# Patient Record
Sex: MALE | Race: WHITE | NOT HISPANIC OR LATINO | Employment: OTHER | URBAN - METROPOLITAN AREA
[De-identification: names, ages, dates, MRNs, and addresses within clinical notes are randomized per-mention and may not be internally consistent; named-entity substitution may affect disease eponyms.]

---

## 2017-01-10 ENCOUNTER — ANTI-COAG VISIT (OUTPATIENT)
Dept: CARDIOLOGY | Facility: CLINIC | Age: 77
End: 2017-01-10
Payer: MEDICARE

## 2017-01-10 DIAGNOSIS — Z79.01 LONG TERM (CURRENT) USE OF ANTICOAGULANTS: Primary | ICD-10-CM

## 2017-01-10 LAB
CTP QC/QA: ABNORMAL
INR PPP: 3.4 (ref 2–3)

## 2017-01-10 PROCEDURE — 85610 PROTHROMBIN TIME: CPT | Mod: QW,S$GLB,,

## 2017-01-10 PROCEDURE — 99211 OFF/OP EST MAY X REQ PHY/QHP: CPT | Mod: 25,S$GLB,,

## 2017-01-10 NOTE — PROGRESS NOTES
INR remains supra-therapeutic. No abnormal bleeding issues.  Will lower total weekly dose. Begin 2.5mg Tuesdays and Thursdays and 5mg all other days. Patient reports no bleeding or bruising, no new medications.  I reminded the patient to call with any problems, changes or questions before the next visit.

## 2017-01-10 NOTE — MR AVS SNAPSHOT
[facility]  [address]  [place] [address]  Phone: [phone]  Fax: [phone]                  [place] DEJA Biggs Jr.   1/10/2017 7:30 AM   Anti-coag visit    Description:  Male : 1940   Provider:  Ly Izaguirre PharmD   Department:  [facility]           Diagnoses this Visit        Comments    Long term (current) use of anticoagulants    -  Primary            To Do List           Future Appointments        Provider Department Dept Phone    1/10/2017 7:30 AM Ly Izaguirre PharmD [facility] [phone]    2017 7:30 AM Ly Izaguirre PharmD [facility] [phone]    2017 9:30 AM LAURA Carmona [facility] [phone]    3/16/2017 7:20 AM LABORATORY, [facility] [phone]    3/23/2017 8:20 AM Jeronimo Barbour NP [facility] [phone]      Goals (5 Years of Data)     None      [facility]     [facility] -  Assistance  Registered nurses in the [facility] provide clinical advisement, health education, appointment booking, and other advisory services.  Call for this free service at [phone].             Medications           Message regarding Medications     Verify the changes and/or additions to your medication regime listed below are the same as discussed with your clinician today.  If any of these changes or additions are incorrect, please notify your healthcare provider.             Verify that the below list of medications is an accurate representation of the medications you are currently taking.  If none reported, the list may be blank. If incorrect, please contact your healthcare provider. Carry this list with you in case of emergency.           Current Medications     amlodipine (NORVASC) 5 MG tablet Take 1 tablet (5 mg total) by mouth once daily.    atorvastatin (LIPITOR) 40 MG tablet TAKE ONE TABLET BY MOUTH DAILY     metoprolol succinate (TOPROL-XL) 25 MG 24 hr tablet TAKE 1 TABLET BY MOUTH TWICE DAILY    tadalafil (CIALIS) 20 MG Tab Take 1 tablet (20 mg total) by mouth daily as needed. 1 Tablet Oral Every other day    trazodone (DESYREL) 50 MG tablet     valsartan (DIOVAN) 160 MG tablet Take 1 tablet (160 mg total) by mouth once daily.    warfarin (COUMADIN) 5 MG tablet Take 1 tablet by mouth as directed by the Coumadin Clinic.    zolpidem (AMBIEN) 5 MG Tab TAKE 1 TABLET (5 MG TOTAL) BY MOUTH NIGHTLY AS NEEDED.           Clinical Reference Information           Allergies as of 1/10/2017     No Known Allergies      Immunizations Administered on Date of Encounter - 1/10/2017     None      Orders Placed During Today's Visit      Normal Orders This Visit    POCT PT/INR          1/10/2017  7:25 AM - Ly Izagiurre, PharmD      Component Results     Component Value Flag Ref Range Units Status    INR 3.4 (A) 2.0 - 3.0  Final     Acceptable           December 2016 Details    Sun Mon Tue Wed Thu Fri Sat         1               2               3                 4               5               6   3.1   2.5 mg   See details      7      5 mg         8      5 mg         9      5 mg         10      5 mg           11      5 mg         12      5 mg         13      5 mg         14      5 mg         15      5 mg         16      5 mg         17      5 mg           18      5 mg         19      5 mg         20      5 mg         21      5 mg         22      5 mg         23      5 mg         24      5 mg           25      5 mg         26      5 mg         27      5 mg         28      5 mg         29      5 mg         30      5 mg         31      5 mg          Date Details   12/06 Last INR check   INR: 3.1                 January 2017 Details    Sun Mon Tue Wed Thu Fri Sat     1      5 mg         2      5 mg         3      5 mg         4      5 mg         5      5 mg         6      5 mg         7      5 mg           8      5 mg          9      5 mg         10   3.4   2.5 mg   See details      11      5 mg         12      2.5 mg         13      5 mg         14      5 mg           15      5 mg         16      5 mg         17      2.5 mg         18      5 mg         19      2.5 mg         20      5 mg         21      5 mg           22      5 mg         23      5 mg         24      2.5 mg         25      5 mg         26      2.5 mg         27      5 mg         28      5 mg           29      5 mg         30      5 mg         31                 Date Details   01/10 This INR check   INR: 3.4       Date of next INR:  1/31/2017               How to take your warfarin dose     To take:  2.5 mg Take 0.5 of a 5 mg tablet.    To take:  5 mg Take 1 of the 5 mg tablets.           Anticoagulation Summary as of 1/10/2017     Maintenance plan 2.5 mg (5 mg x 0.5) on Tue, Thu; 5 mg (5 mg x 1) all other days    Full instructions 2.5 mg on Tue, Thu; 5 mg all other days    Next INR check 1/31/2017      Anticoagulation Episode Summary     Comments       Patient Findings     Negatives Signs/symptoms of thrombosis, Signs/symptoms of bleeding, Laboratory test error suspected, Change in health, Change in alcohol use, Change in activity, Upcoming invasive procedure, Emergency department visit, Upcoming dental procedure, Missed doses, Extra doses, Change in medications, Change in diet/appetite, Hospital admission, Bruising, Other complaints      [facility] Sign-Up     Activating your [facility] account is as easy as 1-2-3!     1) Visit [email or web address], select Sign Up Now, enter this activation code and your date of birth, then select Next.  [email or web address]  Expires: 2/24/2017  7:26 AM      2) Create a username and password to use when you visit [facility] in the future and select a security question in case you lose your password and select Next.    3) Enter your e-mail address and click Sign Up!    Additional Information  If you have questions, please e-mail  [email or web address] or call [phone] to talk to our [facility] staff. Remember, [facility] is NOT to be used for urgent needs. For medical emergencies, dial 911.

## 2017-01-31 ENCOUNTER — ANTI-COAG VISIT (OUTPATIENT)
Dept: CARDIOLOGY | Facility: CLINIC | Age: 77
End: 2017-01-31
Payer: MEDICARE

## 2017-01-31 DIAGNOSIS — Z79.01 LONG TERM (CURRENT) USE OF ANTICOAGULANTS: Primary | ICD-10-CM

## 2017-01-31 LAB — INR PPP: 2 (ref 2–3)

## 2017-01-31 PROCEDURE — 85610 PROTHROMBIN TIME: CPT | Mod: QW,S$GLB,,

## 2017-01-31 PROCEDURE — 99211 OFF/OP EST MAY X REQ PHY/QHP: CPT | Mod: 25,S$GLB,,

## 2017-01-31 RX ORDER — WARFARIN SODIUM 5 MG/1
TABLET ORAL
Qty: 30 TABLET | Refills: 3 | Status: SHIPPED | OUTPATIENT
Start: 2017-01-31 | End: 2017-06-20 | Stop reason: SDUPTHER

## 2017-01-31 NOTE — PROGRESS NOTES
INR today is 2.0. Will gently increase total weekly dose until follow-up. Patient reports no bleeding or bruising, no new medications and no diet changes.  I reminded the patient to call with any problems, changes or questions before the next visit.

## 2017-01-31 NOTE — MR AVS SNAPSHOT
[facility]  [address]  [place] [address]  Phone: [phone]  Fax: [phone]                  [place] DEJA Biggs Jr.   2017 7:30 AM   Anti-coag visit    Description:  Male : 1940   Provider:  Ly Izaguirre PharmD   Department:  [facility]           Diagnoses this Visit        Comments    Long term (current) use of anticoagulants    -  Primary            To Do List           Future Appointments        Provider Department Dept Phone    2017 7:30 AM Ly Izaguirre PharmD [facility] [phone]    2017 9:30 AM LAURA Carmona [facility] [phone]    3/7/2017 7:30 AM Ly Izaguirre PharmD [facility] [phone]    3/16/2017 7:20 AM LABORATORY, [facility] [phone]    3/23/2017 8:20 AM Jeronimo Barbour NP [facility] [phone]      Goals (5 Years of Data)     None      [facility]     [facility] -  Assistance  Registered nurses in the [facility] provide clinical advisement, health education, appointment booking, and other advisory services.  Call for this free service at [phone].             Medications           Message regarding Medications     Verify the changes and/or additions to your medication regime listed below are the same as discussed with your clinician today.  If any of these changes or additions are incorrect, please notify your healthcare provider.             Verify that the below list of medications is an accurate representation of the medications you are currently taking.  If none reported, the list may be blank. If incorrect, please contact your healthcare provider. Carry this list with you in case of emergency.           Current Medications     amlodipine (NORVASC) 5 MG tablet Take 1 tablet (5 mg total) by mouth once daily.    atorvastatin (LIPITOR) 40 MG tablet TAKE ONE TABLET BY MOUTH DAILY     metoprolol succinate (TOPROL-XL) 25 MG 24 hr tablet TAKE 1 TABLET BY MOUTH TWICE DAILY    tadalafil (CIALIS) 20 MG Tab Take 1 tablet (20 mg total) by mouth daily as needed. 1 Tablet Oral Every other day    trazodone (DESYREL) 50 MG tablet     valsartan (DIOVAN) 160 MG tablet Take 1 tablet (160 mg total) by mouth once daily.    warfarin (COUMADIN) 5 MG tablet Take 1 tablet by mouth as directed by the Coumadin Clinic.    zolpidem (AMBIEN) 5 MG Tab TAKE 1 TABLET (5 MG TOTAL) BY MOUTH NIGHTLY AS NEEDED.           Clinical Reference Information           Allergies as of 1/31/2017     No Known Allergies      Immunizations Administered on Date of Encounter - 1/31/2017     None      Orders Placed During Today's Visit      Normal Orders This Visit    POCT INR          1/31/2017  7:28 AM - Amy AlvaradoD      Component Results     Component Value Flag Ref Range Units Status    INR 2.0  2.0 - 3.0  Final      January 2017 Details    Sun Mon Tue Wed Thu Fri Sat     1      5 mg         2      5 mg         3      5 mg         4      5 mg         5      5 mg         6      5 mg         7      5 mg           8      5 mg         9      5 mg         10   3.4   2.5 mg   See details      11      5 mg         12      2.5 mg         13      5 mg         14      5 mg           15      5 mg         16      5 mg         17      2.5 mg         18      5 mg         19      2.5 mg         20      5 mg         21      5 mg           22      5 mg         23      5 mg         24      2.5 mg         25      5 mg         26      2.5 mg         27      5 mg         28      5 mg           29      5 mg         30      5 mg         31   2.0   5 mg   See details           Date Details   01/10 Last INR check   INR: 3.4      01/31 This INR check   INR: 2.0                     How to take your warfarin dose     To take:  5 mg Take 1 of the 5 mg tablets.           February 2017 Details    Sun Mon Tue Wed Thu Fri Sat        1      5 mg         2       2.5 mg         3      5 mg         4      5 mg           5      5 mg         6      5 mg         7      5 mg         8      5 mg         9      2.5 mg         10      5 mg         11      5 mg           12      5 mg         13      5 mg         14      5 mg         15      5 mg         16      2.5 mg         17      5 mg         18      5 mg           19      5 mg         20      5 mg         21      5 mg         22      5 mg         23      2.5 mg         24      5 mg         25      5 mg           26      5 mg         27      5 mg         28      5 mg              Date Details   No additional details            How to take your warfarin dose     To take:  2.5 mg Take 0.5 of a 5 mg tablet.    To take:  5 mg Take 1 of the 5 mg tablets.           March 2017 Details    Sun Mon Tue Wed Thu Fri Sat        1      5 mg         2      2.5 mg         3      5 mg         4      5 mg           5      5 mg         6      5 mg         7            8               9               10               11                 12               13               14               15               16               17               18                 19               20               21               22               23               24               25                 26               27               28               29               30               31                 Date Details   No additional details    Date of next INR:  3/7/2017         How to take your warfarin dose     To take:  2.5 mg Take 0.5 of a 5 mg tablet.    To take:  5 mg Take 1 of the 5 mg tablets.           Anticoagulation Summary as of 1/31/2017     Maintenance plan 2.5 mg (5 mg x 0.5) on Thu; 5 mg (5 mg x 1) all other days    Full instructions 2.5 mg on Thu; 5 mg all other days    Next INR check 3/7/2017      Anticoagulation Episode Summary     Comments       [facility] Sign-Up     Activating your [facility] account is as easy as 1-2-3!     1) Visit [email or web address], select  Sign Up Now, enter this activation code and your date of birth, then select Next.  [email or web address]  Expires: 2/24/2017  7:26 AM      2) Create a username and password to use when you visit [facility] in the future and select a security question in case you lose your password and select Next.    3) Enter your e-mail address and click Sign Up!    Additional Information  If you have questions, please e-mail [email or web address] or call [phone] to talk to our [facility] staff. Remember, [facility] is NOT to be used for urgent needs. For medical emergencies, dial 911.

## 2017-02-07 RX ORDER — AMLODIPINE BESYLATE 5 MG/1
5 TABLET ORAL DAILY
Qty: 30 TABLET | Refills: 6 | Status: SHIPPED | OUTPATIENT
Start: 2017-02-07 | End: 2017-09-14 | Stop reason: SDUPTHER

## 2017-02-14 RX ORDER — VALSARTAN 160 MG/1
160 TABLET ORAL DAILY
Qty: 30 TABLET | Refills: 11 | Status: SHIPPED | OUTPATIENT
Start: 2017-02-14 | End: 2018-02-12 | Stop reason: SDUPTHER

## 2017-02-27 ENCOUNTER — OFFICE VISIT (OUTPATIENT)
Dept: CARDIOLOGY | Facility: CLINIC | Age: 77
End: 2017-02-27
Payer: MEDICARE

## 2017-02-27 VITALS
DIASTOLIC BLOOD PRESSURE: 66 MMHG | HEIGHT: 67 IN | HEART RATE: 72 BPM | SYSTOLIC BLOOD PRESSURE: 120 MMHG | WEIGHT: 193.44 LBS | BODY MASS INDEX: 30.36 KG/M2

## 2017-02-27 DIAGNOSIS — E78.00 PURE HYPERCHOLESTEROLEMIA: ICD-10-CM

## 2017-02-27 DIAGNOSIS — I25.10 ATHEROSCLEROSIS OF NATIVE CORONARY ARTERY WITHOUT ANGINA PECTORIS, UNSPECIFIED WHETHER NATIVE OR TRANSPLANTED HEART: ICD-10-CM

## 2017-02-27 DIAGNOSIS — I10 ESSENTIAL HYPERTENSION: Primary | ICD-10-CM

## 2017-02-27 DIAGNOSIS — I48.91 ATRIAL FIBRILLATION, UNSPECIFIED TYPE: ICD-10-CM

## 2017-02-27 PROCEDURE — 3074F SYST BP LT 130 MM HG: CPT | Mod: S$GLB,,, | Performed by: NURSE PRACTITIONER

## 2017-02-27 PROCEDURE — 1160F RVW MEDS BY RX/DR IN RCRD: CPT | Mod: S$GLB,,, | Performed by: NURSE PRACTITIONER

## 2017-02-27 PROCEDURE — 1159F MED LIST DOCD IN RCRD: CPT | Mod: S$GLB,,, | Performed by: NURSE PRACTITIONER

## 2017-02-27 PROCEDURE — 3078F DIAST BP <80 MM HG: CPT | Mod: S$GLB,,, | Performed by: NURSE PRACTITIONER

## 2017-02-27 PROCEDURE — 1157F ADVNC CARE PLAN IN RCRD: CPT | Mod: S$GLB,,, | Performed by: NURSE PRACTITIONER

## 2017-02-27 PROCEDURE — 99499 UNLISTED E&M SERVICE: CPT | Mod: S$GLB,,, | Performed by: NURSE PRACTITIONER

## 2017-02-27 PROCEDURE — 99999 PR PBB SHADOW E&M-EST. PATIENT-LVL III: CPT | Mod: PBBFAC,,, | Performed by: NURSE PRACTITIONER

## 2017-02-27 PROCEDURE — 93000 ELECTROCARDIOGRAM COMPLETE: CPT | Mod: S$GLB,,, | Performed by: INTERNAL MEDICINE

## 2017-02-27 PROCEDURE — 99214 OFFICE O/P EST MOD 30 MIN: CPT | Mod: S$GLB,,, | Performed by: NURSE PRACTITIONER

## 2017-02-27 PROCEDURE — 1126F AMNT PAIN NOTED NONE PRSNT: CPT | Mod: S$GLB,,, | Performed by: NURSE PRACTITIONER

## 2017-02-27 RX ORDER — ASPIRIN 81 MG/1
81 TABLET ORAL DAILY
Status: ON HOLD | COMMUNITY
End: 2023-03-03 | Stop reason: HOSPADM
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
## 2017-02-27 NOTE — MR AVS SNAPSHOT
[facility]  [address] [facility] [address]  Phone: [phone]  Fax: [phone]                  [place] DEJA Biggs Jr.   2017 9:30 AM   Office Visit    Description:  Male : 1940   Provider:  LAURA Carmona   Department:  [facility]           Reason for Visit     Atrial Fibrillation     Hypertension           Diagnoses this Visit        Comments    Essential hypertension    -  Primary     Atherosclerosis of native coronary artery without angina pectoris, unspecified whether native or transplanted heart         Atrial fibrillation, unspecified type         Pure hypercholesterolemia                To Do List           Future Appointments        Provider Department Dept Phone    3/7/2017 7:30 AM Ly Izaguirre, PharmD '[facility] Coumadin [phone]    3/16/2017 7:20 AM LABORATORY, [facility] [phone]    3/23/2017 8:20 AM Jeronimo Barbour [facility] Internal Medicine [phone]    10/24/2017 8:30 AM Ryan Bermeo MD [facility] - Ophthalmology [phone]      Goals (5 Years of Data)     None      Follow-Up and Disposition     Return in about 6 months (around 2017) for HTN and A-fib .      [facility] On Call     [facility] On Call Nurse Care Line -  Assistance  Registered nurses in the [facility] On Call Center provide clinical advisement, health education, appointment booking, and other advisory services.  Call for this free service at [phone].             Medications           Message regarding Medications     Verify the changes and/or additions to your medication regime listed below are the same as discussed with your clinician today.  If any of these changes or additions are incorrect, please notify your healthcare provider.        STOP taking these medications     trazodone (DESYREL) 50 MG tablet     zolpidem (AMBIEN) 5 MG Tab TAKE 1 TABLET (5 MG TOTAL) BY MOUTH NIGHTLY AS NEEDED.           Verify that the  below list of medications is an accurate representation of the medications you are currently taking.  If none reported, the list may be blank. If incorrect, please contact your healthcare provider. Carry this list with you in case of emergency.           Current Medications     amlodipine (NORVASC) 5 MG tablet Take 1 tablet (5 mg total) by mouth once daily.    aspirin (ECOTRIN) 81 MG EC tablet Take 81 mg by mouth once daily.    atorvastatin (LIPITOR) 40 MG tablet TAKE ONE TABLET BY MOUTH DAILY    metoprolol succinate (TOPROL-XL) 25 MG 24 hr tablet TAKE 1 TABLET BY MOUTH TWICE DAILY    tadalafil (CIALIS) 20 MG Tab Take 1 tablet (20 mg total) by mouth daily as needed. 1 Tablet Oral Every other day    valsartan (DIOVAN) 160 MG tablet Take 1 tablet (160 mg total) by mouth once daily.    warfarin (COUMADIN) 5 MG tablet Take 1/2 tablet on Thursdays and 1 tablet all other days as directed by the Coumadin Clinic.           Clinical Reference Information           Your Vitals Were     BP                   120/66 (BP Location: Right arm, Patient Position: Sitting, BP Method: Manual)           Blood Pressure          Most Recent Value    BP  120/66      Allergies as of 2/27/2017     No Known Allergies      Immunizations Administered on Date of Encounter - 2/27/2017     None      Orders Placed During Today's Visit     Future Labs/Procedures Expected by Expires    EKG 12-lead  As directed 2/27/2018      [facility] Sign-Up     Activating your [facility] account is as easy as 1-2-3!     1) Visit [email or web address], select Sign Up Now, enter this activation code and your date of birth, then select Next.  [email or web address]  Expires: 4/13/2017  9:35 AM      2) Create a username and password to use when you visit [facility] in the future and select a security question in case you lose your password and select Next.    3) Enter your e-mail address and click Sign Up!    Additional Information  If you have questions, please e-mail  [email or web address] or call [phone] to talk to our [facility] staff. Remember, [facility] is NOT to be used for urgent needs. For medical emergencies, dial 911.         Language Assistance Services     ATTENTION: Language assistance services are available, free of charge. Please call [phone].      ATENCIÓN: Si habla español, tiene a schmidt disposición servicios gratuitos de asistencia lingüística. Llame al [phone].     CHÚ Ý: N?u b?n nói Ti?ng Vi?t, có các d?ch v? h? tr? ngôn ng? mi?n phí dành cho b?n. G?i s? [phone].         [facility] complies with applicable Federal civil rights laws and does not discriminate on the basis of race, color, national origin, age, disability, or sex.

## 2017-03-07 ENCOUNTER — ANTI-COAG VISIT (OUTPATIENT)
Dept: CARDIOLOGY | Facility: CLINIC | Age: 77
End: 2017-03-07
Payer: MEDICARE

## 2017-03-07 DIAGNOSIS — Z79.01 LONG TERM (CURRENT) USE OF ANTICOAGULANTS: Primary | ICD-10-CM

## 2017-03-07 LAB — INR PPP: 2.5 (ref 2–3)

## 2017-03-07 PROCEDURE — 85610 PROTHROMBIN TIME: CPT | Mod: QW,S$GLB,,

## 2017-03-07 PROCEDURE — 99211 OFF/OP EST MAY X REQ PHY/QHP: CPT | Mod: 25,S$GLB,,

## 2017-03-07 NOTE — PROGRESS NOTES
INR is now therapeutic. C/o some pains from repairing his home. No other compliants and no changes in medications. Continue dose and diet until follow-up.

## 2017-03-07 NOTE — MR AVS SNAPSHOT
[facility]  [address]  [place] [address]  Phone: [phone]  Fax: [phone]                  [place] DEJA Biggs Jr.   3/7/2017 7:30 AM   Anti-coag visit    Description:  Male : 1940   Provider:  Ly Izaguirre PharmD   Department:  [facility]           Diagnoses this Visit        Comments    Long term (current) use of anticoagulants    -  Primary            To Do List           Future Appointments        Provider Department Dept Phone    3/7/2017 7:30 AM Ly Izaguirre PharmD [facility] [phone]    3/16/2017 7:20 AM LABORATORY, [facility] [phone]    3/23/2017 8:20 AM Jeronimo Barbour NP [place] - Internal Medicine [phone]    2017 7:30 AM Ly Izaguirre PharmD [facility] [phone]    10/24/2017 8:30 AM Ryan Bermeo MD [facility] [phone]      Goals (5 Years of Data)     None      [facility] On [facility]     [facility] On Call Nurse [facility] -  Assistance  Registered nurses in the [facility] Center provide clinical advisement, health education, appointment booking, and other advisory services.  Call for this free service at [phone].             Medications           Message regarding Medications     Verify the changes and/or additions to your medication regime listed below are the same as discussed with your clinician today.  If any of these changes or additions are incorrect, please notify your healthcare provider.             Verify that the below list of medications is an accurate representation of the medications you are currently taking.  If none reported, the list may be blank. If incorrect, please contact your healthcare provider. Carry this list with you in case of emergency.           Current Medications     amlodipine (NORVASC) 5 MG tablet Take 1 tablet (5 mg total) by mouth once daily.    aspirin (ECOTRIN) 81 MG EC tablet Take 81 mg by mouth once daily.     atorvastatin (LIPITOR) 40 MG tablet TAKE ONE TABLET BY MOUTH DAILY    metoprolol succinate (TOPROL-XL) 25 MG 24 hr tablet TAKE 1 TABLET BY MOUTH TWICE DAILY    tadalafil (CIALIS) 20 MG Tab Take 1 tablet (20 mg total) by mouth daily as needed. 1 Tablet Oral Every other day    valsartan (DIOVAN) 160 MG tablet Take 1 tablet (160 mg total) by mouth once daily.    warfarin (COUMADIN) 5 MG tablet Take 1/2 tablet on Thursdays and 1 tablet all other days as directed by the Coumadin Clinic.           Clinical Reference Information           Allergies as of 3/7/2017     No Known Allergies      Immunizations Administered on Date of Encounter - 3/7/2017     None      Orders Placed During Today's Visit      Normal Orders This Visit    POCT INR          3/7/2017  7:21 AM - Ly Izaguirre, PharmD      Component Results     Component Value Flag Ref Range Units Status    INR 2.5  2.0 - 3.0  Final      January 2017 Details    Sun Mon Tue Wed Thu Fri Sat     1               2               3               4               5               6               7                 8               9               10               11               12               13               14                 15               16               17               18               19               20               21                 22               23               24               25               26               27               28                 29               30               31   2.0   5 mg   See details           Date Details   01/31 Last INR check   INR: 2.0                 February 2017 Details    Sun Mon Tue Wed Thu Fri Sat        1      5 mg         2      2.5 mg         3      5 mg         4      5 mg           5      5 mg         6      5 mg         7      5 mg         8      5 mg         9      2.5 mg         10      5 mg         11      5 mg           12      5 mg         13      5 mg         14      5 mg         15      5 mg         16       2.5 mg         17      5 mg         18      5 mg           19      5 mg         20      5 mg         21      5 mg         22      5 mg         23      2.5 mg         24      5 mg         25      5 mg           26      5 mg         27      5 mg         28      5 mg              Date Details   No additional details              March 2017 Details    Sun Mon Tue Wed Thu Fri Sat        1      5 mg         2      2.5 mg         3      5 mg         4      5 mg           5      5 mg         6      5 mg         7   2.5   5 mg   See details      8      5 mg         9      2.5 mg         10      5 mg         11      5 mg           12      5 mg         13      5 mg         14      5 mg         15      5 mg         16      2.5 mg         17      5 mg         18      5 mg           19      5 mg         20      5 mg         21      5 mg         22      5 mg         23      2.5 mg         24      5 mg         25      5 mg           26      5 mg         27      5 mg         28      5 mg         29      5 mg         30      2.5 mg         31      5 mg           Date Details   03/07 This INR check   INR: 2.5                     How to take your warfarin dose     To take:  2.5 mg Take 0.5 of a 5 mg tablet.    To take:  5 mg Take 1 of the 5 mg tablets.           April 2017 Details    Sun Mon Tue Wed Thu Fri Sat           1      5 mg           2      5 mg         3      5 mg         4            5               6               7               8                 9               10               11               12               13               14               15                 16               17               18               19               20               21               22                 23               24               25               26               27               28               29                 30                      Date Details   No additional details    Date of next INR:  4/4/2017         How to take your  warfarin dose     To take:  5 mg Take 1 of the 5 mg tablets.           Anticoagulation Summary as of 3/7/2017     Maintenance plan 2.5 mg (5 mg x 0.5) on Thu; 5 mg (5 mg x 1) all other days    Full instructions 2.5 mg on Thu; 5 mg all other days    Next INR check 4/4/2017      Anticoagulation Episode Summary     Comments       Patient Findings     Negatives Signs/symptoms of thrombosis, Signs/symptoms of bleeding, Laboratory test error suspected, Change in health, Change in alcohol use, Change in activity, Upcoming invasive procedure, Emergency department visit, Upcoming dental procedure, Missed doses, Extra doses, Change in medications, Change in diet/appetite, Hospital admission, Bruising, Other complaints      [facility] Sign-Up     Activating your [facility] account is as easy as 1-2-3!     1) Visit [email or web address], select Sign Up Now, enter this activation code and your date of birth, then select Next.  [email or web address]  Expires: 4/13/2017  9:35 AM      2) Create a username and password to use when you visit [facility] in the future and select a security question in case you lose your password and select Next.    3) Enter your e-mail address and click Sign Up!    Additional Information  If you have questions, please e-mail [email or web address] or call [phone] to talk to our [facility] staff. Remember, [facility] is NOT to be used for urgent needs. For medical emergencies, dial 911.         Language Assistance Services     ATTENTION: Language assistance services are available, free of charge. Please call [phone].      ATENCIÓN: Si habla español, tiene a schmidt disposición servicios gratuitos de asistencia lingüística. Llame al [phone].     CHÚ Ý: N?u b?n nói Ti?ng Vi?t, có các d?ch v? h? tr? ngôn ng? mi?n phí dành cho b?n. G?i s? [phone].         [facility] complies with applicable Federal civil rights laws and does not discriminate on the basis of race, color, national origin, age,  disability, or sex.

## 2017-03-15 DIAGNOSIS — E78.00 PURE HYPERCHOLESTEROLEMIA: Primary | ICD-10-CM

## 2017-03-15 RX ORDER — ATORVASTATIN CALCIUM 40 MG/1
TABLET, FILM COATED ORAL
Qty: 30 TABLET | Refills: 1 | Status: ON HOLD | OUTPATIENT
Start: 2017-03-15 | End: 2017-05-16 | Stop reason: HOSPADM

## 2017-03-15 RX ORDER — ATORVASTATIN CALCIUM 40 MG/1
40 TABLET, FILM COATED ORAL DAILY
Qty: 30 TABLET | Refills: 2 | Status: SHIPPED | OUTPATIENT
Start: 2017-03-15 | End: 2017-06-11 | Stop reason: SDUPTHER

## 2017-03-17 ENCOUNTER — LAB VISIT (OUTPATIENT)
Dept: LAB | Facility: HOSPITAL | Age: 77
End: 2017-03-17
Attending: INTERNAL MEDICINE
Payer: MEDICARE

## 2017-03-17 DIAGNOSIS — I10 ESSENTIAL HYPERTENSION: ICD-10-CM

## 2017-03-17 DIAGNOSIS — Z85.46 HISTORY OF PROSTATE CANCER: ICD-10-CM

## 2017-03-17 LAB
ALBUMIN SERPL BCP-MCNC: 4 G/DL
ALP SERPL-CCNC: 61 U/L
ALT SERPL W/O P-5'-P-CCNC: 36 U/L
ANION GAP SERPL CALC-SCNC: 7 MMOL/L
AST SERPL-CCNC: 30 U/L
BASOPHILS # BLD AUTO: 0.01 K/UL
BASOPHILS NFR BLD: 0.1 %
BILIRUB SERPL-MCNC: 0.9 MG/DL
BUN SERPL-MCNC: 17 MG/DL
CALCIUM SERPL-MCNC: 9.6 MG/DL
CHLORIDE SERPL-SCNC: 103 MMOL/L
CHOLEST/HDLC SERPL: 2.8 {RATIO}
CO2 SERPL-SCNC: 27 MMOL/L
COMPLEXED PSA SERPL-MCNC: <0.01 NG/ML
CREAT SERPL-MCNC: 0.9 MG/DL
DIFFERENTIAL METHOD: NORMAL
EOSINOPHIL # BLD AUTO: 0.2 K/UL
EOSINOPHIL NFR BLD: 3.2 %
ERYTHROCYTE [DISTWIDTH] IN BLOOD BY AUTOMATED COUNT: 12.9 %
EST. GFR  (AFRICAN AMERICAN): >60 ML/MIN/1.73 M^2
EST. GFR  (NON AFRICAN AMERICAN): >60 ML/MIN/1.73 M^2
GLUCOSE SERPL-MCNC: 94 MG/DL
HCT VFR BLD AUTO: 44.1 %
HDL/CHOLESTEROL RATIO: 35.4 %
HDLC SERPL-MCNC: 161 MG/DL
HDLC SERPL-MCNC: 57 MG/DL
HGB BLD-MCNC: 14.8 G/DL
LDLC SERPL CALC-MCNC: 91.4 MG/DL
LYMPHOCYTES # BLD AUTO: 2.9 K/UL
LYMPHOCYTES NFR BLD: 40 %
MCH RBC QN AUTO: 30.7 PG
MCHC RBC AUTO-ENTMCNC: 33.6 %
MCV RBC AUTO: 92 FL
MONOCYTES # BLD AUTO: 0.7 K/UL
MONOCYTES NFR BLD: 10.2 %
NEUTROPHILS # BLD AUTO: 3.4 K/UL
NEUTROPHILS NFR BLD: 46.2 %
NONHDLC SERPL-MCNC: 104 MG/DL
PLATELET # BLD AUTO: 243 K/UL
PMV BLD AUTO: 10.9 FL
POTASSIUM SERPL-SCNC: 4.8 MMOL/L
PROT SERPL-MCNC: 7.1 G/DL
RBC # BLD AUTO: 4.82 M/UL
SODIUM SERPL-SCNC: 137 MMOL/L
TRIGL SERPL-MCNC: 63 MG/DL
TSH SERPL DL<=0.005 MIU/L-ACNC: 1.06 UIU/ML
WBC # BLD AUTO: 7.25 K/UL

## 2017-03-17 PROCEDURE — 80053 COMPREHEN METABOLIC PANEL: CPT

## 2017-03-17 PROCEDURE — 36415 COLL VENOUS BLD VENIPUNCTURE: CPT | Mod: PO

## 2017-03-17 PROCEDURE — 80061 LIPID PANEL: CPT

## 2017-03-17 PROCEDURE — 84153 ASSAY OF PSA TOTAL: CPT

## 2017-03-17 PROCEDURE — 85025 COMPLETE CBC W/AUTO DIFF WBC: CPT

## 2017-03-17 PROCEDURE — 84443 ASSAY THYROID STIM HORMONE: CPT

## 2017-03-23 ENCOUNTER — OFFICE VISIT (OUTPATIENT)
Dept: INTERNAL MEDICINE | Facility: CLINIC | Age: 77
End: 2017-03-23
Payer: MEDICARE

## 2017-03-23 VITALS
WEIGHT: 196.88 LBS | HEART RATE: 72 BPM | DIASTOLIC BLOOD PRESSURE: 84 MMHG | BODY MASS INDEX: 30.9 KG/M2 | HEIGHT: 67 IN | SYSTOLIC BLOOD PRESSURE: 110 MMHG

## 2017-03-23 DIAGNOSIS — I10 ESSENTIAL HYPERTENSION: Primary | ICD-10-CM

## 2017-03-23 DIAGNOSIS — E78.00 PURE HYPERCHOLESTEROLEMIA: ICD-10-CM

## 2017-03-23 PROCEDURE — 1157F ADVNC CARE PLAN IN RCRD: CPT | Mod: S$GLB,,, | Performed by: NURSE PRACTITIONER

## 2017-03-23 PROCEDURE — 3079F DIAST BP 80-89 MM HG: CPT | Mod: S$GLB,,, | Performed by: NURSE PRACTITIONER

## 2017-03-23 PROCEDURE — 99214 OFFICE O/P EST MOD 30 MIN: CPT | Mod: S$GLB,,, | Performed by: NURSE PRACTITIONER

## 2017-03-23 PROCEDURE — 1126F AMNT PAIN NOTED NONE PRSNT: CPT | Mod: S$GLB,,, | Performed by: NURSE PRACTITIONER

## 2017-03-23 PROCEDURE — 1159F MED LIST DOCD IN RCRD: CPT | Mod: S$GLB,,, | Performed by: NURSE PRACTITIONER

## 2017-03-23 PROCEDURE — 1160F RVW MEDS BY RX/DR IN RCRD: CPT | Mod: S$GLB,,, | Performed by: NURSE PRACTITIONER

## 2017-03-23 PROCEDURE — 3074F SYST BP LT 130 MM HG: CPT | Mod: S$GLB,,, | Performed by: NURSE PRACTITIONER

## 2017-03-23 PROCEDURE — 99999 PR PBB SHADOW E&M-EST. PATIENT-LVL III: CPT | Mod: PBBFAC,,, | Performed by: NURSE PRACTITIONER

## 2017-03-23 NOTE — MR AVS SNAPSHOT
[facility]  [address] [place] [address]  Phone: [phone]                  [place] DEJA Biggs Jr.   3/23/2017 8:20 AM   Office Visit    Description:  Male : 1940   Provider:  Jeronimo Barbour NP   Department:  [facility]           Diagnoses this Visit        Comments    Essential hypertension    -  Primary     Pure hypercholesterolemia                To Do List           Future Appointments        Provider Department Dept Phone    2017 7:30 AM Ly Izaguirre, PharmD [facility] [phone]    2017 9:00 AM LABORATORY, [facility] [phone]    2017 8:20 AM Giuliano Moran MD [facility] Internal Medicine [phone]    10/24/2017 8:30 AM Ryan Bermeo MD [facility] [phone]      Goals (5 Years of Data)     None      Follow-Up and Disposition     Return in about 6 months (around 2017).      [facility] On Call     [facility] On Call Nurse [facility] -  Assistance  Registered nurses in the [facility] On Call Center provide clinical advisement, health education, appointment booking, and other advisory services.  Call for this free service at [phone].             Medications           Message regarding Medications     Verify the changes and/or additions to your medication regime listed below are the same as discussed with your clinician today.  If any of these changes or additions are incorrect, please notify your healthcare provider.             Verify that the below list of medications is an accurate representation of the medications you are currently taking.  If none reported, the list may be blank. If incorrect, please contact your healthcare provider. Carry this list with you in case of emergency.           Current Medications     amlodipine (NORVASC) 5 MG tablet Take 1 tablet (5 mg total) by mouth once daily.    aspirin (ECOTRIN) 81 MG EC tablet Take 81 mg by mouth once daily.     "atorvastatin (LIPITOR) 40 MG tablet TAKE ONE TABLET BY MOUTH DAILY    atorvastatin (LIPITOR) 40 MG tablet Take 1 tablet (40 mg total) by mouth once daily.    metoprolol succinate (TOPROL-XL) 25 MG 24 hr tablet TAKE 1 TABLET BY MOUTH TWICE DAILY    tadalafil (CIALIS) 20 MG Tab Take 1 tablet (20 mg total) by mouth daily as needed. 1 Tablet Oral Every other day    valsartan (DIOVAN) 160 MG tablet Take 1 tablet (160 mg total) by mouth once daily.    warfarin (COUMADIN) 5 MG tablet Take 1/2 tablet on Thursdays and 1 tablet all other days as directed by the Coumadin Clinic.           Clinical Reference Information           Your Vitals Were     BP Pulse Height Weight BMI    110/84 72 5' 7" (1.702 m) 89.3 kg (196 lb 13.9 oz) 30.83 kg/m2      Blood Pressure          Most Recent Value    BP  110/84      Allergies as of 3/23/2017     No Known Allergies      Immunizations Administered on Date of Encounter - 3/23/2017     None      Orders Placed During Today's Visit     Future Labs/Procedures Expected by Expires    Comprehensive metabolic panel  9/19/2017 (Approximate) 5/22/2018    Lipid panel  9/19/2017 (Approximate) 5/22/2018      [facility] Sign-Up     Activating your [facility] account is as easy as 1-2-3!     1) Visit [email or web address], select Sign Up Now, enter this activation code and your date of birth, then select Next.  [email or web address]  Expires: 4/13/2017 10:35 AM      2) Create a username and password to use when you visit [facility] in the future and select a security question in case you lose your password and select Next.    3) Enter your e-mail address and click Sign Up!    Additional Information  If you have questions, please e-mail [email or web address] or call [phone] to talk to our [facility] staff. Remember, [facility] is NOT to be used for urgent needs. For medical emergencies, dial 911.         Language Assistance Services     ATTENTION: Language assistance services are available, free of charge. Please " call [phone].      ATENCIÓN: Si habla español, tiene a schmidt disposición servicios gratuitos de asistencia lingüística. Llame al [phone].     CHÚ Ý: N?u b?n nói Ti?ng Vi?t, có các d?ch v? h? tr? ngôn ng? mi?n phí dành cho b?n. G?i s? [phone].         [facility] complies with applicable Federal civil rights laws and does not discriminate on the basis of race, color, national origin, age, disability, or sex.

## 2017-03-23 NOTE — PROGRESS NOTES
"Subjective:      Patient ID: Anthony Biggs Jr. is a 76 y.o. male.    Chief Complaint: No chief complaint on file.    HPI:  Patient is here for a follow up of his labs.  He is taking his meds as ordered, his BP is well-controlled.  Labs are acceptable.      Past Medical History:   Diagnosis Date    *Atrial fibrillation     Abnormal CXR     Atrial fibrillation     Coronary atherosclerosis of unspecified type of vessel, native or graft     History of prostate cancer 2007    prostatectomy    Pure hypercholesterolemia     Unspecified essential hypertension        Past Surgical History:   Procedure Laterality Date    ABDOMINAL HERNIA REPAIR      APPENDECTOMY      bladder sx      CARDIAC CATHETERIZATION      CATARACT EXTRACTION W/  INTRAOCULAR LENS IMPLANT  Restor OU    inguinal hernia      lung sx      PROSTATE SURGERY         Lab Results   Component Value Date    WBC 7.25 03/17/2017    HGB 14.8 03/17/2017    HCT 44.1 03/17/2017     03/17/2017    CHOL 161 03/17/2017    TRIG 63 03/17/2017    HDL 57 03/17/2017    ALT 36 03/17/2017    AST 30 03/17/2017     03/17/2017    K 4.8 03/17/2017     03/17/2017    CREATININE 0.9 03/17/2017    BUN 17 03/17/2017    CO2 27 03/17/2017    TSH 1.064 03/17/2017    PSA <0.010 08/20/2013    INR 2.5 03/07/2017       /84  Pulse 72  Ht 5' 7" (1.702 m)  Wt 89.3 kg (196 lb 13.9 oz)  BMI 30.83 kg/m2      Review of Systems   Constitutional: Negative for appetite change, chills, diaphoresis and fever.   HENT: Negative for congestion, ear pain, postnasal drip, rhinorrhea, sneezing, sore throat and trouble swallowing.    Eyes: Negative for photophobia, pain and visual disturbance.   Respiratory: Negative for apnea, cough, choking, chest tightness, shortness of breath and wheezing.    Cardiovascular: Negative for chest pain, palpitations and leg swelling.   Gastrointestinal: Negative for abdominal pain, constipation, diarrhea, nausea and vomiting. "   Genitourinary: Negative for decreased urine volume, difficulty urinating, dysuria, hematuria and urgency.   Musculoskeletal: Negative for arthralgias, gait problem, joint swelling and myalgias.   Skin: Negative for rash.   Neurological: Negative for dizziness, tremors, seizures, syncope, weakness, light-headedness, numbness and headaches.   Psychiatric/Behavioral: Negative for agitation, confusion, decreased concentration, hallucinations and sleep disturbance. The patient is not nervous/anxious.       Objective:     Physical Exam   Constitutional: He is oriented to person, place, and time. He appears well-developed and well-nourished. No distress.   Musculoskeletal:   Normal gait   Neurological: He is alert and oriented to person, place, and time.   Skin: Skin is warm and dry.   Psychiatric: He has a normal mood and affect. His behavior is normal.     Assessment:      1. Essential hypertension    2. Pure hypercholesterolemia      Plan:   Essential hypertension  -     Comprehensive metabolic panel; Future; Expected date: 9/19/17    Pure hypercholesterolemia  -     Lipid panel; Future; Expected date: 9/19/17      Meds remain the same.  Will see dr guidry in 6 months for labs and a physical    Current Outpatient Prescriptions:     amlodipine (NORVASC) 5 MG tablet, Take 1 tablet (5 mg total) by mouth once daily., Disp: 30 tablet, Rfl: 6    aspirin (ECOTRIN) 81 MG EC tablet, Take 81 mg by mouth once daily., Disp: , Rfl:     atorvastatin (LIPITOR) 40 MG tablet, TAKE ONE TABLET BY MOUTH DAILY, Disp: 30 tablet, Rfl: 1    atorvastatin (LIPITOR) 40 MG tablet, Take 1 tablet (40 mg total) by mouth once daily., Disp: 30 tablet, Rfl: 2    metoprolol succinate (TOPROL-XL) 25 MG 24 hr tablet, TAKE 1 TABLET BY MOUTH TWICE DAILY, Disp: 60 tablet, Rfl: 9    tadalafil (CIALIS) 20 MG Tab, Take 1 tablet (20 mg total) by mouth daily as needed. 1 Tablet Oral Every other day, Disp: 90 tablet, Rfl: 1    valsartan (DIOVAN) 160 MG  tablet, Take 1 tablet (160 mg total) by mouth once daily., Disp: 30 tablet, Rfl: 11    warfarin (COUMADIN) 5 MG tablet, Take 1/2 tablet on Thursdays and 1 tablet all other days as directed by the Coumadin Clinic., Disp: 30 tablet, Rfl: 3

## 2017-04-04 ENCOUNTER — ANTI-COAG VISIT (OUTPATIENT)
Dept: CARDIOLOGY | Facility: CLINIC | Age: 77
End: 2017-04-04
Payer: MEDICARE

## 2017-04-04 DIAGNOSIS — Z79.01 LONG TERM (CURRENT) USE OF ANTICOAGULANTS: Primary | ICD-10-CM

## 2017-04-04 LAB — INR PPP: 1.3 (ref 2–3)

## 2017-04-04 PROCEDURE — 99211 OFF/OP EST MAY X REQ PHY/QHP: CPT | Mod: 25,S$GLB,,

## 2017-04-04 PROCEDURE — 85610 PROTHROMBIN TIME: CPT | Mod: QW,S$GLB,,

## 2017-04-04 NOTE — PROGRESS NOTES
INR today is 1.3. Patient denies missed doses. He reports high vitamin k over the weekend. Will increase this week's dose and repeat INR in 1 week.

## 2017-04-04 NOTE — MR AVS SNAPSHOT
[facility]  [address] [facility]  [place] [address]  Phone: [phone]  Fax: [phone]                  [place] DEJA Biggs Jr.   2017 7:30 AM   Anti-coag visit    Description:  Male : 1940   Provider:  Ly Izaguirre PharmD   Department:  [facility]           Diagnoses this Visit        Comments    Long term (current) use of anticoagulants    -  Primary            To Do List           Future Appointments        Provider Department Dept Phone    2017 7:30 AM Amy AlvaradoD [facility] [phone]    2017 9:00 AM LABORATORY, [facility] [phone]    2017 8:20 AM Giuliano Moran MD [facility] Internal Medicine [phone]    10/24/2017 8:30 AM Ryan Bermeo MD [facility] [phone]      Goals (5 Years of Data)     None      [facility]     [facility] Nurse Care [facility] -  Assistance  Unless otherwise directed by your provider, please contact [facility], our nurse care line that is available for  assistance.     Registered nurses in the [facility] provide: appointment scheduling, clinical advisement, health education, and other advisory services.  Call: [phone] (toll free)               Medications           Message regarding Medications     Verify the changes and/or additions to your medication regime listed below are the same as discussed with your clinician today.  If any of these changes or additions are incorrect, please notify your healthcare provider.             Verify that the below list of medications is an accurate representation of the medications you are currently taking.  If none reported, the list may be blank. If incorrect, please contact your healthcare provider. Carry this list with you in case of emergency.           Current Medications     amlodipine (NORVASC) 5 MG tablet Take 1 tablet (5 mg total) by mouth once daily.    aspirin (ECOTRIN)  81 MG EC tablet Take 81 mg by mouth once daily.    atorvastatin (LIPITOR) 40 MG tablet TAKE ONE TABLET BY MOUTH DAILY    atorvastatin (LIPITOR) 40 MG tablet Take 1 tablet (40 mg total) by mouth once daily.    metoprolol succinate (TOPROL-XL) 25 MG 24 hr tablet TAKE 1 TABLET BY MOUTH TWICE DAILY    tadalafil (CIALIS) 20 MG Tab Take 1 tablet (20 mg total) by mouth daily as needed. 1 Tablet Oral Every other day    valsartan (DIOVAN) 160 MG tablet Take 1 tablet (160 mg total) by mouth once daily.    warfarin (COUMADIN) 5 MG tablet Take 1/2 tablet on Thursdays and 1 tablet all other days as directed by the [facility].           Clinical Reference Information           Allergies as of 4/4/2017     No Known Allergies      Immunizations Administered on Date of Encounter - 4/4/2017     None      Orders Placed During Today's Visit      Normal Orders This Visit    POCT INR          4/4/2017  7:30 AM - Amy AlvaradoD      Component Results     Component Value Flag Ref Range Units Status    INR 1.3 (A) 2.0 - 3.0  Final      March 2017 Details    Sun Mon Tue Wed Thu Fri Sat        1               2               3               4                 5      5 mg         6      5 mg         7   2.5   5 mg   See details      8      5 mg         9      2.5 mg         10      5 mg         11      5 mg           12      5 mg         13      5 mg         14      5 mg         15      5 mg         16      2.5 mg         17      5 mg         18      5 mg           19      5 mg         20      5 mg         21      5 mg         22      5 mg         23      2.5 mg         24      5 mg         25      5 mg           26      5 mg         27      5 mg         28      5 mg         29      5 mg         30      2.5 mg         31      5 mg           Date Details   03/07 Last INR check   INR: 2.5                 April 2017 Details    Sun Mon Tue Wed Thu Fri Sat           1      5 mg           2      5 mg         3      5 mg         4    1.3   10 mg   See details      5      5 mg         6      5 mg         7      5 mg         8      5 mg           9      5 mg         10      5 mg         11            12               13               14               15                 16               17               18               19               20               21               22                 23               24               25               26               27               28               29                 30                      Date Details   04/04 This INR check   INR: 1.3       Date of next INR:  4/11/2017               How to take your warfarin dose     To take:  5 mg Take 1 of the 5 mg tablets.    To take:  10 mg Take 2 of the 5 mg tablets.           Anticoagulation Summary as of 4/4/2017     Maintenance plan 2.5 mg (5 mg x 0.5) on Thu; 5 mg (5 mg x 1) all other days    Full instructions 4/4: 10 mg; 4/6: 5 mg; Otherwise 2.5 mg on Thu; 5 mg all other days    Next INR check 4/11/2017      Anticoagulation Episode Summary     Comments       Patient Findings     Positives Change in diet/appetite    Negatives Signs/symptoms of thrombosis, Signs/symptoms of bleeding, Laboratory test error suspected, Change in health, Change in alcohol use, Change in activity, Upcoming invasive procedure, Emergency department visit, Upcoming dental procedure, Missed doses, Extra doses, Change in medications, Hospital admission, Bruising, Other complaints      [facility] Sign-Up     Activating your [facility] account is as easy as 1-2-3!     1) Visit [email or web address], select Sign Up Now, enter this activation code and your date of birth, then select Next.  [email or web address]  Expires: 4/13/2017 10:35 AM      2) Create a username and password to use when you visit [facility] in the future and select a security question in case you lose your password and select Next.    3) Enter your e-mail address and click Sign Up!    Additional Information  If you have questions, please  e-mail [email or web address] or call [phone] to talk to our [facility] staff. Remember, [facility] is NOT to be used for urgent needs. For medical emergencies, dial 911.         Language Assistance Services     ATTENTION: Language assistance services are available, free of charge. Please call [phone].      ATENCIÓN: Si habla español, tiene a schmidt disposición servicios gratuitos de asistencia lingüística. Llame al [phone].     CHÚ Ý: N?u b?n nói Ti?ng Vi?t, có các d?ch v? h? tr? ngôn ng? mi?n phí dành cho b?n. G?i s? [phone].         [facility] complies with applicable Federal civil rights laws and does not discriminate on the basis of race, color, national origin, age, disability, or sex.

## 2017-04-11 ENCOUNTER — ANTI-COAG VISIT (OUTPATIENT)
Dept: CARDIOLOGY | Facility: CLINIC | Age: 77
End: 2017-04-11
Payer: MEDICARE

## 2017-04-11 DIAGNOSIS — Z79.01 LONG TERM (CURRENT) USE OF ANTICOAGULANTS: Primary | ICD-10-CM

## 2017-04-11 LAB — INR PPP: 3.3 (ref 2–3)

## 2017-04-11 PROCEDURE — 99211 OFF/OP EST MAY X REQ PHY/QHP: CPT | Mod: 25,S$GLB,,

## 2017-04-11 PROCEDURE — 85610 PROTHROMBIN TIME: CPT | Mod: QW,S$GLB,,

## 2017-04-11 NOTE — PROGRESS NOTES
INR is supra-therapeutic today. No bleeding issues. This is a quick follow-up after a sub-therapeutic INR on 4/4. Will resume scheduled dose until follow-up.

## 2017-04-11 NOTE — MR AVS SNAPSHOT
[facility]  [address] [facility] [address]  Phone: [phone]  Fax: [phone]                  [place] DEJA Biggs Jr.   2017 7:30 AM   Anti-coag visit    Description:  Male : 1940   Provider:  Ly Izaguirre PharmD   Department:  [facility]           Diagnoses this Visit        Comments    Long term (current) use of anticoagulants    -  Primary            To Do List           Future Appointments        Provider Department Dept Phone    2017 7:45 AM Amy AlvaradoD [facility] [phone]    2017 9:00 AM LABORATORY, [facility] Laboratory [phone]    2017 8:20 AM Giuliano Moran MD [facility] Internal Medicine [phone]    10/24/2017 8:30 AM Ryan Bermeo MD [facility] [phone]      Goals (5 Years of Data)     None      [facility]     [facility] Care [facility] -  Assistance  Unless otherwise directed by your provider, please contact [facility], our nurse care line that is available for  assistance.     Registered nurses in the [facility] provide: appointment scheduling, clinical advisement, health education, and other advisory services.  Call: [phone] (toll free)               Medications           Message regarding Medications     Verify the changes and/or additions to your medication regime listed below are the same as discussed with your clinician today.  If any of these changes or additions are incorrect, please notify your healthcare provider.             Verify that the below list of medications is an accurate representation of the medications you are currently taking.  If none reported, the list may be blank. If incorrect, please contact your healthcare provider. Carry this list with you in case of emergency.           Current Medications     amlodipine (NORVASC) 5 MG tablet Take 1 tablet (5 mg total) by mouth once daily.    aspirin (ECOTRIN)  81 MG EC tablet Take 81 mg by mouth once daily.    atorvastatin (LIPITOR) 40 MG tablet TAKE ONE TABLET BY MOUTH DAILY    atorvastatin (LIPITOR) 40 MG tablet Take 1 tablet (40 mg total) by mouth once daily.    metoprolol succinate (TOPROL-XL) 25 MG 24 hr tablet TAKE 1 TABLET BY MOUTH TWICE DAILY    tadalafil (CIALIS) 20 MG Tab Take 1 tablet (20 mg total) by mouth daily as needed. 1 Tablet Oral Every other day    valsartan (DIOVAN) 160 MG tablet Take 1 tablet (160 mg total) by mouth once daily.    warfarin (COUMADIN) 5 MG tablet Take 1/2 tablet on Thursdays and 1 tablet all other days as directed by the [facility]madin Clinic.           Clinical Reference Information           Allergies as of 4/11/2017     No Known Allergies      Immunizations Administered on Date of Encounter - 4/11/2017     None      Orders Placed During Today's Visit      Normal Orders This Visit    POCT INR          4/11/2017  7:30 AM - Amy AlvaradoD      Component Results     Component Value Flag Ref Range Units Status    INR 3.3 (A) 2.0 - 3.0  Final      March 2017 Details    Sun Mon Tue Wed Thu Fri Sat        1               2               3               4                 5               6               7               8               9               10               11                 12      5 mg         13      5 mg         14      5 mg         15      5 mg         16      2.5 mg         17      5 mg         18      5 mg           19      5 mg         20      5 mg         21      5 mg         22      5 mg         23      2.5 mg         24      5 mg         25      5 mg           26      5 mg         27      5 mg         28      5 mg         29      5 mg         30      2.5 mg         31      5 mg           Date Details   No additional details              April 2017 Details    Sun Mon Tue Wed Thu Fri Sat           1      5 mg           2      5 mg         3      5 mg         4   1.3   10 mg   See details      5      5 mg         6       5 mg         7      5 mg         8      5 mg           9      5 mg         10      5 mg         11   3.3   5 mg   See details      12      5 mg         13      2.5 mg         14      5 mg         15      5 mg           16      5 mg         17      5 mg         18      5 mg         19      5 mg         20      2.5 mg         21      5 mg         22      5 mg           23      5 mg         24      5 mg         25      5 mg         26      5 mg         27      2.5 mg         28      5 mg         29      5 mg           30      5 mg                Date Details   04/04 Last INR check   INR: 1.3      04/11 This INR check   INR: 3.3                     How to take your warfarin dose     To take:  2.5 mg Take 0.5 of a 5 mg tablet.    To take:  5 mg Take 1 of the 5 mg tablets.           May 2017 Details    Sun Mon Tue Wed Thu Fri Sat      1      5 mg         2      5 mg         3      5 mg         4      2.5 mg         5      5 mg         6      5 mg           7      5 mg         8      5 mg         9            10               11               12               13                 14               15               16               17               18               19               20                 21               22               23               24               25               26               27                 28               29               30               31                   Date Details   No additional details    Date of next INR:  5/9/2017         How to take your warfarin dose     To take:  2.5 mg Take 0.5 of a 5 mg tablet.    To take:  5 mg Take 1 of the 5 mg tablets.           Anticoagulation Summary as of 4/11/2017     Maintenance plan 2.5 mg (5 mg x 0.5) on Thu; 5 mg (5 mg x 1) all other days    Full instructions 2.5 mg on Thu; 5 mg all other days    Next INR check 5/9/2017      Anticoagulation Episode Summary     Comments       Patient Findings     Negatives Signs/symptoms of thrombosis, Signs/symptoms  of bleeding, Laboratory test error suspected, Change in health, Change in alcohol use, Change in activity, Upcoming invasive procedure, Emergency department visit, Upcoming dental procedure, Missed doses, Extra doses, Change in medications, Change in diet/appetite, Hospital admission, Bruising, Other complaints      [facility] Sign-Up     Activating your [facility] account is as easy as 1-2-3!     1) Visit [email or web address], select Sign Up Now, enter this activation code and your date of birth, then select Next.  [email or web address]  Expires: 4/13/2017 10:35 AM      2) Create a username and password to use when you visit [facility] in the future and select a security question in case you lose your password and select Next.    3) Enter your e-mail address and click Sign Up!    Additional Information  If you have questions, please e-mail [email or web address] or call [phone] to talk to our [facility] staff. Remember, [facility] is NOT to be used for urgent needs. For medical emergencies, dial 911.         Language Assistance Services     ATTENTION: Language assistance services are available, free of charge. Please call [phone].      ATENCIÓN: Si habla tara, tiene a schmidt disposición servicios gratuitos de asistencia lingüística. Llame al [phone].     CHÚ Ý: N?u b?n nói Ti?ng Vi?t, có các d?ch v? h? tr? ngôn ng? mi?n phí dành cho b?n. G?i s? [phone].         [facility] complies with applicable Federal civil rights laws and does not discriminate on the basis of race, color, national origin, age, disability, or sex.

## 2017-04-19 ENCOUNTER — TELEPHONE (OUTPATIENT)
Dept: GASTROENTEROLOGY | Facility: CLINIC | Age: 77
End: 2017-04-19

## 2017-04-19 ENCOUNTER — OFFICE VISIT (OUTPATIENT)
Dept: GASTROENTEROLOGY | Facility: CLINIC | Age: 77
End: 2017-04-19
Payer: MEDICARE

## 2017-04-19 VITALS
DIASTOLIC BLOOD PRESSURE: 80 MMHG | BODY MASS INDEX: 31.11 KG/M2 | SYSTOLIC BLOOD PRESSURE: 122 MMHG | HEART RATE: 72 BPM | WEIGHT: 198.19 LBS | HEIGHT: 67 IN

## 2017-04-19 DIAGNOSIS — Z86.010 HISTORY OF COLON POLYPS: ICD-10-CM

## 2017-04-19 DIAGNOSIS — Z12.11 COLON CANCER SCREENING: Primary | ICD-10-CM

## 2017-04-19 DIAGNOSIS — Z79.01 LONG TERM CURRENT USE OF ANTICOAGULANT: ICD-10-CM

## 2017-04-19 DIAGNOSIS — I48.20 CHRONIC ATRIAL FIBRILLATION: ICD-10-CM

## 2017-04-19 PROCEDURE — 99213 OFFICE O/P EST LOW 20 MIN: CPT | Mod: S$GLB,,, | Performed by: NURSE PRACTITIONER

## 2017-04-19 PROCEDURE — 99999 PR PBB SHADOW E&M-EST. PATIENT-LVL III: CPT | Mod: PBBFAC,,, | Performed by: NURSE PRACTITIONER

## 2017-04-19 PROCEDURE — 99499 UNLISTED E&M SERVICE: CPT | Mod: S$GLB,,, | Performed by: NURSE PRACTITIONER

## 2017-04-19 PROCEDURE — 1160F RVW MEDS BY RX/DR IN RCRD: CPT | Mod: S$GLB,,, | Performed by: NURSE PRACTITIONER

## 2017-04-19 PROCEDURE — 1159F MED LIST DOCD IN RCRD: CPT | Mod: S$GLB,,, | Performed by: NURSE PRACTITIONER

## 2017-04-19 PROCEDURE — 1157F ADVNC CARE PLAN IN RCRD: CPT | Mod: 8P,S$GLB,, | Performed by: NURSE PRACTITIONER

## 2017-04-19 PROCEDURE — 3074F SYST BP LT 130 MM HG: CPT | Mod: S$GLB,,, | Performed by: NURSE PRACTITIONER

## 2017-04-19 PROCEDURE — 3079F DIAST BP 80-89 MM HG: CPT | Mod: S$GLB,,, | Performed by: NURSE PRACTITIONER

## 2017-04-19 PROCEDURE — 1126F AMNT PAIN NOTED NONE PRSNT: CPT | Mod: S$GLB,,, | Performed by: NURSE PRACTITIONER

## 2017-04-19 RX ORDER — SODIUM, POTASSIUM,MAG SULFATES 17.5-3.13G
SOLUTION, RECONSTITUTED, ORAL ORAL
Qty: 354 ML | Refills: 0 | Status: ON HOLD | OUTPATIENT
Start: 2017-04-19 | End: 2017-05-16 | Stop reason: HOSPADM

## 2017-04-19 NOTE — TELEPHONE ENCOUNTER
Patient scheduled for colonoscopy on 5/16/2017, currently taking Coumadin 5 mg and will need to stop prior the procedure. Please advise.

## 2017-04-19 NOTE — PROGRESS NOTES
Clinic Consult:  [facility]ation [facility]    Reason for Consult:  The primary encounter diagnosis was Colon cancer screening. Diagnoses of History of colon polyps, Chronic atrial fibrillation, and Long term current use of anticoagulant were also pertinent to this visit.    PCP: Giuliano Moran       HPI:  This is a 76 y.o. male here for evaluation of the above. He was referred to me by Dr. Moran. He is here for a repeat colonoscopy for colon cancer screening. His last colonoscopy was in 2012 and revealed 2 tubular adenomatous polyps and 3 non-bleeding angiectasias. Repeat recommended in 5 years. He reports he just had a wellness check up and everything looked good. He denies any GI complaints at this time. He denies any abdominal pain, hematochezia, melena, nausea or vomiting, or weight loss.     Review of Systems   Constitutional: Negative for fever, malaise/fatigue and weight loss.   HENT: Negative for sore throat.    Respiratory: Negative for cough and wheezing.    Cardiovascular: Negative for chest pain and palpitations.   Genitourinary: Negative for dysuria and frequency.   Musculoskeletal: Negative for back pain, joint pain, myalgias and neck pain.   Skin: Negative for itching and rash.   Neurological: Negative for dizziness, speech change, seizures, loss of consciousness and headaches.   Psychiatric/Behavioral: Negative for depression and substance abuse. The patient is not nervous/anxious.        Medical History:  has a past medical history of *Atrial fibrillation; Abnormal CXR; Atrial fibrillation; Coronary atherosclerosis of unspecified type of vessel, native or graft; History of prostate cancer (2007); Pure hypercholesterolemia; and Unspecified essential hypertension.    Surgical History:  has a past surgical history that includes ostate surgery; Abdominal hernia repair; inguinal hernia; lung sx; Appendectomy; bladder sx; Cataract extraction w/  intraocular lens implant (Restor OU); and  "Cardiac catheterization.    Family History: family history includes Cancer in his father; Diabetes in his mother; Macular degeneration in his sister. There is no history of Strabismus, Retinal detachment, Glaucoma, Blindness, or Amblyopia..     Social History:  reports that he has quit smoking. He has never used smokeless tobacco. He reports that he drinks alcohol.    Allergies: Reviewed    Home Medications:   Current Outpatient Prescriptions on File Prior to Visit   Medication Sig Dispense Refill    amlodipine (NORVASC) 5 MG tablet Take 1 tablet (5 mg total) by mouth once daily. 30 tablet 6    aspirin (ECOTRIN) 81 MG EC tablet Take 81 mg by mouth once daily.      atorvastatin (LIPITOR) 40 MG tablet TAKE ONE TABLET BY MOUTH DAILY 30 tablet 1    atorvastatin (LIPITOR) 40 MG tablet Take 1 tablet (40 mg total) by mouth once daily. 30 tablet 2    metoprolol succinate (TOPROL-XL) 25 MG 24 hr tablet TAKE 1 TABLET BY MOUTH TWICE DAILY 60 tablet 9    tadalafil (CIALIS) 20 MG Tab Take 1 tablet (20 mg total) by mouth daily as needed. 1 Tablet Oral Every other day 90 tablet 1    valsartan (DIOVAN) 160 MG tablet Take 1 tablet (160 mg total) by mouth once daily. 30 tablet 11    warfarin (COUMADIN) 5 MG tablet Take 1/2 tablet on Thursdays and 1 tablet all other days as directed by the [facility]madin Clinic. 30 tablet 3     No current facility-administered medications on file prior to visit.        Physical Exam:  Vital Signs:  /80  Pulse 72  Ht 5' 7" (1.702 m)  Wt 89.9 kg (198 lb 3.1 oz)  BMI 31.04 kg/m2  Body mass index is 31.04 kg/(m^2).  Physical Exam   Constitutional: He is oriented to person, place, and time and well-developed, well-nourished, and in no distress. No distress.   HENT:   Head: Normocephalic.   Eyes: Conjunctivae are normal. Pupils are equal, round, and reactive to light.   Neck: Neck supple.   Cardiovascular: Normal rate and normal heart sounds.  An irregularly irregular rhythm present. "   Pulmonary/Chest: Effort normal and breath sounds normal. No respiratory distress.   Abdominal: Soft. Bowel sounds are normal. He exhibits no distension. There is no tenderness.   Neurological: He is alert and oriented to person, place, and time. No cranial nerve deficit.   Skin: Skin is warm and dry. No rash noted.   Psychiatric: Mood and affect normal.   Vitals reviewed.      Labs: Pertinent labs reviewed.    CRC Screening: See HPI    Assessment:  1. Colon cancer screening    2. History of colon polyps    3. Chronic atrial fibrillation    4. Long term current use of anticoagulant           Recommendations:  Colon cancer screening  History of colon polyps  - Due for screening colonoscopy  -     Case request GI: COLONOSCOPY  -     SUPREP BOWEL PREP KIT 17.5-3.13-1.6 gram SolR; Use as directed  Dispense: 354 mL; Refill: 0    Chronic atrial fibrillation  Long term current use of anticoagulant  - Currently on coumadin for A. Fib.  - Will send approval to Dr. Griffith to stop Coumadin prior to procedure.    Follow up to be determined after procedure.    Thank you so much for allowing me to participate in the care of DESEAN Felton Jr.

## 2017-04-19 NOTE — MR AVS SNAPSHOT
[facility]  [address] [facility] [address]  Phone: [phone]  Fax: [phone]                  [place] DEJA Biggs Jr.   2017 8:00 AM   Office Visit    Description:  Male : 1940   Provider:  Renee Fam NP   Department:  [facility]           Reason for Visit     Colonoscopy           Diagnoses this Visit        Comments    Colon cancer screening    -  Primary     History of colon polyps         Chronic atrial fibrillation         Long term current use of anticoagulant                To Do List           Future Appointments        Provider Department Dept Phone    2017 7:45 AM Amy AlvaradoD [facility] Coumadin [phone]    2017 9:00 AM LABORATORY, [facility] Laboratory [phone]    2017 8:20 AM Giuliano Moran MD [facility] Internal Medicine [phone]    10/24/2017 8:30 AM Ryan Bermeo MD [facility] Ophthalmology [phone]      Goals (5 Years of Data)     None       These Medications        Disp Refills Start End    SUPREP BOWEL PREP KIT 17.5-3.13-1.6 gram SolR 354 mL 0 2017     Use as directed    Pharmacy: [id number] [address] #: [phone]         [facility]     [facility] Nurse Care Line -  Assistance  Unless otherwise directed by your provider, please contact [facility], our nurse care line that is available for  assistance.     Registered nurses in the [facility] provide: appointment scheduling, clinical advisement, health education, and other advisory services.  Call: [phone] (toll free)               Medications           Message regarding Medications     Verify the changes and/or additions to your medication regime listed below are the same as discussed with your clinician today.  If any of these changes or additions are incorrect, please notify your healthcare provider.        START taking  "these NEW medications        Refills    SUPREP BOWEL PREP KIT 17.5-3.13-1.6 gram SolR 0    Sig: Use as directed    Class: Normal           Verify that the below list of medications is an accurate representation of the medications you are currently taking.  If none reported, the list may be blank. If incorrect, please contact your healthcare provider. Carry this list with you in case of emergency.           Current Medications     amlodipine (NORVASC) 5 MG tablet Take 1 tablet (5 mg total) by mouth once daily.    aspirin (ECOTRIN) 81 MG EC tablet Take 81 mg by mouth once daily.    atorvastatin (LIPITOR) 40 MG tablet TAKE ONE TABLET BY MOUTH DAILY    atorvastatin (LIPITOR) 40 MG tablet Take 1 tablet (40 mg total) by mouth once daily.    metoprolol succinate (TOPROL-XL) 25 MG 24 hr tablet TAKE 1 TABLET BY MOUTH TWICE DAILY    tadalafil (CIALIS) 20 MG Tab Take 1 tablet (20 mg total) by mouth daily as needed. 1 Tablet Oral Every other day    valsartan (DIOVAN) 160 MG tablet Take 1 tablet (160 mg total) by mouth once daily.    warfarin (COUMADIN) 5 MG tablet Take 1/2 tablet on Thursdays and 1 tablet all other days as directed by the [facility] Clinic.    SUPREP BOWEL PREP KIT 17.5-3.13-1.6 gram SolR Use as directed           Clinical Reference Information           Your Vitals Were     BP Pulse Height Weight BMI    130/80 72 5' 7" (1.702 m) 89.9 kg (198 lb 3.1 oz) 31.04 kg/m2      Blood Pressure          Most Recent Value    BP  130/80      Allergies as of 4/19/2017     No Known Allergies      Immunizations Administered on Date of Encounter - 4/19/2017     None      Orders Placed During Today's Visit      Normal Orders This Visit    Case request GI: COLONOSCOPY       [facility] Sign-Up     Activating your [facility] account is as easy as 1-2-3!     1) Visit [email or web address], select Sign Up Now, enter this activation code and your date of birth, then select Next.  [email or web address]  Expires: 6/3/2017  8:08 AM      2) Create a " username and password to use when you visit [facility] in the future and select a security question in case you lose your password and select Next.    3) Enter your e-mail address and click Sign Up!    Additional Information  If you have questions, please e-mail [email or web address] or call [phone] to talk to our [facility] staff. Remember, [facility] is NOT to be used for urgent needs. For medical emergencies, dial 911.         Language Assistance Services     ATTENTION: Language assistance services are available, free of charge. Please call [phone].      ATENCIÓN: Si habla español, tiene a schmidt disposición servicios gratuitos de asistencia lingüística. Llame al [phone].     CHÚ Ý: N?u b?n nói Ti?ng Vi?t, có các d?ch v? h? tr? ngôn ng? mi?n phí dành cho b?n. G?i s? [phone].         [facility] complies with applicable Federal civil rights laws and does not discriminate on the basis of race, color, national origin, age, disability, or sex.

## 2017-04-19 NOTE — TELEPHONE ENCOUNTER
This patient needs to be off of coumadin for 5 days for an endoscopy procedure.  Will forward to Dr Griffith for approval.

## 2017-05-09 ENCOUNTER — ANTI-COAG VISIT (OUTPATIENT)
Dept: CARDIOLOGY | Facility: CLINIC | Age: 77
End: 2017-05-09
Payer: MEDICARE

## 2017-05-09 DIAGNOSIS — Z79.01 LONG TERM (CURRENT) USE OF ANTICOAGULANTS: Primary | ICD-10-CM

## 2017-05-09 LAB — INR PPP: 2.5 (ref 2–3)

## 2017-05-09 PROCEDURE — 99211 OFF/OP EST MAY X REQ PHY/QHP: CPT | Mod: 25,S$GLB,,

## 2017-05-09 PROCEDURE — 85610 PROTHROMBIN TIME: CPT | Mod: QW,S$GLB,,

## 2017-05-09 NOTE — PROGRESS NOTES
INR is therapeutic. Patient reports no bleeding or bruising, no new medications and no diet changes.    Patient will be holding warfarin in preparation for colonoscopy. Last dose of warfarin 5/10 then restart the evening of 5/16 at 5mg daily. Repeat INR 1 week post-procedure.

## 2017-05-09 NOTE — MR AVS SNAPSHOT
[facility]  [address] [facility] [address]  Phone: [phone]  Fax: [phone]                  Marissa DEJA Biggs Jr.   2017 7:45 AM   Anti-coag visit    Description:  Male : 1940   Provider:  Ly Izaguirre PharmD   Department:  [facility]           Diagnoses this Visit        Comments    Long term (current) use of anticoagulants    -  Primary            To Do List           Future Appointments        Provider Department Dept Phone    2017 7:45 AM Ly Izaguirre PharmD [facility] [phone]    2017 8:15 AM Ly Izaguirre PharmD [facility] [phone]    2017 9:00 AM LABORATORY, [facility] Laboratory [phone]    2017 8:20 AM Giuliano Moran MD [facility] Internal Medicine [phone]    10/24/2017 8:30 AM Ryan Bermeo MD [facility] [phone]      Your Future Surgeries/Procedures     May 16, 2017   Surgery with Alfredo Naidu MD   [facility])    [address] [facility] [address] [phone]              Goals (5 Years of Data)     None      [facility]     [facility] Care [facility] -  Assistance  Unless otherwise directed by your provider, please contact [facility], our nurse care line that is available for  assistance.     Registered nurses in the [facility] provide: appointment scheduling, clinical advisement, health education, and other advisory services.  Call: [phone] (toll free)               Medications           Message regarding Medications     Verify the changes and/or additions to your medication regime listed below are the same as discussed with your clinician today.  If any of these changes or additions are incorrect, please notify your healthcare provider.             Verify that the below list of medications is an accurate representation of the medications  you are currently taking.  If none reported, the list may be blank. If incorrect, please contact your healthcare provider. Carry this list with you in case of emergency.           Current Medications     amlodipine (NORVASC) 5 MG tablet Take 1 tablet (5 mg total) by mouth once daily.    aspirin (ECOTRIN) 81 MG EC tablet Take 81 mg by mouth once daily.    atorvastatin (LIPITOR) 40 MG tablet TAKE ONE TABLET BY MOUTH DAILY    atorvastatin (LIPITOR) 40 MG tablet Take 1 tablet (40 mg total) by mouth once daily.    metoprolol succinate (TOPROL-XL) 25 MG 24 hr tablet TAKE 1 TABLET BY MOUTH TWICE DAILY    SUPREP BOWEL PREP KIT 17.5-3.13-1.6 gram SolR Use as directed    tadalafil (CIALIS) 20 MG Tab Take 1 tablet (20 mg total) by mouth daily as needed. 1 Tablet Oral Every other day    valsartan (DIOVAN) 160 MG tablet Take 1 tablet (160 mg total) by mouth once daily.    warfarin (COUMADIN) 5 MG tablet Take 1/2 tablet on Thursdays and 1 tablet all other days as directed by the Coumadin Clinic.           Clinical Reference Information           Allergies as of 5/9/2017     No Known Allergies      Immunizations Administered on Date of Encounter - 5/9/2017     None      Orders Placed During Today's Visit      Normal Orders This Visit    POCT INR          5/9/2017  7:34 AM - Ly Izaguirre, PharmD      Component Results     Component Value Flag Ref Range Units Status    INR 2.5  2.0 - 3.0  Final      April 2017 Details    Sun Mon Tue Wed Thu Fri Sat           1                 2               3               4               5               6               7               8                 9      5 mg         10      5 mg         11   3.3   5 mg   See details      12      5 mg         13      2.5 mg         14      5 mg         15      5 mg           16      5 mg         17      5 mg         18      5 mg         19      5 mg         20      2.5 mg         21      5 mg         22      5 mg           23      5 mg         24      5  mg         25      5 mg         26      5 mg         27      2.5 mg         28      5 mg         29      5 mg           30      5 mg                Date Details   04/11 Last INR check   INR: 3.3                 May 2017 Details    Sun Mon Tue Wed Thu Fri Sat      1      5 mg         2      5 mg         3      5 mg         4      2.5 mg         5      5 mg         6      5 mg           7      5 mg         8      5 mg         9   2.5   5 mg   See details      10      5 mg         11      Hold         12      Hold         13      Hold           14      Hold         15      Hold         16      5 mg         17      5 mg         18      5 mg         19      5 mg         20      5 mg           21      5 mg         22      5 mg         23            24               25               26               27                 28               29               30               31                   Date Details   05/09 This INR check   INR: 2.5       Date of next INR:  5/23/2017               How to take your warfarin dose     To take:  5 mg Take 1 of the 5 mg tablets.    Hold Do not take your warfarin dose. See the Details table to the right for additional instructions.                Anticoagulation Summary as of 5/9/2017     Maintenance plan 2.5 mg (5 mg x 0.5) on Thu; 5 mg (5 mg x 1) all other days    Full instructions 5/11: Hold; 5/12: Hold; 5/13: Hold; 5/14: Hold; 5/15: Hold; 5/18: 5 mg; Otherwise 2.5 mg on Thu; 5 mg all other days    Next INR check 5/23/2017      Anticoagulation Episode Summary     Comments c- scope 5/16 holding x5 days prior      [facility] Sign-Up     Activating your [facility] account is as easy as 1-2-3!     1) Visit [email or web address], select Sign Up Now, enter this activation code and your date of birth, then select Next.  [email or web address]  Expires: 6/3/2017  8:08 AM      2) Create a username and password to use when you visit [facility] in the future and select a security question in case you lose your  password and select Next.    3) Enter your e-mail address and click Sign Up!    Additional Information  If you have questions, please e-mail [email or web address] or call [phone] to talk to our [facility] staff. Remember, [facility] is NOT to be used for urgent needs. For medical emergencies, dial 911.         Language Assistance Services     ATTENTION: Language assistance services are available, free of charge. Please call [phone].      ATENCIÓN: Si habla español, tiene a schmidt disposición servicios gratuitos de asistencia lingüística. Llame al [phone].     CHÚ Ý: N?u b?n nói Ti?ng Vi?t, có các d?ch v? h? tr? ngôn ng? mi?n phí dành cho b?n. G?i s? [phone].         [facility] complies with applicable Federal civil rights laws and does not discriminate on the basis of race, color, national origin, age, disability, or sex.

## 2017-05-15 PROBLEM — Z86.010 HISTORY OF COLON POLYPS: Status: ACTIVE | Noted: 2017-05-15

## 2017-05-15 PROBLEM — Z86.0100 HISTORY OF COLON POLYPS: Status: ACTIVE | Noted: 2017-05-15

## 2017-05-16 ENCOUNTER — HOSPITAL ENCOUNTER (OUTPATIENT)
Facility: HOSPITAL | Age: 77
Discharge: HOME | End: 2017-05-16
Attending: INTERNAL MEDICINE | Admitting: INTERNAL MEDICINE
Payer: MEDICARE

## 2017-05-16 ENCOUNTER — ANESTHESIA (OUTPATIENT)
Dept: ENDOSCOPY | Facility: HOSPITAL | Age: 77
End: 2017-05-16
Payer: MEDICARE

## 2017-05-16 ENCOUNTER — SURGERY (OUTPATIENT)
Age: 77
End: 2017-05-16

## 2017-05-16 ENCOUNTER — ANESTHESIA EVENT (OUTPATIENT)
Dept: ENDOSCOPY | Facility: HOSPITAL | Age: 77
End: 2017-05-16
Payer: MEDICARE

## 2017-05-16 VITALS — RESPIRATION RATE: 24 BRPM

## 2017-05-16 DIAGNOSIS — D12.4 BENIGN NEOPLASM OF DESCENDING COLON: ICD-10-CM

## 2017-05-16 DIAGNOSIS — Z12.11 SPECIAL SCREENING FOR MALIGNANT NEOPLASMS, COLON: ICD-10-CM

## 2017-05-16 DIAGNOSIS — Z86.010 HISTORY OF COLON POLYPS: Primary | ICD-10-CM

## 2017-05-16 DIAGNOSIS — D12.0 BENIGN NEOPLASM OF CECUM: ICD-10-CM

## 2017-05-16 DIAGNOSIS — K57.30 DIVERTICULOSIS OF LARGE INTESTINE WITHOUT HEMORRHAGE: Chronic | ICD-10-CM

## 2017-05-16 LAB
INR PPP: 1.1
PROTHROMBIN TIME: 11.3 SEC

## 2017-05-16 PROCEDURE — 27201012 HC FORCEPS, HOT/COLD, DISP: Performed by: INTERNAL MEDICINE

## 2017-05-16 PROCEDURE — 88305 TISSUE EXAM BY PATHOLOGIST: CPT | Performed by: PATHOLOGY

## 2017-05-16 PROCEDURE — 37000009 HC ANESTHESIA EA ADD 15 MINS: Performed by: INTERNAL MEDICINE

## 2017-05-16 PROCEDURE — 88305 TISSUE EXAM BY PATHOLOGIST: CPT | Mod: 26,,, | Performed by: PATHOLOGY

## 2017-05-16 PROCEDURE — 63600175 PHARM REV CODE 636 W HCPCS: Performed by: NURSE ANESTHETIST, CERTIFIED REGISTERED

## 2017-05-16 PROCEDURE — 45380 COLONOSCOPY AND BIOPSY: CPT | Mod: PT,,, | Performed by: INTERNAL MEDICINE

## 2017-05-16 PROCEDURE — 37000008 HC ANESTHESIA 1ST 15 MINUTES: Performed by: INTERNAL MEDICINE

## 2017-05-16 PROCEDURE — 25000003 PHARM REV CODE 250: Performed by: INTERNAL MEDICINE

## 2017-05-16 PROCEDURE — 25000003 PHARM REV CODE 250: Performed by: NURSE ANESTHETIST, CERTIFIED REGISTERED

## 2017-05-16 PROCEDURE — 45380 COLONOSCOPY AND BIOPSY: CPT | Performed by: INTERNAL MEDICINE

## 2017-05-16 PROCEDURE — 85610 PROTHROMBIN TIME: CPT

## 2017-05-16 RX ORDER — SODIUM CHLORIDE, SODIUM LACTATE, POTASSIUM CHLORIDE, CALCIUM CHLORIDE 600; 310; 30; 20 MG/100ML; MG/100ML; MG/100ML; MG/100ML
INJECTION, SOLUTION INTRAVENOUS CONTINUOUS
Status: DISCONTINUED | OUTPATIENT
Start: 2017-05-16 | End: 2017-05-16 | Stop reason: HOSPADM

## 2017-05-16 RX ORDER — PROPOFOL 10 MG/ML
VIAL (ML) INTRAVENOUS
Status: DISCONTINUED | OUTPATIENT
Start: 2017-05-16 | End: 2017-05-16

## 2017-05-16 RX ORDER — LIDOCAINE HYDROCHLORIDE 20 MG/ML
INJECTION, SOLUTION EPIDURAL; INFILTRATION; INTRACAUDAL; PERINEURAL
Status: DISCONTINUED | OUTPATIENT
Start: 2017-05-16 | End: 2017-05-16

## 2017-05-16 RX ADMIN — PROPOFOL 50 MG: 10 INJECTION, EMULSION INTRAVENOUS at 09:05

## 2017-05-16 RX ADMIN — SODIUM CHLORIDE, SODIUM LACTATE, POTASSIUM CHLORIDE, AND CALCIUM CHLORIDE: 600; 310; 30; 20 INJECTION, SOLUTION INTRAVENOUS at 09:05

## 2017-05-16 RX ADMIN — PROPOFOL 150 MG: 10 INJECTION, EMULSION INTRAVENOUS at 09:05

## 2017-05-16 RX ADMIN — PROPOFOL 60 MG: 10 INJECTION, EMULSION INTRAVENOUS at 09:05

## 2017-05-16 RX ADMIN — LIDOCAINE HYDROCHLORIDE 40 MG: 20 INJECTION, SOLUTION EPIDURAL; INFILTRATION; INTRACAUDAL; PERINEURAL at 09:05

## 2017-05-16 NOTE — ANESTHESIA POSTPROCEDURE EVALUATION
"Anesthesia Post Evaluation    Patient: Simonton DEJA Biggs JrLester    Procedure(s) Performed: Procedure(s) (LRB):  COLONOSCOPY (N/A)    Final Anesthesia Type: MAC  Patient location during evaluation: PACU  Patient participation: Yes- Able to Participate  Level of consciousness: awake and alert and oriented  Post-procedure vital signs: reviewed and stable  Airway patency: patent  PONV status at discharge: No PONV  Anesthetic complications: no      Cardiovascular status: blood pressure returned to baseline, hemodynamically stable and stable  Respiratory status: unassisted, room air and spontaneous ventilation  Hydration status: euvolemic  Follow-up not needed.        Visit Vitals    /78 (BP Location: Left arm, Patient Position: Lying, BP Method: Automatic)    Pulse 84    Temp 36 °C (96.8 °F)    Resp 14    Ht 5' 7" (1.702 m)    Wt 87.5 kg (193 lb)    SpO2 (!) 92%    BMI 30.23 kg/m2       Pain/Sophy Score: Sophy Score: 8 (5/16/2017  9:45 AM)      "

## 2017-05-16 NOTE — INTERVAL H&P NOTE
The patient has been examined and the H&P has been reviewed:    I concur with the findings and no changes have occurred since H&P was written.    Anesthesia/Surgery risks, benefits and alternative options discussed and understood by patient/family.          Active Hospital Problems    Diagnosis  POA    *History of colon polyps [Z86.010]  Not Applicable    Special screening for malignant neoplasms, colon [Z12.11]  Not Applicable      Resolved Hospital Problems    Diagnosis Date Resolved POA   No resolved problems to display.

## 2017-05-16 NOTE — DISCHARGE SUMMARY
Endoscopy Discharge Summary      Admit Date: 5/16/2017    Discharge Date and Time:  5/16/2017 9:46 AM    Attending Physician: Alfredo Naidu MD     Discharge Physician: Alfredo Naidu MD     Principal Admitting Diagnoses: History of colon polyps         Discharge Diagnosis: The primary encounter diagnosis was History of colon polyps. Diagnoses of Special screening for malignant neoplasms, colon, Benign neoplasm of cecum, Benign neoplasm of descending colon, and Diverticulosis of large intestine without hemorrhage were also pertinent to this visit.     Discharged Condition: Good    Indication for Admission: History of colon polyps     Hospital Course: Patient was admitted for an inpatient procedure and tolerated the procedure well with no complications.    Significant Diagnostic Studies: COLONOSCOPY    Pathology (if any):  Specimen (12h ago through future)    Start     Ordered    05/16/17 0934  Specimen to Pathology - Surgery  Once     Comments:  1.  Colon Polyps x 4    05/16/17 0941          Disposition: Home.    Bleeding: None    No Implants    Follow Up/Patient Instructions:   Current Discharge Medication List      CONTINUE these medications which have NOT CHANGED    Details   amlodipine (NORVASC) 5 MG tablet Take 1 tablet (5 mg total) by mouth once daily.  Qty: 30 tablet, Refills: 6      aspirin (ECOTRIN) 81 MG EC tablet Take 81 mg by mouth once daily.      atorvastatin (LIPITOR) 40 MG tablet Take 1 tablet (40 mg total) by mouth once daily.  Qty: 30 tablet, Refills: 2    Associated Diagnoses: Pure hypercholesterolemia      metoprolol succinate (TOPROL-XL) 25 MG 24 hr tablet TAKE 1 TABLET BY MOUTH TWICE DAILY  Qty: 60 tablet, Refills: 9      valsartan (DIOVAN) 160 MG tablet Take 1 tablet (160 mg total) by mouth once daily.  Qty: 30 tablet, Refills: 11      warfarin (COUMADIN) 5 MG tablet Take 1/2 tablet on Thursdays and 1 tablet all other days as directed by the Coumadin Clinic.  Qty: 30 tablet, Refills: 3     Associated Diagnoses: Long term (current) use of anticoagulants         STOP taking these medications       SUPREP BOWEL PREP KIT 17.5-3.13-1.6 gram SolR Comments:   Reason for Stopping:         tadalafil (CIALIS) 20 MG Tab Comments:   Reason for Stopping:                 Discharge Procedure Orders  Diet general     Activity as tolerated     Call MD for:  temperature >100.4     Call MD for:  persistent nausea and vomiting     Call MD for:  severe uncontrolled pain     Call MD for:  difficulty breathing, headache or visual disturbances     Call MD for:  redness, tenderness, or signs of infection (pain, swelling, redness, odor or green/yellow discharge around incision site)     Call MD for:  hives     Call MD for:  persistent dizziness or light-headedness     No dressing needed         Follow-up Information     Follow up with Giuliano Moran MD.    Specialty:  Internal Medicine    Why:  As needed    Contact information:    [address]  [address]  [facility] [address]  [phone]

## 2017-05-16 NOTE — DISCHARGE INSTRUCTIONS
If you develop fevers, severe abdominal pain, significant bleeding, nausea or vomiting, please contact us or come to our Emergency Department at [facility].  Our  contact number is [phone] available for emergencies or any question that you may have.      You may return to normal activities tomorrow.  Written discharge instructions were provided to you today and have them handy since you may not remember our conversation today.       I also recommend that you follow a high fiber diet and take calcium supplements daily as well as to continue your present medications.      If you take Aspirin, please resume taking it at your prior dose today. If we obtained biopsies or removed polyps during your procedure, we will contact you within 5 to 6 days with the results.      Thanks for trusting us with your healthcare needs.      Sincerely,     Alfredo Naidu M.D.        To rate your experience with Dr. Naidu, please click on the link below     [email or web address]

## 2017-05-16 NOTE — ANESTHESIA PREPROCEDURE EVALUATION
05/16/2017  Dell DEJA Biggs Jr. is a 76 y.o., male.    Anesthesia Evaluation    I have reviewed the Patient Summary Reports.    I have reviewed the Nursing Notes.   I have reviewed the Medications.     Review of Systems  Anesthesia Hx:  No problems with previous Anesthesia  History of prior surgery of interest to airway management or planning: Previous anesthesia: General, MAC Denies Family Hx of Anesthesia complications.   Denies Personal Hx of Anesthesia complications.   Social:  Non-Smoker, No Alcohol Use    Cardiovascular:   Exercise tolerance: good Hypertension, well controlled CAD  Dysrhythmias atrial fibrillation hyperlipidemia    Pulmonary:  Pulmonary Normal 1962- collapsed lung on right side.. Surgery and no further issues   Hepatic/GI:   Bowel Prep. Last bowel prep  H/o prostate CA   Neurological:  Neurology Normal    Endocrine:  Endocrine Normal    Psych:  Psychiatric Normal           Physical Exam  General:  Well nourished    Airway/Jaw/Neck:  Airway Findings: Mouth Opening: Normal General Airway Assessment: Adult  Mallampati: I  Improves to I with phonation.  TM Distance: Normal, at least 6 cm      Dental:  Dental Findings: In tact   Chest/Lungs:  Chest/Lungs Findings: Normal Respiratory Rate     Heart/Vascular:  Heart Findings: Rate: Normal             Anesthesia Plan  Type of Anesthesia, risks & benefits discussed:  Anesthesia Type:  MAC  Patient's Preference:   Intra-op Monitoring Plan:   Intra-op Monitoring Plan Comments:   Post Op Pain Control Plan:   Post Op Pain Control Plan Comments:   Induction:    Beta Blocker:  Patient is on a Beta-Blocker and has received one dose within the past 24 hours (No further documentation required).       Informed Consent: Patient understands risks and agrees with Anesthesia plan.  Questions answered. Anesthesia consent signed with patient.  ASA Score: 3      Day of Surgery Review of History & Physical: I have interviewed and examined the patient. I have reviewed the patient's H&P dated:  There are no significant changes.          Ready For Surgery From Anesthesia Perspective.

## 2017-05-16 NOTE — IP AVS SNAPSHOT
[address] [place] [address]           Patient Discharge Instructions   Our goal is to set you up for success. This packet includes information on your condition, medications, and your home care.  It will help you care for yourself to prevent having to return to the hospital.     Please ask your nurse if you have any questions.      There are many details to remember when preparing to leave the hospital. Here is what you will need to do:    1. Take your medicine. If you are prescribed medications, review your Medication List on the following pages. You may have new medications to  at the pharmacy and others that you'll need to stop taking. Review the instructions for how and when to take your medications. Talk with your doctor or nurses if you are unsure of what to do.     2. Go to your follow-up appointments. Specific follow-up information is listed in the following pages. Your may be contacted by a nurse or clinical provider about future appointments. Be sure we have all of the phone numbers to reach you. Please contact your provider's office if you are unable to make an appointment.     3. Watch for warning signs. Your doctor or nurse will give you detailed warning signs to watch for and when to call for assistance. These instructions may also include educational information about your condition. If you experience any of warning signs to your health, call your doctor.               ** Verify the list of medication(s) below is accurate and up to date. Carry this with you in case of emergency. If your medications have changed, please notify your healthcare provider.             Medication List      CHANGE how you take these medications        Additional Info                      atorvastatin 40 MG tablet   Commonly known as:  LIPITOR   Quantity:  30 tablet   Refills:  2   Dose:  40 mg   What changed:  Another medication with the same name was removed.  Continue taking this medication, and follow the directions you see here.    Instructions:  Take 1 tablet (40 mg total) by mouth once daily.     Begin Date    AM    Noon    PM    Bedtime         CONTINUE taking these medications        Additional Info                      amlodipine 5 MG tablet   Commonly known as:  NORVASC   Quantity:  30 tablet   Refills:  6   Dose:  5 mg    Instructions:  Take 1 tablet (5 mg total) by mouth once daily.     Begin Date    AM    Noon    PM    Bedtime       aspirin 81 MG EC tablet   Commonly known as:  ECOTRIN   Refills:  0   Dose:  81 mg    Instructions:  Take 81 mg by mouth once daily.     Begin Date    AM    Noon    PM    Bedtime       metoprolol succinate 25 MG 24 hr tablet   Commonly known as:  TOPROL-XL   Quantity:  60 tablet   Refills:  9    Instructions:  TAKE 1 TABLET BY MOUTH TWICE DAILY     Begin Date    AM    Noon    PM    Bedtime       valsartan 160 MG tablet   Commonly known as:  DIOVAN   Quantity:  30 tablet   Refills:  11   Dose:  160 mg    Instructions:  Take 1 tablet (160 mg total) by mouth once daily.     Begin Date    AM    Noon    PM    Bedtime       warfarin 5 MG tablet   Commonly known as:  COUMADIN   Quantity:  30 tablet   Refills:  3    Instructions:  Take 1/2 tablet on Thursdays and 1 tablet all other days as directed by the [facility].     Begin Date    AM    Noon    PM    Bedtime         STOP taking these medications     SUPREP BOWEL PREP KIT 17.5-3.13-1.6 gram Solr   Generic drug:  sodium,potassium,mag sulfates       tadalafil 20 MG Tab   Commonly known as:  CIALIS                  Please bring to all follow up appointments:    1. A copy of your discharge instructions.  2. All medicines you are currently taking in their original bottles.  3. Identification and insurance card.    Please arrive 15 minutes ahead of scheduled appointment time.    Please call 24 hours in advance if you must reschedule your appointment and/or time.        Your Scheduled  Appointments     May 23, 2017  8:15 AM CDT   Coumadin with Ly Izaguirre PharmD   [facility] ([facility])    [address] [place] [id number] [phone]            Sep 13, 2017  9:00 AM CDT   Fasting Lab with LABORATORY, [facility] ([facility])    [id number] [facility] [id number] [phone]            Sep 25, 2017  8:20 AM CDT   Established Patient Visit with Giuliano Moran MD   [facility] ([facility])    [address] [place] [id number] [phone]            Oct 24, 2017  8:30 AM CDT   Established Patient Visit with Ryan Bermeo MD   [facility] Ophthalmology ([facility])    [address] [place] [id number] [phone]              Follow-up Information     Follow up with Giuliano Moran MD.    Specialty:  Internal Medicine    Why:  As needed    Contact information:    [address] [place] [address]  [address]  [place] [address]  [phone]          Discharge Instructions     Future Orders    Activity as tolerated     Call MD for:  difficulty breathing, headache or visual disturbances     Call MD for:  hives     Call MD for:  persistent dizziness or light-headedness     Call MD for:  persistent nausea and vomiting     Call MD for:  redness, tenderness, or signs of infection (pain, swelling, redness, odor or green/yellow discharge around incision site)     Call MD for:  severe uncontrolled pain     Call MD for:  temperature >100.4     Diet general     Questions:    Total calories:      Fat restriction, if any:      Protein restriction, if any:      Na restriction, if any:      Fluid restriction:      Additional restrictions:      No dressing needed         Discharge Instructions       If you develop fevers, severe abdominal pain, significant bleeding, nausea or vomiting, please contact us or come to our Emergency Department at [facility].  Our   "contact number is [phone] available for emergencies or any question that you may have.      You may return to normal activities tomorrow.  Written discharge instructions were provided to you today and have them handy since you may not remember our conversation today.       I also recommend that you follow a high fiber diet and take calcium supplements daily as well as to continue your present medications.      If you take Aspirin, please resume taking it at your prior dose today. If we obtained biopsies or removed polyps during your procedure, we will contact you within 5 to 6 days with the results.      Thanks for trusting us with your healthcare needs.      Sincerely,     Alfredo Naidu M.D.        To rate your experience with Dr. Naidu, please click on the link below     [email or web address]      Discharge References/Attachments     COLON AND RECTAL POLYPS, UNDERSTANDING (ENGLISH)    DIVERTICULOSIS (ENGLISH)        Primary Diagnosis     Your primary diagnosis was:  History Of Colonic Polyps      Admission Information     Date & Time Provider Department [facility]    5/16/2017  8:21 AM Alfredo Naidu MD [facility] -  [id number]      Care Providers     Provider Role Specialty Primary office phone    Alfredo Naidu MD Attending Provider Gastroenterology [phone]    Alfredo Naidu MD Surgeon  Gastroenterology [phone]      Your Vitals Were     BP Pulse Temp Resp Height Weight    125/78 (BP Location: Left arm, Patient Position: Lying, BP Method: Automatic) 87 97.9 °F (36.6 °C) (Oral) 18 5' 7" (1.702 m) 87.5 kg (193 lb)    SpO2 BMI             96% 30.23 kg/m2         Recent Lab Values     No lab values to display.      Pending Labs     Order Current Status    Specimen to Pathology - Surgery Collected (05/16/17 0941)      Allergies as of 5/16/2017     No Known Allergies      [facility] On Call     [facility] On Call Nurse Care Line - 24/7 Assistance  Unless otherwise " directed by your provider, please contact [facility], our nurse care line that is available for 24/7 assistance.     Registered nurses in the [facility] provide clinical advisement, health education, appointment booking, and other advisory services.  Call for this free service at [phone].        Advance Directives     An advance directive is a document which, in the event you are no longer able to make decisions for yourself, tells your healthcare team what kind of treatment you do or do not want to receive, or who you would like to make those decisions for you.  If you do not currently have an advance directive, [facility] encourages you to create one.  For more information call:  [phone] ([phone]), [phone] ([phone]),  or log on to [email or web address].        Smoking Cessation     If you would like to quit smoking:   You may be eligible for free services if you are a [place] resident and started smoking cigarettes before September 1, 1988.  Call the Smoking Cessation Trust (SCT) toll free at (730) 065-9327 or (794) 999-3046.   Call 7-575-QUIT-NOW if you do not meet the above criteria.   Contact us via email: [email or web address]   View our website for more information: [email or web address]        Language Assistance Services     ATTENTION: Language assistance services are available, free of charge. Please call [phone].      ATENCIÓN: Si habla español, tiene a schmidt disposición servicios gratuitos de asistencia lingüística. Llame al [phone].     CHÚ Ý: N?u b?n nói Ti?ng Vi?t, có các d?ch v? h? tr? ngôn ng? mi?n phí dành cho b?n. G?i s? [phone].        Coumadin Discharge Instructions                         [facility] Sign-Up     Activating your [facility] account is as easy as 1-2-3!     1) Visit [email or web address], select Sign Up Now, enter this activation code and your date of birth, then select Next.  [email or web address]  Expires: 6/3/2017   8:08 AM      2) Create a username and password to use when you visit [facility] in the future and select a security question in case you lose your password and select Next.    3) Enter your e-mail address and click Sign Up!    Additional Information  If you have questions, please e-mail [email or web address] or call [phone] to talk to our [facility] staff. Remember, [facility] is NOT to be used for urgent needs. For medical emergencies, dial 911.          [facility] -  complies with applicable Federal civil rights laws and does not discriminate on the basis of race, color, national origin, age, disability, or sex.

## 2017-05-16 NOTE — ANESTHESIA RELEASE NOTE
"Anesthesia Release from PACU Note    Patient: [place] DEJA Biggs JrLester    Procedure(s) Performed: Procedure(s) (LRB):  COLONOSCOPY (N/A)    Anesthesia type: MAC    Post pain: Adequate analgesia    Post assessment: no apparent anesthetic complications, tolerated procedure well and no evidence of recall    Last Vitals:   Visit Vitals    /78 (BP Location: Left arm, Patient Position: Lying, BP Method: Automatic)    Pulse 84    Temp 36 °C (96.8 °F)    Resp 14    Ht 5' 7" (1.702 m)    Wt 87.5 kg (193 lb)    SpO2 (!) 92%    BMI 30.23 kg/m2       Post vital signs: stable    Level of consciousness: awake, alert  and oriented    Nausea/Vomiting: no nausea/no vomiting    Complications: none    Airway Patency: patent    Respiratory: unassisted, spontaneous ventilation, room air    Cardiovascular: stable and blood pressure at baseline    Hydration: euvolemic  "

## 2017-05-18 VITALS
WEIGHT: 193 LBS | TEMPERATURE: 97 F | HEART RATE: 82 BPM | RESPIRATION RATE: 18 BRPM | OXYGEN SATURATION: 97 % | DIASTOLIC BLOOD PRESSURE: 74 MMHG | BODY MASS INDEX: 30.29 KG/M2 | HEIGHT: 67 IN | SYSTOLIC BLOOD PRESSURE: 107 MMHG

## 2017-05-23 ENCOUNTER — ANTI-COAG VISIT (OUTPATIENT)
Dept: CARDIOLOGY | Facility: CLINIC | Age: 77
End: 2017-05-23
Payer: MEDICARE

## 2017-05-23 DIAGNOSIS — Z79.01 LONG TERM (CURRENT) USE OF ANTICOAGULANTS: Primary | ICD-10-CM

## 2017-05-23 LAB — INR PPP: 1.8 (ref 2–3)

## 2017-05-23 PROCEDURE — 99211 OFF/OP EST MAY X REQ PHY/QHP: CPT | Mod: 25,S$GLB,,

## 2017-05-23 PROCEDURE — 85610 PROTHROMBIN TIME: CPT | Mod: QW,S$GLB,,

## 2017-05-23 NOTE — PROGRESS NOTES
INR today is slightly sub-therapeutic. Patient held for colonoscopy and resumed warfairn 5mg daily on 5/16. Will restart scheduled dose until follow-up. Patient reports no bleeding or bruising, no new medications.

## 2017-05-26 RX ORDER — ZOLPIDEM TARTRATE 5 MG/1
TABLET ORAL
Qty: 20 TABLET | Refills: 5 | Status: SHIPPED | OUTPATIENT
Start: 2017-05-26 | End: 2017-05-31

## 2017-05-26 RX ORDER — ZOLPIDEM TARTRATE 5 MG/1
TABLET ORAL
Qty: 20 TABLET | Refills: 4 | Status: SHIPPED | OUTPATIENT
Start: 2017-05-26 | End: 2017-05-26 | Stop reason: SDUPTHER

## 2017-05-31 ENCOUNTER — HOSPITAL ENCOUNTER (OUTPATIENT)
Dept: RADIOLOGY | Facility: HOSPITAL | Age: 77
Discharge: HOME | End: 2017-05-31
Attending: INTERNAL MEDICINE
Payer: MEDICARE

## 2017-05-31 ENCOUNTER — OFFICE VISIT (OUTPATIENT)
Dept: INTERNAL MEDICINE | Facility: CLINIC | Age: 77
End: 2017-05-31
Payer: MEDICARE

## 2017-05-31 ENCOUNTER — TELEPHONE (OUTPATIENT)
Dept: INTERNAL MEDICINE | Facility: CLINIC | Age: 77
End: 2017-05-31

## 2017-05-31 VITALS
HEIGHT: 67 IN | TEMPERATURE: 99 F | OXYGEN SATURATION: 94 % | DIASTOLIC BLOOD PRESSURE: 98 MMHG | HEART RATE: 87 BPM | BODY MASS INDEX: 32.53 KG/M2 | SYSTOLIC BLOOD PRESSURE: 132 MMHG | WEIGHT: 207.25 LBS

## 2017-05-31 DIAGNOSIS — R06.02 SOB (SHORTNESS OF BREATH): Primary | ICD-10-CM

## 2017-05-31 DIAGNOSIS — Z51.81 ENCOUNTER FOR THERAPEUTIC DRUG MONITORING: Primary | ICD-10-CM

## 2017-05-31 DIAGNOSIS — R06.02 SOB (SHORTNESS OF BREATH): ICD-10-CM

## 2017-05-31 PROCEDURE — 1126F AMNT PAIN NOTED NONE PRSNT: CPT | Mod: S$GLB,,, | Performed by: INTERNAL MEDICINE

## 2017-05-31 PROCEDURE — 99499 UNLISTED E&M SERVICE: CPT | Mod: S$GLB,,, | Performed by: INTERNAL MEDICINE

## 2017-05-31 PROCEDURE — 99999 PR PBB SHADOW E&M-EST. PATIENT-LVL IV: CPT | Mod: PBBFAC,,, | Performed by: INTERNAL MEDICINE

## 2017-05-31 PROCEDURE — 71020 XR CHEST PA AND LATERAL: CPT | Mod: TC,PO

## 2017-05-31 PROCEDURE — 93010 ELECTROCARDIOGRAM REPORT: CPT | Mod: S$GLB,,, | Performed by: INTERNAL MEDICINE

## 2017-05-31 PROCEDURE — 99214 OFFICE O/P EST MOD 30 MIN: CPT | Mod: S$GLB,,, | Performed by: INTERNAL MEDICINE

## 2017-05-31 PROCEDURE — 71020 XR CHEST PA AND LATERAL: CPT | Mod: 26,,, | Performed by: RADIOLOGY

## 2017-05-31 PROCEDURE — 1159F MED LIST DOCD IN RCRD: CPT | Mod: S$GLB,,, | Performed by: INTERNAL MEDICINE

## 2017-05-31 PROCEDURE — 93005 ELECTROCARDIOGRAM TRACING: CPT | Mod: S$GLB,,, | Performed by: INTERNAL MEDICINE

## 2017-05-31 NOTE — PROGRESS NOTES
"HPI:  Patient is a 76-year-old man who comes in today with complaints of sudden onset of shortness of breath about 2 weeks ago.  He states he was in normal health and had excellent exercise capacity.  He had a colonoscopy about 3 weeks ago.  He noted about a week thereafter.  He suddenly became very short winded with even just walking to the mailbox.  He denies any chest pain.  There's been no other associated symptoms.  Denies any cough, fever, chills, sweats.    Current meds have been verified and updated per the EMR  Exam:BP (!) 132/98   Pulse 87   Temp 98.6 °F (37 °C) (Tympanic)   Ht 5' 7" (1.702 m)   Wt 94 kg (207 lb 3.7 oz)   SpO2 (!) 94%   BMI 32.46 kg/m²   O2 sat at rest was 96%, after approximately a 2 minute walk.  His O2 sat was down to 82%  Chest is clear  Cardiovascular irregular rate and rhythm without murmur, gallop, rub  Extremities reveal some mild edema in the right lower extremity, but nonetheless    Lab Results   Component Value Date    WBC 7.25 03/17/2017    HGB 14.8 03/17/2017    HCT 44.1 03/17/2017     03/17/2017    CHOL 161 03/17/2017    TRIG 63 03/17/2017    HDL 57 03/17/2017    ALT 36 03/17/2017    AST 30 03/17/2017     03/17/2017    K 4.8 03/17/2017     03/17/2017    CREATININE 0.9 03/17/2017    BUN 17 03/17/2017    CO2 27 03/17/2017    TSH 1.064 03/17/2017    PSA <0.010 08/20/2013    INR 1.8 (A) 05/23/2017    chest x-ray unchanged from prior exam    Impression:  My primary concern is he may have a pulmonary embolus.  Patient at rest is very comfortable, he is hemodynamically stable and his O2 sats are normal.  It is only with exertion that he desaturates.  He is already anticoagulated with Coumadin due to his atrial fibrillation.  Patient Active Problem List   Diagnosis    Pure hypercholesterolemia    Atrial fibrillation    Coronary atherosclerosis    Essential hypertension    History of prostate cancer    Abnormal CXR    Refractive error    Family history " of macular degeneration    Posterior capsular opacification    Pseudophakia    Pseudophakia of both eyes    History of colon polyps    Diverticulosis of large intestine without hemorrhage       Plan:  Orders Placed This Encounter    X-Ray Chest PA And Lateral    CBC auto differential    Comprehensive metabolic panel    EKG 12-lead     .  He will have a CT scan of chest for pulmonary embolus protocol tomorrow morning.  He's been told he has any change in his status tonight, he should go to the emergency room.  He'll also have lab work done today.  We will be in touch with him with results tomorrow

## 2017-06-01 ENCOUNTER — TELEPHONE (OUTPATIENT)
Dept: INTERNAL MEDICINE | Facility: CLINIC | Age: 77
End: 2017-06-01

## 2017-06-01 ENCOUNTER — HOSPITAL ENCOUNTER (OUTPATIENT)
Dept: RADIOLOGY | Facility: HOSPITAL | Age: 77
Discharge: HOME | End: 2017-06-01
Attending: INTERNAL MEDICINE
Payer: MEDICARE

## 2017-06-01 DIAGNOSIS — R06.02 SOB (SHORTNESS OF BREATH): ICD-10-CM

## 2017-06-01 PROCEDURE — 71275 CT ANGIOGRAPHY CHEST: CPT | Mod: 26,,, | Performed by: RADIOLOGY

## 2017-06-01 PROCEDURE — 71275 CT ANGIOGRAPHY CHEST: CPT | Mod: TC,PO

## 2017-06-01 PROCEDURE — 25500020 PHARM REV CODE 255: Mod: PO | Performed by: INTERNAL MEDICINE

## 2017-06-01 RX ADMIN — IOHEXOL 100 ML: 350 INJECTION, SOLUTION INTRAVENOUS at 03:06

## 2017-06-01 NOTE — TELEPHONE ENCOUNTER
3:24 CTA not read.  3:40 CTA not read yet   4:12 Spoke with Dr Moran and let him know that the CTA was negative for PE.     4:24 called and spoke with pt and let him know result and that Dr Moran requested that he be seen sometime next week in the clinic.  He is requesting to be seen as soon as Dr Moran is back in the clinic   He requested a morning appt, I booked him for 06/06 at 7:40 at his request   He knows if he gets in distress to proceed to the ER

## 2017-06-06 ENCOUNTER — OFFICE VISIT (OUTPATIENT)
Dept: INTERNAL MEDICINE | Facility: CLINIC | Age: 77
End: 2017-06-06
Payer: MEDICARE

## 2017-06-06 VITALS
WEIGHT: 207.25 LBS | BODY MASS INDEX: 32.53 KG/M2 | OXYGEN SATURATION: 97 % | HEART RATE: 80 BPM | DIASTOLIC BLOOD PRESSURE: 84 MMHG | SYSTOLIC BLOOD PRESSURE: 124 MMHG | HEIGHT: 67 IN | TEMPERATURE: 99 F

## 2017-06-06 DIAGNOSIS — I10 ESSENTIAL HYPERTENSION: Primary | ICD-10-CM

## 2017-06-06 DIAGNOSIS — R06.09 DOE (DYSPNEA ON EXERTION): ICD-10-CM

## 2017-06-06 DIAGNOSIS — I48.20 CHRONIC ATRIAL FIBRILLATION: ICD-10-CM

## 2017-06-06 PROCEDURE — 99999 PR PBB SHADOW E&M-EST. PATIENT-LVL III: CPT | Mod: PBBFAC,,, | Performed by: INTERNAL MEDICINE

## 2017-06-06 PROCEDURE — 1126F AMNT PAIN NOTED NONE PRSNT: CPT | Mod: S$GLB,,, | Performed by: INTERNAL MEDICINE

## 2017-06-06 PROCEDURE — 1159F MED LIST DOCD IN RCRD: CPT | Mod: S$GLB,,, | Performed by: INTERNAL MEDICINE

## 2017-06-06 PROCEDURE — 99213 OFFICE O/P EST LOW 20 MIN: CPT | Mod: S$GLB,,, | Performed by: INTERNAL MEDICINE

## 2017-06-06 NOTE — PROGRESS NOTES
"HPI:  Patient is a 76-year-old man who comes in today for follow-up of his CT of his chest to rule out pulmonary emboli.  The CT was negative.  Patient continues to have subjective dyspnea on exertion.  He can only walk approximately 100 feet or getting short of breath.  Last visit we did a 50 yard walk here in the building and His O2 sats fell to 83%.      Current meds have been verified and updated per the EMR  Exam:/84   Pulse 80   Temp 99 °F (37.2 °C) (Tympanic)   Ht 5' 7" (1.702 m)   Wt 94 kg (207 lb 3.7 oz)   SpO2 97%   BMI 32.46 kg/m²   Exam deferred    Lab Results   Component Value Date    WBC 8.98 05/31/2017    HGB 14.5 05/31/2017    HCT 42.8 05/31/2017     05/31/2017    CHOL 161 03/17/2017    TRIG 63 03/17/2017    HDL 57 03/17/2017    ALT 50 (H) 05/31/2017    AST 36 05/31/2017     05/31/2017    K 4.6 05/31/2017     05/31/2017    CREATININE 0.9 05/31/2017    CREATININE 0.9 05/31/2017    BUN 20 05/31/2017    CO2 27 05/31/2017    TSH 1.064 03/17/2017    PSA <0.010 08/20/2013    INR 1.8 (A) 05/23/2017       Impression:  Dyspnea on exertion with significant desaturation  Patient Active Problem List   Diagnosis    Pure hypercholesterolemia    Atrial fibrillation    Coronary atherosclerosis    Essential hypertension    History of prostate cancer    Abnormal CXR    Refractive error    Family history of macular degeneration    Posterior capsular opacification    Pseudophakia    Pseudophakia of both eyes    History of colon polyps    Diverticulosis of large intestine without hemorrhage       Plan:  Orders Placed This Encounter    2D Echo w/ Color Flow Doppler    Complete PFT with bronchodilator   .  He will have echo and PFTs done.  He was started on home O2.  We will call him with results of the pulmonary function test and echo.      "

## 2017-06-07 ENCOUNTER — PROCEDURE VISIT (OUTPATIENT)
Dept: PULMONOLOGY | Facility: CLINIC | Age: 77
End: 2017-06-07
Payer: MEDICARE

## 2017-06-07 DIAGNOSIS — R06.09 DOE (DYSPNEA ON EXERTION): ICD-10-CM

## 2017-06-07 PROCEDURE — 94729 DIFFUSING CAPACITY: CPT | Mod: S$GLB,,, | Performed by: INTERNAL MEDICINE

## 2017-06-07 PROCEDURE — 94060 EVALUATION OF WHEEZING: CPT | Mod: S$GLB,,, | Performed by: INTERNAL MEDICINE

## 2017-06-07 PROCEDURE — 94726 PLETHYSMOGRAPHY LUNG VOLUMES: CPT | Mod: 59,S$GLB,, | Performed by: INTERNAL MEDICINE

## 2017-06-08 ENCOUNTER — TELEPHONE (OUTPATIENT)
Dept: INTERNAL MEDICINE | Facility: CLINIC | Age: 77
End: 2017-06-08

## 2017-06-08 NOTE — TELEPHONE ENCOUNTER
----- Message from Eliecer Nuñez sent at 6/8/2017  8:51 AM CDT -----  Contact: Marino Biggs (Spouse)  Marino Biggs (Spouse) is requesting a call from nurse in regards to a test for the pt.            Please call pt back at [phone]

## 2017-06-09 ENCOUNTER — CLINICAL SUPPORT (OUTPATIENT)
Dept: CARDIOLOGY | Facility: CLINIC | Age: 77
End: 2017-06-09
Payer: MEDICARE

## 2017-06-09 ENCOUNTER — TELEPHONE (OUTPATIENT)
Dept: INTERNAL MEDICINE | Facility: CLINIC | Age: 77
End: 2017-06-09

## 2017-06-09 DIAGNOSIS — I48.20 CHRONIC ATRIAL FIBRILLATION: ICD-10-CM

## 2017-06-09 LAB
DIASTOLIC DYSFUNCTION: YES
ESTIMATED PA SYSTOLIC PRESSURE: 26.21
MITRAL VALVE REGURGITATION: ABNORMAL
RETIRED EF AND QEF - SEE NOTES: 55 (ref 55–65)
TRICUSPID VALVE REGURGITATION: ABNORMAL

## 2017-06-09 PROCEDURE — 93306 TTE W/DOPPLER COMPLETE: CPT | Mod: S$GLB,,, | Performed by: INTERNAL MEDICINE

## 2017-06-09 NOTE — TELEPHONE ENCOUNTER
Called pt.  He reports that he has not received home oxygen.  He is checking his O2 saturation and the readings have been normal.  He would like his test results.  Please advise.

## 2017-06-09 NOTE — TELEPHONE ENCOUNTER
----- Message from Giuliano Moran MD sent at 6/9/2017  4:06 PM CDT -----  Call pt and find out if he has received his home oxygen yet

## 2017-06-11 DIAGNOSIS — E78.00 PURE HYPERCHOLESTEROLEMIA: ICD-10-CM

## 2017-06-11 RX ORDER — ATORVASTATIN CALCIUM 40 MG/1
TABLET, FILM COATED ORAL
Qty: 30 TABLET | Refills: 1 | Status: SHIPPED | OUTPATIENT
Start: 2017-06-11 | End: 2017-06-12 | Stop reason: SDUPTHER

## 2017-06-12 ENCOUNTER — TELEPHONE (OUTPATIENT)
Dept: INTERNAL MEDICINE | Facility: CLINIC | Age: 77
End: 2017-06-12

## 2017-06-12 LAB
POST FEF 25 75: 2.3 L/S (ref 1.26–2.72)
POST FET 100: 7.96 S
POST FEV1 FVC: 84 %
POST FEV1: 2.34 L (ref 2.36–3.08)
POST FIF 50: 4.2 L/S
POST FVC: 2.8 L (ref 3.34–4.19)
POST PEF: 5.39 L/S (ref 6.2–8.32)
PRE DLCO: 26.36 ML/MMHG/MIN (ref 16.59–24.88)
PRE ERV: 0.05 L
PRE FEF 25 75: 1.88 L/S (ref 1.26–2.72)
PRE FET 100: 7.1 S
PRE FEV1 FVC: 80 %
PRE FEV1: 2.06 L (ref 2.36–3.08)
PRE FIF 50: 3.71 L/S
PRE FRC PL: 5.53 L (ref 2.44–3.65)
PRE FVC: 2.56 L (ref 3.34–4.19)
PRE KROGHS K: 4.11 1/MIN
PRE PEF: 5.37 L/S (ref 6.2–8.32)
PRE RV: 5.48 L (ref 2.01–2.8)
PRE SVC: 3.12 L
PRE TLC: 8.6 L (ref 5.23–6.23)
PREDICTED DLCO: 20.73 ML/MMHG/MIN (ref 16.59–24.88)
PREDICTED FEV1 FVC: 72.78 % (ref 67.94–77.62)
PREDICTED FEV1: 2.72 L (ref 2.36–3.08)
PREDICTED FRC N2: 3.05 L (ref 2.44–3.65)
PREDICTED FRC PL: 3.05 L (ref 2.44–3.65)
PREDICTED FVC: 3.77 L (ref 3.34–4.19)
PREDICTED RV: 2.4 L (ref 2.01–2.8)
PREDICTED SVC: 3.36 L
PREDICTED TLC: 5.73 L (ref 5.23–6.23)
PROVOCATION PROTOCOL: ABNORMAL

## 2017-06-12 RX ORDER — ATORVASTATIN CALCIUM 40 MG/1
40 TABLET, FILM COATED ORAL DAILY
Qty: 30 TABLET | Refills: 6 | Status: SHIPPED | OUTPATIENT
Start: 2017-06-12 | End: 2018-02-26 | Stop reason: SDUPTHER

## 2017-06-12 NOTE — TELEPHONE ENCOUNTER
----- Message from Marina Stephens sent at 6/12/2017  8:06 AM CDT -----  Contact: Pt  Pt is requesting to have test results from 06/09. Pls call pt back at [phone] or [phone].

## 2017-06-13 ENCOUNTER — TELEPHONE (OUTPATIENT)
Dept: INTERNAL MEDICINE | Facility: CLINIC | Age: 77
End: 2017-06-13

## 2017-06-13 RX ORDER — TIOTROPIUM BROMIDE 18 UG/1
18 CAPSULE ORAL; RESPIRATORY (INHALATION) DAILY
Qty: 30 CAPSULE | Refills: 11 | Status: SHIPPED | OUTPATIENT
Start: 2017-06-13 | End: 2017-06-26

## 2017-06-13 RX ORDER — ALBUTEROL SULFATE 90 UG/1
2 AEROSOL, METERED RESPIRATORY (INHALATION) EVERY 6 HOURS PRN
Qty: 1 INHALER | Refills: 11 | Status: SHIPPED | OUTPATIENT
Start: 2017-06-13 | End: 2017-09-05 | Stop reason: ALTCHOICE

## 2017-06-13 NOTE — TELEPHONE ENCOUNTER
Your echocardiogram shows the typical changes in someone who has had high blood pressure but is overall functioning fairly well. Your PFT's show you have some mild changes of COPD which did improve with the bronchodilator they gave you during the examine. I want you to start on an inhaler that you use every day and a second inhaler that you use only if you feel short winded. Scripts were sent to your pharmacy.

## 2017-06-13 NOTE — TELEPHONE ENCOUNTER
----- Message from Krysta Miller sent at 6/13/2017  1:14 PM CDT -----  Patient would like tests results from last week.   Call him at [phone].                                        potts

## 2017-06-20 ENCOUNTER — ANTI-COAG VISIT (OUTPATIENT)
Dept: CARDIOLOGY | Facility: CLINIC | Age: 77
End: 2017-06-20
Payer: MEDICARE

## 2017-06-20 DIAGNOSIS — Z79.01 LONG TERM (CURRENT) USE OF ANTICOAGULANTS: Primary | ICD-10-CM

## 2017-06-20 LAB — INR PPP: 2.2 (ref 2–3)

## 2017-06-20 PROCEDURE — 85610 PROTHROMBIN TIME: CPT | Mod: QW,S$GLB,,

## 2017-06-20 RX ORDER — WARFARIN SODIUM 5 MG/1
TABLET ORAL
Qty: 30 TABLET | Refills: 3 | Status: SHIPPED | OUTPATIENT
Start: 2017-06-20 | End: 2017-09-05 | Stop reason: SDUPTHER

## 2017-06-26 ENCOUNTER — OFFICE VISIT (OUTPATIENT)
Dept: INTERNAL MEDICINE | Facility: CLINIC | Age: 77
End: 2017-06-26
Payer: MEDICARE

## 2017-06-26 VITALS
TEMPERATURE: 99 F | WEIGHT: 207.88 LBS | SYSTOLIC BLOOD PRESSURE: 122 MMHG | HEIGHT: 67 IN | HEART RATE: 83 BPM | OXYGEN SATURATION: 96 % | DIASTOLIC BLOOD PRESSURE: 88 MMHG | BODY MASS INDEX: 32.63 KG/M2

## 2017-06-26 DIAGNOSIS — F33.1 MODERATE EPISODE OF RECURRENT MAJOR DEPRESSIVE DISORDER: Primary | ICD-10-CM

## 2017-06-26 PROCEDURE — 99499 UNLISTED E&M SERVICE: CPT | Mod: S$GLB,,, | Performed by: INTERNAL MEDICINE

## 2017-06-26 PROCEDURE — 1126F AMNT PAIN NOTED NONE PRSNT: CPT | Mod: S$GLB,,, | Performed by: INTERNAL MEDICINE

## 2017-06-26 PROCEDURE — 1159F MED LIST DOCD IN RCRD: CPT | Mod: S$GLB,,, | Performed by: INTERNAL MEDICINE

## 2017-06-26 PROCEDURE — 99213 OFFICE O/P EST LOW 20 MIN: CPT | Mod: S$GLB,,, | Performed by: INTERNAL MEDICINE

## 2017-06-26 PROCEDURE — 99999 PR PBB SHADOW E&M-EST. PATIENT-LVL III: CPT | Mod: PBBFAC,,, | Performed by: INTERNAL MEDICINE

## 2017-06-26 RX ORDER — ESCITALOPRAM OXALATE 10 MG/1
10 TABLET ORAL DAILY
Qty: 30 TABLET | Refills: 11 | Status: SHIPPED | OUTPATIENT
Start: 2017-06-26 | End: 2017-07-28
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
## 2017-07-11 ENCOUNTER — ANTI-COAG VISIT (OUTPATIENT)
Dept: CARDIOLOGY | Facility: CLINIC | Age: 77
End: 2017-07-11
Payer: MEDICARE

## 2017-07-11 DIAGNOSIS — Z79.01 LONG TERM (CURRENT) USE OF ANTICOAGULANTS: Primary | ICD-10-CM

## 2017-07-11 LAB — INR PPP: 2.3 (ref 2–3)

## 2017-07-11 PROCEDURE — 99211 OFF/OP EST MAY X REQ PHY/QHP: CPT | Mod: 25,S$GLB,,

## 2017-07-11 PROCEDURE — 85610 PROTHROMBIN TIME: CPT | Mod: QW,S$GLB,,

## 2017-07-11 NOTE — PROGRESS NOTES
INR remains therapeutic. Changes in medications related to- lexapro began 6/26. No other changes noted. Continue dose and diet until follow-up.

## 2017-07-12 DIAGNOSIS — I10 ESSENTIAL HYPERTENSION: Primary | ICD-10-CM

## 2017-07-12 DIAGNOSIS — I48.0 PAROXYSMAL ATRIAL FIBRILLATION: ICD-10-CM

## 2017-07-12 RX ORDER — METOPROLOL SUCCINATE 25 MG/1
TABLET, EXTENDED RELEASE ORAL
Qty: 60 TABLET | Refills: 0 | Status: SHIPPED | OUTPATIENT
Start: 2017-07-12 | End: 2017-07-12 | Stop reason: SDUPTHER

## 2017-07-12 RX ORDER — METOPROLOL SUCCINATE 25 MG/1
25 TABLET, EXTENDED RELEASE ORAL 2 TIMES DAILY
Qty: 60 TABLET | Refills: 3 | Status: SHIPPED | OUTPATIENT
Start: 2017-07-12 | End: 2017-11-16

## 2017-07-12 NOTE — TELEPHONE ENCOUNTER
Received Rx refill request from [facility] for Metoprolol.Request sent to Terri Verduzco NP for approval.

## 2017-07-20 ENCOUNTER — TELEPHONE (OUTPATIENT)
Dept: PULMONOLOGY | Facility: CLINIC | Age: 77
End: 2017-07-20

## 2017-07-20 DIAGNOSIS — R06.02 SOB (SHORTNESS OF BREATH): Primary | ICD-10-CM

## 2017-07-28 ENCOUNTER — OFFICE VISIT (OUTPATIENT)
Dept: PULMONOLOGY | Facility: CLINIC | Age: 77
End: 2017-07-28
Payer: MEDICARE

## 2017-07-28 ENCOUNTER — PROCEDURE VISIT (OUTPATIENT)
Dept: PULMONOLOGY | Facility: CLINIC | Age: 77
End: 2017-07-28
Payer: MEDICARE

## 2017-07-28 VITALS — HEIGHT: 67 IN | BODY MASS INDEX: 32.01 KG/M2 | WEIGHT: 203.94 LBS

## 2017-07-28 VITALS
OXYGEN SATURATION: 97 % | HEART RATE: 81 BPM | RESPIRATION RATE: 18 BRPM | WEIGHT: 204 LBS | BODY MASS INDEX: 32.02 KG/M2 | DIASTOLIC BLOOD PRESSURE: 62 MMHG | HEIGHT: 67 IN | SYSTOLIC BLOOD PRESSURE: 120 MMHG

## 2017-07-28 DIAGNOSIS — J45.20 MILD INTERMITTENT ASTHMA WITHOUT COMPLICATION: Primary | ICD-10-CM

## 2017-07-28 DIAGNOSIS — R06.02 SOB (SHORTNESS OF BREATH): ICD-10-CM

## 2017-07-28 PROCEDURE — 99999 PR PBB SHADOW E&M-EST. PATIENT-LVL III: CPT | Mod: PBBFAC,,, | Performed by: INTERNAL MEDICINE

## 2017-07-28 PROCEDURE — 1159F MED LIST DOCD IN RCRD: CPT | Mod: S$GLB,,, | Performed by: INTERNAL MEDICINE

## 2017-07-28 PROCEDURE — 99499 UNLISTED E&M SERVICE: CPT | Mod: S$GLB,,, | Performed by: INTERNAL MEDICINE

## 2017-07-28 PROCEDURE — 94620 PR PULMONARY STRESS TESTING,SIMPLE: CPT | Mod: S$GLB,,, | Performed by: INTERNAL MEDICINE

## 2017-07-28 PROCEDURE — 99205 OFFICE O/P NEW HI 60 MIN: CPT | Mod: S$GLB,,, | Performed by: INTERNAL MEDICINE

## 2017-07-28 RX ORDER — FLUTICASONE FUROATE AND VILANTEROL 200; 25 UG/1; UG/1
1 POWDER RESPIRATORY (INHALATION) DAILY
Qty: 60 EACH | Refills: 11 | Status: SHIPPED | OUTPATIENT
Start: 2017-07-28 | End: 2017-09-05 | Stop reason: ALTCHOICE

## 2017-07-28 RX ORDER — PREDNISONE 20 MG/1
TABLET ORAL
Qty: 20 TABLET | Refills: 1 | Status: SHIPPED | OUTPATIENT
Start: 2017-07-28 | End: 2017-09-05 | Stop reason: ALTCHOICE

## 2017-07-28 NOTE — PROCEDURES
"[facility]  Six Minute Walk     SUMMARY     Ordering Provider: Dr. Beltran   Interpreting Provider: Dr. Beltran  Performing nurse/tech/RT: YOLANDA Fisher RRT  Diagnosis: Shortness of Breath  Height: 5' 7" (170.2 cm)  Weight: 92.5 kg (203 lb 14.8 oz)  BMI (Calculated): 32   Patient Race:             Phase Oxygen Assessment Supplemental O2 Heart   Rate Blood Pressure Grace Dyspnea Scale Rating   Resting 97 % Room Air 76 bpm 126/66 3   Exercise        Minute        1 97 % Room Air 87 bpm     2 96 % Room Air 89 bpm     3 96 % Room Air 90 bpm     4 97 % Room Air 91 bpm     5 98 % Room Air 120 bpm     6  97 % Room Air 115 bpm 138/73 7-8   Recovery        Minute        1 97 % Room Air 81 bpm     2 98 % Room Air 84 bpm     3 98 % Room Air 79 bpm     4 98 % Room Air 80 bpm (!) 146/95 3     Six Minute Walk Summary  6MWT Status: completed without stopping  Patient Reported: Dyspnea, Lightheadedness     Interpretation:  Did the patient stop or pause?: No                        Total Time Walked (Calculated): 360 seconds  Final Partial Lap Distance (feet): 100 feet  Total Distance Meters (Calculated): 274.32 meters  Predicted Distance Meters (Calculated): 445.13 meters  Percentage of Predicted (Calculated): 61.63  Peak VO2 (Calculated): 12.21  Mets: 3.49  Has The Patient Had a Previous Six Minute Walk Test?: No       Previous 6MWT Results  Has The Patient Had a Previous Six Minute Walk Test?: No       Interpretation:  Total distance walked in six minutes is mildly reduced indicating a reduction in overall  functional capacity. There was no exercise induced hypoxemia with significant oxygen desaturation.    Oxygen desaturation did not meet criteria for supplemental oxygen prescription.    Clinical correlation suggested.    Cong Beltran MD    Mild exercise-induced hypoxemia described as an arterial oxygen saturation of 93-95% (or 3-4% less than at rest), moderate exercise-induced hypoxemia as 89-93%, and severe " exercise induced hypoxemia as < 89% O2 saturation.  Medicare Criteria Comments:   When arterial oxygen saturation is at or below 88% during exercise (severe exercise induced hypoxemia) then the patient falls under Medicare Group 1 criteria for supplemental oxygen.  Details about Medicare Group Criteria coverage can be found at [email or web address]/manuals/[email or web address]
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
## 2017-07-28 NOTE — LETTER
July 28, 2017      Giuliano Moran MD  [address]  [address]  [place] [address]           [facility]  [address] [place] [id number]  Phone: [phone]  Fax: [phone]          Patient: Anthony Biggs Jr.   MR Number: [id number]   YOB: 1940   Date of Visit: 7/28/2017       Dear Dr. Giuliano Moran:    Thank you for referring Anthony Biggs to me for evaluation. Attached you will find relevant portions of my assessment and plan of care.    If you have questions, please do not hesitate to call me. I look forward to following Anthony Biggs along with you.    Sincerely,    Cong Beltran MD    Enclosure  CC:  No Recipients    If you would like to receive this communication electronically, please contact [email or web address] or [phone] to request more information on [employer] Link access.    For providers and/or their staff who would like to refer a patient to [facility], please contact us through our one-stop-shop provider referral line, [facility], at [phone].    If you feel you have received this communication in error or would no longer like to receive these types of communications, please e-mail [email or web address]

## 2017-07-28 NOTE — PATIENT INSTRUCTIONS
Fluticasone; Vilanterol inhalation powder  What is this medicine?  FLUTICASONE; VILANTEROL (floo TIK a sone; vye JOSE ter ol) inhalation is a combination of two medicines that decrease inflammation and help to open up the airways of your lungs. It is for chronic obstructive pulmonary disease (COPD), including chronic bronchitis or emphysema. It is also used for asthma in adults to help control symptoms. Do NOT use for an acute asthma attack or COPD attack.  How should I use this medicine?  This medicine is inhaled through the mouth. It is used once per day. Follow the directions on the prescription label. Do not use a spacer device with this inhaler. Take your medicine at regular intervals. Do not take your medicine more often than directed. Do not stop taking except on your doctor's advice. Make sure that you are using your inhaler correctly. Ask you doctor or health care provider if you have any questions.  A special MedGuide will be given to you by the pharmacist with each prescription and refill. Be sure to read this information carefully each time.  Talk to your pediatrician regarding the use of this medicine in children. Special care may be needed. This medicine is not approved for use in children under 18 years of age.  What side effects may I notice from receiving this medicine?  Side effects that you should report to your doctor or health care professional as soon as possible:  · allergic reactions like skin rash or hives, swelling of the face, lips, or tongue  · breathing problems right after inhaling your medicine  · changes in vision  · chest pain  · fast, irregular heartbeat  · feeling faint or lightheaded, falls  · fever or chills  · nausea, vomiting  · tiredness  Side effects that usually do not require medical attention (Report these to your doctor or health care professional if they continue or are bothersome.):  · cough  · headache  · nervousness  · sore throat  · tremor  What may interact with  this medicine?  Do not take this medicine with any of the following medications:  · cisapride  · dofetilide  · dronedarone  · MAOIs like Carbex, Eldepryl, Marplan, Nardil, and Parnate  · pimozide  · thioridazine  · ziprasidone  This medicine may also interact with the following medications:  · antiviral medicines for HIV or AIDS  · beta-blockers like metoprolol and propranolol  · certain medicines for depression, anxiety, or psychotic disturbances  · diuretics  · medicines for colds  · medicines for fungal infections like ketoconazole and itraconazole  · other medicines for breathing problems  · other medicines that prolong the QT interval (cause an abnormal heart rhythm)  What if I miss a dose?  If you miss a dose, use it as soon as you can. If it is almost time for your next dose, use only that dose and continue with your regular schedule. Do not use double or extra doses.  Where should I keep my medicine?  Keep out of the reach of children.  Store at room temperature between 15 and 30 degrees C (59 and 86 degrees F). Store in a dry place away from direct heat or sunlight. Throw away 6 weeks after you remove the inhaler from the foil tray, or after the dose indicator reads 0, whichever comes first. Throw away any unopened packages after the expiration date.  What should I tell my health care provider before I take this medicine?  They need to know if you have any of these conditions:  · bone problems  · immune system problems  · diabetes  · heart disease or irregular heartbeat  · high blood pressure  · infection  · pheochromocytoma  · seizures  · thyroid disease  · an unusual or allergic reaction to fluticasone, vilanterol, milk proteins, corticosteroids, other medicines, foods, dyes, or preservatives  · pregnant or trying to get pregnant  · breast-feeding  What should I watch for while using this medicine?  Visit your doctor or health care professional for regular checkups. Tell your doctor or health care  professional if your symptoms do not get better.  If your symptoms get worse or if you need your short-acting inhalers more often, call your doctor right away. Do not use this medicine more than every 24 hours.  If you are going to have surgery tell your doctor or health care professional that you are using this medicine. Try not to come in contact with people with the chicken pox or measles. If you do, call your doctor.  Date Last Reviewed:   NOTE:This sheet is a summary. It may not cover all possible information. If you have questions about this medicine, talk to your doctor, pharmacist, or health care provider. Copyright© 2016 [employer]        Tiotropium inhalation powder  What is this medicine?  TIOTROPIUM (richelle oh TRO pee um) is a bronchodilator. It helps open up the airways in your lungs to make it easier to breathe. This medicine is used to treat chronic obstructive pulmonary disease (COPD), including emphysema and chronic bronchitis. Do not use this medicine for an acute attack.  How should I use this medicine?  This medicine is used in a special inhaler. Do NOT swallow the capsules. Do NOT use a spacer device. Follow the directions on the prescription label. Take your medicine at regular intervals. Do not take it more often than directed. Do not stop taking except on your doctor's advice. Make sure that you are using your inhaler correctly. Ask your doctor or health care provider if you have any questions. Small pieces of the capsule may get in your mouth or throat when you breathe in your medicine. This is normal and should not hurt you.  Talk to your pediatrician regarding the use of this medicine in children. Special care may be needed.  What side effects may I notice from receiving this medicine?  Side effects that you should report to your doctor or health care professional as soon as possible:  · allergic reactions like skin rash or hives, swelling of the face, lips, or tongue  · breathing  problems  · changes in vision  · chest pain  · fast heartbeat  · infection or flu-like symptoms  · trouble passing urine or change in the amount of urine  Side effects that usually do not require medical attention (report to your doctor or health care professional if they continue or are bothersome):  · constipation  · cough  · dizziness  · dry mouth  · headache  · muscle pain  · sore throat  · stomach upset  What may interact with this medicine?  This medicine may also interact with the following medications:  · atropine  · antihistamines for allergy, cough and cold  · certain medicines for bladder problems like oxybutynin, tolterodine  · certain medicines for stomach problems like dicyclomine, hyoscyamine  · certain medicines for travel sickness like scopolamine  · certain medicines for Parkinson's disease like benztropine, trihexyphenidyl  · ipratropium  What if I miss a dose?  If you miss a dose, use it as soon as you can. If it is almost time for your next dose, use only that dose and continue with your regular schedule. Do not use double or extra doses.  Where should I keep my medicine?  Keep out of the reach of children.  Store at room temperature between 15 and 30 degrees C (59 and 86 degrees F). Protect from humidity. Keep capsules in the foil pack until you are ready to use. Throw away any unused medicine after the expiration date.  What should I tell my health care provider before I take this medicine?  They need to know if you have any of these conditions:  · bladder problems or difficulty passing urine  · glaucoma  · kidney disease  · prostate trouble  · an unusual or allergic reaction to tiotropium, ipratropium, atropine, other medicines, lactose, foods, dyes, or preservatives  · pregnant or trying to get pregnant  · breast-feeding  What should I watch for while using this medicine?  Visit your doctor or health care professional for regular checks on your progress. Tell your doctor if your symptoms do  not improve. Do not use more medicine than directed. If your symptoms get worse while you are using this medicine, call your doctor right away.  Do not get the this medicine in your eyes. It can cause irritation, pain, or blurred vision.  You may get dizzy. Do not drive, use machinery, or do anything that needs mental alertness until you know how this medicine affects you. Do not stand or sit up quickly, especially if you are an older patient. This reduces the risk of dizzy or fainting spells.  Clean the inhaler as directed in the patient information sheet that comes with this medicine.  Date Last Reviewed:   NOTE:This sheet is a summary. It may not cover all possible information. If you have questions about this medicine, talk to your doctor, pharmacist, or health care provider. Copyright© 2016 [employer]

## 2017-07-29 DIAGNOSIS — R06.02 SOB (SHORTNESS OF BREATH): Primary | ICD-10-CM

## 2017-08-03 ENCOUNTER — TELEPHONE (OUTPATIENT)
Dept: CARDIOLOGY | Facility: CLINIC | Age: 77
End: 2017-08-03

## 2017-08-03 NOTE — TELEPHONE ENCOUNTER
I spoke with wife and patient.He has been having sob at rest and increased with exertion and saw his Pulmonologist, Dr Beltran this past week and was recommended he see Dr Griffith.The patient did not want to see mid level which could have seen him tomorrow he insisted on seeing Dr Griffith.I made him an appointment for next week but advised patient not to wait if his sob increased or any signs of his condition worsening to go to the ER or call 911.He verbalized understanding and before I hung up encouraged him to go to ER if needed and he said no he would wait to see Dr Griffith.

## 2017-08-03 NOTE — TELEPHONE ENCOUNTER
----- Message from Kurt Waite sent at 8/3/2017 12:48 PM CDT -----  Contact: Pt/ wife  Pt wife states that pt needs to be seen as soon as possible. Pt is having shortness of breath, pt is very weak and can't walk a very long distance. Pt wife state that pt had an appt with a pulmonary doctor and they advised him to see his heart doctor. Please give wife a call at ..[phone] (home)

## 2017-08-07 ENCOUNTER — OFFICE VISIT (OUTPATIENT)
Dept: INTERNAL MEDICINE | Facility: CLINIC | Age: 77
End: 2017-08-07
Payer: MEDICARE

## 2017-08-07 VITALS
DIASTOLIC BLOOD PRESSURE: 86 MMHG | HEART RATE: 92 BPM | OXYGEN SATURATION: 97 % | BODY MASS INDEX: 33.08 KG/M2 | HEIGHT: 67 IN | SYSTOLIC BLOOD PRESSURE: 132 MMHG | WEIGHT: 210.75 LBS | TEMPERATURE: 99 F

## 2017-08-07 DIAGNOSIS — I25.10 ATHEROSCLEROSIS OF NATIVE CORONARY ARTERY WITHOUT ANGINA PECTORIS, UNSPECIFIED WHETHER NATIVE OR TRANSPLANTED HEART: ICD-10-CM

## 2017-08-07 DIAGNOSIS — Z79.01 CURRENT USE OF LONG TERM ANTICOAGULATION: ICD-10-CM

## 2017-08-07 DIAGNOSIS — H52.7 REFRACTIVE ERROR: ICD-10-CM

## 2017-08-07 DIAGNOSIS — Z83.518 FAMILY HISTORY OF MACULAR DEGENERATION: ICD-10-CM

## 2017-08-07 DIAGNOSIS — E78.00 PURE HYPERCHOLESTEROLEMIA: ICD-10-CM

## 2017-08-07 DIAGNOSIS — Z85.46 HISTORY OF PROSTATE CANCER: ICD-10-CM

## 2017-08-07 DIAGNOSIS — H26.499 POSTERIOR CAPSULAR OPACIFICATION, UNSPECIFIED LATERALITY: ICD-10-CM

## 2017-08-07 DIAGNOSIS — Z00.00 ENCOUNTER FOR PREVENTIVE HEALTH EXAMINATION: Primary | ICD-10-CM

## 2017-08-07 DIAGNOSIS — K57.30 DIVERTICULOSIS OF LARGE INTESTINE WITHOUT HEMORRHAGE: Chronic | ICD-10-CM

## 2017-08-07 DIAGNOSIS — Z96.1 PSEUDOPHAKIA OF BOTH EYES: ICD-10-CM

## 2017-08-07 DIAGNOSIS — I10 ESSENTIAL HYPERTENSION: ICD-10-CM

## 2017-08-07 DIAGNOSIS — R93.89 ABNORMAL CXR: ICD-10-CM

## 2017-08-07 DIAGNOSIS — Z86.010 HISTORY OF COLON POLYPS: ICD-10-CM

## 2017-08-07 DIAGNOSIS — I48.20 CHRONIC ATRIAL FIBRILLATION: ICD-10-CM

## 2017-08-07 PROCEDURE — 99999 PR PBB SHADOW E&M-EST. PATIENT-LVL IV: CPT | Mod: PBBFAC,,, | Performed by: PHYSICIAN ASSISTANT

## 2017-08-07 PROCEDURE — 99499 UNLISTED E&M SERVICE: CPT | Mod: S$GLB,,, | Performed by: PHYSICIAN ASSISTANT

## 2017-08-07 PROCEDURE — G0439 PPPS, SUBSEQ VISIT: HCPCS | Mod: S$GLB,,, | Performed by: PHYSICIAN ASSISTANT

## 2017-08-07 RX ORDER — TIOTROPIUM BROMIDE 18 UG/1
18 CAPSULE ORAL; RESPIRATORY (INHALATION) DAILY
COMMUNITY
End: 2017-09-05 | Stop reason: ALTCHOICE

## 2017-08-07 NOTE — PATIENT INSTRUCTIONS
Counseling and Referral of Other Preventative  (Italic type indicates deductible and co-insurance are waived)    Patient Name: Milo Kalyan  Today's Date: 8/7/2017      SERVICE LIMITATIONS RECOMMENDATION    Vaccines    · Pneumococcal (once after 65)    · Influenza (annually)    · Hepatitis B (if medium/high risk)    · Prevnar 13      Hepatitis B medium/high risk factors:       - End-stage renal disease       - Hemophiliacs who received Factor VII or         IX concentrates       - Clients of institutions for the mentally             retarded       - Persons who live in the same house as          a HepB carrier       - Homosexual men       - Illicit injectable drug abusers     Pneumococcal: Scheduled - see appointments     Influenza: Done, repeat in one year     Hepatitis B: N/A     Prevnar 13: Done, no repeat necessary    Prostate cancer screening (annually to age 75)     Prostate specific antigen (PSA) Shared decision making with Provider. Sometimes a co-pay may be required if the patient decides to have this test. The USPSTF no longer recommends prostate cancer screening routinely in medicine: every 1 year    Colorectal cancer screening (to age 75)    · Fecal occult blood test (annual)  · Flexible sigmoidoscopy (5y)  · Screening colonoscopy (10y)  · Barium enema   Last done 5/16/17, recommend to repeat every 3  years    Diabetes self-management training (no USPSTF recommendations)  Requires referral by treating physician for patient with diabetes or renal disease. 10 hours of initial DSMT sessions of no less than 30 minutes each in a continuous 12-month period. 2 hours of follow-up DSMT in subsequent years.  N/A    Glaucoma screening (no USPSTF recommendation)  Diabetes mellitus, family history   , age 50 or over    American, age 65 or over  Done this year, repeat every year    Medical nutrition therapy for diabetes or renal disease (no recommended schedule)  Requires referral by treating  physician for patient with diabetes or renal disease or kidney transplant within the past 3 years.  Can be provided in same year as diabetes self-management training (DSMT), and CMS recommends medical nutrition therapy take place after DSMT. Up to 3 hours for initial year and 2 hours in subsequent years.  N/A    Cardiovascular screening blood tests (every 5 years)  · Fasting lipid panel  Order as a panel if possible  Done this year, repeat every year    Diabetes screening tests (at least every 3 years, Medicare covers annually or at 6-month intervals for prediabetic patients)  · Fasting blood sugar (FBS) or glucose tolerance test (GTT)  Patient must be diagnosed with one of the following:       - Hypertension       - Dyslipidemia       - Obesity (BMI 30kg/m2)       - Previous elevated impaired FBS or GTT       ... or any two of the following:       - Overweight (BMI 25 but <30)       - Family history of diabetes       - Age 65 or older       - History of gestational diabetes or birth of baby weighing more than 9 pounds  Done this year, repeat every year    Abdominal aortic aneurysm screening (once)  · Sonogram   Limited to patients who meet one of the following criteria:       - Men who are 65-75 years old and have smoked more than 100 cigarette in their lifetime       - Anyone with a family history of abdominal aortic aneurysm       - Anyone recommended for screening by the USPSTF  Recommended to patient, declined    HIV screening (annually for increased risk patients)  · HIV-1 and HIV-2 by EIA, or ORION, rapid antibody test or oral mucosa transudate  Patients must be at increased risk for HIV infection per USPSTF guidelines or pregnant. Tests covered annually for patient at increased risk or as requested by the patient. Pregnant patients may receive up to 3 tests during pregnancy.  Risks discussed, screening is not recommended    Smoking cessation counseling (up to 8 sessions per year)  Patients must be  asymptomatic of tobacco-related conditions to receive as a preventative service.  Non-smoker    Subsequent annual wellness visit  At least 12 months since last AWV  Return in one year     The following information is provided to all patients.  This information is to help you find resources for any of the problems found today that may be affecting your health:                Living healthy guide: www.[facility]      Understanding Diabetes: www.diabetes.org      Eating healthy: www.cdc.gov/healthyweight      CDC home safety checklist: www.cdc.gov/steadi/patient.html      Agency on Aging: www.goea.[place].[facility]      Alcoholics anonymous (AA): www.aa.org      Physical Activity: www.elisa.nih.gov/yt0bpmw      Tobacco use: www.quitwithusla.org

## 2017-08-07 NOTE — PROGRESS NOTES
"Anthony Biggs presented for a  Medicare AWV and comprehensive Health Risk Assessment today. The following components were reviewed and updated:    · Medical history  · Family History  · Social history  · Allergies and Current Medications  · Health Risk Assessment  · Health Maintenance  · Care Team     ** See Completed Assessments for Annual Wellness Visit within the encounter summary.**       The following assessments were completed:  · Living Situation  · CAGE  · Depression Screening  · Timed Get Up and Go  · Whisper Test  · Cognitive Function Screening  · Nutrition Screening  · ADL Screening  · PAQ Screening    Vitals:    08/07/17 1102   BP: 132/86   BP Location: Left arm   Patient Position: Sitting   BP Method: Manual   Pulse: 92   Temp: 98.6 °F (37 °C)   TempSrc: Tympanic   SpO2: 97%   Weight: 95.6 kg (210 lb 12.2 oz)   Height: 5' 7" (1.702 m)     Body mass index is 33.01 kg/m².  Physical Exam   Constitutional: He appears well-developed and well-nourished. No distress.   Cardiovascular: Normal rate.  An irregular rhythm present.   Pulmonary/Chest: Effort normal and breath sounds normal.   Abdominal: Soft. There is no tenderness.   Skin: He is not diaphoretic.   Nursing note and vitals reviewed.        Diagnoses and health risks identified today and associated recommendations/orders:    1. Encounter for preventive health examination  Suggest to keep appointments with PCP. Patient refused AAA screen today. He states he will discuss with his PCP on the next visit.   Prevnar 23 given today    2. Refractive error  Stable and controlled. Continue current treatment plan as previously prescribed with your Opthalmology .       3. Posterior capsular opacification, unspecified laterality  Stable and controlled. Continue current treatment plan as previously prescribed with your PCP.       4. Pure hypercholesterolemia  Stable and controlled. Continue current treatment plan as previously prescribed with your PCP.       5. " Chronic atrial fibrillation  Stable and controlled. Continue current treatment plan as previously prescribed with your PCP and cardiology.       6. Atherosclerosis of native coronary artery without angina pectoris, unspecified whether native or transplanted heart  Stable and controlled. Continue current treatment plan as previously prescribed with your PCP and cardiology.         7. Essential hypertension  Stable and controlled. Continue current treatment plan as previously prescribed with your PCP.       8. History of prostate cancer  Stable and controlled. Continue current treatment plan as previously prescribed with your urologist.       9. Diverticulosis of large intestine without hemorrhage  Stable and controlled. Continue current treatment plan as previously prescribed with your gastroenterologist.       10. History of colon polyps  Stable and controlled. Continue current treatment plan as previously prescribed with your gastroenterologist.       11. Abnormal CXR  Stable and controlled. Continue current treatment plan as previously prescribed with your pulmonologist.   Suggest a PFT to confirm COPD    12. Family history of macular degeneration  Stable and controlled. Continue current treatment plan as previously prescribed with your Opthalmology .     13. Pseudophakia of both eyes  Stable and controlled. Continue current treatment plan as previously prescribed with your Opthalmology .     14. Current use of long term anticoagulation  Stable and controlled. Continue current treatment plan as previously prescribed with your PCP.         Provided [facility] with a 5-10 year written screening schedule and personal prevention plan. Recommendations were developed using the USPSTF age appropriate recommendations. Education, counseling, and referrals were provided as needed. After Visit Summary printed and given to patient which includes a list of additional screenings\tests needed.    No Follow-up on file.    Dank RUEDA  Elham Hanna, SHARAD

## 2017-08-08 ENCOUNTER — ANTI-COAG VISIT (OUTPATIENT)
Dept: CARDIOLOGY | Facility: CLINIC | Age: 77
End: 2017-08-08
Payer: MEDICARE

## 2017-08-08 DIAGNOSIS — Z79.01 LONG TERM (CURRENT) USE OF ANTICOAGULANTS: Primary | ICD-10-CM

## 2017-08-08 LAB — INR PPP: 2.7 (ref 2–3)

## 2017-08-08 PROCEDURE — 99211 OFF/OP EST MAY X REQ PHY/QHP: CPT | Mod: 25,S$GLB,,

## 2017-08-08 PROCEDURE — 85610 PROTHROMBIN TIME: CPT | Mod: QW,S$GLB,,

## 2017-08-08 NOTE — PROGRESS NOTES
INR remains therapeutic. Recently c/o SOB, completed a steroid taper, and now taking Breo Elipta. No other changes noted. Continue dose and diet until follow-up. Patient reports no bleeding or bruising,  and no diet changes.  I reminded the patient to call with any problems, changes or questions before the next visit.

## 2017-08-09 ENCOUNTER — OFFICE VISIT (OUTPATIENT)
Dept: CARDIOLOGY | Facility: CLINIC | Age: 77
End: 2017-08-09
Payer: MEDICARE

## 2017-08-09 VITALS
SYSTOLIC BLOOD PRESSURE: 136 MMHG | WEIGHT: 210.13 LBS | DIASTOLIC BLOOD PRESSURE: 84 MMHG | HEART RATE: 72 BPM | HEIGHT: 67 IN | BODY MASS INDEX: 32.98 KG/M2

## 2017-08-09 DIAGNOSIS — E78.00 PURE HYPERCHOLESTEROLEMIA: ICD-10-CM

## 2017-08-09 DIAGNOSIS — Z79.01 CURRENT USE OF LONG TERM ANTICOAGULATION: ICD-10-CM

## 2017-08-09 DIAGNOSIS — I25.10 ATHEROSCLEROSIS OF NATIVE CORONARY ARTERY WITHOUT ANGINA PECTORIS, UNSPECIFIED WHETHER NATIVE OR TRANSPLANTED HEART: ICD-10-CM

## 2017-08-09 DIAGNOSIS — I48.20 CHRONIC ATRIAL FIBRILLATION: Primary | ICD-10-CM

## 2017-08-09 DIAGNOSIS — I10 ESSENTIAL HYPERTENSION: ICD-10-CM

## 2017-08-09 PROCEDURE — 3079F DIAST BP 80-89 MM HG: CPT | Mod: S$GLB,,, | Performed by: INTERNAL MEDICINE

## 2017-08-09 PROCEDURE — 1126F AMNT PAIN NOTED NONE PRSNT: CPT | Mod: S$GLB,,, | Performed by: INTERNAL MEDICINE

## 2017-08-09 PROCEDURE — 1159F MED LIST DOCD IN RCRD: CPT | Mod: S$GLB,,, | Performed by: INTERNAL MEDICINE

## 2017-08-09 PROCEDURE — 3008F BODY MASS INDEX DOCD: CPT | Mod: S$GLB,,, | Performed by: INTERNAL MEDICINE

## 2017-08-09 PROCEDURE — 99215 OFFICE O/P EST HI 40 MIN: CPT | Mod: S$GLB,,, | Performed by: INTERNAL MEDICINE

## 2017-08-09 PROCEDURE — 3075F SYST BP GE 130 - 139MM HG: CPT | Mod: S$GLB,,, | Performed by: INTERNAL MEDICINE

## 2017-08-09 PROCEDURE — 99499 UNLISTED E&M SERVICE: CPT | Mod: S$GLB,,, | Performed by: INTERNAL MEDICINE

## 2017-08-09 PROCEDURE — 99999 PR PBB SHADOW E&M-EST. PATIENT-LVL III: CPT | Mod: PBBFAC,,, | Performed by: INTERNAL MEDICINE

## 2017-08-09 NOTE — PROGRESS NOTES
Subjective:   Patient ID:  Anthony Biggs Jr. is a 76 y.o. male who presents for follow-up of Shortness of Breath; Fatigue; and Atrial Fibrillation   Pt fatigue and SOB - new.Echo-unchanged. Pt denies CP.Pt has gained 10 lbs    Shortness of Breath   This is a new problem. The current episode started 1 to 4 weeks ago. The problem occurs intermittently. The problem has been waxing and waning. Pertinent negatives include no chest pain or leg swelling. Nothing aggravates the symptoms. He has tried rest for the symptoms. The treatment provided mild relief.   Fatigue   This is a new problem. The current episode started 1 to 4 weeks ago. The problem occurs intermittently. The problem has been waxing and waning. Associated symptoms include fatigue. Pertinent negatives include no chest pain. Nothing aggravates the symptoms. He has tried rest for the symptoms. The treatment provided mild relief.   Atrial Fibrillation   Presents for follow-up visit. Symptoms include shortness of breath. Symptoms are negative for chest pain, dizziness and palpitations. The symptoms have been stable. Past medical history includes atrial fibrillation and hyperlipidemia. There are no medication compliance problems.   Hypertension   This is a chronic problem. The current episode started more than 1 year ago. The problem has been gradually improving since onset. Associated symptoms include shortness of breath. Pertinent negatives include no chest pain or palpitations. There are no associated agents to hypertension. Past treatments include beta blockers, angiotensin blockers and calcium channel blockers. The current treatment provides moderate improvement. There are no compliance problems.    Hyperlipidemia   This is a chronic problem. The current episode started more than 1 year ago. The problem is controlled. Associated symptoms include shortness of breath. Pertinent negatives include no chest pain. Current antihyperlipidemic treatment includes  statins. The current treatment provides moderate improvement of lipids. Compliance problems include adherence to exercise.        Review of Systems   Constitution: Positive for fatigue. Negative for weight gain.   HENT: Negative.    Eyes: Negative.    Cardiovascular: Negative.  Negative for chest pain, leg swelling and palpitations.   Respiratory: Positive for shortness of breath.    Endocrine: Negative.    Hematologic/Lymphatic: Negative.    Skin: Negative.    Musculoskeletal: Negative for muscle weakness.   Gastrointestinal: Negative.    Genitourinary: Negative.    Neurological: Negative.  Negative for dizziness.   Psychiatric/Behavioral: Negative.    Allergic/Immunologic: Negative.      Family History   Problem Relation Age of Onset    Heart disease Mother     Cancer Father      lung    Macular degeneration Sister     Diabetes Sister     Heart disease Sister     Strabismus Neg Hx     Retinal detachment Neg Hx     Glaucoma Neg Hx     Blindness Neg Hx     Amblyopia Neg Hx      Past Medical History:   Diagnosis Date    *Atrial fibrillation     Abnormal CXR     Atrial fibrillation     Coronary atherosclerosis of unspecified type of vessel, native or graft     Emphysema of lung     History of prostate cancer 2007    prostatectomy    Prostate cancer     Pure hypercholesterolemia     Trouble in sleeping     Unspecified essential hypertension     Urinary incontinence      Current Outpatient Prescriptions on File Prior to Visit   Medication Sig Dispense Refill    albuterol 90 mcg/actuation inhaler Inhale 2 puffs into the lungs every 6 (six) hours as needed for Wheezing. Rescue 1 Inhaler 11    amlodipine (NORVASC) 5 MG tablet Take 1 tablet (5 mg total) by mouth once daily. 30 tablet 6    aspirin (ECOTRIN) 81 MG EC tablet Take 81 mg by mouth once daily.      atorvastatin (LIPITOR) 40 MG tablet Take 1 tablet (40 mg total) by mouth once daily. 30 tablet 6    fluticasone-vilanterol (BREO ELLIPTA)  200-25 mcg/dose DsDv diskus inhaler Inhale 1 puff into the lungs once daily. Controller 60 each 11    metoprolol succinate (TOPROL-XL) 25 MG 24 hr tablet Take 1 tablet (25 mg total) by mouth 2 (two) times daily. 60 tablet 3    predniSONE (DELTASONE) 20 MG tablet Prednisone 60 mg/ day for 3 days, 40 mg/day for 3 days,20 mg/ day for 3 days, (1/2 tablet )10 mg a day for 3 days. 20 tablet 1    tiotropium (SPIRIVA) 18 mcg inhalation capsule Inhale 18 mcg into the lungs once daily. Controller      valsartan (DIOVAN) 160 MG tablet Take 1 tablet (160 mg total) by mouth once daily. 30 tablet 11    warfarin (COUMADIN) 5 MG tablet Take 1/2 tablet on Thursdays and 1 tablet all other days as directed by the [facility]. 30 tablet 3     No current facility-administered medications on file prior to visit.      Review of patient's allergies indicates:  No Known Allergies    Objective:     Physical Exam   Constitutional: He is oriented to person, place, and time. He appears well-developed and well-nourished.   HENT:   Head: Normocephalic and atraumatic.   Eyes: Conjunctivae are normal. Pupils are equal, round, and reactive to light.   Neck: Normal range of motion. Neck supple.   Cardiovascular: Normal rate, normal heart sounds and intact distal pulses.  An irregularly irregular rhythm present.   Pulmonary/Chest: Effort normal and breath sounds normal.   Abdominal: Soft. Bowel sounds are normal.   Neurological: He is alert and oriented to person, place, and time.   Skin: Skin is warm and dry.   Psychiatric: He has a normal mood and affect.   Nursing note and vitals reviewed.      Assessment:     1. Chronic atrial fibrillation    2. Pure hypercholesterolemia    3. Atherosclerosis of native coronary artery without angina pectoris, unspecified whether native or transplanted heart    4. Essential hypertension    5. Current use of long term anticoagulation        Plan:     Chronic atrial fibrillation    Pure  hypercholesterolemia    Atherosclerosis of native coronary artery without angina pectoris, unspecified whether native or transplanted heart    Essential hypertension    Current use of long term anticoagulation    BP diary  Stress test and echo  Continue coumadin, metoprolol- afib  Continue norvasc-htn  Continue statin-hlp

## 2017-08-16 ENCOUNTER — TELEPHONE (OUTPATIENT)
Dept: CARDIOLOGY | Facility: CLINIC | Age: 77
End: 2017-08-16

## 2017-08-16 NOTE — TELEPHONE ENCOUNTER
----- Message from Henna Sutherland sent at 8/16/2017  2:54 PM CDT -----  Contact: Mar[place] wife  Calling concerning patient having any prepping/restrictions before appointment tomorrow. Please call patient ASAP today @ [phone]. Thanks, casa

## 2017-08-16 NOTE — TELEPHONE ENCOUNTER
I called the patient to answer his questions.  He needed full instructions and to know when and where to be for his test. He stated that no one called him to let him know he was scheduled and also no one had given him any instructions about the testing.  I reassured patient and gave him full instructions according to testing guidelines.  I apologized to him for no one giving him the needed information earlier.  Patient verbalized understanding of all test instructions and is now happy with his treatment.

## 2017-08-17 ENCOUNTER — HOSPITAL ENCOUNTER (OUTPATIENT)
Dept: RADIOLOGY | Facility: HOSPITAL | Age: 77
Discharge: HOME | End: 2017-08-17
Attending: INTERNAL MEDICINE
Payer: MEDICARE

## 2017-08-17 ENCOUNTER — HOSPITAL ENCOUNTER (OUTPATIENT)
Dept: PULMONOLOGY | Facility: HOSPITAL | Age: 77
Discharge: HOME | End: 2017-08-17
Attending: INTERNAL MEDICINE
Payer: MEDICARE

## 2017-08-17 ENCOUNTER — CLINICAL SUPPORT (OUTPATIENT)
Dept: CARDIOLOGY | Facility: CLINIC | Age: 77
End: 2017-08-17
Payer: MEDICARE

## 2017-08-17 DIAGNOSIS — I48.20 CHRONIC ATRIAL FIBRILLATION: ICD-10-CM

## 2017-08-17 DIAGNOSIS — R06.02 SOB (SHORTNESS OF BREATH): ICD-10-CM

## 2017-08-17 LAB — DIASTOLIC DYSFUNCTION: NO

## 2017-08-17 PROCEDURE — 93224 XTRNL ECG REC UP TO 48 HRS: CPT | Mod: S$GLB,,, | Performed by: INTERNAL MEDICINE

## 2017-08-17 PROCEDURE — 93018 CV STRESS TEST I&R ONLY: CPT | Mod: ,,, | Performed by: INTERNAL MEDICINE

## 2017-08-17 PROCEDURE — 78452 HT MUSCLE IMAGE SPECT MULT: CPT | Mod: 26,,, | Performed by: INTERNAL MEDICINE

## 2017-08-17 PROCEDURE — 93016 CV STRESS TEST SUPVJ ONLY: CPT | Mod: ,,, | Performed by: INTERNAL MEDICINE

## 2017-08-17 RX ORDER — REGADENOSON 0.08 MG/ML
0.4 INJECTION, SOLUTION INTRAVENOUS ONCE
Status: DISCONTINUED | OUTPATIENT
Start: 2017-08-17 | End: 2017-08-18 | Stop reason: HOSPADM

## 2017-08-18 ENCOUNTER — TELEPHONE (OUTPATIENT)
Dept: CARDIOLOGY | Facility: CLINIC | Age: 77
End: 2017-08-18

## 2017-08-18 NOTE — TELEPHONE ENCOUNTER
----- Message from Anuradha Haji sent at 8/18/2017  9:34 AM CDT -----  Contact: Mrs. Biggs/wife  Mrs. Biggs returned call. She can be contacted at [phone].    Thanks,  Anuradha

## 2017-08-18 NOTE — TELEPHONE ENCOUNTER
Returned call and spoke with Mr Biggs.  Explained that his stress test was normal.verbalized understanding

## 2017-08-23 ENCOUNTER — OFFICE VISIT (OUTPATIENT)
Dept: CARDIOLOGY | Facility: CLINIC | Age: 77
End: 2017-08-23
Payer: MEDICARE

## 2017-08-23 VITALS
BODY MASS INDEX: 29.29 KG/M2 | SYSTOLIC BLOOD PRESSURE: 136 MMHG | HEART RATE: 72 BPM | HEIGHT: 67 IN | WEIGHT: 186.63 LBS | DIASTOLIC BLOOD PRESSURE: 72 MMHG

## 2017-08-23 DIAGNOSIS — I48.20 CHRONIC ATRIAL FIBRILLATION: ICD-10-CM

## 2017-08-23 DIAGNOSIS — I25.10 ATHEROSCLEROSIS OF NATIVE CORONARY ARTERY WITHOUT ANGINA PECTORIS, UNSPECIFIED WHETHER NATIVE OR TRANSPLANTED HEART: ICD-10-CM

## 2017-08-23 DIAGNOSIS — E78.00 PURE HYPERCHOLESTEROLEMIA: Primary | ICD-10-CM

## 2017-08-23 DIAGNOSIS — I10 ESSENTIAL HYPERTENSION: ICD-10-CM

## 2017-08-23 DIAGNOSIS — Z79.01 CURRENT USE OF LONG TERM ANTICOAGULATION: ICD-10-CM

## 2017-08-23 PROCEDURE — 3078F DIAST BP <80 MM HG: CPT | Mod: S$GLB,,, | Performed by: INTERNAL MEDICINE

## 2017-08-23 PROCEDURE — 3075F SYST BP GE 130 - 139MM HG: CPT | Mod: S$GLB,,, | Performed by: INTERNAL MEDICINE

## 2017-08-23 PROCEDURE — 99999 PR PBB SHADOW E&M-EST. PATIENT-LVL III: CPT | Mod: PBBFAC,,, | Performed by: INTERNAL MEDICINE

## 2017-08-23 PROCEDURE — 1126F AMNT PAIN NOTED NONE PRSNT: CPT | Mod: S$GLB,,, | Performed by: INTERNAL MEDICINE

## 2017-08-23 PROCEDURE — 99215 OFFICE O/P EST HI 40 MIN: CPT | Mod: S$GLB,,, | Performed by: INTERNAL MEDICINE

## 2017-08-23 PROCEDURE — 1159F MED LIST DOCD IN RCRD: CPT | Mod: S$GLB,,, | Performed by: INTERNAL MEDICINE

## 2017-08-23 PROCEDURE — 3008F BODY MASS INDEX DOCD: CPT | Mod: S$GLB,,, | Performed by: INTERNAL MEDICINE

## 2017-08-23 PROCEDURE — 99499 UNLISTED E&M SERVICE: CPT | Mod: S$GLB,,, | Performed by: INTERNAL MEDICINE

## 2017-08-23 NOTE — PROGRESS NOTES
Subjective:   Patient ID:  Anthony Biggs Jr. is a 77 y.o. male who presents for follow-up of Fatigue and Shortness of Breath (does not necessarily correspond to exertion)  Pts stress test and echo unremarkable. Pt still with SOB despite inhalers.C 2008, LAD 40%    Hypertension   This is a chronic problem. The current episode started more than 1 year ago. The problem has been gradually improving since onset. The problem is controlled. Associated symptoms include shortness of breath. Pertinent negatives include no chest pain or palpitations. Past treatments include angiotensin blockers and calcium channel blockers. The current treatment provides moderate improvement. There are no compliance problems.    Coronary Artery Disease   Presents for follow-up visit. Symptoms include shortness of breath. Pertinent negatives include no chest pain, chest pressure, chest tightness, dizziness, leg swelling, muscle weakness, palpitations or weight gain. Risk factors include hyperlipidemia. The symptoms have been stable. Compliance with diet is variable. Compliance with exercise is variable. Compliance with medications is good.   Hyperlipidemia   This is a chronic problem. The current episode started more than 1 year ago. The problem is controlled. Recent lipid tests were reviewed and are variable. Associated symptoms include shortness of breath. Pertinent negatives include no chest pain. Current antihyperlipidemic treatment includes statins. The current treatment provides moderate improvement of lipids. Compliance problems include medication side effects.        Review of Systems   Constitution: Negative. Negative for weight gain.   HENT: Negative.    Eyes: Negative.    Cardiovascular: Negative.  Negative for chest pain, leg swelling and palpitations.   Respiratory: Positive for shortness of breath. Negative for chest tightness.    Endocrine: Negative.    Hematologic/Lymphatic: Negative.    Skin: Negative.    Musculoskeletal:  Negative for muscle weakness.   Gastrointestinal: Negative.    Genitourinary: Negative.    Neurological: Negative.  Negative for dizziness.   Psychiatric/Behavioral: Negative.    Allergic/Immunologic: Negative.      Family History   Problem Relation Age of Onset    Heart disease Mother     Cancer Father      lung    Macular degeneration Sister     Diabetes Sister     Heart disease Sister     Strabismus Neg Hx     Retinal detachment Neg Hx     Glaucoma Neg Hx     Blindness Neg Hx     Amblyopia Neg Hx      Past Medical History:   Diagnosis Date    *Atrial fibrillation     Abnormal CXR     Atrial fibrillation     Coronary atherosclerosis of unspecified type of vessel, native or graft     Emphysema of lung     History of prostate cancer 2007    prostatectomy    Prostate cancer     Pure hypercholesterolemia     Trouble in sleeping     Unspecified essential hypertension     Urinary incontinence      Current Outpatient Prescriptions on File Prior to Visit   Medication Sig Dispense Refill    albuterol 90 mcg/actuation inhaler Inhale 2 puffs into the lungs every 6 (six) hours as needed for Wheezing. Rescue 1 Inhaler 11    amlodipine (NORVASC) 5 MG tablet Take 1 tablet (5 mg total) by mouth once daily. 30 tablet 6    aspirin (ECOTRIN) 81 MG EC tablet Take 81 mg by mouth once daily.      atorvastatin (LIPITOR) 40 MG tablet Take 1 tablet (40 mg total) by mouth once daily. 30 tablet 6    fluticasone-vilanterol (BREO ELLIPTA) 200-25 mcg/dose DsDv diskus inhaler Inhale 1 puff into the lungs once daily. Controller 60 each 11    metoprolol succinate (TOPROL-XL) 25 MG 24 hr tablet Take 1 tablet (25 mg total) by mouth 2 (two) times daily. 60 tablet 3    predniSONE (DELTASONE) 20 MG tablet Prednisone 60 mg/ day for 3 days, 40 mg/day for 3 days,20 mg/ day for 3 days, (1/2 tablet )10 mg a day for 3 days. 20 tablet 1    tiotropium (SPIRIVA) 18 mcg inhalation capsule Inhale 18 mcg into the lungs once daily.  Controller      valsartan (DIOVAN) 160 MG tablet Take 1 tablet (160 mg total) by mouth once daily. 30 tablet 11    warfarin (COUMADIN) 5 MG tablet Take 1/2 tablet on Thursdays and 1 tablet all other days as directed by the [facility]. 30 tablet 3     No current facility-administered medications on file prior to visit.      Review of patient's allergies indicates:  No Known Allergies    Objective:     Physical Exam   Constitutional: He is oriented to person, place, and time. He appears well-developed and well-nourished.   HENT:   Head: Normocephalic and atraumatic.   Eyes: Conjunctivae are normal. Pupils are equal, round, and reactive to light.   Neck: Normal range of motion. Neck supple.   Cardiovascular: Normal rate, regular rhythm, normal heart sounds and intact distal pulses.    Pulmonary/Chest: Effort normal and breath sounds normal.   Abdominal: Soft. Bowel sounds are normal.   Neurological: He is alert and oriented to person, place, and time.   Skin: Skin is warm and dry.   Psychiatric: He has a normal mood and affect.   Nursing note and vitals reviewed.      Assessment:     1. Pure hypercholesterolemia    2. Chronic atrial fibrillation    3. Atherosclerosis of native coronary artery without angina pectoris, unspecified whether native or transplanted heart    4. Essential hypertension    5. Current use of long term anticoagulation        Plan:     Pure hypercholesterolemia    Chronic atrial fibrillation    Atherosclerosis of native coronary artery without angina pectoris, unspecified whether native or transplanted heart    Essential hypertension    Current use of long term anticoagulation    LHC/RHC  Risks and benefits explained  Hold coumadin

## 2017-08-28 ENCOUNTER — HOSPITAL ENCOUNTER (OUTPATIENT)
Facility: HOSPITAL | Age: 77
Discharge: HOME | End: 2017-08-28
Attending: INTERNAL MEDICINE | Admitting: INTERNAL MEDICINE
Payer: MEDICARE

## 2017-08-28 ENCOUNTER — SURGERY (OUTPATIENT)
Age: 77
End: 2017-08-28

## 2017-08-28 VITALS
DIASTOLIC BLOOD PRESSURE: 75 MMHG | TEMPERATURE: 98 F | SYSTOLIC BLOOD PRESSURE: 110 MMHG | HEART RATE: 78 BPM | WEIGHT: 186 LBS | OXYGEN SATURATION: 95 % | HEIGHT: 67 IN | RESPIRATION RATE: 15 BRPM | BODY MASS INDEX: 29.19 KG/M2

## 2017-08-28 DIAGNOSIS — R53.83 OTHER FATIGUE: ICD-10-CM

## 2017-08-28 DIAGNOSIS — R06.02 SOB (SHORTNESS OF BREATH): ICD-10-CM

## 2017-08-28 DIAGNOSIS — I20.9 ANGINA PECTORIS: Primary | ICD-10-CM

## 2017-08-28 DIAGNOSIS — I48.91 ATRIAL FIBRILLATION, UNSPECIFIED TYPE: ICD-10-CM

## 2017-08-28 DIAGNOSIS — I70.90 ATHEROSCLEROSIS: ICD-10-CM

## 2017-08-28 DIAGNOSIS — I25.10 ATHEROSCLEROSIS OF NATIVE CORONARY ARTERY WITHOUT ANGINA PECTORIS, UNSPECIFIED WHETHER NATIVE OR TRANSPLANTED HEART: ICD-10-CM

## 2017-08-28 PROBLEM — I27.20 PULMONARY HYPERTENSION, MILD: Status: ACTIVE | Noted: 2017-08-28

## 2017-08-28 LAB
ALBUMIN SERPL BCP-MCNC: 3.8 G/DL
ALP SERPL-CCNC: 59 U/L
ALT SERPL W/O P-5'-P-CCNC: 45 U/L
ANION GAP SERPL CALC-SCNC: 10 MMOL/L
AST SERPL-CCNC: 28 U/L
BASOPHILS # BLD AUTO: 0.01 K/UL
BASOPHILS NFR BLD: 0.1 %
BILIRUB SERPL-MCNC: 0.7 MG/DL
BUN SERPL-MCNC: 14 MG/DL
CALCIUM SERPL-MCNC: 9.6 MG/DL
CHLORIDE SERPL-SCNC: 104 MMOL/L
CO2 SERPL-SCNC: 22 MMOL/L
CREAT SERPL-MCNC: 0.9 MG/DL
DIFFERENTIAL METHOD: ABNORMAL
EOSINOPHIL # BLD AUTO: 0.3 K/UL
EOSINOPHIL NFR BLD: 3.6 %
ERYTHROCYTE [DISTWIDTH] IN BLOOD BY AUTOMATED COUNT: 12.8 %
EST. GFR  (AFRICAN AMERICAN): >60 ML/MIN/1.73 M^2
EST. GFR  (NON AFRICAN AMERICAN): >60 ML/MIN/1.73 M^2
GLUCOSE SERPL-MCNC: 113 MG/DL
HCT VFR BLD AUTO: 42.9 %
HGB BLD-MCNC: 14.7 G/DL
INR PPP: 1.1
LYMPHOCYTES # BLD AUTO: 2.2 K/UL
LYMPHOCYTES NFR BLD: 32.1 %
MCH RBC QN AUTO: 31.1 PG
MCHC RBC AUTO-ENTMCNC: 34.3 G/DL
MCV RBC AUTO: 91 FL
MONOCYTES # BLD AUTO: 0.9 K/UL
MONOCYTES NFR BLD: 12.4 %
NEUTROPHILS # BLD AUTO: 3.6 K/UL
NEUTROPHILS NFR BLD: 51.8 %
PLATELET # BLD AUTO: 231 K/UL
PMV BLD AUTO: 9.7 FL
POTASSIUM SERPL-SCNC: 4.7 MMOL/L
PROT SERPL-MCNC: 7 G/DL
PROTHROMBIN TIME: 11 SEC
RBC # BLD AUTO: 4.72 M/UL
SODIUM SERPL-SCNC: 136 MMOL/L
WBC # BLD AUTO: 6.92 K/UL

## 2017-08-28 PROCEDURE — 99152 MOD SED SAME PHYS/QHP 5/>YRS: CPT

## 2017-08-28 PROCEDURE — 93460 R&L HRT ART/VENTRICLE ANGIO: CPT | Mod: 26,,, | Performed by: INTERNAL MEDICINE

## 2017-08-28 PROCEDURE — 85025 COMPLETE CBC W/AUTO DIFF WBC: CPT

## 2017-08-28 PROCEDURE — 63600175 PHARM REV CODE 636 W HCPCS

## 2017-08-28 PROCEDURE — 25000003 PHARM REV CODE 250

## 2017-08-28 PROCEDURE — 80053 COMPREHEN METABOLIC PANEL: CPT

## 2017-08-28 PROCEDURE — 25000003 PHARM REV CODE 250: Performed by: INTERNAL MEDICINE

## 2017-08-28 PROCEDURE — 93460 R&L HRT ART/VENTRICLE ANGIO: CPT

## 2017-08-28 PROCEDURE — 85610 PROTHROMBIN TIME: CPT

## 2017-08-28 PROCEDURE — 99152 MOD SED SAME PHYS/QHP 5/>YRS: CPT | Mod: ,,, | Performed by: INTERNAL MEDICINE

## 2017-08-28 RX ORDER — DIAZEPAM 5 MG/1
5 TABLET ORAL
Status: DISCONTINUED | OUTPATIENT
Start: 2017-08-28 | End: 2017-08-28 | Stop reason: HOSPADM

## 2017-08-28 RX ORDER — FUROSEMIDE 40 MG/1
40 TABLET ORAL 2 TIMES DAILY
Qty: 60 TABLET | Refills: 11 | Status: SHIPPED | OUTPATIENT
Start: 2017-08-28 | End: 2017-09-25 | Stop reason: SDUPTHER

## 2017-08-28 RX ORDER — SODIUM CHLORIDE 9 MG/ML
INJECTION, SOLUTION INTRAVENOUS CONTINUOUS
Status: DISCONTINUED | OUTPATIENT
Start: 2017-08-28 | End: 2017-08-28 | Stop reason: HOSPADM

## 2017-08-28 RX ORDER — HYDROCODONE BITARTRATE AND ACETAMINOPHEN 5; 325 MG/1; MG/1
1 TABLET ORAL EVERY 4 HOURS PRN
Status: DISCONTINUED | OUTPATIENT
Start: 2017-08-28 | End: 2017-08-28 | Stop reason: HOSPADM

## 2017-08-28 RX ORDER — NITROGLYCERIN 0.4 MG/1
0.4 TABLET SUBLINGUAL EVERY 5 MIN PRN
Status: DISCONTINUED | OUTPATIENT
Start: 2017-08-28 | End: 2017-08-28 | Stop reason: HOSPADM

## 2017-08-28 RX ORDER — OXYCODONE HYDROCHLORIDE 5 MG/1
10 TABLET ORAL EVERY 4 HOURS PRN
Status: DISCONTINUED | OUTPATIENT
Start: 2017-08-28 | End: 2017-08-28 | Stop reason: HOSPADM

## 2017-08-28 RX ORDER — POTASSIUM CHLORIDE 20 MEQ/1
20 TABLET, EXTENDED RELEASE ORAL DAILY
Status: DISCONTINUED | OUTPATIENT
Start: 2017-08-28 | End: 2017-08-28

## 2017-08-28 RX ORDER — POTASSIUM CHLORIDE 20 MEQ/1
20 TABLET, EXTENDED RELEASE ORAL 2 TIMES DAILY
Qty: 30 TABLET | Refills: 6 | Status: SHIPPED | OUTPATIENT
Start: 2017-08-28 | End: 2017-11-16

## 2017-08-28 RX ORDER — ATROPINE SULFATE 0.1 MG/ML
0.5 INJECTION INTRAVENOUS
Status: DISCONTINUED | OUTPATIENT
Start: 2017-08-28 | End: 2017-08-28 | Stop reason: HOSPADM

## 2017-08-28 RX ORDER — ACETAMINOPHEN 325 MG/1
650 TABLET ORAL EVERY 4 HOURS PRN
Status: DISCONTINUED | OUTPATIENT
Start: 2017-08-28 | End: 2017-08-28 | Stop reason: HOSPADM

## 2017-08-28 RX ORDER — DIPHENHYDRAMINE HCL 50 MG
50 CAPSULE ORAL ONCE
Status: COMPLETED | OUTPATIENT
Start: 2017-08-28 | End: 2017-08-28

## 2017-08-28 RX ORDER — FUROSEMIDE 40 MG/1
40 TABLET ORAL 2 TIMES DAILY
Status: DISCONTINUED | OUTPATIENT
Start: 2017-08-28 | End: 2017-08-28

## 2017-08-28 RX ORDER — SODIUM CHLORIDE 9 MG/ML
INJECTION, SOLUTION INTRAVENOUS CONTINUOUS
Status: DISCONTINUED | OUTPATIENT
Start: 2017-08-28 | End: 2017-08-28

## 2017-08-28 RX ADMIN — HYDROCODONE BITARTRATE AND ACETAMINOPHEN 1 TABLET: 5; 325 TABLET ORAL at 09:08

## 2017-08-28 RX ADMIN — SODIUM CHLORIDE: 9 INJECTION, SOLUTION INTRAVENOUS at 06:08

## 2017-08-28 RX ADMIN — SODIUM CHLORIDE: 9 INJECTION, SOLUTION INTRAVENOUS at 09:08

## 2017-08-28 RX ADMIN — Medication 50 MG: at 06:08

## 2017-08-28 RX ADMIN — DIAZEPAM 5 MG: 5 TABLET ORAL at 06:08

## 2017-08-28 NOTE — H&P
Subjective:   Patient ID:  Anthony Biggs Jr. is a 77 y.o. male who presents for follow-up of Fatigue and Shortness of Breath (does not necessarily correspond to exertion)  Pts stress test and echo unremarkable. Pt still with SOB despite inhalers.C 2008, LAD 40%     Hypertension   This is a chronic problem. The current episode started more than 1 year ago. The problem has been gradually improving since onset. The problem is controlled. Associated symptoms include shortness of breath. Pertinent negatives include no chest pain or palpitations. Past treatments include angiotensin blockers and calcium channel blockers. The current treatment provides moderate improvement. There are no compliance problems.    Coronary Artery Disease   Presents for follow-up visit. Symptoms include shortness of breath. Pertinent negatives include no chest pain, chest pressure, chest tightness, dizziness, leg swelling, muscle weakness, palpitations or weight gain. Risk factors include hyperlipidemia. The symptoms have been stable. Compliance with diet is variable. Compliance with exercise is variable. Compliance with medications is good.   Hyperlipidemia   This is a chronic problem. The current episode started more than 1 year ago. The problem is controlled. Recent lipid tests were reviewed and are variable. Associated symptoms include shortness of breath. Pertinent negatives include no chest pain. Current antihyperlipidemic treatment includes statins. The current treatment provides moderate improvement of lipids. Compliance problems include medication side effects.          Review of Systems   Constitution: Negative. Negative for weight gain.   HENT: Negative.    Eyes: Negative.    Cardiovascular: Negative.  Negative for chest pain, leg swelling and palpitations.   Respiratory: Positive for shortness of breath. Negative for chest tightness.    Endocrine: Negative.    Hematologic/Lymphatic: Negative.    Skin: Negative.    Musculoskeletal:  Negative for muscle weakness.   Gastrointestinal: Negative.    Genitourinary: Negative.    Neurological: Negative.  Negative for dizziness.   Psychiatric/Behavioral: Negative.    Allergic/Immunologic: Negative.              Family History   Problem Relation Age of Onset    Heart disease Mother      Cancer Father         lung    Macular degeneration Sister      Diabetes Sister      Heart disease Sister      Strabismus Neg Hx      Retinal detachment Neg Hx      Glaucoma Neg Hx      Blindness Neg Hx      Amblyopia Neg Hx             Past Medical History and Surgical History   Diagnosis Date    *Atrial fibrillation      Abnormal CXR      Atrial fibrillation      Coronary atherosclerosis of unspecified type of vessel, native or graft      Emphysema of lung      History of prostate cancer 2007     prostatectomy    Prostate cancer      Pure hypercholesterolemia      Trouble in sleeping      Unspecified essential hypertension      Urinary incontinence               Current Outpatient Prescriptions on File Prior to Visit   Medication Sig Dispense Refill    albuterol 90 mcg/actuation inhaler Inhale 2 puffs into the lungs every 6 (six) hours as needed for Wheezing. Rescue 1 Inhaler 11    amlodipine (NORVASC) 5 MG tablet Take 1 tablet (5 mg total) by mouth once daily. 30 tablet 6    aspirin (ECOTRIN) 81 MG EC tablet Take 81 mg by mouth once daily.        atorvastatin (LIPITOR) 40 MG tablet Take 1 tablet (40 mg total) by mouth once daily. 30 tablet 6    fluticasone-vilanterol (BREO ELLIPTA) 200-25 mcg/dose DsDv diskus inhaler Inhale 1 puff into the lungs once daily. Controller 60 each 11    metoprolol succinate (TOPROL-XL) 25 MG 24 hr tablet Take 1 tablet (25 mg total) by mouth 2 (two) times daily. 60 tablet 3    predniSONE (DELTASONE) 20 MG tablet Prednisone 60 mg/ day for 3 days, 40 mg/day for 3 days,20 mg/ day for 3 days, (1/2 tablet )10 mg a day for 3 days. 20 tablet 1    tiotropium (SPIRIVA) 18  mcg inhalation capsule Inhale 18 mcg into the lungs once daily. Controller        valsartan (DIOVAN) 160 MG tablet Take 1 tablet (160 mg total) by mouth once daily. 30 tablet 11    warfarin (COUMADIN) 5 MG tablet Take 1/2 tablet on Thursdays and 1 tablet all other days as directed by the [facility]. 30 tablet 3      No current facility-administered medications on file prior to visit.       Review of patient's allergies indicates:  No Known Allergies     Objective:      Physical Exam   Constitutional: He is oriented to person, place, and time. He appears well-developed and well-nourished.   HENT:   Head: Normocephalic and atraumatic.   Eyes: Conjunctivae are normal. Pupils are equal, round, and reactive to light.   Neck: Normal range of motion. Neck supple.   Cardiovascular: Normal rate, regular rhythm, normal heart sounds and intact distal pulses.    Pulmonary/Chest: Effort normal and breath sounds normal.   Abdominal: Soft. Bowel sounds are normal.   Neurological: He is alert and oriented to person, place, and time.   Skin: Skin is warm and dry.   Psychiatric: He has a normal mood and affect.   Nursing note and vitals reviewed.        Assessment:      1. Pure hypercholesterolemia    2. Chronic atrial fibrillation    3. Atherosclerosis of native coronary artery without angina pectoris, unspecified whether native or transplanted heart    4. Essential hypertension    5. Current use of long term anticoagulation          Plan:      Pure hypercholesterolemia     Chronic atrial fibrillation     Atherosclerosis of native coronary artery without angina pectoris, unspecified whether native or transplanted heart     Essential hypertension     Current use of long term anticoagulation     LHC/RHC  Risks and benefits explained  Hold coumadin

## 2017-08-28 NOTE — BRIEF OP NOTE
<[facility]  Surgery Department  Operative Note    SUMMARY     Date of Procedure: 8/28/2017     Procedure: Procedure(s) (LRB):  HEART CATH-BILATERAL (Bilateral)     Surgeon(s) and Role:     * Darryl Griffith MD - Primary    Assisting Surgeon: None    Pre-Operative Diagnosis: Fatigue, unspecified type [R53.83]  Shortness of breath [R06.02]  PAF (paroxysmal atrial fibrillation) [I48.0]    Post-Operative Diagnosis: Post-Op Diagnosis Codes:     * Fatigue, unspecified type [R53.83]     * Shortness of breath [R06.02]     * PAF (paroxysmal atrial fibrillation) [I48.0]    Anesthesia: RN IV Sedation    Technical Procedures Used: LHC/RHC    Description of the Findings of the Procedure: nonobstructive CAD, normal lv function, moderate pulmonary HTN, PCWP 19 mmHg    Significant Surgical Tasks Conducted by the Assistant(s), if Applicable: none    Complications: No    Estimated Blood Loss (EBL): < 50 cc           Implants: * No implants in log *    Specimens:   Specimen (12h ago through future)    None                  Condition: Good    Disposition: PACU - hemodynamically stable.    Attestation: I was present and scrubbed for the entire procedure.

## 2017-08-28 NOTE — PLAN OF CARE
Discharge instructions and follow up appointments reviewed with patient and family. All questions answered, concerns addressed. Dressing dry and intact, no drainage, no hematoma. Pt ambulated to bathroom safely, pt fully awake and appropriate for discharge. IV removed. Pt taken to car safely by RN in wheelchair.

## 2017-08-29 LAB — CORONARY STENOSIS: ABNORMAL

## 2017-09-05 ENCOUNTER — ANTI-COAG VISIT (OUTPATIENT)
Dept: CARDIOLOGY | Facility: CLINIC | Age: 77
End: 2017-09-05
Payer: MEDICARE

## 2017-09-05 ENCOUNTER — PROCEDURE VISIT (OUTPATIENT)
Dept: PULMONOLOGY | Facility: CLINIC | Age: 77
End: 2017-09-05
Payer: MEDICARE

## 2017-09-05 ENCOUNTER — OFFICE VISIT (OUTPATIENT)
Dept: PULMONOLOGY | Facility: CLINIC | Age: 77
End: 2017-09-05
Payer: MEDICARE

## 2017-09-05 VITALS
HEIGHT: 70 IN | DIASTOLIC BLOOD PRESSURE: 76 MMHG | BODY MASS INDEX: 30.06 KG/M2 | HEART RATE: 94 BPM | OXYGEN SATURATION: 98 % | SYSTOLIC BLOOD PRESSURE: 138 MMHG | WEIGHT: 210 LBS

## 2017-09-05 DIAGNOSIS — J45.20 MILD INTERMITTENT ASTHMA WITHOUT COMPLICATION: ICD-10-CM

## 2017-09-05 DIAGNOSIS — G47.33 OSA (OBSTRUCTIVE SLEEP APNEA): Primary | ICD-10-CM

## 2017-09-05 DIAGNOSIS — I27.20 PULMONARY HYPERTENSION, MILD: ICD-10-CM

## 2017-09-05 DIAGNOSIS — G47.00 INSOMNIA, UNSPECIFIED TYPE: ICD-10-CM

## 2017-09-05 DIAGNOSIS — Z79.01 LONG TERM (CURRENT) USE OF ANTICOAGULANTS: Primary | ICD-10-CM

## 2017-09-05 LAB
INR PPP: 1.8 (ref 2–3)
PRE FEF 25 75: 1.04 L/S (ref 1.21–2.67)
PRE FET 100: 15.67 S
PRE FEV1 FVC: 64 %
PRE FEV1: 2.39 L (ref 2.32–3.04)
PRE FIF 50: 2.86 L/S
PRE FVC: 3.73 L (ref 3.3–4.15)
PRE PEF: 5.8 L/S (ref 6.09–8.21)
PREDICTED FEV1 FVC: 72.57 % (ref 67.73–77.41)
PREDICTED FEV1: 2.68 L (ref 2.32–3.04)
PREDICTED FVC: 3.73 L (ref 3.3–4.15)
PROVOCATION PROTOCOL: ABNORMAL

## 2017-09-05 PROCEDURE — 3078F DIAST BP <80 MM HG: CPT | Mod: S$GLB,,, | Performed by: INTERNAL MEDICINE

## 2017-09-05 PROCEDURE — 99211 OFF/OP EST MAY X REQ PHY/QHP: CPT | Mod: 25,S$GLB,,

## 2017-09-05 PROCEDURE — 3075F SYST BP GE 130 - 139MM HG: CPT | Mod: S$GLB,,, | Performed by: INTERNAL MEDICINE

## 2017-09-05 PROCEDURE — 99215 OFFICE O/P EST HI 40 MIN: CPT | Mod: 25,S$GLB,, | Performed by: INTERNAL MEDICINE

## 2017-09-05 PROCEDURE — 3008F BODY MASS INDEX DOCD: CPT | Mod: S$GLB,,, | Performed by: INTERNAL MEDICINE

## 2017-09-05 PROCEDURE — 94060 EVALUATION OF WHEEZING: CPT | Mod: S$GLB,,, | Performed by: INTERNAL MEDICINE

## 2017-09-05 PROCEDURE — 85610 PROTHROMBIN TIME: CPT | Mod: QW,S$GLB,,

## 2017-09-05 PROCEDURE — 1126F AMNT PAIN NOTED NONE PRSNT: CPT | Mod: S$GLB,,, | Performed by: INTERNAL MEDICINE

## 2017-09-05 PROCEDURE — 1159F MED LIST DOCD IN RCRD: CPT | Mod: S$GLB,,, | Performed by: INTERNAL MEDICINE

## 2017-09-05 PROCEDURE — 99499 UNLISTED E&M SERVICE: CPT | Mod: S$GLB,,, | Performed by: INTERNAL MEDICINE

## 2017-09-05 PROCEDURE — 99999 PR PBB SHADOW E&M-EST. PATIENT-LVL III: CPT | Mod: PBBFAC,,, | Performed by: INTERNAL MEDICINE

## 2017-09-05 RX ORDER — WARFARIN SODIUM 5 MG/1
TABLET ORAL
Qty: 30 TABLET | Refills: 3 | Status: SHIPPED | OUTPATIENT
Start: 2017-09-05 | End: 2017-12-19 | Stop reason: SDUPTHER

## 2017-09-05 RX ORDER — DOXEPIN HYDROCHLORIDE 10 MG/1
10 CAPSULE ORAL NIGHTLY
Qty: 30 CAPSULE | Refills: 11 | Status: SHIPPED | OUTPATIENT
Start: 2017-09-05 | End: 2018-06-25

## 2017-09-05 NOTE — PATIENT INSTRUCTIONS
Doxepin oral tablets  What is this medicine?  DOXEPIN (DOX e pin) is used to treat insomnia. This medicine helps you sleep through the night.  How should I use this medicine?  Take this medicine by mouth with a glass of water. Follow the directions on the prescription label. Take this medicine on an empty stomach, and not within 3 hours of a meal. This medicine should be taken within 30 minutes of going to sleep and only when you are able to get a full night of sleep before you must be active again. Do not take it more often than directed. Do not stop taking this medicine suddenly except upon the advice of your doctor. Stopping this medicine too quickly may cause serious side effects or your condition may worsen.  A special MedGuide will be given to you by the pharmacist with each prescription and refill. Be sure to read this information carefully each time.  Talk to your pediatrician regarding the use of this medicine in children. Special care may be needed.  What side effects may I notice from receiving this medicine?  Side effects that you should report to your doctor or health care professional as soon as possible:  · allergic reactions like skin rash, itching or hives, swelling of the face, lips, or tongue  · breathing problems  · confusion, hallucinations  · fast, irregular or pounding heartbeat  · fever or infection  · suicidal thoughts or other mood changes  · trouble passing urine or change in the amount of urine  · unusual activities while asleep like driving, eating, making phone calls  Side effects that usually do not require medical attention (Report these to your doctor or health care professional if they continue or are bothersome.):  · constipation  · daytime drowsiness  · dry mouth  · nausea  · weight gain  What may interact with this medicine?  Do not take this medicine with any of the following medications:  · arsenic trioxide  · certain medicines used to regulate abnormal heartbeat or to treat  other heart conditions  · cisapride  · halofantrine  · levomethadyl  · linezolid  · MAOIs like Carbex, Eldepryl, Marplan, Nardil, and Parnate  · methylene blue  · other medicines for mental depression  · phenothiazines like perphenazine, thioridazine and chlorpromazine  · pimozide  · procarbazine  · sparfloxacin  · [place]'s Wort  · ziprasidone  This medicine may also interact with the following medications:  · cimetidine  · tolazamide  What if I miss a dose?  This does not apply. This medicine should only be taken before going to sleep. Do not take double or extra doses.  Where should I keep my medicine?  Keep out of the reach of children.  Store at room temperature between 20 and 25 degrees C (68 and 77 degrees F). Throw away any unused medicine after the expiration date.  What should I tell my health care provider before I take this medicine?  They need to know if you have any of these conditions:  · bipolar disorder  · depression  · difficulty passing urine  · glaucoma  · heart disease  · history of a drug or alcohol abuse problem  · liver disease  · lung or breathing disease, like asthma or sleep apnea  · prostate trouble  · schizophrenia  · suicidal thoughts, plans, or attempt; a previous suicide attempt by you or a family member  · an unusual or allergic reaction to doxepin, other medicines, foods, dyes, or preservatives  · pregnant or trying to get pregnant  · breast-feeding  What should I watch for while using this medicine?  Visit your doctor or health care professional for regular checks on your progress. Keep a regular sleep schedule by going to bed at about the same time each night. Avoid caffeine-containing drinks in the evening hours. Talk to your doctor if you still have trouble sleeping within 7 to 10 days of using this medicine. This may mean there is another cause for your sleep problems.  Do not take this medicine unless you are able to get a full night of sleep before you must be active again.  "After taking this medicine, do not drive, use machinery, or do anything that needs mental alertness. Take this medicine only within 30 minutes of going to bed and then confine your activities to those needed to get ready for sleep. You may still feel drowsy the next day after taking this medicine. If this happens, do not drive, use machinery, or do anything that needs mental alertness until you feel fully awake. Do not stand or sit up quickly, especially if you are an older patient. This reduces the risk of dizzy or fainting spells.  After taking this medicine for sleep, you may get up out of bed while not being fully awake and do an activity that you do not know you are doing. The next morning, you may have no memory of the event. Activities such as driving a car ("sleep-driving"), making and eating food, talking on the phone, sexual activity, and sleep-walking have been reported. Call your doctor right away if you find out you have done any of these activities. Do not take this medicine if you drink alcohol or have taken another medicine for sleep, since your risk of doing these sleep-related activities will be increased.  Patients and their families should watch out for new or worsening thoughts of suicide or depression. Also watch out for sudden changes in feelings such as feeling anxious, agitated, panicky, irritable, hostile, aggressive, impulsive, severely restless, overly excited and hyperactive, or not being able to sleep. If this happens, especially at the beginning of treatment or after a change in dose, call your health care professional.  Your mouth may get dry. Chewing sugarless gum or sucking hard candy, and drinking plenty of water may help. Contact your doctor if the problem does not go away or is severe.  This medicine may cause dry eyes and blurred vision. If you wear contact lenses you may feel some discomfort. Lubricating drops may help. See your eye doctor if the problem does not go away or is " severe.  This medicine can cause constipation. Try to have a bowel movement at least every 2 to 3 days. If you do not have a bowel movement for 3 days, call your doctor or health care professional.  This medicine can make you more sensitive to the sun. Keep out of the sun. If you cannot avoid being in the sun, wear protective clothing and use sunscreen. Do not use sun lamps or tanning beds/booths.  Date Last Reviewed:   NOTE:This sheet is a summary. It may not cover all possible information. If you have questions about this medicine, talk to your doctor, pharmacist, or health care provider. Copyright© 2016 [employer]      Please call the [facility] to schedule sleep study at  [phone] . Usually take 1- 2 days to get insurance company approval.  You will need to schedule at follow up clinic appointment 7 days after the sleep study to review the results.
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
## 2017-09-05 NOTE — PROGRESS NOTES
INR is slightly sub-therapeutic. Recently admitted, LHC/RHC performed. Resumed warfarin at scheduled dose 8/28. No other changes noted. Will gently increase total weekly dose then resume scheduled dose until follow-up.

## 2017-09-11 ENCOUNTER — OFFICE VISIT (OUTPATIENT)
Dept: CARDIOLOGY | Facility: CLINIC | Age: 77
End: 2017-09-11
Payer: MEDICARE

## 2017-09-11 ENCOUNTER — LAB VISIT (OUTPATIENT)
Dept: LAB | Facility: HOSPITAL | Age: 77
End: 2017-09-11
Attending: PHYSICIAN ASSISTANT
Payer: MEDICARE

## 2017-09-11 VITALS
HEIGHT: 70 IN | WEIGHT: 216.69 LBS | BODY MASS INDEX: 31.02 KG/M2 | HEART RATE: 86 BPM | SYSTOLIC BLOOD PRESSURE: 104 MMHG | DIASTOLIC BLOOD PRESSURE: 66 MMHG

## 2017-09-11 DIAGNOSIS — R53.83 FATIGUE, UNSPECIFIED TYPE: Primary | ICD-10-CM

## 2017-09-11 DIAGNOSIS — I10 ESSENTIAL HYPERTENSION: ICD-10-CM

## 2017-09-11 DIAGNOSIS — Z79.01 CURRENT USE OF LONG TERM ANTICOAGULATION: ICD-10-CM

## 2017-09-11 DIAGNOSIS — I48.20 CHRONIC ATRIAL FIBRILLATION: ICD-10-CM

## 2017-09-11 DIAGNOSIS — I27.20 PULMONARY HYPERTENSION, MILD: ICD-10-CM

## 2017-09-11 DIAGNOSIS — R06.02 SOB (SHORTNESS OF BREATH): ICD-10-CM

## 2017-09-11 DIAGNOSIS — E78.00 PURE HYPERCHOLESTEROLEMIA: ICD-10-CM

## 2017-09-11 DIAGNOSIS — R53.83 FATIGUE, UNSPECIFIED TYPE: ICD-10-CM

## 2017-09-11 DIAGNOSIS — I20.9 ANGINA PECTORIS: ICD-10-CM

## 2017-09-11 LAB
ALBUMIN SERPL BCP-MCNC: 3.6 G/DL
ALP SERPL-CCNC: 72 U/L
ALT SERPL W/O P-5'-P-CCNC: 39 U/L
ANION GAP SERPL CALC-SCNC: 11 MMOL/L
AST SERPL-CCNC: 29 U/L
BILIRUB SERPL-MCNC: 0.4 MG/DL
BNP SERPL-MCNC: 90 PG/ML
BUN SERPL-MCNC: 22 MG/DL
CALCIUM SERPL-MCNC: 9.8 MG/DL
CHLORIDE SERPL-SCNC: 105 MMOL/L
CO2 SERPL-SCNC: 24 MMOL/L
CREAT SERPL-MCNC: 1 MG/DL
EST. GFR  (AFRICAN AMERICAN): >60 ML/MIN/1.73 M^2
EST. GFR  (NON AFRICAN AMERICAN): >60 ML/MIN/1.73 M^2
GLUCOSE SERPL-MCNC: 147 MG/DL
POTASSIUM SERPL-SCNC: 3.9 MMOL/L
PROT SERPL-MCNC: 7.1 G/DL
SODIUM SERPL-SCNC: 140 MMOL/L
TSH SERPL DL<=0.005 MIU/L-ACNC: 1.23 UIU/ML

## 2017-09-11 PROCEDURE — 1159F MED LIST DOCD IN RCRD: CPT | Mod: S$GLB,,, | Performed by: PHYSICIAN ASSISTANT

## 2017-09-11 PROCEDURE — 3008F BODY MASS INDEX DOCD: CPT | Mod: S$GLB,,, | Performed by: PHYSICIAN ASSISTANT

## 2017-09-11 PROCEDURE — 84443 ASSAY THYROID STIM HORMONE: CPT

## 2017-09-11 PROCEDURE — 84403 ASSAY OF TOTAL TESTOSTERONE: CPT

## 2017-09-11 PROCEDURE — 99999 PR PBB SHADOW E&M-EST. PATIENT-LVL III: CPT | Mod: PBBFAC,,, | Performed by: PHYSICIAN ASSISTANT

## 2017-09-11 PROCEDURE — 3078F DIAST BP <80 MM HG: CPT | Mod: S$GLB,,, | Performed by: PHYSICIAN ASSISTANT

## 2017-09-11 PROCEDURE — 99214 OFFICE O/P EST MOD 30 MIN: CPT | Mod: S$GLB,,, | Performed by: PHYSICIAN ASSISTANT

## 2017-09-11 PROCEDURE — 83880 ASSAY OF NATRIURETIC PEPTIDE: CPT

## 2017-09-11 PROCEDURE — 80053 COMPREHEN METABOLIC PANEL: CPT

## 2017-09-11 PROCEDURE — 36415 COLL VENOUS BLD VENIPUNCTURE: CPT

## 2017-09-11 PROCEDURE — 99499 UNLISTED E&M SERVICE: CPT | Mod: S$GLB,,, | Performed by: PHYSICIAN ASSISTANT

## 2017-09-11 PROCEDURE — 3074F SYST BP LT 130 MM HG: CPT | Mod: S$GLB,,, | Performed by: PHYSICIAN ASSISTANT

## 2017-09-11 PROCEDURE — 1126F AMNT PAIN NOTED NONE PRSNT: CPT | Mod: S$GLB,,, | Performed by: PHYSICIAN ASSISTANT

## 2017-09-11 NOTE — PROGRESS NOTES
Subjective:    Patient ID:  Anthony Biggs Jr. is a 77 y.o. male who presents for follow-up of hospital f/u      HPI   Mr. Cruz is a 77 year old male patient with a PMHx of pulmonary HTN, atrial fibrillation (on Coumadin), hyperlipidemia and HTN who presents today for hospital follow-up. Patient recently underwent elective R/LHC on 8/28/17 for worsening SOB. R/LHC subsequently showed non-obstructive CAD and moderate pulmonary HTN. Patient returns today and states he is doing ok. He still complains of SOB, especially with exertion. States no real change in status since last visit. Associated symptoms include extreme fatigue, patient states he is not able to do the things he once did. Denies any chest pain or chest tightness. No palpitations, lightheadedness, dizziness, near syncope, or syncope. He reports compliance with his medications, has not noticed any improvement with Lasix. He is currently in process of sleep apnea workup. 2D echo reviewed, from 5/17 showed normal EF, DD.    Review of Systems   Constitution: Positive for malaise/fatigue. Negative for chills, decreased appetite, fever and weakness.   HENT: Negative for congestion, hoarse voice and sore throat.    Eyes: Negative for blurred vision and discharge.   Cardiovascular: Positive for dyspnea on exertion and leg swelling. Negative for chest pain, claudication, cyanosis, irregular heartbeat, near-syncope, orthopnea, palpitations and paroxysmal nocturnal dyspnea.   Respiratory: Positive for shortness of breath. Negative for cough, hemoptysis, snoring, sputum production and wheezing.    Endocrine: Negative for cold intolerance and heat intolerance.   Hematologic/Lymphatic: Negative for bleeding problem. Does not bruise/bleed easily.   Skin: Negative for rash.   Musculoskeletal: Negative for arthritis, back pain, joint pain, joint swelling, muscle cramps, muscle weakness and myalgias.   Gastrointestinal: Negative for abdominal pain, constipation, diarrhea,  "heartburn, melena and nausea.   Genitourinary: Negative for hematuria.   Neurological: Negative for dizziness, focal weakness, headaches, light-headedness, loss of balance, numbness, paresthesias and seizures.   Psychiatric/Behavioral: Negative for memory loss. The patient does not have insomnia.    Allergic/Immunologic: Negative for hives.       /66 (BP Location: Left arm, Patient Position: Sitting, BP Method: Medium (Manual))   Pulse 86   Ht 5' 10" (1.778 m)   Wt 98.3 kg (216 lb 11.4 oz)   BMI 31.09 kg/m²     Objective:    Physical Exam   Constitutional: He is oriented to person, place, and time. He appears well-developed and well-nourished. No distress.   HENT:   Head: Normocephalic and atraumatic.   Eyes: Pupils are equal, round, and reactive to light. Right eye exhibits no discharge. Left eye exhibits no discharge.   Neck: Neck supple. No JVD present. No tracheal deviation present. No thyromegaly present.   Cardiovascular: Normal rate, S1 normal, S2 normal and normal heart sounds.  An irregularly irregular rhythm present. PMI is not displaced.  Exam reveals no gallop, no S3, no S4 and no friction rub.    No murmur heard.  Pulses:       Radial pulses are 2+ on the right side, and 2+ on the left side.   Pulmonary/Chest: Effort normal and breath sounds normal. No respiratory distress. He has no wheezes. He has no rales.   Abdominal: Soft. He exhibits no distension. There is no tenderness. There is no rebound.   Musculoskeletal: He exhibits edema (1+ BLE edema).   Neurological: He is alert and oriented to person, place, and time.   Skin: Skin is warm and dry. He is not diaphoretic. No erythema.   Groin access site C/D/I; no bleeding   Psychiatric: He has a normal mood and affect. His behavior is normal. Thought content normal.   Nursing note and vitals reviewed.      E. Angiographic Results     Diagnostic:          Patient has a right dominant coronary artery.        - Left Main Coronary Artery:           "   The LM is normal. There is MELO 3 flow.     - Left Anterior Descending Artery:             The mid LAD has a 50% stenosis. There is MELO 3 flow. long mid 50% lesion     - Left Circumflex Artery:             The LCX has luminal irregularities. There is MELO 3 flow. left dominant     - Right Coronary Artery:             The RCA is normal. There is MELO 3 flow. small non dominant     - Common Femoral Artery:             The right CFA is normal.  Assessment:       1. Fatigue, unspecified type    2. Pulmonary hypertension, mild    3. Angina pectoris    4. Chronic atrial fibrillation    5. Pure hypercholesterolemia    6. Essential hypertension    7. Current use of long term anticoagulation    8. SOB (shortness of breath)        Patient still complaining of SOB as well as profound fatigue, unclear etiology at this point, may be due to untreated sleep apnea. Sleep study waiting to be approved. CV wise, appears stable. LHC showed non-obstructive CAD. Will check TSH, CMP, BNP, and testosterone today for further evaluation. Will likely d/c Lasix and use on prn basis as patient has not noticed any improvement pending review of labs.   Plan:     -CMP, BNP, TSH, testosterone  -Continue current meds for now  -Will likely d/c Lasix  -Sleep study  -Keep f/u with Dr. Griffith          Chart reviewed. Dr. Griffith agrees with plan as outlined above.

## 2017-09-12 LAB — TESTOST SERPL-MCNC: 506 NG/DL

## 2017-09-13 ENCOUNTER — TELEPHONE (OUTPATIENT)
Dept: CARDIOLOGY | Facility: CLINIC | Age: 77
End: 2017-09-13

## 2017-09-13 ENCOUNTER — LAB VISIT (OUTPATIENT)
Dept: LAB | Facility: HOSPITAL | Age: 77
End: 2017-09-13
Attending: INTERNAL MEDICINE
Payer: MEDICARE

## 2017-09-13 DIAGNOSIS — I10 ESSENTIAL HYPERTENSION: ICD-10-CM

## 2017-09-13 DIAGNOSIS — E78.00 PURE HYPERCHOLESTEROLEMIA: ICD-10-CM

## 2017-09-13 LAB
ALBUMIN SERPL BCP-MCNC: 3.7 G/DL
ALP SERPL-CCNC: 55 U/L
ALT SERPL W/O P-5'-P-CCNC: 39 U/L
ANION GAP SERPL CALC-SCNC: 11 MMOL/L
AST SERPL-CCNC: 32 U/L
BILIRUB SERPL-MCNC: 0.7 MG/DL
BUN SERPL-MCNC: 23 MG/DL
CALCIUM SERPL-MCNC: 9.2 MG/DL
CHLORIDE SERPL-SCNC: 103 MMOL/L
CHOLEST SERPL-MCNC: 178 MG/DL
CHOLEST/HDLC SERPL: 3.8 {RATIO}
CO2 SERPL-SCNC: 27 MMOL/L
CREAT SERPL-MCNC: 0.9 MG/DL
EST. GFR  (AFRICAN AMERICAN): >60 ML/MIN/1.73 M^2
EST. GFR  (NON AFRICAN AMERICAN): >60 ML/MIN/1.73 M^2
GLUCOSE SERPL-MCNC: 121 MG/DL
HDLC SERPL-MCNC: 47 MG/DL
HDLC SERPL: 26.4 %
LDLC SERPL CALC-MCNC: 107.4 MG/DL
NONHDLC SERPL-MCNC: 131 MG/DL
POTASSIUM SERPL-SCNC: 4.4 MMOL/L
PROT SERPL-MCNC: 6.9 G/DL
SODIUM SERPL-SCNC: 141 MMOL/L
TRIGL SERPL-MCNC: 118 MG/DL

## 2017-09-13 PROCEDURE — 36415 COLL VENOUS BLD VENIPUNCTURE: CPT | Mod: PO

## 2017-09-13 PROCEDURE — 80061 LIPID PANEL: CPT

## 2017-09-13 PROCEDURE — 80053 COMPREHEN METABOLIC PANEL: CPT

## 2017-09-13 NOTE — TELEPHONE ENCOUNTER
The patient has been notified of this information and all questions answered.      Pt is already scheduled to see Dr. Griffith 10/25/17 appt letter has been mailed to pt.

## 2017-09-13 NOTE — TELEPHONE ENCOUNTER
----- Message from NIKOS Mcgee sent at 9/13/2017  9:38 AM CDT -----  Please phone patient and notify him of the following:    (1) BNP is WNL, 90    (2) Testosterone, TSH, and CMP are all WNL.    I discussed with Dr. Griffith. Let's decrease Lasix to 40 mg daily and assess response. Please keep scheduled f/u on 10/25/17.    Thanks

## 2017-09-15 ENCOUNTER — TELEPHONE (OUTPATIENT)
Dept: PULMONOLOGY | Facility: CLINIC | Age: 77
End: 2017-09-15

## 2017-09-15 RX ORDER — AMLODIPINE BESYLATE 5 MG/1
TABLET ORAL
Qty: 30 TABLET | Refills: 11 | Status: SHIPPED | OUTPATIENT
Start: 2017-09-15 | End: 2017-09-25

## 2017-09-15 NOTE — TELEPHONE ENCOUNTER
----- Message from Liz Landry sent at 9/15/2017  7:49 AM CDT -----  Contact: be/wife [phone] or [phone]  States that she is calling regarding when pt will have the sleep apnea test. Please call back at [phone] or [phone]//thank you acc

## 2017-09-15 NOTE — TELEPHONE ENCOUNTER
Spoke with pt's wife, explained that it may take up to 2wks for insurance to approve psg but as soon as its authorized someone from the sleep lab will contact her to schedule. She verbalized understanding.

## 2017-09-21 ENCOUNTER — TELEPHONE (OUTPATIENT)
Dept: PULMONOLOGY | Facility: CLINIC | Age: 77
End: 2017-09-21

## 2017-09-21 NOTE — TELEPHONE ENCOUNTER
----- Message from Ashlee Golden sent at 9/21/2017  3:18 PM CDT -----  Contact: wife  Wife returning nurse call, please call [phone].

## 2017-09-21 NOTE — TELEPHONE ENCOUNTER
Per Jeronimo in the sleep lab,  he is not approved yet. we have an issue with these orders and referral not falling in my que.We are using this as an example to show. So he will be pending still.  Called pt to notify no answer. LVM.

## 2017-09-21 NOTE — TELEPHONE ENCOUNTER
----- Message from April Carrillo sent at 9/21/2017  9:47 AM CDT -----  Pt wife(Marino) at [phone]//[facility] is calling regarding pt's sleep apnea test//please call to discuss//thanks//[facility]

## 2017-09-21 NOTE — TELEPHONE ENCOUNTER
Spoke with pt, informed him that psg still pending and as soon as approved he will be contacted to schedule. Pt verbalized understanding.

## 2017-09-21 NOTE — TELEPHONE ENCOUNTER
Spoke with pt's wife, they are still waiting to be scheduled for sleep study. Explained that per chart PSG still pending. Message sent to Jeronimo in sleep lab to have her f/u on authorization.

## 2017-09-25 ENCOUNTER — OFFICE VISIT (OUTPATIENT)
Dept: INTERNAL MEDICINE | Facility: CLINIC | Age: 77
End: 2017-09-25
Payer: MEDICARE

## 2017-09-25 VITALS
SYSTOLIC BLOOD PRESSURE: 102 MMHG | DIASTOLIC BLOOD PRESSURE: 78 MMHG | TEMPERATURE: 100 F | OXYGEN SATURATION: 97 % | WEIGHT: 210.13 LBS | BODY MASS INDEX: 30.08 KG/M2 | HEART RATE: 84 BPM | HEIGHT: 70 IN

## 2017-09-25 DIAGNOSIS — I10 ESSENTIAL HYPERTENSION: ICD-10-CM

## 2017-09-25 DIAGNOSIS — Z00.00 ROUTINE GENERAL MEDICAL EXAMINATION AT A HEALTH CARE FACILITY: Primary | ICD-10-CM

## 2017-09-25 DIAGNOSIS — E78.00 PURE HYPERCHOLESTEROLEMIA: ICD-10-CM

## 2017-09-25 DIAGNOSIS — R73.01 IMPAIRED FASTING GLUCOSE: ICD-10-CM

## 2017-09-25 DIAGNOSIS — I48.20 CHRONIC ATRIAL FIBRILLATION: ICD-10-CM

## 2017-09-25 DIAGNOSIS — I25.10 ATHEROSCLEROSIS OF NATIVE CORONARY ARTERY WITHOUT ANGINA PECTORIS, UNSPECIFIED WHETHER NATIVE OR TRANSPLANTED HEART: ICD-10-CM

## 2017-09-25 DIAGNOSIS — R73.01 IFG (IMPAIRED FASTING GLUCOSE): ICD-10-CM

## 2017-09-25 PROCEDURE — 99499 UNLISTED E&M SERVICE: CPT | Mod: S$GLB,,, | Performed by: INTERNAL MEDICINE

## 2017-09-25 PROCEDURE — 99999 PR PBB SHADOW E&M-EST. PATIENT-LVL IV: CPT | Mod: PBBFAC,,, | Performed by: INTERNAL MEDICINE

## 2017-09-25 PROCEDURE — 99397 PER PM REEVAL EST PAT 65+ YR: CPT | Mod: 25,S$GLB,, | Performed by: INTERNAL MEDICINE

## 2017-09-25 PROCEDURE — 90662 IIV NO PRSV INCREASED AG IM: CPT | Mod: S$GLB,,, | Performed by: INTERNAL MEDICINE

## 2017-09-25 PROCEDURE — G0008 ADMIN INFLUENZA VIRUS VAC: HCPCS | Mod: S$GLB,,, | Performed by: INTERNAL MEDICINE

## 2017-09-25 RX ORDER — FUROSEMIDE 40 MG/1
40 TABLET ORAL DAILY
Qty: 30 TABLET | Refills: 11 | Status: SHIPPED | OUTPATIENT
Start: 2017-09-25 | End: 2017-11-16

## 2017-09-25 RX ORDER — BUPROPION HYDROCHLORIDE 75 MG/1
75 TABLET ORAL 2 TIMES DAILY
Qty: 60 TABLET | Refills: 11 | Status: SHIPPED | OUTPATIENT
Start: 2017-09-25 | End: 2017-10-24

## 2017-09-25 RX ORDER — AMLODIPINE BESYLATE 2.5 MG/1
2.5 TABLET ORAL DAILY
Qty: 90 TABLET | Refills: 3 | Status: SHIPPED | OUTPATIENT
Start: 2017-09-25 | End: 2017-10-25 | Stop reason: ALTCHOICE
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
## 2017-10-03 ENCOUNTER — ANTI-COAG VISIT (OUTPATIENT)
Dept: CARDIOLOGY | Facility: CLINIC | Age: 77
End: 2017-10-03
Payer: MEDICARE

## 2017-10-03 DIAGNOSIS — Z79.01 LONG-TERM (CURRENT) USE OF ANTICOAGULANTS: Primary | ICD-10-CM

## 2017-10-03 LAB — INR PPP: 1.8 (ref 2–3)

## 2017-10-03 PROCEDURE — 99211 OFF/OP EST MAY X REQ PHY/QHP: CPT | Mod: 25,S$GLB,,

## 2017-10-03 PROCEDURE — 85610 PROTHROMBIN TIME: CPT | Mod: QW,S$GLB,,

## 2017-10-03 NOTE — PROGRESS NOTES
INR remains sub-therapeutic. Will increase total weekly dose until follow-up. Patient reports no bleeding or bruising, no new medications and no diet changes.  I reminded the patient to call with any problems, changes or questions before the next visit.

## 2017-10-09 ENCOUNTER — HOSPITAL ENCOUNTER (OUTPATIENT)
Dept: SLEEP MEDICINE | Facility: HOSPITAL | Age: 77
Discharge: HOME | End: 2017-10-09
Attending: INTERNAL MEDICINE
Payer: MEDICARE

## 2017-10-09 DIAGNOSIS — F51.04 PSYCHOPHYSIOLOGICAL INSOMNIA: ICD-10-CM

## 2017-10-09 DIAGNOSIS — G47.33 OSA (OBSTRUCTIVE SLEEP APNEA): Primary | ICD-10-CM

## 2017-10-09 DIAGNOSIS — Z72.821 INADEQUATE SLEEP HYGIENE: ICD-10-CM

## 2017-10-09 PROCEDURE — 95810 POLYSOM 6/> YRS 4/> PARAM: CPT

## 2017-10-09 PROCEDURE — 95810 POLYSOM 6/> YRS 4/> PARAM: CPT | Mod: 26,,, | Performed by: PSYCHOLOGIST

## 2017-10-13 ENCOUNTER — TELEPHONE (OUTPATIENT)
Dept: PULMONOLOGY | Facility: CLINIC | Age: 77
End: 2017-10-13

## 2017-10-13 NOTE — TELEPHONE ENCOUNTER
Spoke with pt, explained that sleep study results are reviewed in person. He has a fu visit on 10/23. Offered pt sooner appt to review results. He declined states he will just wait for appt.

## 2017-10-13 NOTE — TELEPHONE ENCOUNTER
----- Message from Dandre Cardoza sent at 10/13/2017 10:10 AM CDT -----  Contact: [phone]  Pt is calling for test results/ Pt can be reached at [phone] or [phone]

## 2017-10-23 ENCOUNTER — OFFICE VISIT (OUTPATIENT)
Dept: PULMONOLOGY | Facility: CLINIC | Age: 77
End: 2017-10-23
Payer: MEDICARE

## 2017-10-23 ENCOUNTER — TELEPHONE (OUTPATIENT)
Dept: INTERNAL MEDICINE | Facility: CLINIC | Age: 77
End: 2017-10-23

## 2017-10-23 VITALS
OXYGEN SATURATION: 98 % | WEIGHT: 215.81 LBS | DIASTOLIC BLOOD PRESSURE: 82 MMHG | HEART RATE: 83 BPM | RESPIRATION RATE: 18 BRPM | SYSTOLIC BLOOD PRESSURE: 118 MMHG | BODY MASS INDEX: 30.9 KG/M2 | HEIGHT: 70 IN

## 2017-10-23 DIAGNOSIS — G47.33 OSA (OBSTRUCTIVE SLEEP APNEA): Primary | ICD-10-CM

## 2017-10-23 DIAGNOSIS — F51.04 PSYCHOPHYSIOLOGICAL INSOMNIA: ICD-10-CM

## 2017-10-23 DIAGNOSIS — Z72.821 INADEQUATE SLEEP HYGIENE: ICD-10-CM

## 2017-10-23 DIAGNOSIS — I27.20 PULMONARY HYPERTENSION, MILD: ICD-10-CM

## 2017-10-23 DIAGNOSIS — J45.20 MILD INTERMITTENT ASTHMA, UNSPECIFIED WHETHER COMPLICATED: ICD-10-CM

## 2017-10-23 PROCEDURE — 99499 UNLISTED E&M SERVICE: CPT | Mod: S$GLB,,, | Performed by: INTERNAL MEDICINE

## 2017-10-23 PROCEDURE — 99215 OFFICE O/P EST HI 40 MIN: CPT | Mod: S$GLB,,, | Performed by: INTERNAL MEDICINE

## 2017-10-23 PROCEDURE — 99999 PR PBB SHADOW E&M-EST. PATIENT-LVL III: CPT | Mod: PBBFAC,,, | Performed by: INTERNAL MEDICINE

## 2017-10-23 RX ORDER — FLUTICASONE FUROATE AND VILANTEROL 200; 25 UG/1; UG/1
1 POWDER RESPIRATORY (INHALATION) DAILY
Qty: 60 EACH | Refills: 11 | Status: SHIPPED | OUTPATIENT
Start: 2017-10-23 | End: 2017-10-23 | Stop reason: CLARIF
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
## 2017-10-23 NOTE — PATIENT INSTRUCTIONS
Fluticasone; Vilanterol inhalation powder  What is this medicine?  FLUTICASONE; VILANTEROL (floo TIK a sone; vye JOSE ter ol) inhalation is a combination of two medicines that decrease inflammation and help to open up the airways of your lungs. It is for chronic obstructive pulmonary disease (COPD), including chronic bronchitis or emphysema. It is also used for asthma in adults to help control symptoms. Do NOT use for an acute asthma attack or COPD attack.  How should I use this medicine?  This medicine is inhaled through the mouth. It is used once per day. Follow the directions on the prescription label. Do not use a spacer device with this inhaler. Take your medicine at regular intervals. Do not take your medicine more often than directed. Do not stop taking except on your doctor's advice. Make sure that you are using your inhaler correctly. Ask you doctor or health care provider if you have any questions.  A special MedGuide will be given to you by the pharmacist with each prescription and refill. Be sure to read this information carefully each time.  Talk to your pediatrician regarding the use of this medicine in children. Special care may be needed. This medicine is not approved for use in children under 18 years of age.  What side effects may I notice from receiving this medicine?  Side effects that you should report to your doctor or health care professional as soon as possible:  · allergic reactions like skin rash or hives, swelling of the face, lips, or tongue  · breathing problems right after inhaling your medicine  · changes in vision  · chest pain  · fast, irregular heartbeat  · feeling faint or lightheaded, falls  · fever or chills  · nausea, vomiting  · tiredness  Side effects that usually do not require medical attention (Report these to your doctor or health care professional if they continue or are bothersome.):  · cough  · headache  · nervousness  · sore throat  · tremor  What may interact with  this medicine?  Do not take this medicine with any of the following medications:  · cisapride  · dofetilide  · dronedarone  · MAOIs like Carbex, Eldepryl, Marplan, Nardil, and Parnate  · pimozide  · thioridazine  · ziprasidone  This medicine may also interact with the following medications:  · antiviral medicines for HIV or AIDS  · beta-blockers like metoprolol and propranolol  · certain medicines for depression, anxiety, or psychotic disturbances  · diuretics  · medicines for colds  · medicines for fungal infections like ketoconazole and itraconazole  · other medicines for breathing problems  · other medicines that prolong the QT interval (cause an abnormal heart rhythm)  What if I miss a dose?  If you miss a dose, use it as soon as you can. If it is almost time for your next dose, use only that dose and continue with your regular schedule. Do not use double or extra doses.  Where should I keep my medicine?  Keep out of the reach of children.  Store at room temperature between 15 and 30 degrees C (59 and 86 degrees F). Store in a dry place away from direct heat or sunlight. Throw away 6 weeks after you remove the inhaler from the foil tray, or after the dose indicator reads 0, whichever comes first. Throw away any unopened packages after the expiration date.  What should I tell my health care provider before I take this medicine?  They need to know if you have any of these conditions:  · bone problems  · immune system problems  · diabetes  · heart disease or irregular heartbeat  · high blood pressure  · infection  · pheochromocytoma  · seizures  · thyroid disease  · an unusual or allergic reaction to fluticasone, vilanterol, milk proteins, corticosteroids, other medicines, foods, dyes, or preservatives  · pregnant or trying to get pregnant  · breast-feeding  What should I watch for while using this medicine?  Visit your doctor or health care professional for regular checkups. Tell your doctor or health care  professional if your symptoms do not get better.  If your symptoms get worse or if you need your short-acting inhalers more often, call your doctor right away. Do not use this medicine more than every 24 hours.  If you are going to have surgery tell your doctor or health care professional that you are using this medicine. Try not to come in contact with people with the chicken pox or measles. If you do, call your doctor.  NOTE:This sheet is a summary. It may not cover all possible information. If you have questions about this medicine, talk to your doctor, pharmacist, or health care provider. Copyright© 2017 [employer]          CPAP HABITUATION PROCEDURE    Vamsi Rizo, Ph.D., [facility] and Cong Beltran M.D.  [facility], [facility]    Some people have difficulty adjusting to CPAP/BiPAP/AutoCPAP.  This is not unusual or hard to understand: Breathing with CPAP is different from ordinary breathing, and this difference is aversive to some. The problem can be overcome, however, and the benefits CPAP confers are certainly worth the effort.  Below, you will find a simple and gradual way to get used to CPAP before you try to use it all night, every night.  The essence of this procedure is to relax and let breathing with CPAP become a habit.  It may take about 2 weeks, and involves the followin. CPAP while awake and comfortably seated, during the late evening.     2. CPAP in bed while attempting sleep at night.     3. If your discomfort is too great at any time, discontinue and attempt again later the same night, for the same amount of time.   4. You and your physician may alter the times and pressures if necessary.     5. If you find that it is very easy to get used to CPAP, you may start using it every night when you are comfortable enough to do so.  6. IMPORTANT REMINDER: If you have a cold or sinus congestion it is okay to miss a night or two of CPAP. Consider using  antihistamines or decongestants to clear up your sinus congestion prior to sleeping.    DAYS  1-3   Start CPAP while awake and comfortably seated during the late evening, after having prepared for bed.  You may do this while watching television, listening to music or reading. Use for 1 hour, then take off CPAP and go directly to bed to sleep    DAYS  4-6   Start CPAP when you go to bed and use for 1 hour, or until you fall asleep.  If your discomfort is too great at any time, discontinue and attempt again later the same night, for the same designated amount of time (1 hour).     DAYS  7-9   Increase time with CPAP to 2 hours a night.  If your discomfort is too great at any time, discontinue and attempt again later the same night, for the same designated amount of time (2 hours).     DAYS 10-12  Increase time with CPAP to 3 hours a night. If your discomfort is too great at any time, discontinue and attempt again later the same night, for the same designated amount of time (3 hours).     DAYS 13-15   Sleep the entire night with CPAP.     OPTIONAL: You may use Progressive Muscle Relaxation (PMR) to help put you at ease when using CPAP; do PMR twice each day, once in the morning or afternoon, and once in the evening just before using CPAP. You may do PMR prior to any attempt until you are comfortable with CPAP.

## 2017-10-23 NOTE — LETTER
October 23, 2017      Giuliano Moran MD  [address]  [address]  [facility] [address]           [facility]  [address] [place] [id number]  Phone: [phone]  Fax: [phone]          Patient: Anthony Biggs Jr.   MR Number: [id number]   YOB: 1940   Date of Visit: 10/23/2017       Dear No ref. provider found:    Thank you for referring Anthony Biggs to me for evaluation. Attached you will find relevant portions of my assessment and plan of care.    If you have questions, please do not hesitate to call me. I look forward to following Anthony Biggs along with you.    Sincerely,    Cong Beltran MD    Enclosure  CC:  No Recipients    IIf you would like to receive this communication electronically, please contact [email or web address] or [phone] to request Credible Link access.    Credible Link is a tool which provides read-only access to select patient information with whom you have a relationship. Its easy to use and provides real time access to review your patients record including encounter summaries, notes, results, and demographic information.    If you feel you have received this communication in error or would no longer like to receive these types of communications, please e-mail [email or web address]

## 2017-10-23 NOTE — TELEPHONE ENCOUNTER
----- Message from Giuliano Moran MD sent at 10/23/2017  2:34 PM CDT -----   Please call pt and have him come in to see me.   ----- Message -----  From: Cong Beltran MD  Sent: 10/23/2017   2:13 PM  To: Giuliano Moran MD    I need your help concerning this patient you have seen in the past  This patient appears to be significantly impaired with inability to walk a block and developing leg fatigue that causes him to limit his walking.  I reviewed his stress test and his heart rate only went up from 80 to 85  He is on metoprolol. His stress test is normal. His Pulmonary Function Studies demonstrate only mild obstruction    I am having a difficult time determining if his fatigue is worsened by his antidepressant (wellbutrin). I am thinking of giving him a more stimulating antidepressant such as Venlafaxine  ( effexor).  What do you think?

## 2017-10-24 ENCOUNTER — ANTI-COAG VISIT (OUTPATIENT)
Dept: CARDIOLOGY | Facility: CLINIC | Age: 77
End: 2017-10-24
Payer: MEDICARE

## 2017-10-24 ENCOUNTER — OFFICE VISIT (OUTPATIENT)
Dept: OPHTHALMOLOGY | Facility: CLINIC | Age: 77
End: 2017-10-24
Payer: MEDICARE

## 2017-10-24 ENCOUNTER — OFFICE VISIT (OUTPATIENT)
Dept: INTERNAL MEDICINE | Facility: CLINIC | Age: 77
End: 2017-10-24
Payer: MEDICARE

## 2017-10-24 VITALS
OXYGEN SATURATION: 98 % | TEMPERATURE: 99 F | HEIGHT: 70 IN | BODY MASS INDEX: 31.59 KG/M2 | HEART RATE: 84 BPM | SYSTOLIC BLOOD PRESSURE: 122 MMHG | WEIGHT: 220.69 LBS | DIASTOLIC BLOOD PRESSURE: 90 MMHG

## 2017-10-24 DIAGNOSIS — F32.A DEPRESSION, UNSPECIFIED DEPRESSION TYPE: Primary | ICD-10-CM

## 2017-10-24 DIAGNOSIS — Z79.01 LONG-TERM (CURRENT) USE OF ANTICOAGULANTS: Primary | ICD-10-CM

## 2017-10-24 DIAGNOSIS — Z83.518 FAMILY HISTORY OF MACULAR DEGENERATION: ICD-10-CM

## 2017-10-24 DIAGNOSIS — Z96.1 PSEUDOPHAKIA OF BOTH EYES: Primary | ICD-10-CM

## 2017-10-24 DIAGNOSIS — F33.1 MODERATE EPISODE OF RECURRENT MAJOR DEPRESSIVE DISORDER: ICD-10-CM

## 2017-10-24 LAB — INR PPP: 2.7 (ref 2–3)

## 2017-10-24 PROCEDURE — 99211 OFF/OP EST MAY X REQ PHY/QHP: CPT | Mod: 25,S$GLB,,

## 2017-10-24 PROCEDURE — 99999 PR PBB SHADOW E&M-EST. PATIENT-LVL II: CPT | Mod: PBBFAC,,, | Performed by: OPHTHALMOLOGY

## 2017-10-24 PROCEDURE — 99499 UNLISTED E&M SERVICE: CPT | Mod: S$GLB,,, | Performed by: INTERNAL MEDICINE

## 2017-10-24 PROCEDURE — 99213 OFFICE O/P EST LOW 20 MIN: CPT | Mod: S$GLB,,, | Performed by: INTERNAL MEDICINE

## 2017-10-24 PROCEDURE — 99999 PR PBB SHADOW E&M-EST. PATIENT-LVL III: CPT | Mod: PBBFAC,,, | Performed by: INTERNAL MEDICINE

## 2017-10-24 PROCEDURE — 85610 PROTHROMBIN TIME: CPT | Mod: QW,S$GLB,,

## 2017-10-24 PROCEDURE — 92014 COMPRE OPH EXAM EST PT 1/>: CPT | Mod: S$GLB,,, | Performed by: OPHTHALMOLOGY

## 2017-10-24 RX ORDER — BUPROPION HYDROCHLORIDE 150 MG/1
150 TABLET, EXTENDED RELEASE ORAL 2 TIMES DAILY
Qty: 60 TABLET | Refills: 11 | Status: SHIPPED | OUTPATIENT
Start: 2017-10-24 | End: 2018-06-25

## 2017-10-24 NOTE — PROGRESS NOTES
INR is now therapeutic. This is a quick follow-up after a sub-therapeutic INR on 10.3. Continue dose and diet until follow-up. Repeat INR in 4 weeks.

## 2017-10-24 NOTE — PROGRESS NOTES
"HPI:  Patient is a 77-year-old man who comes in today with continued complaints of no energy, fatigue.  He's been seen by multiple specialists in no Etiology found.  4 weeks ago.  He was started on Wellbutrin on a low dose.  He's not seen any benefit.  He has tolerated medication without difficulty.  He has a very depressive affect    Current meds have been verified and updated per the EMR  Exam:BP (!) 122/90 (BP Location: Right arm)   Pulse 84   Temp 98.6 °F (37 °C) (Tympanic)   Ht 5' 10" (1.778 m)   Wt 100.1 kg (220 lb 10.9 oz)   SpO2 98%   BMI 31.66 kg/m²   Exam deferred    Lab Results   Component Value Date    WBC 6.92 08/28/2017    HGB 14.7 08/28/2017    HCT 42.9 08/28/2017     08/28/2017    CHOL 178 09/13/2017    TRIG 118 09/13/2017    HDL 47 09/13/2017    ALT 39 09/13/2017    AST 32 09/13/2017     09/13/2017    K 4.4 09/13/2017     09/13/2017    CREATININE 0.9 09/13/2017    BUN 23 09/13/2017    CO2 27 09/13/2017    TSH 1.226 09/11/2017    PSA <0.010 08/20/2013    INR 2.7 10/24/2017       Impression:  Depression  Patient Active Problem List   Diagnosis    Pure hypercholesterolemia    Atrial fibrillation    Coronary atherosclerosis    Essential hypertension    History of prostate cancer    Abnormal CXR    Refractive error    Family history of macular degeneration    Posterior capsular opacification    Pseudophakia of both eyes    History of colon polyps    Diverticulosis of large intestine without hemorrhage    Current use of long term anticoagulation    Pulmonary hypertension, mild    Impaired fasting glucose    SHORTY (obstructive sleep apnea)    Psychophysiological insomnia    Inadequate sleep hygiene       Plan:  Orders Placed This Encounter    buPROPion (WELLBUTRIN SR) 150 MG TBSR 12 hr tablet     Given that he is only been on the Wellbutrin at low dose for 4 weeks, I would prefer increasing to 150 mg twice a day.  I want to see him back in 4 weeks.  If he is not " any better at that point that I would change him to Effexor.

## 2017-10-24 NOTE — PROGRESS NOTES
SUBJECTIVE:   [place] DEJA Biggs Jr. is a 77 y.o. male   Uncorrected distance visual acuity was 20/40 -2 in the right eye and 20/40 in the left eye.   Chief Complaint   Patient presents with    Annual Exam     here for yearly dfe pt states no complaints does not want any RX va is good        HPI:  HPI     Annual Exam    Additional comments: here for yearly dfe pt states no complaints does not   want any RX va is good           Comments   1. Restor IOL OD 12/07 (no glasses at all)  2. Restor IOL OS 11/07  3. S/p Yag OS 7/08  4. Mild CF OD  5. +FHx AMD       Last edited by Vidhya Calderon MA on 10/24/2017  8:10 AM. (History)        Assessment /Plan :  1. Pseudophakia of both eyes stable   2. Family history of macular degeneration no disease now     RTC in 1 year or prn any changes

## 2017-10-25 ENCOUNTER — OFFICE VISIT (OUTPATIENT)
Dept: CARDIOLOGY | Facility: CLINIC | Age: 77
End: 2017-10-25
Payer: MEDICARE

## 2017-10-25 VITALS
BODY MASS INDEX: 31.28 KG/M2 | SYSTOLIC BLOOD PRESSURE: 112 MMHG | HEIGHT: 70 IN | HEART RATE: 60 BPM | DIASTOLIC BLOOD PRESSURE: 70 MMHG | WEIGHT: 218.5 LBS

## 2017-10-25 DIAGNOSIS — E78.00 PURE HYPERCHOLESTEROLEMIA: Primary | ICD-10-CM

## 2017-10-25 DIAGNOSIS — I27.20 PULMONARY HYPERTENSION, MILD: ICD-10-CM

## 2017-10-25 DIAGNOSIS — I25.10 ATHEROSCLEROSIS OF NATIVE CORONARY ARTERY WITHOUT ANGINA PECTORIS, UNSPECIFIED WHETHER NATIVE OR TRANSPLANTED HEART: ICD-10-CM

## 2017-10-25 DIAGNOSIS — F33.1 MODERATE EPISODE OF RECURRENT MAJOR DEPRESSIVE DISORDER: ICD-10-CM

## 2017-10-25 DIAGNOSIS — I48.0 PAROXYSMAL ATRIAL FIBRILLATION: ICD-10-CM

## 2017-10-25 DIAGNOSIS — I10 ESSENTIAL HYPERTENSION: ICD-10-CM

## 2017-10-25 PROCEDURE — 99999 PR PBB SHADOW E&M-EST. PATIENT-LVL III: CPT | Mod: PBBFAC,,, | Performed by: INTERNAL MEDICINE

## 2017-10-25 PROCEDURE — 99214 OFFICE O/P EST MOD 30 MIN: CPT | Mod: S$GLB,,, | Performed by: INTERNAL MEDICINE

## 2017-10-25 PROCEDURE — 99499 UNLISTED E&M SERVICE: CPT | Mod: S$GLB,,, | Performed by: INTERNAL MEDICINE

## 2017-10-25 RX ORDER — DILTIAZEM HYDROCHLORIDE EXTENDED-RELEASE TABLETS 120 MG/1
120 TABLET, EXTENDED RELEASE ORAL DAILY
Qty: 30 TABLET | Refills: 11 | Status: SHIPPED | OUTPATIENT
Start: 2017-10-25 | End: 2017-11-02 | Stop reason: ALTCHOICE

## 2017-10-25 NOTE — PROGRESS NOTES
Subjective:   Patient ID:  Anthony Biggs Jr. is a 77 y.o. male who presents for follow-up of Atrial Fibrillation; Hypertension; Hyperlipidemia; and Hospital Follow Up (still having symtoms after angiogram)  Pt with fatigue.Patient denies CP, angina or anginal equivalent.Lasix and K not helping sx.     Hypertension   This is a chronic problem. The current episode started more than 1 year ago. The problem has been gradually improving since onset. The problem is controlled. Pertinent negatives include no chest pain, palpitations or shortness of breath. Past treatments include beta blockers, calcium channel blockers, diuretics and angiotensin blockers. The current treatment provides moderate improvement. Compliance problems include medication side effects.    Hyperlipidemia   This is a chronic problem. The current episode started more than 1 year ago. The problem is controlled. Pertinent negatives include no chest pain or shortness of breath. Current antihyperlipidemic treatment includes statins. The current treatment provides moderate improvement of lipids. Compliance problems include medication side effects.    Coronary Artery Disease   Presents for follow-up visit. Pertinent negatives include no chest pain, chest pressure, chest tightness, dizziness, leg swelling, muscle weakness, palpitations, shortness of breath or weight gain. Risk factors include hyperlipidemia. The symptoms have been stable. Compliance with diet is variable. Compliance with exercise is variable. Compliance with medications is good.       Review of Systems   Constitution: Negative. Negative for weight gain.   HENT: Negative.    Eyes: Negative.    Cardiovascular: Negative.  Negative for chest pain, leg swelling and palpitations.   Respiratory: Negative.  Negative for chest tightness and shortness of breath.    Endocrine: Negative.    Hematologic/Lymphatic: Negative.    Skin: Negative.    Musculoskeletal: Negative for muscle weakness.    Gastrointestinal: Negative.    Genitourinary: Negative.    Neurological: Negative.  Negative for dizziness.   Psychiatric/Behavioral: Negative.    Allergic/Immunologic: Negative.      Family History   Problem Relation Age of Onset    Heart disease Mother     Cancer Father      lung    Macular degeneration Sister     Diabetes Sister     Heart disease Sister     Strabismus Neg Hx     Retinal detachment Neg Hx     Glaucoma Neg Hx     Blindness Neg Hx     Amblyopia Neg Hx      Past Medical History:   Diagnosis Date    *Atrial fibrillation     Abnormal CXR     Angina pectoris 8/28/2017    Atrial fibrillation     Coronary atherosclerosis of unspecified type of vessel, native or graft     Depression     Emphysema of lung     History of prostate cancer 2007    prostatectomy    Impaired fasting glucose     SHORTY (obstructive sleep apnea)     Prostate cancer     Pure hypercholesterolemia     Trouble in sleeping     Unspecified essential hypertension     Urinary incontinence      Current Outpatient Prescriptions on File Prior to Visit   Medication Sig Dispense Refill    amlodipine (NORVASC) 2.5 MG tablet Take 1 tablet (2.5 mg total) by mouth once daily. 90 tablet 3    aspirin (ECOTRIN) 81 MG EC tablet Take 81 mg by mouth once daily.      atorvastatin (LIPITOR) 40 MG tablet Take 1 tablet (40 mg total) by mouth once daily. 30 tablet 6    buPROPion (WELLBUTRIN SR) 150 MG TBSR 12 hr tablet Take 1 tablet (150 mg total) by mouth 2 (two) times daily. 60 tablet 11    doxepin (SINEQUAN) 10 MG capsule Take 1 capsule (10 mg total) by mouth every evening. 30 capsule 11    furosemide (LASIX) 40 MG tablet Take 1 tablet (40 mg total) by mouth once daily. 30 tablet 11    metoprolol succinate (TOPROL-XL) 25 MG 24 hr tablet Take 1 tablet (25 mg total) by mouth 2 (two) times daily. 60 tablet 3    potassium chloride SA (K-DUR,KLOR-CON) 20 MEQ tablet Take 1 tablet (20 mEq total) by mouth 2 (two) times daily. 30  tablet 6    valsartan (DIOVAN) 160 MG tablet Take 1 tablet (160 mg total) by mouth once daily. 30 tablet 11    warfarin (COUMADIN) 5 MG tablet Take 1/2 tablet on Thursdays and 1 tablet all other days as directed by the [facility]. 30 tablet 3     No current facility-administered medications on file prior to visit.      Review of patient's allergies indicates:  No Known Allergies    Objective:     Physical Exam   Constitutional: He is oriented to person, place, and time. He appears well-developed and well-nourished.   HENT:   Head: Normocephalic and atraumatic.   Eyes: Conjunctivae are normal. Pupils are equal, round, and reactive to light.   Neck: Normal range of motion. Neck supple.   Cardiovascular: Normal rate, regular rhythm, normal heart sounds and intact distal pulses.    Pulmonary/Chest: Effort normal and breath sounds normal.   Abdominal: Soft. Bowel sounds are normal.   Neurological: He is alert and oriented to person, place, and time.   Skin: Skin is warm and dry.   Psychiatric: He has a normal mood and affect.   Nursing note and vitals reviewed.      Assessment:     1. Pure hypercholesterolemia    2. Paroxysmal atrial fibrillation    3. Atherosclerosis of native coronary artery without angina pectoris, unspecified whether native or transplanted heart    4. Essential hypertension    5. Pulmonary hypertension, mild    6. Moderate episode of recurrent major depressive disorder        Plan:     Pure hypercholesterolemia    Paroxysmal atrial fibrillation    Atherosclerosis of native coronary artery without angina pectoris, unspecified whether native or transplanted heart  -     Cardiology Lab ROBYN Resting, Lower Extremities; Future    Essential hypertension    Pulmonary hypertension, mild    Moderate episode of recurrent major depressive disorder      Stop metoprolol- fatigue  Start Diltiazem , stop norvasc  Vascular studies  Continue coumadin- afib  Continue valsartan, norvasc-htn  Continue statin-hlp

## 2017-11-02 RX ORDER — DILTIAZEM HYDROCHLORIDE 120 MG/1
120 CAPSULE, EXTENDED RELEASE ORAL DAILY
Qty: 90 CAPSULE | Refills: 0 | Status: SHIPPED | OUTPATIENT
Start: 2017-11-02 | End: 2018-01-28 | Stop reason: SDUPTHER

## 2017-11-02 RX ORDER — DILTIAZEM HYDROCHLORIDE 120 MG/1
120 CAPSULE, EXTENDED RELEASE ORAL DAILY
Status: CANCELLED | OUTPATIENT
Start: 2017-11-02

## 2017-11-02 RX ORDER — DILTIAZEM HYDROCHLORIDE 120 MG/1
120 CAPSULE, EXTENDED RELEASE ORAL DAILY
COMMUNITY
End: 2017-11-02 | Stop reason: SDUPTHER

## 2017-11-02 NOTE — TELEPHONE ENCOUNTER
----- Message from Lashon Casiano sent at 11/2/2017  9:38 AM CDT -----  Contact: pt   Pt calling about the medication and the pharmacy sent a request in and it's been a week,, please call pt back [phone]

## 2017-11-07 ENCOUNTER — TELEPHONE (OUTPATIENT)
Dept: CARDIOLOGY | Facility: CLINIC | Age: 77
End: 2017-11-07

## 2017-11-07 NOTE — TELEPHONE ENCOUNTER
----- Message from Rocio Cardoza sent at 11/7/2017 10:43 AM CST -----  Contact: wife  request a call concerning to discus the change in the pt medication, pt can be reached at  [phone]///[id number]

## 2017-11-16 ENCOUNTER — OFFICE VISIT (OUTPATIENT)
Dept: INTERNAL MEDICINE | Facility: CLINIC | Age: 77
End: 2017-11-16
Payer: MEDICARE

## 2017-11-16 VITALS
BODY MASS INDEX: 34.26 KG/M2 | HEART RATE: 84 BPM | DIASTOLIC BLOOD PRESSURE: 86 MMHG | OXYGEN SATURATION: 99 % | HEIGHT: 67 IN | SYSTOLIC BLOOD PRESSURE: 126 MMHG | TEMPERATURE: 99 F | WEIGHT: 218.25 LBS

## 2017-11-16 DIAGNOSIS — I10 ESSENTIAL HYPERTENSION: ICD-10-CM

## 2017-11-16 DIAGNOSIS — F33.1 MODERATE EPISODE OF RECURRENT MAJOR DEPRESSIVE DISORDER: Primary | ICD-10-CM

## 2017-11-16 PROCEDURE — 99213 OFFICE O/P EST LOW 20 MIN: CPT | Mod: S$GLB,,, | Performed by: INTERNAL MEDICINE

## 2017-11-16 PROCEDURE — 99999 PR PBB SHADOW E&M-EST. PATIENT-LVL III: CPT | Mod: PBBFAC,,, | Performed by: INTERNAL MEDICINE

## 2017-11-16 PROCEDURE — 99499 UNLISTED E&M SERVICE: CPT | Mod: S$GLB,,, | Performed by: INTERNAL MEDICINE

## 2017-11-16 RX ORDER — TALC
1 POWDER (GRAM) TOPICAL NIGHTLY
COMMUNITY

## 2017-11-21 ENCOUNTER — CLINICAL SUPPORT (OUTPATIENT)
Dept: CARDIOLOGY | Facility: CLINIC | Age: 77
End: 2017-11-21
Payer: MEDICARE

## 2017-11-21 ENCOUNTER — ANTI-COAG VISIT (OUTPATIENT)
Dept: CARDIOLOGY | Facility: CLINIC | Age: 77
End: 2017-11-21
Payer: MEDICARE

## 2017-11-21 DIAGNOSIS — Z79.01 LONG-TERM (CURRENT) USE OF ANTICOAGULANTS: Primary | ICD-10-CM

## 2017-11-21 DIAGNOSIS — I25.10 ATHEROSCLEROSIS OF NATIVE CORONARY ARTERY WITHOUT ANGINA PECTORIS, UNSPECIFIED WHETHER NATIVE OR TRANSPLANTED HEART: ICD-10-CM

## 2017-11-21 LAB
INR PPP: 2.2 (ref 2–3)
VASCULAR ANKLE BRACHIAL INDEX (ABI) RIGHT: 1.01 (ref 0.9–1.2)

## 2017-11-21 PROCEDURE — 85610 PROTHROMBIN TIME: CPT | Mod: QW,S$GLB,,

## 2017-11-21 PROCEDURE — 93922 UPR/L XTREMITY ART 2 LEVELS: CPT | Mod: S$GLB,,, | Performed by: INTERNAL MEDICINE

## 2017-11-21 PROCEDURE — 99211 OFF/OP EST MAY X REQ PHY/QHP: CPT | Mod: 25,S$GLB,,

## 2017-11-21 NOTE — PROGRESS NOTES
INR remains therapeutic. Changes in medications related to-- metoprolol and norvasc discontinued and now on Diltiazem. Will continue current dose and diet until follow-up. Patient reports no bleeding or bruising.  I reminded the patient to call with any problems, changes or questions before the next visit.

## 2017-12-12 ENCOUNTER — PATIENT OUTREACH (OUTPATIENT)
Dept: ADMINISTRATIVE | Facility: HOSPITAL | Age: 77
End: 2017-12-12

## 2017-12-19 ENCOUNTER — ANTI-COAG VISIT (OUTPATIENT)
Dept: CARDIOLOGY | Facility: CLINIC | Age: 77
End: 2017-12-19
Payer: MEDICARE

## 2017-12-19 ENCOUNTER — LAB VISIT (OUTPATIENT)
Dept: LAB | Facility: HOSPITAL | Age: 77
End: 2017-12-19
Attending: INTERNAL MEDICINE
Payer: MEDICARE

## 2017-12-19 DIAGNOSIS — Z79.01 LONG-TERM (CURRENT) USE OF ANTICOAGULANTS: Primary | ICD-10-CM

## 2017-12-19 DIAGNOSIS — R73.01 IFG (IMPAIRED FASTING GLUCOSE): ICD-10-CM

## 2017-12-19 LAB
ANION GAP SERPL CALC-SCNC: 6 MMOL/L
BUN SERPL-MCNC: 14 MG/DL
CALCIUM SERPL-MCNC: 10 MG/DL
CHLORIDE SERPL-SCNC: 102 MMOL/L
CO2 SERPL-SCNC: 27 MMOL/L
CREAT SERPL-MCNC: 0.9 MG/DL
EST. GFR  (AFRICAN AMERICAN): >60 ML/MIN/1.73 M^2
EST. GFR  (NON AFRICAN AMERICAN): >60 ML/MIN/1.73 M^2
ESTIMATED AVG GLUCOSE: 128 MG/DL
GLUCOSE SERPL-MCNC: 106 MG/DL
HBA1C MFR BLD HPLC: 6.1 %
INR PPP: 1.8 (ref 2–3)
POTASSIUM SERPL-SCNC: 4.5 MMOL/L
SODIUM SERPL-SCNC: 135 MMOL/L

## 2017-12-19 PROCEDURE — 99211 OFF/OP EST MAY X REQ PHY/QHP: CPT | Mod: 25,S$GLB,,

## 2017-12-19 PROCEDURE — 80048 BASIC METABOLIC PNL TOTAL CA: CPT

## 2017-12-19 PROCEDURE — 36415 COLL VENOUS BLD VENIPUNCTURE: CPT | Mod: PO

## 2017-12-19 PROCEDURE — 85610 PROTHROMBIN TIME: CPT | Mod: QW,S$GLB,,

## 2017-12-19 PROCEDURE — 83036 HEMOGLOBIN GLYCOSYLATED A1C: CPT

## 2017-12-19 RX ORDER — WARFARIN SODIUM 5 MG/1
TABLET ORAL
Qty: 30 TABLET | Refills: 3 | Status: SHIPPED | OUTPATIENT
Start: 2017-12-19 | End: 2017-12-26 | Stop reason: SDUPTHER

## 2017-12-19 NOTE — PROGRESS NOTES
INR is slightly sub-therapeutic. Patient confirms compliance. Denies changes. Will gently increase total weekly dose. Repeat INR in 4 weeks. Patient reports no bleeding or bruising, no new medications and no diet changes.  I reminded the patient to call with any problems, changes or questions before the next visit.

## 2017-12-26 ENCOUNTER — OFFICE VISIT (OUTPATIENT)
Dept: INTERNAL MEDICINE | Facility: CLINIC | Age: 77
End: 2017-12-26
Payer: MEDICARE

## 2017-12-26 VITALS
TEMPERATURE: 99 F | HEART RATE: 81 BPM | BODY MASS INDEX: 34.57 KG/M2 | WEIGHT: 220.25 LBS | HEIGHT: 67 IN | SYSTOLIC BLOOD PRESSURE: 132 MMHG | OXYGEN SATURATION: 98 % | DIASTOLIC BLOOD PRESSURE: 84 MMHG

## 2017-12-26 DIAGNOSIS — I27.20 PULMONARY HYPERTENSION, MILD: ICD-10-CM

## 2017-12-26 DIAGNOSIS — R73.01 IMPAIRED FASTING GLUCOSE: ICD-10-CM

## 2017-12-26 DIAGNOSIS — Z79.01 CURRENT USE OF LONG TERM ANTICOAGULATION: ICD-10-CM

## 2017-12-26 DIAGNOSIS — E78.00 PURE HYPERCHOLESTEROLEMIA: ICD-10-CM

## 2017-12-26 DIAGNOSIS — I10 ESSENTIAL HYPERTENSION: Primary | ICD-10-CM

## 2017-12-26 DIAGNOSIS — Z79.01 LONG-TERM (CURRENT) USE OF ANTICOAGULANTS: ICD-10-CM

## 2017-12-26 DIAGNOSIS — F33.1 MODERATE EPISODE OF RECURRENT MAJOR DEPRESSIVE DISORDER: ICD-10-CM

## 2017-12-26 PROCEDURE — 99214 OFFICE O/P EST MOD 30 MIN: CPT | Mod: S$GLB,,, | Performed by: NURSE PRACTITIONER

## 2017-12-26 PROCEDURE — 99999 PR PBB SHADOW E&M-EST. PATIENT-LVL III: CPT | Mod: PBBFAC,,, | Performed by: NURSE PRACTITIONER

## 2017-12-26 PROCEDURE — 99499 UNLISTED E&M SERVICE: CPT | Mod: S$GLB,,, | Performed by: NURSE PRACTITIONER

## 2017-12-26 RX ORDER — WARFARIN SODIUM 5 MG/1
TABLET ORAL
Qty: 30 TABLET | Refills: 3
Start: 2017-12-26 | End: 2018-02-01 | Stop reason: SDUPTHER

## 2017-12-26 NOTE — PROGRESS NOTES
Subjective:      Patient ID: Anthony Biggs Jr. is a 77 y.o. male.    Chief Complaint: Follow-up (3 month)    HPI:  Patient is here for a follow up of his labs.  He is taking his meds as ordered.  No complaints today.  Says he is doing better with his energy level.  Is thinking about starting to exercise at the gym again. Denies any low blood sugars, says readings have been a little elevated with the holidays.    Past Medical History:   Diagnosis Date    *Atrial fibrillation     Abnormal CXR     Angina pectoris 8/28/2017    Atrial fibrillation     Coronary atherosclerosis of unspecified type of vessel, native or graft     Depression     Emphysema of lung     History of prostate cancer 2007    prostatectomy    Impaired fasting glucose     SHORTY (obstructive sleep apnea)     Prostate cancer     Pure hypercholesterolemia     Trouble in sleeping     Unspecified essential hypertension     Urinary incontinence        Past Surgical History:   Procedure Laterality Date    ABDOMINAL HERNIA REPAIR      APPENDECTOMY      bladder sx      CARDIAC CATHETERIZATION      CATARACT EXTRACTION W/  INTRAOCULAR LENS IMPLANT  Restor OU    COLONOSCOPY N/A 5/16/2017    Procedure: COLONOSCOPY;  Surgeon: Alfredo Naidu MD;  Location: [facility];  Service: Endoscopy;  Laterality: N/A;    inguinal hernia      lung sx      PROSTATE SURGERY         Lab Results   Component Value Date    WBC 6.92 08/28/2017    HGB 14.7 08/28/2017    HCT 42.9 08/28/2017     08/28/2017    CHOL 178 09/13/2017    TRIG 118 09/13/2017    HDL 47 09/13/2017    ALT 39 09/13/2017    AST 32 09/13/2017     (L) 12/19/2017    K 4.5 12/19/2017     12/19/2017    CREATININE 0.9 12/19/2017    BUN 14 12/19/2017    CO2 27 12/19/2017    TSH 1.226 09/11/2017    PSA <0.010 08/20/2013    INR 1.8 (A) 12/19/2017    HGBA1C 6.1 (H) 12/19/2017       /84 (BP Location: Left arm, Patient Position: Sitting, BP Method: Medium (Manual))   Pulse 81    "Temp 98.5 °F (36.9 °C) (Tympanic)   Ht 5' 7" (1.702 m)   Wt 99.9 kg (220 lb 3.8 oz)   SpO2 98%   BMI 34.49 kg/m²       Review of Systems   Constitutional: Negative for appetite change, chills, diaphoresis and fever.   HENT: Negative for congestion, ear pain, postnasal drip, rhinorrhea, sneezing, sore throat and trouble swallowing.    Eyes: Negative for photophobia, pain and visual disturbance.   Respiratory: Negative for apnea, cough, choking, chest tightness, shortness of breath and wheezing.    Cardiovascular: Negative for chest pain, palpitations and leg swelling.   Gastrointestinal: Negative for abdominal pain, constipation, diarrhea, nausea and vomiting.   Genitourinary: Negative for decreased urine volume, difficulty urinating, dysuria, hematuria and urgency.   Musculoskeletal: Negative for arthralgias, gait problem, joint swelling and myalgias.   Skin: Negative for rash.   Neurological: Negative for dizziness, tremors, seizures, syncope, weakness, light-headedness, numbness and headaches.   Psychiatric/Behavioral: Negative for agitation, confusion, decreased concentration, hallucinations and sleep disturbance. The patient is not nervous/anxious.       Objective:     Physical Exam   Constitutional: He is oriented to person, place, and time. He appears well-developed and well-nourished. No distress.   Musculoskeletal:   Normal gait   Neurological: He is alert and oriented to person, place, and time.   Skin: Skin is warm and dry.   Psychiatric: He has a normal mood and affect. His behavior is normal.     Assessment:      1. Essential hypertension    2. Pure hypercholesterolemia    3. Pulmonary hypertension, mild    4. Impaired fasting glucose    5. Moderate episode of recurrent major depressive disorder    6. Current use of long term anticoagulation      Plan:   Essential hypertension  -     Comprehensive metabolic panel; Future; Expected date: 06/24/2018    Pure hypercholesterolemia  -     Lipid panel; " Future; Expected date: 06/24/2018    Pulmonary hypertension, mild    Impaired fasting glucose  -     Hemoglobin A1c; Future; Expected date: 06/24/2018    Moderate episode of recurrent major depressive disorder    Current use of long term anticoagulation    his INR is 1.8 from 12/19/17, but says it was changed to 5mg daily on that day per coumadin clinic.  Is not due for a recheck until 1/16/17.   I will contact the coumadin clinic today.   Otherwise will see dr Moran for labs and a physical in 6 months    Current Outpatient Prescriptions:     aspirin (ECOTRIN) 81 MG EC tablet, Take 81 mg by mouth once daily., Disp: , Rfl:     atorvastatin (LIPITOR) 40 MG tablet, Take 1 tablet (40 mg total) by mouth once daily., Disp: 30 tablet, Rfl: 6    buPROPion (WELLBUTRIN SR) 150 MG TBSR 12 hr tablet, Take 1 tablet (150 mg total) by mouth 2 (two) times daily., Disp: 60 tablet, Rfl: 11    diltiaZEM HCl (TIAZAC) 120 mg 24 hr capsule, Take 1 capsule (120 mg total) by mouth once daily., Disp: 90 capsule, Rfl: 0    doxepin (SINEQUAN) 10 MG capsule, Take 1 capsule (10 mg total) by mouth every evening., Disp: 30 capsule, Rfl: 11    melatonin 3 mg Tab, Take 1 tablet by mouth nightly., Disp: , Rfl:     valsartan (DIOVAN) 160 MG tablet, Take 1 tablet (160 mg total) by mouth once daily., Disp: 30 tablet, Rfl: 11    warfarin (COUMADIN) 5 MG tablet, Take 1/2 tablet on Thursdays and 1 tablet all other days as directed by the [facility]madin Clinic., Disp: 30 tablet, Rfl: 3

## 2017-12-28 ENCOUNTER — ANTI-COAG VISIT (OUTPATIENT)
Dept: CARDIOLOGY | Facility: CLINIC | Age: 77
End: 2017-12-28
Payer: MEDICARE

## 2017-12-28 DIAGNOSIS — Z79.01 LONG-TERM (CURRENT) USE OF ANTICOAGULANTS: Primary | ICD-10-CM

## 2017-12-28 LAB — INR PPP: 1.7 (ref 2–3)

## 2017-12-28 PROCEDURE — 85610 PROTHROMBIN TIME: CPT | Mod: QW,S$GLB,,

## 2017-12-28 PROCEDURE — 99211 OFF/OP EST MAY X REQ PHY/QHP: CPT | Mod: 25,S$GLB,,

## 2017-12-28 NOTE — PROGRESS NOTES
INR remains sub-therapeutic. Will boost this week's dose then begin 5mg daily until follow-up. Repeat INR in 2 weeks. Patient denies missed doses but has been taking the medication at different times of the day. Educated on same time every day.

## 2018-01-03 ENCOUNTER — OFFICE VISIT (OUTPATIENT)
Dept: INTERNAL MEDICINE | Facility: CLINIC | Age: 78
End: 2018-01-03
Payer: MEDICARE

## 2018-01-03 VITALS
DIASTOLIC BLOOD PRESSURE: 74 MMHG | BODY MASS INDEX: 34.84 KG/M2 | HEART RATE: 94 BPM | TEMPERATURE: 99 F | SYSTOLIC BLOOD PRESSURE: 122 MMHG | HEIGHT: 67 IN | OXYGEN SATURATION: 97 % | WEIGHT: 222 LBS

## 2018-01-03 DIAGNOSIS — J06.9 UPPER RESPIRATORY TRACT INFECTION, UNSPECIFIED TYPE: Primary | ICD-10-CM

## 2018-01-03 PROCEDURE — 99999 PR PBB SHADOW E&M-EST. PATIENT-LVL IV: CPT | Mod: PBBFAC,,, | Performed by: INTERNAL MEDICINE

## 2018-01-03 PROCEDURE — 99213 OFFICE O/P EST LOW 20 MIN: CPT | Mod: 25,S$GLB,, | Performed by: INTERNAL MEDICINE

## 2018-01-03 PROCEDURE — 96372 THER/PROPH/DIAG INJ SC/IM: CPT | Mod: S$GLB,,, | Performed by: INTERNAL MEDICINE

## 2018-01-03 PROCEDURE — 99499 UNLISTED E&M SERVICE: CPT | Mod: S$GLB,,, | Performed by: INTERNAL MEDICINE

## 2018-01-03 RX ORDER — HYDROCODONE BITARTRATE AND HOMATROPINE METHYLBROMIDE ORAL SOLUTION 5; 1.5 MG/5ML; MG/5ML
5 LIQUID ORAL EVERY 4 HOURS PRN
Qty: 240 ML | Refills: 0 | Status: SHIPPED | OUTPATIENT
Start: 2018-01-03 | End: 2018-01-13

## 2018-01-03 RX ORDER — METHYLPREDNISOLONE ACETATE 80 MG/ML
80 INJECTION, SUSPENSION INTRA-ARTICULAR; INTRALESIONAL; INTRAMUSCULAR; SOFT TISSUE
Status: COMPLETED | OUTPATIENT
Start: 2018-01-03 | End: 2018-01-03

## 2018-01-03 RX ADMIN — METHYLPREDNISOLONE ACETATE 80 MG: 80 INJECTION, SUSPENSION INTRA-ARTICULAR; INTRALESIONAL; INTRAMUSCULAR; SOFT TISSUE at 08:01

## 2018-01-03 NOTE — PROGRESS NOTES
"HPI:  Patient is a 77-year-old man who comes today with complaints of chest congestion for the last 2 days.  He states he's bringing up clear sputum.  He denies any fever, chills, sweats.  He is been taken some old cough syrup.    Current meds have been verified and updated per the EMR  Exam:/74 (BP Location: Right arm, Patient Position: Sitting, BP Method: Medium (Manual))   Pulse 94   Temp 98.8 °F (37.1 °C) (Tympanic)   Ht 5' 7" (1.702 m)   Wt 100.7 kg (222 lb 0.1 oz)   SpO2 97%   BMI 34.77 kg/m²   Throat normal.  Neck supple.  Chest is clear    Lab Results   Component Value Date    WBC 6.92 08/28/2017    HGB 14.7 08/28/2017    HCT 42.9 08/28/2017     08/28/2017    CHOL 178 09/13/2017    TRIG 118 09/13/2017    HDL 47 09/13/2017    ALT 39 09/13/2017    AST 32 09/13/2017     (L) 12/19/2017    K 4.5 12/19/2017     12/19/2017    CREATININE 0.9 12/19/2017    BUN 14 12/19/2017    CO2 27 12/19/2017    TSH 1.226 09/11/2017    PSA <0.010 08/20/2013    INR 1.7 (A) 12/28/2017    HGBA1C 6.1 (H) 12/19/2017       Impression:  Viral upper respiratory illness  Patient Active Problem List   Diagnosis    Pure hypercholesterolemia    Atrial fibrillation    Coronary atherosclerosis    Essential hypertension    History of prostate cancer    Abnormal CXR    Refractive error    Family history of macular degeneration    Posterior capsular opacification    Pseudophakia of both eyes    History of colon polyps    Diverticulosis of large intestine without hemorrhage    Current use of long term anticoagulation    Pulmonary hypertension, mild    Impaired fasting glucose    SHORTY (obstructive sleep apnea)    Depression       Plan:  Orders Placed This Encounter    hydrocodone-homatropine 5-1.5 mg/5 ml (HYCODAN) 5-1.5 mg/5 mL Syrp    methylPREDNISolone acetate injection 80 mg     Patient was given 1 cc Depo-Medrol 80 and Hycodan cough syrup.  He was told take over-the-counter Mucinex DM and to force " fluids

## 2018-01-03 NOTE — PROGRESS NOTES
Depo-Medrol 80 mg/ml IM left ventrogluteal.  Lot# [id number] exp 05/2020 mfg [employer].  Pt advised to wait in clinic 15 minutes to monitor for side effects.  Pt voiced understanding and tolerated injection well.

## 2018-01-09 ENCOUNTER — OFFICE VISIT (OUTPATIENT)
Dept: CARDIOLOGY | Facility: CLINIC | Age: 78
End: 2018-01-09
Payer: MEDICARE

## 2018-01-09 VITALS
HEART RATE: 66 BPM | DIASTOLIC BLOOD PRESSURE: 84 MMHG | HEIGHT: 67 IN | BODY MASS INDEX: 33.3 KG/M2 | SYSTOLIC BLOOD PRESSURE: 132 MMHG | WEIGHT: 212.19 LBS

## 2018-01-09 DIAGNOSIS — I25.10 ATHEROSCLEROSIS OF NATIVE CORONARY ARTERY WITHOUT ANGINA PECTORIS, UNSPECIFIED WHETHER NATIVE OR TRANSPLANTED HEART: ICD-10-CM

## 2018-01-09 DIAGNOSIS — E78.00 PURE HYPERCHOLESTEROLEMIA: Primary | ICD-10-CM

## 2018-01-09 DIAGNOSIS — I48.0 PAROXYSMAL ATRIAL FIBRILLATION: ICD-10-CM

## 2018-01-09 DIAGNOSIS — I10 ESSENTIAL HYPERTENSION: ICD-10-CM

## 2018-01-09 DIAGNOSIS — Z79.01 CURRENT USE OF LONG TERM ANTICOAGULATION: ICD-10-CM

## 2018-01-09 PROCEDURE — 99214 OFFICE O/P EST MOD 30 MIN: CPT | Mod: S$GLB,,, | Performed by: INTERNAL MEDICINE

## 2018-01-09 PROCEDURE — 99999 PR PBB SHADOW E&M-EST. PATIENT-LVL III: CPT | Mod: PBBFAC,,, | Performed by: INTERNAL MEDICINE

## 2018-01-09 PROCEDURE — 99499 UNLISTED E&M SERVICE: CPT | Mod: S$GLB,,, | Performed by: INTERNAL MEDICINE

## 2018-01-09 NOTE — PROGRESS NOTES
Subjective:   Patient ID:  Anthony Biggs Jr. is a 77 y.o. male who presents for follow-up of Results (recent testing); Hyperlipidemia; Hypertension; and Atrial Fibrillation  Fatigue improved off b blockers.Patient denies CP, angina or anginal equivalent.    Hypertension   This is a chronic problem. The current episode started more than 1 year ago. The problem has been gradually improving since onset. The problem is controlled. Pertinent negatives include no chest pain, palpitations or shortness of breath. Past treatments include calcium channel blockers and angiotensin blockers. The current treatment provides moderate improvement. Compliance problems include exercise.    Hyperlipidemia   This is a chronic problem. The current episode started more than 1 year ago. The problem is controlled. Recent lipid tests were reviewed and are variable. Pertinent negatives include no chest pain or shortness of breath. Current antihyperlipidemic treatment includes statins. The current treatment provides moderate improvement of lipids. Compliance problems include adherence to exercise.    Atrial Fibrillation   Presents for follow-up visit. Symptoms are negative for bradycardia, chest pain, dizziness, hypertension, hypotension, palpitations, shortness of breath, syncope, tachycardia and weakness. The symptoms have been stable. Past medical history includes atrial fibrillation and hyperlipidemia. Medication compliance problems include medication side effects.       Review of Systems   Constitution: Negative. Negative for weakness and weight gain.   HENT: Negative.    Eyes: Negative.    Cardiovascular: Negative.  Negative for chest pain, leg swelling, palpitations and syncope.   Respiratory: Negative.  Negative for shortness of breath.    Endocrine: Negative.    Hematologic/Lymphatic: Negative.    Skin: Negative.    Musculoskeletal: Negative for muscle weakness.   Gastrointestinal: Negative.    Genitourinary: Negative.     Neurological: Negative.  Negative for dizziness.   Psychiatric/Behavioral: Negative.    Allergic/Immunologic: Negative.      Family History   Problem Relation Age of Onset    Heart disease Mother     Cancer Father      lung    Macular degeneration Sister     Diabetes Sister     Heart disease Sister     Strabismus Neg Hx     Retinal detachment Neg Hx     Glaucoma Neg Hx     Blindness Neg Hx     Amblyopia Neg Hx      Past Medical History:   Diagnosis Date    *Atrial fibrillation     Abnormal CXR     Angina pectoris 8/28/2017    Atrial fibrillation     Coronary atherosclerosis of unspecified type of vessel, native or graft     Depression     Emphysema of lung     History of prostate cancer 2007    prostatectomy    Impaired fasting glucose     SHORTY (obstructive sleep apnea)     Prostate cancer     Pure hypercholesterolemia     Trouble in sleeping     Unspecified essential hypertension     Urinary incontinence      Current Outpatient Prescriptions on File Prior to Visit   Medication Sig Dispense Refill    aspirin (ECOTRIN) 81 MG EC tablet Take 81 mg by mouth once daily.      atorvastatin (LIPITOR) 40 MG tablet Take 1 tablet (40 mg total) by mouth once daily. 30 tablet 6    buPROPion (WELLBUTRIN SR) 150 MG TBSR 12 hr tablet Take 1 tablet (150 mg total) by mouth 2 (two) times daily. 60 tablet 11    diltiaZEM HCl (TIAZAC) 120 mg 24 hr capsule Take 1 capsule (120 mg total) by mouth once daily. 90 capsule 0    doxepin (SINEQUAN) 10 MG capsule Take 1 capsule (10 mg total) by mouth every evening. 30 capsule 11    hydrocodone-homatropine 5-1.5 mg/5 ml (HYCODAN) 5-1.5 mg/5 mL Syrp Take 5 mLs by mouth every 4 (four) hours as needed. 240 mL 0    melatonin 3 mg Tab Take 1 tablet by mouth nightly.      valsartan (DIOVAN) 160 MG tablet Take 1 tablet (160 mg total) by mouth once daily. 30 tablet 11    warfarin (COUMADIN) 5 MG tablet Take 5 mg daily 30 tablet 3     No current facility-administered  medications on file prior to visit.      Review of patient's allergies indicates:  No Known Allergies    Objective:     Physical Exam   Constitutional: He is oriented to person, place, and time. He appears well-developed and well-nourished.   HENT:   Head: Normocephalic and atraumatic.   Eyes: Conjunctivae are normal. Pupils are equal, round, and reactive to light.   Neck: Normal range of motion. Neck supple.   Cardiovascular: Normal rate, regular rhythm, normal heart sounds and intact distal pulses.    Pulmonary/Chest: Effort normal and breath sounds normal.   Abdominal: Soft. Bowel sounds are normal.   Neurological: He is alert and oriented to person, place, and time.   Skin: Skin is warm and dry.   Psychiatric: He has a normal mood and affect.   Nursing note and vitals reviewed.      Assessment:     1. Pure hypercholesterolemia    2. Paroxysmal atrial fibrillation    3. Atherosclerosis of native coronary artery without angina pectoris, unspecified whether native or transplanted heart    4. Essential hypertension    5. Current use of long term anticoagulation        Plan:     Pure hypercholesterolemia    Paroxysmal atrial fibrillation    Atherosclerosis of native coronary artery without angina pectoris, unspecified whether native or transplanted heart    Essential hypertension    Current use of long term anticoagulation    continue diltiazem, valsartan, -htn  Continue coumadin, diltiazem-PAF  Exercise  Continue statin-hlp

## 2018-01-11 ENCOUNTER — ANTI-COAG VISIT (OUTPATIENT)
Dept: CARDIOLOGY | Facility: CLINIC | Age: 78
End: 2018-01-11
Payer: MEDICARE

## 2018-01-11 DIAGNOSIS — Z79.01 LONG TERM (CURRENT) USE OF ANTICOAGULANTS: Primary | ICD-10-CM

## 2018-01-11 LAB — INR PPP: 1.8 (ref 2–3)

## 2018-01-11 PROCEDURE — 85610 PROTHROMBIN TIME: CPT | Mod: QW,S$GLB,,

## 2018-01-11 PROCEDURE — 99211 OFF/OP EST MAY X REQ PHY/QHP: CPT | Mod: 25,S$GLB,,

## 2018-01-11 NOTE — PROGRESS NOTES
INR remains sub-therapeutic despite dose adjustment. Confirms compliance. Denies changes. Will increase total weekly dose until follow-up.

## 2018-01-29 RX ORDER — DILTIAZEM HYDROCHLORIDE 120 MG/1
120 CAPSULE, EXTENDED RELEASE ORAL DAILY
Qty: 90 CAPSULE | Refills: 0 | Status: SHIPPED | OUTPATIENT
Start: 2018-01-29 | End: 2018-04-26 | Stop reason: SDUPTHER

## 2018-02-01 ENCOUNTER — ANTI-COAG VISIT (OUTPATIENT)
Dept: CARDIOLOGY | Facility: CLINIC | Age: 78
End: 2018-02-01
Payer: MEDICARE

## 2018-02-01 DIAGNOSIS — Z79.01 LONG TERM (CURRENT) USE OF ANTICOAGULANTS: Primary | ICD-10-CM

## 2018-02-01 LAB — INR PPP: 1.9 (ref 2–3)

## 2018-02-01 PROCEDURE — 85610 PROTHROMBIN TIME: CPT | Mod: QW,S$GLB,,

## 2018-02-01 PROCEDURE — 99211 OFF/OP EST MAY X REQ PHY/QHP: CPT | Mod: 25,S$GLB,,

## 2018-02-01 RX ORDER — WARFARIN SODIUM 5 MG/1
TABLET ORAL
Qty: 34 TABLET | Refills: 3 | Status: SHIPPED | OUTPATIENT
Start: 2018-02-01 | End: 2018-06-18 | Stop reason: SDUPTHER

## 2018-02-01 NOTE — PROGRESS NOTES
INR is trending up with dose adjustment but remains slightly sub-therapeutic. Will increase total weekly dose until follow-up. Patient reports no bleeding or bruising, no new medications and no diet changes.  I reminded the patient to call with any problems, changes or questions before the next visit.

## 2018-02-13 RX ORDER — VALSARTAN 160 MG/1
160 TABLET ORAL DAILY
Qty: 30 TABLET | Refills: 10 | Status: SHIPPED | OUTPATIENT
Start: 2018-02-13 | End: 2018-07-26 | Stop reason: RX

## 2018-02-15 ENCOUNTER — PATIENT OUTREACH (OUTPATIENT)
Dept: ADMINISTRATIVE | Facility: HOSPITAL | Age: 78
End: 2018-02-15

## 2018-02-15 ENCOUNTER — OFFICE VISIT (OUTPATIENT)
Dept: INTERNAL MEDICINE | Facility: CLINIC | Age: 78
End: 2018-02-15
Payer: MEDICARE

## 2018-02-15 VITALS
TEMPERATURE: 98 F | SYSTOLIC BLOOD PRESSURE: 124 MMHG | DIASTOLIC BLOOD PRESSURE: 86 MMHG | WEIGHT: 217.81 LBS | HEIGHT: 68 IN | BODY MASS INDEX: 33.01 KG/M2 | HEART RATE: 85 BPM | OXYGEN SATURATION: 98 %

## 2018-02-15 DIAGNOSIS — J06.9 UPPER RESPIRATORY TRACT INFECTION, UNSPECIFIED TYPE: Primary | ICD-10-CM

## 2018-02-15 DIAGNOSIS — R05.9 COUGH: ICD-10-CM

## 2018-02-15 PROCEDURE — 1159F MED LIST DOCD IN RCRD: CPT | Mod: S$GLB,,, | Performed by: FAMILY MEDICINE

## 2018-02-15 PROCEDURE — 1126F AMNT PAIN NOTED NONE PRSNT: CPT | Mod: S$GLB,,, | Performed by: FAMILY MEDICINE

## 2018-02-15 PROCEDURE — 3008F BODY MASS INDEX DOCD: CPT | Mod: S$GLB,,, | Performed by: FAMILY MEDICINE

## 2018-02-15 PROCEDURE — 99213 OFFICE O/P EST LOW 20 MIN: CPT | Mod: S$GLB,,, | Performed by: FAMILY MEDICINE

## 2018-02-15 PROCEDURE — 99999 PR PBB SHADOW E&M-EST. PATIENT-LVL III: CPT | Mod: PBBFAC,,, | Performed by: FAMILY MEDICINE

## 2018-02-15 RX ORDER — PREDNISONE 20 MG/1
40 TABLET ORAL DAILY
Qty: 8 TABLET | Refills: 0 | Status: SHIPPED | OUTPATIENT
Start: 2018-02-15 | End: 2018-02-19

## 2018-02-15 RX ORDER — HYDROCODONE BITARTRATE AND HOMATROPINE METHYLBROMIDE ORAL SOLUTION 5; 1.5 MG/5ML; MG/5ML
5 LIQUID ORAL EVERY 4 HOURS PRN
Qty: 120 ML | Refills: 0 | Status: SHIPPED | OUTPATIENT
Start: 2018-02-15 | End: 2018-07-31

## 2018-02-15 NOTE — PROGRESS NOTES
Subjective:       Patient ID: Anthony Biggs Jr. is a 77 y.o. male.    Chief Complaint: Cough (congestion)      Patient reports he has been congested and coughing for about a week now. Afebrile, no body aches , sore throat.  Had similar symptoms early last month, they had resolved soon after the visit.      Cough   Associated symptoms include postnasal drip and rhinorrhea. Pertinent negatives include no ear pain, fever, myalgias, rash, sore throat, shortness of breath or wheezing.     Review of Systems   Constitutional: Positive for fatigue. Negative for activity change, appetite change and fever.   HENT: Positive for congestion, postnasal drip and rhinorrhea. Negative for ear pain, sinus pressure and sore throat.    Respiratory: Positive for cough. Negative for shortness of breath and wheezing.    Gastrointestinal: Negative for abdominal pain, diarrhea and nausea.   Musculoskeletal: Negative for myalgias.   Skin: Negative for rash.     Past Medical History:   Diagnosis Date    *Atrial fibrillation     Abnormal CXR     Angina pectoris 8/28/2017    Atrial fibrillation     Coronary atherosclerosis of unspecified type of vessel, native or graft     Depression     Emphysema of lung     History of prostate cancer 2007    prostatectomy    Impaired fasting glucose     SHORTY (obstructive sleep apnea)     Prostate cancer     Pure hypercholesterolemia     Trouble in sleeping     Unspecified essential hypertension     Urinary incontinence      Past Surgical History:   Procedure Laterality Date    ABDOMINAL HERNIA REPAIR      APPENDECTOMY      bladder sx      CARDIAC CATHETERIZATION      CATARACT EXTRACTION W/  INTRAOCULAR LENS IMPLANT  Restor OU    COLONOSCOPY N/A 5/16/2017    Procedure: COLONOSCOPY;  Surgeon: Alfredo Naidu MD;  Location: [facility];  Service: Endoscopy;  Laterality: N/A;    inguinal hernia      lung sx      PROSTATE SURGERY       Family History   Problem Relation Age of Onset    Heart  "disease Mother     Cancer Father      lung    Macular degeneration Sister     Diabetes Sister     Heart disease Sister     Strabismus Neg Hx     Retinal detachment Neg Hx     Glaucoma Neg Hx     Blindness Neg Hx     Amblyopia Neg Hx      Social History     Social History    Marital status:      Spouse name: N/A    Number of children: N/A    Years of education: N/A     Occupational History    Not on file.     Social History Main Topics    Smoking status: Former Smoker     Packs/day: 0.50     Years: 40.00     Types: Cigarettes     Quit date: 2002    Smokeless tobacco: Never Used    Alcohol use 0.0 oz/week     5 - 10 Glasses of wine per week    Drug use: No    Sexual activity: No     Other Topics Concern    Not on file     Social History Narrative    No narrative on file     Review of patient's allergies indicates:  No Known Allergies    Objective:       /86 (BP Location: Right arm, Patient Position: Sitting, BP Method: Medium (Manual))   Pulse 85   Temp 98.2 °F (36.8 °C) (Tympanic)   Ht 5' 7.5" (1.715 m)   Wt 98.8 kg (217 lb 13 oz)   SpO2 98%   BMI 33.61 kg/m²   Physical Exam   Constitutional: He is oriented to person, place, and time. He appears well-developed and well-nourished. No distress.   HENT:   Head: Normocephalic.   Right Ear: Hearing, tympanic membrane, external ear and ear canal normal.   Left Ear: Hearing, tympanic membrane, external ear and ear canal normal.   Nose: Mucosal edema present. Right sinus exhibits no maxillary sinus tenderness and no frontal sinus tenderness. Left sinus exhibits no maxillary sinus tenderness and no frontal sinus tenderness.   Mouth/Throat: Uvula is midline and mucous membranes are normal. Posterior oropharyngeal erythema present.   Eyes: Conjunctivae and EOM are normal. Pupils are equal, round, and reactive to light.   Cardiovascular: Normal rate, regular rhythm and normal heart sounds.    Pulmonary/Chest: Effort normal and breath sounds " normal. No respiratory distress.   Abdominal: Soft. Bowel sounds are normal.   Lymphadenopathy:     He has no cervical adenopathy.   Neurological: He is alert and oriented to person, place, and time.   Skin: Skin is warm and dry. He is not diaphoretic.   Psychiatric: He has a normal mood and affect. His behavior is normal.   Nursing note and vitals reviewed.    Assessment:     1. Upper respiratory tract infection, unspecified type    2. Cough      Plan:   Upper respiratory tract infection, unspecified type    Cough    Other orders  -     predniSONE (DELTASONE) 20 MG tablet; Take 2 tablets (40 mg total) by mouth once daily.  Dispense: 8 tablet; Refill: 0  -     hydrocodone-homatropine 5-1.5 mg/5 ml (HYCODAN) 5-1.5 mg/5 mL Syrp; Take 5 mLs by mouth every 4 (four) hours as needed.  Dispense: 120 mL; Refill: 0      Medication List with Changes/Refills   New Medications    HYDROCODONE-HOMATROPINE 5-1.5 MG/5 ML (HYCODAN) 5-1.5 MG/5 ML SYRP    Take 5 mLs by mouth every 4 (four) hours as needed.    PREDNISONE (DELTASONE) 20 MG TABLET    Take 2 tablets (40 mg total) by mouth once daily.   Current Medications    ASPIRIN (ECOTRIN) 81 MG EC TABLET    Take 81 mg by mouth once daily.    ATORVASTATIN (LIPITOR) 40 MG TABLET    Take 1 tablet (40 mg total) by mouth once daily.    BUPROPION (WELLBUTRIN SR) 150 MG TBSR 12 HR TABLET    Take 1 tablet (150 mg total) by mouth 2 (two) times daily.    DILTIAZEM HCL (TIAZAC) 120 MG 24 HR CAPSULE    TAKE 1 CAPSULE (120 MG TOTAL) BY MOUTH ONCE DAILY.    DOXEPIN (SINEQUAN) 10 MG CAPSULE    Take 1 capsule (10 mg total) by mouth every evening.    MELATONIN 3 MG TAB    Take 1 tablet by mouth nightly.    VALSARTAN (DIOVAN) 160 MG TABLET    TAKE 1 TABLET (160 MG TOTAL) BY MOUTH ONCE DAILY.    WARFARIN (COUMADIN) 5 MG TABLET    Take 1.5 tablets Tuesdays, Thursdays, Saturdays and 1 tablet all other days as directed by the coumadin clinic.

## 2018-02-26 DIAGNOSIS — E78.00 PURE HYPERCHOLESTEROLEMIA: ICD-10-CM

## 2018-02-26 RX ORDER — ATORVASTATIN CALCIUM 40 MG/1
TABLET, FILM COATED ORAL
Qty: 30 TABLET | Refills: 5 | Status: SHIPPED | OUTPATIENT
Start: 2018-02-26 | End: 2018-08-25 | Stop reason: SDUPTHER

## 2018-03-01 ENCOUNTER — ANTI-COAG VISIT (OUTPATIENT)
Dept: CARDIOLOGY | Facility: CLINIC | Age: 78
End: 2018-03-01
Payer: MEDICARE

## 2018-03-01 DIAGNOSIS — Z79.01 LONG TERM (CURRENT) USE OF ANTICOAGULANTS: Primary | ICD-10-CM

## 2018-03-01 LAB — INR PPP: 1.6 (ref 2–3)

## 2018-03-01 PROCEDURE — 99211 OFF/OP EST MAY X REQ PHY/QHP: CPT | Mod: 25,S$GLB,,

## 2018-03-01 PROCEDURE — 85610 PROTHROMBIN TIME: CPT | Mod: QW,S$GLB,,

## 2018-03-01 NOTE — PROGRESS NOTES
INR has trended down due to missed dose. Patient takes warfarin in AM therefore will boost tomorrow dose then re-challenge recently adjusted dose until follow-up. Patient reports no bleeding or bruising, no new medications and no diet changes.  I reminded the patient to call with any problems, changes or questions before the next visit.

## 2018-03-15 ENCOUNTER — ANTI-COAG VISIT (OUTPATIENT)
Dept: CARDIOLOGY | Facility: CLINIC | Age: 78
End: 2018-03-15
Payer: MEDICARE

## 2018-03-15 DIAGNOSIS — Z79.01 LONG TERM (CURRENT) USE OF ANTICOAGULANTS: Primary | ICD-10-CM

## 2018-03-15 LAB — INR PPP: 2.5 (ref 2–3)

## 2018-03-15 PROCEDURE — 99211 OFF/OP EST MAY X REQ PHY/QHP: CPT | Mod: 25,S$GLB,,

## 2018-03-15 PROCEDURE — 85610 PROTHROMBIN TIME: CPT | Mod: QW,S$GLB,,

## 2018-03-15 NOTE — PROGRESS NOTES
INR is now therapeutic. This is a quick follow-up after a sub-therapeutic INR on 3/1. Continue dose and diet until follow-up. Repeat INR in 4 weeks.

## 2018-04-09 ENCOUNTER — TELEPHONE (OUTPATIENT)
Dept: CARDIOLOGY | Facility: CLINIC | Age: 78
End: 2018-04-09

## 2018-04-17 ENCOUNTER — ANTI-COAG VISIT (OUTPATIENT)
Dept: CARDIOLOGY | Facility: CLINIC | Age: 78
End: 2018-04-17
Payer: MEDICARE

## 2018-04-17 DIAGNOSIS — Z79.01 LONG TERM (CURRENT) USE OF ANTICOAGULANTS: Primary | ICD-10-CM

## 2018-04-17 LAB — INR PPP: 2.3 (ref 2–3)

## 2018-04-17 PROCEDURE — 85610 PROTHROMBIN TIME: CPT | Mod: QW,S$GLB,, | Performed by: NUCLEAR MEDICINE

## 2018-04-17 NOTE — PROGRESS NOTES
Patient's INR is therapeutic at 2.3.  No changes in dosage.  Recheck in 1 month.  Please call should you have any questions or concerns at [phone] or [phone].

## 2018-04-26 RX ORDER — DILTIAZEM HYDROCHLORIDE 120 MG/1
120 CAPSULE, EXTENDED RELEASE ORAL DAILY
Qty: 90 CAPSULE | Refills: 0 | Status: SHIPPED | OUTPATIENT
Start: 2018-04-26 | End: 2018-08-03 | Stop reason: SDUPTHER

## 2018-05-15 ENCOUNTER — ANTI-COAG VISIT (OUTPATIENT)
Dept: CARDIOLOGY | Facility: CLINIC | Age: 78
End: 2018-05-15
Payer: MEDICARE

## 2018-05-15 DIAGNOSIS — Z79.01 LONG TERM (CURRENT) USE OF ANTICOAGULANTS: Primary | ICD-10-CM

## 2018-05-15 LAB — INR PPP: 2.6 (ref 2–3)

## 2018-05-15 PROCEDURE — 85610 PROTHROMBIN TIME: CPT | Mod: QW,S$GLB,, | Performed by: NUCLEAR MEDICINE

## 2018-05-15 NOTE — PROGRESS NOTES
Patient's INR is therapeutic at 2.6.  No changes in dose.  Recheck in 1 month.  Please call should you have any questions or concerns at [phone] or [phone].

## 2018-06-11 ENCOUNTER — PATIENT OUTREACH (OUTPATIENT)
Dept: ADMINISTRATIVE | Facility: HOSPITAL | Age: 78
End: 2018-06-11

## 2018-06-18 DIAGNOSIS — Z79.01 LONG TERM (CURRENT) USE OF ANTICOAGULANTS: ICD-10-CM

## 2018-06-19 ENCOUNTER — ANTI-COAG VISIT (OUTPATIENT)
Dept: CARDIOLOGY | Facility: CLINIC | Age: 78
End: 2018-06-19
Payer: MEDICARE

## 2018-06-19 ENCOUNTER — LAB VISIT (OUTPATIENT)
Dept: LAB | Facility: HOSPITAL | Age: 78
End: 2018-06-19
Attending: INTERNAL MEDICINE
Payer: MEDICARE

## 2018-06-19 DIAGNOSIS — Z79.01 LONG TERM (CURRENT) USE OF ANTICOAGULANTS: Primary | ICD-10-CM

## 2018-06-19 DIAGNOSIS — I10 ESSENTIAL HYPERTENSION: ICD-10-CM

## 2018-06-19 DIAGNOSIS — E78.00 PURE HYPERCHOLESTEROLEMIA: ICD-10-CM

## 2018-06-19 DIAGNOSIS — R73.01 IMPAIRED FASTING GLUCOSE: ICD-10-CM

## 2018-06-19 LAB
ALBUMIN SERPL BCP-MCNC: 4.1 G/DL
ALP SERPL-CCNC: 68 U/L
ALT SERPL W/O P-5'-P-CCNC: 39 U/L
ANION GAP SERPL CALC-SCNC: 8 MMOL/L
AST SERPL-CCNC: 33 U/L
BILIRUB SERPL-MCNC: 0.8 MG/DL
BUN SERPL-MCNC: 15 MG/DL
CALCIUM SERPL-MCNC: 9.8 MG/DL
CHLORIDE SERPL-SCNC: 104 MMOL/L
CHOLEST SERPL-MCNC: 154 MG/DL
CHOLEST/HDLC SERPL: 3.1 {RATIO}
CO2 SERPL-SCNC: 25 MMOL/L
CREAT SERPL-MCNC: 1 MG/DL
EST. GFR  (AFRICAN AMERICAN): >60 ML/MIN/1.73 M^2
EST. GFR  (NON AFRICAN AMERICAN): >60 ML/MIN/1.73 M^2
ESTIMATED AVG GLUCOSE: 137 MG/DL
GLUCOSE SERPL-MCNC: 118 MG/DL
HBA1C MFR BLD HPLC: 6.4 %
HDLC SERPL-MCNC: 49 MG/DL
HDLC SERPL: 31.8 %
INR PPP: 2.3 (ref 2–3)
LDLC SERPL CALC-MCNC: 88 MG/DL
NONHDLC SERPL-MCNC: 105 MG/DL
POTASSIUM SERPL-SCNC: 4.7 MMOL/L
PROT SERPL-MCNC: 7.2 G/DL
SODIUM SERPL-SCNC: 137 MMOL/L
TRIGL SERPL-MCNC: 85 MG/DL

## 2018-06-19 PROCEDURE — 80061 LIPID PANEL: CPT

## 2018-06-19 PROCEDURE — 83036 HEMOGLOBIN GLYCOSYLATED A1C: CPT

## 2018-06-19 PROCEDURE — 36415 COLL VENOUS BLD VENIPUNCTURE: CPT | Mod: PO

## 2018-06-19 PROCEDURE — 85610 PROTHROMBIN TIME: CPT | Mod: QW,S$GLB,, | Performed by: NUCLEAR MEDICINE

## 2018-06-19 PROCEDURE — 80053 COMPREHEN METABOLIC PANEL: CPT

## 2018-06-19 NOTE — PROGRESS NOTES
Patient's INR is therapeutic at 2.3.  No changes in dose.  Recheck in 1 month.  Please call should you have any questions or concerns at [phone] or [phone].

## 2018-06-20 RX ORDER — WARFARIN SODIUM 5 MG/1
TABLET ORAL
Qty: 34 TABLET | Refills: 2 | Status: SHIPPED | OUTPATIENT
Start: 2018-06-20 | End: 2018-06-25

## 2018-06-25 ENCOUNTER — OFFICE VISIT (OUTPATIENT)
Dept: INTERNAL MEDICINE | Facility: CLINIC | Age: 78
End: 2018-06-25
Payer: MEDICARE

## 2018-06-25 VITALS
BODY MASS INDEX: 34.01 KG/M2 | DIASTOLIC BLOOD PRESSURE: 90 MMHG | WEIGHT: 216.69 LBS | TEMPERATURE: 98 F | RESPIRATION RATE: 16 BRPM | HEIGHT: 67 IN | BODY MASS INDEX: 34.01 KG/M2 | WEIGHT: 216.69 LBS | RESPIRATION RATE: 16 BRPM | SYSTOLIC BLOOD PRESSURE: 130 MMHG | OXYGEN SATURATION: 98 % | DIASTOLIC BLOOD PRESSURE: 90 MMHG | OXYGEN SATURATION: 98 % | TEMPERATURE: 98 F | HEIGHT: 67 IN | SYSTOLIC BLOOD PRESSURE: 130 MMHG | HEART RATE: 81 BPM | HEART RATE: 81 BPM

## 2018-06-25 DIAGNOSIS — E11.59 HYPERTENSION ASSOCIATED WITH DIABETES: ICD-10-CM

## 2018-06-25 DIAGNOSIS — E11.69 HYPERLIPIDEMIA ASSOCIATED WITH TYPE 2 DIABETES MELLITUS: ICD-10-CM

## 2018-06-25 DIAGNOSIS — E11.9 DIABETES MELLITUS WITHOUT COMPLICATION: ICD-10-CM

## 2018-06-25 DIAGNOSIS — Z79.01 CURRENT USE OF LONG TERM ANTICOAGULATION: ICD-10-CM

## 2018-06-25 DIAGNOSIS — I77.1 TORTUOUS AORTA: ICD-10-CM

## 2018-06-25 DIAGNOSIS — E78.5 HYPERLIPIDEMIA ASSOCIATED WITH TYPE 2 DIABETES MELLITUS: ICD-10-CM

## 2018-06-25 DIAGNOSIS — I25.10 ATHEROSCLEROSIS OF NATIVE CORONARY ARTERY WITHOUT ANGINA PECTORIS, UNSPECIFIED WHETHER NATIVE OR TRANSPLANTED HEART: ICD-10-CM

## 2018-06-25 DIAGNOSIS — H26.499 POSTERIOR CAPSULAR OPACIFICATION, UNSPECIFIED LATERALITY: ICD-10-CM

## 2018-06-25 DIAGNOSIS — I15.2 HYPERTENSION ASSOCIATED WITH DIABETES: ICD-10-CM

## 2018-06-25 DIAGNOSIS — G25.2 INTENTION TREMOR: ICD-10-CM

## 2018-06-25 DIAGNOSIS — I27.20 PULMONARY HYPERTENSION, MILD: ICD-10-CM

## 2018-06-25 DIAGNOSIS — Z00.00 ANNUAL PHYSICAL EXAM: ICD-10-CM

## 2018-06-25 DIAGNOSIS — I48.0 PAROXYSMAL ATRIAL FIBRILLATION: ICD-10-CM

## 2018-06-25 DIAGNOSIS — I10 ESSENTIAL HYPERTENSION: ICD-10-CM

## 2018-06-25 DIAGNOSIS — E78.00 PURE HYPERCHOLESTEROLEMIA: Primary | ICD-10-CM

## 2018-06-25 DIAGNOSIS — Z00.00 ENCOUNTER FOR PREVENTIVE HEALTH EXAMINATION: Primary | ICD-10-CM

## 2018-06-25 DIAGNOSIS — Z79.01 LONG TERM (CURRENT) USE OF ANTICOAGULANTS: ICD-10-CM

## 2018-06-25 PROCEDURE — 3044F HG A1C LEVEL LT 7.0%: CPT | Mod: CPTII,S$GLB,, | Performed by: NURSE PRACTITIONER

## 2018-06-25 PROCEDURE — 99499 UNLISTED E&M SERVICE: CPT | Mod: HCNC,S$GLB,, | Performed by: INTERNAL MEDICINE

## 2018-06-25 PROCEDURE — 99999 PR PBB SHADOW E&M-EST. PATIENT-LVL V: CPT | Mod: PBBFAC,,, | Performed by: NURSE PRACTITIONER

## 2018-06-25 PROCEDURE — G0439 PPPS, SUBSEQ VISIT: HCPCS | Mod: S$GLB,,, | Performed by: NURSE PRACTITIONER

## 2018-06-25 PROCEDURE — 99999 PR PBB SHADOW E&M-EST. PATIENT-LVL III: CPT | Mod: PBBFAC,,, | Performed by: INTERNAL MEDICINE

## 2018-06-25 PROCEDURE — 3080F DIAST BP >= 90 MM HG: CPT | Mod: CPTII,S$GLB,, | Performed by: NURSE PRACTITIONER

## 2018-06-25 PROCEDURE — 99499 UNLISTED E&M SERVICE: CPT | Mod: S$GLB,,, | Performed by: NURSE PRACTITIONER

## 2018-06-25 PROCEDURE — 3075F SYST BP GE 130 - 139MM HG: CPT | Mod: CPTII,S$GLB,, | Performed by: NURSE PRACTITIONER

## 2018-06-25 PROCEDURE — 3080F DIAST BP >= 90 MM HG: CPT | Mod: CPTII,S$GLB,, | Performed by: INTERNAL MEDICINE

## 2018-06-25 PROCEDURE — 99214 OFFICE O/P EST MOD 30 MIN: CPT | Mod: S$GLB,,, | Performed by: INTERNAL MEDICINE

## 2018-06-25 PROCEDURE — 3044F HG A1C LEVEL LT 7.0%: CPT | Mod: CPTII,S$GLB,, | Performed by: INTERNAL MEDICINE

## 2018-06-25 PROCEDURE — 3075F SYST BP GE 130 - 139MM HG: CPT | Mod: CPTII,S$GLB,, | Performed by: INTERNAL MEDICINE

## 2018-06-25 RX ORDER — WARFARIN SODIUM 5 MG/1
5 TABLET ORAL DAILY
COMMUNITY
End: 2018-06-25

## 2018-06-25 RX ORDER — PREDNISONE 20 MG/1
20 TABLET ORAL DAILY
COMMUNITY
End: 2018-06-25

## 2018-06-25 RX ORDER — WARFARIN SODIUM 5 MG/1
TABLET ORAL
Qty: 34 TABLET | Refills: 2
Start: 2018-06-25 | End: 2018-09-05 | Stop reason: SDUPTHER

## 2018-06-25 NOTE — PATIENT INSTRUCTIONS
Counseling and Referral of Other Preventative  (Italic type indicates deductible and co-insurance are waived)    Patient Name: Anthony Biggs  Today's Date: 6/25/2018    Health Maintenance       Date Due Completion Date    Influenza Vaccine 08/01/2018 9/25/2017    Lipid Panel 06/19/2019 6/19/2018    High Dose Statin 06/25/2019 6/25/2018    Colonoscopy 05/16/2020 5/16/2017    TETANUS VACCINE 09/16/2026 9/16/2016        Orders Placed This Encounter   Procedures    Ambulatory Referral to Optometry     The following information is provided to all patients.  This information is to help you find resources for any of the problems found today that may be affecting your health:                Living healthy guide: www.[facility].[place].gov      Understanding Diabetes: www.diabetes.org      Eating healthy: www.cdc.gov/healthyweight      CDC home safety checklist: www.cdc.gov/steadi/patient.html      Agency on Aging: www.goea.[place].[facility]      Alcoholics anonymous (AA): www.aa.org      Physical Activity: www.elisa.nih.gov/ts7dary      Tobacco use: www.quitwithusla.org

## 2018-06-25 NOTE — PROGRESS NOTES
"HPI:   patient is a 77-year-old man who comes today for follow-up of his hypertension, atrial fibrillation, coronary disease, and impaired fasting glucose.  He continues to do about the same.  His only new complaint is tremors in his hands left greater than right.  The tremors are much worse when he tends to do something like sign his signature or feed himself.  There is no family history of tremors although his dad did die at age 45.   denies any chest pain shortness of breath.    Current meds have been verified and updated per the EMR  Exam:BP (!) 130/90   Pulse 81   Temp 98.2 °F (36.8 °C)   Resp 16   Ht 5' 7" (1.702 m)   Wt 98.3 kg (216 lb 11.4 oz)   SpO2 98%   BMI 33.94 kg/m²     Chest clear   Cardiovascular irregular rate and rhythm without any significant murmur gallop or rub   neurological he has a fine motor tremor of the left upper extremity that is exacerbated by doing intentional movement.  He has no cogwheeling his gait is unremarkable.  He has no flat faces.    Lab Results   Component Value Date    WBC 6.92 08/28/2017    HGB 14.7 08/28/2017    HCT 42.9 08/28/2017     08/28/2017    CHOL 154 06/19/2018    TRIG 85 06/19/2018    HDL 49 06/19/2018    ALT 39 06/19/2018    AST 33 06/19/2018     06/19/2018    K 4.7 06/19/2018     06/19/2018    CREATININE 1.0 06/19/2018    BUN 15 06/19/2018    CO2 25 06/19/2018    TSH 1.226 09/11/2017    PSA <0.010 08/20/2013    INR 2.3 06/19/2018    HGBA1C 6.4 (H) 06/19/2018       Impression:   impaired fasting glucose has progressed to diabetes   benign intention tremor   multiple other medical problems below, stable  Patient Active Problem List   Diagnosis    Atrial fibrillation    Coronary atherosclerosis    History of prostate cancer    Abnormal CXR    Refractive error    Family history of macular degeneration    Posterior capsular opacification    Pseudophakia of both eyes    History of colon polyps    Diverticulosis of large intestine " without hemorrhage    Current use of long term anticoagulation    Pulmonary hypertension, mild    SHORTY (obstructive sleep apnea)    Depression    Diabetes mellitus without complication    Hyperlipidemia associated with type 2 diabetes mellitus    Hypertension associated with diabetes       Plan:  Orders Placed This Encounter    Hemoglobin A1c    Lipid panel    Basic metabolic panel    CBC auto differential    TSH    Protein / creatinine ratio, urine      encouraged patient to lose some weight.  Current him to avoid the simple carbohydrates.  Patient will see me again in 6 months with above lab work.  We did discuss his tremor.  I would not recommend any medical therapy at this point.

## 2018-06-25 NOTE — PROGRESS NOTES
"Anthony Biggs presented for a  Medicare AWV and comprehensive Health Risk Assessment today. The following components were reviewed and updated:    · Medical history  · Family History  · Social history  · Allergies and Current Medications  · Health Risk Assessment  · Health Maintenance  · Care Team     ** See Completed Assessments for Annual Wellness Visit within the encounter summary.**       The following assessments were completed:  · Living Situation  · CAGE  · Depression Screening  · Timed Get Up and Go  · Whisper Test  · Cognitive Function Screening  · Nutrition Screening  · ADL Screening  · PAQ Screening    Vitals:    06/25/18 0950   BP: (!) 130/90   Pulse: 81   Resp: 16   Temp: 98.2 °F (36.8 °C)   SpO2: 98%   Weight: 98.3 kg (216 lb 11.4 oz)   Height: 5' 7" (1.702 m)     Body mass index is 33.94 kg/m².  Physical Exam   Constitutional: He is oriented to person, place, and time. He appears well-developed and well-nourished. No distress.   HENT:   Head: Normocephalic and atraumatic.   Nose: Nose normal.   Mouth/Throat: Oropharynx is clear and moist. No oropharyngeal exudate.   Eyes: Conjunctivae are normal. Pupils are equal, round, and reactive to light.   Neck: Normal range of motion. Neck supple. No JVD present. No tracheal deviation present. No thyromegaly present.   Cardiovascular: Normal rate, normal heart sounds and intact distal pulses.  Exam reveals no gallop and no friction rub.    No murmur heard.  Regularly irregular   Pulmonary/Chest: Effort normal and breath sounds normal. No respiratory distress. He has no wheezes. He has no rales. He exhibits no tenderness.   Abdominal: Soft. Bowel sounds are normal. He exhibits no distension and no mass. There is no tenderness. There is no rebound and no guarding. No hernia.   Musculoskeletal: Normal range of motion. He exhibits no edema or tenderness.   Lymphadenopathy:     He has no cervical adenopathy.   Neurological: He is alert and oriented to person, place, " and time. No cranial nerve deficit.   Skin: Skin is warm and dry. He is not diaphoretic.   Chronic PVD skin changes on lower legs   Psychiatric: He has a normal mood and affect. His behavior is normal.         Diagnoses and health risks identified today and associated recommendations/orders:    1. Long term (current) use of anticoagulants  Monitored/treated on meds, continue the same tx, sees coumadin clinic routinely, cardiology in July  - warfarin (JANTOVEN) 5 MG tablet; TAKE 1.5 TABLETS TUESDAYS, THURSDAYS, SATURDAYS AND 1 TABLET ALL OTHER DAYS AS DIRECTED BY THE COUMADIN  Dispense: 34 tablet; Refill: 2    2. Annual physical exam  - Ambulatory Referral to Optometry    3. Encounter for preventive health examination  Screenings performed, as noted above.  Personal preventative testing needs reviewed.     4. Hypertension associated with diabetes  Monitored/treated on meds, continue the same tx, stable    5. Hyperlipidemia associated with type 2 diabetes mellitus  Monitored/treated on meds, continue the same tx, stable    6. Paroxysmal atrial fibrillation  Monitored/treated on meds, continue the same tx, stable    7. Pulmonary hypertension, mild  Monitored/treated on meds, continue the same tx, stable    8. Intention tremor  Monitored/treated on meds, continue the same tx, stable, new dx, monitored by pcp    9. Diabetes mellitus without complication  Monitored/treated on meds, continue the same tx, stable    10. Current use of long term anticoagulation  Monitored/treated on meds, continue the same tx, stable    11. Atherosclerosis of native coronary artery without angina pectoris, unspecified whether native or transplanted heart  Monitored/treated on meds, continue the same tx, stable    12. Posterior capsular opacification, unspecified laterality  Monitored/treated on meds, continue the same tx, stable, sees optometry routinely    13. Tortuous Aorta  Monitored/treated on meds, continue the same tx, no chest pain,  sees cardiology in July      Provided Chromo with a 5-10 year written screening schedule and personal prevention plan. Recommendations were developed using the USPSTF age appropriate recommendations. Education, counseling, and referrals were provided as needed. After Visit Summary printed and given to patient which includes a list of additional screenings\tests needed.    No Follow-up on file.    Jeronimo Barbour NP

## 2018-07-11 ENCOUNTER — OFFICE VISIT (OUTPATIENT)
Dept: CARDIOLOGY | Facility: CLINIC | Age: 78
End: 2018-07-11
Payer: MEDICARE

## 2018-07-11 VITALS
HEIGHT: 67 IN | SYSTOLIC BLOOD PRESSURE: 120 MMHG | BODY MASS INDEX: 33.6 KG/M2 | HEART RATE: 80 BPM | WEIGHT: 214.06 LBS | DIASTOLIC BLOOD PRESSURE: 84 MMHG

## 2018-07-11 DIAGNOSIS — I27.20 PULMONARY HYPERTENSION, MILD: ICD-10-CM

## 2018-07-11 DIAGNOSIS — G47.33 OSA (OBSTRUCTIVE SLEEP APNEA): ICD-10-CM

## 2018-07-11 DIAGNOSIS — I77.1 TORTUOUS AORTA: ICD-10-CM

## 2018-07-11 DIAGNOSIS — I15.2 HYPERTENSION ASSOCIATED WITH DIABETES: ICD-10-CM

## 2018-07-11 DIAGNOSIS — E11.9 DIABETES MELLITUS WITHOUT COMPLICATION: ICD-10-CM

## 2018-07-11 DIAGNOSIS — I25.10 ATHEROSCLEROSIS OF NATIVE CORONARY ARTERY OF NATIVE HEART WITHOUT ANGINA PECTORIS: ICD-10-CM

## 2018-07-11 DIAGNOSIS — G25.2 INTENTION TREMOR: ICD-10-CM

## 2018-07-11 DIAGNOSIS — I48.0 PAROXYSMAL ATRIAL FIBRILLATION: Primary | ICD-10-CM

## 2018-07-11 DIAGNOSIS — Z79.01 CURRENT USE OF LONG TERM ANTICOAGULATION: ICD-10-CM

## 2018-07-11 DIAGNOSIS — E11.59 HYPERTENSION ASSOCIATED WITH DIABETES: ICD-10-CM

## 2018-07-11 PROCEDURE — 99999 PR PBB SHADOW E&M-EST. PATIENT-LVL III: CPT | Mod: PBBFAC,,, | Performed by: INTERNAL MEDICINE

## 2018-07-11 PROCEDURE — 3044F HG A1C LEVEL LT 7.0%: CPT | Mod: CPTII,S$GLB,, | Performed by: INTERNAL MEDICINE

## 2018-07-11 PROCEDURE — 99214 OFFICE O/P EST MOD 30 MIN: CPT | Mod: S$GLB,,, | Performed by: INTERNAL MEDICINE

## 2018-07-11 PROCEDURE — 3074F SYST BP LT 130 MM HG: CPT | Mod: CPTII,S$GLB,, | Performed by: INTERNAL MEDICINE

## 2018-07-11 PROCEDURE — 3079F DIAST BP 80-89 MM HG: CPT | Mod: CPTII,S$GLB,, | Performed by: INTERNAL MEDICINE

## 2018-07-11 RX ORDER — FUROSEMIDE 20 MG/1
20 TABLET ORAL DAILY
Qty: 30 TABLET | Refills: 11 | Status: SHIPPED | OUTPATIENT
Start: 2018-07-11 | End: 2018-08-13

## 2018-07-11 NOTE — PROGRESS NOTES
Subjective:   Patient ID:  Anthony Biggs Jr. is a 77 y.o. male who presents for follow-up of Follow-up (6mo) and Shaking (patient first noticed a couple of months ago.)  Patient denies CP, angina or anginal equivalent.Cath last year nonobstructive, mild pulmonary HTN. Inhalers did not help sx.    Hypertension   This is a chronic problem. The current episode started more than 1 year ago. The problem has been gradually improving since onset. The problem is controlled. Pertinent negatives include no chest pain, palpitations or shortness of breath. Past treatments include calcium channel blockers and angiotensin blockers. The current treatment provides moderate improvement. There are no compliance problems.    Hyperlipidemia   This is a chronic problem. The current episode started more than 1 year ago. The problem is controlled. Recent lipid tests were reviewed and are variable. Pertinent negatives include no chest pain or shortness of breath. Current antihyperlipidemic treatment includes statins. The current treatment provides moderate improvement of lipids. Compliance problems include adherence to exercise.    Atrial Fibrillation   Presents for follow-up visit. Symptoms are negative for chest pain, dizziness, hypotension, palpitations, shortness of breath, syncope and tachycardia. Past medical history includes atrial fibrillation and hyperlipidemia. Medication compliance problems include medication side effects.       Review of Systems   Constitution: Negative. Negative for weight gain.   HENT: Negative.    Eyes: Negative.    Cardiovascular: Negative.  Negative for chest pain, leg swelling, palpitations and syncope.   Respiratory: Negative.  Negative for shortness of breath.    Endocrine: Negative.    Hematologic/Lymphatic: Negative.    Skin: Negative.    Musculoskeletal: Negative for muscle weakness.   Gastrointestinal: Negative.    Genitourinary: Negative.    Neurological: Negative.  Negative for dizziness.    Psychiatric/Behavioral: Negative.    Allergic/Immunologic: Negative.      Family History   Problem Relation Age of Onset    Heart disease Mother     Cancer Father         lung    Macular degeneration Sister     Diabetes Sister     Heart disease Sister     Strabismus Neg Hx     Retinal detachment Neg Hx     Glaucoma Neg Hx     Blindness Neg Hx     Amblyopia Neg Hx      Past Medical History:   Diagnosis Date    *Atrial fibrillation     Abnormal CXR     Angina pectoris 8/28/2017    Atrial fibrillation     Coronary atherosclerosis of unspecified type of vessel, native or graft     Depression     Diabetes mellitus without complication     Emphysema of lung     History of prostate cancer 2007    prostatectomy    Hyperlipidemia associated with type 2 diabetes mellitus     Hypertension associated with diabetes     Intention tremor     SHORTY (obstructive sleep apnea)     Prostate cancer     Trouble in sleeping     Urinary incontinence      Current Outpatient Prescriptions on File Prior to Visit   Medication Sig Dispense Refill    aspirin (ECOTRIN) 81 MG EC tablet Take 81 mg by mouth once daily.      atorvastatin (LIPITOR) 40 MG tablet TAKE ONE TABLET BY MOUTH DAILY 30 tablet 5    diltiaZEM HCl (TIAZAC) 120 mg 24 hr capsule TAKE 1 CAPSULE (120 MG TOTAL) BY MOUTH ONCE DAILY. 90 capsule 0    hydrocodone-homatropine 5-1.5 mg/5 ml (HYCODAN) 5-1.5 mg/5 mL Syrp Take 5 mLs by mouth every 4 (four) hours as needed. 120 mL 0    melatonin 3 mg Tab Take 1 tablet by mouth nightly.      valsartan (DIOVAN) 160 MG tablet TAKE 1 TABLET (160 MG TOTAL) BY MOUTH ONCE DAILY. 30 tablet 10    warfarin (JANTOVEN) 5 MG tablet TAKE 1.5 TABLETS TUESDAYS, THURSDAYS, SATURDAYS AND 1 TABLET ALL OTHER DAYS AS DIRECTED BY THE COUMADIN 34 tablet 2     No current facility-administered medications on file prior to visit.      Review of patient's allergies indicates:  No Known Allergies    Objective:     Physical Exam    Constitutional: He is oriented to person, place, and time. He appears well-developed and well-nourished.   HENT:   Head: Normocephalic and atraumatic.   Eyes: Conjunctivae are normal. Pupils are equal, round, and reactive to light.   Neck: Normal range of motion. Neck supple.   Cardiovascular: Normal rate, regular rhythm, normal heart sounds and intact distal pulses.    Pulmonary/Chest: Effort normal and breath sounds normal.   Abdominal: Soft. Bowel sounds are normal.   Neurological: He is alert and oriented to person, place, and time.   Skin: Skin is warm and dry.   Psychiatric: He has a normal mood and affect.   Nursing note and vitals reviewed.      Assessment:     1. Paroxysmal atrial fibrillation    2. Atherosclerosis of native coronary artery of native heart without angina pectoris    3. Current use of long term anticoagulation    4. Pulmonary hypertension, mild    5. Diabetes mellitus without complication    6. SHORTY (obstructive sleep apnea)    7. Hypertension associated with diabetes    8. Intention tremor    9. Tortuous aorta        Plan:     Paroxysmal atrial fibrillation    Atherosclerosis of native coronary artery of native heart without angina pectoris    Current use of long term anticoagulation    Pulmonary hypertension, mild    Diabetes mellitus without complication    SHORTY (obstructive sleep apnea)    Hypertension associated with diabetes    Intention tremor    Tortuous aorta    start low dose lasix  continue diltiazem, valsartan, -htn  Continue coumadin, diltiazem-PAF  Exercise  Continue statin-hlp

## 2018-07-25 ENCOUNTER — TELEPHONE (OUTPATIENT)
Dept: CARDIOLOGY | Facility: CLINIC | Age: 78
End: 2018-07-25

## 2018-07-25 NOTE — TELEPHONE ENCOUNTER
----- Message from Eliecer Nuñez sent at 7/25/2018  2:06 PM CDT -----  Pt is requesting a call from nurse to discuss changes in his medications.      Please call pt back pt [phone]

## 2018-07-25 NOTE — TELEPHONE ENCOUNTER
----- Message from Linda Greenberg sent at 7/25/2018 12:58 PM CDT -----  Contact: self  Patient would like to consult with nurse regarding a change of BP medication. Patient will need medication tonight.pharmacy states that have been trying to contact the office.Please call back at [phone].    Pt uses:  [id number] - [place], [place] - [address] [address]  [address] [facility] [address]  Phone: [phone] Fax: [phone]      Thanks,  Linda Greenberg

## 2018-07-25 NOTE — TELEPHONE ENCOUNTER
I called and pt needs his valsartan 160 mg changed to another medication.  Pharmacy took his bottle with the pills but he has a few left to take till we make the medication change.   Dr Griffith please let me know what med and mg. Thank you

## 2018-07-27 ENCOUNTER — TELEPHONE (OUTPATIENT)
Dept: CARDIOLOGY | Facility: CLINIC | Age: 78
End: 2018-07-27

## 2018-07-27 RX ORDER — LOSARTAN POTASSIUM 100 MG/1
100 TABLET ORAL DAILY
Qty: 90 TABLET | Refills: 3 | Status: SHIPPED | OUTPATIENT
Start: 2018-07-27 | End: 2019-05-16 | Stop reason: SDUPTHER

## 2018-07-27 NOTE — TELEPHONE ENCOUNTER
Notified rx was signed and sent to his pharmacy. Please check later today to see if filled and ready for pickup . lucy

## 2018-07-31 ENCOUNTER — OFFICE VISIT (OUTPATIENT)
Dept: INTERNAL MEDICINE | Facility: CLINIC | Age: 78
End: 2018-07-31
Payer: MEDICARE

## 2018-07-31 ENCOUNTER — ANTI-COAG VISIT (OUTPATIENT)
Dept: CARDIOLOGY | Facility: CLINIC | Age: 78
End: 2018-07-31
Payer: MEDICARE

## 2018-07-31 VITALS
DIASTOLIC BLOOD PRESSURE: 76 MMHG | HEART RATE: 87 BPM | SYSTOLIC BLOOD PRESSURE: 118 MMHG | HEIGHT: 67 IN | TEMPERATURE: 98 F | WEIGHT: 217.13 LBS | OXYGEN SATURATION: 96 % | BODY MASS INDEX: 34.08 KG/M2 | RESPIRATION RATE: 18 BRPM

## 2018-07-31 DIAGNOSIS — Z79.01 LONG TERM (CURRENT) USE OF ANTICOAGULANTS: Primary | ICD-10-CM

## 2018-07-31 DIAGNOSIS — H61.23 BILATERAL IMPACTED CERUMEN: Primary | ICD-10-CM

## 2018-07-31 LAB — INR PPP: 3.2 (ref 2–3)

## 2018-07-31 PROCEDURE — 3074F SYST BP LT 130 MM HG: CPT | Mod: CPTII,S$GLB,, | Performed by: FAMILY MEDICINE

## 2018-07-31 PROCEDURE — 69210 REMOVE IMPACTED EAR WAX UNI: CPT | Mod: S$GLB,,, | Performed by: FAMILY MEDICINE

## 2018-07-31 PROCEDURE — 99213 OFFICE O/P EST LOW 20 MIN: CPT | Mod: 25,S$GLB,, | Performed by: FAMILY MEDICINE

## 2018-07-31 PROCEDURE — 99999 PR PBB SHADOW E&M-EST. PATIENT-LVL III: CPT | Mod: PBBFAC,,, | Performed by: FAMILY MEDICINE

## 2018-07-31 PROCEDURE — 85610 PROTHROMBIN TIME: CPT | Mod: QW,S$GLB,, | Performed by: NUCLEAR MEDICINE

## 2018-07-31 PROCEDURE — 3078F DIAST BP <80 MM HG: CPT | Mod: CPTII,S$GLB,, | Performed by: FAMILY MEDICINE

## 2018-07-31 RX ORDER — NEOMYCIN SULFATE, POLYMYXIN B SULFATE AND HYDROCORTISONE 10; 3.5; 1 MG/ML; MG/ML; [USP'U]/ML
3 SUSPENSION/ DROPS AURICULAR (OTIC) 4 TIMES DAILY
Qty: 10 ML | Refills: 2 | Status: SHIPPED | OUTPATIENT
Start: 2018-07-31 | End: 2018-08-13

## 2018-07-31 NOTE — PROCEDURES
"Ear Cerumen Removal  Date/Time: 7/31/2018 11:19 AM  Performed by: BENITO RODRIGUEZ  Authorized by: EBNITO RODRIGUEZ     Time out: Immediately prior to procedure a "time out" was called to verify the correct patient, procedure, equipment, support staff and site/side marked as required.    Consent Done?:  Yes (Verbal)    Local anesthetic:  None  Location details:  Both ears  Procedure type: curette    Cerumen  Removal Results:  Cerumen completely removed  Patient tolerance:  Patient tolerated the procedure well with no immediate complications     Irrigation also used      "

## 2018-07-31 NOTE — PROGRESS NOTES
Subjective:       Patient ID: Anthony Biggs Jr. is a 77 y.o. male.    Chief Complaint: Ear Fullness      Patient reports sensation of ear fullness, difficulty hearing recently. Has had to have his ears flushed in the past. Says that his wife attempted to clean out his ears last night with hydrogen peroxide but was unsuccessful.      Ear Fullness    Pertinent negatives include no ear discharge or sore throat.     Review of Systems   Constitutional: Negative for fatigue and fever.   HENT: Negative for congestion, ear discharge, ear pain and sore throat.      Past Medical History:   Diagnosis Date    *Atrial fibrillation     Abnormal CXR     Angina pectoris 8/28/2017    Atrial fibrillation     Coronary atherosclerosis of unspecified type of vessel, native or graft     Depression     Diabetes mellitus without complication     Emphysema of lung     History of prostate cancer 2007    prostatectomy    Hyperlipidemia associated with type 2 diabetes mellitus     Hypertension associated with diabetes     Intention tremor     SHORTY (obstructive sleep apnea)     Prostate cancer     Trouble in sleeping     Urinary incontinence      Past Surgical History:   Procedure Laterality Date    ABDOMINAL HERNIA REPAIR      APPENDECTOMY      bladder sx      CARDIAC CATHETERIZATION      CATARACT EXTRACTION W/  INTRAOCULAR LENS IMPLANT  Restor OU    COLONOSCOPY N/A 5/16/2017    Procedure: COLONOSCOPY;  Surgeon: Alfredo Naidu MD;  Location: [facility];  Service: Endoscopy;  Laterality: N/A;    inguinal hernia      lung sx      PROSTATE SURGERY       Family History   Problem Relation Age of Onset    Heart disease Mother     Cancer Father         lung    Macular degeneration Sister     Diabetes Sister     Heart disease Sister     Strabismus Neg Hx     Retinal detachment Neg Hx     Glaucoma Neg Hx     Blindness Neg Hx     Amblyopia Neg Hx      Social History     Social History    Marital status:       "Spouse name: N/A    Number of children: N/A    Years of education: N/A     Occupational History    Not on file.     Social History Main Topics    Smoking status: Former Smoker     Packs/day: 0.50     Years: 40.00     Types: Cigarettes     Quit date: 2002    Smokeless tobacco: Never Used    Alcohol use No    Drug use: No    Sexual activity: No     Other Topics Concern    Not on file     Social History Narrative    No narrative on file     Review of patient's allergies indicates:  No Known Allergies    Objective:       /76   Pulse 87   Temp 98.2 °F (36.8 °C)   Resp 18   Ht 5' 6.93" (1.7 m)   Wt 98.5 kg (217 lb 2.5 oz)   SpO2 96%   BMI 34.08 kg/m²   Physical Exam   Constitutional: He appears well-developed and well-nourished. No distress.   HENT:   Bilateral cerumen impaction  After removed right ear canal with mild abrasion   Skin: He is not diaphoretic.     Assessment:     1. Bilateral impacted cerumen      Plan:   Bilateral impacted cerumen    Other orders  -     neomycin-polymyxin-hydrocortisone (CORTISPORIN) 3.5-10,000-1 mg/mL-unit/mL-% otic suspension; Place 3 drops into both ears 4 (four) times daily.  Dispense: 10 mL; Refill: 2      Medication List with Changes/Refills   New Medications    NEOMYCIN-POLYMYXIN-HYDROCORTISONE (CORTISPORIN) 3.5-10,000-1 MG/ML-UNIT/ML-% OTIC SUSPENSION    Place 3 drops into both ears 4 (four) times daily.   Current Medications    ASPIRIN (ECOTRIN) 81 MG EC TABLET    Take 81 mg by mouth once daily.    ATORVASTATIN (LIPITOR) 40 MG TABLET    TAKE ONE TABLET BY MOUTH DAILY    DILTIAZEM HCL (TIAZAC) 120 MG 24 HR CAPSULE    TAKE 1 CAPSULE (120 MG TOTAL) BY MOUTH ONCE DAILY.    FUROSEMIDE (LASIX) 20 MG TABLET    Take 1 tablet (20 mg total) by mouth once daily.    LOSARTAN (COZAAR) 100 MG TABLET    Take 1 tablet (100 mg total) by mouth once daily.    MELATONIN 3 MG TAB    Take 1 tablet by mouth nightly.    WARFARIN (JANTOVEN) 5 MG TABLET    TAKE 1.5 TABLETS TUESDAYS, " THURSDAYS, SATURDAYS AND 1 TABLET ALL OTHER DAYS AS DIRECTED BY THE COUMADIN   Discontinued Medications    HYDROCODONE-HOMATROPINE 5-1.5 MG/5 ML (HYCODAN) 5-1.5 MG/5 ML SYRP    Take 5 mLs by mouth every 4 (four) hours as needed.

## 2018-07-31 NOTE — PROGRESS NOTES
Patient's INR is slightly elevated at 3.2.  Reports no signs of any abnormal bleeding at present.  Patient takes Warfarin in the morning.  Instructed patient to take a lower dose tomorrow of 2.5 mg (1/2 tablet) - only, then resume on regular scheduled dose.  Recheck in 4 weeks.

## 2018-08-05 RX ORDER — DILTIAZEM HYDROCHLORIDE 120 MG/1
CAPSULE, EXTENDED RELEASE ORAL
Qty: 90 CAPSULE | Refills: 0 | Status: SHIPPED | OUTPATIENT
Start: 2018-08-05 | End: 2018-09-03 | Stop reason: SDUPTHER

## 2018-08-13 ENCOUNTER — HOSPITAL ENCOUNTER (OUTPATIENT)
Dept: RADIOLOGY | Facility: HOSPITAL | Age: 78
Discharge: HOME | End: 2018-08-13
Attending: FAMILY MEDICINE
Payer: MEDICARE

## 2018-08-13 ENCOUNTER — OFFICE VISIT (OUTPATIENT)
Dept: INTERNAL MEDICINE | Facility: CLINIC | Age: 78
End: 2018-08-13
Payer: MEDICARE

## 2018-08-13 VITALS
TEMPERATURE: 99 F | SYSTOLIC BLOOD PRESSURE: 122 MMHG | HEART RATE: 88 BPM | DIASTOLIC BLOOD PRESSURE: 78 MMHG | OXYGEN SATURATION: 96 % | RESPIRATION RATE: 18 BRPM | HEIGHT: 67 IN | BODY MASS INDEX: 34.08 KG/M2

## 2018-08-13 DIAGNOSIS — M79.671 RIGHT FOOT PAIN: Primary | ICD-10-CM

## 2018-08-13 DIAGNOSIS — M79.671 RIGHT FOOT PAIN: ICD-10-CM

## 2018-08-13 PROCEDURE — 73630 X-RAY EXAM OF FOOT: CPT | Mod: TC,PO,RT

## 2018-08-13 PROCEDURE — 99213 OFFICE O/P EST LOW 20 MIN: CPT | Mod: S$GLB,,, | Performed by: FAMILY MEDICINE

## 2018-08-13 PROCEDURE — 3074F SYST BP LT 130 MM HG: CPT | Mod: CPTII,S$GLB,, | Performed by: FAMILY MEDICINE

## 2018-08-13 PROCEDURE — 3078F DIAST BP <80 MM HG: CPT | Mod: CPTII,S$GLB,, | Performed by: FAMILY MEDICINE

## 2018-08-13 PROCEDURE — 73630 X-RAY EXAM OF FOOT: CPT | Mod: 26,RT,, | Performed by: RADIOLOGY

## 2018-08-13 PROCEDURE — 99999 PR PBB SHADOW E&M-EST. PATIENT-LVL IV: CPT | Mod: PBBFAC,,, | Performed by: FAMILY MEDICINE

## 2018-08-13 RX ORDER — MELOXICAM 15 MG/1
15 TABLET ORAL DAILY
Qty: 14 TABLET | Refills: 0 | Status: SHIPPED | OUTPATIENT
Start: 2018-08-13 | End: 2018-10-03

## 2018-08-13 RX ORDER — METHYLPREDNISOLONE 4 MG/1
TABLET ORAL
Qty: 1 PACKAGE | Refills: 0 | Status: SHIPPED | OUTPATIENT
Start: 2018-08-13 | End: 2018-09-03

## 2018-08-13 RX ORDER — HYDROCODONE BITARTRATE AND ACETAMINOPHEN 5; 325 MG/1; MG/1
1 TABLET ORAL EVERY 6 HOURS PRN
Qty: 10 TABLET | Refills: 0 | Status: SHIPPED | OUTPATIENT
Start: 2018-08-13 | End: 2018-10-03

## 2018-08-13 NOTE — PROGRESS NOTES
Subjective:       Patient ID: Anhtony Biggs Jr. is a 77 y.o. male.    Chief Complaint: Foot Pain      Patient reports right foot pain and swelling, pain is constant, he is unable to walk. No known injury or trauma to foot. Began about 2 days ago with pain in lateral right ankle, now more in foot.      Foot Pain   Associated symptoms include arthralgias and joint swelling. Pertinent negatives include no rash.     Review of Systems   Constitutional: Positive for activity change.   Musculoskeletal: Positive for arthralgias, gait problem and joint swelling.   Skin: Negative for color change and rash.     Past Medical History:   Diagnosis Date    *Atrial fibrillation     Abnormal CXR     Angina pectoris 8/28/2017    Atrial fibrillation     Coronary atherosclerosis of unspecified type of vessel, native or graft     Depression     Diabetes mellitus without complication     Emphysema of lung     History of prostate cancer 2007    prostatectomy    Hyperlipidemia associated with type 2 diabetes mellitus     Hypertension associated with diabetes     Intention tremor     SHORTY (obstructive sleep apnea)     Prostate cancer     Trouble in sleeping     Urinary incontinence      Past Surgical History:   Procedure Laterality Date    ABDOMINAL HERNIA REPAIR      APPENDECTOMY      bladder sx      CARDIAC CATHETERIZATION      CATARACT EXTRACTION W/  INTRAOCULAR LENS IMPLANT  Restor OU    inguinal hernia      lung sx      PROSTATE SURGERY       Family History   Problem Relation Age of Onset    Heart disease Mother     Cancer Father         lung    Macular degeneration Sister     Diabetes Sister     Heart disease Sister     Strabismus Neg Hx     Retinal detachment Neg Hx     Glaucoma Neg Hx     Blindness Neg Hx     Amblyopia Neg Hx      Social History     Socioeconomic History    Marital status:      Spouse name: Not on file    Number of children: Not on file    Years of education: Not on file  "   Highest education level: Not on file   Social Needs    Financial resource strain: Not on file    Food insecurity - worry: Not on file    Food insecurity - inability: Not on file    Transportation needs - medical: Not on file    Transportation needs - non-medical: Not on file   Occupational History    Not on file   Tobacco Use    Smoking status: Former Smoker     Packs/day: 0.50     Years: 40.00     Pack years: 20.00     Types: Cigarettes     Last attempt to quit:      Years since quittin.6    Smokeless tobacco: Never Used   Substance and Sexual Activity    Alcohol use: No    Drug use: No    Sexual activity: No   Other Topics Concern    Not on file   Social History Narrative    Not on file     Review of patient's allergies indicates:  No Known Allergies    Objective:       /78   Pulse 88   Temp 98.8 °F (37.1 °C)   Resp 18   Ht 5' 6.93" (1.7 m)   SpO2 96%   BMI 34.08 kg/m²   Physical Exam   Constitutional: He appears well-developed and well-nourished. No distress.   Musculoskeletal: Normal range of motion. He exhibits edema and tenderness (lateral right midfoot). He exhibits no deformity.   Pulse 2+     Skin: Skin is warm. Capillary refill takes less than 2 seconds. He is not diaphoretic. No erythema.     Assessment:     1. Right foot pain      Plan:   Right foot pain  -     X-Ray Foot Complete Right; Future; Expected date: 2018 NAF    Other orders  -     meloxicam (MOBIC) 15 MG tablet; Take 1 tablet (15 mg total) by mouth once daily.  Dispense: 14 tablet; Refill: 0  -     HYDROcodone-acetaminophen (NORCO) 5-325 mg per tablet; Take 1 tablet by mouth every 6 (six) hours as needed for Pain.  Dispense: 10 tablet; Refill: 0  -     methylPREDNISolone (MEDROL DOSEPACK) 4 mg tablet; use as directed  Dispense: 1 Package; Refill: 0      Medication List with Changes/Refills   New Medications    HYDROCODONE-ACETAMINOPHEN (NORCO) 5-325 MG PER TABLET    Take 1 tablet by mouth every 6 " (six) hours as needed for Pain.    MELOXICAM (MOBIC) 15 MG TABLET    Take 1 tablet (15 mg total) by mouth once daily.    METHYLPREDNISOLONE (MEDROL DOSEPACK) 4 MG TABLET    use as directed   Current Medications    ASPIRIN (ECOTRIN) 81 MG EC TABLET    Take 81 mg by mouth once daily.    ATORVASTATIN (LIPITOR) 40 MG TABLET    TAKE ONE TABLET BY MOUTH DAILY    DILTIAZEM HCL (TIAZAC) 120 MG 24 HR CAPSULE    TAKE ONE CAPSULE BY MOUTH DAILY    LOSARTAN (COZAAR) 100 MG TABLET    Take 1 tablet (100 mg total) by mouth once daily.    MELATONIN 3 MG TAB    Take 1 tablet by mouth nightly.    WARFARIN (JANTOVEN) 5 MG TABLET    TAKE 1.5 TABLETS TUESDAYS, THURSDAYS, SATURDAYS AND 1 TABLET ALL OTHER DAYS AS DIRECTED BY THE COUMADIN   Discontinued Medications    FUROSEMIDE (LASIX) 20 MG TABLET    Take 1 tablet (20 mg total) by mouth once daily.    NEOMYCIN-POLYMYXIN-HYDROCORTISONE (CORTISPORIN) 3.5-10,000-1 MG/ML-UNIT/ML-% OTIC SUSPENSION    Place 3 drops into both ears 4 (four) times daily.

## 2018-08-24 DIAGNOSIS — E78.00 PURE HYPERCHOLESTEROLEMIA: ICD-10-CM

## 2018-08-25 RX ORDER — ATORVASTATIN CALCIUM 40 MG/1
TABLET, FILM COATED ORAL
Qty: 30 TABLET | Refills: 4 | Status: SHIPPED | OUTPATIENT
Start: 2018-08-25 | End: 2019-01-17 | Stop reason: SDUPTHER

## 2018-08-28 ENCOUNTER — ANTI-COAG VISIT (OUTPATIENT)
Dept: CARDIOLOGY | Facility: CLINIC | Age: 78
End: 2018-08-28
Payer: MEDICARE

## 2018-08-28 DIAGNOSIS — Z79.01 LONG TERM (CURRENT) USE OF ANTICOAGULANTS: Primary | ICD-10-CM

## 2018-08-28 LAB — INR PPP: 3 (ref 2–3)

## 2018-08-28 PROCEDURE — 99211 OFF/OP EST MAY X REQ PHY/QHP: CPT | Mod: 25,S$GLB,, | Performed by: NUCLEAR MEDICINE

## 2018-08-28 PROCEDURE — 85610 PROTHROMBIN TIME: CPT | Mod: QW,S$GLB,, | Performed by: NUCLEAR MEDICINE

## 2018-08-28 NOTE — PROGRESS NOTES
Patient's INR is therapeutic at 3.0.  Patient takes Warfarin in the a.m..   Will lower dose to 7.5 mg on Tuesday and Thursday; and 5 mg on all other days.  Recheck in 4 weeks.

## 2018-09-03 RX ORDER — DILTIAZEM HYDROCHLORIDE 120 MG/1
CAPSULE, EXTENDED RELEASE ORAL
Qty: 30 CAPSULE | Refills: 0 | Status: SHIPPED | OUTPATIENT
Start: 2018-09-03 | End: 2019-06-26

## 2018-09-05 DIAGNOSIS — Z79.01 LONG TERM (CURRENT) USE OF ANTICOAGULANTS: ICD-10-CM

## 2018-09-05 RX ORDER — WARFARIN SODIUM 5 MG/1
TABLET ORAL
Qty: 34 TABLET | Refills: 1 | Status: SHIPPED | OUTPATIENT
Start: 2018-09-05 | End: 2018-11-06 | Stop reason: SDUPTHER

## 2018-09-25 ENCOUNTER — ANTI-COAG VISIT (OUTPATIENT)
Dept: CARDIOLOGY | Facility: CLINIC | Age: 78
End: 2018-09-25
Payer: MEDICARE

## 2018-09-25 DIAGNOSIS — Z79.01 LONG TERM (CURRENT) USE OF ANTICOAGULANTS: Primary | ICD-10-CM

## 2018-09-25 LAB — INR PPP: 2.1 (ref 2–3)

## 2018-09-25 PROCEDURE — 85610 PROTHROMBIN TIME: CPT | Mod: PBBFAC

## 2018-09-25 NOTE — PROGRESS NOTES
Patient's INR is therapeutic at 2.1.  No changes in dose.  Recheck in 1 month.  Please call should you have any questions or concerns at [phone] or [phone].

## 2018-10-01 RX ORDER — DILTIAZEM HYDROCHLORIDE 120 MG/1
CAPSULE, EXTENDED RELEASE ORAL
Qty: 30 CAPSULE | Refills: 0 | Status: SHIPPED | OUTPATIENT
Start: 2018-10-01 | End: 2018-10-03 | Stop reason: SDUPTHER

## 2018-10-02 DIAGNOSIS — Z76.89 ENCOUNTER TO ESTABLISH CARE: Primary | ICD-10-CM

## 2018-10-03 ENCOUNTER — CLINICAL SUPPORT (OUTPATIENT)
Dept: CARDIOLOGY | Facility: CLINIC | Age: 78
End: 2018-10-03
Payer: MEDICARE

## 2018-10-03 ENCOUNTER — OFFICE VISIT (OUTPATIENT)
Dept: CARDIOLOGY | Facility: CLINIC | Age: 78
End: 2018-10-03
Payer: MEDICARE

## 2018-10-03 VITALS
SYSTOLIC BLOOD PRESSURE: 130 MMHG | WEIGHT: 216.06 LBS | HEIGHT: 66 IN | DIASTOLIC BLOOD PRESSURE: 80 MMHG | HEART RATE: 82 BPM | BODY MASS INDEX: 34.72 KG/M2

## 2018-10-03 DIAGNOSIS — I48.91 ATRIAL FIBRILLATION, UNSPECIFIED TYPE: Primary | ICD-10-CM

## 2018-10-03 DIAGNOSIS — E11.59 HYPERTENSION ASSOCIATED WITH DIABETES: ICD-10-CM

## 2018-10-03 DIAGNOSIS — Z76.89 ENCOUNTER TO ESTABLISH CARE: ICD-10-CM

## 2018-10-03 DIAGNOSIS — I25.10 ATHEROSCLEROSIS OF NATIVE CORONARY ARTERY OF NATIVE HEART WITHOUT ANGINA PECTORIS: ICD-10-CM

## 2018-10-03 DIAGNOSIS — I48.0 PAROXYSMAL ATRIAL FIBRILLATION: Primary | ICD-10-CM

## 2018-10-03 DIAGNOSIS — Z79.01 CURRENT USE OF LONG TERM ANTICOAGULATION: ICD-10-CM

## 2018-10-03 DIAGNOSIS — E11.9 DIABETES MELLITUS WITHOUT COMPLICATION: ICD-10-CM

## 2018-10-03 DIAGNOSIS — G47.33 OSA (OBSTRUCTIVE SLEEP APNEA): ICD-10-CM

## 2018-10-03 DIAGNOSIS — I15.2 HYPERTENSION ASSOCIATED WITH DIABETES: ICD-10-CM

## 2018-10-03 DIAGNOSIS — I27.20 PULMONARY HYPERTENSION, MILD: ICD-10-CM

## 2018-10-03 PROCEDURE — 3079F DIAST BP 80-89 MM HG: CPT | Mod: CPTII,,, | Performed by: INTERNAL MEDICINE

## 2018-10-03 PROCEDURE — 99214 OFFICE O/P EST MOD 30 MIN: CPT | Mod: S$PBB,,, | Performed by: INTERNAL MEDICINE

## 2018-10-03 PROCEDURE — 93005 ELECTROCARDIOGRAM TRACING: CPT | Mod: PBBFAC | Performed by: INTERNAL MEDICINE

## 2018-10-03 PROCEDURE — 1101F PT FALLS ASSESS-DOCD LE1/YR: CPT | Mod: CPTII,,, | Performed by: INTERNAL MEDICINE

## 2018-10-03 PROCEDURE — 3075F SYST BP GE 130 - 139MM HG: CPT | Mod: CPTII,,, | Performed by: INTERNAL MEDICINE

## 2018-10-03 PROCEDURE — 99213 OFFICE O/P EST LOW 20 MIN: CPT | Mod: PBBFAC,25 | Performed by: INTERNAL MEDICINE

## 2018-10-03 PROCEDURE — 93010 ELECTROCARDIOGRAM REPORT: CPT | Mod: S$PBB,,, | Performed by: INTERNAL MEDICINE

## 2018-10-03 PROCEDURE — 99999 PR PBB SHADOW E&M-EST. PATIENT-LVL III: CPT | Mod: PBBFAC,,, | Performed by: INTERNAL MEDICINE

## 2018-10-03 RX ORDER — FUROSEMIDE 40 MG/1
40 TABLET ORAL 2 TIMES DAILY
Qty: 60 TABLET | Refills: 11 | Status: SHIPPED | OUTPATIENT
Start: 2018-10-03 | End: 2018-11-14

## 2018-10-03 NOTE — PROGRESS NOTES
Subjective:   Patient ID:  Anthony Biggs Jr. is a 78 y.o. male who presents for follow-up of 2 mo f/u  Pt still SOB. Low dose  diuretics did not help sx.Pt with LE edema.    Hypertension   This is a chronic problem. The current episode started more than 1 year ago. The problem has been gradually improving since onset. The problem is controlled. Associated symptoms include shortness of breath. Pertinent negatives include no chest pain or palpitations. Past treatments include calcium channel blockers and angiotensin blockers. The current treatment provides moderate improvement. Compliance problems include medication side effects.    Hyperlipidemia   This is a chronic problem. The current episode started more than 1 year ago. The problem is controlled. Recent lipid tests were reviewed and are variable. Associated symptoms include shortness of breath. Pertinent negatives include no chest pain. Current antihyperlipidemic treatment includes statins. The current treatment provides moderate improvement of lipids. Compliance problems include medication side effects.    Atrial Fibrillation   Presents for follow-up visit. Symptoms include shortness of breath. Symptoms are negative for bradycardia, chest pain, dizziness, hypotension, palpitations, syncope, tachycardia and weakness. The symptoms have been stable. Past medical history includes atrial fibrillation and hyperlipidemia. Medication compliance problems include medication side effects.       Review of Systems   Constitution: Negative. Negative for weakness and weight gain.   HENT: Negative.    Eyes: Negative.    Cardiovascular: Negative.  Negative for chest pain, leg swelling, palpitations and syncope.   Respiratory: Positive for shortness of breath.    Endocrine: Negative.    Hematologic/Lymphatic: Negative.    Skin: Negative.    Musculoskeletal: Negative for muscle weakness.   Gastrointestinal: Negative.    Genitourinary: Negative.    Neurological: Negative.  Negative  for dizziness.   Psychiatric/Behavioral: Negative.    Allergic/Immunologic: Negative.      Family History   Problem Relation Age of Onset    Heart disease Mother     Cancer Father         lung    Macular degeneration Sister     Diabetes Sister     Heart disease Sister     Strabismus Neg Hx     Retinal detachment Neg Hx     Glaucoma Neg Hx     Blindness Neg Hx     Amblyopia Neg Hx      Past Medical History:   Diagnosis Date    *Atrial fibrillation     Abnormal CXR     Angina pectoris 8/28/2017    Atrial fibrillation     Coronary atherosclerosis of unspecified type of vessel, native or graft     Depression     Diabetes mellitus without complication     Emphysema of lung     History of prostate cancer 2007    prostatectomy    Hyperlipidemia associated with type 2 diabetes mellitus     Hypertension associated with diabetes     Intention tremor     SHORTY (obstructive sleep apnea)     Prostate cancer     Trouble in sleeping     Urinary incontinence      Current Outpatient Medications on File Prior to Visit   Medication Sig Dispense Refill    aspirin (ECOTRIN) 81 MG EC tablet Take 81 mg by mouth once daily.      atorvastatin (LIPITOR) 40 MG tablet TAKE ONE TABLET BY MOUTH DAILY 30 tablet 4    diltiaZEM HCl (TIAZAC) 120 mg 24 hr capsule TAKE ONE CAPSULE BY MOUTH DAILY 30 capsule 0    losartan (COZAAR) 100 MG tablet Take 1 tablet (100 mg total) by mouth once daily. 90 tablet 3    melatonin 3 mg Tab Take 1 tablet by mouth nightly.      warfarin (JANTOVEN) 5 MG tablet TAKE 1&1/2 TABLET TUESDAYS, THURSDAYS, SATURDAYS AND 1 TABLET ALL OTHER DAYS AS DIRECTED BY COUMADIN DOCTOR. (Patient taking differently: TAKE 1&1/2 TABLET Tuesday and Thursday; AND 1 TABLET ALL OTHER DAYS AS DIRECTED BY COUMADIN DOCTOR.) 34 tablet 1    [DISCONTINUED] diltiaZEM HCl (TIAZAC) 120 mg 24 hr capsule TAKE ONE CAPSULE BY MOUTH DAILY 30 capsule 0    [DISCONTINUED] diltiaZEM HCl (TIAZAC) 120 mg 24 hr capsule TAKE ONE  CAPSULE BY MOUTH DAILY 30 capsule 0    [DISCONTINUED] HYDROcodone-acetaminophen (NORCO) 5-325 mg per tablet Take 1 tablet by mouth every 6 (six) hours as needed for Pain. 10 tablet 0    [DISCONTINUED] meloxicam (MOBIC) 15 MG tablet Take 1 tablet (15 mg total) by mouth once daily. 14 tablet 0     No current facility-administered medications on file prior to visit.      Review of patient's allergies indicates:  No Known Allergies    Objective:     Physical Exam   Constitutional: He is oriented to person, place, and time. He appears well-developed and well-nourished.   HENT:   Head: Normocephalic and atraumatic.   Eyes: Conjunctivae are normal. Pupils are equal, round, and reactive to light.   Neck: Normal range of motion. Neck supple.   Cardiovascular: Normal rate, regular rhythm, normal heart sounds and intact distal pulses.   Pulmonary/Chest: Effort normal and breath sounds normal.   Abdominal: Soft. Bowel sounds are normal.   Neurological: He is alert and oriented to person, place, and time.   Skin: Skin is warm and dry.   Psychiatric: He has a normal mood and affect.   Nursing note and vitals reviewed.      Assessment:     1. Paroxysmal atrial fibrillation    2. Atherosclerosis of native coronary artery of native heart without angina pectoris    3. Current use of long term anticoagulation    4. Pulmonary hypertension, mild    5. SHORTY (obstructive sleep apnea)    6. Diabetes mellitus without complication    7. Hypertension associated with diabetes        Plan:     Paroxysmal atrial fibrillation    Atherosclerosis of native coronary artery of native heart without angina pectoris    Current use of long term anticoagulation    Pulmonary hypertension, mild    SHORTY (obstructive sleep apnea)    Diabetes mellitus without complication    Hypertension associated with diabetes      restart diuretics at high dose  Holter/event monitor  Continue statin-hlp  Continue diltiazem, losartan, -htn  Continue coumadin-afib

## 2018-10-24 ENCOUNTER — CLINICAL SUPPORT (OUTPATIENT)
Dept: CARDIOLOGY | Facility: CLINIC | Age: 78
End: 2018-10-24
Attending: INTERNAL MEDICINE
Payer: MEDICARE

## 2018-10-24 DIAGNOSIS — I48.91 ATRIAL FIBRILLATION, UNSPECIFIED TYPE: ICD-10-CM

## 2018-10-24 PROCEDURE — 93226 XTRNL ECG REC<48 HR SCAN A/R: CPT | Mod: PBBFAC | Performed by: INTERNAL MEDICINE

## 2018-10-24 PROCEDURE — 93227 XTRNL ECG REC<48 HR R&I: CPT | Mod: S$PBB,,, | Performed by: INTERNAL MEDICINE

## 2018-10-27 ENCOUNTER — OFFICE VISIT (OUTPATIENT)
Dept: URGENT CARE | Facility: CLINIC | Age: 78
End: 2018-10-27
Payer: MEDICARE

## 2018-10-27 VITALS
WEIGHT: 213.75 LBS | TEMPERATURE: 99 F | BODY MASS INDEX: 34.35 KG/M2 | HEART RATE: 94 BPM | OXYGEN SATURATION: 97 % | RESPIRATION RATE: 17 BRPM | SYSTOLIC BLOOD PRESSURE: 130 MMHG | DIASTOLIC BLOOD PRESSURE: 88 MMHG | HEIGHT: 66 IN

## 2018-10-27 DIAGNOSIS — M54.50 MIDLINE LOW BACK PAIN WITHOUT SCIATICA, UNSPECIFIED CHRONICITY: Primary | ICD-10-CM

## 2018-10-27 PROCEDURE — 3075F SYST BP GE 130 - 139MM HG: CPT | Mod: CPTII,,, | Performed by: NURSE PRACTITIONER

## 2018-10-27 PROCEDURE — 99214 OFFICE O/P EST MOD 30 MIN: CPT | Mod: S$PBB,,, | Performed by: NURSE PRACTITIONER

## 2018-10-27 PROCEDURE — 3079F DIAST BP 80-89 MM HG: CPT | Mod: CPTII,,, | Performed by: NURSE PRACTITIONER

## 2018-10-27 PROCEDURE — 99215 OFFICE O/P EST HI 40 MIN: CPT | Mod: PBBFAC,PO | Performed by: NURSE PRACTITIONER

## 2018-10-27 PROCEDURE — 99999 PR PBB SHADOW E&M-EST. PATIENT-LVL V: CPT | Mod: PBBFAC,,, | Performed by: NURSE PRACTITIONER

## 2018-10-27 PROCEDURE — 1101F PT FALLS ASSESS-DOCD LE1/YR: CPT | Mod: CPTII,,, | Performed by: NURSE PRACTITIONER

## 2018-10-27 RX ORDER — TIZANIDINE 2 MG/1
2 TABLET ORAL 2 TIMES DAILY PRN
Qty: 20 TABLET | Refills: 0 | Status: SHIPPED | OUTPATIENT
Start: 2018-10-27 | End: 2018-11-06

## 2018-10-27 NOTE — PATIENT INSTRUCTIONS
Self-Care for Low Back Pain    Most people have low back pain now and then. In many cases, it isnt serious and self-care can help. Sometimes low back pain can be a sign of a bigger problem. Call your healthcare provider if your pain returns often or gets worse over time. For the long-term care of your back, get regular exercise, lose any excess weight and learn good posture.  Take a short rest  Lying down during the day may be beneficial for short periods of time if severe pain increases with sitting or standing. Long-term bed rest could be detrimental.  Reduce pain and swelling  Cold reduces swelling. Both cold and heat can reduce pain. Protect your skin by placing a towel between your body and the ice or heat source.  · For the first few days, apply an ice pack for 15 to 20 minutes .  · After the first few days, try heat for 15 minutes at a time to ease pain. Never sleep on a heating pad.  · Over-the-counter medicine can help control pain and swelling. Try aspirin or ibuprofen.  Exercise  Exercise can help your back heal. It also helps your back get stronger and more flexible, preventing any reinjury. Ask your healthcare provider about specific exercises for your back.  Use good posture to avoid reinjury  · When moving, bend at the hips and knees. Dont bend at the waist or twist around.  · When lifting, keep the object close to your body. Dont try to lift more than you can handle.  · When sitting, keep your lower back supported. Use a rolled-up towel as needed.  Seek immediate medical care if:  · Youre unable to stand or walk.  · You have a temperature over 100.4°F (38.0°C)  · You have frequent, painful, or bloody urination.  · You have severe abdominal pain.  · You have a sharp, stabbing pain.  · Your pain is constant.  · You have pain or numbness in your leg.  · You feel pain in a new area of your back.  · You notice that the pain isnt decreasing after more than a week.   Date Last Reviewed: 9/29/2015  ©  2974-2817 [employer]. [address] [place] [address], [place] [address]. All rights reserved. This information is not intended as a substitute for professional medical care. Always follow your healthcare professional's instructions.

## 2018-10-27 NOTE — PROGRESS NOTES
Subjective:       Patient ID: Anthony Biggs Jr. is a 78 y.o. male.    Chief Complaint: Back Pain    Patient presents with lower back pain that started on yesterday.  Reports helping change wife's tire and twisted back when getting up.  Took prescribed pain medication at home with mild relief.  Does not remember name.       Review of Systems   Constitutional: Negative for chills and fatigue.   Respiratory: Negative for cough and shortness of breath.    Musculoskeletal: Positive for arthralgias, back pain, gait problem and myalgias.   Skin: Negative for color change and wound.   Psychiatric/Behavioral: Negative for agitation and confusion.       Objective:      Physical Exam   Constitutional: He is oriented to person, place, and time. Vital signs are normal. He appears well-developed and well-nourished.   HENT:   Head: Normocephalic and atraumatic.   Neck: Normal range of motion.   Cardiovascular: Normal rate.   Pulmonary/Chest: Effort normal.   Musculoskeletal: He exhibits tenderness.        Lumbar back: He exhibits decreased range of motion and tenderness. He exhibits no swelling.   Neurological: He is alert and oriented to person, place, and time.   Skin: Skin is warm.   Psychiatric: He has a normal mood and affect. His behavior is normal.       Assessment:       1. Midline low back pain without sciatica, unspecified chronicity        Plan:         Midline low back pain without sciatica, unspecified chronicity  -     tiZANidine (ZANAFLEX) 2 MG tablet; Take 1 tablet (2 mg total) by mouth 2 (two) times daily as needed (back pain). Causes drowsiness  Dispense: 20 tablet; Refill: 0        Start tizanidine for back pain.  Rest.  Warm compresses.  Follow up if no improvement.

## 2018-10-30 ENCOUNTER — TELEPHONE (OUTPATIENT)
Dept: CARDIOLOGY | Facility: CLINIC | Age: 78
End: 2018-10-30

## 2018-10-31 ENCOUNTER — TELEPHONE (OUTPATIENT)
Dept: CARDIOLOGY | Facility: CLINIC | Age: 78
End: 2018-10-31

## 2018-10-31 NOTE — TELEPHONE ENCOUNTER
----- Message from Salome Green sent at 10/31/2018  9:33 AM CDT -----  Contact: ms lr-wife  monitor results needed..[phone] or [phone]

## 2018-10-31 NOTE — TELEPHONE ENCOUNTER
Called to patient and spoke with patient's wife Mrs. Pichardo--informed will give a call with results when released by Dr. Griffith--a message has been forwarded to Dr. Griffith--Mrs. Pichardo verbalizes understanding

## 2018-11-01 ENCOUNTER — TELEPHONE (OUTPATIENT)
Dept: CARDIOLOGY | Facility: CLINIC | Age: 78
End: 2018-11-01

## 2018-11-01 NOTE — TELEPHONE ENCOUNTER
11/02 Called pt and spoke with wife. She wants to wait till after they see Dr Griffith next week to do any other test. Cm  11/01 Called and notified pt and told him I would check with Dr Griffith to see if he wants any other test done, then call him back. He agreed. Cm  ----- Message from Darryl Griffith MD sent at 10/31/2018  6:43 PM CDT -----  Contact: ms lr-wife  Holter shows chronic afib, avg HR 91, no findings that would cause SOB  ----- Message -----  From: Mirella Ronquillo LPN  Sent: 10/31/2018   3:19 PM  To: Darryl Griffith MD     Please result patient's holter monitor. Thanks.  ----- Message -----  From: Salome Green  Sent: 10/31/2018   9:33 AM  To: Nacho SANTIAGO Staff    monitor results needed..[phone] or [phone]

## 2018-11-06 ENCOUNTER — IMMUNIZATION (OUTPATIENT)
Dept: INTERNAL MEDICINE | Facility: CLINIC | Age: 78
End: 2018-11-06
Payer: MEDICARE

## 2018-11-06 ENCOUNTER — ANTI-COAG VISIT (OUTPATIENT)
Dept: CARDIOLOGY | Facility: CLINIC | Age: 78
End: 2018-11-06
Payer: MEDICARE

## 2018-11-06 DIAGNOSIS — Z79.01 LONG TERM (CURRENT) USE OF ANTICOAGULANTS: ICD-10-CM

## 2018-11-06 DIAGNOSIS — Z79.01 LONG TERM (CURRENT) USE OF ANTICOAGULANTS: Primary | ICD-10-CM

## 2018-11-06 LAB — INR PPP: 2 (ref 2–3)

## 2018-11-06 PROCEDURE — 85610 PROTHROMBIN TIME: CPT | Mod: QW,HCNC,S$GLB, | Performed by: NUCLEAR MEDICINE

## 2018-11-06 PROCEDURE — 90662 IIV NO PRSV INCREASED AG IM: CPT | Mod: HCNC,S$GLB,, | Performed by: INTERNAL MEDICINE

## 2018-11-06 PROCEDURE — G0008 ADMIN INFLUENZA VIRUS VAC: HCPCS | Mod: HCNC,S$GLB,, | Performed by: INTERNAL MEDICINE

## 2018-11-06 RX ORDER — WARFARIN SODIUM 5 MG/1
TABLET ORAL
Qty: 34 TABLET | Refills: 0 | Status: SHIPPED | OUTPATIENT
Start: 2018-11-06 | End: 2018-12-01 | Stop reason: SDUPTHER

## 2018-11-06 NOTE — PROGRESS NOTES
Patient's INR is therapeutic at 2.0.  Instructed to maintain current dose of Warfarin 7.5 mg on Tuesdays and Thursdays; and 5 mg on all other days per dosing calendar given.  Recheck in 1 month.

## 2018-11-13 ENCOUNTER — OFFICE VISIT (OUTPATIENT)
Dept: OPHTHALMOLOGY | Facility: CLINIC | Age: 78
End: 2018-11-13
Payer: MEDICARE

## 2018-11-13 DIAGNOSIS — Z96.1 PSEUDOPHAKIA OF BOTH EYES: Primary | ICD-10-CM

## 2018-11-13 DIAGNOSIS — Z83.518 FAMILY HISTORY OF MACULAR DEGENERATION: ICD-10-CM

## 2018-11-13 PROCEDURE — 99999 PR PBB SHADOW E&M-EST. PATIENT-LVL II: CPT | Mod: PBBFAC,HCNC,, | Performed by: OPHTHALMOLOGY

## 2018-11-13 PROCEDURE — 92014 COMPRE OPH EXAM EST PT 1/>: CPT | Mod: HCNC,S$GLB,, | Performed by: OPHTHALMOLOGY

## 2018-11-13 NOTE — LETTER
November 13, 2018      Jeronimo Barbour, TIFFANIE  [address] [facility] [address] [place] [address] Ophthalmology  [address] [place] [id number]  Phone: [phone]  Fax: [phone]          Patient: Anthony Biggs Jr.   MR Number: [id number]   YOB: 1940   Date of Visit: 11/13/2018       Dear Jeronimo Barbour:    Thank you for referring Anthony Biggs to me for evaluation. Attached you will find relevant portions of my assessment and plan of care.    If you have questions, please do not hesitate to call me. I look forward to following Anthony Biggs along with you.    Sincerely,    Ryan Bermeo MD    Enclosure  CC:  No Recipients    If you would like to receive this communication electronically, please contact [email or web address] or [phone] to request more information on [facility] access.    For providers and/or their staff who would like to refer a patient to [facility], please contact us through our one-stop-shop provider referral line, [facility], at [phone].    If you feel you have received this communication in error or would no longer like to receive these types of communications, please e-mail [email or web address]

## 2018-11-13 NOTE — PROGRESS NOTES
SUBJECTIVE:   [place] DEJA Biggs Jr. is a 78 y.o. male   Uncorrected distance visual acuity was 20/30 in the right eye and 20/30 -2 in the left eye.   Chief Complaint   Patient presents with    Annual Exam     pt states doing well just here for dilation no complaints decline RX         HPI:  HPI     Annual Exam      Additional comments: pt states doing well just here for dilation no   complaints decline RX               Comments     1. Restor IOL OD 12/07 (no glasses at all)  2. Restor IOL OS 11/07  Yag OS 7/08  3. Mild CF OD  4. +FHx AMD          Last edited by Vidhya Calderon MA on 11/13/2018  7:49 AM. (History)        Assessment /Plan :  1. Pseudophakia of both eyes  -- Condition stable, no therapeutic change required. Monitoring routinely.     2. Family history of macular degeneration no disease now     RTC in 1 year or prn any changes

## 2018-11-14 ENCOUNTER — OFFICE VISIT (OUTPATIENT)
Dept: CARDIOLOGY | Facility: CLINIC | Age: 78
End: 2018-11-14
Payer: MEDICARE

## 2018-11-14 VITALS
DIASTOLIC BLOOD PRESSURE: 74 MMHG | BODY MASS INDEX: 34.7 KG/M2 | HEIGHT: 66 IN | WEIGHT: 215.94 LBS | HEART RATE: 84 BPM | SYSTOLIC BLOOD PRESSURE: 128 MMHG

## 2018-11-14 DIAGNOSIS — E11.69 HYPERLIPIDEMIA ASSOCIATED WITH TYPE 2 DIABETES MELLITUS: ICD-10-CM

## 2018-11-14 DIAGNOSIS — E78.5 HYPERLIPIDEMIA ASSOCIATED WITH TYPE 2 DIABETES MELLITUS: ICD-10-CM

## 2018-11-14 DIAGNOSIS — I48.0 PAROXYSMAL ATRIAL FIBRILLATION: ICD-10-CM

## 2018-11-14 DIAGNOSIS — I27.20 PULMONARY HYPERTENSION, MILD: Primary | ICD-10-CM

## 2018-11-14 DIAGNOSIS — G47.33 OSA (OBSTRUCTIVE SLEEP APNEA): ICD-10-CM

## 2018-11-14 DIAGNOSIS — E11.9 DIABETES MELLITUS WITHOUT COMPLICATION: ICD-10-CM

## 2018-11-14 PROCEDURE — 3078F DIAST BP <80 MM HG: CPT | Mod: CPTII,HCNC,S$GLB, | Performed by: INTERNAL MEDICINE

## 2018-11-14 PROCEDURE — 3074F SYST BP LT 130 MM HG: CPT | Mod: CPTII,HCNC,S$GLB, | Performed by: INTERNAL MEDICINE

## 2018-11-14 PROCEDURE — 1101F PT FALLS ASSESS-DOCD LE1/YR: CPT | Mod: CPTII,HCNC,S$GLB, | Performed by: INTERNAL MEDICINE

## 2018-11-14 PROCEDURE — 99999 PR PBB SHADOW E&M-EST. PATIENT-LVL III: CPT | Mod: PBBFAC,HCNC,, | Performed by: INTERNAL MEDICINE

## 2018-11-14 PROCEDURE — 99214 OFFICE O/P EST MOD 30 MIN: CPT | Mod: HCNC,S$GLB,, | Performed by: INTERNAL MEDICINE

## 2018-11-14 RX ORDER — TORSEMIDE 10 MG/1
10 TABLET ORAL DAILY
Qty: 30 TABLET | Refills: 11 | Status: SHIPPED | OUTPATIENT
Start: 2018-11-14 | End: 2019-10-18 | Stop reason: SDUPTHER

## 2018-11-14 NOTE — PROGRESS NOTES
Subjective:   Patient ID:  Anthony Biggs Jr. is a 78 y.o. male who presents for follow-up of med change and did holter  Fatigue improved with more sleep. Pt with mild FLORES.Pt with ? depression    Hypertension   This is a chronic problem. The current episode started more than 1 year ago. The problem has been gradually improving since onset. The problem is controlled. Pertinent negatives include no chest pain, palpitations or shortness of breath. Past treatments include calcium channel blockers and angiotensin blockers. The current treatment provides moderate improvement. There are no compliance problems.    Hyperlipidemia   This is a chronic problem. The current episode started more than 1 year ago. The problem is controlled. Recent lipid tests were reviewed and are variable. Pertinent negatives include no chest pain or shortness of breath. Current antihyperlipidemic treatment includes statins. The current treatment provides moderate improvement of lipids. Compliance problems include medication side effects.    Atrial Fibrillation   Presents for follow-up visit. Symptoms are negative for bradycardia, chest pain, dizziness, palpitations, shortness of breath, syncope, tachycardia and weakness. The symptoms have been stable. Past medical history includes atrial fibrillation and hyperlipidemia. Medication compliance problems include medication side effects.       Review of Systems   Constitution: Negative. Negative for weakness and weight gain.   HENT: Negative.    Eyes: Negative.    Cardiovascular: Negative.  Negative for chest pain, leg swelling, palpitations and syncope.   Respiratory: Negative.  Negative for shortness of breath.    Endocrine: Negative.    Hematologic/Lymphatic: Negative.    Skin: Negative.    Musculoskeletal: Negative for muscle weakness.   Gastrointestinal: Negative.    Genitourinary: Negative.    Neurological: Negative.  Negative for dizziness.   Psychiatric/Behavioral: Negative.     Allergic/Immunologic: Negative.      Family History   Problem Relation Age of Onset    Heart disease Mother     Cancer Father         lung    Macular degeneration Sister     Diabetes Sister     Heart disease Sister     Strabismus Neg Hx     Retinal detachment Neg Hx     Glaucoma Neg Hx     Blindness Neg Hx     Amblyopia Neg Hx      Past Medical History:   Diagnosis Date    *Atrial fibrillation     Abnormal CXR     Angina pectoris 8/28/2017    Atrial fibrillation     Coronary atherosclerosis of unspecified type of vessel, native or graft     Depression     Diabetes mellitus without complication     Emphysema of lung     History of prostate cancer 2007    prostatectomy    Hyperlipidemia associated with type 2 diabetes mellitus     Hypertension associated with diabetes     Intention tremor     SHORTY (obstructive sleep apnea)     Prostate cancer     Trouble in sleeping     Urinary incontinence      Current Outpatient Medications on File Prior to Visit   Medication Sig Dispense Refill    aspirin (ECOTRIN) 81 MG EC tablet Take 81 mg by mouth once daily.      atorvastatin (LIPITOR) 40 MG tablet TAKE ONE TABLET BY MOUTH DAILY 30 tablet 4    diltiaZEM HCl (TIAZAC) 120 mg 24 hr capsule TAKE ONE CAPSULE BY MOUTH DAILY 30 capsule 0    furosemide (LASIX) 40 MG tablet Take 1 tablet (40 mg total) by mouth 2 (two) times daily. 60 tablet 11    losartan (COZAAR) 100 MG tablet Take 1 tablet (100 mg total) by mouth once daily. 90 tablet 3    melatonin 3 mg Tab Take 1 tablet by mouth nightly.      warfarin (JANTOVEN) 5 MG tablet TAKE 1&1/2 TABLET TUESDAYS, THURSDAYS, SATURDAYS AND 1 TABLET ALL OTHER DAYS AS DIRECTED BY COUMADIN DOCTOR. 34 tablet 0     No current facility-administered medications on file prior to visit.      Review of patient's allergies indicates:  No Known Allergies    Objective:     Physical Exam   Constitutional: He is oriented to person, place, and time. He appears well-developed and  well-nourished.   HENT:   Head: Normocephalic and atraumatic.   Eyes: Conjunctivae are normal. Pupils are equal, round, and reactive to light.   Neck: Normal range of motion. Neck supple.   Cardiovascular: Normal rate, normal heart sounds and intact distal pulses. An irregularly irregular rhythm present.   Pulmonary/Chest: Effort normal and breath sounds normal.   Abdominal: Soft. Bowel sounds are normal.   Neurological: He is alert and oriented to person, place, and time.   Skin: Skin is warm and dry.   Psychiatric: He has a normal mood and affect.   Nursing note and vitals reviewed.      Assessment:     1. Pulmonary hypertension, mild    2. Paroxysmal atrial fibrillation    3. Hyperlipidemia associated with type 2 diabetes mellitus    4. Diabetes mellitus without complication    5. SHORTY (obstructive sleep apnea)        Plan:     Pulmonary hypertension, mild    Paroxysmal atrial fibrillation    Hyperlipidemia associated with type 2 diabetes mellitus    Diabetes mellitus without complication    SHORTY (obstructive sleep apnea)      Change lasix to demedex  Continue statin-hlp  Continue diltiazem, losartan, -htn  Continue coumadin-afib

## 2018-11-26 ENCOUNTER — TELEPHONE (OUTPATIENT)
Dept: PAIN MEDICINE | Facility: CLINIC | Age: 78
End: 2018-11-26

## 2018-11-26 NOTE — TELEPHONE ENCOUNTER
----- Message from Kiko Lizarraga sent at 11/26/2018  9:45 AM CST -----  Contact: SPOUSE  Requesting call back regarding getting an appt. Scheduled for pt sooner than next available. Please call back at [phone] or [phone].    Thanks,  Kiko Lizarraga

## 2018-12-01 DIAGNOSIS — Z79.01 LONG TERM (CURRENT) USE OF ANTICOAGULANTS: ICD-10-CM

## 2018-12-03 RX ORDER — WARFARIN SODIUM 5 MG/1
TABLET ORAL
Qty: 34 TABLET | Refills: 0 | Status: SHIPPED | OUTPATIENT
Start: 2018-12-03 | End: 2018-12-28 | Stop reason: SDUPTHER

## 2018-12-04 ENCOUNTER — ANTI-COAG VISIT (OUTPATIENT)
Dept: CARDIOLOGY | Facility: CLINIC | Age: 78
End: 2018-12-04
Payer: MEDICARE

## 2018-12-04 DIAGNOSIS — Z79.01 LONG TERM (CURRENT) USE OF ANTICOAGULANTS: Primary | ICD-10-CM

## 2018-12-04 LAB — INR PPP: 3.3 (ref 2–3)

## 2018-12-04 PROCEDURE — 85610 PROTHROMBIN TIME: CPT | Mod: QW,HCNC,S$GLB, | Performed by: NUCLEAR MEDICINE

## 2018-12-04 PROCEDURE — 99211 OFF/OP EST MAY X REQ PHY/QHP: CPT | Mod: 25,HCNC,S$GLB, | Performed by: NUCLEAR MEDICINE

## 2018-12-04 NOTE — PROGRESS NOTES
Patient's INR is supra-therapeutic at 3.3.  Mr. Biggs states he has been prescribed Norco 10/325mg for pain and is also taking OTC acetaminophen.  Educated patient on potential increase in INR due to high doses of acetaminophen (>2000mg); patient agrees to avoid OTC meds while taking prescribed pain medication. No signs of bleeding noted; advised patient to seek immediate medical attention if he notices any abnormal bleeding.  Instructions given for patient to take coumadin 2.5mg on tomorrow (12/05); then resume current dose of 7.5mg on Tuesdays and Thursdays and 5mg on all other days of the week.  Follow-up in 3 weeks.

## 2018-12-07 ENCOUNTER — HOSPITAL ENCOUNTER (OUTPATIENT)
Dept: RADIOLOGY | Facility: HOSPITAL | Age: 78
Discharge: HOME | End: 2018-12-07
Attending: PAIN MEDICINE
Payer: MEDICARE

## 2018-12-07 DIAGNOSIS — M47.816 ARTHRITIS OF LUMBAR SPINE: ICD-10-CM

## 2018-12-07 PROCEDURE — 72148 MRI LUMBAR SPINE W/O DYE: CPT | Mod: TC,HCNC

## 2018-12-10 ENCOUNTER — TELEPHONE (OUTPATIENT)
Dept: INTERNAL MEDICINE | Facility: CLINIC | Age: 78
End: 2018-12-10

## 2018-12-10 NOTE — TELEPHONE ENCOUNTER
Patients wife came into clinic to discuss patients recent problem with back pain. States patient pulled his back about two weeks ago. He went to see Dr. Wright whom he had seen in previous years. Dr. Pineda ordered an MRI which patient had done last Friday 12/07/18. Wife states that Dr. Wright's office was waiting on the results of MRI but he could not see the patient until Friday 12/14/18 she states that patient is in to much pain to wait until then and that the medication prescribed by Dr. Wright was not helping much only last for about an hour. Patient is unable to move about or get in and out of bed or chair along. I spoke with Dr. Moran he recommended that she speak back with Dr. Wright office in regards to patient being seen sooner and or getting a change in medication. She verbalized understanding she was given a copy of the patients MRI report to take to Dr. Wright.

## 2018-12-12 ENCOUNTER — TELEPHONE (OUTPATIENT)
Dept: CARDIOLOGY | Facility: CLINIC | Age: 78
End: 2018-12-12

## 2018-12-12 NOTE — TELEPHONE ENCOUNTER
----- Message from Kiko Lizarraga sent at 12/12/2018  9:03 AM CST -----  Contact: spouse  Requesting call back regarding questions about pt procedure on 12/13. Please call back at [phone].    Thanks,  Kiko Lizarraga

## 2018-12-12 NOTE — TELEPHONE ENCOUNTER
I spoke to patients spouse.She stated the problem or question has been resolved and she does not need our assistance.

## 2018-12-19 ENCOUNTER — LAB VISIT (OUTPATIENT)
Dept: LAB | Facility: HOSPITAL | Age: 78
End: 2018-12-19
Attending: INTERNAL MEDICINE
Payer: MEDICARE

## 2018-12-19 DIAGNOSIS — E11.9 DIABETES MELLITUS WITHOUT COMPLICATION: ICD-10-CM

## 2018-12-19 LAB
ANION GAP SERPL CALC-SCNC: 8 MMOL/L
BASOPHILS # BLD AUTO: 0.01 K/UL
BASOPHILS NFR BLD: 0.1 %
BUN SERPL-MCNC: 23 MG/DL
CALCIUM SERPL-MCNC: 9.9 MG/DL
CHLORIDE SERPL-SCNC: 99 MMOL/L
CHOLEST SERPL-MCNC: 154 MG/DL
CHOLEST/HDLC SERPL: 2.4 {RATIO}
CO2 SERPL-SCNC: 27 MMOL/L
CREAT SERPL-MCNC: 0.9 MG/DL
DIFFERENTIAL METHOD: ABNORMAL
EOSINOPHIL # BLD AUTO: 0.1 K/UL
EOSINOPHIL NFR BLD: 0.5 %
ERYTHROCYTE [DISTWIDTH] IN BLOOD BY AUTOMATED COUNT: 12.2 %
EST. GFR  (AFRICAN AMERICAN): >60 ML/MIN/1.73 M^2
EST. GFR  (NON AFRICAN AMERICAN): >60 ML/MIN/1.73 M^2
ESTIMATED AVG GLUCOSE: 163 MG/DL
GLUCOSE SERPL-MCNC: 122 MG/DL
HBA1C MFR BLD HPLC: 7.3 %
HCT VFR BLD AUTO: 41.5 %
HDLC SERPL-MCNC: 64 MG/DL
HDLC SERPL: 41.6 %
HGB BLD-MCNC: 13.9 G/DL
IMM GRANULOCYTES # BLD AUTO: 0.07 K/UL
IMM GRANULOCYTES NFR BLD AUTO: 0.5 %
LDLC SERPL CALC-MCNC: 79.2 MG/DL
LYMPHOCYTES # BLD AUTO: 3.2 K/UL
LYMPHOCYTES NFR BLD: 24.1 %
MCH RBC QN AUTO: 29.5 PG
MCHC RBC AUTO-ENTMCNC: 33.5 G/DL
MCV RBC AUTO: 88 FL
MONOCYTES # BLD AUTO: 1.1 K/UL
MONOCYTES NFR BLD: 8 %
NEUTROPHILS # BLD AUTO: 8.9 K/UL
NEUTROPHILS NFR BLD: 66.8 %
NONHDLC SERPL-MCNC: 90 MG/DL
NRBC BLD-RTO: 0 /100 WBC
PLATELET # BLD AUTO: 287 K/UL
PMV BLD AUTO: 10.6 FL
POTASSIUM SERPL-SCNC: 4.6 MMOL/L
RBC # BLD AUTO: 4.71 M/UL
SODIUM SERPL-SCNC: 134 MMOL/L
TRIGL SERPL-MCNC: 54 MG/DL
TSH SERPL DL<=0.005 MIU/L-ACNC: 0.8 UIU/ML
WBC # BLD AUTO: 13.28 K/UL

## 2018-12-19 PROCEDURE — 84443 ASSAY THYROID STIM HORMONE: CPT | Mod: HCNC

## 2018-12-19 PROCEDURE — 83036 HEMOGLOBIN GLYCOSYLATED A1C: CPT | Mod: HCNC

## 2018-12-19 PROCEDURE — 80061 LIPID PANEL: CPT | Mod: HCNC

## 2018-12-19 PROCEDURE — 85025 COMPLETE CBC W/AUTO DIFF WBC: CPT | Mod: HCNC

## 2018-12-19 PROCEDURE — 36415 COLL VENOUS BLD VENIPUNCTURE: CPT | Mod: HCNC,PO

## 2018-12-19 PROCEDURE — 80048 BASIC METABOLIC PNL TOTAL CA: CPT | Mod: HCNC

## 2018-12-26 ENCOUNTER — OFFICE VISIT (OUTPATIENT)
Dept: INTERNAL MEDICINE | Facility: CLINIC | Age: 78
End: 2018-12-26
Payer: MEDICARE

## 2018-12-26 VITALS
OXYGEN SATURATION: 96 % | WEIGHT: 220.44 LBS | DIASTOLIC BLOOD PRESSURE: 82 MMHG | BODY MASS INDEX: 35.43 KG/M2 | HEIGHT: 66 IN | SYSTOLIC BLOOD PRESSURE: 124 MMHG | TEMPERATURE: 99 F | HEART RATE: 92 BPM

## 2018-12-26 DIAGNOSIS — I15.2 HYPERTENSION ASSOCIATED WITH DIABETES: ICD-10-CM

## 2018-12-26 DIAGNOSIS — E11.59 HYPERTENSION ASSOCIATED WITH DIABETES: ICD-10-CM

## 2018-12-26 DIAGNOSIS — Z79.01 CURRENT USE OF LONG TERM ANTICOAGULATION: ICD-10-CM

## 2018-12-26 DIAGNOSIS — I27.20 PULMONARY HYPERTENSION, MILD: ICD-10-CM

## 2018-12-26 DIAGNOSIS — G25.2 INTENTION TREMOR: ICD-10-CM

## 2018-12-26 DIAGNOSIS — R93.89 ABNORMAL CXR: ICD-10-CM

## 2018-12-26 DIAGNOSIS — I48.0 PAROXYSMAL ATRIAL FIBRILLATION: ICD-10-CM

## 2018-12-26 DIAGNOSIS — Z00.00 ROUTINE GENERAL MEDICAL EXAMINATION AT A HEALTH CARE FACILITY: Primary | ICD-10-CM

## 2018-12-26 DIAGNOSIS — E11.9 DIABETES MELLITUS WITHOUT COMPLICATION: ICD-10-CM

## 2018-12-26 DIAGNOSIS — E78.5 HYPERLIPIDEMIA ASSOCIATED WITH TYPE 2 DIABETES MELLITUS: ICD-10-CM

## 2018-12-26 DIAGNOSIS — G47.33 OSA (OBSTRUCTIVE SLEEP APNEA): ICD-10-CM

## 2018-12-26 DIAGNOSIS — Z85.46 HISTORY OF PROSTATE CANCER: ICD-10-CM

## 2018-12-26 DIAGNOSIS — E11.69 HYPERLIPIDEMIA ASSOCIATED WITH TYPE 2 DIABETES MELLITUS: ICD-10-CM

## 2018-12-26 DIAGNOSIS — I25.10 ATHEROSCLEROSIS OF NATIVE CORONARY ARTERY OF NATIVE HEART WITHOUT ANGINA PECTORIS: ICD-10-CM

## 2018-12-26 DIAGNOSIS — K57.30 DIVERTICULOSIS OF LARGE INTESTINE WITHOUT HEMORRHAGE: Chronic | ICD-10-CM

## 2018-12-26 DIAGNOSIS — Z86.010 HISTORY OF COLON POLYPS: ICD-10-CM

## 2018-12-26 DIAGNOSIS — I77.1 TORTUOUS AORTA: ICD-10-CM

## 2018-12-26 PROCEDURE — 3079F DIAST BP 80-89 MM HG: CPT | Mod: CPTII,HCNC,S$GLB, | Performed by: INTERNAL MEDICINE

## 2018-12-26 PROCEDURE — 99999 PR PBB SHADOW E&M-EST. PATIENT-LVL III: CPT | Mod: PBBFAC,HCNC,, | Performed by: INTERNAL MEDICINE

## 2018-12-26 PROCEDURE — 99397 PER PM REEVAL EST PAT 65+ YR: CPT | Mod: HCNC,S$GLB,, | Performed by: INTERNAL MEDICINE

## 2018-12-26 PROCEDURE — 99499 UNLISTED E&M SERVICE: CPT | Mod: HCNC,S$GLB,, | Performed by: INTERNAL MEDICINE

## 2018-12-26 PROCEDURE — 3074F SYST BP LT 130 MM HG: CPT | Mod: CPTII,HCNC,S$GLB, | Performed by: INTERNAL MEDICINE

## 2018-12-26 RX ORDER — HYDROCODONE BITARTRATE AND ACETAMINOPHEN 10; 325 MG/1; MG/1
10-325 TABLET ORAL DAILY PRN
COMMUNITY
Start: 2018-11-28 | End: 2019-06-26

## 2018-12-26 RX ORDER — METFORMIN HYDROCHLORIDE 500 MG/1
500 TABLET ORAL 2 TIMES DAILY WITH MEALS
Qty: 180 TABLET | Refills: 3 | Status: SHIPPED | OUTPATIENT
Start: 2018-12-26 | End: 2019-12-11 | Stop reason: SDUPTHER

## 2018-12-26 NOTE — PROGRESS NOTES
HPI:  Patient is a 78-year-old man who comes in today for follow-up of his diabetes, hypertension, lipids, coronary disease, and for his annual physical exam.  He has been having problems with a herniated disc.  He has had 1 steroid injection already.  Patient is scheduled for repeat injection next month.  He otherwise, has been doing well.  He has no other complaints.  He denies any chest pain or shortness of breath.  He denies any palpitations.      Current MEDS: medcard review, verified and update  Allergies: Per the electronic medical record    Past Medical History:   Diagnosis Date    Abnormal CXR     Angina pectoris 8/28/2017    Atrial fibrillation     Coronary atherosclerosis of unspecified type of vessel, native or graft     Depression     Diabetes mellitus without complication     Emphysema of lung     History of prostate cancer 2007    prostatectomy    Hyperlipidemia associated with type 2 diabetes mellitus     Hypertension associated with diabetes     Intention tremor     SHORTY (obstructive sleep apnea)     Prostate cancer     Trouble in sleeping     Urinary incontinence        Past Surgical History:   Procedure Laterality Date    ABDOMINAL HERNIA REPAIR      APPENDECTOMY      bladder sx      CARDIAC CATHETERIZATION      CATARACT EXTRACTION W/  INTRAOCULAR LENS IMPLANT  Restor OU    COLONOSCOPY N/A 5/16/2017    Performed by Alfredo [facility]idu MD at [facility] ENDO    HEART CATH-BILATERAL Bilateral 8/28/2017    Performed by Darryl Griffith MD at [facility] CATH LAB    inguinal hernia      lung sx      PROSTATE SURGERY         SHx: per the electronic medical record    FHx: recorded in the electronic medical record    ROS:    denies any chest pains or shortness of breath. Denies any nausea, vomiting or diarrhea. Denies any fever, chills or sweats. Denies any change in weight, voice, stool, skin or hair. Denies any dysuria, dyspepsia or dysphagia. Denies any change in vision, hearing or headaches.  "Denies any swollen lymph nodes or loss of memory.    PE:  /82 (BP Location: Right arm, Patient Position: Sitting, BP Method: Medium (Manual))   Pulse 92   Temp 99 °F (37.2 °C) (Tympanic)   Ht 5' 6" (1.676 m)   Wt 100 kg (220 lb 7.4 oz)   SpO2 96%   BMI 35.58 kg/m²   Gen: Well-developed, well-nourished, male, in no acute distress, oriented x3  HEENT: neck is supple, no adenopathy, carotids 2+ equal without bruits, thyroid exam normal size without nodules.  CHEST: clear to auscultation and percussion  CVS: regular rate and rhythm without significant murmur, gallop, or rubs  ABD: soft, benign, no rebound no guarding, no distention.  Bowel sounds are normal.     nontender.  No palpable masses.  No organomegaly and no audible bruits.  RECTAL:  Deferred  EXT: no clubbing, cyanosis, or edema  LYMPH: no cervical, inguinal, or axillary adenopathy  FEET: no loss of sensation.  No ulcers or pressure sores.  Monofilament testing normal  NEURO: gait normal.  Cranial nerves II- XII intact. No nystagmus.  Speech normal.   Gross motor and sensory unremarkable.    Lab Results   Component Value Date    WBC 13.28 (H) 12/19/2018    HGB 13.9 (L) 12/19/2018    HCT 41.5 12/19/2018     12/19/2018    CHOL 154 12/19/2018    TRIG 54 12/19/2018    HDL 64 12/19/2018    ALT 39 06/19/2018    AST 33 06/19/2018     (L) 12/19/2018    K 4.6 12/19/2018    CL 99 12/19/2018    CREATININE 0.9 12/19/2018    BUN 23 12/19/2018    CO2 27 12/19/2018    TSH 0.803 12/19/2018    PSA <0.010 08/20/2013    INR 3.3 (A) 12/04/2018    HGBA1C 7.3 (H) 12/19/2018       Impression:  Lumbar radiculopathy secondary to herniated disc  Diabetes, his hemoglobin A1c has continued to increase  Multiple other medical problems below, stable  Patient Active Problem List   Diagnosis    Atrial fibrillation    Coronary atherosclerosis    History of prostate cancer    Abnormal CXR    Refractive error    Family history of macular degeneration    Posterior " capsular opacification    Pseudophakia of both eyes    History of colon polyps    Diverticulosis of large intestine without hemorrhage    Current use of long term anticoagulation    Pulmonary hypertension, mild    SHORTY (obstructive sleep apnea)    Diabetes mellitus without complication    Hyperlipidemia associated with type 2 diabetes mellitus    Hypertension associated with diabetes    Intention tremor    Tortuous aorta       Plan:   Orders Placed This Encounter    Comprehensive metabolic panel    Lipid panel    Hemoglobin A1c    metFORMIN (GLUCOPHAGE) 500 MG tablet     He was started on metformin 500 mg twice a day.  He will be seen again in 6 months with above lab work.  We discussed doing flexion and extension exercises for his back.  He needs to be walking as much as he can.  He needs to avoid simple carbohydrates in his diet.

## 2018-12-26 NOTE — PROGRESS NOTES
Patient, Anthony Biggs Jr. (MRN #[id number]), presented with a recorded BMI of 35.58 kg/m^2 and a documented comorbidity(s):  - Diabetes Mellitus Type 2  - Obstructive Sleep Apnea  - Hypertension  - Hyperlipidemia  - Atrial Fibrillation  to which the severe obesity is a contributing factor. This is consistent with the definition of severe obesity (BMI 35.0-39.9) with comorbidity (ICD-10 E66.01, Z68.35). The patient's severe obesity was monitored, evaluated, addressed and/or treated. This addendum to the medical record is made on 12/26/2018.

## 2018-12-27 ENCOUNTER — ANTI-COAG VISIT (OUTPATIENT)
Dept: CARDIOLOGY | Facility: CLINIC | Age: 78
End: 2018-12-27
Payer: MEDICARE

## 2018-12-27 DIAGNOSIS — Z79.01 LONG TERM (CURRENT) USE OF ANTICOAGULANTS: Primary | ICD-10-CM

## 2018-12-27 LAB — INR PPP: 3.2 (ref 2–3)

## 2018-12-27 PROCEDURE — 85610 PROTHROMBIN TIME: CPT | Mod: QW,HCNC,S$GLB, | Performed by: INTERNAL MEDICINE

## 2018-12-27 PROCEDURE — 99211 OFF/OP EST MAY X REQ PHY/QHP: CPT | Mod: 25,HCNC,S$GLB, | Performed by: INTERNAL MEDICINE

## 2018-12-27 NOTE — PROGRESS NOTES
Patient's INR is supra-therapeutic at 3.2.  Reports receiving an injection to his back about 2 weeks ago and taking a muscle relaxer as needed.  Educated patient on potential increase in INR due to injection.  No signs of bleeding noted; advised patient to seek immediate medical attention if he notices any abnormal bleeding.  Instructions given for patient to take coumadin 2.5mg on tomorrow (12/28); then resume current dose of 7.5mg on Tuesdays and Thursdays and 5mg on all other days of the week.  Patient voiced understanding.  Follow-up in 2 weeks.

## 2018-12-28 DIAGNOSIS — Z79.01 LONG TERM (CURRENT) USE OF ANTICOAGULANTS: ICD-10-CM

## 2018-12-28 RX ORDER — WARFARIN SODIUM 5 MG/1
TABLET ORAL
Qty: 34 TABLET | Refills: 0 | Status: SHIPPED | OUTPATIENT
Start: 2018-12-28 | End: 2019-01-29 | Stop reason: SDUPTHER

## 2019-01-03 ENCOUNTER — CLINICAL SUPPORT (OUTPATIENT)
Dept: DIABETES | Facility: CLINIC | Age: 79
End: 2019-01-03
Payer: MEDICARE

## 2019-01-03 VITALS — BODY MASS INDEX: 34.51 KG/M2 | HEIGHT: 66 IN | WEIGHT: 214.75 LBS

## 2019-01-03 DIAGNOSIS — E11.9 TYPE 2 DIABETES MELLITUS WITHOUT COMPLICATION, UNSPECIFIED WHETHER LONG TERM INSULIN USE: Primary | ICD-10-CM

## 2019-01-03 PROCEDURE — 99999 PR PBB SHADOW E&M-EST. PATIENT-LVL III: ICD-10-PCS | Mod: PBBFAC,HCNC,, | Performed by: DIETITIAN, REGISTERED

## 2019-01-03 PROCEDURE — G0108 PR DIAB MANAGE TRN  PER INDIV: ICD-10-PCS | Mod: HCNC,S$GLB,, | Performed by: DIETITIAN, REGISTERED

## 2019-01-03 PROCEDURE — G0108 DIAB MANAGE TRN  PER INDIV: HCPCS | Mod: HCNC,S$GLB,, | Performed by: DIETITIAN, REGISTERED

## 2019-01-03 PROCEDURE — 99999 PR PBB SHADOW E&M-EST. PATIENT-LVL III: CPT | Mod: PBBFAC,HCNC,, | Performed by: DIETITIAN, REGISTERED

## 2019-01-03 NOTE — PROGRESS NOTES
"Diabetes Education  Author: Nanci Snow RD  Date: 1/3/2019    Diabetes Care Management Summary  Diabetes Education Record Assessment/Progress: Initial  Current Diabetes Risk Level: Moderate     Referring Provider: Janes Feng  78 y.o. male in clinic today with wife for diabetes education. Patient has not taken diabetes education courses in the past, as recently diagnosed.   Wife requested today's appt to clarify many questions regarding diabetes care. Pt aslo with ht ds and need help putting low fat, low sodium diet together with carb controlled diet.     Weight: 97.4 kg (214 lb 11.7 oz)   Height: 5' 6" (167.6 cm)     Lab Results   Component Value Date    HGBA1C 7.3 (H) 12/19/2018       Diabetes Type  Diabetes Type : Type II    Diabetes History  Diabetes Diagnosis: 0-1 year  Current Treatment: Oral Medication, Diet  Reviewed Problem List with Patient: Yes  DM medications:  Metformin, 500 mg bid     Health Maintenance was reviewed today with patient. Discussed with patient importance of routine eye exams, foot exams/foot care, blood work (i.e.: A1c, microalbumin, and lipid), dental visits, yearly flu vaccine, and pneumonia vaccine as indicated by PCP. Patient verbalized understanding.     Health Maintenance Topics with due status: Not Due       Topic Last Completion Date    TETANUS VACCINE 09/16/2016    Colonoscopy 05/16/2017    Eye Exam 11/13/2018    Lipid Panel 12/19/2018    Hemoglobin A1c 12/19/2018     Health Maintenance Due   Topic Date Due    Foot Exam  08/16/1950     Protective Sensation (w/ 10 gram monofilament):  Right: Intact  Left: Intact    Visual Inspection:  Nails Intact - without Evidence of Foot Deformity- Bilateral and Dry Skin -  Bilateral - flaky skin on bottom of feet; thick nails.     Pedal Pulses:   Right: Present  Left: Present       Nutrition  Meal Planning: 3 meals per day, water  What type of sweetener do you use?: Sweet N Low  What type of beverages do you drink?: water, milk, " other (see comments)(coffee - 2 cups per day with nondairy creamer)  Meal Plan 24 Hour Recall - Breakfast: Crispix cereal, half banana, skim milk  Meal Plan 24 Hour Recall - Lunch: pork roast, cabbage, salad, corn, water  Meal Plan 24 Hour Recall - Dinner: 1/2 hamburger steak (lean meat), cabbage, salad, corn, cookies, water  Meal Plan 24 Hour Recall - Snack: cheese crackers(doesn't snack a lot)    Monitoring   Self Monitoring : gave meter today  Blood Glucose Logs: No  Do you use a personal continuous glucose monitor?: No  In the last month, how often have you had a low blood sugar reaction?: (not yet checking bg)    Exercise   Exercise Type: none(none at this time due to bulging disc in back )    Current Diabetes Treatment   Current Treatment: Oral Medication, Diet    Social History  Primary Support: Self, Spouse  Occupation: retired  Smoking Status: Ex Smoker  Alcohol Use: Daily(rum and Diet Coke)                                Barriers to Change  Barriers to Change: None  Learning Challenges : None, Other(pt and wife concerned that they won't remember everything being taught/discussed)    Readiness to Learn   Readiness to Learn : Eager    Cultural Influences  Cultural Influences: No    Diabetes Education Assessment/Progress  Diabetes Disease Process (diabetes disease process and treatment options): Discussion, Individual Session, Demonstrates Understanding/Competency(verbalizes/demonstrates)  Nutrition (Incorporating nutritional management into one's lifestyle): Discussion, Instructed, Individual Session, Demonstrates Understanding/Competency (verbalizes/demonstrates), Written Materials Provided  Physical Activity (incorporating physical activity into one's lifestyle): Discussion, Individual Session, Demonstrates Understanding/Competency (verbalizes/demonstrates)  Medications (states correct name, dose, onset, peak, duration, side effects & timing of meds): Discussion, Individual Session, Demonstrates  Understanding/Competency(verbalizes/demonstrates)  Monitoring (monitoring blood glucose/other parameters & using results): Discussion, Instructed, Individual Session, Demonstrates Understanding/Competency (verbalizes/demonstrates), Written Materials Provided  Acute Complications (preventing, detecting, and treating acute complications): Discussion, Instructed, Individual Session, Demonstrates Understanding/Competency (verbalizes/demonstrates), Written Materials Provided  Chronic Complications (preventing, detecting, and treating chronic complications): Discussion, Individual Session, Demonstrates Understanding/Competency (verbalizes/demonstrates)  Clinical (diabetes, other pertinent medical history, and relevant comorbidities reviewed during visit): Discussion, Individual Session  Cognitive (knowledge of self-management skills, functional health literacy): Discussion, Individual Session  Psychosocial (emotional response to diabetes): Discussion, Individual Session  Diabetes Distress and Support Systems: Not Covered/Deferred(newly diagnosed and couldn't answer questions; deferred to next appt)  Behavioral (readiness for change, lifestyle practices, self-care behaviors): Discussion, Individual Session    Goals  Patient has selected/evaluated goals during today's session: Yes, selected  Healthy Eating: Set(Eat whole grain bread, and brown rice; Decrease portions of starch, fruit and milk; Add protein such as eggs/Egg Beaters to breakfast with small cup of cereal)  Start Date: 01/03/19  Target Date: 04/03/19  Physical Activity: Set(start walking or riding bike when back pain subsides)  Start Date: 01/03/19  Target Date: 04/03/19  Monitoring: Set(check bg daily - alternating fasting and pp)  Start Date: 01/03/19  Target Date: 04/03/19         Diabetes Care Plan/Intervention  Education Plan/Intervention: Individual Follow-Up DSMT   F/u in 6 wksl   Gave meter and demonstrated use.     Diabetes Meal Plan  Calories:  1800  Carbohydrate Per Meal: 30-45g  Carbohydrate Per Snack : 15-20g    Today's Self-Management Care Plan was developed with the patient's input and is based on barriers identified during today's assessment.    The long and short-term goals in the care plan were written with the patient/caregiver's input. The patient has agreed to work toward these goals to improve his overall diabetes control.      The patient received a copy of today's self-management plan and verbalized understanding of the care plan, goals, and all of today's instructions.      The patient was encouraged to communicate with his physician and care team regarding his condition(s) and treatment.  I provided the patient with my contact information today and encouraged him to contact me via phone or patient portal as needed.     Education Units of Time   Time Spent: 60 min

## 2019-01-03 NOTE — PATIENT INSTRUCTIONS
Eat whole grain bread, and brown rice.     Decrease portions of starch, fruit and milk    Add protein such as eggs/Egg Beaters to breakfast with small cup of cereal

## 2019-01-03 NOTE — LETTER
January 3, 2019        Giuliano Moran MD  [address]  [address]  [facility] [address]             [facility]  [address] [place] [address]  Phone: [phone]  Fax: [phone]   Patient: Anthony Biggs Jr.   MR Number: [id number]   YOB: 1940   Date of Visit: 1/3/2019       Dear Dr. Moran:    Thank you for referring Anthony Biggs to me for evaluation. Below are the relevant portions of my assessment and plan of care.       If you have questions, please do not hesitate to call me. I look forward to following Anthony along with you.    Sincerely,      Nanci Snow RD           CC  No Recipients

## 2019-01-07 ENCOUNTER — TELEPHONE (OUTPATIENT)
Dept: INTERNAL MEDICINE | Facility: CLINIC | Age: 79
End: 2019-01-07

## 2019-01-07 NOTE — TELEPHONE ENCOUNTER
----- Message from Alondra Hanson sent at 1/7/2019  8:40 AM CST -----  Contact: Pt wife - be   States that pt is having issue with sleeping and wants to have some sleeping pills called in for him and can be reached at [phone] //[email or web address]     [id number] [place] - [address]  [address] [facility] [address]  Phone: [phone] Fax: [phone]

## 2019-01-10 ENCOUNTER — ANTI-COAG VISIT (OUTPATIENT)
Dept: CARDIOLOGY | Facility: CLINIC | Age: 79
End: 2019-01-10
Payer: MEDICARE

## 2019-01-10 DIAGNOSIS — Z79.01 LONG TERM (CURRENT) USE OF ANTICOAGULANTS: Primary | ICD-10-CM

## 2019-01-10 LAB — INR PPP: 3.6 (ref 2–3)

## 2019-01-10 PROCEDURE — 99211 PR OFFICE/OUTPT VISIT, EST, LEVL I: ICD-10-PCS | Mod: 25,HCNC,S$GLB, | Performed by: INTERNAL MEDICINE

## 2019-01-10 PROCEDURE — 85610 PROTHROMBIN TIME: CPT | Mod: QW,HCNC,S$GLB, | Performed by: INTERNAL MEDICINE

## 2019-01-10 PROCEDURE — 99211 OFF/OP EST MAY X REQ PHY/QHP: CPT | Mod: 25,HCNC,S$GLB, | Performed by: INTERNAL MEDICINE

## 2019-01-10 PROCEDURE — 85610 POCT INR: ICD-10-PCS | Mod: QW,HCNC,S$GLB, | Performed by: INTERNAL MEDICINE

## 2019-01-10 NOTE — PROGRESS NOTES
"Patient's INR is supra therapeutic today at 3.6.  Reports medication changes:  Started Glucophage on 12/26/2018 and c/o of right hip pain - stated, "injection didn't work".  No signs of any abnormal bleeding reported at present.  Patient has taken his Warfarin this morning.  Instructed patient to eat a small portion (1/2 cup) of a dark leafy vegetable today, hold Warfarin dose tomorrow, then start new dose of Warfarin 7.5 mg every Tuesday and 5 mg on all other days per week.  Dosing calendar given.  Recheck in 2 weeks.  Advised to seek medical attention (ER) for any abnormal bleeding, if needed.  Patient voiced understanding.  "

## 2019-01-17 DIAGNOSIS — E78.00 PURE HYPERCHOLESTEROLEMIA: ICD-10-CM

## 2019-01-17 RX ORDER — ATORVASTATIN CALCIUM 40 MG/1
TABLET, FILM COATED ORAL
Qty: 30 TABLET | Refills: 3 | Status: SHIPPED | OUTPATIENT
Start: 2019-01-17 | End: 2019-05-29 | Stop reason: SDUPTHER

## 2019-01-18 ENCOUNTER — OFFICE VISIT (OUTPATIENT)
Dept: INTERNAL MEDICINE | Facility: CLINIC | Age: 79
End: 2019-01-18
Payer: MEDICARE

## 2019-01-18 VITALS
HEART RATE: 90 BPM | SYSTOLIC BLOOD PRESSURE: 126 MMHG | BODY MASS INDEX: 32.94 KG/M2 | DIASTOLIC BLOOD PRESSURE: 84 MMHG | WEIGHT: 209.88 LBS | TEMPERATURE: 98 F | HEIGHT: 67 IN | OXYGEN SATURATION: 97 %

## 2019-01-18 DIAGNOSIS — I77.1 TORTUOUS AORTA: ICD-10-CM

## 2019-01-18 DIAGNOSIS — E11.59 HYPERTENSION ASSOCIATED WITH DIABETES: ICD-10-CM

## 2019-01-18 DIAGNOSIS — I15.2 HYPERTENSION ASSOCIATED WITH DIABETES: ICD-10-CM

## 2019-01-18 DIAGNOSIS — E11.69 HYPERLIPIDEMIA ASSOCIATED WITH TYPE 2 DIABETES MELLITUS: ICD-10-CM

## 2019-01-18 DIAGNOSIS — I25.10 ATHEROSCLEROSIS OF NATIVE CORONARY ARTERY OF NATIVE HEART WITHOUT ANGINA PECTORIS: ICD-10-CM

## 2019-01-18 DIAGNOSIS — E78.5 HYPERLIPIDEMIA ASSOCIATED WITH TYPE 2 DIABETES MELLITUS: ICD-10-CM

## 2019-01-18 DIAGNOSIS — E11.9 DIABETES MELLITUS WITHOUT COMPLICATION: Primary | ICD-10-CM

## 2019-01-18 PROCEDURE — 3079F PR MOST RECENT DIASTOLIC BLOOD PRESSURE 80-89 MM HG: ICD-10-PCS | Mod: CPTII,HCNC,S$GLB, | Performed by: INTERNAL MEDICINE

## 2019-01-18 PROCEDURE — 3074F SYST BP LT 130 MM HG: CPT | Mod: CPTII,HCNC,S$GLB, | Performed by: INTERNAL MEDICINE

## 2019-01-18 PROCEDURE — 99999 PR PBB SHADOW E&M-EST. PATIENT-LVL III: ICD-10-PCS | Mod: PBBFAC,HCNC,, | Performed by: INTERNAL MEDICINE

## 2019-01-18 PROCEDURE — 3079F DIAST BP 80-89 MM HG: CPT | Mod: CPTII,HCNC,S$GLB, | Performed by: INTERNAL MEDICINE

## 2019-01-18 PROCEDURE — 1101F PR PT FALLS ASSESS DOC 0-1 FALLS W/OUT INJ PAST YR: ICD-10-PCS | Mod: CPTII,HCNC,S$GLB, | Performed by: INTERNAL MEDICINE

## 2019-01-18 PROCEDURE — 99213 PR OFFICE/OUTPT VISIT, EST, LEVL III, 20-29 MIN: ICD-10-PCS | Mod: HCNC,S$GLB,, | Performed by: INTERNAL MEDICINE

## 2019-01-18 PROCEDURE — 99999 PR PBB SHADOW E&M-EST. PATIENT-LVL III: CPT | Mod: PBBFAC,HCNC,, | Performed by: INTERNAL MEDICINE

## 2019-01-18 PROCEDURE — 3074F PR MOST RECENT SYSTOLIC BLOOD PRESSURE < 130 MM HG: ICD-10-PCS | Mod: CPTII,HCNC,S$GLB, | Performed by: INTERNAL MEDICINE

## 2019-01-18 PROCEDURE — 1101F PT FALLS ASSESS-DOCD LE1/YR: CPT | Mod: CPTII,HCNC,S$GLB, | Performed by: INTERNAL MEDICINE

## 2019-01-18 PROCEDURE — 99213 OFFICE O/P EST LOW 20 MIN: CPT | Mod: HCNC,S$GLB,, | Performed by: INTERNAL MEDICINE

## 2019-01-18 RX ORDER — ZOLPIDEM TARTRATE 5 MG/1
5 TABLET ORAL NIGHTLY PRN
Qty: 20 TABLET | Refills: 5 | Status: SHIPPED | OUTPATIENT
Start: 2019-01-18 | End: 2019-06-26

## 2019-01-18 NOTE — PROGRESS NOTES
"HPI:  Patient is a 78-year-old man who comes today with complaints of episodes where he just feels extremely cold from different body levels all the way down.  One time it was from the nipples of his chest always to his feet.  This is another time it was from his knees down to his feet.  Another time it was just the ankles.  Each time it has been bilateral and at the same symmetrical level.    Current meds have been verified and updated per the EMR  Exam:/84 (BP Location: Right arm, Patient Position: Sitting, BP Method: Medium (Manual))   Pulse 90   Temp 98.2 °F (36.8 °C) (Tympanic)   Ht 5' 7" (1.702 m)   Wt 95.2 kg (209 lb 14.1 oz)   SpO2 97%   BMI 32.87 kg/m²   Exam deferred    Lab Results   Component Value Date    WBC 13.28 (H) 12/19/2018    HGB 13.9 (L) 12/19/2018    HCT 41.5 12/19/2018     12/19/2018    CHOL 154 12/19/2018    TRIG 54 12/19/2018    HDL 64 12/19/2018    ALT 39 06/19/2018    AST 33 06/19/2018     (L) 12/19/2018    K 4.6 12/19/2018    CL 99 12/19/2018    CREATININE 0.9 12/19/2018    BUN 23 12/19/2018    CO2 27 12/19/2018    TSH 0.803 12/19/2018    PSA <0.010 08/20/2013    INR 3.6 (A) 01/10/2019    HGBA1C 7.3 (H) 12/19/2018       Impression:  Unusual symptoms I just keep the find no way to explain.  Patient was reassured.  I suspect this just anxiety  Patient Active Problem List   Diagnosis    Atrial fibrillation    Coronary atherosclerosis    History of prostate cancer    Abnormal CXR    Refractive error    Family history of macular degeneration    Posterior capsular opacification    Pseudophakia of both eyes    History of colon polyps    Diverticulosis of large intestine without hemorrhage    Current use of long term anticoagulation    Pulmonary hypertension, mild    SHORTY (obstructive sleep apnea)    Diabetes mellitus without complication    Hyperlipidemia associated with type 2 diabetes mellitus    Hypertension associated with diabetes    Intention tremor "    Tortuous aorta       Plan:  Orders Placed This Encounter    zolpidem (AMBIEN) 5 MG Tab     He was given some Ambien force helping to sleep at night.  He will see me at his regular scheduled appointment or otherwise as needed

## 2019-01-25 ENCOUNTER — ANTI-COAG VISIT (OUTPATIENT)
Dept: CARDIOLOGY | Facility: CLINIC | Age: 79
End: 2019-01-25
Payer: MEDICARE

## 2019-01-25 DIAGNOSIS — Z79.01 LONG TERM (CURRENT) USE OF ANTICOAGULANTS: Primary | ICD-10-CM

## 2019-01-25 LAB — INR PPP: 2.4 (ref 2–3)

## 2019-01-25 PROCEDURE — 85610 POCT INR: ICD-10-PCS | Mod: QW,HCNC,S$GLB, | Performed by: INTERNAL MEDICINE

## 2019-01-25 PROCEDURE — 85610 PROTHROMBIN TIME: CPT | Mod: QW,HCNC,S$GLB, | Performed by: INTERNAL MEDICINE

## 2019-01-25 NOTE — PROGRESS NOTES
Patient's INR is therapeutic at 2.4.  Reports no recent changes.  Instructed to maintain current dose of Warfarin 7.5 mg every Tuesday and 5 mg on all other days per dosing calendar given.  Recheck in 1 month.

## 2019-01-29 DIAGNOSIS — Z79.01 LONG TERM (CURRENT) USE OF ANTICOAGULANTS: ICD-10-CM

## 2019-01-29 RX ORDER — WARFARIN SODIUM 5 MG/1
TABLET ORAL
Qty: 34 TABLET | Refills: 0 | Status: SHIPPED | OUTPATIENT
Start: 2019-01-29 | End: 2019-03-05 | Stop reason: SDUPTHER

## 2019-02-14 ENCOUNTER — NUTRITION (OUTPATIENT)
Dept: DIABETES | Facility: CLINIC | Age: 79
End: 2019-02-14
Payer: MEDICARE

## 2019-02-14 VITALS — BODY MASS INDEX: 31.37 KG/M2 | WEIGHT: 199.88 LBS | HEIGHT: 67 IN

## 2019-02-14 DIAGNOSIS — E11.69 TYPE 2 DIABETES MELLITUS WITH OTHER SPECIFIED COMPLICATION, WITHOUT LONG-TERM CURRENT USE OF INSULIN: ICD-10-CM

## 2019-02-14 DIAGNOSIS — E11.9 TYPE 2 DIABETES MELLITUS WITHOUT COMPLICATION, WITHOUT LONG-TERM CURRENT USE OF INSULIN: Primary | ICD-10-CM

## 2019-02-14 PROCEDURE — G0108 DIAB MANAGE TRN  PER INDIV: HCPCS | Mod: HCNC,S$GLB,, | Performed by: DIETITIAN, REGISTERED

## 2019-02-14 PROCEDURE — G0108 PR DIAB MANAGE TRN  PER INDIV: ICD-10-PCS | Mod: HCNC,S$GLB,, | Performed by: DIETITIAN, REGISTERED

## 2019-02-14 PROCEDURE — 99999 PR PBB SHADOW E&M-EST. PATIENT-LVL II: CPT | Mod: PBBFAC,HCNC,, | Performed by: DIETITIAN, REGISTERED

## 2019-02-14 PROCEDURE — 99999 PR PBB SHADOW E&M-EST. PATIENT-LVL II: ICD-10-PCS | Mod: PBBFAC,HCNC,, | Performed by: DIETITIAN, REGISTERED

## 2019-02-14 NOTE — PROGRESS NOTES
"Diabetes Education  Author: Nanci Snow, KHANH  Date: 2/14/2019    Diabetes Care Management Summary  Diabetes Education Record Assessment/Progress: Post Program/Follow-up  Current Diabetes Risk Level: Moderate     Referring Provider: Giuliano Moran MD  78 y.o. male in clinic today for diabetes education f/u.   First appt was requested by wife to clarify many questions regarding diabetes diet and how to put this together with low fat and low sodium for his heart.   Since last visit, pt is eating more fish and chicken, limiting/avoiding sweets, overall eating less. They have changed from white to wheat bread, limited to 2 slices/day. Avoiding rice and potatoes.       Weight: 90.6 kg (199 lb 13.6 oz)   Height: 5' 7" (170.2 cm)     15 lbs lost since last visit.    Lab Results   Component Value Date    HGBA1C 7.3 (H) 12/19/2018       Diabetes Type  Diabetes Type : Type II    Diabetes History  Diabetes Diagnosis: 0-1 year  Current Treatment: Oral Medication, Diet  Reviewed Problem List with Patient: No  DM medications:  Metformin, 500 mg bid     Health Maintenance was reviewed today with patient. Discussed with patient importance of routine eye exams, foot exams/foot care, blood work (i.e.: A1c, microalbumin, and lipid), dental visits, yearly flu vaccine, and pneumonia vaccine as indicated by PCP. Patient verbalized understanding.     Health Maintenance Topics with due status: Not Due       Topic Last Completion Date    TETANUS VACCINE 09/16/2016    Colonoscopy 05/16/2017    Eye Exam 11/13/2018    Lipid Panel 12/19/2018    Hemoglobin A1c 12/19/2018    Foot Exam 01/03/2019     There are no preventive care reminders to display for this patient.    Nutrition  What type of sweetener do you use?: Sweet N Low  What type of beverages do you drink?: water, milk, other (see comments)(coffee - 2 cups per day with nondairy creamer)  Meal Plan 24 Hour Recall - Breakfast: 4a - Cheerios, 1/2 banana, skim milk  Meal Plan 24 Hour " Recall - Lunch: 10a: 2 hot dogs on 2 slices bread, small pc cantaloupe, water  Meal Plan 24 Hour Recall - Dinner: 3:30p - rotisserie chicken, stir lock mixed vegetables, cantaloupe, water  Meal Plan 24 Hour Recall - Snack: mid morning (8a): 1 sl whole wheat bread and 1 egg, 1/2 cup milk // 6p: rum and Diet Coke       Monitoring   Self Monitoring : has not been checking - couldn't use meter  Blood Glucose Logs: No  Do you use a personal continuous glucose monitor?: No       Exercise   Exercise Type: none    Current Diabetes Treatment   Current Treatment: Oral Medication, Diet                                     Barriers to Change  Barriers to Change: None  Learning Challenges : None, Other(pt and wife concerned that they won't remember everything being taught/discussed)              Diabetes Education Assessment/Progress  Diabetes Disease Process (diabetes disease process and treatment options): Discussion, Individual Session, Demonstrates Understanding/Competency(verbalizes/demonstrates)  Nutrition (Incorporating nutritional management into one's lifestyle): Discussion, Instructed, Individual Session, Demonstrates Understanding/Competency (verbalizes/demonstrates), Written Materials Provided  Physical Activity (incorporating physical activity into one's lifestyle): Discussion, Individual Session, Demonstrates Understanding/Competency (verbalizes/demonstrates)  Medications (states correct name, dose, onset, peak, duration, side effects & timing of meds): Discussion, Individual Session, Demonstrates Understanding/Competency(verbalizes/demonstrates)  Monitoring (monitoring blood glucose/other parameters & using results): Discussion, Instructed, Individual Session, Demonstrates Understanding/Competency (verbalizes/demonstrates), Written Materials Provided  Acute Complications (preventing, detecting, and treating acute complications): Discussion, Instructed, Individual Session, Demonstrates Understanding/Competency  (verbalizes/demonstrates), Written Materials Provided  Chronic Complications (preventing, detecting, and treating chronic complications): Discussion, Individual Session, Demonstrates Understanding/Competency (verbalizes/demonstrates)  Clinical (diabetes, other pertinent medical history, and relevant comorbidities reviewed during visit): Discussion, Individual Session  Cognitive (knowledge of self-management skills, functional health literacy): Discussion, Individual Session  Psychosocial (emotional response to diabetes): Discussion, Individual Session  Diabetes Distress and Support Systems: Not Covered/Deferred(newly diagnosed and couldn't answer questions; deferred to next appt)  Behavioral (readiness for change, lifestyle practices, self-care behaviors): Discussion, Individual Session    Goals  Patient has selected/evaluated goals during today's session: Yes, selected  Healthy Eating: (Eat whole grain bread, and brown rice; Decrease portions of starch, fruit and milk; Add protein such as eggs/Egg Beaters to breakfast with small cup of cereal)  Physical Activity: (start walking or riding bike when back pain subsides)  Monitoring: (check bg daily - alternating fasting and pp)         Diabetes Care Plan/Intervention  Education Plan/Intervention: Individual Follow-Up DSMT   Demonstrated use of meter  F/u in 3 months    Diabetes Meal Plan  Calories: 1800  Carbohydrate Per Meal: 30-45g  Carbohydrate Per Snack : 15-20g    Today's Self-Management Care Plan was developed with the patient's input and is based on barriers identified during today's assessment.    The long and short-term goals in the care plan were written with the patient/caregiver's input. The patient has agreed to work toward these goals to improve his overall diabetes control.      The patient received a copy of today's self-management plan and verbalized understanding of the care plan, goals, and all of today's instructions.      The patient was  encouraged to communicate with his physician and care team regarding his condition(s) and treatment.  I provided the patient with my contact information today and encouraged him to contact me via phone or patient portal as needed.     Education Units of Time   Time Spent: 60 min

## 2019-02-18 ENCOUNTER — TELEPHONE (OUTPATIENT)
Dept: CARDIOLOGY | Facility: CLINIC | Age: 79
End: 2019-02-18

## 2019-02-18 NOTE — TELEPHONE ENCOUNTER
Called and left a v/m that Dr Griffith not seeing any patients in clinic the week of 02/25/19. Please call back to reschedule. cm

## 2019-02-21 ENCOUNTER — TELEPHONE (OUTPATIENT)
Dept: CARDIOLOGY | Facility: CLINIC | Age: 79
End: 2019-02-21

## 2019-02-21 NOTE — TELEPHONE ENCOUNTER
02/21 Called and offered him an appt with another provider, since Dr Griffith in hospital rounds, he declined and would rather wait, so appt resheculed. cm

## 2019-02-22 ENCOUNTER — ANTI-COAG VISIT (OUTPATIENT)
Dept: CARDIOLOGY | Facility: CLINIC | Age: 79
End: 2019-02-22
Payer: MEDICARE

## 2019-02-22 DIAGNOSIS — Z79.01 LONG TERM (CURRENT) USE OF ANTICOAGULANTS: Primary | ICD-10-CM

## 2019-02-22 LAB — INR PPP: 2.6 (ref 2–3)

## 2019-02-22 PROCEDURE — 85610 PROTHROMBIN TIME: CPT | Mod: QW,HCNC,S$GLB, | Performed by: INTERNAL MEDICINE

## 2019-02-22 PROCEDURE — 85610 POCT INR: ICD-10-PCS | Mod: QW,HCNC,S$GLB, | Performed by: INTERNAL MEDICINE

## 2019-02-22 NOTE — PROGRESS NOTES
Patient's INR is therapeutic at 2.6.  Reports no recent changes.  Instructed to maintain current dose of Warfarin 7.5 mg every Tuesday and 5 mg on all other days per week.  Dose calendar - given.  Recheck in 1 month.

## 2019-03-05 DIAGNOSIS — Z79.01 LONG TERM (CURRENT) USE OF ANTICOAGULANTS: ICD-10-CM

## 2019-03-06 RX ORDER — WARFARIN SODIUM 5 MG/1
TABLET ORAL
Qty: 34 TABLET | Refills: 0 | Status: SHIPPED | OUTPATIENT
Start: 2019-03-06 | End: 2019-04-04 | Stop reason: SDUPTHER

## 2019-03-18 ENCOUNTER — LAB VISIT (OUTPATIENT)
Dept: LAB | Facility: HOSPITAL | Age: 79
End: 2019-03-18
Attending: INTERNAL MEDICINE
Payer: MEDICARE

## 2019-03-18 ENCOUNTER — OFFICE VISIT (OUTPATIENT)
Dept: CARDIOLOGY | Facility: CLINIC | Age: 79
End: 2019-03-18
Payer: MEDICARE

## 2019-03-18 VITALS
WEIGHT: 195.19 LBS | HEART RATE: 86 BPM | SYSTOLIC BLOOD PRESSURE: 122 MMHG | BODY MASS INDEX: 30.64 KG/M2 | DIASTOLIC BLOOD PRESSURE: 80 MMHG | HEIGHT: 67 IN

## 2019-03-18 DIAGNOSIS — G47.33 OSA (OBSTRUCTIVE SLEEP APNEA): ICD-10-CM

## 2019-03-18 DIAGNOSIS — E78.5 HYPERLIPIDEMIA ASSOCIATED WITH TYPE 2 DIABETES MELLITUS: ICD-10-CM

## 2019-03-18 DIAGNOSIS — I25.10 ATHEROSCLEROSIS OF NATIVE CORONARY ARTERY OF NATIVE HEART WITHOUT ANGINA PECTORIS: ICD-10-CM

## 2019-03-18 DIAGNOSIS — I15.2 HYPERTENSION ASSOCIATED WITH DIABETES: ICD-10-CM

## 2019-03-18 DIAGNOSIS — I25.10 ATHEROSCLEROSIS OF NATIVE CORONARY ARTERY OF NATIVE HEART WITHOUT ANGINA PECTORIS: Primary | ICD-10-CM

## 2019-03-18 DIAGNOSIS — E11.69 HYPERLIPIDEMIA ASSOCIATED WITH TYPE 2 DIABETES MELLITUS: ICD-10-CM

## 2019-03-18 DIAGNOSIS — Z79.01 CURRENT USE OF LONG TERM ANTICOAGULATION: ICD-10-CM

## 2019-03-18 DIAGNOSIS — I77.1 TORTUOUS AORTA: ICD-10-CM

## 2019-03-18 DIAGNOSIS — E11.59 HYPERTENSION ASSOCIATED WITH DIABETES: ICD-10-CM

## 2019-03-18 LAB
ANION GAP SERPL CALC-SCNC: 7 MMOL/L
BUN SERPL-MCNC: 18 MG/DL
CALCIUM SERPL-MCNC: 10 MG/DL
CHLORIDE SERPL-SCNC: 99 MMOL/L
CO2 SERPL-SCNC: 30 MMOL/L
CREAT SERPL-MCNC: 0.9 MG/DL
EST. GFR  (AFRICAN AMERICAN): >60 ML/MIN/1.73 M^2
EST. GFR  (NON AFRICAN AMERICAN): >60 ML/MIN/1.73 M^2
GLUCOSE SERPL-MCNC: 88 MG/DL
POTASSIUM SERPL-SCNC: 4.4 MMOL/L
SODIUM SERPL-SCNC: 136 MMOL/L

## 2019-03-18 PROCEDURE — 1101F PT FALLS ASSESS-DOCD LE1/YR: CPT | Mod: HCNC,CPTII,S$GLB, | Performed by: INTERNAL MEDICINE

## 2019-03-18 PROCEDURE — 1101F PR PT FALLS ASSESS DOC 0-1 FALLS W/OUT INJ PAST YR: ICD-10-PCS | Mod: HCNC,CPTII,S$GLB, | Performed by: INTERNAL MEDICINE

## 2019-03-18 PROCEDURE — 99214 PR OFFICE/OUTPT VISIT, EST, LEVL IV, 30-39 MIN: ICD-10-PCS | Mod: HCNC,S$GLB,, | Performed by: INTERNAL MEDICINE

## 2019-03-18 PROCEDURE — 36415 COLL VENOUS BLD VENIPUNCTURE: CPT | Mod: HCNC

## 2019-03-18 PROCEDURE — 99499 RISK ADDL DX/OHS AUDIT: ICD-10-PCS | Mod: HCNC,S$GLB,, | Performed by: INTERNAL MEDICINE

## 2019-03-18 PROCEDURE — 99214 OFFICE O/P EST MOD 30 MIN: CPT | Mod: HCNC,S$GLB,, | Performed by: INTERNAL MEDICINE

## 2019-03-18 PROCEDURE — 3074F PR MOST RECENT SYSTOLIC BLOOD PRESSURE < 130 MM HG: ICD-10-PCS | Mod: HCNC,CPTII,S$GLB, | Performed by: INTERNAL MEDICINE

## 2019-03-18 PROCEDURE — 3079F PR MOST RECENT DIASTOLIC BLOOD PRESSURE 80-89 MM HG: ICD-10-PCS | Mod: HCNC,CPTII,S$GLB, | Performed by: INTERNAL MEDICINE

## 2019-03-18 PROCEDURE — 3079F DIAST BP 80-89 MM HG: CPT | Mod: HCNC,CPTII,S$GLB, | Performed by: INTERNAL MEDICINE

## 2019-03-18 PROCEDURE — 3074F SYST BP LT 130 MM HG: CPT | Mod: HCNC,CPTII,S$GLB, | Performed by: INTERNAL MEDICINE

## 2019-03-18 PROCEDURE — 99999 PR PBB SHADOW E&M-EST. PATIENT-LVL III: CPT | Mod: PBBFAC,HCNC,, | Performed by: INTERNAL MEDICINE

## 2019-03-18 PROCEDURE — 99499 UNLISTED E&M SERVICE: CPT | Mod: HCNC,S$GLB,, | Performed by: INTERNAL MEDICINE

## 2019-03-18 PROCEDURE — 80048 BASIC METABOLIC PNL TOTAL CA: CPT | Mod: HCNC

## 2019-03-18 PROCEDURE — 99999 PR PBB SHADOW E&M-EST. PATIENT-LVL III: ICD-10-PCS | Mod: PBBFAC,HCNC,, | Performed by: INTERNAL MEDICINE

## 2019-03-18 RX ORDER — FUROSEMIDE 40 MG/1
40 TABLET ORAL 2 TIMES DAILY
COMMUNITY
End: 2019-06-26

## 2019-03-18 NOTE — PROGRESS NOTES
Subjective:   Patient ID:  Anthony Biggs Jr. is a 78 y.o. male who presents for follow-up of Hypertension (c/o weakness, jeanne horses)  Pt states leg cramping, he states after starting metformin. Lasix change to demedex did  help FLORES.    Hypertension   This is a chronic problem. The current episode started more than 1 year ago. The problem has been gradually improving since onset. The problem is controlled. Pertinent negatives include no chest pain, palpitations or shortness of breath. Past treatments include calcium channel blockers and angiotensin blockers. The current treatment provides moderate improvement. There are no compliance problems.    Atrial Fibrillation   Presents for follow-up visit. Symptoms are negative for bradycardia, chest pain, dizziness, hypotension, palpitations, shortness of breath, syncope, tachycardia and weakness. The symptoms have been stable. Past medical history includes atrial fibrillation. Medication compliance problems include medication side effects.       Review of Systems   Constitution: Negative. Negative for weakness and weight gain.   HENT: Negative.    Eyes: Negative.    Cardiovascular: Negative.  Negative for chest pain, leg swelling, palpitations and syncope.   Respiratory: Negative.  Negative for shortness of breath.    Endocrine: Negative.    Hematologic/Lymphatic: Negative.    Skin: Negative.    Musculoskeletal: Negative for muscle weakness.   Gastrointestinal: Negative.    Genitourinary: Negative.    Neurological: Negative.  Negative for dizziness.   Psychiatric/Behavioral: Negative.    Allergic/Immunologic: Negative.      Family History   Problem Relation Age of Onset    Heart disease Mother     Cancer Father         lung    Macular degeneration Sister     Diabetes Sister     Heart disease Sister     Strabismus Neg Hx     Retinal detachment Neg Hx     Glaucoma Neg Hx     Blindness Neg Hx     Amblyopia Neg Hx      Past Medical History:   Diagnosis Date     Abnormal CXR     Angina pectoris 2017    Atrial fibrillation     Coronary atherosclerosis of unspecified type of vessel, native or graft     Depression     Diabetes mellitus without complication     Emphysema of lung     History of prostate cancer 2007    prostatectomy    Hyperlipidemia associated with type 2 diabetes mellitus     Hypertension associated with diabetes     Intention tremor     SHORTY (obstructive sleep apnea)     Prostate cancer     Trouble in sleeping     Urinary incontinence      Social History     Socioeconomic History    Marital status:      Spouse name: Not on file    Number of children: Not on file    Years of education: Not on file    Highest education level: Not on file   Social Needs    Financial resource strain: Not on file    Food insecurity - worry: Not on file    Food insecurity - inability: Not on file    Transportation needs - medical: Not on file    Transportation needs - non-medical: Not on file   Occupational History    Not on file   Tobacco Use    Smoking status: Former Smoker     Packs/day: 0.50     Years: 40.00     Pack years: 20.00     Types: Cigarettes     Last attempt to quit:      Years since quittin.2    Smokeless tobacco: Never Used   Substance and Sexual Activity    Alcohol use: No    Drug use: No    Sexual activity: No   Other Topics Concern    Not on file   Social History Narrative    Not on file     Current Outpatient Medications on File Prior to Visit   Medication Sig Dispense Refill    aspirin (ECOTRIN) 81 MG EC tablet Take 81 mg by mouth once daily.      atorvastatin (LIPITOR) 40 MG tablet TAKE ONE TABLET BY MOUTH DAILY 30 tablet 3    diltiaZEM HCl (TIAZAC) 120 mg 24 hr capsule TAKE ONE CAPSULE BY MOUTH DAILY 30 capsule 0    furosemide (LASIX) 40 MG tablet Take 40 mg by mouth 2 (two) times daily.      losartan (COZAAR) 100 MG tablet Take 1 tablet (100 mg total) by mouth once daily. 90 tablet 3    melatonin 3 mg  Tab Take 1 tablet by mouth nightly.      metFORMIN (GLUCOPHAGE) 500 MG tablet Take 1 tablet (500 mg total) by mouth 2 (two) times daily with meals. 180 tablet 3    torsemide (DEMADEX) 10 MG Tab Take 1 tablet (10 mg total) by mouth once daily. 30 tablet 11    warfarin (JANTOVEN) 5 MG tablet TAKE ONE AND ONE-HALF TABLETS BY MOUTH EVERY TUES, THURS, & SAT. & ONE TABLET ON ALL OTHER DAYS. 34 tablet 0    HYDROcodone-acetaminophen (NORCO)  mg per tablet Take  tablets by mouth daily as needed.      zolpidem (AMBIEN) 5 MG Tab Take 1 tablet (5 mg total) by mouth nightly as needed. 20 tablet 5     No current facility-administered medications on file prior to visit.      Review of patient's allergies indicates:  No Known Allergies    Objective:     Physical Exam   Constitutional: He is oriented to person, place, and time. He appears well-developed and well-nourished.   HENT:   Head: Normocephalic and atraumatic.   Eyes: Conjunctivae are normal. Pupils are equal, round, and reactive to light.   Neck: Normal range of motion. Neck supple.   Cardiovascular: Normal rate, normal heart sounds and intact distal pulses. An irregularly irregular rhythm present.   Pulmonary/Chest: Effort normal and breath sounds normal.   Abdominal: Soft. Bowel sounds are normal.   Neurological: He is alert and oriented to person, place, and time.   Skin: Skin is warm and dry.   Psychiatric: He has a normal mood and affect.   Nursing note and vitals reviewed.      Assessment:     1. Atherosclerosis of native coronary artery of native heart without angina pectoris    2. Hyperlipidemia associated with type 2 diabetes mellitus    3. Hypertension associated with diabetes    4. Tortuous aorta    5. Current use of long term anticoagulation    6. SHORTY (obstructive sleep apnea)        Plan:     Atherosclerosis of native coronary artery of native heart without angina pectoris    Hyperlipidemia associated with type 2 diabetes  mellitus    Hypertension associated with diabetes    Tortuous aorta    Current use of long term anticoagulation    SHORTY (obstructive sleep apnea)      Check K  Continue statin-hlp  Continue diltiazem, losartan,demedex -htn  Continue coumadin-afib

## 2019-03-19 ENCOUNTER — TELEPHONE (OUTPATIENT)
Dept: CARDIOLOGY | Facility: CLINIC | Age: 79
End: 2019-03-19

## 2019-03-19 NOTE — TELEPHONE ENCOUNTER
Attempted to reach patient and no answer.Left message lab results reviewed by Dr Griffith and are wnl.

## 2019-03-19 NOTE — TELEPHONE ENCOUNTER
----- Message from Darryl Griffith MD sent at 3/19/2019  8:52 AM CDT -----  Please tell pt bmp wnl

## 2019-03-22 ENCOUNTER — ANTI-COAG VISIT (OUTPATIENT)
Dept: CARDIOLOGY | Facility: CLINIC | Age: 79
End: 2019-03-22
Payer: MEDICARE

## 2019-03-22 DIAGNOSIS — Z79.01 LONG TERM (CURRENT) USE OF ANTICOAGULANTS: Primary | ICD-10-CM

## 2019-03-22 LAB — INR PPP: 3.2 (ref 2–3)

## 2019-03-22 PROCEDURE — 99211 OFF/OP EST MAY X REQ PHY/QHP: CPT | Mod: 25,HCNC,S$GLB, | Performed by: INTERNAL MEDICINE

## 2019-03-22 PROCEDURE — 85610 POCT INR: ICD-10-PCS | Mod: QW,HCNC,S$GLB, | Performed by: INTERNAL MEDICINE

## 2019-03-22 PROCEDURE — 85610 PROTHROMBIN TIME: CPT | Mod: QW,HCNC,S$GLB, | Performed by: INTERNAL MEDICINE

## 2019-03-22 PROCEDURE — 99211 PR OFFICE/OUTPT VISIT, EST, LEVL I: ICD-10-PCS | Mod: 25,HCNC,S$GLB, | Performed by: INTERNAL MEDICINE

## 2019-03-22 NOTE — PROGRESS NOTES
Patient's INR is slightly elevated from goal at 3.2.  Reports no medication changes.  No signs of any abnormal bleeding at present.  Takes Warfarin in the a.m..  Instructed to take a lower dose of Warfarin 2.5 mg tomorrow - only, then resume on regular scheduled dose of Warfarin 7.5 mg every Tuesday and 5 mg on all other days per week.  Dose calendar - given.  Advised to watch for signs of any abnormal bleeding; and if needed, seek medical attention (ED).  Recheck in 1 month.  Patient voiced understanding.

## 2019-04-01 ENCOUNTER — OFFICE VISIT (OUTPATIENT)
Dept: INTERNAL MEDICINE | Facility: CLINIC | Age: 79
End: 2019-04-01
Payer: MEDICARE

## 2019-04-01 VITALS
HEIGHT: 68 IN | RESPIRATION RATE: 16 BRPM | BODY MASS INDEX: 29.73 KG/M2 | DIASTOLIC BLOOD PRESSURE: 84 MMHG | HEART RATE: 95 BPM | WEIGHT: 196.19 LBS | SYSTOLIC BLOOD PRESSURE: 118 MMHG | OXYGEN SATURATION: 95 % | TEMPERATURE: 98 F

## 2019-04-01 DIAGNOSIS — R05.9 COUGH: ICD-10-CM

## 2019-04-01 DIAGNOSIS — I48.0 PAROXYSMAL ATRIAL FIBRILLATION: ICD-10-CM

## 2019-04-01 DIAGNOSIS — F33.1 MODERATE EPISODE OF RECURRENT MAJOR DEPRESSIVE DISORDER: ICD-10-CM

## 2019-04-01 DIAGNOSIS — E11.8 TYPE 2 DIABETES MELLITUS WITH COMPLICATION, UNSPECIFIED WHETHER LONG TERM INSULIN USE: Primary | ICD-10-CM

## 2019-04-01 DIAGNOSIS — I27.20 PULMONARY HYPERTENSION, MILD: ICD-10-CM

## 2019-04-01 PROBLEM — E66.01 MORBID (SEVERE) OBESITY DUE TO EXCESS CALORIES: Status: ACTIVE | Noted: 2019-04-01

## 2019-04-01 LAB — GLUCOSE SERPL-MCNC: 79 MG/DL (ref 70–110)

## 2019-04-01 PROCEDURE — 3079F PR MOST RECENT DIASTOLIC BLOOD PRESSURE 80-89 MM HG: ICD-10-PCS | Mod: HCNC,CPTII,S$GLB, | Performed by: FAMILY MEDICINE

## 2019-04-01 PROCEDURE — 3074F SYST BP LT 130 MM HG: CPT | Mod: HCNC,CPTII,S$GLB, | Performed by: FAMILY MEDICINE

## 2019-04-01 PROCEDURE — 96372 THER/PROPH/DIAG INJ SC/IM: CPT | Mod: HCNC,S$GLB,, | Performed by: FAMILY MEDICINE

## 2019-04-01 PROCEDURE — 99999 PR PBB SHADOW E&M-EST. PATIENT-LVL V: CPT | Mod: PBBFAC,HCNC,, | Performed by: FAMILY MEDICINE

## 2019-04-01 PROCEDURE — 99999 PR PBB SHADOW E&M-EST. PATIENT-LVL V: ICD-10-PCS | Mod: PBBFAC,HCNC,, | Performed by: FAMILY MEDICINE

## 2019-04-01 PROCEDURE — 99213 OFFICE O/P EST LOW 20 MIN: CPT | Mod: 25,HCNC,S$GLB, | Performed by: FAMILY MEDICINE

## 2019-04-01 PROCEDURE — 1101F PR PT FALLS ASSESS DOC 0-1 FALLS W/OUT INJ PAST YR: ICD-10-PCS | Mod: HCNC,CPTII,S$GLB, | Performed by: FAMILY MEDICINE

## 2019-04-01 PROCEDURE — 82962 POCT GLUCOSE, HAND-HELD DEVICE: ICD-10-PCS | Mod: HCNC,S$GLB,, | Performed by: FAMILY MEDICINE

## 2019-04-01 PROCEDURE — 99499 RISK ADDL DX/OHS AUDIT: ICD-10-PCS | Mod: HCNC,S$GLB,, | Performed by: FAMILY MEDICINE

## 2019-04-01 PROCEDURE — 99213 PR OFFICE/OUTPT VISIT, EST, LEVL III, 20-29 MIN: ICD-10-PCS | Mod: 25,HCNC,S$GLB, | Performed by: FAMILY MEDICINE

## 2019-04-01 PROCEDURE — 3079F DIAST BP 80-89 MM HG: CPT | Mod: HCNC,CPTII,S$GLB, | Performed by: FAMILY MEDICINE

## 2019-04-01 PROCEDURE — 3074F PR MOST RECENT SYSTOLIC BLOOD PRESSURE < 130 MM HG: ICD-10-PCS | Mod: HCNC,CPTII,S$GLB, | Performed by: FAMILY MEDICINE

## 2019-04-01 PROCEDURE — 1101F PT FALLS ASSESS-DOCD LE1/YR: CPT | Mod: HCNC,CPTII,S$GLB, | Performed by: FAMILY MEDICINE

## 2019-04-01 PROCEDURE — 96372 PR INJECTION,THERAP/PROPH/DIAG2ST, IM OR SUBCUT: ICD-10-PCS | Mod: HCNC,S$GLB,, | Performed by: FAMILY MEDICINE

## 2019-04-01 PROCEDURE — 99499 UNLISTED E&M SERVICE: CPT | Mod: HCNC,S$GLB,, | Performed by: FAMILY MEDICINE

## 2019-04-01 PROCEDURE — 82962 GLUCOSE BLOOD TEST: CPT | Mod: HCNC,S$GLB,, | Performed by: FAMILY MEDICINE

## 2019-04-01 RX ORDER — DOXYCYCLINE 100 MG/1
100 CAPSULE ORAL 2 TIMES DAILY
Qty: 20 CAPSULE | Refills: 0 | Status: SHIPPED | OUTPATIENT
Start: 2019-04-01 | End: 2019-06-26

## 2019-04-01 RX ORDER — METHYLPREDNISOLONE ACETATE 80 MG/ML
80 INJECTION, SUSPENSION INTRA-ARTICULAR; INTRALESIONAL; INTRAMUSCULAR; SOFT TISSUE ONCE
Status: COMPLETED | OUTPATIENT
Start: 2019-04-01 | End: 2019-04-01

## 2019-04-01 RX ORDER — CODEINE PHOSPHATE AND GUAIFENESIN 10; 100 MG/5ML; MG/5ML
5 SOLUTION ORAL 3 TIMES DAILY PRN
Qty: 118 ML | Refills: 0 | Status: SHIPPED | OUTPATIENT
Start: 2019-04-01 | End: 2019-04-11

## 2019-04-01 RX ADMIN — METHYLPREDNISOLONE ACETATE 80 MG: 80 INJECTION, SUSPENSION INTRA-ARTICULAR; INTRALESIONAL; INTRAMUSCULAR; SOFT TISSUE at 09:04

## 2019-04-01 NOTE — PROGRESS NOTES
Subjective:      Patient ID: Anthony Biggs Jr. is a 78 y.o. male.    Chief Complaint: Cough      Patient reports sinus congestion, coughing which seems to be worsening for over 2 weeks now. Coughing now productive of greenish sputum. Afebrile.    Review of Systems   Constitutional: Negative for activity change, appetite change, fatigue and fever.   HENT: Positive for congestion, postnasal drip, rhinorrhea, sinus pressure and sore throat. Negative for ear pain and sinus pain.    Respiratory: Positive for cough. Negative for shortness of breath and wheezing.    Gastrointestinal: Negative for abdominal pain, diarrhea and nausea.   Musculoskeletal: Negative for myalgias.   Skin: Negative for rash.   Neurological: Positive for headaches.     Past Medical History:   Diagnosis Date    Abnormal CXR     Angina pectoris 8/28/2017    Atrial fibrillation     Coronary atherosclerosis of unspecified type of vessel, native or graft     Depression     Diabetes mellitus without complication     Emphysema of lung     History of prostate cancer 2007    prostatectomy    Hyperlipidemia associated with type 2 diabetes mellitus     Hypertension associated with diabetes     Intention tremor     Morbid (severe) obesity due to excess calories 4/1/2019    SHORTY (obstructive sleep apnea)     Prostate cancer     Trouble in sleeping     Urinary incontinence      Past Surgical History:   Procedure Laterality Date    ABDOMINAL HERNIA REPAIR      APPENDECTOMY      bladder sx      CARDIAC CATHETERIZATION      CATARACT EXTRACTION W/  INTRAOCULAR LENS IMPLANT  Restor OU    COLONOSCOPY N/A 5/16/2017    Performed by Alfredo Naidu MD at [facility] ENDO    HEART CATH-BILATERAL Bilateral 8/28/2017    Performed by Darryl Griffith MD at [facility] CATH LAB    inguinal hernia      lung sx      PROSTATE SURGERY       Family History   Problem Relation Age of Onset    Heart disease Mother     Cancer Father         lung    Macular degeneration  "Sister     Diabetes Sister     Heart disease Sister     Strabismus Neg Hx     Retinal detachment Neg Hx     Glaucoma Neg Hx     Blindness Neg Hx     Amblyopia Neg Hx      Social History     Socioeconomic History    Marital status:      Spouse name: Not on file    Number of children: Not on file    Years of education: Not on file    Highest education level: Not on file   Occupational History    Not on file   Social Needs    Financial resource strain: Not on file    Food insecurity:     Worry: Not on file     Inability: Not on file    Transportation needs:     Medical: Not on file     Non-medical: Not on file   Tobacco Use    Smoking status: Former Smoker     Packs/day: 0.50     Years: 40.00     Pack years: 20.00     Types: Cigarettes     Last attempt to quit:      Years since quittin.2    Smokeless tobacco: Never Used   Substance and Sexual Activity    Alcohol use: No    Drug use: No    Sexual activity: Never   Lifestyle    Physical activity:     Days per week: Not on file     Minutes per session: Not on file    Stress: Not on file   Relationships    Social connections:     Talks on phone: Not on file     Gets together: Not on file     Attends Gnosticist service: Not on file     Active member of club or organization: Not on file     Attends meetings of clubs or organizations: Not on file     Relationship status: Not on file    Intimate partner violence:     Fear of current or ex partner: Not on file     Emotionally abused: Not on file     Physically abused: Not on file     Forced sexual activity: Not on file   Other Topics Concern    Not on file   Social History Narrative    Not on file     Review of patient's allergies indicates:  No Known Allergies    Objective:       /84   Pulse 95   Temp 98 °F (36.7 °C) (Tympanic)   Resp 16   Ht 5' 7.5" (1.715 m)   Wt 89 kg (196 lb 3.4 oz)   SpO2 95%   BMI 30.28 kg/m²   Physical Exam   Constitutional: He is oriented to person, " place, and time. He appears well-developed and well-nourished. No distress.   HENT:   Head: Normocephalic.   Right Ear: Hearing, tympanic membrane, external ear and ear canal normal.   Left Ear: Hearing, tympanic membrane, external ear and ear canal normal.   Nose: Mucosal edema present. Right sinus exhibits no maxillary sinus tenderness and no frontal sinus tenderness. Left sinus exhibits no maxillary sinus tenderness and no frontal sinus tenderness.   Mouth/Throat: Uvula is midline and mucous membranes are normal. Posterior oropharyngeal erythema present.   Eyes: Pupils are equal, round, and reactive to light. Conjunctivae and EOM are normal.   Cardiovascular: Normal rate, regular rhythm and normal heart sounds.   Pulmonary/Chest: Effort normal and breath sounds normal. No respiratory distress. He has no wheezes. He has no rales.   Abdominal: Soft. Bowel sounds are normal.   Lymphadenopathy:     He has no cervical adenopathy.   Neurological: He is alert and oriented to person, place, and time.   Skin: Skin is warm and dry. He is not diaphoretic.   Psychiatric: He has a normal mood and affect. His behavior is normal.   Nursing note and vitals reviewed.    Assessment:     1. Type 2 diabetes mellitus with complication, unspecified whether long term insulin use    2. Cough    3. Moderate episode of recurrent major depressive disorder    4. Paroxysmal atrial fibrillation    5. Pulmonary hypertension, mild      Plan:   Type 2 diabetes mellitus with complication, unspecified whether long term insulin use  -     POCT Glucose, Hand-Held Device - 67    Cough  -     doxycycline (VIBRAMYCIN) 100 MG Cap; Take 1 capsule (100 mg total) by mouth 2 (two) times daily.  Dispense: 20 capsule; Refill: 0  -     guaifenesin-codeine 100-10 mg/5 ml (TUSSI-ORGANIDIN NR)  mg/5 mL syrup; Take 5 mLs by mouth 3 (three) times daily as needed for Cough.  Dispense: 118 mL; Refill: 0  -     methylPREDNISolone acetate injection 80  mg        Moderate episode of recurrent major depressive disorder  This chronic comorbid condition affected decision making today.  Paroxysmal atrial fibrillation  This chronic comorbid condition affected decision making today.  Pulmonary hypertension, mild  This chronic comorbid condition affected decision making today.        Medication List with Changes/Refills   New Medications    DOXYCYCLINE (VIBRAMYCIN) 100 MG CAP    Take 1 capsule (100 mg total) by mouth 2 (two) times daily.    GUAIFENESIN-CODEINE 100-10 MG/5 ML (TUSSI-ORGANIDIN NR)  MG/5 ML SYRUP    Take 5 mLs by mouth 3 (three) times daily as needed for Cough.   Current Medications    ASPIRIN (ECOTRIN) 81 MG EC TABLET    Take 81 mg by mouth once daily.    ATORVASTATIN (LIPITOR) 40 MG TABLET    TAKE ONE TABLET BY MOUTH DAILY    DILTIAZEM HCL (TIAZAC) 120 MG 24 HR CAPSULE    TAKE ONE CAPSULE BY MOUTH DAILY    FUROSEMIDE (LASIX) 40 MG TABLET    Take 40 mg by mouth 2 (two) times daily.    HYDROCODONE-ACETAMINOPHEN (NORCO)  MG PER TABLET    Take  tablets by mouth daily as needed.    LOSARTAN (COZAAR) 100 MG TABLET    Take 1 tablet (100 mg total) by mouth once daily.    MELATONIN 3 MG TAB    Take 1 tablet by mouth nightly.    METFORMIN (GLUCOPHAGE) 500 MG TABLET    Take 1 tablet (500 mg total) by mouth 2 (two) times daily with meals.    TORSEMIDE (DEMADEX) 10 MG TAB    Take 1 tablet (10 mg total) by mouth once daily.    WARFARIN (JANTOVEN) 5 MG TABLET    TAKE ONE AND ONE-HALF TABLETS BY MOUTH EVERY TUES, THURS, & SAT. & ONE TABLET ON ALL OTHER DAYS.    ZOLPIDEM (AMBIEN) 5 MG TAB    Take 1 tablet (5 mg total) by mouth nightly as needed.

## 2019-04-04 DIAGNOSIS — Z79.01 LONG TERM (CURRENT) USE OF ANTICOAGULANTS: ICD-10-CM

## 2019-04-04 RX ORDER — WARFARIN SODIUM 5 MG/1
TABLET ORAL
Qty: 34 TABLET | Refills: 0 | Status: SHIPPED | OUTPATIENT
Start: 2019-04-04 | End: 2019-05-06 | Stop reason: SDUPTHER

## 2019-04-26 ENCOUNTER — ANTI-COAG VISIT (OUTPATIENT)
Dept: CARDIOLOGY | Facility: CLINIC | Age: 79
End: 2019-04-26
Payer: MEDICARE

## 2019-04-26 DIAGNOSIS — Z79.01 LONG TERM (CURRENT) USE OF ANTICOAGULANTS: Primary | ICD-10-CM

## 2019-04-26 LAB — INR PPP: 2.7 (ref 2–3)

## 2019-04-26 PROCEDURE — 85610 POCT INR: ICD-10-PCS | Mod: QW,HCNC,S$GLB, | Performed by: INTERNAL MEDICINE

## 2019-04-26 PROCEDURE — 93793 PR ANTICOAGULANT MGMT FOR PT TAKING WARFARIN: ICD-10-PCS | Mod: HCNC,S$GLB,,

## 2019-04-26 PROCEDURE — 93793 ANTICOAG MGMT PT WARFARIN: CPT | Mod: HCNC,S$GLB,,

## 2019-04-26 PROCEDURE — 85610 PROTHROMBIN TIME: CPT | Mod: QW,HCNC,S$GLB, | Performed by: INTERNAL MEDICINE

## 2019-04-26 NOTE — PROGRESS NOTES
Patient's INR is therapeutic at 2.7.  Reports no recent changes.  Instructed to maintain current dose of Warfarin 7.5 mg every Tuesday and 5 mg on all other days per week.  Dose calendar - given.  Recheck in 1 month.

## 2019-05-06 DIAGNOSIS — Z79.01 LONG TERM (CURRENT) USE OF ANTICOAGULANTS: ICD-10-CM

## 2019-05-06 RX ORDER — WARFARIN SODIUM 5 MG/1
TABLET ORAL
Qty: 34 TABLET | Refills: 0 | Status: SHIPPED | OUTPATIENT
Start: 2019-05-06 | End: 2019-06-11 | Stop reason: SDUPTHER

## 2019-05-16 RX ORDER — LOSARTAN POTASSIUM 100 MG/1
TABLET ORAL
Qty: 90 TABLET | Refills: 2 | Status: SHIPPED | OUTPATIENT
Start: 2019-05-16 | End: 2019-10-18 | Stop reason: RX

## 2019-05-24 ENCOUNTER — ANTI-COAG VISIT (OUTPATIENT)
Dept: CARDIOLOGY | Facility: CLINIC | Age: 79
End: 2019-05-24
Payer: MEDICARE

## 2019-05-24 DIAGNOSIS — I48.0 PAROXYSMAL ATRIAL FIBRILLATION: ICD-10-CM

## 2019-05-24 DIAGNOSIS — Z79.01 LONG TERM (CURRENT) USE OF ANTICOAGULANTS: Primary | ICD-10-CM

## 2019-05-24 LAB — INR PPP: 2.5 (ref 2–3)

## 2019-05-24 PROCEDURE — 93793 PR ANTICOAGULANT MGMT FOR PT TAKING WARFARIN: ICD-10-PCS | Mod: HCNC,S$GLB,,

## 2019-05-24 PROCEDURE — 85610 PROTHROMBIN TIME: CPT | Mod: QW,HCNC,S$GLB, | Performed by: INTERNAL MEDICINE

## 2019-05-24 PROCEDURE — 93793 ANTICOAG MGMT PT WARFARIN: CPT | Mod: HCNC,S$GLB,,

## 2019-05-24 PROCEDURE — 85610 POCT INR: ICD-10-PCS | Mod: QW,HCNC,S$GLB, | Performed by: INTERNAL MEDICINE

## 2019-05-24 NOTE — PROGRESS NOTES
Patient's INR is therapeutic at 2.5.  Reports no recent changes.  Instructed to maintain current dose of Warfarin 7.5 mg every Tuesday and 5 mg on all other days per week.  Dose calendar - given.  Recheck in 1 month.

## 2019-05-29 DIAGNOSIS — E78.00 PURE HYPERCHOLESTEROLEMIA: ICD-10-CM

## 2019-05-29 RX ORDER — ATORVASTATIN CALCIUM 40 MG/1
40 TABLET, FILM COATED ORAL DAILY
Qty: 30 TABLET | Refills: 3 | Status: SHIPPED | OUTPATIENT
Start: 2019-05-29 | End: 2019-09-17 | Stop reason: SDUPTHER

## 2019-06-11 DIAGNOSIS — Z79.01 LONG TERM (CURRENT) USE OF ANTICOAGULANTS: ICD-10-CM

## 2019-06-12 RX ORDER — WARFARIN SODIUM 5 MG/1
TABLET ORAL
Qty: 34 TABLET | Refills: 0 | Status: SHIPPED | OUTPATIENT
Start: 2019-06-12 | End: 2019-07-11 | Stop reason: SDUPTHER

## 2019-06-21 ENCOUNTER — ANTI-COAG VISIT (OUTPATIENT)
Dept: CARDIOLOGY | Facility: CLINIC | Age: 79
End: 2019-06-21
Payer: MEDICARE

## 2019-06-21 DIAGNOSIS — I48.0 PAROXYSMAL ATRIAL FIBRILLATION: ICD-10-CM

## 2019-06-21 DIAGNOSIS — Z79.01 LONG TERM (CURRENT) USE OF ANTICOAGULANTS: Primary | ICD-10-CM

## 2019-06-21 LAB — INR PPP: 3.7 (ref 2–3)

## 2019-06-21 PROCEDURE — 85610 PROTHROMBIN TIME: CPT | Mod: QW,HCNC,S$GLB, | Performed by: INTERNAL MEDICINE

## 2019-06-21 PROCEDURE — 93793 ANTICOAG MGMT PT WARFARIN: CPT | Mod: HCNC,S$GLB,,

## 2019-06-21 PROCEDURE — 85610 POCT INR: ICD-10-PCS | Mod: QW,HCNC,S$GLB, | Performed by: INTERNAL MEDICINE

## 2019-06-21 PROCEDURE — 93793 PR ANTICOAGULANT MGMT FOR PT TAKING WARFARIN: ICD-10-PCS | Mod: HCNC,S$GLB,,

## 2019-06-21 NOTE — PROGRESS NOTES
Patient's INR is supra therapeutic at 3.7.  Reports no medications/diet changes.  No abnormal bleeding reported.  Patient has taken his Warfarin this morning.  Instructed patient to eat a small portion of a dark leafy vegetable today, hold Warfarin dose tomorrow (6/22), than on Sunday, 6/23/2019 resume on regular schedule dose of 7.5 mg every Tuesday and 5 mg on all other days per week - will rechallenge dose.  Will recheck INR in 2 weeks.  Dose calendar - given.  Advised patient to seek medical attention (ED) for any signs of abnormal bleeding, if needed.  Patient repeated back instructions and voiced understanding.

## 2019-06-26 ENCOUNTER — OFFICE VISIT (OUTPATIENT)
Dept: INTERNAL MEDICINE | Facility: CLINIC | Age: 79
End: 2019-06-26
Payer: MEDICARE

## 2019-06-26 ENCOUNTER — TELEPHONE (OUTPATIENT)
Dept: INTERNAL MEDICINE | Facility: CLINIC | Age: 79
End: 2019-06-26

## 2019-06-26 VITALS
HEIGHT: 68 IN | WEIGHT: 195.56 LBS | RESPIRATION RATE: 16 BRPM | TEMPERATURE: 99 F | OXYGEN SATURATION: 97 % | DIASTOLIC BLOOD PRESSURE: 70 MMHG | DIASTOLIC BLOOD PRESSURE: 70 MMHG | OXYGEN SATURATION: 97 % | HEIGHT: 62 IN | SYSTOLIC BLOOD PRESSURE: 112 MMHG | BODY MASS INDEX: 29.64 KG/M2 | TEMPERATURE: 99 F | SYSTOLIC BLOOD PRESSURE: 112 MMHG | BODY MASS INDEX: 35.99 KG/M2 | HEART RATE: 89 BPM | HEART RATE: 87 BPM | WEIGHT: 195.56 LBS | RESPIRATION RATE: 16 BRPM

## 2019-06-26 DIAGNOSIS — E11.9 DIABETES MELLITUS WITHOUT COMPLICATION: Primary | ICD-10-CM

## 2019-06-26 DIAGNOSIS — I77.1 TORTUOUS AORTA: ICD-10-CM

## 2019-06-26 DIAGNOSIS — F33.1 MODERATE EPISODE OF RECURRENT MAJOR DEPRESSIVE DISORDER: ICD-10-CM

## 2019-06-26 DIAGNOSIS — E78.5 HYPERLIPIDEMIA ASSOCIATED WITH TYPE 2 DIABETES MELLITUS: ICD-10-CM

## 2019-06-26 DIAGNOSIS — I27.20 PULMONARY HYPERTENSION, MILD: ICD-10-CM

## 2019-06-26 DIAGNOSIS — G47.33 OSA (OBSTRUCTIVE SLEEP APNEA): ICD-10-CM

## 2019-06-26 DIAGNOSIS — Z79.01 CURRENT USE OF LONG TERM ANTICOAGULATION: ICD-10-CM

## 2019-06-26 DIAGNOSIS — E11.59 HYPERTENSION ASSOCIATED WITH DIABETES: Primary | ICD-10-CM

## 2019-06-26 DIAGNOSIS — H61.23 BILATERAL IMPACTED CERUMEN: ICD-10-CM

## 2019-06-26 DIAGNOSIS — Z96.1 PSEUDOPHAKIA OF BOTH EYES: ICD-10-CM

## 2019-06-26 DIAGNOSIS — I25.10 ATHEROSCLEROSIS OF NATIVE CORONARY ARTERY OF NATIVE HEART WITHOUT ANGINA PECTORIS: ICD-10-CM

## 2019-06-26 DIAGNOSIS — I48.0 PAROXYSMAL ATRIAL FIBRILLATION: ICD-10-CM

## 2019-06-26 DIAGNOSIS — G25.2 INTENTION TREMOR: ICD-10-CM

## 2019-06-26 DIAGNOSIS — E11.9 DIABETES MELLITUS WITHOUT COMPLICATION: ICD-10-CM

## 2019-06-26 DIAGNOSIS — E11.69 HYPERLIPIDEMIA ASSOCIATED WITH TYPE 2 DIABETES MELLITUS: ICD-10-CM

## 2019-06-26 DIAGNOSIS — Z86.010 HISTORY OF COLON POLYPS: ICD-10-CM

## 2019-06-26 DIAGNOSIS — H26.499 POSTERIOR CAPSULAR OPACIFICATION, UNSPECIFIED LATERALITY: ICD-10-CM

## 2019-06-26 DIAGNOSIS — Z00.00 ENCOUNTER FOR PREVENTIVE HEALTH EXAMINATION: Primary | ICD-10-CM

## 2019-06-26 DIAGNOSIS — H52.7 REFRACTIVE ERROR: ICD-10-CM

## 2019-06-26 DIAGNOSIS — I15.2 HYPERTENSION ASSOCIATED WITH DIABETES: Primary | ICD-10-CM

## 2019-06-26 DIAGNOSIS — I15.2 HYPERTENSION ASSOCIATED WITH DIABETES: ICD-10-CM

## 2019-06-26 DIAGNOSIS — E11.59 HYPERTENSION ASSOCIATED WITH DIABETES: ICD-10-CM

## 2019-06-26 PROCEDURE — 3078F DIAST BP <80 MM HG: CPT | Mod: HCNC,CPTII,S$GLB, | Performed by: INTERNAL MEDICINE

## 2019-06-26 PROCEDURE — 99999 PR PBB SHADOW E&M-EST. PATIENT-LVL IV: ICD-10-PCS | Mod: PBBFAC,HCNC,, | Performed by: INTERNAL MEDICINE

## 2019-06-26 PROCEDURE — 3078F PR MOST RECENT DIASTOLIC BLOOD PRESSURE < 80 MM HG: ICD-10-PCS | Mod: HCNC,CPTII,S$GLB, | Performed by: NURSE PRACTITIONER

## 2019-06-26 PROCEDURE — 99213 PR OFFICE/OUTPT VISIT, EST, LEVL III, 20-29 MIN: ICD-10-PCS | Mod: HCNC,S$GLB,, | Performed by: INTERNAL MEDICINE

## 2019-06-26 PROCEDURE — 99213 OFFICE O/P EST LOW 20 MIN: CPT | Mod: HCNC,S$GLB,, | Performed by: INTERNAL MEDICINE

## 2019-06-26 PROCEDURE — 99499 UNLISTED E&M SERVICE: CPT | Mod: HCNC,S$GLB,, | Performed by: NURSE PRACTITIONER

## 2019-06-26 PROCEDURE — 3074F SYST BP LT 130 MM HG: CPT | Mod: HCNC,CPTII,S$GLB, | Performed by: INTERNAL MEDICINE

## 2019-06-26 PROCEDURE — 99999 PR PBB SHADOW E&M-EST. PATIENT-LVL III: CPT | Mod: PBBFAC,HCNC,, | Performed by: NURSE PRACTITIONER

## 2019-06-26 PROCEDURE — 3074F SYST BP LT 130 MM HG: CPT | Mod: HCNC,CPTII,S$GLB, | Performed by: NURSE PRACTITIONER

## 2019-06-26 PROCEDURE — 3074F PR MOST RECENT SYSTOLIC BLOOD PRESSURE < 130 MM HG: ICD-10-PCS | Mod: HCNC,CPTII,S$GLB, | Performed by: INTERNAL MEDICINE

## 2019-06-26 PROCEDURE — 99999 PR PBB SHADOW E&M-EST. PATIENT-LVL III: ICD-10-PCS | Mod: PBBFAC,HCNC,, | Performed by: NURSE PRACTITIONER

## 2019-06-26 PROCEDURE — G0439 PR MEDICARE ANNUAL WELLNESS SUBSEQUENT VISIT: ICD-10-PCS | Mod: HCNC,S$GLB,, | Performed by: NURSE PRACTITIONER

## 2019-06-26 PROCEDURE — G0439 PPPS, SUBSEQ VISIT: HCPCS | Mod: HCNC,S$GLB,, | Performed by: NURSE PRACTITIONER

## 2019-06-26 PROCEDURE — 99499 RISK ADDL DX/OHS AUDIT: ICD-10-PCS | Mod: HCNC,S$GLB,, | Performed by: NURSE PRACTITIONER

## 2019-06-26 PROCEDURE — 99999 PR PBB SHADOW E&M-EST. PATIENT-LVL IV: CPT | Mod: PBBFAC,HCNC,, | Performed by: INTERNAL MEDICINE

## 2019-06-26 PROCEDURE — 1101F PT FALLS ASSESS-DOCD LE1/YR: CPT | Mod: HCNC,CPTII,S$GLB, | Performed by: INTERNAL MEDICINE

## 2019-06-26 PROCEDURE — 3078F DIAST BP <80 MM HG: CPT | Mod: HCNC,CPTII,S$GLB, | Performed by: NURSE PRACTITIONER

## 2019-06-26 PROCEDURE — 3074F PR MOST RECENT SYSTOLIC BLOOD PRESSURE < 130 MM HG: ICD-10-PCS | Mod: HCNC,CPTII,S$GLB, | Performed by: NURSE PRACTITIONER

## 2019-06-26 PROCEDURE — 1101F PR PT FALLS ASSESS DOC 0-1 FALLS W/OUT INJ PAST YR: ICD-10-PCS | Mod: HCNC,CPTII,S$GLB, | Performed by: INTERNAL MEDICINE

## 2019-06-26 PROCEDURE — 3078F PR MOST RECENT DIASTOLIC BLOOD PRESSURE < 80 MM HG: ICD-10-PCS | Mod: HCNC,CPTII,S$GLB, | Performed by: INTERNAL MEDICINE

## 2019-06-26 NOTE — PROGRESS NOTES
"HPI:  Patient is a 78-year-old man who comes today for follow-up of diabetes, hypertension, and lipids.  He states he just feels fatigued and tired frequently.  He denies any chest pains or shortness of breath.  He admits he does not exercise at all.  He has a very blunted affect.  He also complains of his ears being plugged up.  He admits that he uses Q-tips.    Current meds have been verified and updated per the EMR  Exam:/70   Pulse 89   Temp 98.6 °F (37 °C)   Resp 16   Ht 5' 7.5" (1.715 m)   Wt 88.7 kg (195 lb 8.8 oz)   SpO2 97%   BMI 30.18 kg/m²   Both ears are completely impacted with cerumen.  Carotids 2+ equal without bruits  Chest clear  Cardiovascular irregular rate and rhythm without murmur gallop or rub  Abdomen soft benign no rebound or guarding or distention  Lab Results   Component Value Date    WBC 13.28 (H) 12/19/2018    HGB 13.9 (L) 12/19/2018    HCT 41.5 12/19/2018     12/19/2018    CHOL 154 12/19/2018    TRIG 54 12/19/2018    HDL 64 12/19/2018    ALT 39 06/19/2018    AST 33 06/19/2018     03/18/2019    K 4.4 03/18/2019    CL 99 03/18/2019    CREATININE 0.9 03/18/2019    BUN 18 03/18/2019    CO2 30 (H) 03/18/2019    TSH 0.803 12/19/2018    PSA <0.010 08/20/2013    INR 3.7 (A) 06/21/2019    HGBA1C 7.3 (H) 12/19/2018       Impression:  Malaise fatigue, is highly suspect due to his depression and extreme inactivity  Bilateral cerumen impaction  Multiple other medical problems below, stable  Patient Active Problem List   Diagnosis    Atrial fibrillation    Coronary atherosclerosis    History of prostate cancer    Refractive error    Family history of macular degeneration    Posterior capsular opacification    Pseudophakia of both eyes    History of colon polyps    Diverticulosis of large intestine without hemorrhage    Current use of long term anticoagulation    Pulmonary hypertension, mild    SHORTY (obstructive sleep apnea)    Diabetes mellitus without " complication    Hyperlipidemia associated with type 2 diabetes mellitus    Hypertension associated with diabetes    Intention tremor    Tortuous aorta    Moderate episode of recurrent major depressive disorder       Plan:  Orders Placed This Encounter    Comprehensive metabolic panel    Hemoglobin A1c    CBC auto differential    Lipid panel    Protein / creatinine ratio, urine    TSH    Hemoglobin A1c    Comprehensive metabolic panel    Lipid panel    Ambulatory consult to ENT     Patient was instructed how to use over-the-counter ear drops to chest clear out his ears.  He will also be again appoint with ENT.  He will cancel the appointment if he is able to get the cerumen out using the over-the-counter drops.  He will have the above lab work this week.  We will call him with results.  He will see me in 6 months with additional lab work above.    This note is generated with speech recognition software and is subject to transcription error and sound alike phrases that may be missed by proofreading.

## 2019-06-26 NOTE — PATIENT INSTRUCTIONS
Counseling and Referral of Other Preventative  (Italic type indicates deductible and co-insurance are waived)    Patient Name: Anthony Biggs  Today's Date: 6/26/2019    Health Maintenance       Date Due Completion Date    Abdominal Aortic Aneurysm Screening 08/16/2007 ---    Shingles Vaccine (2 of 3) 04/03/2012 2/7/2012    Hemoglobin A1c 06/19/2019 12/19/2018    Influenza Vaccine 08/01/2019 11/6/2018    Eye Exam 11/13/2019 11/13/2018    Lipid Panel 12/19/2019 12/19/2018    Foot Exam 01/03/2020 1/3/2019    Colonoscopy 05/16/2020 5/16/2017    TETANUS VACCINE 09/16/2026 9/16/2016        No orders of the defined types were placed in this encounter.    The following information is provided to all patients.  This information is to help you find resources for any of the problems found today that may be affecting your health:                Living healthy guide: www.[facility].[place].[facility]      Understanding Diabetes: www.diabetes.org      Eating healthy: www.cdc.gov/healthyweight      [facility] home safety checklist: www.cdc.gov/steadi/patient.html      Agency on Aging: www.goea.[place]      Alcoholics anonymous (AA): www.aa.org      Physical Activity: www.elisa.nih.gov/yu4saja      Tobacco use: www.quitwithusla.org

## 2019-06-26 NOTE — PROGRESS NOTES
"Anthony Biggs presented for a  Medicare AWV and comprehensive Health Risk Assessment today. The following components were reviewed and updated:    · Medical history  · Family History  · Social history  · Allergies and Current Medications  · Health Risk Assessment  · Health Maintenance  · Care Team     ** See Completed Assessments for Annual Wellness Visit within the encounter summary.**       The following assessments were completed:  · Living Situation  · CAGE  · Depression Screening  · Timed Get Up and Go  · Whisper Test  · Cognitive Function Screening  · Nutrition Screening  · ADL Screening  · PAQ Screening    Vitals:    06/26/19 0928   BP: 112/70   Pulse: 87   Resp: 16   Temp: 98.6 °F (37 °C)   SpO2: 97%   Weight: 88.7 kg (195 lb 8.8 oz)   Height: 5' 1.5" (1.562 m)     Body mass index is 36.35 kg/m².  Physical Exam   Constitutional: He is oriented to person, place, and time. He appears well-developed and well-nourished. No distress.   HENT:   Head: Normocephalic and atraumatic.   Mouth/Throat: Oropharynx is clear and moist. No oropharyngeal exudate.   Bilateral ear wax   Neck: Normal range of motion. Neck supple. No JVD present. No tracheal deviation present. No thyromegaly present.   No carotid bruits   Cardiovascular: Normal rate, regular rhythm, normal heart sounds and intact distal pulses. Exam reveals no gallop and no friction rub.   No murmur heard.  Pulmonary/Chest: Effort normal and breath sounds normal. No respiratory distress. He has no wheezes. He has no rales. He exhibits no tenderness.   Abdominal: Soft. Bowel sounds are normal. He exhibits no distension and no mass. There is no tenderness. There is no rebound and no guarding. No hernia.   No abd bruits   Musculoskeletal: Normal range of motion. He exhibits no edema or tenderness.   Lymphadenopathy:     He has no cervical adenopathy.   Neurological: He is alert and oriented to person, place, and time. No cranial nerve deficit.   Skin: Skin is warm and " dry. He is not diaphoretic.   Psychiatric: He has a normal mood and affect. His behavior is normal.         Diagnoses and health risks identified today and associated recommendations/orders:    1. Encounter for preventive health examination  Screenings performed, as noted above.  Personal preventative testing needs reviewed.     2. Hypertension associated with diabetes  Monitored/treated on meds, continue the same tx, stable    3. Hyperlipidemia associated with type 2 diabetes mellitus  Monitored/treated on meds, continue the same tx, stable, last LDL 79.2    4. Tortuous aorta  Monitored/treated on meds, continue the same tx, stable, no chest pain    5. Paroxysmal atrial fibrillation  Monitored/treated on meds, continue the same tx, stable    6. Atherosclerosis of native coronary artery of native heart without angina pectoris  Monitored/treated on meds, continue the same tx, stable, no chest pain    7. Moderate episode of recurrent major depressive disorder  Monitored/treated on meds, continue the same tx, stable, no SI/HI    8. Intention tremor  Monitored/treated on meds, continue the same tx, stable    9. Posterior capsular opacification, unspecified laterality  Monitored/treated on meds, continue the same tx, stable    10. Pseudophakia of both eyes  Monitored/treated on meds, continue the same tx, stable,     11. Refractive error  Monitored/treated on meds, continue the same tx, stable    12. Pulmonary hypertension, mild  Monitored/treated on meds, continue the same tx, stable, not using home oxygen    13. Current use of long term anticoagulation  Monitored/treated on meds, continue the same tx, stable    14. SHORTY (obstructive sleep apnea)  Monitored/treated on meds, continue the same tx, stable, sees Dr Mahoney, not on oxygen at home      Provided [place] with a 5-10 year written screening schedule and personal prevention plan. Recommendations were developed using the USPSTF age appropriate recommendations.  Education, counseling, and referrals were provided as needed. After Visit Summary printed and given to patient which includes a list of additional screenings\tests needed.    No follow-ups on file.    Jeronimo Barbour NP

## 2019-06-26 NOTE — TELEPHONE ENCOUNTER
I did already during his visit this morning. Look in epic and you will see duplicates of three test. They were supposed to book some for tomorrow and the three duplicates again in Dec.

## 2019-06-27 ENCOUNTER — TELEPHONE (OUTPATIENT)
Dept: INTERNAL MEDICINE | Facility: CLINIC | Age: 79
End: 2019-06-27

## 2019-06-27 ENCOUNTER — OFFICE VISIT (OUTPATIENT)
Dept: OTOLARYNGOLOGY | Facility: CLINIC | Age: 79
End: 2019-06-27
Payer: MEDICARE

## 2019-06-27 ENCOUNTER — LAB VISIT (OUTPATIENT)
Dept: LAB | Facility: HOSPITAL | Age: 79
End: 2019-06-27
Attending: INTERNAL MEDICINE
Payer: MEDICARE

## 2019-06-27 VITALS
BODY MASS INDEX: 35.61 KG/M2 | DIASTOLIC BLOOD PRESSURE: 77 MMHG | WEIGHT: 191.56 LBS | TEMPERATURE: 99 F | SYSTOLIC BLOOD PRESSURE: 117 MMHG | HEART RATE: 83 BPM

## 2019-06-27 DIAGNOSIS — E11.9 DIABETES MELLITUS WITHOUT COMPLICATION: ICD-10-CM

## 2019-06-27 DIAGNOSIS — H61.21 IMPACTED CERUMEN OF RIGHT EAR: Primary | ICD-10-CM

## 2019-06-27 LAB
ALBUMIN SERPL BCP-MCNC: 4.1 G/DL (ref 3.5–5.2)
ALP SERPL-CCNC: 64 U/L (ref 55–135)
ALT SERPL W/O P-5'-P-CCNC: 21 U/L (ref 10–44)
ANION GAP SERPL CALC-SCNC: 10 MMOL/L (ref 8–16)
AST SERPL-CCNC: 23 U/L (ref 10–40)
BASOPHILS # BLD AUTO: 0.02 K/UL (ref 0–0.2)
BASOPHILS NFR BLD: 0.2 % (ref 0–1.9)
BILIRUB SERPL-MCNC: 0.9 MG/DL (ref 0.1–1)
BUN SERPL-MCNC: 19 MG/DL (ref 8–23)
CALCIUM SERPL-MCNC: 9.7 MG/DL (ref 8.7–10.5)
CHLORIDE SERPL-SCNC: 101 MMOL/L (ref 95–110)
CHOLEST SERPL-MCNC: 129 MG/DL (ref 120–199)
CHOLEST/HDLC SERPL: 2.2 {RATIO} (ref 2–5)
CO2 SERPL-SCNC: 26 MMOL/L (ref 23–29)
CREAT SERPL-MCNC: 0.9 MG/DL (ref 0.5–1.4)
DIFFERENTIAL METHOD: ABNORMAL
EOSINOPHIL # BLD AUTO: 0.2 K/UL (ref 0–0.5)
EOSINOPHIL NFR BLD: 2 % (ref 0–8)
ERYTHROCYTE [DISTWIDTH] IN BLOOD BY AUTOMATED COUNT: 13 % (ref 11.5–14.5)
EST. GFR  (AFRICAN AMERICAN): >60 ML/MIN/1.73 M^2
EST. GFR  (NON AFRICAN AMERICAN): >60 ML/MIN/1.73 M^2
ESTIMATED AVG GLUCOSE: 126 MG/DL (ref 68–131)
GLUCOSE SERPL-MCNC: 107 MG/DL (ref 70–110)
HBA1C MFR BLD HPLC: 6 % (ref 4–5.6)
HCT VFR BLD AUTO: 40.5 % (ref 40–54)
HDLC SERPL-MCNC: 58 MG/DL (ref 40–75)
HDLC SERPL: 45 % (ref 20–50)
HGB BLD-MCNC: 13.1 G/DL (ref 14–18)
IMM GRANULOCYTES # BLD AUTO: 0.03 K/UL (ref 0–0.04)
IMM GRANULOCYTES NFR BLD AUTO: 0.3 % (ref 0–0.5)
LDLC SERPL CALC-MCNC: 58.4 MG/DL (ref 63–159)
LYMPHOCYTES # BLD AUTO: 1.6 K/UL (ref 1–4.8)
LYMPHOCYTES NFR BLD: 17.2 % (ref 18–48)
MCH RBC QN AUTO: 30.6 PG (ref 27–31)
MCHC RBC AUTO-ENTMCNC: 32.3 G/DL (ref 32–36)
MCV RBC AUTO: 95 FL (ref 82–98)
MONOCYTES # BLD AUTO: 0.8 K/UL (ref 0.3–1)
MONOCYTES NFR BLD: 9 % (ref 4–15)
NEUTROPHILS # BLD AUTO: 6.6 K/UL (ref 1.8–7.7)
NEUTROPHILS NFR BLD: 71.3 % (ref 38–73)
NONHDLC SERPL-MCNC: 71 MG/DL
NRBC BLD-RTO: 0 /100 WBC
PLATELET # BLD AUTO: 220 K/UL (ref 150–350)
PMV BLD AUTO: 11.3 FL (ref 9.2–12.9)
POTASSIUM SERPL-SCNC: 4.2 MMOL/L (ref 3.5–5.1)
PROT SERPL-MCNC: 6.9 G/DL (ref 6–8.4)
RBC # BLD AUTO: 4.28 M/UL (ref 4.6–6.2)
SODIUM SERPL-SCNC: 137 MMOL/L (ref 136–145)
TRIGL SERPL-MCNC: 63 MG/DL (ref 30–150)
TSH SERPL DL<=0.005 MIU/L-ACNC: 0.72 UIU/ML (ref 0.4–4)
WBC # BLD AUTO: 9.23 K/UL (ref 3.9–12.7)

## 2019-06-27 PROCEDURE — 80061 LIPID PANEL: CPT | Mod: HCNC

## 2019-06-27 PROCEDURE — 99999 PR PBB SHADOW E&M-EST. PATIENT-LVL III: CPT | Mod: PBBFAC,HCNC,, | Performed by: PHYSICIAN ASSISTANT

## 2019-06-27 PROCEDURE — 83036 HEMOGLOBIN GLYCOSYLATED A1C: CPT | Mod: HCNC

## 2019-06-27 PROCEDURE — 99499 NO LOS: ICD-10-PCS | Mod: HCNC,S$GLB,, | Performed by: PHYSICIAN ASSISTANT

## 2019-06-27 PROCEDURE — 85025 COMPLETE CBC W/AUTO DIFF WBC: CPT | Mod: HCNC

## 2019-06-27 PROCEDURE — 69210 REMOVE IMPACTED EAR WAX UNI: CPT | Mod: HCNC,S$GLB,, | Performed by: PHYSICIAN ASSISTANT

## 2019-06-27 PROCEDURE — 84443 ASSAY THYROID STIM HORMONE: CPT | Mod: HCNC

## 2019-06-27 PROCEDURE — 99499 UNLISTED E&M SERVICE: CPT | Mod: HCNC,S$GLB,, | Performed by: PHYSICIAN ASSISTANT

## 2019-06-27 PROCEDURE — 36415 COLL VENOUS BLD VENIPUNCTURE: CPT | Mod: HCNC

## 2019-06-27 PROCEDURE — 99999 PR PBB SHADOW E&M-EST. PATIENT-LVL III: ICD-10-PCS | Mod: PBBFAC,HCNC,, | Performed by: PHYSICIAN ASSISTANT

## 2019-06-27 PROCEDURE — 69210 PR REMOVAL IMPACTED CERUMEN REQUIRING INSTRUMENTATION, UNILATERAL: ICD-10-PCS | Mod: HCNC,S$GLB,, | Performed by: PHYSICIAN ASSISTANT

## 2019-06-27 PROCEDURE — 80053 COMPREHEN METABOLIC PANEL: CPT | Mod: HCNC

## 2019-06-27 RX ORDER — HYDROCODONE BITARTRATE AND HOMATROPINE METHYLBROMIDE ORAL SOLUTION 5; 1.5 MG/5ML; MG/5ML
5 LIQUID ORAL EVERY 4 HOURS PRN
Qty: 120 ML | Refills: 0 | Status: SHIPPED | OUTPATIENT
Start: 2019-06-27 | End: 2020-01-09 | Stop reason: ALTCHOICE

## 2019-06-27 NOTE — PROGRESS NOTES
Subjective:   Cerumen impactions     Patient ID: Anthony Biggs Jr. is a 78 y.o. male.    Chief Complaint:  Excessive ear wax     Anthony Biggs Jr. is a 78 y.o. male here to see me today for evaluation of a wax impaction in right ear.  He denies any hearing loss in the affected ears; denies ear pain or drainage.  He feels as though the right ear is clogged; worse after using Qtip.  This has been an issue in the past.  The patient has not been using any sort of ear drop to soften the wax.  Denies tinnitus or dizziness.      HPI  Review of Systems   HENT:  Negative for hearing loss, ear discharge, ear pain and tinnitus.        Objective:     Physical Exam   HENT:   Right Ear: External ear and ear canal normal.    Left Ear: External ear and ear canal normal. Tympanic membrane is normal in appearance.   RIGHT complete cerumen impaction, removal described below       Procedure Note    CHIEF COMPLAINT:  Cerumen Impaction    Description:  The patient was seated in an exam chair.  An ear speculum was placed in the right EAC and was examined under the microscope.  Suction and/or loop curettes were used to remove a large cerumen impaction.  The tympanic membrane was visualized and was normal in appearance.  The patient tolerated the procedure well.           Assessment:     1. Impacted cerumen of right ear        Plan:     1.  Cerumen impaction:  Removed today without difficulty.  I would recommend the use of a wax softening drop, either over the counter Debrox or mineral oil, on a weekly basis.  I also instructed the patient to avoid Qtips.  He denies hearing loss and is not interested in audiogram.  RTC as needed.

## 2019-06-27 NOTE — TELEPHONE ENCOUNTER
Pt states he thought he was suppose to get some cough medication called into the pharmacy    Please advise

## 2019-06-27 NOTE — LETTER
June 27, 2019      Giuliano Moran MD  [address]  [address]  [facility] [address]           [facility]  [address] [place] [id number]  Phone: [phone]  Fax: [phone]          Patient: Anthony Biggs Jr.   MR Number: [id number]   YOB: 1940   Date of Visit: 6/27/2019       Dear Dr. Giuliano Moran:    Thank you for referring Anthony Biggs to me for evaluation. Attached you will find relevant portions of my assessment and plan of care.    If you have questions, please do not hesitate to call me. I look forward to following Anthony Biggs along with you.    Sincerely,    SHARAD Andrewsosure  CC:  No Recipients    If you would like to receive this communication electronically, please contact [email or web address] or [phone] to request more information on [employer] Link access.    For providers and/or their staff who would like to refer a patient to [facility], please contact us through our one-stop-shop provider referral line, [facility], at [phone].    If you feel you have received this communication in error or would no longer like to receive these types of communications, please e-mail [email or web address]

## 2019-07-02 ENCOUNTER — ANTI-COAG VISIT (OUTPATIENT)
Dept: CARDIOLOGY | Facility: CLINIC | Age: 79
End: 2019-07-02
Payer: MEDICARE

## 2019-07-02 DIAGNOSIS — I48.0 PAROXYSMAL ATRIAL FIBRILLATION: ICD-10-CM

## 2019-07-02 DIAGNOSIS — Z79.01 LONG TERM (CURRENT) USE OF ANTICOAGULANTS: Primary | ICD-10-CM

## 2019-07-02 LAB — INR PPP: 2.9 (ref 2–3)

## 2019-07-02 PROCEDURE — 85610 POCT INR: ICD-10-PCS | Mod: QW,HCNC,S$GLB, | Performed by: NUCLEAR MEDICINE

## 2019-07-02 PROCEDURE — 85610 PROTHROMBIN TIME: CPT | Mod: QW,HCNC,S$GLB, | Performed by: NUCLEAR MEDICINE

## 2019-07-02 PROCEDURE — 93793 ANTICOAG MGMT PT WARFARIN: CPT | Mod: HCNC,S$GLB,,

## 2019-07-02 PROCEDURE — 93793 PR ANTICOAGULANT MGMT FOR PT TAKING WARFARIN: ICD-10-PCS | Mod: HCNC,S$GLB,,

## 2019-07-02 NOTE — PROGRESS NOTES
Patient's INR is therapeutic at 2.9.  Reports no recent changes.  Instructed to maintain current dose of Warfarin 7.5 mg every Tuesday and 5 mg on all other days per week.  Dose calendar - given.  Recheck in 3 weeks.

## 2019-07-05 ENCOUNTER — TELEPHONE (OUTPATIENT)
Dept: INTERNAL MEDICINE | Facility: CLINIC | Age: 79
End: 2019-07-05

## 2019-07-05 NOTE — TELEPHONE ENCOUNTER
----- Message from Melanie Pickering sent at 7/5/2019 10:27 AM CDT -----  Contact: Pt  .Type:  Test Results    Who Called: PT  Name of Test (Lab/Mammo/Etc): LAB  Date of Test: 06/27  Ordering Provider: JAC  Where the test was performed: [facility]  Would the patient rather a call back or a response via [facility]? CALL BACK  Best Call Back Number: .[phone]  Additional Information:

## 2019-07-05 NOTE — TELEPHONE ENCOUNTER
----- Message from Melanie Pickering sent at 7/5/2019 10:56 AM CDT -----  Contact: Pt  .Type:  Patient Returning Call    Who Called:PT  Who Left Message for Patient:TRINY  Does the patient know what this is regarding?:YES  Would the patient rather a call back or a response via [facility]? CALL BACK  Best Call Back Number:.[phone]  Additional Information:

## 2019-07-11 DIAGNOSIS — Z79.01 LONG TERM (CURRENT) USE OF ANTICOAGULANTS: ICD-10-CM

## 2019-07-12 RX ORDER — WARFARIN SODIUM 5 MG/1
TABLET ORAL
Qty: 34 TABLET | Refills: 0 | Status: SHIPPED | OUTPATIENT
Start: 2019-07-12 | End: 2019-08-12 | Stop reason: SDUPTHER

## 2019-07-23 ENCOUNTER — ANTI-COAG VISIT (OUTPATIENT)
Dept: CARDIOLOGY | Facility: CLINIC | Age: 79
End: 2019-07-23
Payer: MEDICARE

## 2019-07-23 DIAGNOSIS — I48.0 PAROXYSMAL ATRIAL FIBRILLATION: ICD-10-CM

## 2019-07-23 DIAGNOSIS — Z79.01 LONG TERM (CURRENT) USE OF ANTICOAGULANTS: Primary | ICD-10-CM

## 2019-07-23 LAB — INR PPP: 3.1 (ref 2–3)

## 2019-07-23 PROCEDURE — 85610 PROTHROMBIN TIME: CPT | Mod: QW,HCNC,S$GLB, | Performed by: NUCLEAR MEDICINE

## 2019-07-23 PROCEDURE — 93793 PR ANTICOAGULANT MGMT FOR PT TAKING WARFARIN: ICD-10-PCS | Mod: HCNC,S$GLB,,

## 2019-07-23 PROCEDURE — 93793 ANTICOAG MGMT PT WARFARIN: CPT | Mod: HCNC,S$GLB,,

## 2019-07-23 PROCEDURE — 85610 POCT INR: ICD-10-PCS | Mod: QW,HCNC,S$GLB, | Performed by: NUCLEAR MEDICINE

## 2019-07-23 NOTE — PROGRESS NOTES
"Patient's INR is slightly elevated at 3.1.  Reports, "I didn't eat my greens yesterday".  No bleeding reported.  Patient has taken his Warfarin this morning.  Instructed patient to eat a small portion of a dark leafy vegetable today.  Stay consistent to diet.  Will rechallenge current dose of Warfarin 7.5 mg every Tuesday and 5 mg on all other days per week.  Advised to seek medical attention (ED) for any signs of abnormal bleeding, if needed.  Recheck in 3 weeks.  Patient voiced understanding of all medical information given.  Dose calendar given and reviewed.  "

## 2019-07-29 ENCOUNTER — TELEPHONE (OUTPATIENT)
Dept: CARDIOLOGY | Facility: CLINIC | Age: 79
End: 2019-07-29

## 2019-08-12 DIAGNOSIS — Z79.01 LONG TERM (CURRENT) USE OF ANTICOAGULANTS: ICD-10-CM

## 2019-08-12 RX ORDER — WARFARIN SODIUM 5 MG/1
TABLET ORAL
Qty: 34 TABLET | Refills: 0 | Status: SHIPPED | OUTPATIENT
Start: 2019-08-12 | End: 2019-09-09 | Stop reason: SDUPTHER

## 2019-08-13 ENCOUNTER — ANTI-COAG VISIT (OUTPATIENT)
Dept: CARDIOLOGY | Facility: CLINIC | Age: 79
End: 2019-08-13
Payer: MEDICARE

## 2019-08-13 DIAGNOSIS — I48.0 PAROXYSMAL ATRIAL FIBRILLATION: ICD-10-CM

## 2019-08-13 DIAGNOSIS — Z79.01 LONG TERM (CURRENT) USE OF ANTICOAGULANTS: Primary | ICD-10-CM

## 2019-08-13 LAB — INR PPP: 2.4 (ref 2–3)

## 2019-08-13 PROCEDURE — 93793 ANTICOAG MGMT PT WARFARIN: CPT | Mod: HCNC,S$GLB,,

## 2019-08-13 PROCEDURE — 93793 PR ANTICOAGULANT MGMT FOR PT TAKING WARFARIN: ICD-10-PCS | Mod: HCNC,S$GLB,,

## 2019-08-13 PROCEDURE — 85610 POCT INR: ICD-10-PCS | Mod: QW,HCNC,S$GLB, | Performed by: NUCLEAR MEDICINE

## 2019-08-13 PROCEDURE — 85610 PROTHROMBIN TIME: CPT | Mod: QW,HCNC,S$GLB, | Performed by: NUCLEAR MEDICINE

## 2019-08-13 NOTE — PROGRESS NOTES
Patient's INR is therapeutic at 2.4.  Reports no recent changes.  Instructed to maintain current dose of Warfarin 7.5 mg every Tuesday and 5 mg on all other days per week.  Dose calendar given and reviewed with patient.  Recheck in 1 month.

## 2019-08-14 ENCOUNTER — TELEPHONE (OUTPATIENT)
Dept: CARDIOLOGY | Facility: CLINIC | Age: 79
End: 2019-08-14

## 2019-08-14 NOTE — TELEPHONE ENCOUNTER
Called and left v/m that I had to move his appt with Dr Griffith on 09/23 on [place] to 09/24 at 10:40. Please call me if he can not make that appt. Loyd.

## 2019-09-09 DIAGNOSIS — Z79.01 LONG TERM (CURRENT) USE OF ANTICOAGULANTS: ICD-10-CM

## 2019-09-10 ENCOUNTER — ANTI-COAG VISIT (OUTPATIENT)
Dept: CARDIOLOGY | Facility: CLINIC | Age: 79
End: 2019-09-10
Payer: MEDICARE

## 2019-09-10 DIAGNOSIS — I48.0 PAROXYSMAL ATRIAL FIBRILLATION: ICD-10-CM

## 2019-09-10 DIAGNOSIS — Z79.01 LONG TERM (CURRENT) USE OF ANTICOAGULANTS: Primary | ICD-10-CM

## 2019-09-10 LAB — INR PPP: 2.6 (ref 2–3)

## 2019-09-10 PROCEDURE — 85610 PROTHROMBIN TIME: CPT | Mod: QW,HCNC,S$GLB, | Performed by: NUCLEAR MEDICINE

## 2019-09-10 PROCEDURE — 93793 ANTICOAG MGMT PT WARFARIN: CPT | Mod: HCNC,S$GLB,,

## 2019-09-10 PROCEDURE — 93793 PR ANTICOAGULANT MGMT FOR PT TAKING WARFARIN: ICD-10-PCS | Mod: HCNC,S$GLB,,

## 2019-09-10 PROCEDURE — 85610 POCT INR: ICD-10-PCS | Mod: QW,HCNC,S$GLB, | Performed by: NUCLEAR MEDICINE

## 2019-09-10 RX ORDER — WARFARIN SODIUM 5 MG/1
TABLET ORAL
Qty: 34 TABLET | Refills: 0 | Status: SHIPPED | OUTPATIENT
Start: 2019-09-10 | End: 2019-10-11 | Stop reason: SDUPTHER

## 2019-09-10 NOTE — PROGRESS NOTES
Patient's INR is therapeutic at 2.6.  Reports no recent changes.  Instructed to maintain current dose of Warfarin 7.5 mg every Tuesday and 5 mg on all other days per week.  Dose calendar given and reviewed with patient.  Recheck in 1 month.

## 2019-09-17 DIAGNOSIS — E78.00 PURE HYPERCHOLESTEROLEMIA: ICD-10-CM

## 2019-09-17 RX ORDER — ATORVASTATIN CALCIUM 40 MG/1
TABLET, FILM COATED ORAL
Qty: 30 TABLET | Refills: 2 | Status: SHIPPED | OUTPATIENT
Start: 2019-09-17 | End: 2019-12-20

## 2019-09-23 DIAGNOSIS — I48.0 PAROXYSMAL ATRIAL FIBRILLATION: Primary | ICD-10-CM

## 2019-09-24 ENCOUNTER — OFFICE VISIT (OUTPATIENT)
Dept: CARDIOLOGY | Facility: CLINIC | Age: 79
End: 2019-09-24
Payer: MEDICARE

## 2019-09-24 ENCOUNTER — CLINICAL SUPPORT (OUTPATIENT)
Dept: CARDIOLOGY | Facility: CLINIC | Age: 79
End: 2019-09-24
Payer: MEDICARE

## 2019-09-24 VITALS
DIASTOLIC BLOOD PRESSURE: 80 MMHG | HEIGHT: 68 IN | BODY MASS INDEX: 29.4 KG/M2 | HEART RATE: 80 BPM | SYSTOLIC BLOOD PRESSURE: 136 MMHG | WEIGHT: 194 LBS

## 2019-09-24 DIAGNOSIS — E78.5 HYPERLIPIDEMIA ASSOCIATED WITH TYPE 2 DIABETES MELLITUS: ICD-10-CM

## 2019-09-24 DIAGNOSIS — E11.69 HYPERLIPIDEMIA ASSOCIATED WITH TYPE 2 DIABETES MELLITUS: ICD-10-CM

## 2019-09-24 DIAGNOSIS — I48.0 PAROXYSMAL ATRIAL FIBRILLATION: ICD-10-CM

## 2019-09-24 DIAGNOSIS — I15.2 HYPERTENSION ASSOCIATED WITH DIABETES: ICD-10-CM

## 2019-09-24 DIAGNOSIS — G47.33 OSA (OBSTRUCTIVE SLEEP APNEA): ICD-10-CM

## 2019-09-24 DIAGNOSIS — E11.59 HYPERTENSION ASSOCIATED WITH DIABETES: ICD-10-CM

## 2019-09-24 DIAGNOSIS — I77.1 TORTUOUS AORTA: ICD-10-CM

## 2019-09-24 DIAGNOSIS — I25.10 ATHEROSCLEROSIS OF NATIVE CORONARY ARTERY OF NATIVE HEART WITHOUT ANGINA PECTORIS: ICD-10-CM

## 2019-09-24 DIAGNOSIS — I48.19 PERSISTENT ATRIAL FIBRILLATION: Primary | ICD-10-CM

## 2019-09-24 PROCEDURE — 3079F PR MOST RECENT DIASTOLIC BLOOD PRESSURE 80-89 MM HG: ICD-10-PCS | Mod: HCNC,CPTII,S$GLB, | Performed by: INTERNAL MEDICINE

## 2019-09-24 PROCEDURE — 1101F PR PT FALLS ASSESS DOC 0-1 FALLS W/OUT INJ PAST YR: ICD-10-PCS | Mod: HCNC,CPTII,S$GLB, | Performed by: INTERNAL MEDICINE

## 2019-09-24 PROCEDURE — 3075F PR MOST RECENT SYSTOLIC BLOOD PRESS GE 130-139MM HG: ICD-10-PCS | Mod: HCNC,CPTII,S$GLB, | Performed by: INTERNAL MEDICINE

## 2019-09-24 PROCEDURE — 99214 OFFICE O/P EST MOD 30 MIN: CPT | Mod: HCNC,S$GLB,, | Performed by: INTERNAL MEDICINE

## 2019-09-24 PROCEDURE — 93000 ELECTROCARDIOGRAM COMPLETE: CPT | Mod: HCNC,S$GLB,, | Performed by: INTERNAL MEDICINE

## 2019-09-24 PROCEDURE — 1101F PT FALLS ASSESS-DOCD LE1/YR: CPT | Mod: HCNC,CPTII,S$GLB, | Performed by: INTERNAL MEDICINE

## 2019-09-24 PROCEDURE — 99999 PR PBB SHADOW E&M-EST. PATIENT-LVL III: ICD-10-PCS | Mod: PBBFAC,HCNC,, | Performed by: INTERNAL MEDICINE

## 2019-09-24 PROCEDURE — 3075F SYST BP GE 130 - 139MM HG: CPT | Mod: HCNC,CPTII,S$GLB, | Performed by: INTERNAL MEDICINE

## 2019-09-24 PROCEDURE — 3079F DIAST BP 80-89 MM HG: CPT | Mod: HCNC,CPTII,S$GLB, | Performed by: INTERNAL MEDICINE

## 2019-09-24 PROCEDURE — 99214 PR OFFICE/OUTPT VISIT, EST, LEVL IV, 30-39 MIN: ICD-10-PCS | Mod: HCNC,S$GLB,, | Performed by: INTERNAL MEDICINE

## 2019-09-24 PROCEDURE — 93000 EKG 12-LEAD: ICD-10-PCS | Mod: HCNC,S$GLB,, | Performed by: INTERNAL MEDICINE

## 2019-09-24 PROCEDURE — 99999 PR PBB SHADOW E&M-EST. PATIENT-LVL III: CPT | Mod: PBBFAC,HCNC,, | Performed by: INTERNAL MEDICINE

## 2019-09-24 NOTE — PROGRESS NOTES
Subjective:   Patient ID:  Anthony Biggs Jr. is a 79 y.o. male who presents for follow-up of Paroxysmal Atrial fibrillation (6 month f/u)  Patient denies CP, angina or anginal equivalent.    Hypertension   This is a chronic problem. The current episode started more than 1 year ago. The problem has been gradually improving since onset. The problem is controlled. Pertinent negatives include no chest pain, palpitations or shortness of breath. Risk factors for coronary artery disease include male gender. Past treatments include calcium channel blockers, angiotensin blockers and diuretics. The current treatment provides moderate improvement. Compliance problems include medication side effects.    Hyperlipidemia   This is a chronic problem. The current episode started more than 1 year ago. The problem is controlled. Recent lipid tests were reviewed and are variable. Pertinent negatives include no chest pain or shortness of breath. Current antihyperlipidemic treatment includes statins. The current treatment provides moderate improvement of lipids. There are no compliance problems.        Review of Systems   Constitution: Negative. Negative for weight gain.   HENT: Negative.    Eyes: Negative.    Cardiovascular: Negative.  Negative for chest pain, leg swelling and palpitations.   Respiratory: Negative.  Negative for shortness of breath.    Endocrine: Negative.    Hematologic/Lymphatic: Negative.    Skin: Negative.    Musculoskeletal: Negative for muscle weakness.   Gastrointestinal: Negative.    Genitourinary: Negative.    Neurological: Negative.  Negative for dizziness.   Psychiatric/Behavioral: Negative.    Allergic/Immunologic: Negative.      Family History   Problem Relation Age of Onset    Heart disease Mother     Cancer Father         lung    Macular degeneration Sister     Diabetes Sister     Heart disease Sister     Strabismus Neg Hx     Retinal detachment Neg Hx     Glaucoma Neg Hx     Blindness Neg Hx      Amblyopia Neg Hx      Past Medical History:   Diagnosis Date    Abnormal CXR     Angina pectoris 2017    Atrial fibrillation     Coronary atherosclerosis of unspecified type of vessel, native or graft     Depression     Diabetes mellitus without complication     Emphysema of lung     History of prostate cancer 2007    prostatectomy    Hyperlipidemia associated with type 2 diabetes mellitus     Hypertension associated with diabetes     Intention tremor     Morbid (severe) obesity due to excess calories 2019    SHORTY (obstructive sleep apnea)     Prostate cancer     Trouble in sleeping     Urinary incontinence      Social History     Socioeconomic History    Marital status:      Spouse name: Not on file    Number of children: Not on file    Years of education: Not on file    Highest education level: Not on file   Occupational History    Not on file   Social Needs    Financial resource strain: Not on file    Food insecurity:     Worry: Not on file     Inability: Not on file    Transportation needs:     Medical: Not on file     Non-medical: Not on file   Tobacco Use    Smoking status: Former Smoker     Packs/day: 0.50     Years: 40.00     Pack years: 20.00     Types: Cigarettes     Last attempt to quit: 2002     Years since quittin.7    Smokeless tobacco: Never Used   Substance and Sexual Activity    Alcohol use: No    Drug use: No    Sexual activity: Never   Lifestyle    Physical activity:     Days per week: Not on file     Minutes per session: Not on file    Stress: Not on file   Relationships    Social connections:     Talks on phone: Not on file     Gets together: Not on file     Attends Zoroastrian service: Not on file     Active member of club or organization: Not on file     Attends meetings of clubs or organizations: Not on file     Relationship status: Not on file   Other Topics Concern    Not on file   Social History Narrative    Not on file     Current  Outpatient Medications on File Prior to Visit   Medication Sig Dispense Refill    aspirin (ECOTRIN) 81 MG EC tablet Take 81 mg by mouth once daily.      atorvastatin (LIPITOR) 40 MG tablet TAKE ONE TABLET BY MOUTH DAILY 30 tablet 2    hydrocodone-homatropine 5-1.5 mg/5 ml (HYCODAN) 5-1.5 mg/5 mL Syrp Take 5 mLs by mouth every 4 (four) hours as needed. 120 mL 0    losartan (COZAAR) 100 MG tablet TAKE ONE TABLET BY MOUTH DAILY 90 tablet 2    melatonin 3 mg Tab Take 1 tablet by mouth nightly.      metFORMIN (GLUCOPHAGE) 500 MG tablet Take 1 tablet (500 mg total) by mouth 2 (two) times daily with meals. 180 tablet 3    torsemide (DEMADEX) 10 MG Tab Take 1 tablet (10 mg total) by mouth once daily. 30 tablet 11    warfarin (JANTOVEN) 5 MG tablet TAKE ONE AND ONE-HALF TABLETS BY MOUTH EVERY TUES, THURS, & SAT. & ONE TABLET ON ALL OTHER DAYS. (Patient taking differently: TAKE ONE AND ONE-HALF TABLETS BY MOUTH EVERY Tuesday AND ONE TABLET ON ALL OTHER DAYS.) 34 tablet 0     No current facility-administered medications on file prior to visit.      Review of patient's allergies indicates:  No Known Allergies    Objective:     Physical Exam   Constitutional: He is oriented to person, place, and time. He appears well-developed and well-nourished.   HENT:   Head: Normocephalic and atraumatic.   Eyes: Pupils are equal, round, and reactive to light. Conjunctivae are normal.   Neck: Normal range of motion. Neck supple.   Cardiovascular: Normal rate, normal heart sounds and intact distal pulses. An irregularly irregular rhythm present.   Pulmonary/Chest: Effort normal and breath sounds normal.   Abdominal: Soft. Bowel sounds are normal.   Neurological: He is alert and oriented to person, place, and time.   Skin: Skin is warm and dry.   Psychiatric: He has a normal mood and affect.   Nursing note and vitals reviewed.      Assessment:     1. Persistent atrial fibrillation    2. Atherosclerosis of native coronary artery of  native heart without angina pectoris    3. Hyperlipidemia associated with type 2 diabetes mellitus    4. Hypertension associated with diabetes    5. Tortuous aorta    6. SHORTY (obstructive sleep apnea)        Plan:     Persistent atrial fibrillation    Atherosclerosis of native coronary artery of native heart without angina pectoris    Hyperlipidemia associated with type 2 diabetes mellitus    Hypertension associated with diabetes    Tortuous aorta    SHORTY (obstructive sleep apnea)        Continue statin-hlp  Continue diltiazem, losartan,demedex -htn  Continue coumadin-afib

## 2019-10-08 ENCOUNTER — ANTI-COAG VISIT (OUTPATIENT)
Dept: CARDIOLOGY | Facility: CLINIC | Age: 79
End: 2019-10-08
Payer: MEDICARE

## 2019-10-08 DIAGNOSIS — Z79.01 LONG TERM (CURRENT) USE OF ANTICOAGULANTS: Primary | ICD-10-CM

## 2019-10-08 DIAGNOSIS — I48.19 PERSISTENT ATRIAL FIBRILLATION: ICD-10-CM

## 2019-10-08 LAB — INR PPP: 2.6 (ref 2–3)

## 2019-10-08 PROCEDURE — 93793 ANTICOAG MGMT PT WARFARIN: CPT | Mod: HCNC,S$GLB,,

## 2019-10-08 PROCEDURE — 85610 PROTHROMBIN TIME: CPT | Mod: QW,HCNC,S$GLB, | Performed by: NUCLEAR MEDICINE

## 2019-10-08 PROCEDURE — 93793 PR ANTICOAGULANT MGMT FOR PT TAKING WARFARIN: ICD-10-PCS | Mod: HCNC,S$GLB,,

## 2019-10-08 PROCEDURE — 85610 POCT INR: ICD-10-PCS | Mod: QW,HCNC,S$GLB, | Performed by: NUCLEAR MEDICINE

## 2019-10-08 NOTE — PROGRESS NOTES
Patient's INR remains therapeutic at 2.6.  Reports no recent changes.  Instructed to maintain current dose of Warfarin 7.5 mg every Tuesday and 5 mg on all other days per week.  Dose calendar given and reviewed with patient.  Recheck in 1 month.  Patient voiced understanding.

## 2019-10-11 DIAGNOSIS — Z79.01 LONG TERM (CURRENT) USE OF ANTICOAGULANTS: ICD-10-CM

## 2019-10-11 RX ORDER — WARFARIN SODIUM 5 MG/1
TABLET ORAL
Qty: 34 TABLET | Refills: 0 | Status: SHIPPED | OUTPATIENT
Start: 2019-10-11 | End: 2019-10-23 | Stop reason: SDUPTHER

## 2019-10-18 ENCOUNTER — IMMUNIZATION (OUTPATIENT)
Dept: INTERNAL MEDICINE | Facility: CLINIC | Age: 79
End: 2019-10-18
Payer: MEDICARE

## 2019-10-18 PROCEDURE — G0008 ADMIN INFLUENZA VIRUS VAC: HCPCS | Mod: HCNC,S$GLB,, | Performed by: INTERNAL MEDICINE

## 2019-10-18 PROCEDURE — 90662 FLU VACCINE - HIGH DOSE (65+) PRESERVATIVE FREE IM: ICD-10-PCS | Mod: HCNC,S$GLB,, | Performed by: INTERNAL MEDICINE

## 2019-10-18 PROCEDURE — G0008 FLU VACCINE - HIGH DOSE (65+) PRESERVATIVE FREE IM: ICD-10-PCS | Mod: HCNC,S$GLB,, | Performed by: INTERNAL MEDICINE

## 2019-10-18 PROCEDURE — 90662 IIV NO PRSV INCREASED AG IM: CPT | Mod: HCNC,S$GLB,, | Performed by: INTERNAL MEDICINE

## 2019-10-18 RX ORDER — TORSEMIDE 10 MG/1
10 TABLET ORAL DAILY
Qty: 90 TABLET | Refills: 3 | Status: SHIPPED | OUTPATIENT
Start: 2019-10-18 | End: 2020-11-02 | Stop reason: SDUPTHER

## 2019-10-18 RX ORDER — LOSARTAN POTASSIUM 50 MG/1
100 TABLET ORAL DAILY
Qty: 60 TABLET | Refills: 2 | Status: SHIPPED | OUTPATIENT
Start: 2019-10-18 | End: 2020-02-20 | Stop reason: ALTCHOICE

## 2019-10-18 RX ORDER — LOSARTAN POTASSIUM 50 MG/1
100 TABLET ORAL DAILY
COMMUNITY
End: 2019-10-18 | Stop reason: RX

## 2019-10-22 ENCOUNTER — TELEPHONE (OUTPATIENT)
Dept: CARDIOLOGY | Facility: CLINIC | Age: 79
End: 2019-10-22

## 2019-10-22 NOTE — TELEPHONE ENCOUNTER
I rec'd a fax that pt needs early refill , lost med. Sent request for warfarin to coumadin clinic. I called about the torsemide and they tell me he had refills and it has been taken care of. cm

## 2019-10-23 DIAGNOSIS — Z79.01 LONG TERM (CURRENT) USE OF ANTICOAGULANTS: Primary | ICD-10-CM

## 2019-10-23 RX ORDER — WARFARIN SODIUM 5 MG/1
TABLET ORAL
Qty: 32 TABLET | Refills: 3 | Status: SHIPPED | OUTPATIENT
Start: 2019-10-23 | End: 2020-03-14

## 2019-10-30 RX ORDER — DILTIAZEM HYDROCHLORIDE 120 MG/1
CAPSULE, EXTENDED RELEASE ORAL
Qty: 90 CAPSULE | Refills: 4 | Status: SHIPPED | OUTPATIENT
Start: 2019-10-30 | End: 2021-01-21

## 2019-11-12 ENCOUNTER — OFFICE VISIT (OUTPATIENT)
Dept: OPHTHALMOLOGY | Facility: CLINIC | Age: 79
End: 2019-11-12
Payer: MEDICARE

## 2019-11-12 ENCOUNTER — ANTI-COAG VISIT (OUTPATIENT)
Dept: CARDIOLOGY | Facility: CLINIC | Age: 79
End: 2019-11-12
Payer: MEDICARE

## 2019-11-12 DIAGNOSIS — I48.91 ATRIAL FIBRILLATION, UNSPECIFIED TYPE: ICD-10-CM

## 2019-11-12 DIAGNOSIS — H52.7 REFRACTIVE ERROR: ICD-10-CM

## 2019-11-12 DIAGNOSIS — H35.3131 EARLY DRY STAGE NONEXUDATIVE AGE-RELATED MACULAR DEGENERATION OF BOTH EYES: ICD-10-CM

## 2019-11-12 DIAGNOSIS — E11.9 DIABETES MELLITUS TYPE 2 WITHOUT RETINOPATHY: Primary | ICD-10-CM

## 2019-11-12 DIAGNOSIS — Z96.1 PSEUDOPHAKIA OF BOTH EYES: ICD-10-CM

## 2019-11-12 DIAGNOSIS — Z79.01 LONG TERM (CURRENT) USE OF ANTICOAGULANTS: Primary | ICD-10-CM

## 2019-11-12 LAB — INR PPP: 3.1 (ref 2–3)

## 2019-11-12 PROCEDURE — 85610 POCT INR: ICD-10-PCS | Mod: QW,HCNC,S$GLB, | Performed by: NUCLEAR MEDICINE

## 2019-11-12 PROCEDURE — 99999 PR PBB SHADOW E&M-EST. PATIENT-LVL II: ICD-10-PCS | Mod: PBBFAC,HCNC,, | Performed by: OPHTHALMOLOGY

## 2019-11-12 PROCEDURE — 99499 RISK ADDL DX/OHS AUDIT: ICD-10-PCS | Mod: HCNC,S$GLB,, | Performed by: OPHTHALMOLOGY

## 2019-11-12 PROCEDURE — 99999 PR PBB SHADOW E&M-EST. PATIENT-LVL II: CPT | Mod: PBBFAC,HCNC,, | Performed by: OPHTHALMOLOGY

## 2019-11-12 PROCEDURE — 93793 ANTICOAG MGMT PT WARFARIN: CPT | Mod: HCNC,S$GLB,,

## 2019-11-12 PROCEDURE — 99499 UNLISTED E&M SERVICE: CPT | Mod: HCNC,S$GLB,, | Performed by: OPHTHALMOLOGY

## 2019-11-12 PROCEDURE — 93793 PR ANTICOAGULANT MGMT FOR PT TAKING WARFARIN: ICD-10-PCS | Mod: HCNC,S$GLB,,

## 2019-11-12 PROCEDURE — 92014 PR EYE EXAM, EST PATIENT,COMPREHESV: ICD-10-PCS | Mod: HCNC,S$GLB,, | Performed by: OPHTHALMOLOGY

## 2019-11-12 PROCEDURE — 92014 COMPRE OPH EXAM EST PT 1/>: CPT | Mod: HCNC,S$GLB,, | Performed by: OPHTHALMOLOGY

## 2019-11-12 PROCEDURE — 85610 PROTHROMBIN TIME: CPT | Mod: QW,HCNC,S$GLB, | Performed by: NUCLEAR MEDICINE

## 2019-11-12 RX ORDER — FUROSEMIDE 40 MG/1
40 TABLET ORAL 2 TIMES DAILY
COMMUNITY
End: 2021-04-30

## 2019-11-12 NOTE — PROGRESS NOTES
SUBJECTIVE:   Anthony Biggs Jr. is a 79 y.o. male   Uncorrected distance visual acuity was 20/70 in the right eye and 20/60 in the left eye.   Chief Complaint   Patient presents with    Annual Exam     s/p CE OU.  Restor - doing well.  States vision is good.  He does not want glasses.        HPI:  HPI     Annual Exam      Additional comments: s/p CE OU.  Restor - doing well.  States vision is   good.  He does not want glasses.              Comments     1. Restor IOL OD 12/07 (no glasses at all)  2. Restor IOL OS 11/07  Yag OS 7/08  3. Mild CF OD  4. Mild AMD (states no family history)            Last edited by KENYATTA Nunes on 11/12/2019  9:32 AM. (History)        Assessment /Plan :  1. Diabetes mellitus type 2 without retinopathy No diabetic retinopathy at this time. Reviewed diabetic eye precautions including avoiding tobacco use,  Good glucose control, and importance of regular follow up.      2. Early dry stage nonexudative age-related macular degeneration of both eyes Exam consistent with mild age related macular degeneration OU. Discussed clinical condition and recommend avoidance of tobacco products. Patient advised to call immediately if any visual changes occur. Regular follow up recommended.      3. Pseudophakia of both eyes  -- Condition stable, no therapeutic change required. Monitoring routinely.     4. Refractive error pt declined        RTC 1 year

## 2019-11-12 NOTE — PROGRESS NOTES
Patient's INR is slightly supra-therapeutic at 3.1.  Mr. Biggs reports he had 2 alcoholic drinks on last night, he normally drinks 1 per night.   Importance of consistent vitamin K intake and alcohol consumption discussed.  No signs of bleeding noted; advised patient to seek immediate medical attention if he notices any abnormal bleeding.  Instructions given for patient to take warfarin 2.5mg on tomorrow (today's dose already taken); then resume current dose of warfarin 7.5mg on Tuesdays and 5mg on all other days of the week.  Patient voiced understanding.  Recheck in 4 weeks.

## 2019-12-10 ENCOUNTER — ANTI-COAG VISIT (OUTPATIENT)
Dept: CARDIOLOGY | Facility: CLINIC | Age: 79
End: 2019-12-10
Payer: MEDICARE

## 2019-12-10 DIAGNOSIS — I48.91 ATRIAL FIBRILLATION, UNSPECIFIED TYPE: ICD-10-CM

## 2019-12-10 DIAGNOSIS — Z79.01 LONG TERM (CURRENT) USE OF ANTICOAGULANTS: Primary | ICD-10-CM

## 2019-12-10 LAB — INR PPP: 3.2 (ref 2–3)

## 2019-12-10 PROCEDURE — 85610 PROTHROMBIN TIME: CPT | Mod: QW,HCNC,S$GLB, | Performed by: INTERNAL MEDICINE

## 2019-12-10 PROCEDURE — 93793 PR ANTICOAGULANT MGMT FOR PT TAKING WARFARIN: ICD-10-PCS | Mod: HCNC,S$GLB,,

## 2019-12-10 PROCEDURE — 85610 POCT INR: ICD-10-PCS | Mod: QW,HCNC,S$GLB, | Performed by: INTERNAL MEDICINE

## 2019-12-10 PROCEDURE — 93793 ANTICOAG MGMT PT WARFARIN: CPT | Mod: HCNC,S$GLB,,

## 2019-12-10 NOTE — PROGRESS NOTES
Patient's INR trended up at 3.2 (supratherapeutic).  Previous medication dose instructions were followed.  No signs of any bleeding episodes reported.  No other changes noted.  Will lower patient's Warfarin dose to 5 mg daily. Recheck in 2 weeks.  Advised to seek medical attention for any signs of abnormal bleeding, if needed.  Dose calendar given and reviewed with patient.  Patient repeated back instructions and voiced understanding.

## 2019-12-11 RX ORDER — METFORMIN HYDROCHLORIDE 500 MG/1
500 TABLET ORAL 2 TIMES DAILY WITH MEALS
Qty: 180 TABLET | Refills: 3 | Status: SHIPPED | OUTPATIENT
Start: 2019-12-11 | End: 2020-12-11

## 2019-12-20 DIAGNOSIS — E78.00 PURE HYPERCHOLESTEROLEMIA: ICD-10-CM

## 2019-12-20 RX ORDER — ATORVASTATIN CALCIUM 40 MG/1
TABLET, FILM COATED ORAL
Qty: 30 TABLET | Refills: 6 | Status: SHIPPED | OUTPATIENT
Start: 2019-12-20 | End: 2020-07-23

## 2019-12-24 ENCOUNTER — ANTI-COAG VISIT (OUTPATIENT)
Dept: CARDIOLOGY | Facility: CLINIC | Age: 79
End: 2019-12-24
Payer: MEDICARE

## 2019-12-24 DIAGNOSIS — Z79.01 LONG TERM (CURRENT) USE OF ANTICOAGULANTS: Primary | ICD-10-CM

## 2019-12-24 DIAGNOSIS — I48.91 ATRIAL FIBRILLATION, UNSPECIFIED TYPE: ICD-10-CM

## 2019-12-24 LAB — INR PPP: 2.6 (ref 2–3)

## 2019-12-24 PROCEDURE — 93793 PR ANTICOAGULANT MGMT FOR PT TAKING WARFARIN: ICD-10-PCS | Mod: HCNC,S$GLB,,

## 2019-12-24 PROCEDURE — 85610 POCT INR: ICD-10-PCS | Mod: QW,HCNC,S$GLB, | Performed by: INTERNAL MEDICINE

## 2019-12-24 PROCEDURE — 93793 ANTICOAG MGMT PT WARFARIN: CPT | Mod: HCNC,S$GLB,,

## 2019-12-24 PROCEDURE — 85610 PROTHROMBIN TIME: CPT | Mod: QW,HCNC,S$GLB, | Performed by: INTERNAL MEDICINE

## 2019-12-24 NOTE — PROGRESS NOTES
Patient's INR is therapeutic at 2.6.  Reports taking 7.5 mg of Warfarin this morning by error.  Will lower tomorrow's (12/25) Warfarin dose to 2.5 mg - only, then patient will resume 5 mg every morning.  Recheck in 2 weeks.  Dose calendar given and reviewed with patient.  Patient repeated back instructions and voiced understanding.

## 2020-01-02 ENCOUNTER — LAB VISIT (OUTPATIENT)
Dept: LAB | Facility: HOSPITAL | Age: 80
End: 2020-01-02
Attending: INTERNAL MEDICINE
Payer: MEDICARE

## 2020-01-02 DIAGNOSIS — E11.9 DIABETES MELLITUS WITHOUT COMPLICATION: ICD-10-CM

## 2020-01-02 LAB
ALBUMIN SERPL BCP-MCNC: 4.1 G/DL (ref 3.5–5.2)
ALP SERPL-CCNC: 55 U/L (ref 55–135)
ALT SERPL W/O P-5'-P-CCNC: 29 U/L (ref 10–44)
ANION GAP SERPL CALC-SCNC: 8 MMOL/L (ref 8–16)
AST SERPL-CCNC: 25 U/L (ref 10–40)
BILIRUB SERPL-MCNC: 0.8 MG/DL (ref 0.1–1)
BUN SERPL-MCNC: 20 MG/DL (ref 8–23)
CALCIUM SERPL-MCNC: 9.9 MG/DL (ref 8.7–10.5)
CHLORIDE SERPL-SCNC: 102 MMOL/L (ref 95–110)
CHOLEST SERPL-MCNC: 142 MG/DL (ref 120–199)
CHOLEST/HDLC SERPL: 2.6 {RATIO} (ref 2–5)
CO2 SERPL-SCNC: 28 MMOL/L (ref 23–29)
CREAT SERPL-MCNC: 1 MG/DL (ref 0.5–1.4)
EST. GFR  (AFRICAN AMERICAN): >60 ML/MIN/1.73 M^2
EST. GFR  (NON AFRICAN AMERICAN): >60 ML/MIN/1.73 M^2
GLUCOSE SERPL-MCNC: 109 MG/DL (ref 70–110)
HDLC SERPL-MCNC: 54 MG/DL (ref 40–75)
HDLC SERPL: 38 % (ref 20–50)
LDLC SERPL CALC-MCNC: 75.4 MG/DL (ref 63–159)
NONHDLC SERPL-MCNC: 88 MG/DL
POTASSIUM SERPL-SCNC: 4.8 MMOL/L (ref 3.5–5.1)
PROT SERPL-MCNC: 7.1 G/DL (ref 6–8.4)
SODIUM SERPL-SCNC: 138 MMOL/L (ref 136–145)
TRIGL SERPL-MCNC: 63 MG/DL (ref 30–150)

## 2020-01-02 PROCEDURE — 36415 COLL VENOUS BLD VENIPUNCTURE: CPT | Mod: HCNC,PO

## 2020-01-02 PROCEDURE — 83036 HEMOGLOBIN GLYCOSYLATED A1C: CPT | Mod: HCNC

## 2020-01-02 PROCEDURE — 80053 COMPREHEN METABOLIC PANEL: CPT | Mod: HCNC

## 2020-01-02 PROCEDURE — 80061 LIPID PANEL: CPT | Mod: HCNC

## 2020-01-03 LAB
ESTIMATED AVG GLUCOSE: 134 MG/DL (ref 68–131)
HBA1C MFR BLD HPLC: 6.3 % (ref 4–5.6)

## 2020-01-07 ENCOUNTER — ANTI-COAG VISIT (OUTPATIENT)
Dept: CARDIOLOGY | Facility: CLINIC | Age: 80
End: 2020-01-07
Payer: MEDICARE

## 2020-01-07 DIAGNOSIS — I48.91 ATRIAL FIBRILLATION, UNSPECIFIED TYPE: ICD-10-CM

## 2020-01-07 DIAGNOSIS — Z79.01 LONG TERM (CURRENT) USE OF ANTICOAGULANTS: Primary | ICD-10-CM

## 2020-01-07 LAB — INR PPP: 2.5 (ref 2–3)

## 2020-01-07 PROCEDURE — 85610 POCT INR: ICD-10-PCS | Mod: QW,HCNC,S$GLB, | Performed by: NUCLEAR MEDICINE

## 2020-01-07 PROCEDURE — 93793 PR ANTICOAGULANT MGMT FOR PT TAKING WARFARIN: ICD-10-PCS | Mod: HCNC,S$GLB,,

## 2020-01-07 PROCEDURE — 85610 PROTHROMBIN TIME: CPT | Mod: QW,HCNC,S$GLB, | Performed by: NUCLEAR MEDICINE

## 2020-01-07 PROCEDURE — 93793 ANTICOAG MGMT PT WARFARIN: CPT | Mod: HCNC,S$GLB,,

## 2020-01-07 NOTE — PROGRESS NOTES
Patient's INR is therapeutic at 2.5.  Previous medication dose instructions were followed. No other changes noted.  Instructions were given to maintain current dose of Warfarin 5 mg every daily.  Recheck in 1 month. Dose calendar given and reviewed with patient.  Patient voiced understanding.

## 2020-01-09 ENCOUNTER — OFFICE VISIT (OUTPATIENT)
Dept: INTERNAL MEDICINE | Facility: CLINIC | Age: 80
End: 2020-01-09
Payer: MEDICARE

## 2020-01-09 VITALS
WEIGHT: 205.5 LBS | OXYGEN SATURATION: 98 % | BODY MASS INDEX: 32.25 KG/M2 | SYSTOLIC BLOOD PRESSURE: 132 MMHG | TEMPERATURE: 98 F | DIASTOLIC BLOOD PRESSURE: 88 MMHG | HEIGHT: 67 IN | HEART RATE: 83 BPM

## 2020-01-09 DIAGNOSIS — Z85.46 HISTORY OF PROSTATE CANCER: ICD-10-CM

## 2020-01-09 DIAGNOSIS — G25.2 INTENTION TREMOR: ICD-10-CM

## 2020-01-09 DIAGNOSIS — I48.91 ATRIAL FIBRILLATION, UNSPECIFIED TYPE: ICD-10-CM

## 2020-01-09 DIAGNOSIS — Z86.010 HISTORY OF COLON POLYPS: ICD-10-CM

## 2020-01-09 DIAGNOSIS — I15.2 HYPERTENSION ASSOCIATED WITH DIABETES: ICD-10-CM

## 2020-01-09 DIAGNOSIS — E11.9 DIABETES MELLITUS WITHOUT COMPLICATION: ICD-10-CM

## 2020-01-09 DIAGNOSIS — E11.69 HYPERLIPIDEMIA ASSOCIATED WITH TYPE 2 DIABETES MELLITUS: ICD-10-CM

## 2020-01-09 DIAGNOSIS — E11.59 HYPERTENSION ASSOCIATED WITH DIABETES: ICD-10-CM

## 2020-01-09 DIAGNOSIS — F33.1 MODERATE EPISODE OF RECURRENT MAJOR DEPRESSIVE DISORDER: ICD-10-CM

## 2020-01-09 DIAGNOSIS — Z00.00 ROUTINE GENERAL MEDICAL EXAMINATION AT A HEALTH CARE FACILITY: Primary | ICD-10-CM

## 2020-01-09 DIAGNOSIS — G47.33 OSA (OBSTRUCTIVE SLEEP APNEA): ICD-10-CM

## 2020-01-09 DIAGNOSIS — I25.10 ATHEROSCLEROSIS OF NATIVE CORONARY ARTERY OF NATIVE HEART WITHOUT ANGINA PECTORIS: ICD-10-CM

## 2020-01-09 DIAGNOSIS — I77.1 TORTUOUS AORTA: ICD-10-CM

## 2020-01-09 DIAGNOSIS — Z79.01 CURRENT USE OF LONG TERM ANTICOAGULATION: ICD-10-CM

## 2020-01-09 DIAGNOSIS — E78.5 HYPERLIPIDEMIA ASSOCIATED WITH TYPE 2 DIABETES MELLITUS: ICD-10-CM

## 2020-01-09 PROCEDURE — 3075F SYST BP GE 130 - 139MM HG: CPT | Mod: HCNC,CPTII,S$GLB, | Performed by: INTERNAL MEDICINE

## 2020-01-09 PROCEDURE — 99999 PR PBB SHADOW E&M-EST. PATIENT-LVL III: ICD-10-PCS | Mod: PBBFAC,HCNC,, | Performed by: INTERNAL MEDICINE

## 2020-01-09 PROCEDURE — 99397 PR PREVENTIVE VISIT,EST,65 & OVER: ICD-10-PCS | Mod: HCNC,S$GLB,, | Performed by: INTERNAL MEDICINE

## 2020-01-09 PROCEDURE — 99499 UNLISTED E&M SERVICE: CPT | Mod: HCNC,S$GLB,, | Performed by: INTERNAL MEDICINE

## 2020-01-09 PROCEDURE — 99397 PER PM REEVAL EST PAT 65+ YR: CPT | Mod: HCNC,S$GLB,, | Performed by: INTERNAL MEDICINE

## 2020-01-09 PROCEDURE — 3079F PR MOST RECENT DIASTOLIC BLOOD PRESSURE 80-89 MM HG: ICD-10-PCS | Mod: HCNC,CPTII,S$GLB, | Performed by: INTERNAL MEDICINE

## 2020-01-09 PROCEDURE — 3075F PR MOST RECENT SYSTOLIC BLOOD PRESS GE 130-139MM HG: ICD-10-PCS | Mod: HCNC,CPTII,S$GLB, | Performed by: INTERNAL MEDICINE

## 2020-01-09 PROCEDURE — 99499 RISK ADDL DX/OHS AUDIT: ICD-10-PCS | Mod: HCNC,S$GLB,, | Performed by: INTERNAL MEDICINE

## 2020-01-09 PROCEDURE — 99999 PR PBB SHADOW E&M-EST. PATIENT-LVL III: CPT | Mod: PBBFAC,HCNC,, | Performed by: INTERNAL MEDICINE

## 2020-01-09 PROCEDURE — 3079F DIAST BP 80-89 MM HG: CPT | Mod: HCNC,CPTII,S$GLB, | Performed by: INTERNAL MEDICINE

## 2020-01-09 NOTE — PROGRESS NOTES
HPI:  Patient is a 79-year-old man who comes today for follow-up his hypertension, lipids, diabetes, coronary disease, if the AFib and for his annual physical.  He is doing well.  He denies any chest pain.  He has had no palpitations.  His blood pressure and blood sugars have been well controlled.  He denies any hypoglycemia.      Current MEDS: medcard review, verified and update  Allergies: Per the electronic medical record    Past Medical History:   Diagnosis Date    Abnormal CXR     Angina pectoris 8/28/2017    Atrial fibrillation     Coronary atherosclerosis of unspecified type of vessel, native or graft     Depression     Diabetes mellitus without complication     Emphysema of lung     History of prostate cancer 2007    prostatectomy    Hyperlipidemia associated with type 2 diabetes mellitus     Hypertension associated with diabetes     Intention tremor     Morbid (severe) obesity due to excess calories 4/1/2019    SHORTY (obstructive sleep apnea)     Prostate cancer     Trouble in sleeping     Urinary incontinence        Past Surgical History:   Procedure Laterality Date    ABDOMINAL HERNIA REPAIR      APPENDECTOMY      bladder sx      CARDIAC CATHETERIZATION      CATARACT EXTRACTION W/  INTRAOCULAR LENS IMPLANT  Restor OU    COLONOSCOPY N/A 5/16/2017    Procedure: COLONOSCOPY;  Surgeon: Alfredo Naidu MD;  Location: [facility];  Service: Endoscopy;  Laterality: N/A;    inguinal hernia      lung sx      PROSTATE SURGERY         SHx: per the electronic medical record    FHx: recorded in the electronic medical record    ROS:    denies any chest pains or shortness of breath. Denies any nausea, vomiting or diarrhea. Denies any fever, chills or sweats. Denies any change in weight, voice, stool, skin or hair. Denies any dysuria, dyspepsia or dysphagia. Denies any change in vision, hearing or headaches. Denies any swollen lymph nodes or loss of memory.    PE:  /88   Pulse 83   Temp 98.1  "°F (36.7 °C) (Tympanic)   Ht 5' 7" (1.702 m)   Wt 93.2 kg (205 lb 7.5 oz)   SpO2 98%   BMI 32.18 kg/m²   Gen: Well-developed, well-nourished, male, in no acute distress, oriented x3  HEENT: neck is supple, no adenopathy, carotids 2+ equal without bruits, thyroid exam normal size without nodules.  CHEST: clear to auscultation and percussion  CVS: regular rate and rhythm without significant murmur, gallop, or rubs  ABD: soft, benign, no rebound no guarding, no distention.  Bowel sounds are normal.     nontender.  No palpable masses.  No organomegaly and no audible bruits.  RECTAL:  Deferred  EXT: no clubbing, cyanosis, or edema  LYMPH: no cervical, inguinal, or axillary adenopathy  FEET: no loss of sensation.  No ulcers or pressure sores.  Monofilament testing normal  NEURO: gait normal.  Cranial nerves II- XII intact. No nystagmus.  Speech normal.   Gross motor and sensory unremarkable.    Lab Results   Component Value Date    WBC 9.23 06/27/2019    HGB 13.1 (L) 06/27/2019    HCT 40.5 06/27/2019     06/27/2019    CHOL 142 01/02/2020    TRIG 63 01/02/2020    HDL 54 01/02/2020    ALT 29 01/02/2020    AST 25 01/02/2020     01/02/2020    K 4.8 01/02/2020     01/02/2020    CREATININE 1.0 01/02/2020    BUN 20 01/02/2020    CO2 28 01/02/2020    TSH 0.722 06/27/2019    PSA <0.010 08/20/2013    INR 2.5 01/07/2020    HGBA1C 6.3 (H) 01/02/2020       Impression:  Multiple medical problems below, stable  Patient Active Problem List   Diagnosis    Atrial fibrillation    Coronary atherosclerosis    History of prostate cancer    Refractive error    Family history of macular degeneration    Posterior capsular opacification    Pseudophakia of both eyes    History of colon polyps    Diverticulosis of large intestine without hemorrhage    Current use of long term anticoagulation    Pulmonary hypertension, mild    SHORTY (obstructive sleep apnea)    Diabetes mellitus without complication    " Hyperlipidemia associated with type 2 diabetes mellitus    Hypertension associated with diabetes    Intention tremor    Tortuous aorta    Moderate episode of recurrent major depressive disorder       Plan:   Orders Placed This Encounter    Hemoglobin A1c    Comprehensive metabolic panel    Lipid panel    Protein / creatinine ratio, urine    TSH    CBC auto differential     Medications remain the same.  He will be seen again in 6 months with above lab work.  This note is generated with speech recognition software and is subject to transcription error and sound alike phrases that may be missed by proofreading.

## 2020-02-04 ENCOUNTER — ANTI-COAG VISIT (OUTPATIENT)
Dept: CARDIOLOGY | Facility: CLINIC | Age: 80
End: 2020-02-04
Payer: MEDICARE

## 2020-02-04 DIAGNOSIS — Z79.01 LONG TERM (CURRENT) USE OF ANTICOAGULANTS: Primary | ICD-10-CM

## 2020-02-04 DIAGNOSIS — I48.91 ATRIAL FIBRILLATION, UNSPECIFIED TYPE: ICD-10-CM

## 2020-02-04 LAB — INR PPP: 3.7 (ref 2–3)

## 2020-02-04 PROCEDURE — 93793 PR ANTICOAGULANT MGMT FOR PT TAKING WARFARIN: ICD-10-PCS | Mod: HCNC,S$GLB,,

## 2020-02-04 PROCEDURE — 85610 POCT INR: ICD-10-PCS | Mod: QW,HCNC,S$GLB, | Performed by: NUCLEAR MEDICINE

## 2020-02-04 PROCEDURE — 93793 ANTICOAG MGMT PT WARFARIN: CPT | Mod: HCNC,S$GLB,,

## 2020-02-04 PROCEDURE — 85610 PROTHROMBIN TIME: CPT | Mod: QW,HCNC,S$GLB, | Performed by: NUCLEAR MEDICINE

## 2020-02-04 NOTE — PROGRESS NOTES
"Patient's INR is supratherapeutic at 3.7.  Alcohol consumption reported - "Sunday, doing the [employer] game and wasn't consistent with my greens".  No signs of any bleeding episodes reported.  Patient takes Warfarin in the a.m. - has taken. Instructions were given to eat a small portion of a dark leafy vegetable today, hold Warfarin dose tomorrow, 3/05/2020, then on 3/06/2020 resume on regular scheduled dose of 5 mg every morning.  Keep diet consistent.  Recheck in 3 weeks.  Advised to seek medical attention (ED) for any signs of abnormal bleeding, if needed.  Patient repeated back instructions and voiced understanding.  "

## 2020-02-20 ENCOUNTER — TELEPHONE (OUTPATIENT)
Dept: CARDIOLOGY | Facility: CLINIC | Age: 80
End: 2020-02-20

## 2020-02-20 DIAGNOSIS — I10 ESSENTIAL HYPERTENSION: Primary | ICD-10-CM

## 2020-02-20 DIAGNOSIS — I25.10 CORONARY ARTERY DISEASE INVOLVING NATIVE CORONARY ARTERY OF NATIVE HEART WITHOUT ANGINA PECTORIS: ICD-10-CM

## 2020-02-20 RX ORDER — OLMESARTAN MEDOXOMIL 40 MG/1
40 TABLET ORAL DAILY
Qty: 90 TABLET | Refills: 3 | Status: SHIPPED | OUTPATIENT
Start: 2020-02-20 | End: 2020-05-18 | Stop reason: SDUPTHER

## 2020-02-20 NOTE — TELEPHONE ENCOUNTER
Called and left v/m that pharmacy notified us that his losartan is not available. Not sure when it will be. Alt is olmesartan. Rx done for olmesartan 40 mg one a day. Please call if any questions. Sent this as urgent message. Loyd

## 2020-02-25 ENCOUNTER — ANTI-COAG VISIT (OUTPATIENT)
Dept: CARDIOLOGY | Facility: CLINIC | Age: 80
End: 2020-02-25
Payer: MEDICARE

## 2020-02-25 DIAGNOSIS — Z79.01 LONG TERM (CURRENT) USE OF ANTICOAGULANTS: Primary | ICD-10-CM

## 2020-02-25 DIAGNOSIS — I48.91 ATRIAL FIBRILLATION, UNSPECIFIED TYPE: ICD-10-CM

## 2020-02-25 LAB — INR PPP: 2.9 (ref 2–3)

## 2020-02-25 PROCEDURE — 93793 PR ANTICOAGULANT MGMT FOR PT TAKING WARFARIN: ICD-10-PCS | Mod: HCNC,S$GLB,,

## 2020-02-25 PROCEDURE — 85610 POCT INR: ICD-10-PCS | Mod: QW,HCNC,S$GLB, | Performed by: NUCLEAR MEDICINE

## 2020-02-25 PROCEDURE — 93793 ANTICOAG MGMT PT WARFARIN: CPT | Mod: HCNC,S$GLB,,

## 2020-02-25 PROCEDURE — 85610 PROTHROMBIN TIME: CPT | Mod: QW,HCNC,S$GLB, | Performed by: NUCLEAR MEDICINE

## 2020-02-25 NOTE — PROGRESS NOTES
Patient's INR is therapeutic at 2.9.  Previous instructions were followed. Reports no recent changes or upcoming procedures.  Instructions were given to maintain current dose of warfarin 5 mg daily.  Recheck in 1 month.  Patient voiced understanding.

## 2020-03-09 ENCOUNTER — OFFICE VISIT (OUTPATIENT)
Dept: INTERNAL MEDICINE | Facility: CLINIC | Age: 80
End: 2020-03-09
Payer: MEDICARE

## 2020-03-09 VITALS
TEMPERATURE: 99 F | HEIGHT: 67 IN | SYSTOLIC BLOOD PRESSURE: 122 MMHG | OXYGEN SATURATION: 97 % | BODY MASS INDEX: 32.67 KG/M2 | HEART RATE: 84 BPM | WEIGHT: 208.13 LBS | DIASTOLIC BLOOD PRESSURE: 84 MMHG

## 2020-03-09 DIAGNOSIS — M79.671 RIGHT FOOT PAIN: Primary | ICD-10-CM

## 2020-03-09 PROCEDURE — 1159F MED LIST DOCD IN RCRD: CPT | Mod: HCNC,S$GLB,, | Performed by: FAMILY MEDICINE

## 2020-03-09 PROCEDURE — 1101F PT FALLS ASSESS-DOCD LE1/YR: CPT | Mod: HCNC,CPTII,S$GLB, | Performed by: FAMILY MEDICINE

## 2020-03-09 PROCEDURE — 3079F DIAST BP 80-89 MM HG: CPT | Mod: HCNC,CPTII,S$GLB, | Performed by: FAMILY MEDICINE

## 2020-03-09 PROCEDURE — 3074F SYST BP LT 130 MM HG: CPT | Mod: HCNC,CPTII,S$GLB, | Performed by: FAMILY MEDICINE

## 2020-03-09 PROCEDURE — 3074F PR MOST RECENT SYSTOLIC BLOOD PRESSURE < 130 MM HG: ICD-10-PCS | Mod: HCNC,CPTII,S$GLB, | Performed by: FAMILY MEDICINE

## 2020-03-09 PROCEDURE — 99213 OFFICE O/P EST LOW 20 MIN: CPT | Mod: HCNC,S$GLB,, | Performed by: FAMILY MEDICINE

## 2020-03-09 PROCEDURE — 1101F PR PT FALLS ASSESS DOC 0-1 FALLS W/OUT INJ PAST YR: ICD-10-PCS | Mod: HCNC,CPTII,S$GLB, | Performed by: FAMILY MEDICINE

## 2020-03-09 PROCEDURE — 1159F PR MEDICATION LIST DOCUMENTED IN MEDICAL RECORD: ICD-10-PCS | Mod: HCNC,S$GLB,, | Performed by: FAMILY MEDICINE

## 2020-03-09 PROCEDURE — 1125F AMNT PAIN NOTED PAIN PRSNT: CPT | Mod: HCNC,S$GLB,, | Performed by: FAMILY MEDICINE

## 2020-03-09 PROCEDURE — 3079F PR MOST RECENT DIASTOLIC BLOOD PRESSURE 80-89 MM HG: ICD-10-PCS | Mod: HCNC,CPTII,S$GLB, | Performed by: FAMILY MEDICINE

## 2020-03-09 PROCEDURE — 99999 PR PBB SHADOW E&M-EST. PATIENT-LVL III: ICD-10-PCS | Mod: PBBFAC,HCNC,, | Performed by: FAMILY MEDICINE

## 2020-03-09 PROCEDURE — 99213 PR OFFICE/OUTPT VISIT, EST, LEVL III, 20-29 MIN: ICD-10-PCS | Mod: HCNC,S$GLB,, | Performed by: FAMILY MEDICINE

## 2020-03-09 PROCEDURE — 99999 PR PBB SHADOW E&M-EST. PATIENT-LVL III: CPT | Mod: PBBFAC,HCNC,, | Performed by: FAMILY MEDICINE

## 2020-03-09 PROCEDURE — 1125F PR PAIN SEVERITY QUANTIFIED, PAIN PRESENT: ICD-10-PCS | Mod: HCNC,S$GLB,, | Performed by: FAMILY MEDICINE

## 2020-03-09 RX ORDER — PREDNISONE 20 MG/1
40 TABLET ORAL DAILY
Qty: 8 TABLET | Refills: 0 | Status: SHIPPED | OUTPATIENT
Start: 2020-03-09 | End: 2020-03-13

## 2020-03-09 RX ORDER — MELOXICAM 15 MG/1
15 TABLET ORAL DAILY
Qty: 10 TABLET | Refills: 0 | Status: ON HOLD | OUTPATIENT
Start: 2020-03-09 | End: 2021-09-20

## 2020-03-09 NOTE — PROGRESS NOTES
Subjective:      Patient ID: Anthony Biggs Jr. is a 79 y.o. male.    Chief Complaint: Ankle Pain (right )      Patient reports pain and swelling and right foot and ankle.  No recent injury to the area.  Reports he had a similar symptoms about a year ago which resolved with anti-inflammatories.    Review of Systems   Cardiovascular: Positive for leg swelling.   Musculoskeletal: Positive for arthralgias and gait problem.   Skin: Negative for color change.     Past Medical History:   Diagnosis Date    Abnormal CXR     Angina pectoris 8/28/2017    Atrial fibrillation     Coronary atherosclerosis of unspecified type of vessel, native or graft     Depression     Diabetes mellitus without complication     Emphysema of lung     History of prostate cancer 2007    prostatectomy    Hyperlipidemia associated with type 2 diabetes mellitus     Hypertension associated with diabetes     Intention tremor     Morbid (severe) obesity due to excess calories 4/1/2019    SHORTY (obstructive sleep apnea)     Prostate cancer     Trouble in sleeping     Urinary incontinence           Past Surgical History:   Procedure Laterality Date    ABDOMINAL HERNIA REPAIR      APPENDECTOMY      bladder sx      CARDIAC CATHETERIZATION      CATARACT EXTRACTION W/  INTRAOCULAR LENS IMPLANT  Restor OU    COLONOSCOPY N/A 5/16/2017    Procedure: COLONOSCOPY;  Surgeon: Alfredo Naidu MD;  Location[facility];  Service: Endoscopy;  Laterality: N/A;    inguinal hernia      lung sx      PROSTATE SURGERY       Family History   Problem Relation Age of Onset    Heart disease Mother     Cancer Father         lung    Macular degeneration Sister     Diabetes Sister     Heart disease Sister     Strabismus Neg Hx     Retinal detachment Neg Hx     Glaucoma Neg Hx     Blindness Neg Hx     Amblyopia Neg Hx      Social History     Socioeconomic History    Marital status:      Spouse name: Not on file    Number of children: Not on  "file    Years of education: Not on file    Highest education level: Not on file   Occupational History    Not on file   Social Needs    Financial resource strain: Not on file    Food insecurity:     Worry: Not on file     Inability: Not on file    Transportation needs:     Medical: Not on file     Non-medical: Not on file   Tobacco Use    Smoking status: Former Smoker     Packs/day: 0.50     Years: 40.00     Pack years: 20.00     Types: Cigarettes     Last attempt to quit:      Years since quittin.1    Smokeless tobacco: Never Used   Substance and Sexual Activity    Alcohol use: No    Drug use: No    Sexual activity: Never   Lifestyle    Physical activity:     Days per week: Not on file     Minutes per session: Not on file    Stress: Not at all   Relationships    Social connections:     Talks on phone: Not on file     Gets together: Not on file     Attends Protestant service: Not on file     Active member of club or organization: Not on file     Attends meetings of clubs or organizations: Not on file     Relationship status: Not on file   Other Topics Concern    Not on file   Social History Narrative    Not on file     Review of patient's allergies indicates:  No Known Allergies    Objective:       /84 (BP Location: Right arm, Patient Position: Sitting, BP Method: Large (Manual))   Pulse 84   Temp 98.9 °F (37.2 °C) (Tympanic)   Ht 5' 7" (1.702 m)   Wt 94.4 kg (208 lb 1.8 oz)   SpO2 97%   BMI 32.60 kg/m²   Physical Exam   Constitutional: He appears well-developed and well-nourished. No distress.   Cardiovascular: Normal rate, regular rhythm and normal heart sounds.   Pulmonary/Chest: Effort normal and breath sounds normal. No respiratory distress.   Musculoskeletal: Normal range of motion. He exhibits edema (right ankle, dorsal foot) and tenderness (right foot). He exhibits no deformity.   Skin: He is not diaphoretic.   Areas of swelling on right foot warm to touch - no erythema "   Vitals reviewed.    Assessment:     1. Right foot pain      Plan:   Right foot pain    Other orders  -     predniSONE (DELTASONE) 20 MG tablet; Take 2 tablets (40 mg total) by mouth once daily. for 4 days  Dispense: 8 tablet; Refill: 0  -     meloxicam (MOBIC) 15 MG tablet; Take 1 tablet (15 mg total) by mouth once daily. Take with meal.  Dispense: 10 tablet; Refill: 0      Medication List with Changes/Refills   New Medications    MELOXICAM (MOBIC) 15 MG TABLET    Take 1 tablet (15 mg total) by mouth once daily. Take with meal.    PREDNISONE (DELTASONE) 20 MG TABLET    Take 2 tablets (40 mg total) by mouth once daily. for 4 days   Current Medications    ASPIRIN (ECOTRIN) 81 MG EC TABLET    Take 81 mg by mouth once daily.    ATORVASTATIN (LIPITOR) 40 MG TABLET    TAKE ONE TABLET BY MOUTH DAILY    DILTIAZEM HCL (TIAZAC) 120 MG 24 HR CAPSULE    TAKE ONE CAPSULE BY MOUTH DAILY    FUROSEMIDE (LASIX) 40 MG TABLET    Take 40 mg by mouth 2 (two) times daily.    MELATONIN 3 MG TAB    Take 1 tablet by mouth nightly.    METFORMIN (GLUCOPHAGE) 500 MG TABLET    Take 1 tablet (500 mg total) by mouth 2 (two) times daily with meals.    OLMESARTAN (BENICAR) 40 MG TABLET    Take 1 tablet (40 mg total) by mouth once daily.    TORSEMIDE (DEMADEX) 10 MG TAB    Take 1 tablet (10 mg total) by mouth once daily.    WARFARIN (JANTOVEN) 5 MG TABLET    TAKE ONE AND ONE-HALF TABLETS BY MOUTH EVERY TUESDAY; AND ONE TABLET ON ALL OTHER DAYS.

## 2020-03-14 DIAGNOSIS — Z79.01 LONG TERM (CURRENT) USE OF ANTICOAGULANTS: ICD-10-CM

## 2020-03-14 RX ORDER — WARFARIN SODIUM 5 MG/1
TABLET ORAL
Qty: 32 TABLET | Refills: 2 | Status: SHIPPED | OUTPATIENT
Start: 2020-03-14 | End: 2020-06-20

## 2020-03-20 DIAGNOSIS — Z79.01 LONG TERM (CURRENT) USE OF ANTICOAGULANTS: Primary | ICD-10-CM

## 2020-03-24 ENCOUNTER — LAB VISIT (OUTPATIENT)
Dept: LAB | Facility: HOSPITAL | Age: 80
End: 2020-03-24
Attending: INTERNAL MEDICINE
Payer: MEDICARE

## 2020-03-24 ENCOUNTER — ANTI-COAG VISIT (OUTPATIENT)
Dept: CARDIOLOGY | Facility: CLINIC | Age: 80
End: 2020-03-24
Payer: MEDICARE

## 2020-03-24 DIAGNOSIS — Z79.01 LONG TERM (CURRENT) USE OF ANTICOAGULANTS: ICD-10-CM

## 2020-03-24 DIAGNOSIS — I48.91 ATRIAL FIBRILLATION, UNSPECIFIED TYPE: ICD-10-CM

## 2020-03-24 LAB
INR PPP: 2.6 (ref 0.8–1.2)
PROTHROMBIN TIME: 27.5 SEC (ref 9–12.5)

## 2020-03-24 PROCEDURE — 93793 PR ANTICOAGULANT MGMT FOR PT TAKING WARFARIN: ICD-10-PCS | Mod: S$GLB,,,

## 2020-03-24 PROCEDURE — 85610 PROTHROMBIN TIME: CPT | Mod: HCNC

## 2020-03-24 PROCEDURE — 36415 COLL VENOUS BLD VENIPUNCTURE: CPT | Mod: HCNC

## 2020-03-24 PROCEDURE — 93793 ANTICOAG MGMT PT WARFARIN: CPT | Mod: S$GLB,,,

## 2020-03-24 NOTE — PROGRESS NOTES
Patient contacted - left V/M:  INR is therapeutic at 2.6.  Instructions were given to maintain current dose of warfarin 5 mg daily.  Recheck on 4/21/2020 ([facility]).  Any questions or concerns, please contact the [facility] at [phone].

## 2020-04-21 ENCOUNTER — ANTI-COAG VISIT (OUTPATIENT)
Dept: CARDIOLOGY | Facility: CLINIC | Age: 80
End: 2020-04-21
Payer: MEDICARE

## 2020-04-21 ENCOUNTER — LAB VISIT (OUTPATIENT)
Dept: LAB | Facility: HOSPITAL | Age: 80
End: 2020-04-21
Attending: INTERNAL MEDICINE
Payer: MEDICARE

## 2020-04-21 DIAGNOSIS — Z79.01 LONG TERM (CURRENT) USE OF ANTICOAGULANTS: ICD-10-CM

## 2020-04-21 DIAGNOSIS — I48.91 ATRIAL FIBRILLATION, UNSPECIFIED TYPE: ICD-10-CM

## 2020-04-21 LAB
INR PPP: 2.6 (ref 0.8–1.2)
PROTHROMBIN TIME: 26.9 SEC (ref 9–12.5)

## 2020-04-21 PROCEDURE — 85610 PROTHROMBIN TIME: CPT | Mod: HCNC

## 2020-04-21 PROCEDURE — 36415 COLL VENOUS BLD VENIPUNCTURE: CPT | Mod: HCNC

## 2020-04-21 PROCEDURE — 93793 ANTICOAG MGMT PT WARFARIN: CPT | Mod: S$GLB,,,

## 2020-04-21 PROCEDURE — 93793 PR ANTICOAGULANT MGMT FOR PT TAKING WARFARIN: ICD-10-PCS | Mod: S$GLB,,,

## 2020-04-21 NOTE — PROGRESS NOTES
Patient's INR is therapeutic at 2.6. Patient reports no changes. Patient instructed to continue coumadin 5 mg every day. Recheck on 5/19/20 at [facility]. Patient repeated back correctly and verbalizes understanding.

## 2020-05-18 DIAGNOSIS — I25.10 CORONARY ARTERY DISEASE INVOLVING NATIVE CORONARY ARTERY OF NATIVE HEART WITHOUT ANGINA PECTORIS: ICD-10-CM

## 2020-05-18 DIAGNOSIS — I10 ESSENTIAL HYPERTENSION: ICD-10-CM

## 2020-05-18 RX ORDER — OLMESARTAN MEDOXOMIL 40 MG/1
40 TABLET ORAL DAILY
Qty: 90 TABLET | Refills: 3 | Status: SHIPPED | OUTPATIENT
Start: 2020-05-18 | End: 2020-05-21

## 2020-05-18 NOTE — TELEPHONE ENCOUNTER
Pt requesting refill on medication.   Pt with twins, had normal first trimester screen. Pt desires AFP today. She still with moderate nausea. Pt will get MFM US for anatomy. She complains of more anxiety lately. She stopped anxiety medication before pregnancy and wants to try something else. [facility]. Follow-up in 4 weeks for OB check.

## 2020-05-19 ENCOUNTER — ANTI-COAG VISIT (OUTPATIENT)
Dept: CARDIOLOGY | Facility: CLINIC | Age: 80
End: 2020-05-19
Payer: MEDICARE

## 2020-05-19 ENCOUNTER — LAB VISIT (OUTPATIENT)
Dept: LAB | Facility: HOSPITAL | Age: 80
End: 2020-05-19
Attending: INTERNAL MEDICINE
Payer: MEDICARE

## 2020-05-19 DIAGNOSIS — Z79.01 LONG TERM (CURRENT) USE OF ANTICOAGULANTS: ICD-10-CM

## 2020-05-19 DIAGNOSIS — I48.91 ATRIAL FIBRILLATION, UNSPECIFIED TYPE: ICD-10-CM

## 2020-05-19 LAB
INR PPP: 2.5 (ref 0.8–1.2)
PROTHROMBIN TIME: 26.3 SEC (ref 9–12.5)

## 2020-05-19 PROCEDURE — 93793 ANTICOAG MGMT PT WARFARIN: CPT | Mod: S$GLB,,,

## 2020-05-19 PROCEDURE — 85610 PROTHROMBIN TIME: CPT | Mod: HCNC

## 2020-05-19 PROCEDURE — 93793 PR ANTICOAGULANT MGMT FOR PT TAKING WARFARIN: ICD-10-PCS | Mod: S$GLB,,,

## 2020-05-19 PROCEDURE — 36415 COLL VENOUS BLD VENIPUNCTURE: CPT | Mod: HCNC

## 2020-05-20 NOTE — PROGRESS NOTES
Patient contacted: Patient's INR is therapeutic at 2.5.  Patient reports no changes. Instructed to continue coumadin 5 mg every day. Patient repeated back correctly and verbalizes understanding. Recheck on 6/16/20 at [facility]. Patient has been made aware of appointment information.

## 2020-05-21 ENCOUNTER — TELEPHONE (OUTPATIENT)
Dept: INTERNAL MEDICINE | Facility: CLINIC | Age: 80
End: 2020-05-21

## 2020-05-21 RX ORDER — IRBESARTAN 300 MG/1
300 TABLET ORAL NIGHTLY
Qty: 90 TABLET | Refills: 3 | Status: SHIPPED | OUTPATIENT
Start: 2020-05-21 | End: 2020-07-21

## 2020-05-21 NOTE — TELEPHONE ENCOUNTER
----- Message from Jacy Melaraite sent at 5/21/2020 11:37 AM CDT -----  Contact: Marino Biggs (Spouse)  Marino called and stated that the olmesartin medication that the pt was prescribed is too expensive and he needs a substitute. She can be reached at [phone].     Thanks,  Tf

## 2020-06-16 ENCOUNTER — ANTI-COAG VISIT (OUTPATIENT)
Dept: CARDIOLOGY | Facility: CLINIC | Age: 80
End: 2020-06-16
Payer: MEDICARE

## 2020-06-16 DIAGNOSIS — Z79.01 LONG TERM (CURRENT) USE OF ANTICOAGULANTS: Primary | ICD-10-CM

## 2020-06-16 DIAGNOSIS — I48.91 ATRIAL FIBRILLATION, UNSPECIFIED TYPE: ICD-10-CM

## 2020-06-16 LAB — INR PPP: 2.5 (ref 2–3)

## 2020-06-16 PROCEDURE — 93793 ANTICOAG MGMT PT WARFARIN: CPT | Mod: HCNC,S$GLB,,

## 2020-06-16 PROCEDURE — 93793 PR ANTICOAGULANT MGMT FOR PT TAKING WARFARIN: ICD-10-PCS | Mod: HCNC,S$GLB,,

## 2020-06-16 PROCEDURE — 85610 POCT INR: ICD-10-PCS | Mod: QW,HCNC,S$GLB, | Performed by: NUCLEAR MEDICINE

## 2020-06-16 PROCEDURE — 85610 PROTHROMBIN TIME: CPT | Mod: QW,HCNC,S$GLB, | Performed by: NUCLEAR MEDICINE

## 2020-07-21 ENCOUNTER — TELEPHONE (OUTPATIENT)
Dept: INTERNAL MEDICINE | Facility: CLINIC | Age: 80
End: 2020-07-21

## 2020-07-21 ENCOUNTER — ANTI-COAG VISIT (OUTPATIENT)
Dept: CARDIOLOGY | Facility: CLINIC | Age: 80
End: 2020-07-21
Payer: MEDICARE

## 2020-07-21 DIAGNOSIS — Z79.01 LONG TERM (CURRENT) USE OF ANTICOAGULANTS: Primary | ICD-10-CM

## 2020-07-21 DIAGNOSIS — I48.91 ATRIAL FIBRILLATION, UNSPECIFIED TYPE: ICD-10-CM

## 2020-07-21 LAB — INR PPP: 2.2 (ref 2–3)

## 2020-07-21 PROCEDURE — 85610 PROTHROMBIN TIME: CPT | Mod: QW,HCNC,S$GLB, | Performed by: NUCLEAR MEDICINE

## 2020-07-21 PROCEDURE — 93793 ANTICOAG MGMT PT WARFARIN: CPT | Mod: HCNC,S$GLB,,

## 2020-07-21 PROCEDURE — 93793 PR ANTICOAGULANT MGMT FOR PT TAKING WARFARIN: ICD-10-PCS | Mod: HCNC,S$GLB,,

## 2020-07-21 PROCEDURE — 85610 POCT INR: ICD-10-PCS | Mod: QW,HCNC,S$GLB, | Performed by: NUCLEAR MEDICINE

## 2020-07-21 RX ORDER — LOSARTAN POTASSIUM 50 MG/1
50 TABLET ORAL 2 TIMES DAILY
Qty: 180 TABLET | Refills: 3 | Status: SHIPPED | OUTPATIENT
Start: 2020-07-21 | End: 2021-07-10

## 2020-07-21 NOTE — PROGRESS NOTES
Patient's INR is therapeutic at 2.2.  Reports no recent changes or upcoming procedures.  Instructions given to maintain current dose of warfarin 5 mg every evening.  Recheck in 1 month.

## 2020-08-10 ENCOUNTER — TELEPHONE (OUTPATIENT)
Dept: CARDIOLOGY | Facility: CLINIC | Age: 80
End: 2020-08-10

## 2020-08-10 NOTE — TELEPHONE ENCOUNTER
V/M that I need to change his appt on 10-2-20 to a virtual visit or reschedule. Dr Griffith only does virtual visits from the hospital since he is in the cath lab on fridays. Please call me back. cm

## 2020-08-24 ENCOUNTER — TELEPHONE (OUTPATIENT)
Dept: CARDIOLOGY | Facility: CLINIC | Age: 80
End: 2020-08-24

## 2020-08-24 NOTE — TELEPHONE ENCOUNTER
Left vm to rtc to reschedule his appt on 10-02-20. Dr Griffith will not be in clinic that day. Can change to virtual visit or reschedule for a different day. Please rtc. cm

## 2020-08-25 ENCOUNTER — ANTI-COAG VISIT (OUTPATIENT)
Dept: CARDIOLOGY | Facility: CLINIC | Age: 80
End: 2020-08-25
Payer: MEDICARE

## 2020-08-25 DIAGNOSIS — Z79.01 LONG TERM (CURRENT) USE OF ANTICOAGULANTS: Primary | ICD-10-CM

## 2020-08-25 DIAGNOSIS — I48.91 ATRIAL FIBRILLATION, UNSPECIFIED TYPE: ICD-10-CM

## 2020-08-25 LAB — INR PPP: 2.2 (ref 2–3)

## 2020-08-25 PROCEDURE — 85610 POCT INR: ICD-10-PCS | Mod: QW,HCNC,S$GLB, | Performed by: INTERNAL MEDICINE

## 2020-08-25 PROCEDURE — 93793 PR ANTICOAGULANT MGMT FOR PT TAKING WARFARIN: ICD-10-PCS | Mod: HCNC,S$GLB,,

## 2020-08-25 PROCEDURE — 85610 PROTHROMBIN TIME: CPT | Mod: QW,HCNC,S$GLB, | Performed by: INTERNAL MEDICINE

## 2020-08-25 PROCEDURE — 93793 ANTICOAG MGMT PT WARFARIN: CPT | Mod: HCNC,S$GLB,,

## 2020-08-25 NOTE — PROGRESS NOTES
Patient's INR remains therapeutic at 2.2.  Reports no recent changes or upcoming procedures.  Instructions given to maintain current dose of warfarin 5 mg daily.  Recheck in 1 month.  Patient expresses understanding.

## 2020-08-26 DIAGNOSIS — Z79.01 LONG TERM (CURRENT) USE OF ANTICOAGULANTS: ICD-10-CM

## 2020-08-27 ENCOUNTER — TELEPHONE (OUTPATIENT)
Dept: CARDIOLOGY | Facility: CLINIC | Age: 80
End: 2020-08-27

## 2020-08-27 RX ORDER — WARFARIN SODIUM 5 MG/1
5 TABLET ORAL DAILY
Qty: 30 TABLET | Refills: 11 | Status: SHIPPED | OUTPATIENT
Start: 2020-08-27 | End: 2021-08-23

## 2020-09-04 ENCOUNTER — TELEPHONE (OUTPATIENT)
Dept: CARDIOLOGY | Facility: CLINIC | Age: 80
End: 2020-09-04

## 2020-09-04 NOTE — TELEPHONE ENCOUNTER
Left v/m to rtc to [facility] appt to virtual visit or reschedule due to Dr Griffith in hospital rounds. cm

## 2020-09-29 ENCOUNTER — PATIENT MESSAGE (OUTPATIENT)
Dept: OTHER | Facility: OTHER | Age: 80
End: 2020-09-29

## 2020-09-29 ENCOUNTER — OFFICE VISIT (OUTPATIENT)
Dept: INTERNAL MEDICINE | Facility: CLINIC | Age: 80
End: 2020-09-29
Payer: MEDICARE

## 2020-09-29 ENCOUNTER — ANTI-COAG VISIT (OUTPATIENT)
Dept: CARDIOLOGY | Facility: CLINIC | Age: 80
End: 2020-09-29
Payer: MEDICARE

## 2020-09-29 VITALS
HEIGHT: 67 IN | SYSTOLIC BLOOD PRESSURE: 142 MMHG | WEIGHT: 218.06 LBS | OXYGEN SATURATION: 97 % | HEART RATE: 88 BPM | DIASTOLIC BLOOD PRESSURE: 94 MMHG | BODY MASS INDEX: 34.22 KG/M2

## 2020-09-29 DIAGNOSIS — E27.8 ADRENAL NODULE: ICD-10-CM

## 2020-09-29 DIAGNOSIS — I70.0 ATHEROSCLEROSIS OF AORTA: ICD-10-CM

## 2020-09-29 DIAGNOSIS — R94.120 ABNORMAL HEARING SCREEN: ICD-10-CM

## 2020-09-29 DIAGNOSIS — R91.8 LUNG NODULES: ICD-10-CM

## 2020-09-29 DIAGNOSIS — R20.8 BURNING SENSATION OF FOOT: ICD-10-CM

## 2020-09-29 DIAGNOSIS — Z85.46 HISTORY OF PROSTATE CANCER: ICD-10-CM

## 2020-09-29 DIAGNOSIS — E78.5 HYPERLIPIDEMIA ASSOCIATED WITH TYPE 2 DIABETES MELLITUS: ICD-10-CM

## 2020-09-29 DIAGNOSIS — I48.91 ATRIAL FIBRILLATION, UNSPECIFIED TYPE: ICD-10-CM

## 2020-09-29 DIAGNOSIS — Z79.01 LONG TERM (CURRENT) USE OF ANTICOAGULANTS: Primary | ICD-10-CM

## 2020-09-29 DIAGNOSIS — E11.69 HYPERLIPIDEMIA ASSOCIATED WITH TYPE 2 DIABETES MELLITUS: ICD-10-CM

## 2020-09-29 DIAGNOSIS — Z91.89 POTENTIAL FOR COGNITIVE IMPAIRMENT: ICD-10-CM

## 2020-09-29 DIAGNOSIS — E66.9 OBESITY (BMI 30.0-34.9): ICD-10-CM

## 2020-09-29 DIAGNOSIS — R41.3 MEMORY DIFFICULTIES: ICD-10-CM

## 2020-09-29 DIAGNOSIS — G25.2 INTENTION TREMOR: ICD-10-CM

## 2020-09-29 DIAGNOSIS — Z00.00 ENCOUNTER FOR PREVENTIVE HEALTH EXAMINATION: Primary | ICD-10-CM

## 2020-09-29 DIAGNOSIS — I10 HYPERTENSION, UNSPECIFIED TYPE: ICD-10-CM

## 2020-09-29 DIAGNOSIS — I25.10 ATHEROSCLEROSIS OF CORONARY ARTERY, ANGINA PRESENCE UNSPECIFIED, UNSPECIFIED VESSEL OR LESION TYPE, UNSPECIFIED WHETHER NATIVE OR TRANSPLANTED HEART: ICD-10-CM

## 2020-09-29 DIAGNOSIS — I27.20 PULMONARY HYPERTENSION: ICD-10-CM

## 2020-09-29 DIAGNOSIS — G47.33 OSA (OBSTRUCTIVE SLEEP APNEA): ICD-10-CM

## 2020-09-29 PROBLEM — Z86.59 HISTORY OF MAJOR DEPRESSION: Status: ACTIVE | Noted: 2019-04-01

## 2020-09-29 PROBLEM — E66.811 OBESITY (BMI 30.0-34.9): Status: ACTIVE | Noted: 2020-09-29

## 2020-09-29 PROBLEM — E27.9 ADRENAL NODULE: Status: ACTIVE | Noted: 2020-09-29

## 2020-09-29 LAB — INR PPP: 2.5 (ref 2–3)

## 2020-09-29 PROCEDURE — 3077F PR MOST RECENT SYSTOLIC BLOOD PRESSURE >= 140 MM HG: ICD-10-PCS | Mod: HCNC,CPTII,S$GLB, | Performed by: NURSE PRACTITIONER

## 2020-09-29 PROCEDURE — 99999 PR PBB SHADOW E&M-EST. PATIENT-LVL IV: CPT | Mod: PBBFAC,HCNC,, | Performed by: NURSE PRACTITIONER

## 2020-09-29 PROCEDURE — 93793 PR ANTICOAGULANT MGMT FOR PT TAKING WARFARIN: ICD-10-PCS | Mod: HCNC,S$GLB,,

## 2020-09-29 PROCEDURE — 93793 ANTICOAG MGMT PT WARFARIN: CPT | Mod: HCNC,S$GLB,,

## 2020-09-29 PROCEDURE — 99999 PR PBB SHADOW E&M-EST. PATIENT-LVL IV: ICD-10-PCS | Mod: PBBFAC,HCNC,, | Performed by: NURSE PRACTITIONER

## 2020-09-29 PROCEDURE — 85610 PROTHROMBIN TIME: CPT | Mod: QW,HCNC,S$GLB, | Performed by: INTERNAL MEDICINE

## 2020-09-29 PROCEDURE — 85610 POCT INR: ICD-10-PCS | Mod: QW,HCNC,S$GLB, | Performed by: INTERNAL MEDICINE

## 2020-09-29 PROCEDURE — G0439 PPPS, SUBSEQ VISIT: HCPCS | Mod: HCNC,S$GLB,, | Performed by: NURSE PRACTITIONER

## 2020-09-29 PROCEDURE — G0439 PR MEDICARE ANNUAL WELLNESS SUBSEQUENT VISIT: ICD-10-PCS | Mod: HCNC,S$GLB,, | Performed by: NURSE PRACTITIONER

## 2020-09-29 PROCEDURE — 3077F SYST BP >= 140 MM HG: CPT | Mod: HCNC,CPTII,S$GLB, | Performed by: NURSE PRACTITIONER

## 2020-09-29 PROCEDURE — 3080F PR MOST RECENT DIASTOLIC BLOOD PRESSURE >= 90 MM HG: ICD-10-PCS | Mod: HCNC,CPTII,S$GLB, | Performed by: NURSE PRACTITIONER

## 2020-09-29 PROCEDURE — 3080F DIAST BP >= 90 MM HG: CPT | Mod: HCNC,CPTII,S$GLB, | Performed by: NURSE PRACTITIONER

## 2020-09-29 PROCEDURE — 99499 RISK ADDL DX/OHS AUDIT: ICD-10-PCS | Mod: HCNC,S$GLB,, | Performed by: NURSE PRACTITIONER

## 2020-09-29 PROCEDURE — 99499 UNLISTED E&M SERVICE: CPT | Mod: HCNC,S$GLB,, | Performed by: NURSE PRACTITIONER

## 2020-09-29 NOTE — PROGRESS NOTES
Patient's INR is therapeutic at 2.5.  Reports no recent changes.  Current warfarin regimen verified.  No changes in dose - maintain scheduled dose of 5 mg daily.  Recheck in 1 month.  Patient verbalized understanding.

## 2020-09-29 NOTE — PROGRESS NOTES
"  Anthony Biggs presented for a  Medicare AWV and comprehensive Health Risk Assessment today. The following components were reviewed and updated:    · Medical history  · Family History  · Social history  · Allergies and Current Medications  · Health Risk Assessment  · Health Maintenance  · Care Team     ** See Completed Assessments for Annual Wellness Visit within the encounter summary.**         The following assessments were completed:  · Living Situation  · CAGE  · Depression Screening  · Timed Get Up and Go  · Whisper Test  · Cognitive Function Screening  · Nutrition Screening  · ADL Screening  · PAQ Screening        Vitals:    09/29/20 0752   BP: (!) 142/94   Pulse: 88   SpO2: 97%   Weight: 98.9 kg (218 lb 0.6 oz)   Height: 5' 7" (1.702 m)     Body mass index is 34.15 kg/m².  Physical Exam  Vitals signs and nursing note reviewed.   Constitutional:       Appearance: He is well-developed.   HENT:      Head: Normocephalic.   Cardiovascular:      Rate and Rhythm: Normal rate and regular rhythm.      Pulses:           Dorsalis pedis pulses are 2+ on the right side and 2+ on the left side.      Heart sounds: Normal heart sounds. No murmur.   Pulmonary:      Effort: Pulmonary effort is normal. No respiratory distress.      Breath sounds: Normal breath sounds.   Abdominal:      Palpations: Abdomen is soft. There is no mass.      Tenderness: There is no abdominal tenderness.   Musculoskeletal: Normal range of motion.   Feet:      Right foot:      Protective Sensation: 10 sites tested. 9 sites sensed.      Skin integrity: No ulcer or blister.      Left foot:      Protective Sensation: 10 sites tested. 10 sites sensed.      Skin integrity: No ulcer or blister.   Skin:     General: Skin is warm and dry.   Neurological:      Mental Status: He is alert and oriented to person, place, and time.      Motor: No abnormal muscle tone.   Psychiatric:         Speech: Speech normal.         Behavior: Behavior normal.           "     Diagnoses and health risks identified today and associated recommendations/orders:    1. Encounter for preventive health examination  He will discuss health maintenance with PCP at upcoming appointment.   Patient will receive Shingrix at pharmacy.    Scheduled  PCP  He will discuss pulmonary follow up with PCP; declines scheduling at this time.     2. Atherosclerosis of coronary artery, angina presence unspecified, unspecified vessel or lesion type, unspecified whether native or transplanted heart  Stable and controlled. Continue current treatment plan as previously prescribed with your PCP and cardiologist.     3. Atrial fibrillation, unspecified type  Stable and controlled. Continue current treatment plan as previously prescribed with your cardiologist.     4. Hypertension, unspecified type  BP elevated at today's visit.   Discussed s/s of MI and stroke (patient denies any s/s) and advised to go to the ER if occur. Advised patient to monitor BP (keep a log) and will bring in for review at upcoming PCP appointment.   Advised to follow up with PCP for further evaluation and treatment. He expressed understanding.      5. Hyperlipidemia associated with type 2 diabetes mellitus  A1C 6.3  Continue current treatment plan as previously prescribed with your PCP and cardiologist.     6. Burning sensation of foot  Reports burning sensation to bilateral feet, not new and not worsening.   See above foot exam.   Encouraged daily foot inspections.   Advised to follow up with PCP for further evaluation and treatment. He expressed understanding.      7. Atherosclerosis of aorta  Cta 6/2017  Stable and controlled. Continue current treatment plan as previously prescribed with your PCP and cardiologist.     8. Pulmonary hypertension  Cath 8/2017  Continue current treatment plan as previously prescribed with your cardiologist and pulmonologist.     9. SHORTY (obstructive sleep apnea)  Discussed risks associated with untreated sleep  apnea. He expressed understanding.    Continue current treatment plan as previously prescribed with your pulmonologist.     10. Lung nodules  cta 6/2017  Advised to follow up with PCP for further evaluation and recommendations. He expressed understanding.      11. Adrenal nodule  cta 6/2017  Advised to follow up with PCP for further evaluation and recommendations. He expressed understanding.      12. Intention tremor  Stable. Continue current treatment plan as previously prescribed with your PCP.     13. History of prostate cancer  Continue current treatment plan as previously prescribed with your PCP.     14. Obesity (BMI 30.0-34.9)  Encouraged healthy diet and exercise as tolerated to help bring BMI into normal range.  Continue current treatment plan as previously prescribed with your PCP.     15. Abnormal hearing screen  Abnormal whisper test-right abnormal; left normal.   Advised to follow up with PCP for further evaluation and recommendations. Patient expressed understanding.       16. Potential for cognitive impairment  Abnormal cognitive function screening.   Reports memory difficulties.   Completes ADLs independently.   Advised to follow up with PCP for further evaluation and recommendations. He expressed understanding.      17. Memory difficulties  See #16      Provided [place] with a 5-10 year written screening schedule and personal prevention plan. Recommendations were developed using the USPSTF age appropriate recommendations. Education, counseling, and referrals were provided as needed. After Visit Summary printed and given to patient which includes a list of additional screenings\tests needed.    Follow up in about 1 year (around 9/29/2021) for awv.    Allison Moreno NP  I offered to discuss end of life issues, including information on how to make advance directives that the patient could use to name someone who would make medical decisions on their behalf if they became too ill to make  themselves.    ___Patient declined  _X_Patient is interested, I provided paper work and offered to discuss.

## 2020-09-29 NOTE — PATIENT INSTRUCTIONS
Counseling and Referral of Other Preventative  (Italic type indicates deductible and co-insurance are waived)    Patient Name: Anthony Biggs  Today's Date: 9/29/2020    Health Maintenance       Date Due Completion Date    Hepatitis C Screening 1940 ---    Shingles Vaccine (2 of 3) 04/03/2012 2/7/2012    Foot Exam 01/03/2020 1/3/2019    Colonoscopy 05/16/2020 5/16/2017    Hemoglobin A1c 07/02/2020 1/2/2020    Influenza Vaccine (1) 08/01/2020 10/18/2019    Eye Exam 11/12/2020 11/12/2019    Override on 11/12/2019: Done    Lipid Panel 01/02/2021 1/2/2020    TETANUS VACCINE 09/16/2026 9/16/2016        No orders of the defined types were placed in this encounter.    The following information is provided to all patients.  This information is to help you find resources for any of the problems found today that may be affecting your health:                Living healthy guide: www.[facility].[place].[facility]      Understanding Diabetes: www.diabetes.org      Eating healthy: www.cdc.gov/healthyweight      [facility] home safety checklist: www.cdc.gov/steadi/patient.html      Agency on Aging: www.goea.[place].gov      Alcoholics anonymous (AA): www.aa.org      Physical Activity: www.elisa.nih.gov/dl5afme      Tobacco use: www.quitwithusla.org

## 2020-09-29 NOTE — Clinical Note
Your patient was seen today for AWV. Abnormalities bolded. Please review.   Thank you,   DESEAN Hartley

## 2020-10-01 ENCOUNTER — OFFICE VISIT (OUTPATIENT)
Dept: INTERNAL MEDICINE | Facility: CLINIC | Age: 80
End: 2020-10-01
Payer: MEDICARE

## 2020-10-01 ENCOUNTER — LAB VISIT (OUTPATIENT)
Dept: LAB | Facility: HOSPITAL | Age: 80
End: 2020-10-01
Attending: INTERNAL MEDICINE
Payer: MEDICARE

## 2020-10-01 VITALS
OXYGEN SATURATION: 98 % | TEMPERATURE: 98 F | BODY MASS INDEX: 34.19 KG/M2 | WEIGHT: 217.81 LBS | DIASTOLIC BLOOD PRESSURE: 94 MMHG | HEIGHT: 67 IN | HEART RATE: 81 BPM | SYSTOLIC BLOOD PRESSURE: 130 MMHG

## 2020-10-01 DIAGNOSIS — I48.91 ATRIAL FIBRILLATION, UNSPECIFIED TYPE: ICD-10-CM

## 2020-10-01 DIAGNOSIS — G25.2 INTENTION TREMOR: ICD-10-CM

## 2020-10-01 DIAGNOSIS — Z85.46 HISTORY OF PROSTATE CANCER: ICD-10-CM

## 2020-10-01 DIAGNOSIS — E11.9 DIABETES MELLITUS WITHOUT COMPLICATION: ICD-10-CM

## 2020-10-01 DIAGNOSIS — Z86.010 HISTORY OF COLON POLYPS: ICD-10-CM

## 2020-10-01 DIAGNOSIS — E11.59 HYPERTENSION ASSOCIATED WITH DIABETES: Primary | ICD-10-CM

## 2020-10-01 DIAGNOSIS — I25.10 ATHEROSCLEROSIS OF CORONARY ARTERY, ANGINA PRESENCE UNSPECIFIED, UNSPECIFIED VESSEL OR LESION TYPE, UNSPECIFIED WHETHER NATIVE OR TRANSPLANTED HEART: ICD-10-CM

## 2020-10-01 DIAGNOSIS — E78.5 HYPERLIPIDEMIA ASSOCIATED WITH TYPE 2 DIABETES MELLITUS: ICD-10-CM

## 2020-10-01 DIAGNOSIS — Z79.01 CURRENT USE OF LONG TERM ANTICOAGULATION: ICD-10-CM

## 2020-10-01 DIAGNOSIS — I70.0 ATHEROSCLEROSIS OF AORTA: ICD-10-CM

## 2020-10-01 DIAGNOSIS — I15.2 HYPERTENSION ASSOCIATED WITH DIABETES: Primary | ICD-10-CM

## 2020-10-01 DIAGNOSIS — E11.69 HYPERLIPIDEMIA ASSOCIATED WITH TYPE 2 DIABETES MELLITUS: ICD-10-CM

## 2020-10-01 DIAGNOSIS — G47.33 OSA (OBSTRUCTIVE SLEEP APNEA): ICD-10-CM

## 2020-10-01 LAB
ALBUMIN SERPL BCP-MCNC: 4.4 G/DL (ref 3.5–5.2)
ALP SERPL-CCNC: 52 U/L (ref 55–135)
ALT SERPL W/O P-5'-P-CCNC: 33 U/L (ref 10–44)
ANION GAP SERPL CALC-SCNC: 10 MMOL/L (ref 8–16)
AST SERPL-CCNC: 29 U/L (ref 10–40)
BASOPHILS # BLD AUTO: 0.01 K/UL (ref 0–0.2)
BASOPHILS NFR BLD: 0.1 % (ref 0–1.9)
BILIRUB SERPL-MCNC: 0.9 MG/DL (ref 0.1–1)
BUN SERPL-MCNC: 18 MG/DL (ref 8–23)
CALCIUM SERPL-MCNC: 9.5 MG/DL (ref 8.7–10.5)
CHLORIDE SERPL-SCNC: 101 MMOL/L (ref 95–110)
CHOLEST SERPL-MCNC: 138 MG/DL (ref 120–199)
CHOLEST/HDLC SERPL: 2.9 {RATIO} (ref 2–5)
CO2 SERPL-SCNC: 27 MMOL/L (ref 23–29)
CREAT SERPL-MCNC: 1 MG/DL (ref 0.5–1.4)
DIFFERENTIAL METHOD: ABNORMAL
EOSINOPHIL # BLD AUTO: 0.2 K/UL (ref 0–0.5)
EOSINOPHIL NFR BLD: 1.9 % (ref 0–8)
ERYTHROCYTE [DISTWIDTH] IN BLOOD BY AUTOMATED COUNT: 12.5 % (ref 11.5–14.5)
EST. GFR  (AFRICAN AMERICAN): >60 ML/MIN/1.73 M^2
EST. GFR  (NON AFRICAN AMERICAN): >60 ML/MIN/1.73 M^2
ESTIMATED AVG GLUCOSE: 143 MG/DL (ref 68–131)
GLUCOSE SERPL-MCNC: 118 MG/DL (ref 70–110)
HBA1C MFR BLD HPLC: 6.6 % (ref 4–5.6)
HCT VFR BLD AUTO: 42.4 % (ref 40–54)
HDLC SERPL-MCNC: 48 MG/DL (ref 40–75)
HDLC SERPL: 34.8 % (ref 20–50)
HGB BLD-MCNC: 13.4 G/DL (ref 14–18)
IMM GRANULOCYTES # BLD AUTO: 0.02 K/UL (ref 0–0.04)
IMM GRANULOCYTES NFR BLD AUTO: 0.3 % (ref 0–0.5)
LDLC SERPL CALC-MCNC: 72.4 MG/DL (ref 63–159)
LYMPHOCYTES # BLD AUTO: 2.8 K/UL (ref 1–4.8)
LYMPHOCYTES NFR BLD: 34.8 % (ref 18–48)
MCH RBC QN AUTO: 29.7 PG (ref 27–31)
MCHC RBC AUTO-ENTMCNC: 31.6 G/DL (ref 32–36)
MCV RBC AUTO: 94 FL (ref 82–98)
MONOCYTES # BLD AUTO: 0.8 K/UL (ref 0.3–1)
MONOCYTES NFR BLD: 10.5 % (ref 4–15)
NEUTROPHILS # BLD AUTO: 4.2 K/UL (ref 1.8–7.7)
NEUTROPHILS NFR BLD: 52.4 % (ref 38–73)
NONHDLC SERPL-MCNC: 90 MG/DL
NRBC BLD-RTO: 0 /100 WBC
PLATELET # BLD AUTO: 259 K/UL (ref 150–350)
PMV BLD AUTO: 10.9 FL (ref 9.2–12.9)
POTASSIUM SERPL-SCNC: 5.2 MMOL/L (ref 3.5–5.1)
PROT SERPL-MCNC: 7.2 G/DL (ref 6–8.4)
RBC # BLD AUTO: 4.51 M/UL (ref 4.6–6.2)
SODIUM SERPL-SCNC: 138 MMOL/L (ref 136–145)
TRIGL SERPL-MCNC: 88 MG/DL (ref 30–150)
TSH SERPL DL<=0.005 MIU/L-ACNC: 0.9 UIU/ML (ref 0.4–4)
WBC # BLD AUTO: 7.92 K/UL (ref 3.9–12.7)

## 2020-10-01 PROCEDURE — 36415 COLL VENOUS BLD VENIPUNCTURE: CPT | Mod: HCNC,PO

## 2020-10-01 PROCEDURE — 99499 UNLISTED E&M SERVICE: CPT | Mod: S$GLB,,, | Performed by: INTERNAL MEDICINE

## 2020-10-01 PROCEDURE — 1101F PR PT FALLS ASSESS DOC 0-1 FALLS W/OUT INJ PAST YR: ICD-10-PCS | Mod: HCNC,CPTII,S$GLB, | Performed by: INTERNAL MEDICINE

## 2020-10-01 PROCEDURE — 84443 ASSAY THYROID STIM HORMONE: CPT | Mod: HCNC

## 2020-10-01 PROCEDURE — 99214 OFFICE O/P EST MOD 30 MIN: CPT | Mod: 25,HCNC,S$GLB, | Performed by: INTERNAL MEDICINE

## 2020-10-01 PROCEDURE — 99999 PR PBB SHADOW E&M-EST. PATIENT-LVL IV: CPT | Mod: PBBFAC,HCNC,, | Performed by: INTERNAL MEDICINE

## 2020-10-01 PROCEDURE — 80061 LIPID PANEL: CPT | Mod: HCNC

## 2020-10-01 PROCEDURE — 85025 COMPLETE CBC W/AUTO DIFF WBC: CPT | Mod: HCNC

## 2020-10-01 PROCEDURE — 83036 HEMOGLOBIN GLYCOSYLATED A1C: CPT | Mod: HCNC

## 2020-10-01 PROCEDURE — 99499 RISK ADDL DX/OHS AUDIT: ICD-10-PCS | Mod: S$GLB,,, | Performed by: INTERNAL MEDICINE

## 2020-10-01 PROCEDURE — 1159F MED LIST DOCD IN RCRD: CPT | Mod: HCNC,S$GLB,, | Performed by: INTERNAL MEDICINE

## 2020-10-01 PROCEDURE — 3080F PR MOST RECENT DIASTOLIC BLOOD PRESSURE >= 90 MM HG: ICD-10-PCS | Mod: HCNC,CPTII,S$GLB, | Performed by: INTERNAL MEDICINE

## 2020-10-01 PROCEDURE — 1126F AMNT PAIN NOTED NONE PRSNT: CPT | Mod: HCNC,S$GLB,, | Performed by: INTERNAL MEDICINE

## 2020-10-01 PROCEDURE — 1126F PR PAIN SEVERITY QUANTIFIED, NO PAIN PRESENT: ICD-10-PCS | Mod: HCNC,S$GLB,, | Performed by: INTERNAL MEDICINE

## 2020-10-01 PROCEDURE — 3080F DIAST BP >= 90 MM HG: CPT | Mod: HCNC,CPTII,S$GLB, | Performed by: INTERNAL MEDICINE

## 2020-10-01 PROCEDURE — 99214 PR OFFICE/OUTPT VISIT, EST, LEVL IV, 30-39 MIN: ICD-10-PCS | Mod: 25,HCNC,S$GLB, | Performed by: INTERNAL MEDICINE

## 2020-10-01 PROCEDURE — 80053 COMPREHEN METABOLIC PANEL: CPT | Mod: HCNC

## 2020-10-01 PROCEDURE — 1101F PT FALLS ASSESS-DOCD LE1/YR: CPT | Mod: HCNC,CPTII,S$GLB, | Performed by: INTERNAL MEDICINE

## 2020-10-01 PROCEDURE — 3075F SYST BP GE 130 - 139MM HG: CPT | Mod: HCNC,CPTII,S$GLB, | Performed by: INTERNAL MEDICINE

## 2020-10-01 PROCEDURE — 99999 PR PBB SHADOW E&M-EST. PATIENT-LVL IV: ICD-10-PCS | Mod: PBBFAC,HCNC,, | Performed by: INTERNAL MEDICINE

## 2020-10-01 PROCEDURE — 1159F PR MEDICATION LIST DOCUMENTED IN MEDICAL RECORD: ICD-10-PCS | Mod: HCNC,S$GLB,, | Performed by: INTERNAL MEDICINE

## 2020-10-01 PROCEDURE — 3075F PR MOST RECENT SYSTOLIC BLOOD PRESS GE 130-139MM HG: ICD-10-PCS | Mod: HCNC,CPTII,S$GLB, | Performed by: INTERNAL MEDICINE

## 2020-10-01 NOTE — PROGRESS NOTES
HPI:  Patient is a 80-year-old man who comes in today for follow-up of his atrial fibrillation, coronary disease, diabetes, hypertension, lipids, and for his annual physical exam.  His blood sugars have been doing well.  He denies any hypoglycemia.  Patient denies any chest pains or shortness of breath.  He has had no palpitations.  He does have problems with ear wax buildup in the right ear.      Current MEDS: medcard review, verified and update  Allergies: Per the electronic medical record    Past Medical History:   Diagnosis Date    Abnormal CXR     Angina pectoris 8/28/2017    Atrial fibrillation     Coronary atherosclerosis of unspecified type of vessel, native or graft     Depression     Diabetes mellitus without complication     Emphysema of lung     History of prostate cancer 2007    prostatectomy    Hyperlipidemia associated with type 2 diabetes mellitus     Hypertension associated with diabetes     Intention tremor     Moderate episode of recurrent major depressive disorder 4/1/2019    Morbid (severe) obesity due to excess calories 4/1/2019    SHORTY (obstructive sleep apnea)     Prostate cancer     Trouble in sleeping     Urinary incontinence        Past Surgical History:   Procedure Laterality Date    ABDOMINAL HERNIA REPAIR      APPENDECTOMY      bladder sx      CARDIAC CATHETERIZATION      CATARACT EXTRACTION W/  INTRAOCULAR LENS IMPLANT  Restor OU    COLONOSCOPY N/A 5/16/2017    Procedure: COLONOSCOPY;  Surgeon: Alfredo Naidu MD;  Location: [facility];  Service: Endoscopy;  Laterality: N/A;    inguinal hernia      lung sx      PROSTATE SURGERY         SHx: per the electronic medical record    FHx: recorded in the electronic medical record    ROS:    denies any chest pains or shortness of breath. Denies any nausea, vomiting or diarrhea. Denies any fever, chills or sweats. Denies any change in weight, voice, stool, skin or hair. Denies any dysuria, dyspepsia or dysphagia. Denies  "any change in vision, hearing or headaches. Denies any swollen lymph nodes or loss of memory.    PE:  BP (!) 130/94   Pulse 81   Temp 98.1 °F (36.7 °C) (Tympanic)   Ht 5' 7" (1.702 m)   Wt 98.8 kg (217 lb 13 oz)   SpO2 98%   BMI 34.11 kg/m²   Gen: Well-developed, well-nourished, male, in no acute distress, oriented x3  HEENT: neck is supple, no adenopathy, carotids 2+ equal without bruits, thyroid exam normal size without nodules.  Right external auditory canal impacted with cerumen  CHEST: clear to auscultation and percussion  CVS: regular rate and rhythm without significant murmur, gallop, or rubs  ABD: soft, benign, no rebound no guarding, no distention.  Bowel sounds are normal.     nontender.  No palpable masses.  No organomegaly and no audible bruits.  RECTAL:  Deferred  EXT: no clubbing, cyanosis, or edema  LYMPH: no cervical, inguinal, or axillary adenopathy  FEET: no loss of sensation.  No ulcers or pressure sores.  NEURO: gait normal.  Cranial nerves II- XII intact. No nystagmus.  Speech normal.   Gross motor and sensory unremarkable.    Lab Results   Component Value Date    WBC 9.23 06/27/2019    HGB 13.1 (L) 06/27/2019    HCT 40.5 06/27/2019     06/27/2019    CHOL 142 01/02/2020    TRIG 63 01/02/2020    HDL 54 01/02/2020    ALT 29 01/02/2020    AST 25 01/02/2020     01/02/2020    K 4.8 01/02/2020     01/02/2020    CREATININE 1.0 01/02/2020    BUN 20 01/02/2020    CO2 28 01/02/2020    TSH 0.722 06/27/2019    PSA <0.010 08/20/2013    INR 2.5 09/29/2020    HGBA1C 6.3 (H) 01/02/2020       Impression:  Diabetes, hypertension, lipids well controlled  Coronary artery disease, AFib, asymptomatic  Ear wax impaction  Other medical problems below, stable  Patient Active Problem List   Diagnosis    Atrial fibrillation    Coronary atherosclerosis    History of prostate cancer    Refractive error    Family history of macular degeneration    Posterior capsular opacification    " Pseudophakia of both eyes    History of colon polyps    Diverticulosis of large intestine without hemorrhage    Current use of long term anticoagulation    Pulmonary hypertension, mild    SHORTY (obstructive sleep apnea)    Diabetes mellitus without complication    Hyperlipidemia associated with type 2 diabetes mellitus    Hypertension associated with diabetes    Intention tremor    Atherosclerosis of aorta    History of major depression;Moderate episode of recurrent major depressive disorder    Adrenal nodule    Lung nodules    Obesity (BMI 30.0-34.9)       Plan:   Orders Placed This Encounter    Hemoglobin A1C    Comprehensive metabolic panel    Lipid Panel    Protein / creatinine ratio, urine    TSH    CBC auto differential     Patient will see me in 6 months with above lab work.  He had the right ear irrigated today.  His medications remain the same  This note is generated with speech recognition software and is subject to transcription error and sound alike phrases that may be missed by proofreading.

## 2020-10-05 ENCOUNTER — OFFICE VISIT (OUTPATIENT)
Dept: CARDIOLOGY | Facility: CLINIC | Age: 80
End: 2020-10-05
Payer: MEDICARE

## 2020-10-05 VITALS
OXYGEN SATURATION: 98 % | WEIGHT: 215.75 LBS | DIASTOLIC BLOOD PRESSURE: 80 MMHG | HEART RATE: 88 BPM | SYSTOLIC BLOOD PRESSURE: 122 MMHG | HEIGHT: 67 IN | BODY MASS INDEX: 33.86 KG/M2

## 2020-10-05 DIAGNOSIS — I48.0 PAROXYSMAL ATRIAL FIBRILLATION: ICD-10-CM

## 2020-10-05 DIAGNOSIS — E78.5 HYPERLIPIDEMIA ASSOCIATED WITH TYPE 2 DIABETES MELLITUS: ICD-10-CM

## 2020-10-05 DIAGNOSIS — I25.10 ATHEROSCLEROSIS OF NATIVE CORONARY ARTERY OF NATIVE HEART WITHOUT ANGINA PECTORIS: ICD-10-CM

## 2020-10-05 DIAGNOSIS — I15.2 HYPERTENSION ASSOCIATED WITH DIABETES: ICD-10-CM

## 2020-10-05 DIAGNOSIS — I27.20 PULMONARY HYPERTENSION, MILD: Primary | ICD-10-CM

## 2020-10-05 DIAGNOSIS — E11.69 HYPERLIPIDEMIA ASSOCIATED WITH TYPE 2 DIABETES MELLITUS: ICD-10-CM

## 2020-10-05 DIAGNOSIS — E11.59 HYPERTENSION ASSOCIATED WITH DIABETES: ICD-10-CM

## 2020-10-05 PROCEDURE — 1126F PR PAIN SEVERITY QUANTIFIED, NO PAIN PRESENT: ICD-10-PCS | Mod: HCNC,S$GLB,, | Performed by: INTERNAL MEDICINE

## 2020-10-05 PROCEDURE — 1159F PR MEDICATION LIST DOCUMENTED IN MEDICAL RECORD: ICD-10-PCS | Mod: HCNC,S$GLB,, | Performed by: INTERNAL MEDICINE

## 2020-10-05 PROCEDURE — 1126F AMNT PAIN NOTED NONE PRSNT: CPT | Mod: HCNC,S$GLB,, | Performed by: INTERNAL MEDICINE

## 2020-10-05 PROCEDURE — 3079F DIAST BP 80-89 MM HG: CPT | Mod: HCNC,CPTII,S$GLB, | Performed by: INTERNAL MEDICINE

## 2020-10-05 PROCEDURE — 99214 PR OFFICE/OUTPT VISIT, EST, LEVL IV, 30-39 MIN: ICD-10-PCS | Mod: HCNC,S$GLB,, | Performed by: INTERNAL MEDICINE

## 2020-10-05 PROCEDURE — 1101F PR PT FALLS ASSESS DOC 0-1 FALLS W/OUT INJ PAST YR: ICD-10-PCS | Mod: HCNC,CPTII,S$GLB, | Performed by: INTERNAL MEDICINE

## 2020-10-05 PROCEDURE — 99214 OFFICE O/P EST MOD 30 MIN: CPT | Mod: HCNC,S$GLB,, | Performed by: INTERNAL MEDICINE

## 2020-10-05 PROCEDURE — 3079F PR MOST RECENT DIASTOLIC BLOOD PRESSURE 80-89 MM HG: ICD-10-PCS | Mod: HCNC,CPTII,S$GLB, | Performed by: INTERNAL MEDICINE

## 2020-10-05 PROCEDURE — 3074F SYST BP LT 130 MM HG: CPT | Mod: HCNC,CPTII,S$GLB, | Performed by: INTERNAL MEDICINE

## 2020-10-05 PROCEDURE — 3074F PR MOST RECENT SYSTOLIC BLOOD PRESSURE < 130 MM HG: ICD-10-PCS | Mod: HCNC,CPTII,S$GLB, | Performed by: INTERNAL MEDICINE

## 2020-10-05 PROCEDURE — 1159F MED LIST DOCD IN RCRD: CPT | Mod: HCNC,S$GLB,, | Performed by: INTERNAL MEDICINE

## 2020-10-05 PROCEDURE — 99999 PR PBB SHADOW E&M-EST. PATIENT-LVL IV: ICD-10-PCS | Mod: PBBFAC,HCNC,, | Performed by: INTERNAL MEDICINE

## 2020-10-05 PROCEDURE — 1101F PT FALLS ASSESS-DOCD LE1/YR: CPT | Mod: HCNC,CPTII,S$GLB, | Performed by: INTERNAL MEDICINE

## 2020-10-05 PROCEDURE — 99999 PR PBB SHADOW E&M-EST. PATIENT-LVL IV: CPT | Mod: PBBFAC,HCNC,, | Performed by: INTERNAL MEDICINE

## 2020-10-05 NOTE — PROGRESS NOTES
Subjective:   Patient ID:  Anthony Biggs Jr. is a 80 y.o. male who presents for follow-up of Persistent atrial fibrillation  Pt with chronic SOB on inhalers.Pt denies CP.    Hypertension  This is a chronic problem. The current episode started more than 1 year ago. The problem has been gradually improving since onset. The problem is controlled. Pertinent negatives include no chest pain, palpitations or shortness of breath. Past treatments include calcium channel blockers, angiotensin blockers and diuretics. The current treatment provides moderate improvement. There are no compliance problems.    Hyperlipidemia  This is a chronic problem. The current episode started more than 1 year ago. The problem is controlled. Pertinent negatives include no chest pain or shortness of breath. Current antihyperlipidemic treatment includes statins. The current treatment provides moderate improvement of lipids. There are no compliance problems.    Atrial Fibrillation  Presents for follow-up visit. Symptoms are negative for bradycardia, chest pain, dizziness, palpitations, shortness of breath, syncope, tachycardia and weakness. The symptoms have been stable. Past medical history includes atrial fibrillation and hyperlipidemia.       Review of Systems   Constitution: Negative. Negative for weight gain.   HENT: Negative.    Eyes: Negative.    Cardiovascular: Negative.  Negative for chest pain, leg swelling, palpitations and syncope.   Respiratory: Negative.  Negative for shortness of breath.    Endocrine: Negative.    Hematologic/Lymphatic: Negative.    Skin: Negative.    Musculoskeletal: Negative for muscle weakness.   Gastrointestinal: Negative.    Genitourinary: Negative.    Neurological: Negative.  Negative for dizziness and weakness.   Psychiatric/Behavioral: Negative.    Allergic/Immunologic: Negative.      Family History   Problem Relation Age of Onset    Heart disease Mother     Cancer Father         lung    Macular  degeneration Sister     Diabetes Sister     Heart disease Sister     Strabismus Neg Hx     Retinal detachment Neg Hx     Glaucoma Neg Hx     Blindness Neg Hx     Amblyopia Neg Hx      Past Medical History:   Diagnosis Date    Abnormal CXR     Angina pectoris 2017    Atrial fibrillation     Coronary atherosclerosis of unspecified type of vessel, native or graft     Depression     Diabetes mellitus without complication     Emphysema of lung     History of prostate cancer 2007    prostatectomy    Hyperlipidemia associated with type 2 diabetes mellitus     Hypertension associated with diabetes     Intention tremor     Moderate episode of recurrent major depressive disorder 2019    Morbid (severe) obesity due to excess calories 2019    SHORTY (obstructive sleep apnea)     Prostate cancer     Trouble in sleeping     Urinary incontinence      Social History     Socioeconomic History    Marital status:      Spouse name: Not on file    Number of children: Not on file    Years of education: Not on file    Highest education level: Some college, no degree   Occupational History    Not on file   Social Needs    Financial resource strain: Not hard at all    Food insecurity     Worry: Never true     Inability: Never true    Transportation needs     Medical: No     Non-medical: No   Tobacco Use    Smoking status: Former Smoker     Packs/day: 0.50     Years: 40.00     Pack years: 20.00     Types: Cigarettes     Quit date:      Years since quittin.7    Smokeless tobacco: Never Used   Substance and Sexual Activity    Alcohol use: No    Drug use: No    Sexual activity: Not Currently   Lifestyle    Physical activity     Days per week: 0 days     Minutes per session: 0 min    Stress: Not at all   Relationships    Social connections     Talks on phone: More than three times a week     Gets together: Once a week     Attends Congregation service: More than 4 times per year      Active member of club or organization: No     Attends meetings of clubs or organizations: Never     Relationship status:    Other Topics Concern    Not on file   Social History Narrative    Not on file     Current Outpatient Medications on File Prior to Visit   Medication Sig Dispense Refill    aspirin (ECOTRIN) 81 MG EC tablet Take 81 mg by mouth once daily.      atorvastatin (LIPITOR) 40 MG tablet TAKE ONE TABLET BY MOUTH DAILY  30 tablet 6    diltiaZEM HCl (TIAZAC) 120 mg 24 hr capsule TAKE ONE CAPSULE BY MOUTH DAILY 90 capsule 4    furosemide (LASIX) 40 MG tablet Take 40 mg by mouth 2 (two) times daily.      losartan (COZAAR) 50 MG tablet Take 1 tablet (50 mg total) by mouth 2 (two) times a day. 180 tablet 3    melatonin 3 mg Tab Take 1 tablet by mouth nightly.      meloxicam (MOBIC) 15 MG tablet Take 1 tablet (15 mg total) by mouth once daily. Take with meal. 10 tablet 0    metFORMIN (GLUCOPHAGE) 500 MG tablet Take 1 tablet (500 mg total) by mouth 2 (two) times daily with meals. 180 tablet 3    torsemide (DEMADEX) 10 MG Tab Take 1 tablet (10 mg total) by mouth once daily. 90 tablet 03    warfarin (COUMADIN) 5 MG tablet Take 1 tablet (5 mg total) by mouth Daily. OR as directed by coumadin clinic 30 tablet 11     No current facility-administered medications on file prior to visit.      Review of patient's allergies indicates:  No Known Allergies    Objective:     Physical Exam   Constitutional: He is oriented to person, place, and time. He appears well-developed and well-nourished.   HENT:   Head: Normocephalic and atraumatic.   Eyes: Pupils are equal, round, and reactive to light. Conjunctivae are normal.   Neck: Normal range of motion. Neck supple.   Cardiovascular: Normal rate, regular rhythm, normal heart sounds and intact distal pulses.   Pulmonary/Chest: Effort normal and breath sounds normal.   Abdominal: Soft. Bowel sounds are normal.   Neurological: He is alert and oriented to person,  place, and time.   Skin: Skin is warm and dry.   Psychiatric: He has a normal mood and affect.   Nursing note and vitals reviewed.      Assessment:     1. Pulmonary hypertension, mild    2. Paroxysmal atrial fibrillation    3. Atherosclerosis of native coronary artery of native heart without angina pectoris    4. Hyperlipidemia associated with type 2 diabetes mellitus    5. Hypertension associated with diabetes        Plan:     Pulmonary hypertension, mild    Paroxysmal atrial fibrillation    Atherosclerosis of native coronary artery of native heart without angina pectoris    Hyperlipidemia associated with type 2 diabetes mellitus    Hypertension associated with diabetes      Continue statin-hlp  Continue diltiazem, losartan,demedex -htn  Continue coumadin-afib

## 2020-10-16 ENCOUNTER — IMMUNIZATION (OUTPATIENT)
Dept: INTERNAL MEDICINE | Facility: CLINIC | Age: 80
End: 2020-10-16
Payer: MEDICARE

## 2020-10-16 ENCOUNTER — TELEPHONE (OUTPATIENT)
Dept: INTERNAL MEDICINE | Facility: CLINIC | Age: 80
End: 2020-10-16

## 2020-10-16 PROCEDURE — 90694 VACC AIIV4 NO PRSRV 0.5ML IM: CPT | Mod: HCNC,S$GLB,, | Performed by: INTERNAL MEDICINE

## 2020-10-16 PROCEDURE — G0008 ADMIN INFLUENZA VIRUS VAC: HCPCS | Mod: HCNC,S$GLB,, | Performed by: INTERNAL MEDICINE

## 2020-10-16 PROCEDURE — G0008 FLU VACCINE - QUADRIVALENT - ADJUVANTED: ICD-10-PCS | Mod: HCNC,S$GLB,, | Performed by: INTERNAL MEDICINE

## 2020-10-16 PROCEDURE — 90694 FLU VACCINE - QUADRIVALENT - ADJUVANTED: ICD-10-PCS | Mod: HCNC,S$GLB,, | Performed by: INTERNAL MEDICINE

## 2020-10-16 NOTE — TELEPHONE ENCOUNTER
----- Message from Carri Wayne sent at 10/16/2020  9:35 AM CDT -----  Please send copy of lab results from last visit to home address

## 2020-10-27 ENCOUNTER — ANTI-COAG VISIT (OUTPATIENT)
Dept: CARDIOLOGY | Facility: CLINIC | Age: 80
End: 2020-10-27
Payer: MEDICARE

## 2020-10-27 DIAGNOSIS — Z79.01 LONG TERM (CURRENT) USE OF ANTICOAGULANTS: Primary | ICD-10-CM

## 2020-10-27 DIAGNOSIS — I48.0 PAROXYSMAL ATRIAL FIBRILLATION: ICD-10-CM

## 2020-10-27 LAB — INR PPP: 2.6 (ref 2–3)

## 2020-10-27 PROCEDURE — 93793 PR ANTICOAGULANT MGMT FOR PT TAKING WARFARIN: ICD-10-PCS | Mod: HCNC,S$GLB,,

## 2020-10-27 PROCEDURE — 85610 PROTHROMBIN TIME: CPT | Mod: QW,HCNC,S$GLB, | Performed by: INTERNAL MEDICINE

## 2020-10-27 PROCEDURE — 93793 ANTICOAG MGMT PT WARFARIN: CPT | Mod: HCNC,S$GLB,,

## 2020-10-27 PROCEDURE — 85610 POCT INR: ICD-10-PCS | Mod: QW,HCNC,S$GLB, | Performed by: INTERNAL MEDICINE

## 2020-10-27 NOTE — PROGRESS NOTES
Patient's INR is therapeutic at 2.6.  Reports no recent changes or upcoming procedures.  No change in dose.  Patient to maintain scheduled dose of warfarin 5 mg daily.  Recheck in 1 month.  Patient verbalized understanding.

## 2020-11-02 RX ORDER — TORSEMIDE 10 MG/1
10 TABLET ORAL DAILY
Qty: 90 TABLET | Refills: 1 | Status: SHIPPED | OUTPATIENT
Start: 2020-11-02 | End: 2021-05-06

## 2020-11-17 ENCOUNTER — OFFICE VISIT (OUTPATIENT)
Dept: OPHTHALMOLOGY | Facility: CLINIC | Age: 80
End: 2020-11-17
Payer: MEDICARE

## 2020-11-17 DIAGNOSIS — Z96.1 PSEUDOPHAKIA OF BOTH EYES: ICD-10-CM

## 2020-11-17 DIAGNOSIS — E11.9 DIABETES MELLITUS TYPE 2 WITHOUT RETINOPATHY: Primary | ICD-10-CM

## 2020-11-17 DIAGNOSIS — H35.3131 EARLY DRY STAGE NONEXUDATIVE AGE-RELATED MACULAR DEGENERATION OF BOTH EYES: ICD-10-CM

## 2020-11-17 PROCEDURE — 99999 PR PBB SHADOW E&M-EST. PATIENT-LVL III: CPT | Mod: PBBFAC,HCNC,, | Performed by: OPHTHALMOLOGY

## 2020-11-17 PROCEDURE — 2023F DILAT RTA XM W/O RTNOPTHY: CPT | Mod: HCNC,S$GLB,, | Performed by: OPHTHALMOLOGY

## 2020-11-17 PROCEDURE — 92014 PR EYE EXAM, EST PATIENT,COMPREHESV: ICD-10-PCS | Mod: HCNC,S$GLB,, | Performed by: OPHTHALMOLOGY

## 2020-11-17 PROCEDURE — 99999 PR PBB SHADOW E&M-EST. PATIENT-LVL III: ICD-10-PCS | Mod: PBBFAC,HCNC,, | Performed by: OPHTHALMOLOGY

## 2020-11-17 PROCEDURE — 92014 COMPRE OPH EXAM EST PT 1/>: CPT | Mod: HCNC,S$GLB,, | Performed by: OPHTHALMOLOGY

## 2020-11-17 PROCEDURE — 2023F PR DILATED RETINAL EXAM W/O EVID OF RETINOPATHY: ICD-10-PCS | Mod: HCNC,S$GLB,, | Performed by: OPHTHALMOLOGY

## 2020-11-24 ENCOUNTER — ANTI-COAG VISIT (OUTPATIENT)
Dept: CARDIOLOGY | Facility: CLINIC | Age: 80
End: 2020-11-24
Payer: MEDICARE

## 2020-11-24 DIAGNOSIS — I48.0 PAROXYSMAL ATRIAL FIBRILLATION: ICD-10-CM

## 2020-11-24 DIAGNOSIS — Z79.01 LONG TERM (CURRENT) USE OF ANTICOAGULANTS: Primary | ICD-10-CM

## 2020-11-24 LAB — INR PPP: 2.5 (ref 2–3)

## 2020-11-24 PROCEDURE — 93793 PR ANTICOAGULANT MGMT FOR PT TAKING WARFARIN: ICD-10-PCS | Mod: HCNC,S$GLB,,

## 2020-11-24 PROCEDURE — 85610 POCT INR: ICD-10-PCS | Mod: QW,HCNC,S$GLB, | Performed by: INTERNAL MEDICINE

## 2020-11-24 PROCEDURE — 85610 PROTHROMBIN TIME: CPT | Mod: QW,HCNC,S$GLB, | Performed by: INTERNAL MEDICINE

## 2020-11-24 PROCEDURE — 93793 ANTICOAG MGMT PT WARFARIN: CPT | Mod: HCNC,S$GLB,,

## 2020-11-24 NOTE — PROGRESS NOTES
Patient's INR is therapeutic at 2.5.  Reports no recent changes.  Medication list - reviewed.  No change in dose - patient to maintain scheduled dose of warfarin 5 mg daily.  Recheck in 1 month.  Patient verbalized understanding.

## 2020-12-11 ENCOUNTER — PATIENT MESSAGE (OUTPATIENT)
Dept: OTHER | Facility: OTHER | Age: 80
End: 2020-12-11

## 2020-12-29 ENCOUNTER — ANTI-COAG VISIT (OUTPATIENT)
Dept: CARDIOLOGY | Facility: CLINIC | Age: 80
End: 2020-12-29
Payer: MEDICARE

## 2020-12-29 DIAGNOSIS — I48.0 PAROXYSMAL ATRIAL FIBRILLATION: ICD-10-CM

## 2020-12-29 DIAGNOSIS — Z79.01 LONG TERM (CURRENT) USE OF ANTICOAGULANTS: Primary | ICD-10-CM

## 2020-12-29 LAB — INR PPP: 2.6 (ref 2–3)

## 2020-12-29 PROCEDURE — 85610 PROTHROMBIN TIME: CPT | Mod: QW,HCNC,S$GLB, | Performed by: INTERNAL MEDICINE

## 2020-12-29 PROCEDURE — 93793 ANTICOAG MGMT PT WARFARIN: CPT | Mod: HCNC,S$GLB,,

## 2020-12-29 PROCEDURE — 85610 POCT INR: ICD-10-PCS | Mod: QW,HCNC,S$GLB, | Performed by: INTERNAL MEDICINE

## 2020-12-29 PROCEDURE — 93793 PR ANTICOAGULANT MGMT FOR PT TAKING WARFARIN: ICD-10-PCS | Mod: HCNC,S$GLB,,

## 2020-12-29 NOTE — PROGRESS NOTES
Patient's INR is 2.6.  Remains therapeutic.  Reports no recent changes.  Current warfarin dose verified.  No change in dose - patient to maintain 5 mg daily.  Recheck on 2/02/2021.  Patient verbalized understanding.

## 2021-01-07 ENCOUNTER — IMMUNIZATION (OUTPATIENT)
Dept: INTERNAL MEDICINE | Facility: CLINIC | Age: 81
End: 2021-01-07
Payer: MEDICARE

## 2021-01-07 DIAGNOSIS — Z23 NEED FOR VACCINATION: ICD-10-CM

## 2021-01-07 PROCEDURE — 91300 COVID-19, MRNA, LNP-S, PF, 30 MCG/0.3 ML DOSE VACCINE: CPT | Mod: PBBFAC | Performed by: FAMILY MEDICINE

## 2021-01-28 ENCOUNTER — IMMUNIZATION (OUTPATIENT)
Dept: INTERNAL MEDICINE | Facility: CLINIC | Age: 81
End: 2021-01-28
Payer: MEDICARE

## 2021-01-28 DIAGNOSIS — Z23 NEED FOR VACCINATION: Primary | ICD-10-CM

## 2021-01-28 PROCEDURE — 91300 COVID-19, MRNA, LNP-S, PF, 30 MCG/0.3 ML DOSE VACCINE: CPT | Mod: PBBFAC | Performed by: FAMILY MEDICINE

## 2021-01-28 PROCEDURE — 0002A COVID-19, MRNA, LNP-S, PF, 30 MCG/0.3 ML DOSE VACCINE: CPT | Mod: PBBFAC | Performed by: FAMILY MEDICINE

## 2021-02-01 NOTE — PROGRESS NOTES
Patient's INR is therapeutic at 2.5.  Reports no recent changes.  Instructions given:  Maintain current dose of warfarin 5 mg daily.  Recheck in 1 month.  Patient verbalized understanding.    
Yes

## 2021-02-02 ENCOUNTER — ANTI-COAG VISIT (OUTPATIENT)
Dept: CARDIOLOGY | Facility: CLINIC | Age: 81
End: 2021-02-02
Payer: MEDICARE

## 2021-02-02 DIAGNOSIS — I48.0 PAROXYSMAL ATRIAL FIBRILLATION: ICD-10-CM

## 2021-02-02 DIAGNOSIS — Z79.01 LONG TERM (CURRENT) USE OF ANTICOAGULANTS: Primary | ICD-10-CM

## 2021-02-02 LAB — INR PPP: 2.7 (ref 2–3)

## 2021-02-02 PROCEDURE — 85610 POCT INR: ICD-10-PCS | Mod: QW,S$GLB,, | Performed by: INTERNAL MEDICINE

## 2021-02-02 PROCEDURE — 85610 PROTHROMBIN TIME: CPT | Mod: QW,S$GLB,, | Performed by: INTERNAL MEDICINE

## 2021-02-02 PROCEDURE — 93793 ANTICOAG MGMT PT WARFARIN: CPT | Mod: S$GLB,,,

## 2021-02-02 PROCEDURE — 93793 PR ANTICOAGULANT MGMT FOR PT TAKING WARFARIN: ICD-10-PCS | Mod: S$GLB,,,

## 2021-02-19 ENCOUNTER — PATIENT MESSAGE (OUTPATIENT)
Dept: CARDIOLOGY | Facility: CLINIC | Age: 81
End: 2021-02-19

## 2021-03-09 ENCOUNTER — ANTI-COAG VISIT (OUTPATIENT)
Dept: CARDIOLOGY | Facility: CLINIC | Age: 81
End: 2021-03-09
Payer: MEDICARE

## 2021-03-09 DIAGNOSIS — Z79.01 LONG TERM (CURRENT) USE OF ANTICOAGULANTS: Primary | ICD-10-CM

## 2021-03-09 DIAGNOSIS — I48.0 PAROXYSMAL ATRIAL FIBRILLATION: ICD-10-CM

## 2021-03-09 LAB — INR PPP: 2.7 (ref 2–3)

## 2021-03-09 PROCEDURE — 93793 PR ANTICOAGULANT MGMT FOR PT TAKING WARFARIN: ICD-10-PCS | Mod: S$GLB,,,

## 2021-03-09 PROCEDURE — 85610 PROTHROMBIN TIME: CPT | Mod: QW,S$GLB,, | Performed by: INTERNAL MEDICINE

## 2021-03-09 PROCEDURE — 93793 ANTICOAG MGMT PT WARFARIN: CPT | Mod: S$GLB,,,

## 2021-03-09 PROCEDURE — 85610 POCT INR: ICD-10-PCS | Mod: QW,S$GLB,, | Performed by: INTERNAL MEDICINE

## 2021-03-19 ENCOUNTER — TELEPHONE (OUTPATIENT)
Dept: CARDIOLOGY | Facility: CLINIC | Age: 81
End: 2021-03-19

## 2021-04-05 ENCOUNTER — LAB VISIT (OUTPATIENT)
Dept: LAB | Facility: HOSPITAL | Age: 81
End: 2021-04-05
Attending: INTERNAL MEDICINE
Payer: MEDICARE

## 2021-04-05 ENCOUNTER — ANTI-COAG VISIT (OUTPATIENT)
Dept: CARDIOLOGY | Facility: CLINIC | Age: 81
End: 2021-04-05
Payer: MEDICARE

## 2021-04-05 DIAGNOSIS — I48.0 PAROXYSMAL ATRIAL FIBRILLATION: ICD-10-CM

## 2021-04-05 DIAGNOSIS — E11.9 DIABETES MELLITUS WITHOUT COMPLICATION: ICD-10-CM

## 2021-04-05 DIAGNOSIS — Z79.01 LONG TERM (CURRENT) USE OF ANTICOAGULANTS: Primary | ICD-10-CM

## 2021-04-05 LAB
ALBUMIN SERPL BCP-MCNC: 4 G/DL (ref 3.5–5.2)
ALP SERPL-CCNC: 54 U/L (ref 55–135)
ALT SERPL W/O P-5'-P-CCNC: 33 U/L (ref 10–44)
ANION GAP SERPL CALC-SCNC: 6 MMOL/L (ref 8–16)
AST SERPL-CCNC: 29 U/L (ref 10–40)
BASOPHILS # BLD AUTO: 0.01 K/UL (ref 0–0.2)
BASOPHILS NFR BLD: 0.1 % (ref 0–1.9)
BILIRUB SERPL-MCNC: 0.9 MG/DL (ref 0.1–1)
BUN SERPL-MCNC: 16 MG/DL (ref 8–23)
CALCIUM SERPL-MCNC: 9.6 MG/DL (ref 8.7–10.5)
CHLORIDE SERPL-SCNC: 100 MMOL/L (ref 95–110)
CHOLEST SERPL-MCNC: 132 MG/DL (ref 120–199)
CHOLEST/HDLC SERPL: 2.8 {RATIO} (ref 2–5)
CO2 SERPL-SCNC: 31 MMOL/L (ref 23–29)
CREAT SERPL-MCNC: 1 MG/DL (ref 0.5–1.4)
DIFFERENTIAL METHOD: ABNORMAL
EOSINOPHIL # BLD AUTO: 0.1 K/UL (ref 0–0.5)
EOSINOPHIL NFR BLD: 1.9 % (ref 0–8)
ERYTHROCYTE [DISTWIDTH] IN BLOOD BY AUTOMATED COUNT: 12.7 % (ref 11.5–14.5)
EST. GFR  (AFRICAN AMERICAN): >60 ML/MIN/1.73 M^2
EST. GFR  (NON AFRICAN AMERICAN): >60 ML/MIN/1.73 M^2
ESTIMATED AVG GLUCOSE: 146 MG/DL (ref 68–131)
GLUCOSE SERPL-MCNC: 111 MG/DL (ref 70–110)
HBA1C MFR BLD: 6.7 % (ref 4–5.6)
HCT VFR BLD AUTO: 40.7 % (ref 40–54)
HDLC SERPL-MCNC: 48 MG/DL (ref 40–75)
HDLC SERPL: 36.4 % (ref 20–50)
HGB BLD-MCNC: 13 G/DL (ref 14–18)
IMM GRANULOCYTES # BLD AUTO: 0.03 K/UL (ref 0–0.04)
IMM GRANULOCYTES NFR BLD AUTO: 0.4 % (ref 0–0.5)
INR PPP: 1.9 (ref 2–3)
LDLC SERPL CALC-MCNC: 68.2 MG/DL (ref 63–159)
LYMPHOCYTES # BLD AUTO: 2.6 K/UL (ref 1–4.8)
LYMPHOCYTES NFR BLD: 35.1 % (ref 18–48)
MCH RBC QN AUTO: 30.2 PG (ref 27–31)
MCHC RBC AUTO-ENTMCNC: 31.9 G/DL (ref 32–36)
MCV RBC AUTO: 94 FL (ref 82–98)
MONOCYTES # BLD AUTO: 1 K/UL (ref 0.3–1)
MONOCYTES NFR BLD: 13 % (ref 4–15)
NEUTROPHILS # BLD AUTO: 3.7 K/UL (ref 1.8–7.7)
NEUTROPHILS NFR BLD: 49.5 % (ref 38–73)
NONHDLC SERPL-MCNC: 84 MG/DL
NRBC BLD-RTO: 0 /100 WBC
PLATELET # BLD AUTO: 291 K/UL (ref 150–450)
PMV BLD AUTO: 10.2 FL (ref 9.2–12.9)
POTASSIUM SERPL-SCNC: 4.7 MMOL/L (ref 3.5–5.1)
PROT SERPL-MCNC: 7.1 G/DL (ref 6–8.4)
RBC # BLD AUTO: 4.31 M/UL (ref 4.6–6.2)
SODIUM SERPL-SCNC: 137 MMOL/L (ref 136–145)
TRIGL SERPL-MCNC: 79 MG/DL (ref 30–150)
TSH SERPL DL<=0.005 MIU/L-ACNC: 0.93 UIU/ML (ref 0.4–4)
WBC # BLD AUTO: 7.47 K/UL (ref 3.9–12.7)

## 2021-04-05 PROCEDURE — 93793 PR ANTICOAGULANT MGMT FOR PT TAKING WARFARIN: ICD-10-PCS | Mod: S$GLB,,,

## 2021-04-05 PROCEDURE — 80061 LIPID PANEL: CPT | Performed by: INTERNAL MEDICINE

## 2021-04-05 PROCEDURE — 85025 COMPLETE CBC W/AUTO DIFF WBC: CPT | Performed by: INTERNAL MEDICINE

## 2021-04-05 PROCEDURE — 36415 COLL VENOUS BLD VENIPUNCTURE: CPT | Performed by: INTERNAL MEDICINE

## 2021-04-05 PROCEDURE — 80053 COMPREHEN METABOLIC PANEL: CPT | Performed by: INTERNAL MEDICINE

## 2021-04-05 PROCEDURE — 83036 HEMOGLOBIN GLYCOSYLATED A1C: CPT | Performed by: INTERNAL MEDICINE

## 2021-04-05 PROCEDURE — 85610 POCT INR: ICD-10-PCS | Mod: QW,S$GLB,, | Performed by: INTERNAL MEDICINE

## 2021-04-05 PROCEDURE — 84443 ASSAY THYROID STIM HORMONE: CPT | Performed by: INTERNAL MEDICINE

## 2021-04-05 PROCEDURE — 93793 ANTICOAG MGMT PT WARFARIN: CPT | Mod: S$GLB,,,

## 2021-04-05 PROCEDURE — 85610 PROTHROMBIN TIME: CPT | Mod: QW,S$GLB,, | Performed by: INTERNAL MEDICINE

## 2021-04-12 ENCOUNTER — TELEPHONE (OUTPATIENT)
Dept: CARDIOLOGY | Facility: CLINIC | Age: 81
End: 2021-04-12

## 2021-04-12 ENCOUNTER — OFFICE VISIT (OUTPATIENT)
Dept: INTERNAL MEDICINE | Facility: CLINIC | Age: 81
End: 2021-04-12
Payer: MEDICARE

## 2021-04-12 ENCOUNTER — OFFICE VISIT (OUTPATIENT)
Dept: CARDIOLOGY | Facility: CLINIC | Age: 81
End: 2021-04-12
Payer: MEDICARE

## 2021-04-12 VITALS
HEIGHT: 67 IN | SYSTOLIC BLOOD PRESSURE: 120 MMHG | WEIGHT: 221.25 LBS | DIASTOLIC BLOOD PRESSURE: 80 MMHG | OXYGEN SATURATION: 98 % | BODY MASS INDEX: 34.73 KG/M2 | HEART RATE: 88 BPM

## 2021-04-12 VITALS
DIASTOLIC BLOOD PRESSURE: 79 MMHG | HEIGHT: 67 IN | SYSTOLIC BLOOD PRESSURE: 118 MMHG | BODY MASS INDEX: 35.19 KG/M2 | OXYGEN SATURATION: 98 % | HEART RATE: 90 BPM | WEIGHT: 224.19 LBS

## 2021-04-12 DIAGNOSIS — G47.33 OSA (OBSTRUCTIVE SLEEP APNEA): ICD-10-CM

## 2021-04-12 DIAGNOSIS — Z79.01 CURRENT USE OF LONG TERM ANTICOAGULATION: ICD-10-CM

## 2021-04-12 DIAGNOSIS — Z85.46 HISTORY OF PROSTATE CANCER: ICD-10-CM

## 2021-04-12 DIAGNOSIS — I15.2 HYPERTENSION ASSOCIATED WITH DIABETES: Primary | ICD-10-CM

## 2021-04-12 DIAGNOSIS — I48.0 PAROXYSMAL ATRIAL FIBRILLATION: ICD-10-CM

## 2021-04-12 DIAGNOSIS — E11.69 HYPERLIPIDEMIA ASSOCIATED WITH TYPE 2 DIABETES MELLITUS: ICD-10-CM

## 2021-04-12 DIAGNOSIS — I70.0 ATHEROSCLEROSIS OF AORTA: ICD-10-CM

## 2021-04-12 DIAGNOSIS — I27.20 PULMONARY HYPERTENSION, MILD: ICD-10-CM

## 2021-04-12 DIAGNOSIS — E27.8 ADRENAL NODULE: ICD-10-CM

## 2021-04-12 DIAGNOSIS — I27.20 PULMONARY HYPERTENSION, MILD: Primary | ICD-10-CM

## 2021-04-12 DIAGNOSIS — E11.59 HYPERTENSION ASSOCIATED WITH DIABETES: Primary | ICD-10-CM

## 2021-04-12 DIAGNOSIS — F33.1 MAJOR DEPRESSIVE DISORDER, RECURRENT, MODERATE: ICD-10-CM

## 2021-04-12 DIAGNOSIS — E78.5 HYPERLIPIDEMIA ASSOCIATED WITH TYPE 2 DIABETES MELLITUS: ICD-10-CM

## 2021-04-12 DIAGNOSIS — I25.10 ATHEROSCLEROSIS OF NATIVE CORONARY ARTERY OF NATIVE HEART WITHOUT ANGINA PECTORIS: ICD-10-CM

## 2021-04-12 DIAGNOSIS — E11.9 DIABETES MELLITUS WITHOUT COMPLICATION: ICD-10-CM

## 2021-04-12 DIAGNOSIS — R91.8 LUNG NODULES: ICD-10-CM

## 2021-04-12 DIAGNOSIS — I15.2 HYPERTENSION ASSOCIATED WITH DIABETES: ICD-10-CM

## 2021-04-12 DIAGNOSIS — E11.59 HYPERTENSION ASSOCIATED WITH DIABETES: ICD-10-CM

## 2021-04-12 PROCEDURE — 99999 PR PBB SHADOW E&M-EST. PATIENT-LVL IV: ICD-10-PCS | Mod: PBBFAC,,, | Performed by: INTERNAL MEDICINE

## 2021-04-12 PROCEDURE — 1101F PT FALLS ASSESS-DOCD LE1/YR: CPT | Mod: CPTII,S$GLB,, | Performed by: INTERNAL MEDICINE

## 2021-04-12 PROCEDURE — 1126F AMNT PAIN NOTED NONE PRSNT: CPT | Mod: S$GLB,,, | Performed by: INTERNAL MEDICINE

## 2021-04-12 PROCEDURE — 99999 PR PBB SHADOW E&M-EST. PATIENT-LVL III: CPT | Mod: PBBFAC,,, | Performed by: INTERNAL MEDICINE

## 2021-04-12 PROCEDURE — 99215 PR OFFICE/OUTPT VISIT, EST, LEVL V, 40-54 MIN: ICD-10-PCS | Mod: S$GLB,,, | Performed by: INTERNAL MEDICINE

## 2021-04-12 PROCEDURE — 3072F PR LOW RISK FOR RETINOPATHY: ICD-10-PCS | Mod: S$GLB,,, | Performed by: INTERNAL MEDICINE

## 2021-04-12 PROCEDURE — 3074F PR MOST RECENT SYSTOLIC BLOOD PRESSURE < 130 MM HG: ICD-10-PCS | Mod: CPTII,S$GLB,, | Performed by: INTERNAL MEDICINE

## 2021-04-12 PROCEDURE — 99214 PR OFFICE/OUTPT VISIT, EST, LEVL IV, 30-39 MIN: ICD-10-PCS | Mod: S$GLB,,, | Performed by: INTERNAL MEDICINE

## 2021-04-12 PROCEDURE — 3288F FALL RISK ASSESSMENT DOCD: CPT | Mod: CPTII,S$GLB,, | Performed by: INTERNAL MEDICINE

## 2021-04-12 PROCEDURE — 99999 PR PBB SHADOW E&M-EST. PATIENT-LVL III: ICD-10-PCS | Mod: PBBFAC,,, | Performed by: INTERNAL MEDICINE

## 2021-04-12 PROCEDURE — 3074F SYST BP LT 130 MM HG: CPT | Mod: CPTII,S$GLB,, | Performed by: INTERNAL MEDICINE

## 2021-04-12 PROCEDURE — 1126F PR PAIN SEVERITY QUANTIFIED, NO PAIN PRESENT: ICD-10-PCS | Mod: S$GLB,,, | Performed by: INTERNAL MEDICINE

## 2021-04-12 PROCEDURE — 99214 OFFICE O/P EST MOD 30 MIN: CPT | Mod: S$GLB,,, | Performed by: INTERNAL MEDICINE

## 2021-04-12 PROCEDURE — 3288F PR FALLS RISK ASSESSMENT DOCUMENTED: ICD-10-PCS | Mod: CPTII,S$GLB,, | Performed by: INTERNAL MEDICINE

## 2021-04-12 PROCEDURE — 1159F PR MEDICATION LIST DOCUMENTED IN MEDICAL RECORD: ICD-10-PCS | Mod: S$GLB,,, | Performed by: INTERNAL MEDICINE

## 2021-04-12 PROCEDURE — 1159F MED LIST DOCD IN RCRD: CPT | Mod: S$GLB,,, | Performed by: INTERNAL MEDICINE

## 2021-04-12 PROCEDURE — 3078F DIAST BP <80 MM HG: CPT | Mod: CPTII,S$GLB,, | Performed by: INTERNAL MEDICINE

## 2021-04-12 PROCEDURE — 1101F PR PT FALLS ASSESS DOC 0-1 FALLS W/OUT INJ PAST YR: ICD-10-PCS | Mod: CPTII,S$GLB,, | Performed by: INTERNAL MEDICINE

## 2021-04-12 PROCEDURE — 99999 PR PBB SHADOW E&M-EST. PATIENT-LVL IV: CPT | Mod: PBBFAC,,, | Performed by: INTERNAL MEDICINE

## 2021-04-12 PROCEDURE — 3078F PR MOST RECENT DIASTOLIC BLOOD PRESSURE < 80 MM HG: ICD-10-PCS | Mod: CPTII,S$GLB,, | Performed by: INTERNAL MEDICINE

## 2021-04-12 PROCEDURE — 3072F LOW RISK FOR RETINOPATHY: CPT | Mod: S$GLB,,, | Performed by: INTERNAL MEDICINE

## 2021-04-12 PROCEDURE — 99499 RISK ADDL DX/OHS AUDIT: ICD-10-PCS | Mod: HCNC,S$GLB,, | Performed by: INTERNAL MEDICINE

## 2021-04-12 PROCEDURE — 99499 UNLISTED E&M SERVICE: CPT | Mod: HCNC,S$GLB,, | Performed by: INTERNAL MEDICINE

## 2021-04-12 PROCEDURE — 99215 OFFICE O/P EST HI 40 MIN: CPT | Mod: S$GLB,,, | Performed by: INTERNAL MEDICINE

## 2021-04-20 ENCOUNTER — HOSPITAL ENCOUNTER (OUTPATIENT)
Dept: RADIOLOGY | Facility: HOSPITAL | Age: 81
Discharge: HOME | End: 2021-04-20
Attending: INTERNAL MEDICINE
Payer: MEDICARE

## 2021-04-20 ENCOUNTER — HOSPITAL ENCOUNTER (OUTPATIENT)
Dept: CARDIOLOGY | Facility: HOSPITAL | Age: 81
Discharge: HOME | End: 2021-04-20
Attending: INTERNAL MEDICINE
Payer: MEDICARE

## 2021-04-20 VITALS
HEIGHT: 67 IN | SYSTOLIC BLOOD PRESSURE: 118 MMHG | WEIGHT: 224 LBS | BODY MASS INDEX: 35.16 KG/M2 | DIASTOLIC BLOOD PRESSURE: 79 MMHG

## 2021-04-20 DIAGNOSIS — I25.10 ATHEROSCLEROSIS OF NATIVE CORONARY ARTERY OF NATIVE HEART WITHOUT ANGINA PECTORIS: ICD-10-CM

## 2021-04-20 LAB
AORTIC ROOT ANNULUS: 3.92 CM
ASCENDING AORTA: 4.06 CM
AV INDEX (PROSTH): 0.63
AV MEAN GRADIENT: 3 MMHG
AV PEAK GRADIENT: 6 MMHG
AV VALVE AREA: 2.32 CM2
AV VELOCITY RATIO: 0.58
BSA FOR ECHO PROCEDURE: 2.19 M2
CV ECHO LV RWT: 0.48 CM
CV STRESS BASE HR: 84 BPM
DIASTOLIC BLOOD PRESSURE: 86 MMHG
DOP CALC AO PEAK VEL: 1.2 M/S
DOP CALC AO VTI: 21.73 CM
DOP CALC LVOT AREA: 3.7 CM2
DOP CALC LVOT DIAMETER: 2.16 CM
DOP CALC LVOT PEAK VEL: 0.7 M/S
DOP CALC LVOT STROKE VOLUME: 50.4 CM3
DOP CALC RVOT PEAK VEL: 0.77 M/S
DOP CALC RVOT VTI: 13.45 CM
DOP CALCLVOT PEAK VEL VTI: 13.76 CM
E WAVE DECELERATION TIME: 205.26 MSEC
E/A RATIO: 3.69
E/E' RATIO: 9.6 M/S
ECHO LV POSTERIOR WALL: 1.09 CM (ref 0.6–1.1)
EJECTION FRACTION: 60 %
FRACTIONAL SHORTENING: 41 % (ref 28–44)
INTERVENTRICULAR SEPTUM: 1.08 CM (ref 0.6–1.1)
IVRT: 62.8 MSEC
LA MAJOR: 6.91 CM
LA MINOR: 6.8 CM
LA WIDTH: 4.36 CM
LEFT ATRIUM SIZE: 5.18 CM
LEFT ATRIUM VOLUME INDEX: 62.1 ML/M2
LEFT ATRIUM VOLUME: 131.59 CM3
LEFT INTERNAL DIMENSION IN SYSTOLE: 2.66 CM (ref 2.1–4)
LEFT VENTRICLE DIASTOLIC VOLUME INDEX: 43.98 ML/M2
LEFT VENTRICLE DIASTOLIC VOLUME: 93.24 ML
LEFT VENTRICLE MASS INDEX: 82 G/M2
LEFT VENTRICLE SYSTOLIC VOLUME INDEX: 12.3 ML/M2
LEFT VENTRICLE SYSTOLIC VOLUME: 26.09 ML
LEFT VENTRICULAR INTERNAL DIMENSION IN DIASTOLE: 4.52 CM (ref 3.5–6)
LEFT VENTRICULAR MASS: 172.88 G
LV LATERAL E/E' RATIO: 8.73 M/S
LV SEPTAL E/E' RATIO: 10.67 M/S
MV A" WAVE DURATION": 11.7 MSEC
MV PEAK A VEL: 0.26 M/S
MV PEAK E VEL: 0.96 M/S
MV STENOSIS PRESSURE HALF TIME: 59.53 MS
MV VALVE AREA P 1/2 METHOD: 3.7 CM2
NUC REST EJECTION FRACTION: 75
NUC STRESS EJECTION FRACTION: 69 %
OHS CV CPX 85 PERCENT MAX PREDICTED HEART RATE MALE: 121
OHS CV CPX MAX PREDICTED HEART RATE: 142
OHS CV CPX PATIENT IS FEMALE: 0
OHS CV CPX PATIENT IS MALE: 1
OHS CV CPX PEAK DIASTOLIC BLOOD PRESSURE: 71 MMHG
OHS CV CPX PEAK HEAR RATE: 96 BPM
OHS CV CPX PEAK RATE PRESSURE PRODUCT: NORMAL
OHS CV CPX PEAK SYSTOLIC BLOOD PRESSURE: 112 MMHG
OHS CV CPX PERCENT MAX PREDICTED HEART RATE ACHIEVED: 68
OHS CV CPX RATE PRESSURE PRODUCT PRESENTING: NORMAL
PISA TR MAX VEL: 3.26 M/S
PULM VEIN S/D RATIO: 0.84
PV MEAN GRADIENT: 1 MMHG
PV PEAK D VEL: 0.79 M/S
PV PEAK GRADIENT: 2 MMHG
PV PEAK S VEL: 0.66 M/S
PV PEAK VELOCITY: 1.13 CM/S
RA MAJOR: 6.85 CM
RA PRESSURE: 3 MMHG
RA WIDTH: 4.43 CM
RIGHT VENTRICULAR END-DIASTOLIC DIMENSION: 3.81 CM
SINUS: 4.27 CM
STJ: 3.8 CM
STRESS ECHO POST EXERCISE DUR MIN: 1 MINUTES
STRESS ECHO POST EXERCISE DUR SEC: 6 SECONDS
SYSTOLIC BLOOD PRESSURE: 130 MMHG
TDI LATERAL: 0.11 M/S
TDI SEPTAL: 0.09 M/S
TDI: 0.1 M/S
TR MAX PG: 43 MMHG
TRICUSPID ANNULAR PLANE SYSTOLIC EXCURSION: 2.31 CM
TV REST PULMONARY ARTERY PRESSURE: 46 MMHG

## 2021-04-20 PROCEDURE — 93016 STRESS TEST WITH MYOCARDIAL PERFUSION (CUPID ONLY): ICD-10-PCS | Mod: ,,, | Performed by: INTERNAL MEDICINE

## 2021-04-20 PROCEDURE — 93017 CV STRESS TEST TRACING ONLY: CPT

## 2021-04-20 PROCEDURE — 93306 TTE W/DOPPLER COMPLETE: CPT

## 2021-04-20 PROCEDURE — 78452 HT MUSCLE IMAGE SPECT MULT: CPT

## 2021-04-20 PROCEDURE — 93306 TTE W/DOPPLER COMPLETE: CPT | Mod: 26,,, | Performed by: INTERNAL MEDICINE

## 2021-04-20 PROCEDURE — 78452 STRESS TEST WITH MYOCARDIAL PERFUSION (CUPID ONLY): ICD-10-PCS | Mod: 26,,, | Performed by: INTERNAL MEDICINE

## 2021-04-20 PROCEDURE — A9502 TC99M TETROFOSMIN: HCPCS

## 2021-04-20 PROCEDURE — 63600175 PHARM REV CODE 636 W HCPCS: Performed by: INTERNAL MEDICINE

## 2021-04-20 PROCEDURE — 93016 CV STRESS TEST SUPVJ ONLY: CPT | Mod: ,,, | Performed by: INTERNAL MEDICINE

## 2021-04-20 PROCEDURE — 78452 HT MUSCLE IMAGE SPECT MULT: CPT | Mod: 26,,, | Performed by: INTERNAL MEDICINE

## 2021-04-20 PROCEDURE — 93018 CV STRESS TEST I&R ONLY: CPT | Mod: ,,, | Performed by: INTERNAL MEDICINE

## 2021-04-20 PROCEDURE — 93306 ECHO (CUPID ONLY): ICD-10-PCS | Mod: 26,,, | Performed by: INTERNAL MEDICINE

## 2021-04-20 PROCEDURE — 93018 STRESS TEST WITH MYOCARDIAL PERFUSION (CUPID ONLY): ICD-10-PCS | Mod: ,,, | Performed by: INTERNAL MEDICINE

## 2021-04-20 RX ORDER — REGADENOSON 0.08 MG/ML
0.4 INJECTION, SOLUTION INTRAVENOUS ONCE
Status: COMPLETED | OUTPATIENT
Start: 2021-04-20 | End: 2021-04-20

## 2021-04-20 RX ADMIN — REGADENOSON 0.4 MG: 0.08 INJECTION, SOLUTION INTRAVENOUS at 09:04

## 2021-04-22 ENCOUNTER — TELEPHONE (OUTPATIENT)
Dept: CARDIOLOGY | Facility: CLINIC | Age: 81
End: 2021-04-22

## 2021-04-30 ENCOUNTER — OFFICE VISIT (OUTPATIENT)
Dept: CARDIOLOGY | Facility: CLINIC | Age: 81
End: 2021-04-30
Payer: MEDICARE

## 2021-04-30 ENCOUNTER — HOSPITAL ENCOUNTER (OUTPATIENT)
Dept: CARDIOLOGY | Facility: HOSPITAL | Age: 81
Discharge: HOME | End: 2021-04-30
Attending: INTERNAL MEDICINE
Payer: MEDICARE

## 2021-04-30 VITALS
WEIGHT: 219.56 LBS | BODY MASS INDEX: 34.39 KG/M2 | HEART RATE: 87 BPM | OXYGEN SATURATION: 98 % | DIASTOLIC BLOOD PRESSURE: 92 MMHG | SYSTOLIC BLOOD PRESSURE: 128 MMHG

## 2021-04-30 DIAGNOSIS — I48.0 PAROXYSMAL ATRIAL FIBRILLATION: ICD-10-CM

## 2021-04-30 DIAGNOSIS — G47.33 OSA (OBSTRUCTIVE SLEEP APNEA): ICD-10-CM

## 2021-04-30 DIAGNOSIS — E78.5 HYPERLIPIDEMIA ASSOCIATED WITH TYPE 2 DIABETES MELLITUS: ICD-10-CM

## 2021-04-30 DIAGNOSIS — I48.0 PAROXYSMAL ATRIAL FIBRILLATION: Primary | ICD-10-CM

## 2021-04-30 DIAGNOSIS — I70.0 ATHEROSCLEROSIS OF AORTA: ICD-10-CM

## 2021-04-30 DIAGNOSIS — E11.59 HYPERTENSION ASSOCIATED WITH DIABETES: ICD-10-CM

## 2021-04-30 DIAGNOSIS — Z79.01 CURRENT USE OF LONG TERM ANTICOAGULATION: ICD-10-CM

## 2021-04-30 DIAGNOSIS — I15.2 HYPERTENSION ASSOCIATED WITH DIABETES: ICD-10-CM

## 2021-04-30 DIAGNOSIS — E11.69 HYPERLIPIDEMIA ASSOCIATED WITH TYPE 2 DIABETES MELLITUS: ICD-10-CM

## 2021-04-30 DIAGNOSIS — I25.10 ATHEROSCLEROSIS OF NATIVE CORONARY ARTERY OF NATIVE HEART WITHOUT ANGINA PECTORIS: ICD-10-CM

## 2021-04-30 PROCEDURE — 1101F PT FALLS ASSESS-DOCD LE1/YR: CPT | Mod: CPTII,S$GLB,, | Performed by: INTERNAL MEDICINE

## 2021-04-30 PROCEDURE — 99999 PR PBB SHADOW E&M-EST. PATIENT-LVL III: ICD-10-PCS | Mod: PBBFAC,,, | Performed by: INTERNAL MEDICINE

## 2021-04-30 PROCEDURE — 1126F PR PAIN SEVERITY QUANTIFIED, NO PAIN PRESENT: ICD-10-PCS | Mod: S$GLB,,, | Performed by: INTERNAL MEDICINE

## 2021-04-30 PROCEDURE — 1101F PR PT FALLS ASSESS DOC 0-1 FALLS W/OUT INJ PAST YR: ICD-10-PCS | Mod: CPTII,S$GLB,, | Performed by: INTERNAL MEDICINE

## 2021-04-30 PROCEDURE — 99214 OFFICE O/P EST MOD 30 MIN: CPT | Mod: S$GLB,,, | Performed by: INTERNAL MEDICINE

## 2021-04-30 PROCEDURE — 93010 EKG 12-LEAD: ICD-10-PCS | Mod: ,,, | Performed by: INTERNAL MEDICINE

## 2021-04-30 PROCEDURE — 1159F PR MEDICATION LIST DOCUMENTED IN MEDICAL RECORD: ICD-10-PCS | Mod: S$GLB,,, | Performed by: INTERNAL MEDICINE

## 2021-04-30 PROCEDURE — 3288F PR FALLS RISK ASSESSMENT DOCUMENTED: ICD-10-PCS | Mod: CPTII,S$GLB,, | Performed by: INTERNAL MEDICINE

## 2021-04-30 PROCEDURE — 3072F LOW RISK FOR RETINOPATHY: CPT | Mod: S$GLB,,, | Performed by: INTERNAL MEDICINE

## 2021-04-30 PROCEDURE — 93010 ELECTROCARDIOGRAM REPORT: CPT | Mod: ,,, | Performed by: INTERNAL MEDICINE

## 2021-04-30 PROCEDURE — 3072F PR LOW RISK FOR RETINOPATHY: ICD-10-PCS | Mod: S$GLB,,, | Performed by: INTERNAL MEDICINE

## 2021-04-30 PROCEDURE — 3288F FALL RISK ASSESSMENT DOCD: CPT | Mod: CPTII,S$GLB,, | Performed by: INTERNAL MEDICINE

## 2021-04-30 PROCEDURE — 1126F AMNT PAIN NOTED NONE PRSNT: CPT | Mod: S$GLB,,, | Performed by: INTERNAL MEDICINE

## 2021-04-30 PROCEDURE — 93005 ELECTROCARDIOGRAM TRACING: CPT

## 2021-04-30 PROCEDURE — 99214 PR OFFICE/OUTPT VISIT, EST, LEVL IV, 30-39 MIN: ICD-10-PCS | Mod: S$GLB,,, | Performed by: INTERNAL MEDICINE

## 2021-04-30 PROCEDURE — 99999 PR PBB SHADOW E&M-EST. PATIENT-LVL III: CPT | Mod: PBBFAC,,, | Performed by: INTERNAL MEDICINE

## 2021-04-30 PROCEDURE — 1159F MED LIST DOCD IN RCRD: CPT | Mod: S$GLB,,, | Performed by: INTERNAL MEDICINE

## 2021-04-30 RX ORDER — METOLAZONE 2.5 MG/1
2.5 TABLET ORAL
Qty: 8 TABLET | Refills: 11 | Status: SHIPPED | OUTPATIENT
Start: 2021-05-04 | End: 2022-07-17

## 2021-05-03 ENCOUNTER — ANTI-COAG VISIT (OUTPATIENT)
Dept: CARDIOLOGY | Facility: CLINIC | Age: 81
End: 2021-05-03
Payer: MEDICARE

## 2021-05-03 DIAGNOSIS — I48.0 PAROXYSMAL ATRIAL FIBRILLATION: ICD-10-CM

## 2021-05-03 DIAGNOSIS — Z79.01 LONG TERM (CURRENT) USE OF ANTICOAGULANTS: Primary | ICD-10-CM

## 2021-05-03 LAB — INR PPP: 2.3 (ref 2–3)

## 2021-05-03 PROCEDURE — 85610 POCT INR: ICD-10-PCS | Mod: QW,S$GLB,, | Performed by: INTERNAL MEDICINE

## 2021-05-03 PROCEDURE — 93793 ANTICOAG MGMT PT WARFARIN: CPT | Mod: S$GLB,,,

## 2021-05-03 PROCEDURE — 93793 PR ANTICOAGULANT MGMT FOR PT TAKING WARFARIN: ICD-10-PCS | Mod: S$GLB,,,

## 2021-05-03 PROCEDURE — 85610 PROTHROMBIN TIME: CPT | Mod: QW,S$GLB,, | Performed by: INTERNAL MEDICINE

## 2021-05-05 ENCOUNTER — HOSPITAL ENCOUNTER (OUTPATIENT)
Dept: CARDIOLOGY | Facility: HOSPITAL | Age: 81
Discharge: HOME | End: 2021-05-05
Attending: INTERNAL MEDICINE
Payer: MEDICARE

## 2021-05-05 DIAGNOSIS — I48.0 PAROXYSMAL ATRIAL FIBRILLATION: ICD-10-CM

## 2021-05-05 PROCEDURE — 93227 HOLTER MONITOR - 24 HOUR (CUPID ONLY): ICD-10-PCS | Mod: ,,, | Performed by: INTERNAL MEDICINE

## 2021-05-05 PROCEDURE — 93227 XTRNL ECG REC<48 HR R&I: CPT | Mod: ,,, | Performed by: INTERNAL MEDICINE

## 2021-05-05 PROCEDURE — 93225 XTRNL ECG REC<48 HRS REC: CPT

## 2021-05-07 ENCOUNTER — TELEPHONE (OUTPATIENT)
Dept: CARDIOLOGY | Facility: CLINIC | Age: 81
End: 2021-05-07

## 2021-05-07 LAB
OHS CV EVENT MONITOR DAY: 0
OHS CV HOLTER LENGTH DECIMAL HOURS: 24
OHS CV HOLTER LENGTH HOURS: 24
OHS CV HOLTER LENGTH MINUTES: 0

## 2021-05-17 ENCOUNTER — OFFICE VISIT (OUTPATIENT)
Dept: CARDIOLOGY | Facility: CLINIC | Age: 81
End: 2021-05-17
Payer: MEDICARE

## 2021-05-17 VITALS
BODY MASS INDEX: 34.01 KG/M2 | OXYGEN SATURATION: 97 % | WEIGHT: 216.69 LBS | RESPIRATION RATE: 16 BRPM | HEIGHT: 67 IN | SYSTOLIC BLOOD PRESSURE: 138 MMHG | DIASTOLIC BLOOD PRESSURE: 90 MMHG | HEART RATE: 94 BPM

## 2021-05-17 DIAGNOSIS — E11.69 HYPERLIPIDEMIA ASSOCIATED WITH TYPE 2 DIABETES MELLITUS: ICD-10-CM

## 2021-05-17 DIAGNOSIS — E11.59 HYPERTENSION ASSOCIATED WITH DIABETES: ICD-10-CM

## 2021-05-17 DIAGNOSIS — E78.5 HYPERLIPIDEMIA ASSOCIATED WITH TYPE 2 DIABETES MELLITUS: ICD-10-CM

## 2021-05-17 DIAGNOSIS — I15.2 HYPERTENSION ASSOCIATED WITH DIABETES: ICD-10-CM

## 2021-05-17 DIAGNOSIS — I48.0 PAROXYSMAL ATRIAL FIBRILLATION: ICD-10-CM

## 2021-05-17 DIAGNOSIS — I27.20 PULMONARY HYPERTENSION, MILD: Primary | ICD-10-CM

## 2021-05-17 DIAGNOSIS — I70.0 ATHEROSCLEROSIS OF AORTA: ICD-10-CM

## 2021-05-17 DIAGNOSIS — Z79.01 CURRENT USE OF LONG TERM ANTICOAGULATION: ICD-10-CM

## 2021-05-17 DIAGNOSIS — I25.10 ATHEROSCLEROSIS OF NATIVE CORONARY ARTERY OF NATIVE HEART WITHOUT ANGINA PECTORIS: ICD-10-CM

## 2021-05-17 DIAGNOSIS — G47.33 OSA (OBSTRUCTIVE SLEEP APNEA): ICD-10-CM

## 2021-05-17 PROCEDURE — 3288F FALL RISK ASSESSMENT DOCD: CPT | Mod: CPTII,S$GLB,, | Performed by: INTERNAL MEDICINE

## 2021-05-17 PROCEDURE — 99999 PR PBB SHADOW E&M-EST. PATIENT-LVL IV: CPT | Mod: PBBFAC,,, | Performed by: INTERNAL MEDICINE

## 2021-05-17 PROCEDURE — 1101F PR PT FALLS ASSESS DOC 0-1 FALLS W/OUT INJ PAST YR: ICD-10-PCS | Mod: CPTII,S$GLB,, | Performed by: INTERNAL MEDICINE

## 2021-05-17 PROCEDURE — 3072F PR LOW RISK FOR RETINOPATHY: ICD-10-PCS | Mod: S$GLB,,, | Performed by: INTERNAL MEDICINE

## 2021-05-17 PROCEDURE — 1159F PR MEDICATION LIST DOCUMENTED IN MEDICAL RECORD: ICD-10-PCS | Mod: S$GLB,,, | Performed by: INTERNAL MEDICINE

## 2021-05-17 PROCEDURE — 99214 OFFICE O/P EST MOD 30 MIN: CPT | Mod: S$GLB,,, | Performed by: INTERNAL MEDICINE

## 2021-05-17 PROCEDURE — 1159F MED LIST DOCD IN RCRD: CPT | Mod: S$GLB,,, | Performed by: INTERNAL MEDICINE

## 2021-05-17 PROCEDURE — 3072F LOW RISK FOR RETINOPATHY: CPT | Mod: S$GLB,,, | Performed by: INTERNAL MEDICINE

## 2021-05-17 PROCEDURE — 1126F PR PAIN SEVERITY QUANTIFIED, NO PAIN PRESENT: ICD-10-PCS | Mod: S$GLB,,, | Performed by: INTERNAL MEDICINE

## 2021-05-17 PROCEDURE — 3288F PR FALLS RISK ASSESSMENT DOCUMENTED: ICD-10-PCS | Mod: CPTII,S$GLB,, | Performed by: INTERNAL MEDICINE

## 2021-05-17 PROCEDURE — 1101F PT FALLS ASSESS-DOCD LE1/YR: CPT | Mod: CPTII,S$GLB,, | Performed by: INTERNAL MEDICINE

## 2021-05-17 PROCEDURE — 99214 PR OFFICE/OUTPT VISIT, EST, LEVL IV, 30-39 MIN: ICD-10-PCS | Mod: S$GLB,,, | Performed by: INTERNAL MEDICINE

## 2021-05-17 PROCEDURE — 99999 PR PBB SHADOW E&M-EST. PATIENT-LVL IV: ICD-10-PCS | Mod: PBBFAC,,, | Performed by: INTERNAL MEDICINE

## 2021-05-17 PROCEDURE — 1126F AMNT PAIN NOTED NONE PRSNT: CPT | Mod: S$GLB,,, | Performed by: INTERNAL MEDICINE

## 2021-05-17 RX ORDER — DILTIAZEM HYDROCHLORIDE 30 MG/1
30 TABLET, FILM COATED ORAL EVERY 12 HOURS
Qty: 60 TABLET | Refills: 11 | Status: SHIPPED | OUTPATIENT
Start: 2021-05-17 | End: 2021-07-07 | Stop reason: ALTCHOICE

## 2021-05-20 ENCOUNTER — OFFICE VISIT (OUTPATIENT)
Dept: INTERNAL MEDICINE | Facility: CLINIC | Age: 81
End: 2021-05-20
Payer: MEDICARE

## 2021-05-20 VITALS
HEART RATE: 95 BPM | TEMPERATURE: 98 F | WEIGHT: 218.69 LBS | OXYGEN SATURATION: 96 % | DIASTOLIC BLOOD PRESSURE: 76 MMHG | BODY MASS INDEX: 34.33 KG/M2 | SYSTOLIC BLOOD PRESSURE: 120 MMHG | HEIGHT: 67 IN

## 2021-05-20 DIAGNOSIS — R06.6 HICCUP: Primary | ICD-10-CM

## 2021-05-20 PROCEDURE — 99213 OFFICE O/P EST LOW 20 MIN: CPT | Mod: S$GLB,,, | Performed by: INTERNAL MEDICINE

## 2021-05-20 PROCEDURE — 1126F PR PAIN SEVERITY QUANTIFIED, NO PAIN PRESENT: ICD-10-PCS | Mod: S$GLB,,, | Performed by: INTERNAL MEDICINE

## 2021-05-20 PROCEDURE — 3288F PR FALLS RISK ASSESSMENT DOCUMENTED: ICD-10-PCS | Mod: CPTII,S$GLB,, | Performed by: INTERNAL MEDICINE

## 2021-05-20 PROCEDURE — 1159F MED LIST DOCD IN RCRD: CPT | Mod: S$GLB,,, | Performed by: INTERNAL MEDICINE

## 2021-05-20 PROCEDURE — 1101F PT FALLS ASSESS-DOCD LE1/YR: CPT | Mod: CPTII,S$GLB,, | Performed by: INTERNAL MEDICINE

## 2021-05-20 PROCEDURE — 99999 PR PBB SHADOW E&M-EST. PATIENT-LVL III: CPT | Mod: PBBFAC,,, | Performed by: INTERNAL MEDICINE

## 2021-05-20 PROCEDURE — 3072F PR LOW RISK FOR RETINOPATHY: ICD-10-PCS | Mod: S$GLB,,, | Performed by: INTERNAL MEDICINE

## 2021-05-20 PROCEDURE — 3072F LOW RISK FOR RETINOPATHY: CPT | Mod: S$GLB,,, | Performed by: INTERNAL MEDICINE

## 2021-05-20 PROCEDURE — 1126F AMNT PAIN NOTED NONE PRSNT: CPT | Mod: S$GLB,,, | Performed by: INTERNAL MEDICINE

## 2021-05-20 PROCEDURE — 99213 PR OFFICE/OUTPT VISIT, EST, LEVL III, 20-29 MIN: ICD-10-PCS | Mod: S$GLB,,, | Performed by: INTERNAL MEDICINE

## 2021-05-20 PROCEDURE — 1159F PR MEDICATION LIST DOCUMENTED IN MEDICAL RECORD: ICD-10-PCS | Mod: S$GLB,,, | Performed by: INTERNAL MEDICINE

## 2021-05-20 PROCEDURE — 3288F FALL RISK ASSESSMENT DOCD: CPT | Mod: CPTII,S$GLB,, | Performed by: INTERNAL MEDICINE

## 2021-05-20 PROCEDURE — 1101F PR PT FALLS ASSESS DOC 0-1 FALLS W/OUT INJ PAST YR: ICD-10-PCS | Mod: CPTII,S$GLB,, | Performed by: INTERNAL MEDICINE

## 2021-05-20 PROCEDURE — 99999 PR PBB SHADOW E&M-EST. PATIENT-LVL III: ICD-10-PCS | Mod: PBBFAC,,, | Performed by: INTERNAL MEDICINE

## 2021-05-20 RX ORDER — CHLORPROMAZINE HYDROCHLORIDE 50 MG/1
50 TABLET, FILM COATED ORAL 3 TIMES DAILY PRN
Qty: 90 TABLET | Refills: 1 | Status: SHIPPED | OUTPATIENT
Start: 2021-05-20 | End: 2021-05-26 | Stop reason: SDUPTHER

## 2021-05-26 RX ORDER — CHLORPROMAZINE HYDROCHLORIDE 50 MG/1
50 TABLET, FILM COATED ORAL 3 TIMES DAILY PRN
Qty: 90 TABLET | Refills: 1 | Status: ON HOLD | OUTPATIENT
Start: 2021-05-26 | End: 2021-09-20

## 2021-06-07 ENCOUNTER — ANTI-COAG VISIT (OUTPATIENT)
Dept: CARDIOLOGY | Facility: CLINIC | Age: 81
End: 2021-06-07
Payer: MEDICARE

## 2021-06-07 DIAGNOSIS — I48.0 PAROXYSMAL ATRIAL FIBRILLATION: ICD-10-CM

## 2021-06-07 DIAGNOSIS — Z79.01 LONG TERM (CURRENT) USE OF ANTICOAGULANTS: Primary | ICD-10-CM

## 2021-06-07 LAB — INR PPP: 2.6 (ref 2–3)

## 2021-06-07 PROCEDURE — 85610 PROTHROMBIN TIME: CPT | Mod: QW,S$GLB,, | Performed by: INTERNAL MEDICINE

## 2021-06-07 PROCEDURE — 93793 ANTICOAG MGMT PT WARFARIN: CPT | Mod: S$GLB,,,

## 2021-06-07 PROCEDURE — 93793 PR ANTICOAGULANT MGMT FOR PT TAKING WARFARIN: ICD-10-PCS | Mod: S$GLB,,,

## 2021-06-07 PROCEDURE — 85610 POCT INR: ICD-10-PCS | Mod: QW,S$GLB,, | Performed by: INTERNAL MEDICINE

## 2021-06-18 ENCOUNTER — PES CALL (OUTPATIENT)
Dept: ADMINISTRATIVE | Facility: CLINIC | Age: 81
End: 2021-06-18

## 2021-06-28 ENCOUNTER — HOSPITAL ENCOUNTER (OUTPATIENT)
Dept: CARDIOLOGY | Facility: HOSPITAL | Age: 81
Discharge: HOME | End: 2021-06-28
Attending: INTERNAL MEDICINE
Payer: MEDICARE

## 2021-06-28 ENCOUNTER — OFFICE VISIT (OUTPATIENT)
Dept: CARDIOLOGY | Facility: CLINIC | Age: 81
End: 2021-06-28
Payer: MEDICARE

## 2021-06-28 VITALS
WEIGHT: 218.69 LBS | HEART RATE: 81 BPM | DIASTOLIC BLOOD PRESSURE: 86 MMHG | OXYGEN SATURATION: 98 % | BODY MASS INDEX: 34.25 KG/M2 | SYSTOLIC BLOOD PRESSURE: 126 MMHG

## 2021-06-28 DIAGNOSIS — I48.0 PAROXYSMAL ATRIAL FIBRILLATION: ICD-10-CM

## 2021-06-28 DIAGNOSIS — I27.20 PULMONARY HYPERTENSION, MILD: Primary | ICD-10-CM

## 2021-06-28 DIAGNOSIS — G47.33 OSA (OBSTRUCTIVE SLEEP APNEA): ICD-10-CM

## 2021-06-28 DIAGNOSIS — I70.0 ATHEROSCLEROSIS OF AORTA: ICD-10-CM

## 2021-06-28 DIAGNOSIS — E78.5 HYPERLIPIDEMIA ASSOCIATED WITH TYPE 2 DIABETES MELLITUS: ICD-10-CM

## 2021-06-28 DIAGNOSIS — E11.69 HYPERLIPIDEMIA ASSOCIATED WITH TYPE 2 DIABETES MELLITUS: ICD-10-CM

## 2021-06-28 DIAGNOSIS — E11.9 DIABETES MELLITUS WITHOUT COMPLICATION: ICD-10-CM

## 2021-06-28 DIAGNOSIS — I25.10 ATHEROSCLEROSIS OF NATIVE CORONARY ARTERY OF NATIVE HEART WITHOUT ANGINA PECTORIS: ICD-10-CM

## 2021-06-28 DIAGNOSIS — Z79.01 CURRENT USE OF LONG TERM ANTICOAGULATION: ICD-10-CM

## 2021-06-28 DIAGNOSIS — E11.59 HYPERTENSION ASSOCIATED WITH DIABETES: ICD-10-CM

## 2021-06-28 DIAGNOSIS — I15.2 HYPERTENSION ASSOCIATED WITH DIABETES: ICD-10-CM

## 2021-06-28 PROCEDURE — 3072F LOW RISK FOR RETINOPATHY: CPT | Mod: S$GLB,,, | Performed by: INTERNAL MEDICINE

## 2021-06-28 PROCEDURE — 99214 PR OFFICE/OUTPT VISIT, EST, LEVL IV, 30-39 MIN: ICD-10-PCS | Mod: S$GLB,,, | Performed by: INTERNAL MEDICINE

## 2021-06-28 PROCEDURE — 3072F PR LOW RISK FOR RETINOPATHY: ICD-10-PCS | Mod: S$GLB,,, | Performed by: INTERNAL MEDICINE

## 2021-06-28 PROCEDURE — 1101F PR PT FALLS ASSESS DOC 0-1 FALLS W/OUT INJ PAST YR: ICD-10-PCS | Mod: CPTII,S$GLB,, | Performed by: INTERNAL MEDICINE

## 2021-06-28 PROCEDURE — 1159F PR MEDICATION LIST DOCUMENTED IN MEDICAL RECORD: ICD-10-PCS | Mod: S$GLB,,, | Performed by: INTERNAL MEDICINE

## 2021-06-28 PROCEDURE — 99214 OFFICE O/P EST MOD 30 MIN: CPT | Mod: S$GLB,,, | Performed by: INTERNAL MEDICINE

## 2021-06-28 PROCEDURE — 93227 HOLTER MONITOR - 48 HOUR (CUPID ONLY): ICD-10-PCS | Mod: ,,, | Performed by: INTERNAL MEDICINE

## 2021-06-28 PROCEDURE — 3288F FALL RISK ASSESSMENT DOCD: CPT | Mod: CPTII,S$GLB,, | Performed by: INTERNAL MEDICINE

## 2021-06-28 PROCEDURE — 3288F PR FALLS RISK ASSESSMENT DOCUMENTED: ICD-10-PCS | Mod: CPTII,S$GLB,, | Performed by: INTERNAL MEDICINE

## 2021-06-28 PROCEDURE — 93227 XTRNL ECG REC<48 HR R&I: CPT | Mod: ,,, | Performed by: INTERNAL MEDICINE

## 2021-06-28 PROCEDURE — 1101F PT FALLS ASSESS-DOCD LE1/YR: CPT | Mod: CPTII,S$GLB,, | Performed by: INTERNAL MEDICINE

## 2021-06-28 PROCEDURE — 93226 XTRNL ECG REC<48 HR SCAN A/R: CPT

## 2021-06-28 PROCEDURE — 99999 PR PBB SHADOW E&M-EST. PATIENT-LVL III: CPT | Mod: PBBFAC,,, | Performed by: INTERNAL MEDICINE

## 2021-06-28 PROCEDURE — 1159F MED LIST DOCD IN RCRD: CPT | Mod: S$GLB,,, | Performed by: INTERNAL MEDICINE

## 2021-06-28 PROCEDURE — 99999 PR PBB SHADOW E&M-EST. PATIENT-LVL III: ICD-10-PCS | Mod: PBBFAC,,, | Performed by: INTERNAL MEDICINE

## 2021-07-04 LAB
OHS CV EVENT MONITOR DAY: 0
OHS CV HOLTER LENGTH DECIMAL HOURS: 48
OHS CV HOLTER LENGTH HOURS: 48
OHS CV HOLTER LENGTH MINUTES: 0

## 2021-07-06 ENCOUNTER — TELEPHONE (OUTPATIENT)
Dept: CARDIOLOGY | Facility: CLINIC | Age: 81
End: 2021-07-06

## 2021-07-06 DIAGNOSIS — R94.31 HOLTER MONITOR, ABNORMAL: Primary | ICD-10-CM

## 2021-07-06 DIAGNOSIS — R06.02 SHORTNESS OF BREATH ON EXERTION: ICD-10-CM

## 2021-07-07 DIAGNOSIS — I48.0 PAF (PAROXYSMAL ATRIAL FIBRILLATION): Primary | ICD-10-CM

## 2021-07-07 DIAGNOSIS — R94.31 HOLTER MONITOR, ABNORMAL: ICD-10-CM

## 2021-07-07 RX ORDER — AMLODIPINE BESYLATE 5 MG/1
5 TABLET ORAL DAILY
COMMUNITY
End: 2021-07-07 | Stop reason: SDUPTHER

## 2021-07-08 RX ORDER — AMLODIPINE BESYLATE 5 MG/1
5 TABLET ORAL DAILY
Qty: 30 TABLET | Refills: 6 | Status: SHIPPED | OUTPATIENT
Start: 2021-07-08 | End: 2022-07-11

## 2021-07-09 ENCOUNTER — OFFICE VISIT (OUTPATIENT)
Dept: CARDIOLOGY | Facility: CLINIC | Age: 81
End: 2021-07-09
Payer: MEDICARE

## 2021-07-09 ENCOUNTER — LAB VISIT (OUTPATIENT)
Dept: LAB | Facility: HOSPITAL | Age: 81
End: 2021-07-09
Attending: INTERNAL MEDICINE
Payer: MEDICARE

## 2021-07-09 VITALS
HEART RATE: 93 BPM | WEIGHT: 219.81 LBS | OXYGEN SATURATION: 96 % | HEIGHT: 67 IN | DIASTOLIC BLOOD PRESSURE: 86 MMHG | SYSTOLIC BLOOD PRESSURE: 130 MMHG | BODY MASS INDEX: 34.5 KG/M2

## 2021-07-09 DIAGNOSIS — I25.10 ATHEROSCLEROSIS OF NATIVE CORONARY ARTERY OF NATIVE HEART WITHOUT ANGINA PECTORIS: ICD-10-CM

## 2021-07-09 DIAGNOSIS — I48.0 PAROXYSMAL ATRIAL FIBRILLATION: ICD-10-CM

## 2021-07-09 DIAGNOSIS — G47.33 OSA (OBSTRUCTIVE SLEEP APNEA): ICD-10-CM

## 2021-07-09 DIAGNOSIS — I15.2 HYPERTENSION ASSOCIATED WITH DIABETES: ICD-10-CM

## 2021-07-09 DIAGNOSIS — R94.31 HOLTER MONITOR, ABNORMAL: ICD-10-CM

## 2021-07-09 DIAGNOSIS — I70.0 ATHEROSCLEROSIS OF AORTA: ICD-10-CM

## 2021-07-09 DIAGNOSIS — F33.1 MAJOR DEPRESSIVE DISORDER, RECURRENT, MODERATE: Primary | ICD-10-CM

## 2021-07-09 DIAGNOSIS — Z79.01 CURRENT USE OF LONG TERM ANTICOAGULATION: ICD-10-CM

## 2021-07-09 DIAGNOSIS — E11.59 HYPERTENSION ASSOCIATED WITH DIABETES: ICD-10-CM

## 2021-07-09 DIAGNOSIS — R06.02 SHORTNESS OF BREATH ON EXERTION: ICD-10-CM

## 2021-07-09 DIAGNOSIS — E78.5 HYPERLIPIDEMIA ASSOCIATED WITH TYPE 2 DIABETES MELLITUS: ICD-10-CM

## 2021-07-09 DIAGNOSIS — E11.69 HYPERLIPIDEMIA ASSOCIATED WITH TYPE 2 DIABETES MELLITUS: ICD-10-CM

## 2021-07-09 DIAGNOSIS — I20.89 ANGINA AT REST: ICD-10-CM

## 2021-07-09 LAB
ANION GAP SERPL CALC-SCNC: 9 MMOL/L (ref 8–16)
APTT BLDCRRT: 34.7 SEC (ref 21–32)
BASOPHILS # BLD AUTO: 0.02 K/UL (ref 0–0.2)
BASOPHILS NFR BLD: 0.3 % (ref 0–1.9)
BUN SERPL-MCNC: 19 MG/DL (ref 8–23)
CALCIUM SERPL-MCNC: 9.4 MG/DL (ref 8.7–10.5)
CHLORIDE SERPL-SCNC: 100 MMOL/L (ref 95–110)
CO2 SERPL-SCNC: 27 MMOL/L (ref 23–29)
CREAT SERPL-MCNC: 1 MG/DL (ref 0.5–1.4)
DIFFERENTIAL METHOD: ABNORMAL
EOSINOPHIL # BLD AUTO: 0.2 K/UL (ref 0–0.5)
EOSINOPHIL NFR BLD: 2.4 % (ref 0–8)
ERYTHROCYTE [DISTWIDTH] IN BLOOD BY AUTOMATED COUNT: 13.1 % (ref 11.5–14.5)
EST. GFR  (AFRICAN AMERICAN): >60 ML/MIN/1.73 M^2
EST. GFR  (NON AFRICAN AMERICAN): >60 ML/MIN/1.73 M^2
GLUCOSE SERPL-MCNC: 95 MG/DL (ref 70–110)
HCT VFR BLD AUTO: 39.5 % (ref 40–54)
HGB BLD-MCNC: 12.8 G/DL (ref 14–18)
IMM GRANULOCYTES # BLD AUTO: 0.02 K/UL (ref 0–0.04)
IMM GRANULOCYTES NFR BLD AUTO: 0.3 % (ref 0–0.5)
INR PPP: 2 (ref 0.8–1.2)
LYMPHOCYTES # BLD AUTO: 2.6 K/UL (ref 1–4.8)
LYMPHOCYTES NFR BLD: 34.8 % (ref 18–48)
MCH RBC QN AUTO: 29.4 PG (ref 27–31)
MCHC RBC AUTO-ENTMCNC: 32.4 G/DL (ref 32–36)
MCV RBC AUTO: 91 FL (ref 82–98)
MONOCYTES # BLD AUTO: 1 K/UL (ref 0.3–1)
MONOCYTES NFR BLD: 13 % (ref 4–15)
NEUTROPHILS # BLD AUTO: 3.7 K/UL (ref 1.8–7.7)
NEUTROPHILS NFR BLD: 49.2 % (ref 38–73)
NRBC BLD-RTO: 0 /100 WBC
PLATELET # BLD AUTO: 267 K/UL (ref 150–450)
PMV BLD AUTO: 9.2 FL (ref 9.2–12.9)
POTASSIUM SERPL-SCNC: 4.7 MMOL/L (ref 3.5–5.1)
PROTHROMBIN TIME: 21.3 SEC (ref 9–12.5)
RBC # BLD AUTO: 4.35 M/UL (ref 4.6–6.2)
SODIUM SERPL-SCNC: 136 MMOL/L (ref 136–145)
WBC # BLD AUTO: 7.49 K/UL (ref 3.9–12.7)

## 2021-07-09 PROCEDURE — 99499 RISK ADDL DX/OHS AUDIT: ICD-10-PCS | Mod: HCNC,S$GLB,, | Performed by: INTERNAL MEDICINE

## 2021-07-09 PROCEDURE — 3072F PR LOW RISK FOR RETINOPATHY: ICD-10-PCS | Mod: S$GLB,,, | Performed by: INTERNAL MEDICINE

## 2021-07-09 PROCEDURE — 3075F PR MOST RECENT SYSTOLIC BLOOD PRESS GE 130-139MM HG: ICD-10-PCS | Mod: CPTII,S$GLB,, | Performed by: INTERNAL MEDICINE

## 2021-07-09 PROCEDURE — 85730 THROMBOPLASTIN TIME PARTIAL: CPT | Performed by: INTERNAL MEDICINE

## 2021-07-09 PROCEDURE — 99499 UNLISTED E&M SERVICE: CPT | Mod: HCNC,S$GLB,, | Performed by: INTERNAL MEDICINE

## 2021-07-09 PROCEDURE — 3075F SYST BP GE 130 - 139MM HG: CPT | Mod: CPTII,S$GLB,, | Performed by: INTERNAL MEDICINE

## 2021-07-09 PROCEDURE — 80048 BASIC METABOLIC PNL TOTAL CA: CPT | Performed by: INTERNAL MEDICINE

## 2021-07-09 PROCEDURE — 99215 PR OFFICE/OUTPT VISIT, EST, LEVL V, 40-54 MIN: ICD-10-PCS | Mod: S$GLB,,, | Performed by: INTERNAL MEDICINE

## 2021-07-09 PROCEDURE — 1159F MED LIST DOCD IN RCRD: CPT | Mod: S$GLB,,, | Performed by: INTERNAL MEDICINE

## 2021-07-09 PROCEDURE — 3072F LOW RISK FOR RETINOPATHY: CPT | Mod: S$GLB,,, | Performed by: INTERNAL MEDICINE

## 2021-07-09 PROCEDURE — 99999 PR PBB SHADOW E&M-EST. PATIENT-LVL III: CPT | Mod: PBBFAC,,, | Performed by: INTERNAL MEDICINE

## 2021-07-09 PROCEDURE — 85025 COMPLETE CBC W/AUTO DIFF WBC: CPT | Performed by: INTERNAL MEDICINE

## 2021-07-09 PROCEDURE — 3079F DIAST BP 80-89 MM HG: CPT | Mod: CPTII,S$GLB,, | Performed by: INTERNAL MEDICINE

## 2021-07-09 PROCEDURE — 99999 PR PBB SHADOW E&M-EST. PATIENT-LVL III: ICD-10-PCS | Mod: PBBFAC,,, | Performed by: INTERNAL MEDICINE

## 2021-07-09 PROCEDURE — 85610 PROTHROMBIN TIME: CPT | Performed by: INTERNAL MEDICINE

## 2021-07-09 PROCEDURE — 1159F PR MEDICATION LIST DOCUMENTED IN MEDICAL RECORD: ICD-10-PCS | Mod: S$GLB,,, | Performed by: INTERNAL MEDICINE

## 2021-07-09 PROCEDURE — 36415 COLL VENOUS BLD VENIPUNCTURE: CPT | Performed by: INTERNAL MEDICINE

## 2021-07-09 PROCEDURE — 99215 OFFICE O/P EST HI 40 MIN: CPT | Mod: S$GLB,,, | Performed by: INTERNAL MEDICINE

## 2021-07-09 PROCEDURE — 3079F PR MOST RECENT DIASTOLIC BLOOD PRESSURE 80-89 MM HG: ICD-10-PCS | Mod: CPTII,S$GLB,, | Performed by: INTERNAL MEDICINE

## 2021-07-12 ENCOUNTER — ANTI-COAG VISIT (OUTPATIENT)
Dept: CARDIOLOGY | Facility: CLINIC | Age: 81
End: 2021-07-12
Payer: MEDICARE

## 2021-07-12 DIAGNOSIS — I48.0 PAROXYSMAL ATRIAL FIBRILLATION: ICD-10-CM

## 2021-07-12 DIAGNOSIS — Z79.01 LONG TERM (CURRENT) USE OF ANTICOAGULANTS: Primary | ICD-10-CM

## 2021-07-12 LAB — INR PPP: 2.4 (ref 2–3)

## 2021-07-12 PROCEDURE — 85610 PROTHROMBIN TIME: CPT | Mod: QW,S$GLB,, | Performed by: INTERNAL MEDICINE

## 2021-07-12 PROCEDURE — 93793 PR ANTICOAGULANT MGMT FOR PT TAKING WARFARIN: ICD-10-PCS | Mod: S$GLB,,,

## 2021-07-12 PROCEDURE — 93793 ANTICOAG MGMT PT WARFARIN: CPT | Mod: S$GLB,,,

## 2021-07-12 PROCEDURE — 85610 POCT INR: ICD-10-PCS | Mod: QW,S$GLB,, | Performed by: INTERNAL MEDICINE

## 2021-07-19 RX ORDER — DIPHENHYDRAMINE HCL 50 MG
50 CAPSULE ORAL ONCE
Status: CANCELLED | OUTPATIENT
Start: 2021-07-19

## 2021-07-20 ENCOUNTER — HOSPITAL ENCOUNTER (OUTPATIENT)
Facility: HOSPITAL | Age: 81
Discharge: HOME | End: 2021-07-21
Attending: INTERNAL MEDICINE | Admitting: INTERNAL MEDICINE
Payer: MEDICARE

## 2021-07-20 DIAGNOSIS — I20.89 ANGINA AT REST: Primary | ICD-10-CM

## 2021-07-20 DIAGNOSIS — I49.3 PVC'S (PREMATURE VENTRICULAR CONTRACTIONS): ICD-10-CM

## 2021-07-20 DIAGNOSIS — I48.91 A-FIB: ICD-10-CM

## 2021-07-20 DIAGNOSIS — I48.0 PAROXYSMAL ATRIAL FIBRILLATION: ICD-10-CM

## 2021-07-20 DIAGNOSIS — Z95.5 STATUS POST CORONARY ARTERY STENT PLACEMENT: ICD-10-CM

## 2021-07-20 DIAGNOSIS — R94.31 HOLTER MONITOR, ABNORMAL: ICD-10-CM

## 2021-07-20 DIAGNOSIS — I25.10 ATHEROSCLEROSIS OF NATIVE CORONARY ARTERY OF NATIVE HEART, ANGINA PRESENCE UNSPECIFIED: ICD-10-CM

## 2021-07-20 LAB
ANION GAP SERPL CALC-SCNC: 8 MMOL/L (ref 8–16)
APTT BLDCRRT: 26.2 SEC (ref 21–32)
BASOPHILS # BLD AUTO: 0.01 K/UL (ref 0–0.2)
BASOPHILS # BLD AUTO: 0.03 K/UL (ref 0–0.2)
BASOPHILS NFR BLD: 0.1 % (ref 0–1.9)
BASOPHILS NFR BLD: 0.4 % (ref 0–1.9)
BUN SERPL-MCNC: 15 MG/DL (ref 8–23)
CALCIUM SERPL-MCNC: 9.1 MG/DL (ref 8.7–10.5)
CHLORIDE SERPL-SCNC: 102 MMOL/L (ref 95–110)
CO2 SERPL-SCNC: 25 MMOL/L (ref 23–29)
CREAT SERPL-MCNC: 0.8 MG/DL (ref 0.5–1.4)
DIFFERENTIAL METHOD: ABNORMAL
DIFFERENTIAL METHOD: ABNORMAL
EOSINOPHIL # BLD AUTO: 0.1 K/UL (ref 0–0.5)
EOSINOPHIL # BLD AUTO: 0.2 K/UL (ref 0–0.5)
EOSINOPHIL NFR BLD: 1.8 % (ref 0–8)
EOSINOPHIL NFR BLD: 1.9 % (ref 0–8)
ERYTHROCYTE [DISTWIDTH] IN BLOOD BY AUTOMATED COUNT: 13.2 % (ref 11.5–14.5)
ERYTHROCYTE [DISTWIDTH] IN BLOOD BY AUTOMATED COUNT: 13.2 % (ref 11.5–14.5)
EST. GFR  (AFRICAN AMERICAN): >60 ML/MIN/1.73 M^2
EST. GFR  (NON AFRICAN AMERICAN): >60 ML/MIN/1.73 M^2
GLUCOSE SERPL-MCNC: 116 MG/DL (ref 70–110)
HCT VFR BLD AUTO: 37 % (ref 40–54)
HCT VFR BLD AUTO: 39.7 % (ref 40–54)
HGB BLD-MCNC: 12.2 G/DL (ref 14–18)
HGB BLD-MCNC: 13 G/DL (ref 14–18)
IMM GRANULOCYTES # BLD AUTO: 0.02 K/UL (ref 0–0.04)
IMM GRANULOCYTES # BLD AUTO: 0.03 K/UL (ref 0–0.04)
IMM GRANULOCYTES NFR BLD AUTO: 0.2 % (ref 0–0.5)
IMM GRANULOCYTES NFR BLD AUTO: 0.4 % (ref 0–0.5)
INR PPP: 1 (ref 0.8–1.2)
LYMPHOCYTES # BLD AUTO: 1.8 K/UL (ref 1–4.8)
LYMPHOCYTES # BLD AUTO: 2.5 K/UL (ref 1–4.8)
LYMPHOCYTES NFR BLD: 22 % (ref 18–48)
LYMPHOCYTES NFR BLD: 34.4 % (ref 18–48)
MCH RBC QN AUTO: 29.1 PG (ref 27–31)
MCH RBC QN AUTO: 29.4 PG (ref 27–31)
MCHC RBC AUTO-ENTMCNC: 32.7 G/DL (ref 32–36)
MCHC RBC AUTO-ENTMCNC: 33 G/DL (ref 32–36)
MCV RBC AUTO: 89 FL (ref 82–98)
MCV RBC AUTO: 89 FL (ref 82–98)
MONOCYTES # BLD AUTO: 0.7 K/UL (ref 0.3–1)
MONOCYTES # BLD AUTO: 0.9 K/UL (ref 0.3–1)
MONOCYTES NFR BLD: 10.2 % (ref 4–15)
MONOCYTES NFR BLD: 10.9 % (ref 4–15)
NEUTROPHILS # BLD AUTO: 3.8 K/UL (ref 1.8–7.7)
NEUTROPHILS # BLD AUTO: 5.4 K/UL (ref 1.8–7.7)
NEUTROPHILS NFR BLD: 52.8 % (ref 38–73)
NEUTROPHILS NFR BLD: 64.9 % (ref 38–73)
NRBC BLD-RTO: 0 /100 WBC
NRBC BLD-RTO: 0 /100 WBC
PLATELET # BLD AUTO: 253 K/UL (ref 150–450)
PLATELET # BLD AUTO: 268 K/UL (ref 150–450)
PMV BLD AUTO: 9.6 FL (ref 9.2–12.9)
PMV BLD AUTO: 9.7 FL (ref 9.2–12.9)
POC ACTIVATED CLOTTING TIME K: 219 SEC (ref 74–137)
POCT GLUCOSE: 108 MG/DL (ref 70–110)
POCT GLUCOSE: 115 MG/DL (ref 70–110)
POCT GLUCOSE: 120 MG/DL (ref 70–110)
POTASSIUM SERPL-SCNC: 4.4 MMOL/L (ref 3.5–5.1)
PROTHROMBIN TIME: 11.2 SEC (ref 9–12.5)
RBC # BLD AUTO: 4.15 M/UL (ref 4.6–6.2)
RBC # BLD AUTO: 4.47 M/UL (ref 4.6–6.2)
SAMPLE: ABNORMAL
SODIUM SERPL-SCNC: 135 MMOL/L (ref 136–145)
WBC # BLD AUTO: 7.23 K/UL (ref 3.9–12.7)
WBC # BLD AUTO: 8.36 K/UL (ref 3.9–12.7)

## 2021-07-20 PROCEDURE — 99153 MOD SED SAME PHYS/QHP EA: CPT | Performed by: INTERNAL MEDICINE

## 2021-07-20 PROCEDURE — C1887 CATHETER, GUIDING: HCPCS | Performed by: INTERNAL MEDICINE

## 2021-07-20 PROCEDURE — 93010 ELECTROCARDIOGRAM REPORT: CPT | Mod: 76,,, | Performed by: INTERNAL MEDICINE

## 2021-07-20 PROCEDURE — C1760 CLOSURE DEV, VASC: HCPCS | Performed by: INTERNAL MEDICINE

## 2021-07-20 PROCEDURE — 93010 EKG 12-LEAD: ICD-10-PCS | Mod: 76,,, | Performed by: INTERNAL MEDICINE

## 2021-07-20 PROCEDURE — 99152 MOD SED SAME PHYS/QHP 5/>YRS: CPT | Mod: ,,, | Performed by: INTERNAL MEDICINE

## 2021-07-20 PROCEDURE — 85610 PROTHROMBIN TIME: CPT | Performed by: INTERNAL MEDICINE

## 2021-07-20 PROCEDURE — C1874 STENT, COATED/COV W/DEL SYS: HCPCS | Performed by: INTERNAL MEDICINE

## 2021-07-20 PROCEDURE — C1894 INTRO/SHEATH, NON-LASER: HCPCS | Performed by: INTERNAL MEDICINE

## 2021-07-20 PROCEDURE — 63600175 PHARM REV CODE 636 W HCPCS: Performed by: INTERNAL MEDICINE

## 2021-07-20 PROCEDURE — 25500020 PHARM REV CODE 255: Performed by: INTERNAL MEDICINE

## 2021-07-20 PROCEDURE — 93005 ELECTROCARDIOGRAM TRACING: CPT

## 2021-07-20 PROCEDURE — C1769 GUIDE WIRE: HCPCS | Performed by: INTERNAL MEDICINE

## 2021-07-20 PROCEDURE — 27201423 OPTIME MED/SURG SUP & DEVICES STERILE SUPPLY: Performed by: INTERNAL MEDICINE

## 2021-07-20 PROCEDURE — 92928 PR STENT: ICD-10-PCS | Mod: LD,,, | Performed by: INTERNAL MEDICINE

## 2021-07-20 PROCEDURE — 85730 THROMBOPLASTIN TIME PARTIAL: CPT | Performed by: INTERNAL MEDICINE

## 2021-07-20 PROCEDURE — 93458 PR CATH PLACE/CORON ANGIO, IMG SUPER/INTERP,W LEFT HEART VENTRICULOGRAPHY: ICD-10-PCS | Mod: 26,51,59, | Performed by: INTERNAL MEDICINE

## 2021-07-20 PROCEDURE — 92928 PRQ TCAT PLMT NTRAC ST 1 LES: CPT | Mod: LD,,, | Performed by: INTERNAL MEDICINE

## 2021-07-20 PROCEDURE — 93458 L HRT ARTERY/VENTRICLE ANGIO: CPT | Mod: 51,59 | Performed by: INTERNAL MEDICINE

## 2021-07-20 PROCEDURE — 93458 L HRT ARTERY/VENTRICLE ANGIO: CPT | Mod: 26,51,59, | Performed by: INTERNAL MEDICINE

## 2021-07-20 PROCEDURE — C1725 CATH, TRANSLUMIN NON-LASER: HCPCS | Performed by: INTERNAL MEDICINE

## 2021-07-20 PROCEDURE — 25000003 PHARM REV CODE 250: Performed by: INTERNAL MEDICINE

## 2021-07-20 PROCEDURE — 99152 PR MOD CONSCIOUS SEDATION, SAME PHYS, 5+ YRS, FIRST 15 MIN: ICD-10-PCS | Mod: ,,, | Performed by: INTERNAL MEDICINE

## 2021-07-20 PROCEDURE — 85025 COMPLETE CBC W/AUTO DIFF WBC: CPT | Performed by: INTERNAL MEDICINE

## 2021-07-20 PROCEDURE — C9600 PERC DRUG-EL COR STENT SING: HCPCS | Mod: LD | Performed by: INTERNAL MEDICINE

## 2021-07-20 PROCEDURE — 99152 MOD SED SAME PHYS/QHP 5/>YRS: CPT | Performed by: INTERNAL MEDICINE

## 2021-07-20 PROCEDURE — 80048 BASIC METABOLIC PNL TOTAL CA: CPT | Performed by: INTERNAL MEDICINE

## 2021-07-20 PROCEDURE — 36415 COLL VENOUS BLD VENIPUNCTURE: CPT | Performed by: INTERNAL MEDICINE

## 2021-07-20 PROCEDURE — 85347 COAGULATION TIME ACTIVATED: CPT | Performed by: INTERNAL MEDICINE

## 2021-07-20 DEVICE — STENT RONYX30030UX RESOLUTE ONYX 3.00X30
Type: IMPLANTABLE DEVICE | Site: CORONARY | Status: FUNCTIONAL
Brand: RESOLUTE ONYX™

## 2021-07-20 DEVICE — STENT RONYX30012UX RESOLUTE ONYX 3.00X12
Type: IMPLANTABLE DEVICE | Site: CORONARY | Status: FUNCTIONAL
Brand: RESOLUTE ONYX™

## 2021-07-20 RX ORDER — NAPROXEN SODIUM 220 MG/1
81 TABLET, FILM COATED ORAL DAILY
Status: DISCONTINUED | OUTPATIENT
Start: 2021-07-20 | End: 2021-07-21 | Stop reason: HOSPADM

## 2021-07-20 RX ORDER — TALC
3 POWDER (GRAM) TOPICAL NIGHTLY
Status: CANCELLED | OUTPATIENT
Start: 2021-07-20

## 2021-07-20 RX ORDER — DIPHENHYDRAMINE HCL 50 MG
50 CAPSULE ORAL ONCE
Status: COMPLETED | OUTPATIENT
Start: 2021-07-20 | End: 2021-07-20

## 2021-07-20 RX ORDER — NITROGLYCERIN 0.4 MG/1
0.4 TABLET SUBLINGUAL EVERY 5 MIN PRN
Status: DISCONTINUED | OUTPATIENT
Start: 2021-07-20 | End: 2021-07-21 | Stop reason: HOSPADM

## 2021-07-20 RX ORDER — HEPARIN SODIUM 1000 [USP'U]/ML
INJECTION, SOLUTION INTRAVENOUS; SUBCUTANEOUS
Status: DISCONTINUED | OUTPATIENT
Start: 2021-07-20 | End: 2021-07-20 | Stop reason: HOSPADM

## 2021-07-20 RX ORDER — CLOPIDOGREL 300 MG/1
TABLET, FILM COATED ORAL
Status: DISCONTINUED | OUTPATIENT
Start: 2021-07-20 | End: 2021-07-20 | Stop reason: HOSPADM

## 2021-07-20 RX ORDER — ONDANSETRON 8 MG/1
8 TABLET, ORALLY DISINTEGRATING ORAL EVERY 8 HOURS PRN
Status: DISCONTINUED | OUTPATIENT
Start: 2021-07-20 | End: 2021-07-21 | Stop reason: HOSPADM

## 2021-07-20 RX ORDER — LIDOCAINE HYDROCHLORIDE 20 MG/ML
INJECTION, SOLUTION EPIDURAL; INFILTRATION; INTRACAUDAL; PERINEURAL
Status: DISCONTINUED | OUTPATIENT
Start: 2021-07-20 | End: 2021-07-20 | Stop reason: HOSPADM

## 2021-07-20 RX ORDER — CLOPIDOGREL BISULFATE 75 MG/1
75 TABLET ORAL DAILY
Status: DISCONTINUED | OUTPATIENT
Start: 2021-07-20 | End: 2021-07-21 | Stop reason: HOSPADM

## 2021-07-20 RX ORDER — ACETAMINOPHEN 325 MG/1
650 TABLET ORAL EVERY 4 HOURS PRN
Status: DISCONTINUED | OUTPATIENT
Start: 2021-07-20 | End: 2021-07-21 | Stop reason: HOSPADM

## 2021-07-20 RX ORDER — AMLODIPINE BESYLATE 5 MG/1
5 TABLET ORAL DAILY
Status: CANCELLED | OUTPATIENT
Start: 2021-07-20

## 2021-07-20 RX ORDER — CEFAZOLIN SODIUM 2 G/50ML
2 SOLUTION INTRAVENOUS
Status: DISCONTINUED | OUTPATIENT
Start: 2021-07-20 | End: 2021-07-20

## 2021-07-20 RX ORDER — SODIUM CHLORIDE 9 MG/ML
INJECTION, SOLUTION INTRAVENOUS CONTINUOUS
Status: DISCONTINUED | OUTPATIENT
Start: 2021-07-20 | End: 2021-07-20

## 2021-07-20 RX ORDER — SODIUM CHLORIDE 9 MG/ML
INJECTION, SOLUTION INTRAVENOUS CONTINUOUS
Status: ACTIVE | OUTPATIENT
Start: 2021-07-20 | End: 2021-07-20

## 2021-07-20 RX ORDER — LOSARTAN POTASSIUM 50 MG/1
50 TABLET ORAL 2 TIMES DAILY
Status: CANCELLED | OUTPATIENT
Start: 2021-07-20

## 2021-07-20 RX ORDER — ASPIRIN 81 MG/1
81 TABLET ORAL DAILY
Status: CANCELLED | OUTPATIENT
Start: 2021-07-20

## 2021-07-20 RX ORDER — MELOXICAM 7.5 MG/1
15 TABLET ORAL DAILY
Status: CANCELLED | OUTPATIENT
Start: 2021-07-20

## 2021-07-20 RX ORDER — AMLODIPINE BESYLATE 5 MG/1
5 TABLET ORAL DAILY
Status: DISCONTINUED | OUTPATIENT
Start: 2021-07-20 | End: 2021-07-21 | Stop reason: HOSPADM

## 2021-07-20 RX ORDER — TORSEMIDE 10 MG/1
10 TABLET ORAL DAILY
Status: CANCELLED | OUTPATIENT
Start: 2021-07-20

## 2021-07-20 RX ORDER — FENTANYL CITRATE 50 UG/ML
INJECTION, SOLUTION INTRAMUSCULAR; INTRAVENOUS
Status: DISCONTINUED | OUTPATIENT
Start: 2021-07-20 | End: 2021-07-20 | Stop reason: HOSPADM

## 2021-07-20 RX ORDER — ATORVASTATIN CALCIUM 40 MG/1
40 TABLET, FILM COATED ORAL DAILY
Status: CANCELLED | OUTPATIENT
Start: 2021-07-20

## 2021-07-20 RX ORDER — OXYCODONE HYDROCHLORIDE 5 MG/1
10 TABLET ORAL EVERY 4 HOURS PRN
Status: DISCONTINUED | OUTPATIENT
Start: 2021-07-20 | End: 2021-07-21 | Stop reason: HOSPADM

## 2021-07-20 RX ORDER — MIDAZOLAM HYDROCHLORIDE 1 MG/ML
INJECTION, SOLUTION INTRAMUSCULAR; INTRAVENOUS
Status: DISCONTINUED | OUTPATIENT
Start: 2021-07-20 | End: 2021-07-20 | Stop reason: HOSPADM

## 2021-07-20 RX ORDER — HYDROCODONE BITARTRATE AND ACETAMINOPHEN 5; 325 MG/1; MG/1
1 TABLET ORAL EVERY 4 HOURS PRN
Status: DISCONTINUED | OUTPATIENT
Start: 2021-07-20 | End: 2021-07-21 | Stop reason: HOSPADM

## 2021-07-20 RX ORDER — ATROPINE SULFATE 0.1 MG/ML
0.5 INJECTION INTRAVENOUS
Status: DISCONTINUED | OUTPATIENT
Start: 2021-07-20 | End: 2021-07-21 | Stop reason: HOSPADM

## 2021-07-20 RX ADMIN — HYDROCODONE BITARTRATE AND ACETAMINOPHEN 1 TABLET: 5; 325 TABLET ORAL at 09:07

## 2021-07-20 RX ADMIN — AMLODIPINE BESYLATE 5 MG: 5 TABLET ORAL at 01:07

## 2021-07-20 RX ADMIN — ASPIRIN 81 MG CHEWABLE TABLET 81 MG: 81 TABLET CHEWABLE at 01:07

## 2021-07-20 RX ADMIN — SODIUM CHLORIDE: 0.9 INJECTION, SOLUTION INTRAVENOUS at 07:07

## 2021-07-20 RX ADMIN — SODIUM CHLORIDE: 0.9 INJECTION, SOLUTION INTRAVENOUS at 10:07

## 2021-07-20 RX ADMIN — CLOPIDOGREL 75 MG: 75 TABLET, FILM COATED ORAL at 01:07

## 2021-07-20 RX ADMIN — DIPHENHYDRAMINE HYDROCHLORIDE 50 MG: 50 CAPSULE ORAL at 07:07

## 2021-07-21 VITALS
RESPIRATION RATE: 18 BRPM | TEMPERATURE: 98 F | WEIGHT: 214.31 LBS | DIASTOLIC BLOOD PRESSURE: 84 MMHG | OXYGEN SATURATION: 95 % | BODY MASS INDEX: 33.64 KG/M2 | SYSTOLIC BLOOD PRESSURE: 126 MMHG | HEIGHT: 67 IN | HEART RATE: 97 BPM

## 2021-07-21 PROBLEM — I20.89 ANGINA AT REST: Status: ACTIVE | Noted: 2021-07-21

## 2021-07-21 LAB
ALBUMIN SERPL BCP-MCNC: 3.7 G/DL (ref 3.5–5.2)
ALP SERPL-CCNC: 51 U/L (ref 55–135)
ALT SERPL W/O P-5'-P-CCNC: 26 U/L (ref 10–44)
ANION GAP SERPL CALC-SCNC: 9 MMOL/L (ref 8–16)
AST SERPL-CCNC: 27 U/L (ref 10–40)
BASOPHILS # BLD AUTO: 0.02 K/UL (ref 0–0.2)
BASOPHILS NFR BLD: 0.3 % (ref 0–1.9)
BILIRUB SERPL-MCNC: 1 MG/DL (ref 0.1–1)
BUN SERPL-MCNC: 16 MG/DL (ref 8–23)
CALCIUM SERPL-MCNC: 9.1 MG/DL (ref 8.7–10.5)
CHLORIDE SERPL-SCNC: 103 MMOL/L (ref 95–110)
CO2 SERPL-SCNC: 24 MMOL/L (ref 23–29)
CREAT SERPL-MCNC: 0.9 MG/DL (ref 0.5–1.4)
DIFFERENTIAL METHOD: ABNORMAL
EOSINOPHIL # BLD AUTO: 0.1 K/UL (ref 0–0.5)
EOSINOPHIL NFR BLD: 1.8 % (ref 0–8)
ERYTHROCYTE [DISTWIDTH] IN BLOOD BY AUTOMATED COUNT: 13.2 % (ref 11.5–14.5)
EST. GFR  (AFRICAN AMERICAN): >60 ML/MIN/1.73 M^2
EST. GFR  (NON AFRICAN AMERICAN): >60 ML/MIN/1.73 M^2
GLUCOSE SERPL-MCNC: 153 MG/DL (ref 70–110)
HCT VFR BLD AUTO: 38.9 % (ref 40–54)
HGB BLD-MCNC: 12.5 G/DL (ref 14–18)
IMM GRANULOCYTES # BLD AUTO: 0.02 K/UL (ref 0–0.04)
IMM GRANULOCYTES NFR BLD AUTO: 0.3 % (ref 0–0.5)
LYMPHOCYTES # BLD AUTO: 1.6 K/UL (ref 1–4.8)
LYMPHOCYTES NFR BLD: 21.3 % (ref 18–48)
MCH RBC QN AUTO: 29.3 PG (ref 27–31)
MCHC RBC AUTO-ENTMCNC: 32.1 G/DL (ref 32–36)
MCV RBC AUTO: 91 FL (ref 82–98)
MONOCYTES # BLD AUTO: 0.7 K/UL (ref 0.3–1)
MONOCYTES NFR BLD: 9.5 % (ref 4–15)
NEUTROPHILS # BLD AUTO: 4.9 K/UL (ref 1.8–7.7)
NEUTROPHILS NFR BLD: 66.8 % (ref 38–73)
NRBC BLD-RTO: 0 /100 WBC
PLATELET # BLD AUTO: 248 K/UL (ref 150–450)
PMV BLD AUTO: 9.6 FL (ref 9.2–12.9)
POCT GLUCOSE: 106 MG/DL (ref 70–110)
POCT GLUCOSE: 127 MG/DL (ref 70–110)
POTASSIUM SERPL-SCNC: 4.2 MMOL/L (ref 3.5–5.1)
PROT SERPL-MCNC: 6.6 G/DL (ref 6–8.4)
RBC # BLD AUTO: 4.27 M/UL (ref 4.6–6.2)
SODIUM SERPL-SCNC: 136 MMOL/L (ref 136–145)
WBC # BLD AUTO: 7.28 K/UL (ref 3.9–12.7)

## 2021-07-21 PROCEDURE — 99024 PR POST-OP FOLLOW-UP VISIT: ICD-10-PCS | Mod: ,,, | Performed by: NURSE PRACTITIONER

## 2021-07-21 PROCEDURE — 85025 COMPLETE CBC W/AUTO DIFF WBC: CPT | Performed by: PHYSICIAN ASSISTANT

## 2021-07-21 PROCEDURE — 25000003 PHARM REV CODE 250: Performed by: INTERNAL MEDICINE

## 2021-07-21 PROCEDURE — 80053 COMPREHEN METABOLIC PANEL: CPT | Performed by: PHYSICIAN ASSISTANT

## 2021-07-21 PROCEDURE — 99024 POSTOP FOLLOW-UP VISIT: CPT | Mod: ,,, | Performed by: NURSE PRACTITIONER

## 2021-07-21 PROCEDURE — 36415 COLL VENOUS BLD VENIPUNCTURE: CPT | Performed by: PHYSICIAN ASSISTANT

## 2021-07-21 RX ORDER — ATORVASTATIN CALCIUM 40 MG/1
40 TABLET, FILM COATED ORAL NIGHTLY
Status: DISCONTINUED | OUTPATIENT
Start: 2021-07-21 | End: 2021-07-21 | Stop reason: HOSPADM

## 2021-07-21 RX ORDER — CLOPIDOGREL BISULFATE 75 MG/1
75 TABLET ORAL DAILY
Qty: 30 TABLET | Refills: 11 | Status: SHIPPED | OUTPATIENT
Start: 2021-07-22 | End: 2021-10-31

## 2021-07-21 RX ADMIN — CLOPIDOGREL 75 MG: 75 TABLET, FILM COATED ORAL at 08:07

## 2021-07-21 RX ADMIN — ASPIRIN 81 MG CHEWABLE TABLET 81 MG: 81 TABLET CHEWABLE at 08:07

## 2021-07-21 RX ADMIN — AMLODIPINE BESYLATE 5 MG: 5 TABLET ORAL at 08:07

## 2021-07-27 ENCOUNTER — ANTI-COAG VISIT (OUTPATIENT)
Dept: CARDIOLOGY | Facility: CLINIC | Age: 81
End: 2021-07-27
Payer: MEDICARE

## 2021-07-27 DIAGNOSIS — I48.0 PAROXYSMAL ATRIAL FIBRILLATION: ICD-10-CM

## 2021-07-27 DIAGNOSIS — Z79.01 LONG TERM (CURRENT) USE OF ANTICOAGULANTS: Primary | ICD-10-CM

## 2021-07-27 LAB — INR PPP: 1.5 (ref 2–3)

## 2021-07-27 PROCEDURE — 93793 PR ANTICOAGULANT MGMT FOR PT TAKING WARFARIN: ICD-10-PCS | Mod: S$GLB,,,

## 2021-07-27 PROCEDURE — 93793 ANTICOAG MGMT PT WARFARIN: CPT | Mod: S$GLB,,,

## 2021-07-27 PROCEDURE — 85610 PROTHROMBIN TIME: CPT | Mod: QW,S$GLB,, | Performed by: INTERNAL MEDICINE

## 2021-07-27 PROCEDURE — 85610 POCT INR: ICD-10-PCS | Mod: QW,S$GLB,, | Performed by: INTERNAL MEDICINE

## 2021-07-30 ENCOUNTER — ANTI-COAG VISIT (OUTPATIENT)
Dept: CARDIOLOGY | Facility: CLINIC | Age: 81
End: 2021-07-30
Payer: MEDICARE

## 2021-07-30 ENCOUNTER — OFFICE VISIT (OUTPATIENT)
Dept: CARDIOLOGY | Facility: CLINIC | Age: 81
End: 2021-07-30
Payer: MEDICARE

## 2021-07-30 VITALS
BODY MASS INDEX: 34.26 KG/M2 | WEIGHT: 218.25 LBS | DIASTOLIC BLOOD PRESSURE: 80 MMHG | OXYGEN SATURATION: 97 % | SYSTOLIC BLOOD PRESSURE: 120 MMHG | HEIGHT: 67 IN | HEART RATE: 85 BPM

## 2021-07-30 DIAGNOSIS — I15.2 HYPERTENSION ASSOCIATED WITH DIABETES: ICD-10-CM

## 2021-07-30 DIAGNOSIS — I25.10 ATHEROSCLEROSIS OF NATIVE CORONARY ARTERY OF NATIVE HEART WITHOUT ANGINA PECTORIS: ICD-10-CM

## 2021-07-30 DIAGNOSIS — Z79.01 LONG TERM (CURRENT) USE OF ANTICOAGULANTS: Primary | ICD-10-CM

## 2021-07-30 DIAGNOSIS — E11.59 HYPERTENSION ASSOCIATED WITH DIABETES: ICD-10-CM

## 2021-07-30 DIAGNOSIS — I27.20 PULMONARY HYPERTENSION, MILD: Primary | ICD-10-CM

## 2021-07-30 DIAGNOSIS — E11.69 HYPERLIPIDEMIA ASSOCIATED WITH TYPE 2 DIABETES MELLITUS: ICD-10-CM

## 2021-07-30 DIAGNOSIS — I48.0 PAROXYSMAL ATRIAL FIBRILLATION: ICD-10-CM

## 2021-07-30 DIAGNOSIS — E78.5 HYPERLIPIDEMIA ASSOCIATED WITH TYPE 2 DIABETES MELLITUS: ICD-10-CM

## 2021-07-30 DIAGNOSIS — G47.33 OSA (OBSTRUCTIVE SLEEP APNEA): ICD-10-CM

## 2021-07-30 LAB — INR PPP: 2.1 (ref 2–3)

## 2021-07-30 PROCEDURE — 3074F SYST BP LT 130 MM HG: CPT | Mod: CPTII,S$GLB,, | Performed by: INTERNAL MEDICINE

## 2021-07-30 PROCEDURE — 3072F LOW RISK FOR RETINOPATHY: CPT | Mod: CPTII,S$GLB,, | Performed by: INTERNAL MEDICINE

## 2021-07-30 PROCEDURE — 1101F PR PT FALLS ASSESS DOC 0-1 FALLS W/OUT INJ PAST YR: ICD-10-PCS | Mod: CPTII,S$GLB,, | Performed by: INTERNAL MEDICINE

## 2021-07-30 PROCEDURE — 1160F RVW MEDS BY RX/DR IN RCRD: CPT | Mod: CPTII,S$GLB,, | Performed by: INTERNAL MEDICINE

## 2021-07-30 PROCEDURE — 3074F PR MOST RECENT SYSTOLIC BLOOD PRESSURE < 130 MM HG: ICD-10-PCS | Mod: CPTII,S$GLB,, | Performed by: INTERNAL MEDICINE

## 2021-07-30 PROCEDURE — 85610 POCT INR: ICD-10-PCS | Mod: QW,S$GLB,, | Performed by: INTERNAL MEDICINE

## 2021-07-30 PROCEDURE — 1159F PR MEDICATION LIST DOCUMENTED IN MEDICAL RECORD: ICD-10-PCS | Mod: CPTII,S$GLB,, | Performed by: INTERNAL MEDICINE

## 2021-07-30 PROCEDURE — 99499 RISK ADDL DX/OHS AUDIT: ICD-10-PCS | Mod: HCNC,S$GLB,, | Performed by: INTERNAL MEDICINE

## 2021-07-30 PROCEDURE — 3288F FALL RISK ASSESSMENT DOCD: CPT | Mod: CPTII,S$GLB,, | Performed by: INTERNAL MEDICINE

## 2021-07-30 PROCEDURE — 99214 PR OFFICE/OUTPT VISIT, EST, LEVL IV, 30-39 MIN: ICD-10-PCS | Mod: S$GLB,,, | Performed by: INTERNAL MEDICINE

## 2021-07-30 PROCEDURE — 3072F PR LOW RISK FOR RETINOPATHY: ICD-10-PCS | Mod: CPTII,S$GLB,, | Performed by: INTERNAL MEDICINE

## 2021-07-30 PROCEDURE — 1101F PT FALLS ASSESS-DOCD LE1/YR: CPT | Mod: CPTII,S$GLB,, | Performed by: INTERNAL MEDICINE

## 2021-07-30 PROCEDURE — 1126F PR PAIN SEVERITY QUANTIFIED, NO PAIN PRESENT: ICD-10-PCS | Mod: CPTII,S$GLB,, | Performed by: INTERNAL MEDICINE

## 2021-07-30 PROCEDURE — 99999 PR PBB SHADOW E&M-EST. PATIENT-LVL IV: ICD-10-PCS | Mod: PBBFAC,,, | Performed by: INTERNAL MEDICINE

## 2021-07-30 PROCEDURE — 3079F DIAST BP 80-89 MM HG: CPT | Mod: CPTII,S$GLB,, | Performed by: INTERNAL MEDICINE

## 2021-07-30 PROCEDURE — 3288F PR FALLS RISK ASSESSMENT DOCUMENTED: ICD-10-PCS | Mod: CPTII,S$GLB,, | Performed by: INTERNAL MEDICINE

## 2021-07-30 PROCEDURE — 1126F AMNT PAIN NOTED NONE PRSNT: CPT | Mod: CPTII,S$GLB,, | Performed by: INTERNAL MEDICINE

## 2021-07-30 PROCEDURE — 3079F PR MOST RECENT DIASTOLIC BLOOD PRESSURE 80-89 MM HG: ICD-10-PCS | Mod: CPTII,S$GLB,, | Performed by: INTERNAL MEDICINE

## 2021-07-30 PROCEDURE — 85610 PROTHROMBIN TIME: CPT | Mod: QW,S$GLB,, | Performed by: INTERNAL MEDICINE

## 2021-07-30 PROCEDURE — 99499 UNLISTED E&M SERVICE: CPT | Mod: HCNC,S$GLB,, | Performed by: INTERNAL MEDICINE

## 2021-07-30 PROCEDURE — 99214 OFFICE O/P EST MOD 30 MIN: CPT | Mod: S$GLB,,, | Performed by: INTERNAL MEDICINE

## 2021-07-30 PROCEDURE — 1160F PR REVIEW ALL MEDS BY PRESCRIBER/CLIN PHARMACIST DOCUMENTED: ICD-10-PCS | Mod: CPTII,S$GLB,, | Performed by: INTERNAL MEDICINE

## 2021-07-30 PROCEDURE — 99999 PR PBB SHADOW E&M-EST. PATIENT-LVL IV: CPT | Mod: PBBFAC,,, | Performed by: INTERNAL MEDICINE

## 2021-07-30 PROCEDURE — 1159F MED LIST DOCD IN RCRD: CPT | Mod: CPTII,S$GLB,, | Performed by: INTERNAL MEDICINE

## 2021-08-06 ENCOUNTER — ANTI-COAG VISIT (OUTPATIENT)
Dept: CARDIOLOGY | Facility: CLINIC | Age: 81
End: 2021-08-06
Payer: MEDICARE

## 2021-08-06 DIAGNOSIS — I48.0 PAROXYSMAL ATRIAL FIBRILLATION: ICD-10-CM

## 2021-08-06 DIAGNOSIS — Z79.01 LONG TERM (CURRENT) USE OF ANTICOAGULANTS: Primary | ICD-10-CM

## 2021-08-06 LAB — INR PPP: 1.6 (ref 2–3)

## 2021-08-06 PROCEDURE — 93793 ANTICOAG MGMT PT WARFARIN: CPT | Mod: S$GLB,,,

## 2021-08-06 PROCEDURE — 85610 POCT INR: ICD-10-PCS | Mod: QW,S$GLB,, | Performed by: INTERNAL MEDICINE

## 2021-08-06 PROCEDURE — 85610 PROTHROMBIN TIME: CPT | Mod: QW,S$GLB,, | Performed by: INTERNAL MEDICINE

## 2021-08-06 PROCEDURE — 93793 PR ANTICOAGULANT MGMT FOR PT TAKING WARFARIN: ICD-10-PCS | Mod: S$GLB,,,

## 2021-08-13 ENCOUNTER — PES CALL (OUTPATIENT)
Dept: ADMINISTRATIVE | Facility: CLINIC | Age: 81
End: 2021-08-13

## 2021-08-17 ENCOUNTER — ANTI-COAG VISIT (OUTPATIENT)
Dept: CARDIOLOGY | Facility: CLINIC | Age: 81
End: 2021-08-17
Payer: MEDICARE

## 2021-08-17 DIAGNOSIS — I48.0 PAROXYSMAL ATRIAL FIBRILLATION: ICD-10-CM

## 2021-08-17 DIAGNOSIS — Z79.01 LONG TERM (CURRENT) USE OF ANTICOAGULANTS: Primary | ICD-10-CM

## 2021-08-17 LAB — INR PPP: 2.9 (ref 2–3)

## 2021-08-17 PROCEDURE — 85610 PROTHROMBIN TIME: CPT | Mod: QW,S$GLB,, | Performed by: INTERNAL MEDICINE

## 2021-08-17 PROCEDURE — 93793 PR ANTICOAGULANT MGMT FOR PT TAKING WARFARIN: ICD-10-PCS | Mod: S$GLB,,,

## 2021-08-17 PROCEDURE — 93793 ANTICOAG MGMT PT WARFARIN: CPT | Mod: S$GLB,,,

## 2021-08-17 PROCEDURE — 85610 POCT INR: ICD-10-PCS | Mod: QW,S$GLB,, | Performed by: INTERNAL MEDICINE

## 2021-08-26 NOTE — TRANSFER OF CARE
"Anesthesia Transfer of Care Note    Patient: [place] DEJA Biggs JrLester    Procedure(s) Performed: Procedure(s) (LRB):  COLONOSCOPY (N/A)    Patient location: PACU    Anesthesia Type: MAC    Transport from OR: Transported from OR on room air with adequate spontaneous ventilation    Post pain: adequate analgesia    Post assessment: no apparent anesthetic complications and tolerated procedure well    Post vital signs: stable    Level of consciousness: awake and responds to stimulation    Nausea/Vomiting: no nausea/vomiting    Complications: none    Transfer of care protocol was followed      Last vitals:   Visit Vitals    /78 (BP Location: Left arm, Patient Position: Lying, BP Method: Automatic)    Pulse 84    Temp 36 °C (96.8 °F)    Resp 14    Ht 5' 7" (1.702 m)    Wt 87.5 kg (193 lb)    SpO2 (!) 92%    BMI 30.23 kg/m2     " Date of Service: 08/25/2021    REASON FOR VISIT:  Perianal itching and possible internal hemorrhoids.    HISTORY OF PRESENT ILLNESS:  This is a 45-year-old woman with no family history of colon cancer who about a month ago began to have symptoms of protrusion with defecation associated with some perianal itching and some discharge.  There was occasional bleeding with this.  Occasional bleeding when she wiped with toilet paper.  No change in the consistency, color or size of her stool.  No change in her stool frequency.  These symptoms have gotten a lot better.  Still some minor symptoms.  She has never had a colonoscopy.    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    TOBACCO:  None.    ALLERGIES:  Bacitracin and Triple Antibiotic.    MEDICATIONS:  Vitamin C.  Calcium.  Vitamin D.  Fish Oil.    Glucosamine.  Ibuprofen.  Multivitamin.    PAST MEDICAL HISTORY:  None.    PAST SURGICAL HISTORY:  None.    PHYSICAL EXAMINATION:  An alert, overweight woman in no acute distress.  Appears her stated age, alert and oriented x3.  Inspection of perianal area shows no evidence of fistula, fissure or abscess.  There is no thrombosed external hemorrhoid.  There is a skin tag approximately 4 cm from the anal verge at 2:00 axis as the patient is in the prone jackknife position.  Digital rectal exam shows good sphincter tone.  No mass.  There is stool in the rectal vault.  With gentle Valsalva there is no protruding material.    Anoscopy showed no evidence of fissure.  There are no enlarged internal hemorrhoids.  There is no prolapsing material.    IMPRESSION:  1.  Perianal itching.  2.  Rectal bleeding.    PLAN:  At this point I am going to go ahead and schedule her for a colonoscopy to ensure there is no other obstructing lesion.  I am also going to go ahead and start her on Citrucel a scoop twice a day.  Further recommendations pending these.      Dictated By: Gumaro Cole MD  Signing Provider:  Gumaro Cole MD    RK/SS1 ([id number])  DD: 08/25/2021 11:37:27 TD: 08/26/2021 12:32:04    Copy Sent To:     cc: Gwen Oates DO

## 2021-08-31 ENCOUNTER — PATIENT MESSAGE (OUTPATIENT)
Dept: CARDIOLOGY | Facility: CLINIC | Age: 81
End: 2021-08-31

## 2021-09-14 ENCOUNTER — ANTI-COAG VISIT (OUTPATIENT)
Dept: CARDIOLOGY | Facility: CLINIC | Age: 81
End: 2021-09-14
Payer: MEDICARE

## 2021-09-14 DIAGNOSIS — Z79.01 LONG TERM (CURRENT) USE OF ANTICOAGULANTS: Primary | ICD-10-CM

## 2021-09-14 DIAGNOSIS — I48.0 PAROXYSMAL ATRIAL FIBRILLATION: ICD-10-CM

## 2021-09-14 LAB — INR PPP: 2.7 (ref 2–3)

## 2021-09-14 PROCEDURE — 93793 PR ANTICOAGULANT MGMT FOR PT TAKING WARFARIN: ICD-10-PCS | Mod: S$GLB,,,

## 2021-09-14 PROCEDURE — 93793 ANTICOAG MGMT PT WARFARIN: CPT | Mod: S$GLB,,,

## 2021-09-14 PROCEDURE — 85610 POCT INR: ICD-10-PCS | Mod: QW,S$GLB,, | Performed by: INTERNAL MEDICINE

## 2021-09-14 PROCEDURE — 85610 PROTHROMBIN TIME: CPT | Mod: QW,S$GLB,, | Performed by: INTERNAL MEDICINE

## 2021-09-20 ENCOUNTER — HOSPITAL ENCOUNTER (OUTPATIENT)
Facility: HOSPITAL | Age: 81
Discharge: HOME | End: 2021-09-21
Attending: EMERGENCY MEDICINE | Admitting: INTERNAL MEDICINE
Payer: MEDICARE

## 2021-09-20 DIAGNOSIS — R06.02 SOB (SHORTNESS OF BREATH): Primary | ICD-10-CM

## 2021-09-20 DIAGNOSIS — D64.9 SYMPTOMATIC ANEMIA: ICD-10-CM

## 2021-09-20 DIAGNOSIS — R07.9 CHEST PAIN: ICD-10-CM

## 2021-09-20 PROBLEM — K92.1 MELENA: Status: ACTIVE | Noted: 2021-09-20

## 2021-09-20 LAB
ABO + RH BLD: NORMAL
ALBUMIN SERPL BCP-MCNC: 3.7 G/DL (ref 3.5–5.2)
ALP SERPL-CCNC: 61 U/L (ref 55–135)
ALT SERPL W/O P-5'-P-CCNC: 24 U/L (ref 10–44)
ANION GAP SERPL CALC-SCNC: 9 MMOL/L (ref 8–16)
AST SERPL-CCNC: 26 U/L (ref 10–40)
BASOPHILS # BLD AUTO: 0.01 K/UL (ref 0–0.2)
BASOPHILS # BLD AUTO: 0.02 K/UL (ref 0–0.2)
BASOPHILS NFR BLD: 0.2 % (ref 0–1.9)
BASOPHILS NFR BLD: 0.4 % (ref 0–1.9)
BILIRUB SERPL-MCNC: 0.5 MG/DL (ref 0.1–1)
BLD GP AB SCN CELLS X3 SERPL QL: NORMAL
BNP SERPL-MCNC: 144 PG/ML (ref 0–99)
BSA FOR ECHO PROCEDURE: 2.15 M2
BUN SERPL-MCNC: 13 MG/DL (ref 8–23)
CALCIUM SERPL-MCNC: 9.4 MG/DL (ref 8.7–10.5)
CHLORIDE SERPL-SCNC: 100 MMOL/L (ref 95–110)
CO2 SERPL-SCNC: 23 MMOL/L (ref 23–29)
CREAT SERPL-MCNC: 0.8 MG/DL (ref 0.5–1.4)
CTP QC/QA: YES
DIFFERENTIAL METHOD: ABNORMAL
DIFFERENTIAL METHOD: ABNORMAL
EJECTION FRACTION: 65 %
EOSINOPHIL # BLD AUTO: 0.1 K/UL (ref 0–0.5)
EOSINOPHIL # BLD AUTO: 0.1 K/UL (ref 0–0.5)
EOSINOPHIL NFR BLD: 2.2 % (ref 0–8)
EOSINOPHIL NFR BLD: 2.2 % (ref 0–8)
ERYTHROCYTE [DISTWIDTH] IN BLOOD BY AUTOMATED COUNT: 13.2 % (ref 11.5–14.5)
ERYTHROCYTE [DISTWIDTH] IN BLOOD BY AUTOMATED COUNT: 13.2 % (ref 11.5–14.5)
EST. GFR  (AFRICAN AMERICAN): >60 ML/MIN/1.73 M^2
EST. GFR  (NON AFRICAN AMERICAN): >60 ML/MIN/1.73 M^2
FERRITIN SERPL-MCNC: 20 NG/ML (ref 20–300)
GLUCOSE SERPL-MCNC: 107 MG/DL (ref 70–110)
HCT VFR BLD AUTO: 27.8 % (ref 40–54)
HCT VFR BLD AUTO: 28.6 % (ref 40–54)
HGB BLD-MCNC: 8.7 G/DL (ref 14–18)
HGB BLD-MCNC: 8.8 G/DL (ref 14–18)
IMM GRANULOCYTES # BLD AUTO: 0.01 K/UL (ref 0–0.04)
IMM GRANULOCYTES # BLD AUTO: 0.02 K/UL (ref 0–0.04)
IMM GRANULOCYTES NFR BLD AUTO: 0.2 % (ref 0–0.5)
IMM GRANULOCYTES NFR BLD AUTO: 0.4 % (ref 0–0.5)
INR PPP: 2.4 (ref 0.8–1.2)
LYMPHOCYTES # BLD AUTO: 1.5 K/UL (ref 1–4.8)
LYMPHOCYTES # BLD AUTO: 1.8 K/UL (ref 1–4.8)
LYMPHOCYTES NFR BLD: 27.2 % (ref 18–48)
LYMPHOCYTES NFR BLD: 32.3 % (ref 18–48)
MCH RBC QN AUTO: 26.5 PG (ref 27–31)
MCH RBC QN AUTO: 26.7 PG (ref 27–31)
MCHC RBC AUTO-ENTMCNC: 30.8 G/DL (ref 32–36)
MCHC RBC AUTO-ENTMCNC: 31.3 G/DL (ref 32–36)
MCV RBC AUTO: 85 FL (ref 82–98)
MCV RBC AUTO: 87 FL (ref 82–98)
MONOCYTES # BLD AUTO: 0.7 K/UL (ref 0.3–1)
MONOCYTES # BLD AUTO: 0.9 K/UL (ref 0.3–1)
MONOCYTES NFR BLD: 13.1 % (ref 4–15)
MONOCYTES NFR BLD: 16.8 % (ref 4–15)
NEUTROPHILS # BLD AUTO: 2.8 K/UL (ref 1.8–7.7)
NEUTROPHILS # BLD AUTO: 2.9 K/UL (ref 1.8–7.7)
NEUTROPHILS NFR BLD: 51.8 % (ref 38–73)
NEUTROPHILS NFR BLD: 53.2 % (ref 38–73)
NRBC BLD-RTO: 0 /100 WBC
NRBC BLD-RTO: 0 /100 WBC
OB PNL STL: POSITIVE
PLATELET # BLD AUTO: 370 K/UL (ref 150–450)
PLATELET # BLD AUTO: 370 K/UL (ref 150–450)
PMV BLD AUTO: 8.5 FL (ref 9.2–12.9)
PMV BLD AUTO: 8.6 FL (ref 9.2–12.9)
POCT GLUCOSE: 102 MG/DL (ref 70–110)
POCT GLUCOSE: 130 MG/DL (ref 70–110)
POTASSIUM SERPL-SCNC: 4.3 MMOL/L (ref 3.5–5.1)
PROT SERPL-MCNC: 6.7 G/DL (ref 6–8.4)
PROTHROMBIN TIME: 25.6 SEC (ref 9–12.5)
RA PRESSURE: 3 MMHG
RBC # BLD AUTO: 3.28 M/UL (ref 4.6–6.2)
RBC # BLD AUTO: 3.29 M/UL (ref 4.6–6.2)
SARS-COV-2 RDRP RESP QL NAA+PROBE: NEGATIVE
SODIUM SERPL-SCNC: 132 MMOL/L (ref 136–145)
TROPONIN I SERPL DL<=0.01 NG/ML-MCNC: 0.01 NG/ML (ref 0–0.03)
TROPONIN I SERPL DL<=0.01 NG/ML-MCNC: 0.01 NG/ML (ref 0–0.03)
TROPONIN I SERPL DL<=0.01 NG/ML-MCNC: 0.02 NG/ML (ref 0–0.03)
TROPONIN I SERPL DL<=0.01 NG/ML-MCNC: <0.006 NG/ML (ref 0–0.03)
WBC # BLD AUTO: 5.42 K/UL (ref 3.9–12.7)
WBC # BLD AUTO: 5.48 K/UL (ref 3.9–12.7)

## 2021-09-20 PROCEDURE — 84484 ASSAY OF TROPONIN QUANT: CPT | Mod: 91 | Performed by: NURSE PRACTITIONER

## 2021-09-20 PROCEDURE — 96372 THER/PROPH/DIAG INJ SC/IM: CPT | Mod: 59

## 2021-09-20 PROCEDURE — 99214 OFFICE O/P EST MOD 30 MIN: CPT | Mod: 25,,, | Performed by: INTERNAL MEDICINE

## 2021-09-20 PROCEDURE — 85025 COMPLETE CBC W/AUTO DIFF WBC: CPT | Mod: 91 | Performed by: NURSE PRACTITIONER

## 2021-09-20 PROCEDURE — 86900 BLOOD TYPING SEROLOGIC ABO: CPT | Performed by: NURSE PRACTITIONER

## 2021-09-20 PROCEDURE — 96374 THER/PROPH/DIAG INJ IV PUSH: CPT | Mod: HCNC

## 2021-09-20 PROCEDURE — G0378 HOSPITAL OBSERVATION PER HR: HCPCS

## 2021-09-20 PROCEDURE — 83880 ASSAY OF NATRIURETIC PEPTIDE: CPT | Performed by: EMERGENCY MEDICINE

## 2021-09-20 PROCEDURE — 36415 COLL VENOUS BLD VENIPUNCTURE: CPT | Performed by: EMERGENCY MEDICINE

## 2021-09-20 PROCEDURE — 82728 ASSAY OF FERRITIN: CPT | Performed by: EMERGENCY MEDICINE

## 2021-09-20 PROCEDURE — 85610 PROTHROMBIN TIME: CPT | Performed by: PHYSICIAN ASSISTANT

## 2021-09-20 PROCEDURE — U0002 COVID-19 LAB TEST NON-CDC: HCPCS | Performed by: EMERGENCY MEDICINE

## 2021-09-20 PROCEDURE — 25000003 PHARM REV CODE 250: Performed by: EMERGENCY MEDICINE

## 2021-09-20 PROCEDURE — 99214 PR OFFICE/OUTPT VISIT, EST, LEVL IV, 30-39 MIN: ICD-10-PCS | Mod: 25,,, | Performed by: INTERNAL MEDICINE

## 2021-09-20 PROCEDURE — 93005 ELECTROCARDIOGRAM TRACING: CPT

## 2021-09-20 PROCEDURE — 25000003 PHARM REV CODE 250: Performed by: NURSE PRACTITIONER

## 2021-09-20 PROCEDURE — 80053 COMPREHEN METABOLIC PANEL: CPT | Performed by: EMERGENCY MEDICINE

## 2021-09-20 PROCEDURE — 84484 ASSAY OF TROPONIN QUANT: CPT | Mod: 91 | Performed by: EMERGENCY MEDICINE

## 2021-09-20 PROCEDURE — 84466 ASSAY OF TRANSFERRIN: CPT | Performed by: EMERGENCY MEDICINE

## 2021-09-20 PROCEDURE — 99285 EMERGENCY DEPT VISIT HI MDM: CPT | Mod: 25,HCNC

## 2021-09-20 PROCEDURE — 85025 COMPLETE CBC W/AUTO DIFF WBC: CPT | Performed by: EMERGENCY MEDICINE

## 2021-09-20 PROCEDURE — 82272 OCCULT BLD FECES 1-3 TESTS: CPT | Performed by: EMERGENCY MEDICINE

## 2021-09-20 PROCEDURE — C9113 INJ PANTOPRAZOLE SODIUM, VIA: HCPCS | Performed by: EMERGENCY MEDICINE

## 2021-09-20 PROCEDURE — 93010 EKG 12-LEAD: ICD-10-PCS | Mod: ,,, | Performed by: INTERNAL MEDICINE

## 2021-09-20 PROCEDURE — 36415 COLL VENOUS BLD VENIPUNCTURE: CPT | Performed by: NURSE PRACTITIONER

## 2021-09-20 PROCEDURE — 63600175 PHARM REV CODE 636 W HCPCS: Performed by: EMERGENCY MEDICINE

## 2021-09-20 PROCEDURE — 93010 ELECTROCARDIOGRAM REPORT: CPT | Mod: ,,, | Performed by: INTERNAL MEDICINE

## 2021-09-20 PROCEDURE — A4216 STERILE WATER/SALINE, 10 ML: HCPCS | Performed by: NURSE PRACTITIONER

## 2021-09-20 PROCEDURE — 63600175 PHARM REV CODE 636 W HCPCS: Performed by: NURSE PRACTITIONER

## 2021-09-20 RX ORDER — PANTOPRAZOLE SODIUM 40 MG/10ML
80 INJECTION, POWDER, LYOPHILIZED, FOR SOLUTION INTRAVENOUS
Status: COMPLETED | OUTPATIENT
Start: 2021-09-20 | End: 2021-09-20

## 2021-09-20 RX ORDER — MAG HYDROX/ALUMINUM HYD/SIMETH 200-200-20
30 SUSPENSION, ORAL (FINAL DOSE FORM) ORAL 4 TIMES DAILY PRN
Status: DISCONTINUED | OUTPATIENT
Start: 2021-09-20 | End: 2021-09-21 | Stop reason: HOSPADM

## 2021-09-20 RX ORDER — KETOROLAC TROMETHAMINE 30 MG/ML
15 INJECTION, SOLUTION INTRAMUSCULAR; INTRAVENOUS EVERY 6 HOURS PRN
Status: DISCONTINUED | OUTPATIENT
Start: 2021-09-20 | End: 2021-09-21 | Stop reason: HOSPADM

## 2021-09-20 RX ORDER — PROMETHAZINE HYDROCHLORIDE 25 MG/1
25 TABLET ORAL EVERY 6 HOURS PRN
Status: DISCONTINUED | OUTPATIENT
Start: 2021-09-20 | End: 2021-09-21 | Stop reason: HOSPADM

## 2021-09-20 RX ORDER — IBUPROFEN 200 MG
24 TABLET ORAL
Status: DISCONTINUED | OUTPATIENT
Start: 2021-09-20 | End: 2021-09-21 | Stop reason: HOSPADM

## 2021-09-20 RX ORDER — PANTOPRAZOLE SODIUM 40 MG/10ML
40 INJECTION, POWDER, LYOPHILIZED, FOR SOLUTION INTRAVENOUS 2 TIMES DAILY
Status: DISCONTINUED | OUTPATIENT
Start: 2021-09-20 | End: 2021-09-20

## 2021-09-20 RX ORDER — IBUPROFEN 200 MG
16 TABLET ORAL
Status: DISCONTINUED | OUTPATIENT
Start: 2021-09-20 | End: 2021-09-21 | Stop reason: HOSPADM

## 2021-09-20 RX ORDER — ASPIRIN 325 MG
325 TABLET ORAL
Status: COMPLETED | OUTPATIENT
Start: 2021-09-20 | End: 2021-09-20

## 2021-09-20 RX ORDER — ONDANSETRON 8 MG/1
8 TABLET, ORALLY DISINTEGRATING ORAL EVERY 8 HOURS PRN
Status: DISCONTINUED | OUTPATIENT
Start: 2021-09-20 | End: 2021-09-21 | Stop reason: HOSPADM

## 2021-09-20 RX ORDER — NALOXONE HCL 0.4 MG/ML
0.02 VIAL (ML) INJECTION
Status: DISCONTINUED | OUTPATIENT
Start: 2021-09-20 | End: 2021-09-21 | Stop reason: HOSPADM

## 2021-09-20 RX ORDER — LANOLIN ALCOHOL/MO/W.PET/CERES
800 CREAM (GRAM) TOPICAL
Status: DISCONTINUED | OUTPATIENT
Start: 2021-09-20 | End: 2021-09-21 | Stop reason: HOSPADM

## 2021-09-20 RX ORDER — KETOROLAC TROMETHAMINE 30 MG/ML
30 INJECTION, SOLUTION INTRAMUSCULAR; INTRAVENOUS EVERY 6 HOURS PRN
Status: DISCONTINUED | OUTPATIENT
Start: 2021-09-20 | End: 2021-09-20

## 2021-09-20 RX ORDER — PANTOPRAZOLE SODIUM 40 MG/1
40 TABLET, DELAYED RELEASE ORAL 2 TIMES DAILY
Status: DISCONTINUED | OUTPATIENT
Start: 2021-09-20 | End: 2021-09-21 | Stop reason: HOSPADM

## 2021-09-20 RX ORDER — ACETAMINOPHEN 325 MG/1
650 TABLET ORAL EVERY 8 HOURS PRN
Status: DISCONTINUED | OUTPATIENT
Start: 2021-09-20 | End: 2021-09-21 | Stop reason: HOSPADM

## 2021-09-20 RX ORDER — AMOXICILLIN 250 MG
1 CAPSULE ORAL 2 TIMES DAILY PRN
Status: DISCONTINUED | OUTPATIENT
Start: 2021-09-20 | End: 2021-09-21 | Stop reason: HOSPADM

## 2021-09-20 RX ORDER — TALC
3 POWDER (GRAM) TOPICAL NIGHTLY
Status: DISCONTINUED | OUTPATIENT
Start: 2021-09-20 | End: 2021-09-20

## 2021-09-20 RX ORDER — SODIUM,POTASSIUM PHOSPHATES 280-250MG
2 POWDER IN PACKET (EA) ORAL
Status: DISCONTINUED | OUTPATIENT
Start: 2021-09-20 | End: 2021-09-21 | Stop reason: HOSPADM

## 2021-09-20 RX ORDER — INSULIN ASPART 100 [IU]/ML
0-5 INJECTION, SOLUTION INTRAVENOUS; SUBCUTANEOUS
Status: DISCONTINUED | OUTPATIENT
Start: 2021-09-20 | End: 2021-09-21 | Stop reason: HOSPADM

## 2021-09-20 RX ORDER — ACETAMINOPHEN 325 MG/1
650 TABLET ORAL EVERY 4 HOURS PRN
Status: DISCONTINUED | OUTPATIENT
Start: 2021-09-20 | End: 2021-09-21 | Stop reason: HOSPADM

## 2021-09-20 RX ORDER — ASPIRIN 81 MG/1
81 TABLET ORAL DAILY
Status: DISCONTINUED | OUTPATIENT
Start: 2021-09-21 | End: 2021-09-21 | Stop reason: HOSPADM

## 2021-09-20 RX ORDER — TORSEMIDE 10 MG/1
10 TABLET ORAL DAILY
Status: DISCONTINUED | OUTPATIENT
Start: 2021-09-21 | End: 2021-09-21 | Stop reason: HOSPADM

## 2021-09-20 RX ORDER — SIMETHICONE 80 MG
1 TABLET,CHEWABLE ORAL 4 TIMES DAILY PRN
Status: DISCONTINUED | OUTPATIENT
Start: 2021-09-20 | End: 2021-09-21 | Stop reason: HOSPADM

## 2021-09-20 RX ORDER — GLUCAGON 1 MG
1 KIT INJECTION
Status: DISCONTINUED | OUTPATIENT
Start: 2021-09-20 | End: 2021-09-21 | Stop reason: HOSPADM

## 2021-09-20 RX ORDER — AMLODIPINE BESYLATE 5 MG/1
5 TABLET ORAL DAILY
Status: DISCONTINUED | OUTPATIENT
Start: 2021-09-21 | End: 2021-09-21 | Stop reason: HOSPADM

## 2021-09-20 RX ORDER — ACETAMINOPHEN 325 MG/1
650 TABLET ORAL EVERY 8 HOURS PRN
Status: DISCONTINUED | OUTPATIENT
Start: 2021-09-20 | End: 2021-09-20

## 2021-09-20 RX ORDER — CLOPIDOGREL BISULFATE 75 MG/1
75 TABLET ORAL DAILY
Status: DISCONTINUED | OUTPATIENT
Start: 2021-09-21 | End: 2021-09-21 | Stop reason: HOSPADM

## 2021-09-20 RX ORDER — ATORVASTATIN CALCIUM 40 MG/1
40 TABLET, FILM COATED ORAL DAILY
Status: DISCONTINUED | OUTPATIENT
Start: 2021-09-21 | End: 2021-09-21 | Stop reason: HOSPADM

## 2021-09-20 RX ORDER — LOSARTAN POTASSIUM 50 MG/1
50 TABLET ORAL 2 TIMES DAILY
Status: DISCONTINUED | OUTPATIENT
Start: 2021-09-20 | End: 2021-09-21 | Stop reason: HOSPADM

## 2021-09-20 RX ORDER — SODIUM CHLORIDE 0.9 % (FLUSH) 0.9 %
10 SYRINGE (ML) INJECTION EVERY 8 HOURS
Status: DISCONTINUED | OUTPATIENT
Start: 2021-09-20 | End: 2021-09-21 | Stop reason: HOSPADM

## 2021-09-20 RX ORDER — TALC
6 POWDER (GRAM) TOPICAL NIGHTLY
Status: DISCONTINUED | OUTPATIENT
Start: 2021-09-20 | End: 2021-09-21 | Stop reason: HOSPADM

## 2021-09-20 RX ORDER — METOLAZONE 2.5 MG/1
2.5 TABLET ORAL
Status: DISCONTINUED | OUTPATIENT
Start: 2021-09-21 | End: 2021-09-21 | Stop reason: HOSPADM

## 2021-09-20 RX ORDER — ENOXAPARIN SODIUM 100 MG/ML
40 INJECTION SUBCUTANEOUS EVERY 24 HOURS
Status: DISCONTINUED | OUTPATIENT
Start: 2021-09-20 | End: 2021-09-21 | Stop reason: HOSPADM

## 2021-09-20 RX ADMIN — LOSARTAN POTASSIUM 50 MG: 50 TABLET, FILM COATED ORAL at 08:09

## 2021-09-20 RX ADMIN — PANTOPRAZOLE SODIUM 80 MG: 40 INJECTION, POWDER, FOR SOLUTION INTRAVENOUS at 11:09

## 2021-09-20 RX ADMIN — ENOXAPARIN SODIUM 40 MG: 40 INJECTION SUBCUTANEOUS at 05:09

## 2021-09-20 RX ADMIN — ASPIRIN 325 MG ORAL TABLET 325 MG: 325 PILL ORAL at 09:09

## 2021-09-20 RX ADMIN — Medication 10 ML: at 10:09

## 2021-09-20 RX ADMIN — PANTOPRAZOLE SODIUM 40 MG: 40 TABLET, DELAYED RELEASE ORAL at 05:09

## 2021-09-20 RX ADMIN — Medication 6 MG: at 08:09

## 2021-09-21 ENCOUNTER — DOCUMENTATION ONLY (OUTPATIENT)
Dept: GASTROENTEROLOGY | Facility: HOSPITAL | Age: 81
End: 2021-09-21

## 2021-09-21 ENCOUNTER — TELEPHONE (OUTPATIENT)
Dept: GASTROENTEROLOGY | Facility: CLINIC | Age: 81
End: 2021-09-21

## 2021-09-21 VITALS
RESPIRATION RATE: 18 BRPM | HEART RATE: 89 BPM | SYSTOLIC BLOOD PRESSURE: 90 MMHG | WEIGHT: 215 LBS | TEMPERATURE: 98 F | HEIGHT: 67 IN | OXYGEN SATURATION: 97 % | DIASTOLIC BLOOD PRESSURE: 53 MMHG | BODY MASS INDEX: 33.74 KG/M2

## 2021-09-21 DIAGNOSIS — D62 ACUTE BLOOD LOSS ANEMIA: Primary | ICD-10-CM

## 2021-09-21 DIAGNOSIS — D50.0 IRON DEFICIENCY ANEMIA SECONDARY TO BLOOD LOSS (CHRONIC): ICD-10-CM

## 2021-09-21 PROBLEM — D64.9 SYMPTOMATIC ANEMIA: Status: RESOLVED | Noted: 2021-09-20 | Resolved: 2021-09-21

## 2021-09-21 LAB
ANION GAP SERPL CALC-SCNC: 6 MMOL/L (ref 8–16)
BASOPHILS # BLD AUTO: 0.01 K/UL (ref 0–0.2)
BASOPHILS # BLD AUTO: 0.02 K/UL (ref 0–0.2)
BASOPHILS # BLD AUTO: 0.02 K/UL (ref 0–0.2)
BASOPHILS NFR BLD: 0.2 % (ref 0–1.9)
BASOPHILS NFR BLD: 0.4 % (ref 0–1.9)
BASOPHILS NFR BLD: 0.4 % (ref 0–1.9)
BLD PROD TYP BPU: NORMAL
BLOOD UNIT EXPIRATION DATE: NORMAL
BLOOD UNIT TYPE CODE: 5100
BLOOD UNIT TYPE: NORMAL
BUN SERPL-MCNC: 15 MG/DL (ref 8–23)
CALCIUM SERPL-MCNC: 9 MG/DL (ref 8.7–10.5)
CHLORIDE SERPL-SCNC: 101 MMOL/L (ref 95–110)
CO2 SERPL-SCNC: 25 MMOL/L (ref 23–29)
CODING SYSTEM: NORMAL
CREAT SERPL-MCNC: 0.9 MG/DL (ref 0.5–1.4)
DIFFERENTIAL METHOD: ABNORMAL
DISPENSE STATUS: NORMAL
EOSINOPHIL # BLD AUTO: 0.1 K/UL (ref 0–0.5)
EOSINOPHIL NFR BLD: 1.6 % (ref 0–8)
EOSINOPHIL NFR BLD: 1.9 % (ref 0–8)
EOSINOPHIL NFR BLD: 3.2 % (ref 0–8)
ERYTHROCYTE [DISTWIDTH] IN BLOOD BY AUTOMATED COUNT: 13.3 % (ref 11.5–14.5)
ERYTHROCYTE [DISTWIDTH] IN BLOOD BY AUTOMATED COUNT: 13.3 % (ref 11.5–14.5)
ERYTHROCYTE [DISTWIDTH] IN BLOOD BY AUTOMATED COUNT: 13.4 % (ref 11.5–14.5)
EST. GFR  (AFRICAN AMERICAN): >60 ML/MIN/1.73 M^2
EST. GFR  (NON AFRICAN AMERICAN): >60 ML/MIN/1.73 M^2
GLUCOSE SERPL-MCNC: 105 MG/DL (ref 70–110)
HCT VFR BLD AUTO: 26.1 % (ref 40–54)
HCT VFR BLD AUTO: 27.3 % (ref 40–54)
HCT VFR BLD AUTO: 28.3 % (ref 40–54)
HGB BLD-MCNC: 8.1 G/DL (ref 14–18)
HGB BLD-MCNC: 8.4 G/DL (ref 14–18)
HGB BLD-MCNC: 8.8 G/DL (ref 14–18)
IMM GRANULOCYTES # BLD AUTO: 0.01 K/UL (ref 0–0.04)
IMM GRANULOCYTES # BLD AUTO: 0.02 K/UL (ref 0–0.04)
IMM GRANULOCYTES # BLD AUTO: 0.02 K/UL (ref 0–0.04)
IMM GRANULOCYTES NFR BLD AUTO: 0.2 % (ref 0–0.5)
IMM GRANULOCYTES NFR BLD AUTO: 0.4 % (ref 0–0.5)
IMM GRANULOCYTES NFR BLD AUTO: 0.4 % (ref 0–0.5)
INR PPP: 1.7 (ref 0.8–1.2)
IRON SERPL-MCNC: 15 UG/DL (ref 45–160)
LYMPHOCYTES # BLD AUTO: 1.2 K/UL (ref 1–4.8)
LYMPHOCYTES # BLD AUTO: 1.4 K/UL (ref 1–4.8)
LYMPHOCYTES # BLD AUTO: 1.6 K/UL (ref 1–4.8)
LYMPHOCYTES NFR BLD: 25.6 % (ref 18–48)
LYMPHOCYTES NFR BLD: 28.9 % (ref 18–48)
LYMPHOCYTES NFR BLD: 30.6 % (ref 18–48)
MAGNESIUM SERPL-MCNC: 1.9 MG/DL (ref 1.6–2.6)
MCH RBC QN AUTO: 26.3 PG (ref 27–31)
MCH RBC QN AUTO: 26.6 PG (ref 27–31)
MCH RBC QN AUTO: 26.7 PG (ref 27–31)
MCHC RBC AUTO-ENTMCNC: 30.8 G/DL (ref 32–36)
MCHC RBC AUTO-ENTMCNC: 31 G/DL (ref 32–36)
MCHC RBC AUTO-ENTMCNC: 31.1 G/DL (ref 32–36)
MCV RBC AUTO: 85 FL (ref 82–98)
MCV RBC AUTO: 86 FL (ref 82–98)
MCV RBC AUTO: 86 FL (ref 82–98)
MONOCYTES # BLD AUTO: 0.6 K/UL (ref 0.3–1)
MONOCYTES # BLD AUTO: 0.6 K/UL (ref 0.3–1)
MONOCYTES # BLD AUTO: 0.7 K/UL (ref 0.3–1)
MONOCYTES NFR BLD: 10.9 % (ref 4–15)
MONOCYTES NFR BLD: 13.3 % (ref 4–15)
MONOCYTES NFR BLD: 13.4 % (ref 4–15)
NEUTROPHILS # BLD AUTO: 2.2 K/UL (ref 1.8–7.7)
NEUTROPHILS # BLD AUTO: 2.8 K/UL (ref 1.8–7.7)
NEUTROPHILS # BLD AUTO: 3.4 K/UL (ref 1.8–7.7)
NEUTROPHILS NFR BLD: 53.4 % (ref 38–73)
NEUTROPHILS NFR BLD: 54.1 % (ref 38–73)
NEUTROPHILS NFR BLD: 61.1 % (ref 38–73)
NRBC BLD-RTO: 0 /100 WBC
NUM UNITS TRANS PACKED RBC: NORMAL
PHOSPHATE SERPL-MCNC: 2.2 MG/DL (ref 2.7–4.5)
PLATELET # BLD AUTO: 346 K/UL (ref 150–450)
PLATELET # BLD AUTO: 349 K/UL (ref 150–450)
PLATELET # BLD AUTO: 402 K/UL (ref 150–450)
PMV BLD AUTO: 8.2 FL (ref 9.2–12.9)
PMV BLD AUTO: 8.7 FL (ref 9.2–12.9)
PMV BLD AUTO: 9 FL (ref 9.2–12.9)
POCT GLUCOSE: 122 MG/DL (ref 70–110)
POCT GLUCOSE: 131 MG/DL (ref 70–110)
POTASSIUM SERPL-SCNC: 4.2 MMOL/L (ref 3.5–5.1)
PROTHROMBIN TIME: 18.8 SEC (ref 9–12.5)
RBC # BLD AUTO: 3.05 M/UL (ref 4.6–6.2)
RBC # BLD AUTO: 3.2 M/UL (ref 4.6–6.2)
RBC # BLD AUTO: 3.3 M/UL (ref 4.6–6.2)
SATURATED IRON: 3 % (ref 20–50)
SODIUM SERPL-SCNC: 132 MMOL/L (ref 136–145)
TOTAL IRON BINDING CAPACITY: 456 UG/DL (ref 250–450)
TRANSFERRIN SERPL-MCNC: 308 MG/DL (ref 200–375)
TROPONIN I SERPL DL<=0.01 NG/ML-MCNC: 0.01 NG/ML (ref 0–0.03)
WBC # BLD AUTO: 4.09 K/UL (ref 3.9–12.7)
WBC # BLD AUTO: 5.26 K/UL (ref 3.9–12.7)
WBC # BLD AUTO: 5.51 K/UL (ref 3.9–12.7)

## 2021-09-21 PROCEDURE — 80048 BASIC METABOLIC PNL TOTAL CA: CPT | Performed by: NURSE PRACTITIONER

## 2021-09-21 PROCEDURE — 83735 ASSAY OF MAGNESIUM: CPT | Performed by: NURSE PRACTITIONER

## 2021-09-21 PROCEDURE — A4216 STERILE WATER/SALINE, 10 ML: HCPCS | Performed by: NURSE PRACTITIONER

## 2021-09-21 PROCEDURE — 36415 COLL VENOUS BLD VENIPUNCTURE: CPT | Performed by: NURSE PRACTITIONER

## 2021-09-21 PROCEDURE — 99215 PR OFFICE/OUTPT VISIT, EST, LEVL V, 40-54 MIN: ICD-10-PCS | Mod: ,,, | Performed by: INTERNAL MEDICINE

## 2021-09-21 PROCEDURE — 84100 ASSAY OF PHOSPHORUS: CPT | Performed by: NURSE PRACTITIONER

## 2021-09-21 PROCEDURE — 86920 COMPATIBILITY TEST SPIN: CPT | Performed by: STUDENT IN AN ORGANIZED HEALTH CARE EDUCATION/TRAINING PROGRAM

## 2021-09-21 PROCEDURE — 85025 COMPLETE CBC W/AUTO DIFF WBC: CPT | Mod: 91 | Performed by: NURSE PRACTITIONER

## 2021-09-21 PROCEDURE — 25000003 PHARM REV CODE 250: Performed by: PHYSICIAN ASSISTANT

## 2021-09-21 PROCEDURE — 85610 PROTHROMBIN TIME: CPT | Performed by: PHYSICIAN ASSISTANT

## 2021-09-21 PROCEDURE — 63600175 PHARM REV CODE 636 W HCPCS: Performed by: STUDENT IN AN ORGANIZED HEALTH CARE EDUCATION/TRAINING PROGRAM

## 2021-09-21 PROCEDURE — 99215 OFFICE O/P EST HI 40 MIN: CPT | Mod: ,,, | Performed by: INTERNAL MEDICINE

## 2021-09-21 PROCEDURE — G0378 HOSPITAL OBSERVATION PER HR: HCPCS

## 2021-09-21 PROCEDURE — 84484 ASSAY OF TROPONIN QUANT: CPT | Performed by: NURSE PRACTITIONER

## 2021-09-21 PROCEDURE — 25000003 PHARM REV CODE 250: Performed by: NURSE PRACTITIONER

## 2021-09-21 PROCEDURE — 25000003 PHARM REV CODE 250: Performed by: STUDENT IN AN ORGANIZED HEALTH CARE EDUCATION/TRAINING PROGRAM

## 2021-09-21 RX ORDER — ISOSORBIDE MONONITRATE 30 MG/1
30 TABLET, EXTENDED RELEASE ORAL DAILY
Status: DISCONTINUED | OUTPATIENT
Start: 2021-09-21 | End: 2021-09-21 | Stop reason: HOSPADM

## 2021-09-21 RX ORDER — ISOSORBIDE MONONITRATE 30 MG/1
30 TABLET, EXTENDED RELEASE ORAL DAILY
Qty: 30 TABLET | Refills: 11 | Status: SHIPPED | OUTPATIENT
Start: 2021-09-22 | End: 2022-09-23 | Stop reason: SDUPTHER

## 2021-09-21 RX ORDER — PANTOPRAZOLE SODIUM 40 MG/1
40 TABLET, DELAYED RELEASE ORAL 2 TIMES DAILY
Qty: 60 TABLET | Refills: 2 | Status: SHIPPED | OUTPATIENT
Start: 2021-09-21 | End: 2022-04-06

## 2021-09-21 RX ORDER — HYDROCODONE BITARTRATE AND ACETAMINOPHEN 500; 5 MG/1; MG/1
TABLET ORAL
Status: DISCONTINUED | OUTPATIENT
Start: 2021-09-21 | End: 2021-09-21

## 2021-09-21 RX ADMIN — PANTOPRAZOLE SODIUM 40 MG: 40 TABLET, DELAYED RELEASE ORAL at 08:09

## 2021-09-21 RX ADMIN — Medication 10 ML: at 02:09

## 2021-09-21 RX ADMIN — ASPIRIN 81 MG: 81 TABLET, COATED ORAL at 08:09

## 2021-09-21 RX ADMIN — ATORVASTATIN CALCIUM 40 MG: 40 TABLET, FILM COATED ORAL at 08:09

## 2021-09-21 RX ADMIN — LOSARTAN POTASSIUM 50 MG: 50 TABLET, FILM COATED ORAL at 08:09

## 2021-09-21 RX ADMIN — IRON SUCROSE 400 MG: 20 INJECTION, SOLUTION INTRAVENOUS at 02:09

## 2021-09-21 RX ADMIN — TORSEMIDE 10 MG: 10 TABLET ORAL at 08:09

## 2021-09-21 RX ADMIN — ISOSORBIDE MONONITRATE 30 MG: 30 TABLET, EXTENDED RELEASE ORAL at 12:09

## 2021-09-21 RX ADMIN — AMLODIPINE BESYLATE 5 MG: 5 TABLET ORAL at 08:09

## 2021-09-21 RX ADMIN — Medication 10 ML: at 05:09

## 2021-09-21 RX ADMIN — CLOPIDOGREL 75 MG: 75 TABLET, FILM COATED ORAL at 08:09

## 2021-09-22 ENCOUNTER — TELEPHONE (OUTPATIENT)
Dept: INTERNAL MEDICINE | Facility: CLINIC | Age: 81
End: 2021-09-22

## 2021-09-23 ENCOUNTER — OFFICE VISIT (OUTPATIENT)
Dept: GASTROENTEROLOGY | Facility: CLINIC | Age: 81
End: 2021-09-23
Payer: MEDICARE

## 2021-09-23 VITALS
DIASTOLIC BLOOD PRESSURE: 70 MMHG | OXYGEN SATURATION: 98 % | WEIGHT: 214.06 LBS | HEART RATE: 88 BPM | HEIGHT: 67 IN | BODY MASS INDEX: 33.6 KG/M2 | SYSTOLIC BLOOD PRESSURE: 110 MMHG

## 2021-09-23 DIAGNOSIS — I48.20 CHRONIC ATRIAL FIBRILLATION: ICD-10-CM

## 2021-09-23 DIAGNOSIS — R19.5 HEME POSITIVE STOOL: ICD-10-CM

## 2021-09-23 DIAGNOSIS — I25.10 CORONARY ARTERY DISEASE INVOLVING NATIVE CORONARY ARTERY OF NATIVE HEART, ANGINA PRESENCE UNSPECIFIED: ICD-10-CM

## 2021-09-23 DIAGNOSIS — Z79.01 CHRONIC ANTICOAGULATION: ICD-10-CM

## 2021-09-23 DIAGNOSIS — Z86.010 HISTORY OF COLON POLYPS: ICD-10-CM

## 2021-09-23 DIAGNOSIS — D50.0 IRON DEFICIENCY ANEMIA DUE TO CHRONIC BLOOD LOSS: Primary | ICD-10-CM

## 2021-09-23 DIAGNOSIS — K57.30 DIVERTICULOSIS OF COLON: ICD-10-CM

## 2021-09-23 DIAGNOSIS — D62 ACUTE BLOOD LOSS ANEMIA: ICD-10-CM

## 2021-09-23 PROCEDURE — 99204 PR OFFICE/OUTPT VISIT, NEW, LEVL IV, 45-59 MIN: ICD-10-PCS | Mod: HCNC,S$GLB,, | Performed by: INTERNAL MEDICINE

## 2021-09-23 PROCEDURE — 1160F RVW MEDS BY RX/DR IN RCRD: CPT | Mod: HCNC,CPTII,S$GLB, | Performed by: INTERNAL MEDICINE

## 2021-09-23 PROCEDURE — 1101F PT FALLS ASSESS-DOCD LE1/YR: CPT | Mod: HCNC,CPTII,S$GLB, | Performed by: INTERNAL MEDICINE

## 2021-09-23 PROCEDURE — 1126F AMNT PAIN NOTED NONE PRSNT: CPT | Mod: HCNC,CPTII,S$GLB, | Performed by: INTERNAL MEDICINE

## 2021-09-23 PROCEDURE — 3078F PR MOST RECENT DIASTOLIC BLOOD PRESSURE < 80 MM HG: ICD-10-PCS | Mod: HCNC,CPTII,S$GLB, | Performed by: INTERNAL MEDICINE

## 2021-09-23 PROCEDURE — 99999 PR PBB SHADOW E&M-EST. PATIENT-LVL IV: ICD-10-PCS | Mod: PBBFAC,,, | Performed by: INTERNAL MEDICINE

## 2021-09-23 PROCEDURE — 3288F PR FALLS RISK ASSESSMENT DOCUMENTED: ICD-10-PCS | Mod: HCNC,CPTII,S$GLB, | Performed by: INTERNAL MEDICINE

## 2021-09-23 PROCEDURE — 1101F PR PT FALLS ASSESS DOC 0-1 FALLS W/OUT INJ PAST YR: ICD-10-PCS | Mod: HCNC,CPTII,S$GLB, | Performed by: INTERNAL MEDICINE

## 2021-09-23 PROCEDURE — 99204 OFFICE O/P NEW MOD 45 MIN: CPT | Mod: HCNC,S$GLB,, | Performed by: INTERNAL MEDICINE

## 2021-09-23 PROCEDURE — 1159F PR MEDICATION LIST DOCUMENTED IN MEDICAL RECORD: ICD-10-PCS | Mod: HCNC,CPTII,S$GLB, | Performed by: INTERNAL MEDICINE

## 2021-09-23 PROCEDURE — 3072F LOW RISK FOR RETINOPATHY: CPT | Mod: HCNC,CPTII,S$GLB, | Performed by: INTERNAL MEDICINE

## 2021-09-23 PROCEDURE — 3288F FALL RISK ASSESSMENT DOCD: CPT | Mod: HCNC,CPTII,S$GLB, | Performed by: INTERNAL MEDICINE

## 2021-09-23 PROCEDURE — 3072F PR LOW RISK FOR RETINOPATHY: ICD-10-PCS | Mod: HCNC,CPTII,S$GLB, | Performed by: INTERNAL MEDICINE

## 2021-09-23 PROCEDURE — 99999 PR PBB SHADOW E&M-EST. PATIENT-LVL IV: CPT | Mod: PBBFAC,,, | Performed by: INTERNAL MEDICINE

## 2021-09-23 PROCEDURE — 1126F PR PAIN SEVERITY QUANTIFIED, NO PAIN PRESENT: ICD-10-PCS | Mod: HCNC,CPTII,S$GLB, | Performed by: INTERNAL MEDICINE

## 2021-09-23 PROCEDURE — 1159F MED LIST DOCD IN RCRD: CPT | Mod: HCNC,CPTII,S$GLB, | Performed by: INTERNAL MEDICINE

## 2021-09-23 PROCEDURE — 1160F PR REVIEW ALL MEDS BY PRESCRIBER/CLIN PHARMACIST DOCUMENTED: ICD-10-PCS | Mod: HCNC,CPTII,S$GLB, | Performed by: INTERNAL MEDICINE

## 2021-09-23 PROCEDURE — 3074F PR MOST RECENT SYSTOLIC BLOOD PRESSURE < 130 MM HG: ICD-10-PCS | Mod: HCNC,CPTII,S$GLB, | Performed by: INTERNAL MEDICINE

## 2021-09-23 PROCEDURE — 3078F DIAST BP <80 MM HG: CPT | Mod: HCNC,CPTII,S$GLB, | Performed by: INTERNAL MEDICINE

## 2021-09-23 PROCEDURE — 3074F SYST BP LT 130 MM HG: CPT | Mod: HCNC,CPTII,S$GLB, | Performed by: INTERNAL MEDICINE

## 2021-09-24 ENCOUNTER — OFFICE VISIT (OUTPATIENT)
Dept: INTERNAL MEDICINE | Facility: CLINIC | Age: 81
End: 2021-09-24
Payer: MEDICARE

## 2021-09-24 ENCOUNTER — TELEPHONE (OUTPATIENT)
Dept: CARDIOLOGY | Facility: HOSPITAL | Age: 81
End: 2021-09-24

## 2021-09-24 VITALS
WEIGHT: 218.25 LBS | OXYGEN SATURATION: 98 % | SYSTOLIC BLOOD PRESSURE: 118 MMHG | HEART RATE: 86 BPM | HEIGHT: 67 IN | TEMPERATURE: 98 F | DIASTOLIC BLOOD PRESSURE: 78 MMHG | BODY MASS INDEX: 34.26 KG/M2

## 2021-09-24 DIAGNOSIS — I15.2 HYPERTENSION ASSOCIATED WITH DIABETES: ICD-10-CM

## 2021-09-24 DIAGNOSIS — K92.2 CHRONIC GI BLEEDING: Primary | ICD-10-CM

## 2021-09-24 DIAGNOSIS — I25.10 ATHEROSCLEROSIS OF NATIVE CORONARY ARTERY OF NATIVE HEART WITHOUT ANGINA PECTORIS: ICD-10-CM

## 2021-09-24 DIAGNOSIS — R06.02 SOB (SHORTNESS OF BREATH): Primary | ICD-10-CM

## 2021-09-24 DIAGNOSIS — E11.59 HYPERTENSION ASSOCIATED WITH DIABETES: ICD-10-CM

## 2021-09-24 DIAGNOSIS — E11.9 DIABETES MELLITUS WITHOUT COMPLICATION: ICD-10-CM

## 2021-09-24 DIAGNOSIS — Z79.01 CURRENT USE OF LONG TERM ANTICOAGULATION: ICD-10-CM

## 2021-09-24 PROCEDURE — 1160F RVW MEDS BY RX/DR IN RCRD: CPT | Mod: HCNC,CPTII,S$GLB, | Performed by: INTERNAL MEDICINE

## 2021-09-24 PROCEDURE — 3072F LOW RISK FOR RETINOPATHY: CPT | Mod: HCNC,CPTII,S$GLB, | Performed by: INTERNAL MEDICINE

## 2021-09-24 PROCEDURE — 99214 OFFICE O/P EST MOD 30 MIN: CPT | Mod: HCNC,S$GLB,, | Performed by: INTERNAL MEDICINE

## 2021-09-24 PROCEDURE — 3288F FALL RISK ASSESSMENT DOCD: CPT | Mod: HCNC,CPTII,S$GLB, | Performed by: INTERNAL MEDICINE

## 2021-09-24 PROCEDURE — 1101F PT FALLS ASSESS-DOCD LE1/YR: CPT | Mod: HCNC,CPTII,S$GLB, | Performed by: INTERNAL MEDICINE

## 2021-09-24 PROCEDURE — 99999 PR PBB SHADOW E&M-EST. PATIENT-LVL IV: ICD-10-PCS | Mod: PBBFAC,,, | Performed by: INTERNAL MEDICINE

## 2021-09-24 PROCEDURE — 3072F PR LOW RISK FOR RETINOPATHY: ICD-10-PCS | Mod: HCNC,CPTII,S$GLB, | Performed by: INTERNAL MEDICINE

## 2021-09-24 PROCEDURE — 1159F MED LIST DOCD IN RCRD: CPT | Mod: HCNC,CPTII,S$GLB, | Performed by: INTERNAL MEDICINE

## 2021-09-24 PROCEDURE — 3288F PR FALLS RISK ASSESSMENT DOCUMENTED: ICD-10-PCS | Mod: HCNC,CPTII,S$GLB, | Performed by: INTERNAL MEDICINE

## 2021-09-24 PROCEDURE — 1160F PR REVIEW ALL MEDS BY PRESCRIBER/CLIN PHARMACIST DOCUMENTED: ICD-10-PCS | Mod: HCNC,CPTII,S$GLB, | Performed by: INTERNAL MEDICINE

## 2021-09-24 PROCEDURE — 99214 PR OFFICE/OUTPT VISIT, EST, LEVL IV, 30-39 MIN: ICD-10-PCS | Mod: HCNC,S$GLB,, | Performed by: INTERNAL MEDICINE

## 2021-09-24 PROCEDURE — 1126F AMNT PAIN NOTED NONE PRSNT: CPT | Mod: HCNC,CPTII,S$GLB, | Performed by: INTERNAL MEDICINE

## 2021-09-24 PROCEDURE — 99999 PR PBB SHADOW E&M-EST. PATIENT-LVL IV: CPT | Mod: PBBFAC,,, | Performed by: INTERNAL MEDICINE

## 2021-09-24 PROCEDURE — 3078F DIAST BP <80 MM HG: CPT | Mod: HCNC,CPTII,S$GLB, | Performed by: INTERNAL MEDICINE

## 2021-09-24 PROCEDURE — 1126F PR PAIN SEVERITY QUANTIFIED, NO PAIN PRESENT: ICD-10-PCS | Mod: HCNC,CPTII,S$GLB, | Performed by: INTERNAL MEDICINE

## 2021-09-24 PROCEDURE — 1101F PR PT FALLS ASSESS DOC 0-1 FALLS W/OUT INJ PAST YR: ICD-10-PCS | Mod: HCNC,CPTII,S$GLB, | Performed by: INTERNAL MEDICINE

## 2021-09-24 PROCEDURE — 3078F PR MOST RECENT DIASTOLIC BLOOD PRESSURE < 80 MM HG: ICD-10-PCS | Mod: HCNC,CPTII,S$GLB, | Performed by: INTERNAL MEDICINE

## 2021-09-24 PROCEDURE — 3074F SYST BP LT 130 MM HG: CPT | Mod: HCNC,CPTII,S$GLB, | Performed by: INTERNAL MEDICINE

## 2021-09-24 PROCEDURE — 1159F PR MEDICATION LIST DOCUMENTED IN MEDICAL RECORD: ICD-10-PCS | Mod: HCNC,CPTII,S$GLB, | Performed by: INTERNAL MEDICINE

## 2021-09-24 PROCEDURE — 3074F PR MOST RECENT SYSTOLIC BLOOD PRESSURE < 130 MM HG: ICD-10-PCS | Mod: HCNC,CPTII,S$GLB, | Performed by: INTERNAL MEDICINE

## 2021-09-24 RX ORDER — LANOLIN ALCOHOL/MO/W.PET/CERES
1 CREAM (GRAM) TOPICAL 2 TIMES DAILY
Qty: 60 TABLET | Refills: 5 | Status: SHIPPED | OUTPATIENT
Start: 2021-09-24 | End: 2022-03-23

## 2021-09-27 ENCOUNTER — OFFICE VISIT (OUTPATIENT)
Dept: CARDIOLOGY | Facility: CLINIC | Age: 81
End: 2021-09-27
Payer: MEDICARE

## 2021-09-27 VITALS
DIASTOLIC BLOOD PRESSURE: 80 MMHG | OXYGEN SATURATION: 98 % | WEIGHT: 216.25 LBS | SYSTOLIC BLOOD PRESSURE: 130 MMHG | HEIGHT: 67 IN | HEART RATE: 90 BPM | BODY MASS INDEX: 33.94 KG/M2

## 2021-09-27 DIAGNOSIS — Z79.01 CURRENT USE OF LONG TERM ANTICOAGULATION: ICD-10-CM

## 2021-09-27 DIAGNOSIS — Z86.59 HISTORY OF MAJOR DEPRESSION: Primary | ICD-10-CM

## 2021-09-27 DIAGNOSIS — I27.20 PULMONARY HYPERTENSION, MILD: ICD-10-CM

## 2021-09-27 DIAGNOSIS — E78.5 HYPERLIPIDEMIA ASSOCIATED WITH TYPE 2 DIABETES MELLITUS: ICD-10-CM

## 2021-09-27 DIAGNOSIS — I48.0 PAROXYSMAL ATRIAL FIBRILLATION: ICD-10-CM

## 2021-09-27 DIAGNOSIS — G47.33 OSA (OBSTRUCTIVE SLEEP APNEA): ICD-10-CM

## 2021-09-27 DIAGNOSIS — E11.59 HYPERTENSION ASSOCIATED WITH DIABETES: ICD-10-CM

## 2021-09-27 DIAGNOSIS — E11.69 HYPERLIPIDEMIA ASSOCIATED WITH TYPE 2 DIABETES MELLITUS: ICD-10-CM

## 2021-09-27 DIAGNOSIS — I15.2 HYPERTENSION ASSOCIATED WITH DIABETES: ICD-10-CM

## 2021-09-27 DIAGNOSIS — I25.10 ATHEROSCLEROSIS OF NATIVE CORONARY ARTERY OF NATIVE HEART WITHOUT ANGINA PECTORIS: ICD-10-CM

## 2021-09-27 DIAGNOSIS — I70.0 ATHEROSCLEROSIS OF AORTA: ICD-10-CM

## 2021-09-27 PROCEDURE — 1159F MED LIST DOCD IN RCRD: CPT | Mod: HCNC,CPTII,S$GLB, | Performed by: INTERNAL MEDICINE

## 2021-09-27 PROCEDURE — 99999 PR PBB SHADOW E&M-EST. PATIENT-LVL IV: CPT | Mod: PBBFAC,HCNC,, | Performed by: INTERNAL MEDICINE

## 2021-09-27 PROCEDURE — 99214 PR OFFICE/OUTPT VISIT, EST, LEVL IV, 30-39 MIN: ICD-10-PCS | Mod: HCNC,S$GLB,, | Performed by: INTERNAL MEDICINE

## 2021-09-27 PROCEDURE — 3072F PR LOW RISK FOR RETINOPATHY: ICD-10-PCS | Mod: HCNC,CPTII,S$GLB, | Performed by: INTERNAL MEDICINE

## 2021-09-27 PROCEDURE — 3288F FALL RISK ASSESSMENT DOCD: CPT | Mod: HCNC,CPTII,S$GLB, | Performed by: INTERNAL MEDICINE

## 2021-09-27 PROCEDURE — 3072F LOW RISK FOR RETINOPATHY: CPT | Mod: HCNC,CPTII,S$GLB, | Performed by: INTERNAL MEDICINE

## 2021-09-27 PROCEDURE — 1160F RVW MEDS BY RX/DR IN RCRD: CPT | Mod: HCNC,CPTII,S$GLB, | Performed by: INTERNAL MEDICINE

## 2021-09-27 PROCEDURE — 1101F PR PT FALLS ASSESS DOC 0-1 FALLS W/OUT INJ PAST YR: ICD-10-PCS | Mod: HCNC,CPTII,S$GLB, | Performed by: INTERNAL MEDICINE

## 2021-09-27 PROCEDURE — 99214 OFFICE O/P EST MOD 30 MIN: CPT | Mod: HCNC,S$GLB,, | Performed by: INTERNAL MEDICINE

## 2021-09-27 PROCEDURE — 3079F DIAST BP 80-89 MM HG: CPT | Mod: HCNC,CPTII,S$GLB, | Performed by: INTERNAL MEDICINE

## 2021-09-27 PROCEDURE — 1160F PR REVIEW ALL MEDS BY PRESCRIBER/CLIN PHARMACIST DOCUMENTED: ICD-10-PCS | Mod: HCNC,CPTII,S$GLB, | Performed by: INTERNAL MEDICINE

## 2021-09-27 PROCEDURE — 1126F AMNT PAIN NOTED NONE PRSNT: CPT | Mod: HCNC,CPTII,S$GLB, | Performed by: INTERNAL MEDICINE

## 2021-09-27 PROCEDURE — 3075F PR MOST RECENT SYSTOLIC BLOOD PRESS GE 130-139MM HG: ICD-10-PCS | Mod: HCNC,CPTII,S$GLB, | Performed by: INTERNAL MEDICINE

## 2021-09-27 PROCEDURE — 1126F PR PAIN SEVERITY QUANTIFIED, NO PAIN PRESENT: ICD-10-PCS | Mod: HCNC,CPTII,S$GLB, | Performed by: INTERNAL MEDICINE

## 2021-09-27 PROCEDURE — 1159F PR MEDICATION LIST DOCUMENTED IN MEDICAL RECORD: ICD-10-PCS | Mod: HCNC,CPTII,S$GLB, | Performed by: INTERNAL MEDICINE

## 2021-09-27 PROCEDURE — 3288F PR FALLS RISK ASSESSMENT DOCUMENTED: ICD-10-PCS | Mod: HCNC,CPTII,S$GLB, | Performed by: INTERNAL MEDICINE

## 2021-09-27 PROCEDURE — 3075F SYST BP GE 130 - 139MM HG: CPT | Mod: HCNC,CPTII,S$GLB, | Performed by: INTERNAL MEDICINE

## 2021-09-27 PROCEDURE — 1101F PT FALLS ASSESS-DOCD LE1/YR: CPT | Mod: HCNC,CPTII,S$GLB, | Performed by: INTERNAL MEDICINE

## 2021-09-27 PROCEDURE — 3079F PR MOST RECENT DIASTOLIC BLOOD PRESSURE 80-89 MM HG: ICD-10-PCS | Mod: HCNC,CPTII,S$GLB, | Performed by: INTERNAL MEDICINE

## 2021-09-27 PROCEDURE — 99999 PR PBB SHADOW E&M-EST. PATIENT-LVL IV: ICD-10-PCS | Mod: PBBFAC,HCNC,, | Performed by: INTERNAL MEDICINE

## 2021-10-04 ENCOUNTER — HOSPITAL ENCOUNTER (OUTPATIENT)
Facility: HOSPITAL | Age: 81
Discharge: HOME | End: 2021-10-04
Attending: INTERNAL MEDICINE | Admitting: INTERNAL MEDICINE
Payer: MEDICARE

## 2021-10-04 PROCEDURE — 91110 GI TRC IMG INTRAL ESOPH-ILE: CPT | Mod: HCNC

## 2021-10-04 PROCEDURE — 91110 PR GI TRACT CAPSULE ENDOSCOPY: ICD-10-PCS | Mod: 26,HCNC,, | Performed by: INTERNAL MEDICINE

## 2021-10-04 PROCEDURE — 91110 GI TRC IMG INTRAL ESOPH-ILE: CPT | Mod: 26,HCNC,, | Performed by: INTERNAL MEDICINE

## 2021-10-05 ENCOUNTER — LAB VISIT (OUTPATIENT)
Dept: LAB | Facility: HOSPITAL | Age: 81
End: 2021-10-05
Attending: INTERNAL MEDICINE
Payer: MEDICARE

## 2021-10-05 ENCOUNTER — ANTI-COAG VISIT (OUTPATIENT)
Dept: CARDIOLOGY | Facility: CLINIC | Age: 81
End: 2021-10-05
Payer: MEDICARE

## 2021-10-05 DIAGNOSIS — Z79.01 LONG TERM (CURRENT) USE OF ANTICOAGULANTS: ICD-10-CM

## 2021-10-05 DIAGNOSIS — E11.9 DIABETES MELLITUS WITHOUT COMPLICATION: ICD-10-CM

## 2021-10-05 DIAGNOSIS — K92.2 CHRONIC GI BLEEDING: ICD-10-CM

## 2021-10-05 LAB
ANION GAP SERPL CALC-SCNC: 10 MMOL/L (ref 8–16)
BASOPHILS # BLD AUTO: 0.01 K/UL (ref 0–0.2)
BASOPHILS NFR BLD: 0.2 % (ref 0–1.9)
BUN SERPL-MCNC: 13 MG/DL (ref 8–23)
CALCIUM SERPL-MCNC: 9.6 MG/DL (ref 8.7–10.5)
CHLORIDE SERPL-SCNC: 104 MMOL/L (ref 95–110)
CHOLEST SERPL-MCNC: 104 MG/DL (ref 120–199)
CHOLEST/HDLC SERPL: 3 {RATIO} (ref 2–5)
CO2 SERPL-SCNC: 21 MMOL/L (ref 23–29)
CREAT SERPL-MCNC: 0.9 MG/DL (ref 0.5–1.4)
DIFFERENTIAL METHOD: ABNORMAL
EOSINOPHIL # BLD AUTO: 0.1 K/UL (ref 0–0.5)
EOSINOPHIL NFR BLD: 2.2 % (ref 0–8)
ERYTHROCYTE [DISTWIDTH] IN BLOOD BY AUTOMATED COUNT: 17.8 % (ref 11.5–14.5)
EST. GFR  (AFRICAN AMERICAN): >60 ML/MIN/1.73 M^2
EST. GFR  (NON AFRICAN AMERICAN): >60 ML/MIN/1.73 M^2
ESTIMATED AVG GLUCOSE: 120 MG/DL (ref 68–131)
GLUCOSE SERPL-MCNC: 101 MG/DL (ref 70–110)
HBA1C MFR BLD: 5.8 % (ref 4–5.6)
HCT VFR BLD AUTO: 30.1 % (ref 40–54)
HDLC SERPL-MCNC: 35 MG/DL (ref 40–75)
HDLC SERPL: 33.7 % (ref 20–50)
HGB BLD-MCNC: 8.9 G/DL (ref 14–18)
IMM GRANULOCYTES # BLD AUTO: 0.02 K/UL (ref 0–0.04)
IMM GRANULOCYTES NFR BLD AUTO: 0.4 % (ref 0–0.5)
INR PPP: 2.6 (ref 0.8–1.2)
LDLC SERPL CALC-MCNC: 57.4 MG/DL (ref 63–159)
LYMPHOCYTES # BLD AUTO: 1.6 K/UL (ref 1–4.8)
LYMPHOCYTES NFR BLD: 29.1 % (ref 18–48)
MCH RBC QN AUTO: 27.1 PG (ref 27–31)
MCHC RBC AUTO-ENTMCNC: 29.6 G/DL (ref 32–36)
MCV RBC AUTO: 92 FL (ref 82–98)
MONOCYTES # BLD AUTO: 0.7 K/UL (ref 0.3–1)
MONOCYTES NFR BLD: 12 % (ref 4–15)
NEUTROPHILS # BLD AUTO: 3 K/UL (ref 1.8–7.7)
NEUTROPHILS NFR BLD: 56.1 % (ref 38–73)
NONHDLC SERPL-MCNC: 69 MG/DL
NRBC BLD-RTO: 0 /100 WBC
PLATELET # BLD AUTO: 342 K/UL (ref 150–450)
PMV BLD AUTO: 9.4 FL (ref 9.2–12.9)
POTASSIUM SERPL-SCNC: 4.6 MMOL/L (ref 3.5–5.1)
PROTHROMBIN TIME: 27.2 SEC (ref 9–12.5)
RBC # BLD AUTO: 3.28 M/UL (ref 4.6–6.2)
SODIUM SERPL-SCNC: 135 MMOL/L (ref 136–145)
TRIGL SERPL-MCNC: 58 MG/DL (ref 30–150)
TSH SERPL DL<=0.005 MIU/L-ACNC: 1.07 UIU/ML (ref 0.4–4)
WBC # BLD AUTO: 5.4 K/UL (ref 3.9–12.7)

## 2021-10-05 PROCEDURE — 85610 PROTHROMBIN TIME: CPT | Mod: HCNC | Performed by: INTERNAL MEDICINE

## 2021-10-05 PROCEDURE — 80061 LIPID PANEL: CPT | Mod: HCNC | Performed by: INTERNAL MEDICINE

## 2021-10-05 PROCEDURE — 93793 PR ANTICOAGULANT MGMT FOR PT TAKING WARFARIN: ICD-10-PCS | Mod: S$GLB,,,

## 2021-10-05 PROCEDURE — 93793 ANTICOAG MGMT PT WARFARIN: CPT | Mod: S$GLB,,,

## 2021-10-05 PROCEDURE — 83036 HEMOGLOBIN GLYCOSYLATED A1C: CPT | Mod: HCNC | Performed by: INTERNAL MEDICINE

## 2021-10-05 PROCEDURE — 36415 COLL VENOUS BLD VENIPUNCTURE: CPT | Mod: HCNC,PO | Performed by: INTERNAL MEDICINE

## 2021-10-05 PROCEDURE — 84443 ASSAY THYROID STIM HORMONE: CPT | Mod: HCNC | Performed by: INTERNAL MEDICINE

## 2021-10-05 PROCEDURE — 85025 COMPLETE CBC W/AUTO DIFF WBC: CPT | Mod: HCNC | Performed by: INTERNAL MEDICINE

## 2021-10-05 PROCEDURE — 80048 BASIC METABOLIC PNL TOTAL CA: CPT | Mod: HCNC | Performed by: INTERNAL MEDICINE

## 2021-10-06 ENCOUNTER — TELEPHONE (OUTPATIENT)
Dept: CARDIOLOGY | Facility: HOSPITAL | Age: 81
End: 2021-10-06

## 2021-10-08 ENCOUNTER — IMMUNIZATION (OUTPATIENT)
Dept: PRIMARY CARE CLINIC | Facility: CLINIC | Age: 81
End: 2021-10-08
Payer: MEDICARE

## 2021-10-08 DIAGNOSIS — Z23 NEED FOR VACCINATION: Primary | ICD-10-CM

## 2021-10-08 PROCEDURE — 91300 COVID-19, MRNA, LNP-S, PF, 30 MCG/0.3 ML DOSE VACCINE: CPT | Mod: PBBFAC | Performed by: FAMILY MEDICINE

## 2021-10-08 PROCEDURE — 0003A COVID-19, MRNA, LNP-S, PF, 30 MCG/0.3 ML DOSE VACCINE: CPT | Mod: CV19,PBBFAC | Performed by: FAMILY MEDICINE

## 2021-10-09 ENCOUNTER — PATIENT OUTREACH (OUTPATIENT)
Dept: ADMINISTRATIVE | Facility: OTHER | Age: 81
End: 2021-10-09

## 2021-10-11 ENCOUNTER — HOSPITAL ENCOUNTER (OUTPATIENT)
Dept: RADIOLOGY | Facility: HOSPITAL | Age: 81
Discharge: HOME | End: 2021-10-11
Attending: INTERNAL MEDICINE
Payer: MEDICARE

## 2021-10-11 ENCOUNTER — OFFICE VISIT (OUTPATIENT)
Dept: CARDIOLOGY | Facility: CLINIC | Age: 81
End: 2021-10-11
Payer: MEDICARE

## 2021-10-11 VITALS
HEIGHT: 67 IN | HEART RATE: 63 BPM | WEIGHT: 216.06 LBS | SYSTOLIC BLOOD PRESSURE: 130 MMHG | OXYGEN SATURATION: 99 % | DIASTOLIC BLOOD PRESSURE: 78 MMHG | BODY MASS INDEX: 33.91 KG/M2

## 2021-10-11 DIAGNOSIS — G47.33 OSA (OBSTRUCTIVE SLEEP APNEA): ICD-10-CM

## 2021-10-11 DIAGNOSIS — I25.10 ATHEROSCLEROSIS OF NATIVE CORONARY ARTERY OF NATIVE HEART WITHOUT ANGINA PECTORIS: ICD-10-CM

## 2021-10-11 DIAGNOSIS — E11.59 HYPERTENSION ASSOCIATED WITH DIABETES: ICD-10-CM

## 2021-10-11 DIAGNOSIS — I70.0 ATHEROSCLEROSIS OF AORTA: ICD-10-CM

## 2021-10-11 DIAGNOSIS — I48.0 PAROXYSMAL ATRIAL FIBRILLATION: ICD-10-CM

## 2021-10-11 DIAGNOSIS — E78.5 HYPERLIPIDEMIA ASSOCIATED WITH TYPE 2 DIABETES MELLITUS: ICD-10-CM

## 2021-10-11 DIAGNOSIS — I27.20 PULMONARY HYPERTENSION, MILD: ICD-10-CM

## 2021-10-11 DIAGNOSIS — I15.2 HYPERTENSION ASSOCIATED WITH DIABETES: ICD-10-CM

## 2021-10-11 DIAGNOSIS — Z01.810 PREOP CARDIOVASCULAR EXAM: ICD-10-CM

## 2021-10-11 DIAGNOSIS — Z79.01 CURRENT USE OF LONG TERM ANTICOAGULATION: ICD-10-CM

## 2021-10-11 DIAGNOSIS — E11.9 DIABETES MELLITUS WITHOUT COMPLICATION: ICD-10-CM

## 2021-10-11 DIAGNOSIS — Z86.59 HISTORY OF MAJOR DEPRESSION: Primary | ICD-10-CM

## 2021-10-11 DIAGNOSIS — E11.69 HYPERLIPIDEMIA ASSOCIATED WITH TYPE 2 DIABETES MELLITUS: ICD-10-CM

## 2021-10-11 PROCEDURE — 1126F PR PAIN SEVERITY QUANTIFIED, NO PAIN PRESENT: ICD-10-PCS | Mod: HCNC,CPTII,S$GLB, | Performed by: INTERNAL MEDICINE

## 2021-10-11 PROCEDURE — 3288F PR FALLS RISK ASSESSMENT DOCUMENTED: ICD-10-PCS | Mod: HCNC,CPTII,S$GLB, | Performed by: INTERNAL MEDICINE

## 2021-10-11 PROCEDURE — 71046 X-RAY EXAM CHEST 2 VIEWS: CPT | Mod: TC,HCNC

## 2021-10-11 PROCEDURE — 99499 RISK ADDL DX/OHS AUDIT: ICD-10-PCS | Mod: S$GLB,,, | Performed by: INTERNAL MEDICINE

## 2021-10-11 PROCEDURE — 71046 XR CHEST PA AND LATERAL: ICD-10-PCS | Mod: 26,HCNC,, | Performed by: RADIOLOGY

## 2021-10-11 PROCEDURE — 1101F PR PT FALLS ASSESS DOC 0-1 FALLS W/OUT INJ PAST YR: ICD-10-PCS | Mod: HCNC,CPTII,S$GLB, | Performed by: INTERNAL MEDICINE

## 2021-10-11 PROCEDURE — 3072F LOW RISK FOR RETINOPATHY: CPT | Mod: HCNC,CPTII,S$GLB, | Performed by: INTERNAL MEDICINE

## 2021-10-11 PROCEDURE — 3075F SYST BP GE 130 - 139MM HG: CPT | Mod: HCNC,CPTII,S$GLB, | Performed by: INTERNAL MEDICINE

## 2021-10-11 PROCEDURE — 99999 PR PBB SHADOW E&M-EST. PATIENT-LVL IV: ICD-10-PCS | Mod: PBBFAC,HCNC,, | Performed by: INTERNAL MEDICINE

## 2021-10-11 PROCEDURE — 3078F DIAST BP <80 MM HG: CPT | Mod: HCNC,CPTII,S$GLB, | Performed by: INTERNAL MEDICINE

## 2021-10-11 PROCEDURE — 3288F FALL RISK ASSESSMENT DOCD: CPT | Mod: HCNC,CPTII,S$GLB, | Performed by: INTERNAL MEDICINE

## 2021-10-11 PROCEDURE — 1101F PT FALLS ASSESS-DOCD LE1/YR: CPT | Mod: HCNC,CPTII,S$GLB, | Performed by: INTERNAL MEDICINE

## 2021-10-11 PROCEDURE — 99999 PR PBB SHADOW E&M-EST. PATIENT-LVL IV: CPT | Mod: PBBFAC,HCNC,, | Performed by: INTERNAL MEDICINE

## 2021-10-11 PROCEDURE — 3078F PR MOST RECENT DIASTOLIC BLOOD PRESSURE < 80 MM HG: ICD-10-PCS | Mod: HCNC,CPTII,S$GLB, | Performed by: INTERNAL MEDICINE

## 2021-10-11 PROCEDURE — 99215 PR OFFICE/OUTPT VISIT, EST, LEVL V, 40-54 MIN: ICD-10-PCS | Mod: HCNC,S$GLB,, | Performed by: INTERNAL MEDICINE

## 2021-10-11 PROCEDURE — 1126F AMNT PAIN NOTED NONE PRSNT: CPT | Mod: HCNC,CPTII,S$GLB, | Performed by: INTERNAL MEDICINE

## 2021-10-11 PROCEDURE — 3075F PR MOST RECENT SYSTOLIC BLOOD PRESS GE 130-139MM HG: ICD-10-PCS | Mod: HCNC,CPTII,S$GLB, | Performed by: INTERNAL MEDICINE

## 2021-10-11 PROCEDURE — 3072F PR LOW RISK FOR RETINOPATHY: ICD-10-PCS | Mod: HCNC,CPTII,S$GLB, | Performed by: INTERNAL MEDICINE

## 2021-10-11 PROCEDURE — 71046 X-RAY EXAM CHEST 2 VIEWS: CPT | Mod: 26,HCNC,, | Performed by: RADIOLOGY

## 2021-10-11 PROCEDURE — 99499 UNLISTED E&M SERVICE: CPT | Mod: S$GLB,,, | Performed by: INTERNAL MEDICINE

## 2021-10-11 PROCEDURE — 99215 OFFICE O/P EST HI 40 MIN: CPT | Mod: HCNC,S$GLB,, | Performed by: INTERNAL MEDICINE

## 2021-10-12 ENCOUNTER — TELEPHONE (OUTPATIENT)
Dept: CARDIOLOGY | Facility: CLINIC | Age: 81
End: 2021-10-12

## 2021-10-12 ENCOUNTER — OFFICE VISIT (OUTPATIENT)
Dept: INTERNAL MEDICINE | Facility: CLINIC | Age: 81
End: 2021-10-12
Payer: MEDICARE

## 2021-10-12 ENCOUNTER — ANTI-COAG VISIT (OUTPATIENT)
Dept: CARDIOLOGY | Facility: CLINIC | Age: 81
End: 2021-10-12
Payer: MEDICARE

## 2021-10-12 ENCOUNTER — LAB VISIT (OUTPATIENT)
Dept: LAB | Facility: HOSPITAL | Age: 81
End: 2021-10-12
Attending: INTERNAL MEDICINE
Payer: MEDICARE

## 2021-10-12 VITALS
SYSTOLIC BLOOD PRESSURE: 120 MMHG | HEIGHT: 67 IN | DIASTOLIC BLOOD PRESSURE: 82 MMHG | OXYGEN SATURATION: 99 % | BODY MASS INDEX: 34.33 KG/M2 | WEIGHT: 218.69 LBS | HEART RATE: 82 BPM

## 2021-10-12 DIAGNOSIS — E11.9 DIABETES MELLITUS WITHOUT COMPLICATION: ICD-10-CM

## 2021-10-12 DIAGNOSIS — I25.10 ATHEROSCLEROSIS OF NATIVE CORONARY ARTERY OF NATIVE HEART WITHOUT ANGINA PECTORIS: ICD-10-CM

## 2021-10-12 DIAGNOSIS — I70.0 ATHEROSCLEROSIS OF AORTA: ICD-10-CM

## 2021-10-12 DIAGNOSIS — E11.59 HYPERTENSION ASSOCIATED WITH DIABETES: Primary | ICD-10-CM

## 2021-10-12 DIAGNOSIS — I15.2 HYPERTENSION ASSOCIATED WITH DIABETES: Primary | ICD-10-CM

## 2021-10-12 DIAGNOSIS — G47.33 OSA (OBSTRUCTIVE SLEEP APNEA): ICD-10-CM

## 2021-10-12 DIAGNOSIS — Z79.01 LONG TERM (CURRENT) USE OF ANTICOAGULANTS: ICD-10-CM

## 2021-10-12 DIAGNOSIS — E11.69 HYPERLIPIDEMIA ASSOCIATED WITH TYPE 2 DIABETES MELLITUS: ICD-10-CM

## 2021-10-12 DIAGNOSIS — E78.5 HYPERLIPIDEMIA ASSOCIATED WITH TYPE 2 DIABETES MELLITUS: ICD-10-CM

## 2021-10-12 DIAGNOSIS — I48.0 PAROXYSMAL ATRIAL FIBRILLATION: ICD-10-CM

## 2021-10-12 LAB
INR PPP: 3.6 (ref 0.8–1.2)
PROTHROMBIN TIME: 37.3 SEC (ref 9–12.5)

## 2021-10-12 PROCEDURE — 1160F RVW MEDS BY RX/DR IN RCRD: CPT | Mod: HCNC,CPTII,S$GLB, | Performed by: INTERNAL MEDICINE

## 2021-10-12 PROCEDURE — 99499 UNLISTED E&M SERVICE: CPT | Mod: S$GLB,,, | Performed by: INTERNAL MEDICINE

## 2021-10-12 PROCEDURE — 1159F MED LIST DOCD IN RCRD: CPT | Mod: HCNC,CPTII,S$GLB, | Performed by: INTERNAL MEDICINE

## 2021-10-12 PROCEDURE — 99999 PR PBB SHADOW E&M-EST. PATIENT-LVL III: ICD-10-PCS | Mod: PBBFAC,HCNC,, | Performed by: INTERNAL MEDICINE

## 2021-10-12 PROCEDURE — 1101F PT FALLS ASSESS-DOCD LE1/YR: CPT | Mod: HCNC,CPTII,S$GLB, | Performed by: INTERNAL MEDICINE

## 2021-10-12 PROCEDURE — 3079F DIAST BP 80-89 MM HG: CPT | Mod: HCNC,CPTII,S$GLB, | Performed by: INTERNAL MEDICINE

## 2021-10-12 PROCEDURE — 3074F SYST BP LT 130 MM HG: CPT | Mod: HCNC,CPTII,S$GLB, | Performed by: INTERNAL MEDICINE

## 2021-10-12 PROCEDURE — 3074F PR MOST RECENT SYSTOLIC BLOOD PRESSURE < 130 MM HG: ICD-10-PCS | Mod: HCNC,CPTII,S$GLB, | Performed by: INTERNAL MEDICINE

## 2021-10-12 PROCEDURE — 36415 COLL VENOUS BLD VENIPUNCTURE: CPT | Mod: HCNC,PO | Performed by: INTERNAL MEDICINE

## 2021-10-12 PROCEDURE — G0008 FLU VACCINE - QUADRIVALENT - ADJUVANTED: ICD-10-PCS | Mod: HCNC,S$GLB,, | Performed by: INTERNAL MEDICINE

## 2021-10-12 PROCEDURE — 93793 PR ANTICOAGULANT MGMT FOR PT TAKING WARFARIN: ICD-10-PCS | Mod: S$GLB,,,

## 2021-10-12 PROCEDURE — 1126F PR PAIN SEVERITY QUANTIFIED, NO PAIN PRESENT: ICD-10-PCS | Mod: HCNC,CPTII,S$GLB, | Performed by: INTERNAL MEDICINE

## 2021-10-12 PROCEDURE — 90694 VACC AIIV4 NO PRSRV 0.5ML IM: CPT | Mod: HCNC,S$GLB,, | Performed by: INTERNAL MEDICINE

## 2021-10-12 PROCEDURE — 3079F PR MOST RECENT DIASTOLIC BLOOD PRESSURE 80-89 MM HG: ICD-10-PCS | Mod: HCNC,CPTII,S$GLB, | Performed by: INTERNAL MEDICINE

## 2021-10-12 PROCEDURE — 93793 ANTICOAG MGMT PT WARFARIN: CPT | Mod: S$GLB,,,

## 2021-10-12 PROCEDURE — 99499 RISK ADDL DX/OHS AUDIT: ICD-10-PCS | Mod: S$GLB,,, | Performed by: INTERNAL MEDICINE

## 2021-10-12 PROCEDURE — 3288F FALL RISK ASSESSMENT DOCD: CPT | Mod: HCNC,CPTII,S$GLB, | Performed by: INTERNAL MEDICINE

## 2021-10-12 PROCEDURE — G0008 ADMIN INFLUENZA VIRUS VAC: HCPCS | Mod: HCNC,S$GLB,, | Performed by: INTERNAL MEDICINE

## 2021-10-12 PROCEDURE — 85610 PROTHROMBIN TIME: CPT | Mod: HCNC | Performed by: INTERNAL MEDICINE

## 2021-10-12 PROCEDURE — 1159F PR MEDICATION LIST DOCUMENTED IN MEDICAL RECORD: ICD-10-PCS | Mod: HCNC,CPTII,S$GLB, | Performed by: INTERNAL MEDICINE

## 2021-10-12 PROCEDURE — 3072F LOW RISK FOR RETINOPATHY: CPT | Mod: HCNC,CPTII,S$GLB, | Performed by: INTERNAL MEDICINE

## 2021-10-12 PROCEDURE — 99214 OFFICE O/P EST MOD 30 MIN: CPT | Mod: 25,HCNC,S$GLB, | Performed by: INTERNAL MEDICINE

## 2021-10-12 PROCEDURE — 3288F PR FALLS RISK ASSESSMENT DOCUMENTED: ICD-10-PCS | Mod: HCNC,CPTII,S$GLB, | Performed by: INTERNAL MEDICINE

## 2021-10-12 PROCEDURE — 90694 FLU VACCINE - QUADRIVALENT - ADJUVANTED: ICD-10-PCS | Mod: HCNC,S$GLB,, | Performed by: INTERNAL MEDICINE

## 2021-10-12 PROCEDURE — 99214 PR OFFICE/OUTPT VISIT, EST, LEVL IV, 30-39 MIN: ICD-10-PCS | Mod: 25,HCNC,S$GLB, | Performed by: INTERNAL MEDICINE

## 2021-10-12 PROCEDURE — 1101F PR PT FALLS ASSESS DOC 0-1 FALLS W/OUT INJ PAST YR: ICD-10-PCS | Mod: HCNC,CPTII,S$GLB, | Performed by: INTERNAL MEDICINE

## 2021-10-12 PROCEDURE — 99999 PR PBB SHADOW E&M-EST. PATIENT-LVL III: CPT | Mod: PBBFAC,HCNC,, | Performed by: INTERNAL MEDICINE

## 2021-10-12 PROCEDURE — 3072F PR LOW RISK FOR RETINOPATHY: ICD-10-PCS | Mod: HCNC,CPTII,S$GLB, | Performed by: INTERNAL MEDICINE

## 2021-10-12 PROCEDURE — 1126F AMNT PAIN NOTED NONE PRSNT: CPT | Mod: HCNC,CPTII,S$GLB, | Performed by: INTERNAL MEDICINE

## 2021-10-12 PROCEDURE — 1160F PR REVIEW ALL MEDS BY PRESCRIBER/CLIN PHARMACIST DOCUMENTED: ICD-10-PCS | Mod: HCNC,CPTII,S$GLB, | Performed by: INTERNAL MEDICINE

## 2021-10-17 PROBLEM — Z01.810 PREOP CARDIOVASCULAR EXAM: Status: ACTIVE | Noted: 2021-10-17

## 2021-10-18 ENCOUNTER — DOCUMENTATION ONLY (OUTPATIENT)
Dept: GASTROENTEROLOGY | Facility: HOSPITAL | Age: 81
End: 2021-10-18

## 2021-10-18 DIAGNOSIS — I99.8 ANGIECTASIS: Primary | ICD-10-CM

## 2021-10-18 DIAGNOSIS — K55.20 AVM (ARTERIOVENOUS MALFORMATION) OF SMALL BOWEL, ACQUIRED: ICD-10-CM

## 2021-10-21 ENCOUNTER — TELEPHONE (OUTPATIENT)
Dept: GASTROENTEROLOGY | Facility: CLINIC | Age: 81
End: 2021-10-21

## 2021-10-21 ENCOUNTER — TELEPHONE (OUTPATIENT)
Dept: INTERNAL MEDICINE | Facility: CLINIC | Age: 81
End: 2021-10-21

## 2021-10-21 ENCOUNTER — ANTI-COAG VISIT (OUTPATIENT)
Dept: CARDIOLOGY | Facility: CLINIC | Age: 81
End: 2021-10-21
Payer: MEDICARE

## 2021-10-21 DIAGNOSIS — Z79.01 LONG TERM (CURRENT) USE OF ANTICOAGULANTS: Primary | ICD-10-CM

## 2021-10-21 DIAGNOSIS — I48.0 PAROXYSMAL ATRIAL FIBRILLATION: ICD-10-CM

## 2021-10-21 LAB — INR PPP: 6.7 (ref 2–3)

## 2021-10-21 PROCEDURE — 93793 PR ANTICOAGULANT MGMT FOR PT TAKING WARFARIN: ICD-10-PCS | Mod: HCNC,S$GLB,,

## 2021-10-21 PROCEDURE — 85610 PROTHROMBIN TIME: CPT | Mod: QW,HCNC,S$GLB, | Performed by: INTERNAL MEDICINE

## 2021-10-21 PROCEDURE — 93793 ANTICOAG MGMT PT WARFARIN: CPT | Mod: HCNC,S$GLB,,

## 2021-10-21 PROCEDURE — 85610 POCT INR: ICD-10-PCS | Mod: QW,HCNC,S$GLB, | Performed by: INTERNAL MEDICINE

## 2021-10-25 ENCOUNTER — TELEPHONE (OUTPATIENT)
Dept: INTERNAL MEDICINE | Facility: CLINIC | Age: 81
End: 2021-10-25
Payer: MEDICARE

## 2021-10-27 ENCOUNTER — PATIENT MESSAGE (OUTPATIENT)
Dept: ENDOSCOPY | Facility: HOSPITAL | Age: 81
End: 2021-10-27
Payer: MEDICARE

## 2021-10-27 ENCOUNTER — TELEPHONE (OUTPATIENT)
Dept: ENDOSCOPY | Facility: HOSPITAL | Age: 81
End: 2021-10-27
Payer: MEDICARE

## 2021-10-29 ENCOUNTER — ANESTHESIA (OUTPATIENT)
Dept: ENDOSCOPY | Facility: HOSPITAL | Age: 81
End: 2021-10-29
Payer: MEDICARE

## 2021-10-29 ENCOUNTER — ANESTHESIA EVENT (OUTPATIENT)
Dept: ENDOSCOPY | Facility: HOSPITAL | Age: 81
End: 2021-10-29
Payer: MEDICARE

## 2021-10-29 ENCOUNTER — HOSPITAL ENCOUNTER (OUTPATIENT)
Facility: HOSPITAL | Age: 81
Discharge: HOME | End: 2021-10-29
Attending: INTERNAL MEDICINE | Admitting: INTERNAL MEDICINE
Payer: MEDICARE

## 2021-10-29 VITALS
HEART RATE: 98 BPM | SYSTOLIC BLOOD PRESSURE: 102 MMHG | HEIGHT: 67 IN | TEMPERATURE: 99 F | DIASTOLIC BLOOD PRESSURE: 64 MMHG | RESPIRATION RATE: 17 BRPM | WEIGHT: 215 LBS | BODY MASS INDEX: 33.74 KG/M2 | OXYGEN SATURATION: 96 %

## 2021-10-29 DIAGNOSIS — K55.20 AVM (ARTERIOVENOUS MALFORMATION) OF SMALL BOWEL, ACQUIRED: Primary | ICD-10-CM

## 2021-10-29 LAB
INR PPP: 1.1 (ref 0.8–1.2)
POCT GLUCOSE: 130 MG/DL (ref 70–110)
PROTHROMBIN TIME: 12.1 SEC (ref 9–12.5)

## 2021-10-29 PROCEDURE — 27202087 HC PROBE, APC: Mod: HCNC | Performed by: INTERNAL MEDICINE

## 2021-10-29 PROCEDURE — 44369 SMALL BOWEL ENDOSCOPY: CPT | Mod: HCNC,,, | Performed by: INTERNAL MEDICINE

## 2021-10-29 PROCEDURE — 37000009 HC ANESTHESIA EA ADD 15 MINS: Mod: HCNC | Performed by: INTERNAL MEDICINE

## 2021-10-29 PROCEDURE — 63600175 PHARM REV CODE 636 W HCPCS: Mod: HCNC | Performed by: NURSE ANESTHETIST, CERTIFIED REGISTERED

## 2021-10-29 PROCEDURE — 37000008 HC ANESTHESIA 1ST 15 MINUTES: Mod: HCNC | Performed by: INTERNAL MEDICINE

## 2021-10-29 PROCEDURE — 44369 SMALL BOWEL ENDOSCOPY: CPT | Mod: HCNC | Performed by: INTERNAL MEDICINE

## 2021-10-29 PROCEDURE — 25000003 PHARM REV CODE 250: Mod: HCNC | Performed by: NURSE ANESTHETIST, CERTIFIED REGISTERED

## 2021-10-29 PROCEDURE — 44369 PR SMALL BOWEL ENDOSCOPY,ABLATE LESN: ICD-10-PCS | Mod: HCNC,,, | Performed by: INTERNAL MEDICINE

## 2021-10-29 PROCEDURE — 43255 EGD CONTROL BLEEDING ANY: CPT | Performed by: INTERNAL MEDICINE

## 2021-10-29 PROCEDURE — 85610 PROTHROMBIN TIME: CPT | Mod: HCNC | Performed by: INTERNAL MEDICINE

## 2021-10-29 RX ORDER — PROPOFOL 10 MG/ML
VIAL (ML) INTRAVENOUS
Status: DISCONTINUED | OUTPATIENT
Start: 2021-10-29 | End: 2021-10-29

## 2021-10-29 RX ORDER — LIDOCAINE HYDROCHLORIDE 10 MG/ML
INJECTION, SOLUTION EPIDURAL; INFILTRATION; INTRACAUDAL; PERINEURAL
Status: DISCONTINUED | OUTPATIENT
Start: 2021-10-29 | End: 2021-10-29

## 2021-10-29 RX ORDER — SODIUM CHLORIDE, SODIUM LACTATE, POTASSIUM CHLORIDE, CALCIUM CHLORIDE 600; 310; 30; 20 MG/100ML; MG/100ML; MG/100ML; MG/100ML
INJECTION, SOLUTION INTRAVENOUS CONTINUOUS PRN
Status: DISCONTINUED | OUTPATIENT
Start: 2021-10-29 | End: 2021-10-29

## 2021-10-29 RX ADMIN — PROPOFOL 80 MG: 10 INJECTION, EMULSION INTRAVENOUS at 07:10

## 2021-10-29 RX ADMIN — PROPOFOL 20 MG: 10 INJECTION, EMULSION INTRAVENOUS at 08:10

## 2021-10-29 RX ADMIN — PROPOFOL 30 MG: 10 INJECTION, EMULSION INTRAVENOUS at 08:10

## 2021-10-29 RX ADMIN — LIDOCAINE HYDROCHLORIDE 50 MG: 10 INJECTION, SOLUTION EPIDURAL; INFILTRATION; INTRACAUDAL; PERINEURAL at 07:10

## 2021-10-29 RX ADMIN — SODIUM CHLORIDE, SODIUM LACTATE, POTASSIUM CHLORIDE, AND CALCIUM CHLORIDE: .6; .31; .03; .02 INJECTION, SOLUTION INTRAVENOUS at 07:10

## 2021-10-29 RX ADMIN — PROPOFOL 20 MG: 10 INJECTION, EMULSION INTRAVENOUS at 07:10

## 2021-10-29 RX ADMIN — GLYCOPYRROLATE 0.2 MG: 0.2 INJECTION, SOLUTION INTRAMUSCULAR; INTRAVITREAL at 07:10

## 2021-10-31 DIAGNOSIS — D50.0 IRON DEFICIENCY ANEMIA DUE TO CHRONIC BLOOD LOSS: Primary | ICD-10-CM

## 2021-11-02 ENCOUNTER — TELEPHONE (OUTPATIENT)
Dept: GASTROENTEROLOGY | Facility: CLINIC | Age: 81
End: 2021-11-02
Payer: MEDICARE

## 2021-11-02 ENCOUNTER — ANTI-COAG VISIT (OUTPATIENT)
Dept: CARDIOLOGY | Facility: CLINIC | Age: 81
End: 2021-11-02
Payer: MEDICARE

## 2021-11-02 DIAGNOSIS — Z79.01 LONG TERM (CURRENT) USE OF ANTICOAGULANTS: Primary | ICD-10-CM

## 2021-11-02 DIAGNOSIS — I48.0 PAROXYSMAL ATRIAL FIBRILLATION: ICD-10-CM

## 2021-11-02 LAB — INR PPP: 1.4 (ref 2–3)

## 2021-11-02 PROCEDURE — 85610 POCT INR: ICD-10-PCS | Mod: QW,HCNC,S$GLB, | Performed by: INTERNAL MEDICINE

## 2021-11-02 PROCEDURE — 85610 PROTHROMBIN TIME: CPT | Mod: QW,HCNC,S$GLB, | Performed by: INTERNAL MEDICINE

## 2021-11-02 PROCEDURE — 93793 ANTICOAG MGMT PT WARFARIN: CPT | Mod: HCNC,S$GLB,,

## 2021-11-02 PROCEDURE — 93793 PR ANTICOAGULANT MGMT FOR PT TAKING WARFARIN: ICD-10-PCS | Mod: HCNC,S$GLB,,

## 2021-11-05 ENCOUNTER — OFFICE VISIT (OUTPATIENT)
Dept: CARDIOLOGY | Facility: CLINIC | Age: 81
End: 2021-11-05
Payer: MEDICARE

## 2021-11-05 ENCOUNTER — LAB VISIT (OUTPATIENT)
Dept: LAB | Facility: HOSPITAL | Age: 81
End: 2021-11-05
Attending: INTERNAL MEDICINE
Payer: MEDICARE

## 2021-11-05 VITALS
BODY MASS INDEX: 33.21 KG/M2 | HEART RATE: 90 BPM | HEIGHT: 67 IN | DIASTOLIC BLOOD PRESSURE: 78 MMHG | OXYGEN SATURATION: 98 % | SYSTOLIC BLOOD PRESSURE: 124 MMHG | WEIGHT: 211.63 LBS

## 2021-11-05 DIAGNOSIS — Z79.01 CURRENT USE OF LONG TERM ANTICOAGULATION: ICD-10-CM

## 2021-11-05 DIAGNOSIS — I25.10 ATHEROSCLEROSIS OF NATIVE CORONARY ARTERY OF NATIVE HEART WITHOUT ANGINA PECTORIS: ICD-10-CM

## 2021-11-05 DIAGNOSIS — I27.20 PULMONARY HYPERTENSION, MILD: Primary | ICD-10-CM

## 2021-11-05 DIAGNOSIS — I70.0 ATHEROSCLEROSIS OF AORTA: ICD-10-CM

## 2021-11-05 DIAGNOSIS — E78.5 HYPERLIPIDEMIA ASSOCIATED WITH TYPE 2 DIABETES MELLITUS: ICD-10-CM

## 2021-11-05 DIAGNOSIS — I48.0 PAROXYSMAL ATRIAL FIBRILLATION: ICD-10-CM

## 2021-11-05 DIAGNOSIS — E11.69 HYPERLIPIDEMIA ASSOCIATED WITH TYPE 2 DIABETES MELLITUS: ICD-10-CM

## 2021-11-05 DIAGNOSIS — E11.59 HYPERTENSION ASSOCIATED WITH DIABETES: ICD-10-CM

## 2021-11-05 DIAGNOSIS — I15.2 HYPERTENSION ASSOCIATED WITH DIABETES: ICD-10-CM

## 2021-11-05 DIAGNOSIS — G47.33 OSA (OBSTRUCTIVE SLEEP APNEA): ICD-10-CM

## 2021-11-05 LAB
BASOPHILS # BLD AUTO: 0.02 K/UL (ref 0–0.2)
BASOPHILS NFR BLD: 0.5 % (ref 0–1.9)
DIFFERENTIAL METHOD: ABNORMAL
EOSINOPHIL # BLD AUTO: 0.1 K/UL (ref 0–0.5)
EOSINOPHIL NFR BLD: 2.9 % (ref 0–8)
ERYTHROCYTE [DISTWIDTH] IN BLOOD BY AUTOMATED COUNT: 16.5 % (ref 11.5–14.5)
HCT VFR BLD AUTO: 27.5 % (ref 40–54)
HGB BLD-MCNC: 7.7 G/DL (ref 14–18)
IMM GRANULOCYTES # BLD AUTO: 0.02 K/UL (ref 0–0.04)
IMM GRANULOCYTES NFR BLD AUTO: 0.5 % (ref 0–0.5)
LYMPHOCYTES # BLD AUTO: 1.2 K/UL (ref 1–4.8)
LYMPHOCYTES NFR BLD: 28.5 % (ref 18–48)
MCH RBC QN AUTO: 23.8 PG (ref 27–31)
MCHC RBC AUTO-ENTMCNC: 28 G/DL (ref 32–36)
MCV RBC AUTO: 85 FL (ref 82–98)
MONOCYTES # BLD AUTO: 0.7 K/UL (ref 0.3–1)
MONOCYTES NFR BLD: 17.1 % (ref 4–15)
NEUTROPHILS # BLD AUTO: 2.1 K/UL (ref 1.8–7.7)
NEUTROPHILS NFR BLD: 50.5 % (ref 38–73)
NRBC BLD-RTO: 0 /100 WBC
PLATELET # BLD AUTO: 470 K/UL (ref 150–450)
PMV BLD AUTO: 9.2 FL (ref 9.2–12.9)
RBC # BLD AUTO: 3.23 M/UL (ref 4.6–6.2)
WBC # BLD AUTO: 4.14 K/UL (ref 3.9–12.7)

## 2021-11-05 PROCEDURE — 85025 COMPLETE CBC W/AUTO DIFF WBC: CPT | Performed by: INTERNAL MEDICINE

## 2021-11-05 PROCEDURE — 99214 PR OFFICE/OUTPT VISIT, EST, LEVL IV, 30-39 MIN: ICD-10-PCS | Mod: HCNC,S$GLB,, | Performed by: INTERNAL MEDICINE

## 2021-11-05 PROCEDURE — 99214 OFFICE O/P EST MOD 30 MIN: CPT | Mod: HCNC,S$GLB,, | Performed by: INTERNAL MEDICINE

## 2021-11-05 PROCEDURE — 3288F FALL RISK ASSESSMENT DOCD: CPT | Mod: HCNC,CPTII,S$GLB, | Performed by: INTERNAL MEDICINE

## 2021-11-05 PROCEDURE — 3078F DIAST BP <80 MM HG: CPT | Mod: HCNC,CPTII,S$GLB, | Performed by: INTERNAL MEDICINE

## 2021-11-05 PROCEDURE — 3072F PR LOW RISK FOR RETINOPATHY: ICD-10-PCS | Mod: HCNC,CPTII,S$GLB, | Performed by: INTERNAL MEDICINE

## 2021-11-05 PROCEDURE — 3074F PR MOST RECENT SYSTOLIC BLOOD PRESSURE < 130 MM HG: ICD-10-PCS | Mod: HCNC,CPTII,S$GLB, | Performed by: INTERNAL MEDICINE

## 2021-11-05 PROCEDURE — 3074F SYST BP LT 130 MM HG: CPT | Mod: HCNC,CPTII,S$GLB, | Performed by: INTERNAL MEDICINE

## 2021-11-05 PROCEDURE — 1126F AMNT PAIN NOTED NONE PRSNT: CPT | Mod: HCNC,CPTII,S$GLB, | Performed by: INTERNAL MEDICINE

## 2021-11-05 PROCEDURE — 99999 PR PBB SHADOW E&M-EST. PATIENT-LVL III: CPT | Mod: PBBFAC,HCNC,, | Performed by: INTERNAL MEDICINE

## 2021-11-05 PROCEDURE — 99999 PR PBB SHADOW E&M-EST. PATIENT-LVL III: ICD-10-PCS | Mod: PBBFAC,HCNC,, | Performed by: INTERNAL MEDICINE

## 2021-11-05 PROCEDURE — 36415 COLL VENOUS BLD VENIPUNCTURE: CPT | Mod: HCNC | Performed by: INTERNAL MEDICINE

## 2021-11-05 PROCEDURE — 1101F PT FALLS ASSESS-DOCD LE1/YR: CPT | Mod: HCNC,CPTII,S$GLB, | Performed by: INTERNAL MEDICINE

## 2021-11-05 PROCEDURE — 1101F PR PT FALLS ASSESS DOC 0-1 FALLS W/OUT INJ PAST YR: ICD-10-PCS | Mod: HCNC,CPTII,S$GLB, | Performed by: INTERNAL MEDICINE

## 2021-11-05 PROCEDURE — 3072F LOW RISK FOR RETINOPATHY: CPT | Mod: HCNC,CPTII,S$GLB, | Performed by: INTERNAL MEDICINE

## 2021-11-05 PROCEDURE — 3078F PR MOST RECENT DIASTOLIC BLOOD PRESSURE < 80 MM HG: ICD-10-PCS | Mod: HCNC,CPTII,S$GLB, | Performed by: INTERNAL MEDICINE

## 2021-11-05 PROCEDURE — 1126F PR PAIN SEVERITY QUANTIFIED, NO PAIN PRESENT: ICD-10-PCS | Mod: HCNC,CPTII,S$GLB, | Performed by: INTERNAL MEDICINE

## 2021-11-05 PROCEDURE — 3288F PR FALLS RISK ASSESSMENT DOCUMENTED: ICD-10-PCS | Mod: HCNC,CPTII,S$GLB, | Performed by: INTERNAL MEDICINE

## 2021-11-08 ENCOUNTER — LAB VISIT (OUTPATIENT)
Dept: LAB | Facility: HOSPITAL | Age: 81
End: 2021-11-08
Attending: INTERNAL MEDICINE
Payer: MEDICARE

## 2021-11-08 ENCOUNTER — TELEPHONE (OUTPATIENT)
Dept: CARDIOLOGY | Facility: CLINIC | Age: 81
End: 2021-11-08
Payer: MEDICARE

## 2021-11-08 ENCOUNTER — ANTI-COAG VISIT (OUTPATIENT)
Dept: CARDIOLOGY | Facility: CLINIC | Age: 81
End: 2021-11-08
Payer: MEDICARE

## 2021-11-08 ENCOUNTER — PATIENT MESSAGE (OUTPATIENT)
Dept: GASTROENTEROLOGY | Facility: CLINIC | Age: 81
End: 2021-11-08
Payer: MEDICARE

## 2021-11-08 DIAGNOSIS — K55.20 ANGIODYSPLASIA OF SMALL INTESTINE: ICD-10-CM

## 2021-11-08 DIAGNOSIS — I25.10 CORONARY ARTERY DISEASE INVOLVING NATIVE CORONARY ARTERY OF NATIVE HEART WITHOUT ANGINA PECTORIS: ICD-10-CM

## 2021-11-08 DIAGNOSIS — D50.0 IRON DEFICIENCY ANEMIA DUE TO CHRONIC BLOOD LOSS: Primary | ICD-10-CM

## 2021-11-08 DIAGNOSIS — D50.0 IRON DEFICIENCY ANEMIA DUE TO CHRONIC BLOOD LOSS: ICD-10-CM

## 2021-11-08 DIAGNOSIS — Z79.01 LONG TERM (CURRENT) USE OF ANTICOAGULANTS: Primary | ICD-10-CM

## 2021-11-08 DIAGNOSIS — I48.0 PAROXYSMAL ATRIAL FIBRILLATION: ICD-10-CM

## 2021-11-08 LAB
BASOPHILS # BLD AUTO: 0.03 K/UL (ref 0–0.2)
BASOPHILS # BLD AUTO: 0.03 K/UL (ref 0–0.2)
BASOPHILS NFR BLD: 0.6 % (ref 0–1.9)
BASOPHILS NFR BLD: 0.6 % (ref 0–1.9)
DIFFERENTIAL METHOD: ABNORMAL
DIFFERENTIAL METHOD: ABNORMAL
EOSINOPHIL # BLD AUTO: 0.1 K/UL (ref 0–0.5)
EOSINOPHIL # BLD AUTO: 0.1 K/UL (ref 0–0.5)
EOSINOPHIL NFR BLD: 2.6 % (ref 0–8)
EOSINOPHIL NFR BLD: 2.6 % (ref 0–8)
ERYTHROCYTE [DISTWIDTH] IN BLOOD BY AUTOMATED COUNT: 17 % (ref 11.5–14.5)
ERYTHROCYTE [DISTWIDTH] IN BLOOD BY AUTOMATED COUNT: 17 % (ref 11.5–14.5)
FERRITIN SERPL-MCNC: 38 NG/ML (ref 20–300)
HCT VFR BLD AUTO: 27.8 % (ref 40–54)
HCT VFR BLD AUTO: 27.8 % (ref 40–54)
HGB BLD-MCNC: 8.1 G/DL (ref 14–18)
HGB BLD-MCNC: 8.1 G/DL (ref 14–18)
IMM GRANULOCYTES # BLD AUTO: 0.02 K/UL (ref 0–0.04)
IMM GRANULOCYTES # BLD AUTO: 0.02 K/UL (ref 0–0.04)
IMM GRANULOCYTES NFR BLD AUTO: 0.4 % (ref 0–0.5)
IMM GRANULOCYTES NFR BLD AUTO: 0.4 % (ref 0–0.5)
INR PPP: 2.7 (ref 2–3)
IRON SERPL-MCNC: 55 UG/DL (ref 45–160)
LYMPHOCYTES # BLD AUTO: 1.3 K/UL (ref 1–4.8)
LYMPHOCYTES # BLD AUTO: 1.3 K/UL (ref 1–4.8)
LYMPHOCYTES NFR BLD: 26.5 % (ref 18–48)
LYMPHOCYTES NFR BLD: 26.5 % (ref 18–48)
MCH RBC QN AUTO: 23.9 PG (ref 27–31)
MCH RBC QN AUTO: 23.9 PG (ref 27–31)
MCHC RBC AUTO-ENTMCNC: 29.1 G/DL (ref 32–36)
MCHC RBC AUTO-ENTMCNC: 29.1 G/DL (ref 32–36)
MCV RBC AUTO: 82 FL (ref 82–98)
MCV RBC AUTO: 82 FL (ref 82–98)
MONOCYTES # BLD AUTO: 0.7 K/UL (ref 0.3–1)
MONOCYTES # BLD AUTO: 0.7 K/UL (ref 0.3–1)
MONOCYTES NFR BLD: 14.5 % (ref 4–15)
MONOCYTES NFR BLD: 14.5 % (ref 4–15)
NEUTROPHILS # BLD AUTO: 2.7 K/UL (ref 1.8–7.7)
NEUTROPHILS # BLD AUTO: 2.7 K/UL (ref 1.8–7.7)
NEUTROPHILS NFR BLD: 55.4 % (ref 38–73)
NEUTROPHILS NFR BLD: 55.4 % (ref 38–73)
NRBC BLD-RTO: 0 /100 WBC
NRBC BLD-RTO: 0 /100 WBC
PLATELET # BLD AUTO: 459 K/UL (ref 150–450)
PLATELET # BLD AUTO: 459 K/UL (ref 150–450)
PMV BLD AUTO: 9 FL (ref 9.2–12.9)
PMV BLD AUTO: 9 FL (ref 9.2–12.9)
RBC # BLD AUTO: 3.39 M/UL (ref 4.6–6.2)
RBC # BLD AUTO: 3.39 M/UL (ref 4.6–6.2)
SATURATED IRON: 13 % (ref 20–50)
TOTAL IRON BINDING CAPACITY: 420 UG/DL (ref 250–450)
TRANSFERRIN SERPL-MCNC: 284 MG/DL (ref 200–375)
WBC # BLD AUTO: 4.91 K/UL (ref 3.9–12.7)
WBC # BLD AUTO: 4.91 K/UL (ref 3.9–12.7)

## 2021-11-08 PROCEDURE — 93793 ANTICOAG MGMT PT WARFARIN: CPT | Mod: HCNC,S$GLB,,

## 2021-11-08 PROCEDURE — 85610 POCT INR: ICD-10-PCS | Mod: QW,HCNC,S$GLB, | Performed by: INTERNAL MEDICINE

## 2021-11-08 PROCEDURE — 82728 ASSAY OF FERRITIN: CPT | Mod: HCNC | Performed by: INTERNAL MEDICINE

## 2021-11-08 PROCEDURE — 84466 ASSAY OF TRANSFERRIN: CPT | Mod: HCNC | Performed by: INTERNAL MEDICINE

## 2021-11-08 PROCEDURE — 85610 PROTHROMBIN TIME: CPT | Mod: QW,HCNC,S$GLB, | Performed by: INTERNAL MEDICINE

## 2021-11-08 PROCEDURE — 93793 PR ANTICOAGULANT MGMT FOR PT TAKING WARFARIN: ICD-10-PCS | Mod: HCNC,S$GLB,,

## 2021-11-08 PROCEDURE — 36415 COLL VENOUS BLD VENIPUNCTURE: CPT | Mod: HCNC | Performed by: INTERNAL MEDICINE

## 2021-11-08 PROCEDURE — 85025 COMPLETE CBC W/AUTO DIFF WBC: CPT | Mod: HCNC | Performed by: INTERNAL MEDICINE

## 2021-11-09 ENCOUNTER — PATIENT MESSAGE (OUTPATIENT)
Dept: GASTROENTEROLOGY | Facility: CLINIC | Age: 81
End: 2021-11-09
Payer: MEDICARE

## 2021-11-10 ENCOUNTER — OFFICE VISIT (OUTPATIENT)
Dept: INTERNAL MEDICINE | Facility: CLINIC | Age: 81
End: 2021-11-10
Payer: MEDICARE

## 2021-11-10 VITALS
BODY MASS INDEX: 33.19 KG/M2 | HEIGHT: 67 IN | DIASTOLIC BLOOD PRESSURE: 76 MMHG | OXYGEN SATURATION: 97 % | SYSTOLIC BLOOD PRESSURE: 110 MMHG | WEIGHT: 211.44 LBS | HEART RATE: 88 BPM

## 2021-11-10 DIAGNOSIS — Z79.01 CURRENT USE OF LONG TERM ANTICOAGULATION: ICD-10-CM

## 2021-11-10 DIAGNOSIS — K92.2 CHRONIC UPPER GI BLEEDING: ICD-10-CM

## 2021-11-10 DIAGNOSIS — D50.0 IRON DEFICIENCY ANEMIA DUE TO CHRONIC BLOOD LOSS: ICD-10-CM

## 2021-11-10 DIAGNOSIS — E78.5 HYPERLIPIDEMIA ASSOCIATED WITH TYPE 2 DIABETES MELLITUS: ICD-10-CM

## 2021-11-10 DIAGNOSIS — E11.69 HYPERLIPIDEMIA ASSOCIATED WITH TYPE 2 DIABETES MELLITUS: ICD-10-CM

## 2021-11-10 DIAGNOSIS — E11.59 HYPERTENSION ASSOCIATED WITH DIABETES: Primary | ICD-10-CM

## 2021-11-10 DIAGNOSIS — K55.20 ANGIODYSPLASIA OF SMALL INTESTINE: ICD-10-CM

## 2021-11-10 DIAGNOSIS — I48.0 PAROXYSMAL ATRIAL FIBRILLATION: ICD-10-CM

## 2021-11-10 DIAGNOSIS — I15.2 HYPERTENSION ASSOCIATED WITH DIABETES: Primary | ICD-10-CM

## 2021-11-10 DIAGNOSIS — E11.9 DIABETES MELLITUS WITHOUT COMPLICATION: ICD-10-CM

## 2021-11-10 PROBLEM — D50.9 IDA (IRON DEFICIENCY ANEMIA): Status: ACTIVE | Noted: 2021-09-20

## 2021-11-10 PROCEDURE — 3078F PR MOST RECENT DIASTOLIC BLOOD PRESSURE < 80 MM HG: ICD-10-PCS | Mod: HCNC,CPTII,S$GLB, | Performed by: INTERNAL MEDICINE

## 2021-11-10 PROCEDURE — 3074F SYST BP LT 130 MM HG: CPT | Mod: HCNC,CPTII,S$GLB, | Performed by: INTERNAL MEDICINE

## 2021-11-10 PROCEDURE — 1159F MED LIST DOCD IN RCRD: CPT | Mod: HCNC,CPTII,S$GLB, | Performed by: INTERNAL MEDICINE

## 2021-11-10 PROCEDURE — 99999 PR PBB SHADOW E&M-EST. PATIENT-LVL III: ICD-10-PCS | Mod: PBBFAC,HCNC,, | Performed by: INTERNAL MEDICINE

## 2021-11-10 PROCEDURE — 3288F PR FALLS RISK ASSESSMENT DOCUMENTED: ICD-10-PCS | Mod: HCNC,CPTII,S$GLB, | Performed by: INTERNAL MEDICINE

## 2021-11-10 PROCEDURE — 3074F PR MOST RECENT SYSTOLIC BLOOD PRESSURE < 130 MM HG: ICD-10-PCS | Mod: HCNC,CPTII,S$GLB, | Performed by: INTERNAL MEDICINE

## 2021-11-10 PROCEDURE — 3072F PR LOW RISK FOR RETINOPATHY: ICD-10-PCS | Mod: HCNC,CPTII,S$GLB, | Performed by: INTERNAL MEDICINE

## 2021-11-10 PROCEDURE — 99499 RISK ADDL DX/OHS AUDIT: ICD-10-PCS | Mod: S$GLB,,, | Performed by: INTERNAL MEDICINE

## 2021-11-10 PROCEDURE — 3288F FALL RISK ASSESSMENT DOCD: CPT | Mod: HCNC,CPTII,S$GLB, | Performed by: INTERNAL MEDICINE

## 2021-11-10 PROCEDURE — 99214 PR OFFICE/OUTPT VISIT, EST, LEVL IV, 30-39 MIN: ICD-10-PCS | Mod: HCNC,S$GLB,, | Performed by: INTERNAL MEDICINE

## 2021-11-10 PROCEDURE — 1126F AMNT PAIN NOTED NONE PRSNT: CPT | Mod: HCNC,CPTII,S$GLB, | Performed by: INTERNAL MEDICINE

## 2021-11-10 PROCEDURE — 3078F DIAST BP <80 MM HG: CPT | Mod: HCNC,CPTII,S$GLB, | Performed by: INTERNAL MEDICINE

## 2021-11-10 PROCEDURE — 1159F PR MEDICATION LIST DOCUMENTED IN MEDICAL RECORD: ICD-10-PCS | Mod: HCNC,CPTII,S$GLB, | Performed by: INTERNAL MEDICINE

## 2021-11-10 PROCEDURE — 99999 PR PBB SHADOW E&M-EST. PATIENT-LVL III: CPT | Mod: PBBFAC,HCNC,, | Performed by: INTERNAL MEDICINE

## 2021-11-10 PROCEDURE — 1101F PR PT FALLS ASSESS DOC 0-1 FALLS W/OUT INJ PAST YR: ICD-10-PCS | Mod: HCNC,CPTII,S$GLB, | Performed by: INTERNAL MEDICINE

## 2021-11-10 PROCEDURE — 3072F LOW RISK FOR RETINOPATHY: CPT | Mod: HCNC,CPTII,S$GLB, | Performed by: INTERNAL MEDICINE

## 2021-11-10 PROCEDURE — 1126F PR PAIN SEVERITY QUANTIFIED, NO PAIN PRESENT: ICD-10-PCS | Mod: HCNC,CPTII,S$GLB, | Performed by: INTERNAL MEDICINE

## 2021-11-10 PROCEDURE — 99214 OFFICE O/P EST MOD 30 MIN: CPT | Mod: HCNC,S$GLB,, | Performed by: INTERNAL MEDICINE

## 2021-11-10 PROCEDURE — 1101F PT FALLS ASSESS-DOCD LE1/YR: CPT | Mod: HCNC,CPTII,S$GLB, | Performed by: INTERNAL MEDICINE

## 2021-11-10 PROCEDURE — 99499 UNLISTED E&M SERVICE: CPT | Mod: S$GLB,,, | Performed by: INTERNAL MEDICINE

## 2021-11-13 ENCOUNTER — PATIENT OUTREACH (OUTPATIENT)
Dept: ADMINISTRATIVE | Facility: OTHER | Age: 81
End: 2021-11-13
Payer: MEDICARE

## 2021-11-16 ENCOUNTER — ANTI-COAG VISIT (OUTPATIENT)
Dept: CARDIOLOGY | Facility: CLINIC | Age: 81
End: 2021-11-16
Payer: MEDICARE

## 2021-11-16 ENCOUNTER — OFFICE VISIT (OUTPATIENT)
Dept: OPHTHALMOLOGY | Facility: CLINIC | Age: 81
End: 2021-11-16
Payer: MEDICARE

## 2021-11-16 DIAGNOSIS — Z79.01 LONG TERM (CURRENT) USE OF ANTICOAGULANTS: Primary | ICD-10-CM

## 2021-11-16 DIAGNOSIS — E11.9 DIABETES MELLITUS TYPE 2 WITHOUT RETINOPATHY: Primary | ICD-10-CM

## 2021-11-16 DIAGNOSIS — Z96.1 PSEUDOPHAKIA OF BOTH EYES: ICD-10-CM

## 2021-11-16 DIAGNOSIS — H00.14 CHALAZION LEFT UPPER EYELID: ICD-10-CM

## 2021-11-16 DIAGNOSIS — I48.0 PAROXYSMAL ATRIAL FIBRILLATION: ICD-10-CM

## 2021-11-16 DIAGNOSIS — H35.3131 EARLY DRY STAGE NONEXUDATIVE AGE-RELATED MACULAR DEGENERATION OF BOTH EYES: ICD-10-CM

## 2021-11-16 LAB — INR PPP: 3.1 (ref 2–3)

## 2021-11-16 PROCEDURE — 2023F PR DILATED RETINAL EXAM W/O EVID OF RETINOPATHY: ICD-10-PCS | Mod: HCNC,CPTII,S$GLB, | Performed by: OPHTHALMOLOGY

## 2021-11-16 PROCEDURE — 2023F DILAT RTA XM W/O RTNOPTHY: CPT | Mod: HCNC,CPTII,S$GLB, | Performed by: OPHTHALMOLOGY

## 2021-11-16 PROCEDURE — 85610 POCT INR: ICD-10-PCS | Mod: QW,HCNC,S$GLB, | Performed by: INTERNAL MEDICINE

## 2021-11-16 PROCEDURE — 99999 PR PBB SHADOW E&M-EST. PATIENT-LVL II: CPT | Mod: PBBFAC,HCNC,, | Performed by: OPHTHALMOLOGY

## 2021-11-16 PROCEDURE — 1159F PR MEDICATION LIST DOCUMENTED IN MEDICAL RECORD: ICD-10-PCS | Mod: HCNC,CPTII,S$GLB, | Performed by: OPHTHALMOLOGY

## 2021-11-16 PROCEDURE — 1160F RVW MEDS BY RX/DR IN RCRD: CPT | Mod: HCNC,CPTII,S$GLB, | Performed by: OPHTHALMOLOGY

## 2021-11-16 PROCEDURE — 92014 COMPRE OPH EXAM EST PT 1/>: CPT | Mod: HCNC,S$GLB,, | Performed by: OPHTHALMOLOGY

## 2021-11-16 PROCEDURE — 1159F MED LIST DOCD IN RCRD: CPT | Mod: HCNC,CPTII,S$GLB, | Performed by: OPHTHALMOLOGY

## 2021-11-16 PROCEDURE — 93793 ANTICOAG MGMT PT WARFARIN: CPT | Mod: HCNC,S$GLB,,

## 2021-11-16 PROCEDURE — 99999 PR PBB SHADOW E&M-EST. PATIENT-LVL II: ICD-10-PCS | Mod: PBBFAC,HCNC,, | Performed by: OPHTHALMOLOGY

## 2021-11-16 PROCEDURE — 93793 PR ANTICOAGULANT MGMT FOR PT TAKING WARFARIN: ICD-10-PCS | Mod: HCNC,S$GLB,,

## 2021-11-16 PROCEDURE — 85610 PROTHROMBIN TIME: CPT | Mod: QW,HCNC,S$GLB, | Performed by: INTERNAL MEDICINE

## 2021-11-16 PROCEDURE — 1160F PR REVIEW ALL MEDS BY PRESCRIBER/CLIN PHARMACIST DOCUMENTED: ICD-10-PCS | Mod: HCNC,CPTII,S$GLB, | Performed by: OPHTHALMOLOGY

## 2021-11-16 PROCEDURE — 92014 PR EYE EXAM, EST PATIENT,COMPREHESV: ICD-10-PCS | Mod: HCNC,S$GLB,, | Performed by: OPHTHALMOLOGY

## 2021-11-18 ENCOUNTER — LAB VISIT (OUTPATIENT)
Dept: LAB | Facility: HOSPITAL | Age: 81
End: 2021-11-18
Attending: INTERNAL MEDICINE
Payer: MEDICARE

## 2021-11-18 ENCOUNTER — OFFICE VISIT (OUTPATIENT)
Dept: HEMATOLOGY/ONCOLOGY | Facility: CLINIC | Age: 81
End: 2021-11-18
Payer: MEDICARE

## 2021-11-18 VITALS
SYSTOLIC BLOOD PRESSURE: 133 MMHG | HEIGHT: 67 IN | OXYGEN SATURATION: 99 % | DIASTOLIC BLOOD PRESSURE: 84 MMHG | BODY MASS INDEX: 32.28 KG/M2 | HEART RATE: 85 BPM | WEIGHT: 205.69 LBS | TEMPERATURE: 99 F

## 2021-11-18 DIAGNOSIS — D64.9 ANEMIA, UNSPECIFIED TYPE: ICD-10-CM

## 2021-11-18 DIAGNOSIS — I25.10 ATHEROSCLEROSIS OF NATIVE CORONARY ARTERY OF NATIVE HEART WITHOUT ANGINA PECTORIS: ICD-10-CM

## 2021-11-18 DIAGNOSIS — K55.20 ANGIODYSPLASIA OF SMALL INTESTINE: ICD-10-CM

## 2021-11-18 DIAGNOSIS — F33.1 MAJOR DEPRESSIVE DISORDER, RECURRENT, MODERATE: ICD-10-CM

## 2021-11-18 DIAGNOSIS — E11.59 HYPERTENSION ASSOCIATED WITH DIABETES: ICD-10-CM

## 2021-11-18 DIAGNOSIS — D50.0 IRON DEFICIENCY ANEMIA DUE TO CHRONIC BLOOD LOSS: Primary | ICD-10-CM

## 2021-11-18 DIAGNOSIS — Z85.46 HISTORY OF PROSTATE CANCER: ICD-10-CM

## 2021-11-18 DIAGNOSIS — I15.2 HYPERTENSION ASSOCIATED WITH DIABETES: ICD-10-CM

## 2021-11-18 DIAGNOSIS — I48.0 PAROXYSMAL ATRIAL FIBRILLATION: ICD-10-CM

## 2021-11-18 LAB
BASOPHILS # BLD AUTO: 0.02 K/UL (ref 0–0.2)
BASOPHILS NFR BLD: 0.3 % (ref 0–1.9)
DIFFERENTIAL METHOD: ABNORMAL
EOSINOPHIL # BLD AUTO: 0.1 K/UL (ref 0–0.5)
EOSINOPHIL NFR BLD: 1.9 % (ref 0–8)
ERYTHROCYTE [DISTWIDTH] IN BLOOD BY AUTOMATED COUNT: 17.2 % (ref 11.5–14.5)
FERRITIN SERPL-MCNC: 32 NG/ML (ref 20–300)
HCT VFR BLD AUTO: 32.4 % (ref 40–54)
HGB BLD-MCNC: 9.7 G/DL (ref 14–18)
IMM GRANULOCYTES # BLD AUTO: 0.01 K/UL (ref 0–0.04)
IMM GRANULOCYTES NFR BLD AUTO: 0.2 % (ref 0–0.5)
IRON SERPL-MCNC: 12 UG/DL (ref 45–160)
LYMPHOCYTES # BLD AUTO: 1.6 K/UL (ref 1–4.8)
LYMPHOCYTES NFR BLD: 25.3 % (ref 18–48)
MCH RBC QN AUTO: 23.5 PG (ref 27–31)
MCHC RBC AUTO-ENTMCNC: 29.9 G/DL (ref 32–36)
MCV RBC AUTO: 79 FL (ref 82–98)
MONOCYTES # BLD AUTO: 1.1 K/UL (ref 0.3–1)
MONOCYTES NFR BLD: 18.2 % (ref 4–15)
NEUTROPHILS # BLD AUTO: 3.4 K/UL (ref 1.8–7.7)
NEUTROPHILS NFR BLD: 54.1 % (ref 38–73)
NRBC BLD-RTO: 0 /100 WBC
PLATELET # BLD AUTO: 383 K/UL (ref 150–450)
PMV BLD AUTO: 8.7 FL (ref 9.2–12.9)
RBC # BLD AUTO: 4.13 M/UL (ref 4.6–6.2)
SATURATED IRON: 3 % (ref 20–50)
TOTAL IRON BINDING CAPACITY: 474 UG/DL (ref 250–450)
TRANSFERRIN SERPL-MCNC: 320 MG/DL (ref 200–375)
WBC # BLD AUTO: 6.2 K/UL (ref 3.9–12.7)

## 2021-11-18 PROCEDURE — 1160F PR REVIEW ALL MEDS BY PRESCRIBER/CLIN PHARMACIST DOCUMENTED: ICD-10-PCS | Mod: HCNC,CPTII,S$GLB, | Performed by: INTERNAL MEDICINE

## 2021-11-18 PROCEDURE — 3288F PR FALLS RISK ASSESSMENT DOCUMENTED: ICD-10-PCS | Mod: HCNC,CPTII,S$GLB, | Performed by: INTERNAL MEDICINE

## 2021-11-18 PROCEDURE — 3075F PR MOST RECENT SYSTOLIC BLOOD PRESS GE 130-139MM HG: ICD-10-PCS | Mod: HCNC,CPTII,S$GLB, | Performed by: INTERNAL MEDICINE

## 2021-11-18 PROCEDURE — 1160F RVW MEDS BY RX/DR IN RCRD: CPT | Mod: HCNC,CPTII,S$GLB, | Performed by: INTERNAL MEDICINE

## 2021-11-18 PROCEDURE — 84466 ASSAY OF TRANSFERRIN: CPT | Mod: HCNC | Performed by: INTERNAL MEDICINE

## 2021-11-18 PROCEDURE — 99499 UNLISTED E&M SERVICE: CPT | Mod: S$GLB,,, | Performed by: INTERNAL MEDICINE

## 2021-11-18 PROCEDURE — 3075F SYST BP GE 130 - 139MM HG: CPT | Mod: HCNC,CPTII,S$GLB, | Performed by: INTERNAL MEDICINE

## 2021-11-18 PROCEDURE — 99999 PR PBB SHADOW E&M-EST. PATIENT-LVL V: CPT | Mod: PBBFAC,HCNC,, | Performed by: INTERNAL MEDICINE

## 2021-11-18 PROCEDURE — 99999 PR PBB SHADOW E&M-EST. PATIENT-LVL V: ICD-10-PCS | Mod: PBBFAC,HCNC,, | Performed by: INTERNAL MEDICINE

## 2021-11-18 PROCEDURE — 1126F AMNT PAIN NOTED NONE PRSNT: CPT | Mod: HCNC,CPTII,S$GLB, | Performed by: INTERNAL MEDICINE

## 2021-11-18 PROCEDURE — 3079F PR MOST RECENT DIASTOLIC BLOOD PRESSURE 80-89 MM HG: ICD-10-PCS | Mod: HCNC,CPTII,S$GLB, | Performed by: INTERNAL MEDICINE

## 2021-11-18 PROCEDURE — 1101F PR PT FALLS ASSESS DOC 0-1 FALLS W/OUT INJ PAST YR: ICD-10-PCS | Mod: HCNC,CPTII,S$GLB, | Performed by: INTERNAL MEDICINE

## 2021-11-18 PROCEDURE — 3072F PR LOW RISK FOR RETINOPATHY: ICD-10-PCS | Mod: HCNC,CPTII,S$GLB, | Performed by: INTERNAL MEDICINE

## 2021-11-18 PROCEDURE — 1159F PR MEDICATION LIST DOCUMENTED IN MEDICAL RECORD: ICD-10-PCS | Mod: HCNC,CPTII,S$GLB, | Performed by: INTERNAL MEDICINE

## 2021-11-18 PROCEDURE — 99205 PR OFFICE/OUTPT VISIT, NEW, LEVL V, 60-74 MIN: ICD-10-PCS | Mod: HCNC,S$GLB,, | Performed by: INTERNAL MEDICINE

## 2021-11-18 PROCEDURE — 99205 OFFICE O/P NEW HI 60 MIN: CPT | Mod: HCNC,S$GLB,, | Performed by: INTERNAL MEDICINE

## 2021-11-18 PROCEDURE — 36415 COLL VENOUS BLD VENIPUNCTURE: CPT | Mod: HCNC | Performed by: INTERNAL MEDICINE

## 2021-11-18 PROCEDURE — 1101F PT FALLS ASSESS-DOCD LE1/YR: CPT | Mod: HCNC,CPTII,S$GLB, | Performed by: INTERNAL MEDICINE

## 2021-11-18 PROCEDURE — 1126F PR PAIN SEVERITY QUANTIFIED, NO PAIN PRESENT: ICD-10-PCS | Mod: HCNC,CPTII,S$GLB, | Performed by: INTERNAL MEDICINE

## 2021-11-18 PROCEDURE — 3288F FALL RISK ASSESSMENT DOCD: CPT | Mod: HCNC,CPTII,S$GLB, | Performed by: INTERNAL MEDICINE

## 2021-11-18 PROCEDURE — 82728 ASSAY OF FERRITIN: CPT | Mod: HCNC | Performed by: INTERNAL MEDICINE

## 2021-11-18 PROCEDURE — 85025 COMPLETE CBC W/AUTO DIFF WBC: CPT | Mod: HCNC | Performed by: INTERNAL MEDICINE

## 2021-11-18 PROCEDURE — 3072F LOW RISK FOR RETINOPATHY: CPT | Mod: HCNC,CPTII,S$GLB, | Performed by: INTERNAL MEDICINE

## 2021-11-18 PROCEDURE — 3079F DIAST BP 80-89 MM HG: CPT | Mod: HCNC,CPTII,S$GLB, | Performed by: INTERNAL MEDICINE

## 2021-11-18 PROCEDURE — 1159F MED LIST DOCD IN RCRD: CPT | Mod: HCNC,CPTII,S$GLB, | Performed by: INTERNAL MEDICINE

## 2021-11-18 PROCEDURE — 99499 RISK ADDL DX/OHS AUDIT: ICD-10-PCS | Mod: S$GLB,,, | Performed by: INTERNAL MEDICINE

## 2021-11-18 RX ORDER — SODIUM CHLORIDE 0.9 % (FLUSH) 0.9 %
10 SYRINGE (ML) INJECTION
Status: CANCELLED | OUTPATIENT
Start: 2021-11-18

## 2021-11-18 RX ORDER — SODIUM CHLORIDE 0.9 % (FLUSH) 0.9 %
10 SYRINGE (ML) INJECTION
Status: CANCELLED | OUTPATIENT
Start: 2021-11-27

## 2021-11-18 RX ORDER — HEPARIN 100 UNIT/ML
500 SYRINGE INTRAVENOUS
Status: CANCELLED | OUTPATIENT
Start: 2021-11-18

## 2021-11-18 RX ORDER — HEPARIN 100 UNIT/ML
500 SYRINGE INTRAVENOUS
Status: CANCELLED | OUTPATIENT
Start: 2021-11-27

## 2021-11-24 ENCOUNTER — ANTI-COAG VISIT (OUTPATIENT)
Dept: CARDIOLOGY | Facility: CLINIC | Age: 81
End: 2021-11-24
Payer: MEDICARE

## 2021-11-24 DIAGNOSIS — Z79.01 LONG TERM (CURRENT) USE OF ANTICOAGULANTS: Primary | ICD-10-CM

## 2021-11-24 DIAGNOSIS — I48.0 PAROXYSMAL ATRIAL FIBRILLATION: ICD-10-CM

## 2021-11-24 LAB — INR PPP: 2.4 (ref 2–3)

## 2021-11-24 PROCEDURE — 93793 PR ANTICOAGULANT MGMT FOR PT TAKING WARFARIN: ICD-10-PCS | Mod: HCNC,S$GLB,,

## 2021-11-24 PROCEDURE — 93793 ANTICOAG MGMT PT WARFARIN: CPT | Mod: HCNC,S$GLB,,

## 2021-11-24 PROCEDURE — 85610 POCT INR: ICD-10-PCS | Mod: QW,HCNC,S$GLB, | Performed by: INTERNAL MEDICINE

## 2021-11-24 PROCEDURE — 85610 PROTHROMBIN TIME: CPT | Mod: QW,HCNC,S$GLB, | Performed by: INTERNAL MEDICINE

## 2021-11-26 ENCOUNTER — INFUSION (OUTPATIENT)
Dept: INFUSION THERAPY | Facility: HOSPITAL | Age: 81
End: 2021-11-26
Attending: INTERNAL MEDICINE
Payer: MEDICARE

## 2021-11-26 VITALS
HEART RATE: 85 BPM | HEIGHT: 67 IN | TEMPERATURE: 97 F | OXYGEN SATURATION: 99 % | SYSTOLIC BLOOD PRESSURE: 127 MMHG | WEIGHT: 208.56 LBS | DIASTOLIC BLOOD PRESSURE: 79 MMHG | BODY MASS INDEX: 32.73 KG/M2 | RESPIRATION RATE: 18 BRPM

## 2021-11-26 DIAGNOSIS — D50.0 IRON DEFICIENCY ANEMIA DUE TO CHRONIC BLOOD LOSS: Primary | ICD-10-CM

## 2021-11-26 PROCEDURE — 63600175 PHARM REV CODE 636 W HCPCS: Mod: JG,HCNC | Performed by: INTERNAL MEDICINE

## 2021-11-26 PROCEDURE — 25000003 PHARM REV CODE 250: Mod: HCNC | Performed by: INTERNAL MEDICINE

## 2021-11-26 PROCEDURE — 96365 THER/PROPH/DIAG IV INF INIT: CPT | Mod: HCNC

## 2021-11-26 RX ADMIN — SODIUM CHLORIDE: 9 INJECTION, SOLUTION INTRAVENOUS at 01:11

## 2021-11-26 RX ADMIN — FERRIC CARBOXYMALTOSE INJECTION 750 MG: 50 INJECTION, SOLUTION INTRAVENOUS at 01:11

## 2021-12-03 ENCOUNTER — INFUSION (OUTPATIENT)
Dept: INFUSION THERAPY | Facility: HOSPITAL | Age: 81
End: 2021-12-03
Attending: INTERNAL MEDICINE
Payer: MEDICARE

## 2021-12-03 ENCOUNTER — PES CALL (OUTPATIENT)
Dept: ADMINISTRATIVE | Facility: CLINIC | Age: 81
End: 2021-12-03
Payer: MEDICARE

## 2021-12-03 VITALS
HEART RATE: 83 BPM | RESPIRATION RATE: 20 BRPM | SYSTOLIC BLOOD PRESSURE: 133 MMHG | OXYGEN SATURATION: 97 % | TEMPERATURE: 97 F | DIASTOLIC BLOOD PRESSURE: 84 MMHG

## 2021-12-03 DIAGNOSIS — D50.0 IRON DEFICIENCY ANEMIA DUE TO CHRONIC BLOOD LOSS: Primary | ICD-10-CM

## 2021-12-03 PROCEDURE — 96365 THER/PROPH/DIAG IV INF INIT: CPT | Mod: HCNC

## 2021-12-03 PROCEDURE — 63600175 PHARM REV CODE 636 W HCPCS: Mod: JG,HCNC | Performed by: INTERNAL MEDICINE

## 2021-12-03 PROCEDURE — 25000003 PHARM REV CODE 250: Mod: HCNC | Performed by: INTERNAL MEDICINE

## 2021-12-03 RX ORDER — SODIUM CHLORIDE 0.9 % (FLUSH) 0.9 %
10 SYRINGE (ML) INJECTION
Status: DISCONTINUED | OUTPATIENT
Start: 2021-12-03 | End: 2021-12-03 | Stop reason: HOSPADM

## 2021-12-03 RX ADMIN — FERRIC CARBOXYMALTOSE INJECTION 750 MG: 50 INJECTION, SOLUTION INTRAVENOUS at 01:12

## 2021-12-03 RX ADMIN — SODIUM CHLORIDE: 9 INJECTION, SOLUTION INTRAVENOUS at 01:12

## 2021-12-08 ENCOUNTER — ANTI-COAG VISIT (OUTPATIENT)
Dept: CARDIOLOGY | Facility: CLINIC | Age: 81
End: 2021-12-08
Payer: MEDICARE

## 2021-12-08 DIAGNOSIS — I48.0 PAROXYSMAL ATRIAL FIBRILLATION: ICD-10-CM

## 2021-12-08 DIAGNOSIS — Z79.01 LONG TERM (CURRENT) USE OF ANTICOAGULANTS: Primary | ICD-10-CM

## 2021-12-08 LAB — INR PPP: 3.3 (ref 2–3)

## 2021-12-08 PROCEDURE — 93793 PR ANTICOAGULANT MGMT FOR PT TAKING WARFARIN: ICD-10-PCS | Mod: HCNC,S$GLB,,

## 2021-12-08 PROCEDURE — 93793 ANTICOAG MGMT PT WARFARIN: CPT | Mod: HCNC,S$GLB,,

## 2021-12-08 PROCEDURE — 85610 POCT INR: ICD-10-PCS | Mod: QW,HCNC,S$GLB, | Performed by: INTERNAL MEDICINE

## 2021-12-08 PROCEDURE — 85610 PROTHROMBIN TIME: CPT | Mod: QW,HCNC,S$GLB, | Performed by: INTERNAL MEDICINE

## 2021-12-10 ENCOUNTER — LAB VISIT (OUTPATIENT)
Dept: LAB | Facility: HOSPITAL | Age: 81
End: 2021-12-10
Attending: INTERNAL MEDICINE
Payer: MEDICARE

## 2021-12-10 ENCOUNTER — OFFICE VISIT (OUTPATIENT)
Dept: INTERNAL MEDICINE | Facility: CLINIC | Age: 81
End: 2021-12-10
Payer: MEDICARE

## 2021-12-10 VITALS
WEIGHT: 208.31 LBS | HEIGHT: 67 IN | BODY MASS INDEX: 32.7 KG/M2 | OXYGEN SATURATION: 98 % | SYSTOLIC BLOOD PRESSURE: 126 MMHG | DIASTOLIC BLOOD PRESSURE: 82 MMHG | HEART RATE: 91 BPM

## 2021-12-10 DIAGNOSIS — I25.10 ATHEROSCLEROSIS OF NATIVE CORONARY ARTERY OF NATIVE HEART WITHOUT ANGINA PECTORIS: ICD-10-CM

## 2021-12-10 DIAGNOSIS — E27.8 ADRENAL NODULE: ICD-10-CM

## 2021-12-10 DIAGNOSIS — I48.0 PAROXYSMAL ATRIAL FIBRILLATION: ICD-10-CM

## 2021-12-10 DIAGNOSIS — E11.59 HYPERTENSION ASSOCIATED WITH DIABETES: ICD-10-CM

## 2021-12-10 DIAGNOSIS — D50.0 IRON DEFICIENCY ANEMIA DUE TO CHRONIC BLOOD LOSS: ICD-10-CM

## 2021-12-10 DIAGNOSIS — E66.9 OBESITY (BMI 30.0-34.9): ICD-10-CM

## 2021-12-10 DIAGNOSIS — K92.2 CHRONIC UPPER GI BLEEDING: ICD-10-CM

## 2021-12-10 DIAGNOSIS — Z00.00 ROUTINE GENERAL MEDICAL EXAMINATION AT A HEALTH CARE FACILITY: Primary | ICD-10-CM

## 2021-12-10 DIAGNOSIS — R91.8 LUNG NODULES: ICD-10-CM

## 2021-12-10 DIAGNOSIS — Z86.59 HISTORY OF MAJOR DEPRESSION: ICD-10-CM

## 2021-12-10 DIAGNOSIS — I27.20 PULMONARY HYPERTENSION, MILD: ICD-10-CM

## 2021-12-10 DIAGNOSIS — G47.33 OSA (OBSTRUCTIVE SLEEP APNEA): ICD-10-CM

## 2021-12-10 DIAGNOSIS — M25.579 ANKLE PAIN, UNSPECIFIED CHRONICITY, UNSPECIFIED LATERALITY: ICD-10-CM

## 2021-12-10 DIAGNOSIS — G25.2 INTENTION TREMOR: ICD-10-CM

## 2021-12-10 DIAGNOSIS — E78.5 HYPERLIPIDEMIA ASSOCIATED WITH TYPE 2 DIABETES MELLITUS: ICD-10-CM

## 2021-12-10 DIAGNOSIS — E11.9 DIABETES MELLITUS WITHOUT COMPLICATION: ICD-10-CM

## 2021-12-10 DIAGNOSIS — E11.69 HYPERLIPIDEMIA ASSOCIATED WITH TYPE 2 DIABETES MELLITUS: ICD-10-CM

## 2021-12-10 DIAGNOSIS — Z86.010 HISTORY OF COLON POLYPS: ICD-10-CM

## 2021-12-10 DIAGNOSIS — I15.2 HYPERTENSION ASSOCIATED WITH DIABETES: ICD-10-CM

## 2021-12-10 DIAGNOSIS — Z85.46 HISTORY OF PROSTATE CANCER: ICD-10-CM

## 2021-12-10 DIAGNOSIS — K55.20 ANGIODYSPLASIA OF SMALL INTESTINE: ICD-10-CM

## 2021-12-10 LAB
BASOPHILS # BLD AUTO: 0.01 K/UL (ref 0–0.2)
BASOPHILS NFR BLD: 0.1 % (ref 0–1.9)
CRP SERPL-MCNC: 10 MG/L (ref 0–8.2)
DIFFERENTIAL METHOD: ABNORMAL
EOSINOPHIL # BLD AUTO: 0.1 K/UL (ref 0–0.5)
EOSINOPHIL NFR BLD: 2.1 % (ref 0–8)
ERYTHROCYTE [DISTWIDTH] IN BLOOD BY AUTOMATED COUNT: 20.9 % (ref 11.5–14.5)
ERYTHROCYTE [SEDIMENTATION RATE] IN BLOOD BY WESTERGREN METHOD: 32 MM/HR (ref 0–23)
HCT VFR BLD AUTO: 37.6 % (ref 40–54)
HGB BLD-MCNC: 11.3 G/DL (ref 14–18)
IMM GRANULOCYTES # BLD AUTO: 0.02 K/UL (ref 0–0.04)
IMM GRANULOCYTES NFR BLD AUTO: 0.3 % (ref 0–0.5)
LYMPHOCYTES # BLD AUTO: 1.2 K/UL (ref 1–4.8)
LYMPHOCYTES NFR BLD: 17.5 % (ref 18–48)
MCH RBC QN AUTO: 24.4 PG (ref 27–31)
MCHC RBC AUTO-ENTMCNC: 30.1 G/DL (ref 32–36)
MCV RBC AUTO: 81 FL (ref 82–98)
MONOCYTES # BLD AUTO: 0.9 K/UL (ref 0.3–1)
MONOCYTES NFR BLD: 13.5 % (ref 4–15)
NEUTROPHILS # BLD AUTO: 4.5 K/UL (ref 1.8–7.7)
NEUTROPHILS NFR BLD: 66.5 % (ref 38–73)
NRBC BLD-RTO: 0 /100 WBC
PLATELET # BLD AUTO: 275 K/UL (ref 150–450)
PMV BLD AUTO: 10.6 FL (ref 9.2–12.9)
RBC # BLD AUTO: 4.64 M/UL (ref 4.6–6.2)
URATE SERPL-MCNC: 6.3 MG/DL (ref 3.4–7)
WBC # BLD AUTO: 6.81 K/UL (ref 3.9–12.7)

## 2021-12-10 PROCEDURE — 3072F PR LOW RISK FOR RETINOPATHY: ICD-10-PCS | Mod: HCNC,CPTII,S$GLB, | Performed by: INTERNAL MEDICINE

## 2021-12-10 PROCEDURE — 85652 RBC SED RATE AUTOMATED: CPT | Mod: HCNC | Performed by: INTERNAL MEDICINE

## 2021-12-10 PROCEDURE — 99999 PR PBB SHADOW E&M-EST. PATIENT-LVL III: ICD-10-PCS | Mod: PBBFAC,HCNC,, | Performed by: INTERNAL MEDICINE

## 2021-12-10 PROCEDURE — 3072F LOW RISK FOR RETINOPATHY: CPT | Mod: HCNC,CPTII,S$GLB, | Performed by: INTERNAL MEDICINE

## 2021-12-10 PROCEDURE — 99499 UNLISTED E&M SERVICE: CPT | Mod: S$GLB,,, | Performed by: INTERNAL MEDICINE

## 2021-12-10 PROCEDURE — 85025 COMPLETE CBC W/AUTO DIFF WBC: CPT | Mod: HCNC | Performed by: INTERNAL MEDICINE

## 2021-12-10 PROCEDURE — 99397 PR PREVENTIVE VISIT,EST,65 & OVER: ICD-10-PCS | Mod: 25,HCNC,S$GLB, | Performed by: INTERNAL MEDICINE

## 2021-12-10 PROCEDURE — 96372 PR INJECTION,THERAP/PROPH/DIAG2ST, IM OR SUBCUT: ICD-10-PCS | Mod: HCNC,S$GLB,, | Performed by: INTERNAL MEDICINE

## 2021-12-10 PROCEDURE — 99397 PER PM REEVAL EST PAT 65+ YR: CPT | Mod: 25,HCNC,S$GLB, | Performed by: INTERNAL MEDICINE

## 2021-12-10 PROCEDURE — 86140 C-REACTIVE PROTEIN: CPT | Mod: HCNC | Performed by: INTERNAL MEDICINE

## 2021-12-10 PROCEDURE — 84550 ASSAY OF BLOOD/URIC ACID: CPT | Mod: HCNC | Performed by: INTERNAL MEDICINE

## 2021-12-10 PROCEDURE — 99999 PR PBB SHADOW E&M-EST. PATIENT-LVL III: CPT | Mod: PBBFAC,HCNC,, | Performed by: INTERNAL MEDICINE

## 2021-12-10 PROCEDURE — 99499 RISK ADDL DX/OHS AUDIT: ICD-10-PCS | Mod: S$GLB,,, | Performed by: INTERNAL MEDICINE

## 2021-12-10 PROCEDURE — 96372 THER/PROPH/DIAG INJ SC/IM: CPT | Mod: HCNC,S$GLB,, | Performed by: INTERNAL MEDICINE

## 2021-12-10 PROCEDURE — 36415 COLL VENOUS BLD VENIPUNCTURE: CPT | Mod: HCNC,PO | Performed by: INTERNAL MEDICINE

## 2021-12-10 RX ORDER — TRAMADOL HYDROCHLORIDE 50 MG/1
50 TABLET ORAL EVERY 6 HOURS PRN
Qty: 30 EACH | Refills: 1 | Status: SHIPPED | OUTPATIENT
Start: 2021-12-10 | End: 2022-07-17

## 2021-12-10 RX ORDER — METHYLPREDNISOLONE ACETATE 80 MG/ML
80 INJECTION, SUSPENSION INTRA-ARTICULAR; INTRALESIONAL; INTRAMUSCULAR; SOFT TISSUE
Status: COMPLETED | OUTPATIENT
Start: 2021-12-10 | End: 2021-12-10

## 2021-12-10 RX ADMIN — METHYLPREDNISOLONE ACETATE 80 MG: 80 INJECTION, SUSPENSION INTRA-ARTICULAR; INTRALESIONAL; INTRAMUSCULAR; SOFT TISSUE at 03:12

## 2021-12-11 ENCOUNTER — PATIENT MESSAGE (OUTPATIENT)
Dept: INTERNAL MEDICINE | Facility: CLINIC | Age: 81
End: 2021-12-11
Payer: MEDICARE

## 2021-12-13 ENCOUNTER — TELEPHONE (OUTPATIENT)
Dept: INTERNAL MEDICINE | Facility: CLINIC | Age: 81
End: 2021-12-13
Payer: MEDICARE

## 2021-12-13 NOTE — TELEPHONE ENCOUNTER
This message was sent to pt on Saturday via the portal. As of this morning he has not read the message. Please contact him.    Your uric acid level was normal but your inflammatory markers were elevated. 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 It should get better with the steroid as well. Let me know on Monday if it is not better.

## 2021-12-22 ENCOUNTER — ANTI-COAG VISIT (OUTPATIENT)
Dept: CARDIOLOGY | Facility: CLINIC | Age: 81
End: 2021-12-22
Payer: MEDICARE

## 2021-12-22 DIAGNOSIS — Z79.01 LONG TERM (CURRENT) USE OF ANTICOAGULANTS: Primary | ICD-10-CM

## 2021-12-22 DIAGNOSIS — I48.0 PAROXYSMAL ATRIAL FIBRILLATION: ICD-10-CM

## 2021-12-22 LAB — INR PPP: 3.1 (ref 2–3)

## 2021-12-22 PROCEDURE — 85610 PROTHROMBIN TIME: CPT | Mod: QW,HCNC,S$GLB, | Performed by: INTERNAL MEDICINE

## 2021-12-22 PROCEDURE — 93793 PR ANTICOAGULANT MGMT FOR PT TAKING WARFARIN: ICD-10-PCS | Mod: HCNC,S$GLB,,

## 2021-12-22 PROCEDURE — 85610 POCT INR: ICD-10-PCS | Mod: QW,HCNC,S$GLB, | Performed by: INTERNAL MEDICINE

## 2021-12-22 PROCEDURE — 93793 ANTICOAG MGMT PT WARFARIN: CPT | Mod: HCNC,S$GLB,,

## 2022-01-05 ENCOUNTER — ANTI-COAG VISIT (OUTPATIENT)
Dept: CARDIOLOGY | Facility: CLINIC | Age: 82
End: 2022-01-05
Payer: MEDICARE

## 2022-01-05 DIAGNOSIS — I48.0 PAROXYSMAL ATRIAL FIBRILLATION: ICD-10-CM

## 2022-01-05 DIAGNOSIS — Z79.01 LONG TERM (CURRENT) USE OF ANTICOAGULANTS: Primary | ICD-10-CM

## 2022-01-05 LAB — INR PPP: 2.1 (ref 2–3)

## 2022-01-05 PROCEDURE — 85610 PROTHROMBIN TIME: CPT | Mod: QW,HCNC,S$GLB, | Performed by: INTERNAL MEDICINE

## 2022-01-05 PROCEDURE — 85610 POCT INR: ICD-10-PCS | Mod: QW,HCNC,S$GLB, | Performed by: INTERNAL MEDICINE

## 2022-01-05 PROCEDURE — 93793 PR ANTICOAGULANT MGMT FOR PT TAKING WARFARIN: ICD-10-PCS | Mod: HCNC,S$GLB,,

## 2022-01-05 PROCEDURE — 93793 ANTICOAG MGMT PT WARFARIN: CPT | Mod: HCNC,S$GLB,,

## 2022-01-05 NOTE — PROGRESS NOTES
INR is therapeutic at 2.1.  Reports no recent changes.  Currently taking warfarin 2.5 mg every Tuesday, Thursday; and 5 mg on all other days.  No change in dose.  Recheck in 1.5 weeks.  Dose calendar given and reviewed with patient.  Patient verbalized understanding.

## 2022-01-14 ENCOUNTER — ANTI-COAG VISIT (OUTPATIENT)
Dept: CARDIOLOGY | Facility: CLINIC | Age: 82
End: 2022-01-14
Payer: MEDICARE

## 2022-01-14 ENCOUNTER — LAB VISIT (OUTPATIENT)
Dept: LAB | Facility: HOSPITAL | Age: 82
End: 2022-01-14
Attending: INTERNAL MEDICINE
Payer: MEDICARE

## 2022-01-14 DIAGNOSIS — Z79.01 LONG TERM (CURRENT) USE OF ANTICOAGULANTS: Primary | ICD-10-CM

## 2022-01-14 DIAGNOSIS — I48.0 PAROXYSMAL ATRIAL FIBRILLATION: ICD-10-CM

## 2022-01-14 DIAGNOSIS — D64.9 ANEMIA, UNSPECIFIED TYPE: ICD-10-CM

## 2022-01-14 LAB
BASOPHILS # BLD AUTO: 0.02 K/UL (ref 0–0.2)
BASOPHILS NFR BLD: 0.3 % (ref 0–1.9)
DIFFERENTIAL METHOD: ABNORMAL
EOSINOPHIL # BLD AUTO: 0.1 K/UL (ref 0–0.5)
EOSINOPHIL NFR BLD: 1.5 % (ref 0–8)
ERYTHROCYTE [DISTWIDTH] IN BLOOD BY AUTOMATED COUNT: 19.1 % (ref 11.5–14.5)
FERRITIN SERPL-MCNC: 586 NG/ML (ref 20–300)
HCT VFR BLD AUTO: 41.1 % (ref 40–54)
HGB BLD-MCNC: 12.5 G/DL (ref 14–18)
IMM GRANULOCYTES # BLD AUTO: 0.02 K/UL (ref 0–0.04)
IMM GRANULOCYTES NFR BLD AUTO: 0.3 % (ref 0–0.5)
INR PPP: 2.1 (ref 2–3)
IRON SERPL-MCNC: 123 UG/DL (ref 45–160)
LYMPHOCYTES # BLD AUTO: 1.9 K/UL (ref 1–4.8)
LYMPHOCYTES NFR BLD: 31.8 % (ref 18–48)
MCH RBC QN AUTO: 26 PG (ref 27–31)
MCHC RBC AUTO-ENTMCNC: 30.4 G/DL (ref 32–36)
MCV RBC AUTO: 85 FL (ref 82–98)
MONOCYTES # BLD AUTO: 0.7 K/UL (ref 0.3–1)
MONOCYTES NFR BLD: 12.2 % (ref 4–15)
NEUTROPHILS # BLD AUTO: 3.2 K/UL (ref 1.8–7.7)
NEUTROPHILS NFR BLD: 53.9 % (ref 38–73)
NRBC BLD-RTO: 0 /100 WBC
PLATELET # BLD AUTO: 238 K/UL (ref 150–450)
PMV BLD AUTO: 9.3 FL (ref 9.2–12.9)
RBC # BLD AUTO: 4.81 M/UL (ref 4.6–6.2)
SATURATED IRON: 39 % (ref 20–50)
TOTAL IRON BINDING CAPACITY: 312 UG/DL (ref 250–450)
TRANSFERRIN SERPL-MCNC: 211 MG/DL (ref 200–375)
WBC # BLD AUTO: 6 K/UL (ref 3.9–12.7)

## 2022-01-14 PROCEDURE — 85610 PROTHROMBIN TIME: CPT | Mod: QW,HCNC,S$GLB, | Performed by: INTERNAL MEDICINE

## 2022-01-14 PROCEDURE — 93793 PR ANTICOAGULANT MGMT FOR PT TAKING WARFARIN: ICD-10-PCS | Mod: HCNC,S$GLB,,

## 2022-01-14 PROCEDURE — 85025 COMPLETE CBC W/AUTO DIFF WBC: CPT | Mod: HCNC | Performed by: INTERNAL MEDICINE

## 2022-01-14 PROCEDURE — 82728 ASSAY OF FERRITIN: CPT | Mod: HCNC | Performed by: INTERNAL MEDICINE

## 2022-01-14 PROCEDURE — 93793 ANTICOAG MGMT PT WARFARIN: CPT | Mod: HCNC,S$GLB,,

## 2022-01-14 PROCEDURE — 36415 COLL VENOUS BLD VENIPUNCTURE: CPT | Mod: HCNC | Performed by: INTERNAL MEDICINE

## 2022-01-14 PROCEDURE — 83540 ASSAY OF IRON: CPT | Mod: HCNC | Performed by: INTERNAL MEDICINE

## 2022-01-14 PROCEDURE — 85610 POCT INR: ICD-10-PCS | Mod: QW,HCNC,S$GLB, | Performed by: INTERNAL MEDICINE

## 2022-01-14 NOTE — PROGRESS NOTES
INR remains therapeutic at 2.1.  Patient reports taking warfarin 2.5 mg every Tuesday, Thursday; and 5 mg on all other days.  No recent changes.  We will continue same dose of warfarin.  Recheck in 1 month.  Dose calendar given and reviewed with patient.  Patient confirms understanding.

## 2022-01-18 ENCOUNTER — OFFICE VISIT (OUTPATIENT)
Dept: HEMATOLOGY/ONCOLOGY | Facility: CLINIC | Age: 82
End: 2022-01-18
Payer: MEDICARE

## 2022-01-18 VITALS
OXYGEN SATURATION: 98 % | HEIGHT: 67 IN | WEIGHT: 216.69 LBS | TEMPERATURE: 98 F | SYSTOLIC BLOOD PRESSURE: 127 MMHG | HEART RATE: 95 BPM | DIASTOLIC BLOOD PRESSURE: 85 MMHG | BODY MASS INDEX: 34.01 KG/M2

## 2022-01-18 DIAGNOSIS — E11.59 HYPERTENSION ASSOCIATED WITH DIABETES: ICD-10-CM

## 2022-01-18 DIAGNOSIS — F33.1 MAJOR DEPRESSIVE DISORDER, RECURRENT, MODERATE: ICD-10-CM

## 2022-01-18 DIAGNOSIS — I15.2 HYPERTENSION ASSOCIATED WITH DIABETES: ICD-10-CM

## 2022-01-18 DIAGNOSIS — I48.0 PAROXYSMAL ATRIAL FIBRILLATION: ICD-10-CM

## 2022-01-18 DIAGNOSIS — D50.0 IRON DEFICIENCY ANEMIA DUE TO CHRONIC BLOOD LOSS: Primary | ICD-10-CM

## 2022-01-18 DIAGNOSIS — K55.20 ANGIODYSPLASIA OF SMALL INTESTINE: ICD-10-CM

## 2022-01-18 DIAGNOSIS — D64.9 ANEMIA, UNSPECIFIED TYPE: ICD-10-CM

## 2022-01-18 DIAGNOSIS — E27.8 ADRENAL NODULE: ICD-10-CM

## 2022-01-18 PROCEDURE — 1126F AMNT PAIN NOTED NONE PRSNT: CPT | Mod: HCNC,CPTII,S$GLB, | Performed by: INTERNAL MEDICINE

## 2022-01-18 PROCEDURE — 3079F PR MOST RECENT DIASTOLIC BLOOD PRESSURE 80-89 MM HG: ICD-10-PCS | Mod: HCNC,CPTII,S$GLB, | Performed by: INTERNAL MEDICINE

## 2022-01-18 PROCEDURE — 1126F PR PAIN SEVERITY QUANTIFIED, NO PAIN PRESENT: ICD-10-PCS | Mod: HCNC,CPTII,S$GLB, | Performed by: INTERNAL MEDICINE

## 2022-01-18 PROCEDURE — 1101F PT FALLS ASSESS-DOCD LE1/YR: CPT | Mod: HCNC,CPTII,S$GLB, | Performed by: INTERNAL MEDICINE

## 2022-01-18 PROCEDURE — 99213 PR OFFICE/OUTPT VISIT, EST, LEVL III, 20-29 MIN: ICD-10-PCS | Mod: HCNC,S$GLB,, | Performed by: INTERNAL MEDICINE

## 2022-01-18 PROCEDURE — 99999 PR PBB SHADOW E&M-EST. PATIENT-LVL IV: CPT | Mod: PBBFAC,HCNC,, | Performed by: INTERNAL MEDICINE

## 2022-01-18 PROCEDURE — 3072F LOW RISK FOR RETINOPATHY: CPT | Mod: HCNC,CPTII,S$GLB, | Performed by: INTERNAL MEDICINE

## 2022-01-18 PROCEDURE — 1160F RVW MEDS BY RX/DR IN RCRD: CPT | Mod: HCNC,CPTII,S$GLB, | Performed by: INTERNAL MEDICINE

## 2022-01-18 PROCEDURE — 3288F PR FALLS RISK ASSESSMENT DOCUMENTED: ICD-10-PCS | Mod: HCNC,CPTII,S$GLB, | Performed by: INTERNAL MEDICINE

## 2022-01-18 PROCEDURE — 1160F PR REVIEW ALL MEDS BY PRESCRIBER/CLIN PHARMACIST DOCUMENTED: ICD-10-PCS | Mod: HCNC,CPTII,S$GLB, | Performed by: INTERNAL MEDICINE

## 2022-01-18 PROCEDURE — 3079F DIAST BP 80-89 MM HG: CPT | Mod: HCNC,CPTII,S$GLB, | Performed by: INTERNAL MEDICINE

## 2022-01-18 PROCEDURE — 1159F PR MEDICATION LIST DOCUMENTED IN MEDICAL RECORD: ICD-10-PCS | Mod: HCNC,CPTII,S$GLB, | Performed by: INTERNAL MEDICINE

## 2022-01-18 PROCEDURE — 99499 RISK ADDL DX/OHS AUDIT: ICD-10-PCS | Mod: HCNC,S$GLB,, | Performed by: INTERNAL MEDICINE

## 2022-01-18 PROCEDURE — 3074F PR MOST RECENT SYSTOLIC BLOOD PRESSURE < 130 MM HG: ICD-10-PCS | Mod: HCNC,CPTII,S$GLB, | Performed by: INTERNAL MEDICINE

## 2022-01-18 PROCEDURE — 99213 OFFICE O/P EST LOW 20 MIN: CPT | Mod: HCNC,S$GLB,, | Performed by: INTERNAL MEDICINE

## 2022-01-18 PROCEDURE — 3288F FALL RISK ASSESSMENT DOCD: CPT | Mod: HCNC,CPTII,S$GLB, | Performed by: INTERNAL MEDICINE

## 2022-01-18 PROCEDURE — 3072F PR LOW RISK FOR RETINOPATHY: ICD-10-PCS | Mod: HCNC,CPTII,S$GLB, | Performed by: INTERNAL MEDICINE

## 2022-01-18 PROCEDURE — 99999 PR PBB SHADOW E&M-EST. PATIENT-LVL IV: ICD-10-PCS | Mod: PBBFAC,HCNC,, | Performed by: INTERNAL MEDICINE

## 2022-01-18 PROCEDURE — 99499 UNLISTED E&M SERVICE: CPT | Mod: HCNC,S$GLB,, | Performed by: INTERNAL MEDICINE

## 2022-01-18 PROCEDURE — 3074F SYST BP LT 130 MM HG: CPT | Mod: HCNC,CPTII,S$GLB, | Performed by: INTERNAL MEDICINE

## 2022-01-18 PROCEDURE — 1159F MED LIST DOCD IN RCRD: CPT | Mod: HCNC,CPTII,S$GLB, | Performed by: INTERNAL MEDICINE

## 2022-01-18 PROCEDURE — 1101F PR PT FALLS ASSESS DOC 0-1 FALLS W/OUT INJ PAST YR: ICD-10-PCS | Mod: HCNC,CPTII,S$GLB, | Performed by: INTERNAL MEDICINE

## 2022-01-18 NOTE — PROGRESS NOTES
Subjective:       Patient ID: Anthony Biggs Jr. is a 81 y.o. male.    Chief Complaint: Results and Anemia    HPI  81-year-old male history of documented iron deficiency.  Patient is iron repleted nearly at this point on oral iron supplementation.  Dramatic improvement follow-up as noted GI workup completed    Past Medical History:   Diagnosis Date    Abnormal CXR     Angina pectoris 2017    Angiodysplasia of small intestine     Atrial fibrillation     Chronic upper GI bleeding     due to angiodysplasia in proximal small intestine    Coronary atherosclerosis of unspecified type of vessel, native or graft     Depression     Diabetes mellitus without complication     Emphysema of lung     History of prostate cancer 2007    prostatectomy    Hyperlipidemia associated with type 2 diabetes mellitus     Hypertension associated with diabetes     LAILA (iron deficiency anemia) 2021    due to angiodysplasia in small intestine    Intention tremor     Moderate episode of recurrent major depressive disorder 2019    Morbid (severe) obesity due to excess calories 2019    SHORTY (obstructive sleep apnea)     Prostate cancer     Trouble in sleeping     Urinary incontinence      Family History   Problem Relation Age of Onset    Heart disease Mother     Cancer Father         lung    Macular degeneration Sister     Diabetes Sister     Heart disease Sister     Strabismus Neg Hx     Retinal detachment Neg Hx     Glaucoma Neg Hx     Blindness Neg Hx     Amblyopia Neg Hx      Social History     Socioeconomic History    Marital status:     Highest education level: Some college, no degree   Tobacco Use    Smoking status: Former Smoker     Packs/day: 0.50     Years: 40.00     Pack years: 20.00     Types: Cigarettes     Quit date:      Years since quittin.0    Smokeless tobacco: Never Used   Substance and Sexual Activity    Alcohol use: Yes     Alcohol/week: 7.0 standard drinks      Types: 7 Shots of liquor per week    Drug use: No    Sexual activity: Not Currently     Past Surgical History:   Procedure Laterality Date    ABDOMINAL HERNIA REPAIR      APPENDECTOMY      bladder sx      CARDIAC CATHETERIZATION      CATARACT EXTRACTION W/  INTRAOCULAR LENS IMPLANT  Restor OU    COLONOSCOPY N/A 5/16/2017    Procedure: COLONOSCOPY;  Surgeon: Alfredo Naidu MD;  Location: [facility] ENDO;  Service: Endoscopy;  Laterality: N/A;    ESOPHAGOGASTRODUODENOSCOPY N/A 10/29/2021    Procedure: SBE (Push Enteroscopy) with Dr. Wayne;  Surgeon: Arlette Wayne MD;  Location: [facility] ENDO;  Service: Endoscopy;  Laterality: N/A;  Plavix held indefinitely as of 10-20-21. Must also hold Coumadin 5 days prior. Ok to continue to use ASA.    inguinal hernia      INTRALUMINAL GASTROINTESTINAL TRACT IMAGING VIA CAPSULE N/A 10/4/2021    Procedure: IMAGING PROCEDURE, GI TRACT, INTRALUMINAL, VIA CAPSULE;  Surgeon: Joaquin Stack RN;  Location: [facility] ENDO;  Service: Endoscopy;  Laterality: N/A;    LEFT HEART CATHETERIZATION Left 7/20/2021    Procedure: CATHETERIZATION, HEART, LEFT;  Surgeon: Darryl Griffith MD;  Location: [facility] CATH LAB;  Service: Cardiology;  Laterality: Left;    lung sx      PERCUTANEOUS TRANSLUMINAL BALLOON ANGIOPLASTY OF CORONARY ARTERY  7/20/2021    Procedure: Angioplasty-coronary;  Surgeon: Darryl Griffith MD;  Location: [facility] CATH LAB;  Service: Cardiology;;    PROSTATE SURGERY         Labs:  Lab Results   Component Value Date    WBC 6.00 01/14/2022    HGB 12.5 (L) 01/14/2022    HCT 41.1 01/14/2022    MCV 85 01/14/2022     01/14/2022     BMP  Lab Results   Component Value Date     (L) 10/05/2021    K 4.6 10/05/2021     10/05/2021    CO2 21 (L) 10/05/2021    BUN 13 10/05/2021    CREATININE 0.9 10/05/2021    CALCIUM 9.6 10/05/2021    ANIONGAP 10 10/05/2021    ESTGFRAFRICA >60.0 10/05/2021    EGFRNONAA >60.0 10/05/2021     Lab Results   Component Value Date     ALT 24 09/20/2021    AST 26 09/20/2021    ALKPHOS 61 09/20/2021    BILITOT 0.5 09/20/2021       Lab Results   Component Value Date    IRON 123 01/14/2022    TIBC 312 01/14/2022    FERRITIN 586 (H) 01/14/2022     No results found for: [id number]  No results found for: FOLATE  Lab Results   Component Value Date    TSH 1.068 10/05/2021         Review of Systems   Constitutional: Positive for fatigue. Negative for activity change, appetite change, chills, diaphoresis, fever and unexpected weight change.   HENT: Negative for congestion, dental problem, drooling, ear discharge, ear pain, facial swelling, hearing loss, mouth sores, nosebleeds, postnasal drip, rhinorrhea, sinus pressure, sneezing, sore throat, tinnitus, trouble swallowing and voice change.    Eyes: Negative for photophobia, pain, discharge, redness, itching and visual disturbance.   Respiratory: Negative for apnea, cough, choking, chest tightness, shortness of breath, wheezing and stridor.    Cardiovascular: Negative for chest pain, palpitations and leg swelling.   Gastrointestinal: Negative for abdominal distention, abdominal pain, anal bleeding, blood in stool, constipation, diarrhea, nausea, rectal pain and vomiting.   Endocrine: Negative for cold intolerance, heat intolerance, polydipsia, polyphagia and polyuria.   Genitourinary: Negative for decreased urine volume, difficulty urinating, dysuria, enuresis, flank pain, frequency, genital sores, hematuria, penile discharge, penile pain, penile swelling, scrotal swelling, testicular pain and urgency.   Musculoskeletal: Negative for arthralgias, back pain, gait problem, joint swelling, myalgias, neck pain and neck stiffness.   Skin: Negative for color change, pallor, rash and wound.   Allergic/Immunologic: Negative for environmental allergies, food allergies and immunocompromised state.   Neurological: Negative for dizziness, tremors, seizures, syncope, facial asymmetry, speech difficulty, weakness,  light-headedness, numbness and headaches.   Hematological: Negative for adenopathy. Does not bruise/bleed easily.   Psychiatric/Behavioral: Negative for agitation, behavioral problems, confusion, decreased concentration, dysphoric mood, hallucinations, self-injury, sleep disturbance and suicidal ideas. The patient is not nervous/anxious and is not hyperactive.        Objective:      Physical Exam  Vitals reviewed.   Constitutional:       General: He is not in acute distress.     Appearance: He is well-developed and well-nourished. He is not diaphoretic.   HENT:      Head: Normocephalic.      Right Ear: External ear normal.      Left Ear: External ear normal.      Nose: Nose normal.      Right Sinus: No maxillary sinus tenderness or frontal sinus tenderness.      Left Sinus: No maxillary sinus tenderness or frontal sinus tenderness.      Mouth/Throat:      Mouth: Oropharynx is clear and moist.      Pharynx: No oropharyngeal exudate.   Eyes:      General: Lids are normal. No scleral icterus.        Right eye: No discharge.         Left eye: No discharge.      Extraocular Movements: EOM normal.      Right eye: Normal extraocular motion.      Left eye: Normal extraocular motion.      Conjunctiva/sclera:      Right eye: Right conjunctiva is not injected. No hemorrhage.     Left eye: Left conjunctiva is not injected. No hemorrhage.     Pupils: Pupils are equal, round, and reactive to light.   Neck:      Thyroid: No thyromegaly.      Vascular: No JVD.      Trachea: No tracheal deviation.   Cardiovascular:      Rate and Rhythm: Normal rate.   Pulmonary:      Effort: Pulmonary effort is normal. No respiratory distress.      Breath sounds: No stridor.   Chest:   Breasts:      Right: No supraclavicular adenopathy.      Left: No supraclavicular adenopathy.       Abdominal:      General: Bowel sounds are normal.      Palpations: Abdomen is soft. There is no hepatomegaly, splenomegaly, hepatosplenomegaly or mass.      Tenderness:  There is no abdominal tenderness.   Musculoskeletal:         General: No tenderness or edema. Normal range of motion.      Cervical back: Normal range of motion and neck supple.   Lymphadenopathy:      Head:      Right side of head: No posterior auricular or occipital adenopathy.      Left side of head: No posterior auricular or occipital adenopathy.      Cervical: No cervical adenopathy.      Right cervical: No superficial, deep or posterior cervical adenopathy.     Left cervical: No superficial, deep or posterior cervical adenopathy.      Upper Body:   No axillary adenopathy present.     Right upper body: No supraclavicular adenopathy.      Left upper body: No supraclavicular adenopathy.   Skin:     General: Skin is dry.      Findings: No erythema or rash.      Nails: There is no clubbing or cyanosis.   Neurological:      Mental Status: He is alert and oriented to person, place, and time.      Cranial Nerves: No cranial nerve deficit.      Coordination: Coordination normal.      Deep Tendon Reflexes: Strength normal.   Psychiatric:         Mood and Affect: Mood and affect normal.         Behavior: Behavior normal.         Thought Content: Thought content normal.         Cognition and Memory: Cognition and memory normal.         Judgment: Judgment normal.             Assessment:      1. Iron deficiency anemia due to chronic blood loss    2. Anemia, unspecified type    3. Angiodysplasia of small intestine    4. Hypertension associated with diabetes    5. Adrenal nodule    6. Major depressive disorder, recurrent, moderate    7. Paroxysmal atrial fibrillation           Plan:      documented iron deficiency nearly repleted.  Offered intravenous iron if needed but at this time would recommend oral iron supplementation article from up-to-date followed to them return for follow-up in 3-6 months with laboratory studies done prior.  If wish additional intravenous iron can be ordered      Godfrey Smiley Jr, MD FACP

## 2022-02-07 ENCOUNTER — OFFICE VISIT (OUTPATIENT)
Dept: CARDIOLOGY | Facility: CLINIC | Age: 82
End: 2022-02-07
Payer: MEDICARE

## 2022-02-07 VITALS
SYSTOLIC BLOOD PRESSURE: 124 MMHG | DIASTOLIC BLOOD PRESSURE: 84 MMHG | WEIGHT: 213.19 LBS | OXYGEN SATURATION: 97 % | BODY MASS INDEX: 33.39 KG/M2 | HEART RATE: 87 BPM

## 2022-02-07 DIAGNOSIS — I27.20 PULMONARY HYPERTENSION, MILD: Primary | ICD-10-CM

## 2022-02-07 DIAGNOSIS — G47.33 OSA (OBSTRUCTIVE SLEEP APNEA): ICD-10-CM

## 2022-02-07 DIAGNOSIS — I70.0 ATHEROSCLEROSIS OF AORTA: ICD-10-CM

## 2022-02-07 DIAGNOSIS — E78.5 HYPERLIPIDEMIA ASSOCIATED WITH TYPE 2 DIABETES MELLITUS: ICD-10-CM

## 2022-02-07 DIAGNOSIS — K55.20 ANGIODYSPLASIA OF SMALL INTESTINE: ICD-10-CM

## 2022-02-07 DIAGNOSIS — I25.10 ATHEROSCLEROSIS OF NATIVE CORONARY ARTERY OF NATIVE HEART WITHOUT ANGINA PECTORIS: ICD-10-CM

## 2022-02-07 DIAGNOSIS — Z79.01 CURRENT USE OF LONG TERM ANTICOAGULATION: ICD-10-CM

## 2022-02-07 DIAGNOSIS — E11.9 DIABETES MELLITUS WITHOUT COMPLICATION: ICD-10-CM

## 2022-02-07 DIAGNOSIS — E11.69 HYPERLIPIDEMIA ASSOCIATED WITH TYPE 2 DIABETES MELLITUS: ICD-10-CM

## 2022-02-07 DIAGNOSIS — I15.2 HYPERTENSION ASSOCIATED WITH DIABETES: ICD-10-CM

## 2022-02-07 DIAGNOSIS — E11.59 HYPERTENSION ASSOCIATED WITH DIABETES: ICD-10-CM

## 2022-02-07 DIAGNOSIS — I48.0 PAROXYSMAL ATRIAL FIBRILLATION: ICD-10-CM

## 2022-02-07 DIAGNOSIS — R06.02 SOB (SHORTNESS OF BREATH): ICD-10-CM

## 2022-02-07 PROCEDURE — 3074F PR MOST RECENT SYSTOLIC BLOOD PRESSURE < 130 MM HG: ICD-10-PCS | Mod: HCNC,CPTII,S$GLB, | Performed by: INTERNAL MEDICINE

## 2022-02-07 PROCEDURE — 1159F MED LIST DOCD IN RCRD: CPT | Mod: HCNC,CPTII,S$GLB, | Performed by: INTERNAL MEDICINE

## 2022-02-07 PROCEDURE — 1126F PR PAIN SEVERITY QUANTIFIED, NO PAIN PRESENT: ICD-10-PCS | Mod: HCNC,CPTII,S$GLB, | Performed by: INTERNAL MEDICINE

## 2022-02-07 PROCEDURE — 3072F LOW RISK FOR RETINOPATHY: CPT | Mod: HCNC,CPTII,S$GLB, | Performed by: INTERNAL MEDICINE

## 2022-02-07 PROCEDURE — 3074F SYST BP LT 130 MM HG: CPT | Mod: HCNC,CPTII,S$GLB, | Performed by: INTERNAL MEDICINE

## 2022-02-07 PROCEDURE — 99215 PR OFFICE/OUTPT VISIT, EST, LEVL V, 40-54 MIN: ICD-10-PCS | Mod: HCNC,S$GLB,, | Performed by: INTERNAL MEDICINE

## 2022-02-07 PROCEDURE — 99499 UNLISTED E&M SERVICE: CPT | Mod: HCNC,S$GLB,, | Performed by: INTERNAL MEDICINE

## 2022-02-07 PROCEDURE — 3079F DIAST BP 80-89 MM HG: CPT | Mod: HCNC,CPTII,S$GLB, | Performed by: INTERNAL MEDICINE

## 2022-02-07 PROCEDURE — 3288F PR FALLS RISK ASSESSMENT DOCUMENTED: ICD-10-PCS | Mod: HCNC,CPTII,S$GLB, | Performed by: INTERNAL MEDICINE

## 2022-02-07 PROCEDURE — 1159F PR MEDICATION LIST DOCUMENTED IN MEDICAL RECORD: ICD-10-PCS | Mod: HCNC,CPTII,S$GLB, | Performed by: INTERNAL MEDICINE

## 2022-02-07 PROCEDURE — 3079F PR MOST RECENT DIASTOLIC BLOOD PRESSURE 80-89 MM HG: ICD-10-PCS | Mod: HCNC,CPTII,S$GLB, | Performed by: INTERNAL MEDICINE

## 2022-02-07 PROCEDURE — 3288F FALL RISK ASSESSMENT DOCD: CPT | Mod: HCNC,CPTII,S$GLB, | Performed by: INTERNAL MEDICINE

## 2022-02-07 PROCEDURE — 1101F PT FALLS ASSESS-DOCD LE1/YR: CPT | Mod: HCNC,CPTII,S$GLB, | Performed by: INTERNAL MEDICINE

## 2022-02-07 PROCEDURE — 99499 RISK ADDL DX/OHS AUDIT: ICD-10-PCS | Mod: HCNC,S$GLB,, | Performed by: INTERNAL MEDICINE

## 2022-02-07 PROCEDURE — 99999 PR PBB SHADOW E&M-EST. PATIENT-LVL III: CPT | Mod: PBBFAC,HCNC,, | Performed by: INTERNAL MEDICINE

## 2022-02-07 PROCEDURE — 99999 PR PBB SHADOW E&M-EST. PATIENT-LVL III: ICD-10-PCS | Mod: PBBFAC,HCNC,, | Performed by: INTERNAL MEDICINE

## 2022-02-07 PROCEDURE — 1101F PR PT FALLS ASSESS DOC 0-1 FALLS W/OUT INJ PAST YR: ICD-10-PCS | Mod: HCNC,CPTII,S$GLB, | Performed by: INTERNAL MEDICINE

## 2022-02-07 PROCEDURE — 1126F AMNT PAIN NOTED NONE PRSNT: CPT | Mod: HCNC,CPTII,S$GLB, | Performed by: INTERNAL MEDICINE

## 2022-02-07 PROCEDURE — 3072F PR LOW RISK FOR RETINOPATHY: ICD-10-PCS | Mod: HCNC,CPTII,S$GLB, | Performed by: INTERNAL MEDICINE

## 2022-02-07 PROCEDURE — 99215 OFFICE O/P EST HI 40 MIN: CPT | Mod: HCNC,S$GLB,, | Performed by: INTERNAL MEDICINE

## 2022-02-10 ENCOUNTER — ANTI-COAG VISIT (OUTPATIENT)
Dept: CARDIOLOGY | Facility: CLINIC | Age: 82
End: 2022-02-10
Payer: MEDICARE

## 2022-02-10 ENCOUNTER — OFFICE VISIT (OUTPATIENT)
Dept: PULMONOLOGY | Facility: CLINIC | Age: 82
End: 2022-02-10
Payer: MEDICARE

## 2022-02-10 ENCOUNTER — HOSPITAL ENCOUNTER (OUTPATIENT)
Dept: RADIOLOGY | Facility: HOSPITAL | Age: 82
Discharge: HOME | End: 2022-02-10
Attending: INTERNAL MEDICINE
Payer: MEDICARE

## 2022-02-10 VITALS
HEART RATE: 88 BPM | DIASTOLIC BLOOD PRESSURE: 76 MMHG | OXYGEN SATURATION: 99 % | WEIGHT: 213.88 LBS | RESPIRATION RATE: 17 BRPM | SYSTOLIC BLOOD PRESSURE: 124 MMHG | HEIGHT: 67 IN | BODY MASS INDEX: 33.57 KG/M2

## 2022-02-10 DIAGNOSIS — G47.33 OSA (OBSTRUCTIVE SLEEP APNEA): ICD-10-CM

## 2022-02-10 DIAGNOSIS — Z79.01 LONG TERM (CURRENT) USE OF ANTICOAGULANTS: Primary | ICD-10-CM

## 2022-02-10 DIAGNOSIS — D64.9 SYMPTOMATIC ANEMIA: ICD-10-CM

## 2022-02-10 DIAGNOSIS — E66.9 OBESITY (BMI 30.0-34.9): ICD-10-CM

## 2022-02-10 DIAGNOSIS — G47.34 NOCTURNAL HYPOXEMIA: Primary | ICD-10-CM

## 2022-02-10 DIAGNOSIS — R06.02 SOB (SHORTNESS OF BREATH): ICD-10-CM

## 2022-02-10 DIAGNOSIS — I48.0 PAROXYSMAL ATRIAL FIBRILLATION: ICD-10-CM

## 2022-02-10 LAB — INR PPP: 2.4 (ref 2–3)

## 2022-02-10 PROCEDURE — 1101F PT FALLS ASSESS-DOCD LE1/YR: CPT | Mod: HCNC,CPTII,S$GLB, | Performed by: INTERNAL MEDICINE

## 2022-02-10 PROCEDURE — 85610 POCT INR: ICD-10-PCS | Mod: QW,HCNC,S$GLB, | Performed by: INTERNAL MEDICINE

## 2022-02-10 PROCEDURE — 3288F PR FALLS RISK ASSESSMENT DOCUMENTED: ICD-10-PCS | Mod: HCNC,CPTII,S$GLB, | Performed by: INTERNAL MEDICINE

## 2022-02-10 PROCEDURE — 3072F LOW RISK FOR RETINOPATHY: CPT | Mod: HCNC,CPTII,S$GLB, | Performed by: INTERNAL MEDICINE

## 2022-02-10 PROCEDURE — 99999 PR PBB SHADOW E&M-EST. PATIENT-LVL V: CPT | Mod: PBBFAC,HCNC,, | Performed by: INTERNAL MEDICINE

## 2022-02-10 PROCEDURE — 71046 X-RAY EXAM CHEST 2 VIEWS: CPT | Mod: 26,HCNC,, | Performed by: RADIOLOGY

## 2022-02-10 PROCEDURE — 3288F FALL RISK ASSESSMENT DOCD: CPT | Mod: HCNC,CPTII,S$GLB, | Performed by: INTERNAL MEDICINE

## 2022-02-10 PROCEDURE — 71046 X-RAY EXAM CHEST 2 VIEWS: CPT | Mod: TC,HCNC

## 2022-02-10 PROCEDURE — 3074F SYST BP LT 130 MM HG: CPT | Mod: HCNC,CPTII,S$GLB, | Performed by: INTERNAL MEDICINE

## 2022-02-10 PROCEDURE — 99204 OFFICE O/P NEW MOD 45 MIN: CPT | Mod: HCNC,S$GLB,, | Performed by: INTERNAL MEDICINE

## 2022-02-10 PROCEDURE — 99204 PR OFFICE/OUTPT VISIT, NEW, LEVL IV, 45-59 MIN: ICD-10-PCS | Mod: HCNC,S$GLB,, | Performed by: INTERNAL MEDICINE

## 2022-02-10 PROCEDURE — 3078F DIAST BP <80 MM HG: CPT | Mod: HCNC,CPTII,S$GLB, | Performed by: INTERNAL MEDICINE

## 2022-02-10 PROCEDURE — 71046 XR CHEST PA AND LATERAL: ICD-10-PCS | Mod: 26,HCNC,, | Performed by: RADIOLOGY

## 2022-02-10 PROCEDURE — 3072F PR LOW RISK FOR RETINOPATHY: ICD-10-PCS | Mod: HCNC,CPTII,S$GLB, | Performed by: INTERNAL MEDICINE

## 2022-02-10 PROCEDURE — 3074F PR MOST RECENT SYSTOLIC BLOOD PRESSURE < 130 MM HG: ICD-10-PCS | Mod: HCNC,CPTII,S$GLB, | Performed by: INTERNAL MEDICINE

## 2022-02-10 PROCEDURE — 93793 PR ANTICOAGULANT MGMT FOR PT TAKING WARFARIN: ICD-10-PCS | Mod: HCNC,S$GLB,,

## 2022-02-10 PROCEDURE — 99999 PR PBB SHADOW E&M-EST. PATIENT-LVL V: ICD-10-PCS | Mod: PBBFAC,HCNC,, | Performed by: INTERNAL MEDICINE

## 2022-02-10 PROCEDURE — 1101F PR PT FALLS ASSESS DOC 0-1 FALLS W/OUT INJ PAST YR: ICD-10-PCS | Mod: HCNC,CPTII,S$GLB, | Performed by: INTERNAL MEDICINE

## 2022-02-10 PROCEDURE — 85610 PROTHROMBIN TIME: CPT | Mod: QW,HCNC,S$GLB, | Performed by: INTERNAL MEDICINE

## 2022-02-10 PROCEDURE — 3078F PR MOST RECENT DIASTOLIC BLOOD PRESSURE < 80 MM HG: ICD-10-PCS | Mod: HCNC,CPTII,S$GLB, | Performed by: INTERNAL MEDICINE

## 2022-02-10 PROCEDURE — 93793 ANTICOAG MGMT PT WARFARIN: CPT | Mod: HCNC,S$GLB,,
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
## 2022-02-10 NOTE — PROGRESS NOTES
INR is therapeutic at 2.4.  Patient reports no recent changes.  Currently taking warfarin 2.5 mg every Tuesday, Thursday; and 5 mg on all other days.  Will continue.  Recheck in 1 month.  Dose calendar given.  Patient verbalized understanding.

## 2022-02-15 ENCOUNTER — HOSPITAL ENCOUNTER (OUTPATIENT)
Dept: CARDIOLOGY | Facility: HOSPITAL | Age: 82
Discharge: HOME | End: 2022-02-15
Attending: INTERNAL MEDICINE
Payer: MEDICARE

## 2022-02-15 DIAGNOSIS — I48.0 PAROXYSMAL ATRIAL FIBRILLATION: ICD-10-CM

## 2022-02-15 PROCEDURE — 93226 XTRNL ECG REC<48 HR SCAN A/R: CPT | Mod: HCNC

## 2022-02-15 PROCEDURE — 93227 HOLTER MONITOR - 24 HOUR (CUPID ONLY): ICD-10-PCS | Mod: HCNC,,, | Performed by: INTERNAL MEDICINE

## 2022-02-15 PROCEDURE — 93227 XTRNL ECG REC<48 HR R&I: CPT | Mod: HCNC,,, | Performed by: INTERNAL MEDICINE

## 2022-02-18 ENCOUNTER — HOSPITAL ENCOUNTER (OUTPATIENT)
Dept: RADIOLOGY | Facility: HOSPITAL | Age: 82
Discharge: HOME | End: 2022-02-18
Attending: INTERNAL MEDICINE
Payer: MEDICARE

## 2022-02-18 ENCOUNTER — HOSPITAL ENCOUNTER (OUTPATIENT)
Dept: CARDIOLOGY | Facility: HOSPITAL | Age: 82
Discharge: HOME | End: 2022-02-18
Attending: INTERNAL MEDICINE
Payer: MEDICARE

## 2022-02-18 DIAGNOSIS — R06.02 SOB (SHORTNESS OF BREATH): ICD-10-CM

## 2022-02-18 DIAGNOSIS — I70.0 ATHEROSCLEROSIS OF AORTA: ICD-10-CM

## 2022-02-18 LAB
CV STRESS BASE HR: 90 BPM
DIASTOLIC BLOOD PRESSURE: 73 MMHG
NUC REST EJECTION FRACTION: 74
NUC STRESS EJECTION FRACTION: 84 %
OHS CV CPX 85 PERCENT MAX PREDICTED HEART RATE MALE: 120
OHS CV CPX ESTIMATED METS: 1
OHS CV CPX MAX PREDICTED HEART RATE: 141
OHS CV CPX PATIENT IS FEMALE: 0
OHS CV CPX PATIENT IS MALE: 1
OHS CV CPX PEAK DIASTOLIC BLOOD PRESSURE: 74 MMHG
OHS CV CPX PEAK HEAR RATE: 102 BPM
OHS CV CPX PEAK RATE PRESSURE PRODUCT: NORMAL
OHS CV CPX PEAK SYSTOLIC BLOOD PRESSURE: 116 MMHG
OHS CV CPX PERCENT MAX PREDICTED HEART RATE ACHIEVED: 72
OHS CV CPX RATE PRESSURE PRODUCT PRESENTING: NORMAL
OHS CV EVENT MONITOR DAY: 2
OHS CV HOLTER LENGTH DECIMAL HOURS: 96
OHS CV HOLTER LENGTH HOURS: 48
OHS CV HOLTER LENGTH MINUTES: 0
OHS CV HOLTER SINUS AVERAGE HR: 88
OHS CV HOLTER SINUS MAX HR: 122
OHS CV HOLTER SINUS MIN HR: 41
STRESS ECHO POST EXERCISE DUR SEC: 55 SECONDS
SYSTOLIC BLOOD PRESSURE: 124 MMHG

## 2022-02-18 PROCEDURE — A9502 TC99M TETROFOSMIN: HCPCS | Mod: HCNC

## 2022-02-18 PROCEDURE — 93018 CV STRESS TEST I&R ONLY: CPT | Mod: HCNC,,, | Performed by: INTERNAL MEDICINE

## 2022-02-18 PROCEDURE — 93016 STRESS TEST WITH MYOCARDIAL PERFUSION (CUPID ONLY): ICD-10-PCS | Mod: HCNC,,, | Performed by: INTERNAL MEDICINE

## 2022-02-18 PROCEDURE — 93016 CV STRESS TEST SUPVJ ONLY: CPT | Mod: HCNC,,, | Performed by: INTERNAL MEDICINE

## 2022-02-18 PROCEDURE — 63600175 PHARM REV CODE 636 W HCPCS: Mod: HCNC | Performed by: INTERNAL MEDICINE

## 2022-02-18 PROCEDURE — 78452 HT MUSCLE IMAGE SPECT MULT: CPT | Mod: 26,HCNC,, | Performed by: INTERNAL MEDICINE

## 2022-02-18 PROCEDURE — 93018 STRESS TEST WITH MYOCARDIAL PERFUSION (CUPID ONLY): ICD-10-PCS | Mod: HCNC,,, | Performed by: INTERNAL MEDICINE

## 2022-02-18 PROCEDURE — 78452 STRESS TEST WITH MYOCARDIAL PERFUSION (CUPID ONLY): ICD-10-PCS | Mod: 26,HCNC,, | Performed by: INTERNAL MEDICINE

## 2022-02-18 PROCEDURE — 93017 CV STRESS TEST TRACING ONLY: CPT | Mod: HCNC

## 2022-02-18 RX ORDER — REGADENOSON 0.08 MG/ML
0.4 INJECTION, SOLUTION INTRAVENOUS ONCE
Status: COMPLETED | OUTPATIENT
Start: 2022-02-18 | End: 2022-02-18

## 2022-02-18 RX ADMIN — REGADENOSON 0.4 MG: 0.08 INJECTION, SOLUTION INTRAVENOUS at 09:02

## 2022-02-21 ENCOUNTER — TELEPHONE (OUTPATIENT)
Dept: CARDIOLOGY | Facility: CLINIC | Age: 82
End: 2022-02-21
Payer: MEDICARE

## 2022-02-21 NOTE — TELEPHONE ENCOUNTER
The patient has been notified of this information and all questions answered.  Holter shows afib with overall controlled rates, stress test is normal. Frequents PVCs, Pauses noted, Consult EP. Patient verbalized understanding.

## 2022-02-21 NOTE — TELEPHONE ENCOUNTER
----- Message from Luis Hamm sent at 2/21/2022  1:43 PM CST -----  Contact: Salix  .Type:  Patient Returning Call    Who Called: Salix  Who Left Message for Patient: unknown   Does the patient know what this is regarding?: results possibly  Would the patient rather a call back or a response via [facility]? call  Best Call Back Number: [phone]  Additional Information: reports missing a call requests another call back  ThanksVIANNEY

## 2022-02-23 ENCOUNTER — OFFICE VISIT (OUTPATIENT)
Dept: INTERNAL MEDICINE | Facility: CLINIC | Age: 82
End: 2022-02-23
Payer: MEDICARE

## 2022-02-23 ENCOUNTER — TELEPHONE (OUTPATIENT)
Dept: INTERNAL MEDICINE | Facility: CLINIC | Age: 82
End: 2022-02-23
Payer: MEDICARE

## 2022-02-23 VITALS
DIASTOLIC BLOOD PRESSURE: 68 MMHG | BODY MASS INDEX: 33.57 KG/M2 | HEART RATE: 95 BPM | WEIGHT: 213.88 LBS | SYSTOLIC BLOOD PRESSURE: 108 MMHG | HEIGHT: 67 IN | TEMPERATURE: 98 F | OXYGEN SATURATION: 98 %

## 2022-02-23 DIAGNOSIS — M10.9 ACUTE GOUT OF RIGHT ANKLE, UNSPECIFIED CAUSE: Primary | ICD-10-CM

## 2022-02-23 PROCEDURE — 1125F AMNT PAIN NOTED PAIN PRSNT: CPT | Mod: HCNC,CPTII,S$GLB, | Performed by: INTERNAL MEDICINE

## 2022-02-23 PROCEDURE — 99999 PR PBB SHADOW E&M-EST. PATIENT-LVL IV: CPT | Mod: PBBFAC,HCNC,, | Performed by: INTERNAL MEDICINE

## 2022-02-23 PROCEDURE — 3074F PR MOST RECENT SYSTOLIC BLOOD PRESSURE < 130 MM HG: ICD-10-PCS | Mod: HCNC,CPTII,S$GLB, | Performed by: INTERNAL MEDICINE

## 2022-02-23 PROCEDURE — 96372 PR INJECTION,THERAP/PROPH/DIAG2ST, IM OR SUBCUT: ICD-10-PCS | Mod: HCNC,S$GLB,, | Performed by: INTERNAL MEDICINE

## 2022-02-23 PROCEDURE — 99213 OFFICE O/P EST LOW 20 MIN: CPT | Mod: 25,HCNC,S$GLB, | Performed by: INTERNAL MEDICINE

## 2022-02-23 PROCEDURE — 3078F PR MOST RECENT DIASTOLIC BLOOD PRESSURE < 80 MM HG: ICD-10-PCS | Mod: HCNC,CPTII,S$GLB, | Performed by: INTERNAL MEDICINE

## 2022-02-23 PROCEDURE — 3072F PR LOW RISK FOR RETINOPATHY: ICD-10-PCS | Mod: HCNC,CPTII,S$GLB, | Performed by: INTERNAL MEDICINE

## 2022-02-23 PROCEDURE — 1101F PR PT FALLS ASSESS DOC 0-1 FALLS W/OUT INJ PAST YR: ICD-10-PCS | Mod: HCNC,CPTII,S$GLB, | Performed by: INTERNAL MEDICINE

## 2022-02-23 PROCEDURE — 3072F LOW RISK FOR RETINOPATHY: CPT | Mod: HCNC,CPTII,S$GLB, | Performed by: INTERNAL MEDICINE

## 2022-02-23 PROCEDURE — 3288F PR FALLS RISK ASSESSMENT DOCUMENTED: ICD-10-PCS | Mod: HCNC,CPTII,S$GLB, | Performed by: INTERNAL MEDICINE

## 2022-02-23 PROCEDURE — 3078F DIAST BP <80 MM HG: CPT | Mod: HCNC,CPTII,S$GLB, | Performed by: INTERNAL MEDICINE

## 2022-02-23 PROCEDURE — 1159F PR MEDICATION LIST DOCUMENTED IN MEDICAL RECORD: ICD-10-PCS | Mod: HCNC,CPTII,S$GLB, | Performed by: INTERNAL MEDICINE

## 2022-02-23 PROCEDURE — 1125F PR PAIN SEVERITY QUANTIFIED, PAIN PRESENT: ICD-10-PCS | Mod: HCNC,CPTII,S$GLB, | Performed by: INTERNAL MEDICINE

## 2022-02-23 PROCEDURE — 99999 PR PBB SHADOW E&M-EST. PATIENT-LVL IV: ICD-10-PCS | Mod: PBBFAC,HCNC,, | Performed by: INTERNAL MEDICINE

## 2022-02-23 PROCEDURE — 99213 PR OFFICE/OUTPT VISIT, EST, LEVL III, 20-29 MIN: ICD-10-PCS | Mod: 25,HCNC,S$GLB, | Performed by: INTERNAL MEDICINE

## 2022-02-23 PROCEDURE — 3288F FALL RISK ASSESSMENT DOCD: CPT | Mod: HCNC,CPTII,S$GLB, | Performed by: INTERNAL MEDICINE

## 2022-02-23 PROCEDURE — 3074F SYST BP LT 130 MM HG: CPT | Mod: HCNC,CPTII,S$GLB, | Performed by: INTERNAL MEDICINE

## 2022-02-23 PROCEDURE — 1101F PT FALLS ASSESS-DOCD LE1/YR: CPT | Mod: HCNC,CPTII,S$GLB, | Performed by: INTERNAL MEDICINE

## 2022-02-23 PROCEDURE — 1159F MED LIST DOCD IN RCRD: CPT | Mod: HCNC,CPTII,S$GLB, | Performed by: INTERNAL MEDICINE

## 2022-02-23 PROCEDURE — 96372 THER/PROPH/DIAG INJ SC/IM: CPT | Mod: HCNC,S$GLB,, | Performed by: INTERNAL MEDICINE

## 2022-02-23 RX ORDER — ALLOPURINOL 100 MG/1
100 TABLET ORAL DAILY
Qty: 90 TABLET | Refills: 3 | Status: SHIPPED | OUTPATIENT
Start: 2022-02-23 | End: 2023-02-11

## 2022-02-23 RX ORDER — METHYLPREDNISOLONE ACETATE 80 MG/ML
80 INJECTION, SUSPENSION INTRA-ARTICULAR; INTRALESIONAL; INTRAMUSCULAR; SOFT TISSUE
Status: COMPLETED | OUTPATIENT
Start: 2022-02-23 | End: 2022-02-23

## 2022-02-23 RX ORDER — METHYLPREDNISOLONE 4 MG/1
TABLET ORAL
Qty: 1 EACH | Refills: 0 | Status: SHIPPED | OUTPATIENT
Start: 2022-02-23 | End: 2022-03-07

## 2022-02-23 RX ADMIN — METHYLPREDNISOLONE ACETATE 80 MG: 80 INJECTION, SUSPENSION INTRA-ARTICULAR; INTRALESIONAL; INTRAMUSCULAR; SOFT TISSUE at 09:02

## 2022-02-23 NOTE — PROGRESS NOTES
"HPI:  Patient is a 81-year-old man who comes today for complaints of right ankle pain and swelling for 24 hours.  He had a similar presentation about 2 months ago.  He was given Depo-Medrol with marked improvement.  His lab work showed elevated sed rate and CRP but normal uric acid level.  The current symptoms are almost identical to what he had 2 months ago  Current meds have been verified and updated per the EMR  Exam:/68   Pulse 95   Temp 97.7 °F (36.5 °C) (Temporal)   Ht 5' 7" (1.702 m)   Wt 97 kg (213 lb 13.5 oz)   SpO2 98%   BMI 33.49 kg/m²   The right ankle is warm.  It is has some general diffuse swelling but no effusion.  There is no erythema    Lab Results   Component Value Date    WBC 6.00 01/14/2022    HGB 12.5 (L) 01/14/2022    HCT 41.1 01/14/2022     01/14/2022    CHOL 104 (L) 10/05/2021    TRIG 58 10/05/2021    HDL 35 (L) 10/05/2021    ALT 24 09/20/2021    AST 26 09/20/2021     (L) 10/05/2021    K 4.6 10/05/2021     10/05/2021    CREATININE 0.9 10/05/2021    BUN 13 10/05/2021    CO2 21 (L) 10/05/2021    TSH 1.068 10/05/2021    PSA <0.010 08/20/2013    INR 2.4 02/10/2022    HGBA1C 5.8 (H) 10/05/2021       Impression:  Highly suspect he has gout or pseudogout  Patient Active Problem List   Diagnosis    Atrial fibrillation    Coronary atherosclerosis    History of prostate cancer    Refractive error    Family history of macular degeneration    Posterior capsular opacification    Pseudophakia of both eyes    History of colon polyps    Diverticulosis of large intestine without hemorrhage    Current use of long term anticoagulation    Pulmonary hypertension, mild    SHORTY (obstructive sleep apnea)    Diabetes mellitus without complication    Hyperlipidemia associated with type 2 diabetes mellitus    Hypertension associated with diabetes    Intention tremor    Atherosclerosis of aorta    History of major depression;Moderate episode of recurrent major depressive " disorder    Adrenal nodule    Lung nodules    Obesity (BMI 30.0-34.9)    Major depressive disorder, recurrent, moderate    Angiodysplasia of small intestine    Chronic upper GI bleeding    LAILA (iron deficiency anemia)       Plan:  Orders Placed This Encounter    allopurinoL (ZYLOPRIM) 100 MG tablet    methylPREDNISolone acetate injection 80 mg    methylPREDNISolone (MEDROL DOSEPACK) 4 mg tablet     Patient will receive Depo-Medrol 80 today.  He will start on a Medrol Dosepak tomorrow.  Once he is finished with the Dosepak he was started on allopurinol.    This note is generated with speech recognition software and is subject to transcription error and sound alike phrases that may be missed by proofreading.

## 2022-02-23 NOTE — PROGRESS NOTES
Administered Methylprednisolone 80 mg/ 1ml     into  lvg  . Patient tolerated well , suggested 15 min wait

## 2022-02-23 NOTE — TELEPHONE ENCOUNTER
----- Message from Jessica Mcnair sent at 2/23/2022  8:12 AM CST -----  Contact: Marino(wife)  Marino called to consult with nurse or staff regarding an early appointment for the patient. She states she would like the see if the patient can be worked in today and would like a call back. Marino would like a call back and can be reached at [phone]. Thanks/MR

## 2022-02-23 NOTE — TELEPHONE ENCOUNTER
----- Message from Rocio Cardoza sent at 2/23/2022  7:08 AM CST -----  Regarding: appt  Contact: wife  Type:  Same Day Appointment Request    Caller is requesting a same day appointment.  Caller declined first available appointment listed below.    Name of Caller: wife  When is the first available appointment? 03/16  Symptoms: ankle pain  Best Call Back Number:[phone]  Additional Information: n/a

## 2022-02-25 ENCOUNTER — OFFICE VISIT (OUTPATIENT)
Dept: CARDIOLOGY | Facility: CLINIC | Age: 82
End: 2022-02-25
Payer: MEDICARE

## 2022-02-25 ENCOUNTER — TELEPHONE (OUTPATIENT)
Dept: CARDIOLOGY | Facility: CLINIC | Age: 82
End: 2022-02-25
Payer: MEDICARE

## 2022-02-25 VITALS
SYSTOLIC BLOOD PRESSURE: 112 MMHG | DIASTOLIC BLOOD PRESSURE: 80 MMHG | OXYGEN SATURATION: 98 % | BODY MASS INDEX: 33.15 KG/M2 | WEIGHT: 211.19 LBS | HEIGHT: 67 IN | HEART RATE: 96 BPM | RESPIRATION RATE: 16 BRPM

## 2022-02-25 DIAGNOSIS — I15.2 HYPERTENSION ASSOCIATED WITH DIABETES: ICD-10-CM

## 2022-02-25 DIAGNOSIS — E11.69 HYPERLIPIDEMIA ASSOCIATED WITH TYPE 2 DIABETES MELLITUS: ICD-10-CM

## 2022-02-25 DIAGNOSIS — Z79.01 CURRENT USE OF LONG TERM ANTICOAGULATION: ICD-10-CM

## 2022-02-25 DIAGNOSIS — I25.10 ATHEROSCLEROSIS OF NATIVE CORONARY ARTERY OF NATIVE HEART WITHOUT ANGINA PECTORIS: ICD-10-CM

## 2022-02-25 DIAGNOSIS — I48.0 PAROXYSMAL ATRIAL FIBRILLATION: Primary | ICD-10-CM

## 2022-02-25 DIAGNOSIS — I70.0 ATHEROSCLEROSIS OF AORTA: ICD-10-CM

## 2022-02-25 DIAGNOSIS — E11.59 HYPERTENSION ASSOCIATED WITH DIABETES: ICD-10-CM

## 2022-02-25 DIAGNOSIS — G47.33 OSA (OBSTRUCTIVE SLEEP APNEA): ICD-10-CM

## 2022-02-25 DIAGNOSIS — E78.5 HYPERLIPIDEMIA ASSOCIATED WITH TYPE 2 DIABETES MELLITUS: ICD-10-CM

## 2022-02-25 PROCEDURE — 1126F PR PAIN SEVERITY QUANTIFIED, NO PAIN PRESENT: ICD-10-PCS | Mod: CPTII,S$GLB,, | Performed by: INTERNAL MEDICINE

## 2022-02-25 PROCEDURE — 1126F AMNT PAIN NOTED NONE PRSNT: CPT | Mod: CPTII,S$GLB,, | Performed by: INTERNAL MEDICINE

## 2022-02-25 PROCEDURE — 1159F MED LIST DOCD IN RCRD: CPT | Mod: CPTII,S$GLB,, | Performed by: INTERNAL MEDICINE

## 2022-02-25 PROCEDURE — 3079F DIAST BP 80-89 MM HG: CPT | Mod: CPTII,S$GLB,, | Performed by: INTERNAL MEDICINE

## 2022-02-25 PROCEDURE — 3072F LOW RISK FOR RETINOPATHY: CPT | Mod: CPTII,S$GLB,, | Performed by: INTERNAL MEDICINE

## 2022-02-25 PROCEDURE — 99499 RISK ADDL DX/OHS AUDIT: ICD-10-PCS | Mod: HCNC,S$GLB,, | Performed by: INTERNAL MEDICINE

## 2022-02-25 PROCEDURE — 3074F PR MOST RECENT SYSTOLIC BLOOD PRESSURE < 130 MM HG: ICD-10-PCS | Mod: CPTII,S$GLB,, | Performed by: INTERNAL MEDICINE

## 2022-02-25 PROCEDURE — 99999 PR PBB SHADOW E&M-EST. PATIENT-LVL IV: ICD-10-PCS | Mod: PBBFAC,,, | Performed by: INTERNAL MEDICINE

## 2022-02-25 PROCEDURE — 99214 OFFICE O/P EST MOD 30 MIN: CPT | Mod: S$GLB,,, | Performed by: INTERNAL MEDICINE

## 2022-02-25 PROCEDURE — 3288F FALL RISK ASSESSMENT DOCD: CPT | Mod: CPTII,S$GLB,, | Performed by: INTERNAL MEDICINE

## 2022-02-25 PROCEDURE — 1101F PT FALLS ASSESS-DOCD LE1/YR: CPT | Mod: CPTII,S$GLB,, | Performed by: INTERNAL MEDICINE

## 2022-02-25 PROCEDURE — 1159F PR MEDICATION LIST DOCUMENTED IN MEDICAL RECORD: ICD-10-PCS | Mod: CPTII,S$GLB,, | Performed by: INTERNAL MEDICINE

## 2022-02-25 PROCEDURE — 99499 UNLISTED E&M SERVICE: CPT | Mod: HCNC,S$GLB,, | Performed by: INTERNAL MEDICINE

## 2022-02-25 PROCEDURE — 99214 PR OFFICE/OUTPT VISIT, EST, LEVL IV, 30-39 MIN: ICD-10-PCS | Mod: S$GLB,,, | Performed by: INTERNAL MEDICINE

## 2022-02-25 PROCEDURE — 99999 PR PBB SHADOW E&M-EST. PATIENT-LVL IV: CPT | Mod: PBBFAC,,, | Performed by: INTERNAL MEDICINE

## 2022-02-25 PROCEDURE — 3288F PR FALLS RISK ASSESSMENT DOCUMENTED: ICD-10-PCS | Mod: CPTII,S$GLB,, | Performed by: INTERNAL MEDICINE

## 2022-02-25 PROCEDURE — 3072F PR LOW RISK FOR RETINOPATHY: ICD-10-PCS | Mod: CPTII,S$GLB,, | Performed by: INTERNAL MEDICINE

## 2022-02-25 PROCEDURE — 3079F PR MOST RECENT DIASTOLIC BLOOD PRESSURE 80-89 MM HG: ICD-10-PCS | Mod: CPTII,S$GLB,, | Performed by: INTERNAL MEDICINE

## 2022-02-25 PROCEDURE — 1101F PR PT FALLS ASSESS DOC 0-1 FALLS W/OUT INJ PAST YR: ICD-10-PCS | Mod: CPTII,S$GLB,, | Performed by: INTERNAL MEDICINE

## 2022-02-25 PROCEDURE — 3074F SYST BP LT 130 MM HG: CPT | Mod: CPTII,S$GLB,, | Performed by: INTERNAL MEDICINE

## 2022-02-25 NOTE — PROGRESS NOTES
Subjective:   Patient ID:  Anthony Biggs Jr. is a 81 y.o. male who presents for follow-up of No chief complaint on file.  Pt with SOB/FLORES and fatigue  H/H improved though but still sx ( Hb 7 now 12)  PCI mid LAD 6-21 temporarily helped sx  Echo 9-21 nml lv function, mild - moderate AI  Nuclear stress test nml 2-22    Recent holter afib avg HR 88 bpm, frequent PVCs, , episoded of pauses > 2 seconds( 7 episodes)  Awaiting EP consult  Discussed tachy masoud and possible PPM in past    Shortness of Breath  This is a new problem. The current episode started 1 to 4 weeks ago. The problem occurs intermittently. The problem has been waxing and waning. Pertinent negatives include no chest pain or leg swelling. The symptoms are aggravated by any activity. He has tried rest for the symptoms. The treatment provided moderate relief. His past medical history is significant for CAD.   Hypertension  This is a chronic problem. The current episode started more than 1 year ago. The problem has been gradually improving since onset. The problem is controlled. Associated symptoms include shortness of breath. Pertinent negatives include no chest pain or palpitations. Past treatments include calcium channel blockers, angiotensin blockers and diuretics. The current treatment provides moderate improvement. There are no compliance problems.    Hyperlipidemia  This is a chronic problem. The current episode started more than 1 year ago. The problem is controlled. Associated symptoms include shortness of breath. Pertinent negatives include no chest pain. Current antihyperlipidemic treatment includes statins. The current treatment provides moderate improvement of lipids. There are no compliance problems.    Atrial Fibrillation  Presents for follow-up visit. Symptoms include shortness of breath. Symptoms are negative for bradycardia, chest pain, dizziness, palpitations and tachycardia. The symptoms have been stable. Past medical history includes  atrial fibrillation, CAD and hyperlipidemia.   Coronary Artery Disease  Presents for follow-up visit. Symptoms include shortness of breath. Pertinent negatives include no chest pain, chest pressure, chest tightness, dizziness, leg swelling, muscle weakness, palpitations or weight gain. Risk factors include hyperlipidemia. The symptoms have been stable. Compliance with diet is variable. Compliance with exercise is variable. Compliance with medications is good.       Review of Systems   Constitutional: Negative. Negative for weight gain.   HENT: Negative.    Eyes: Negative.    Cardiovascular: Negative.  Negative for chest pain, leg swelling and palpitations.   Respiratory: Positive for shortness of breath. Negative for chest tightness.    Endocrine: Negative.    Hematologic/Lymphatic: Negative.    Skin: Negative.    Musculoskeletal: Negative for muscle weakness.   Gastrointestinal: Negative.    Genitourinary: Negative.    Neurological: Negative.  Negative for dizziness.   Psychiatric/Behavioral: Negative.    Allergic/Immunologic: Negative.      Family History   Problem Relation Age of Onset    Heart disease Mother     Cancer Father         lung    Macular degeneration Sister     Diabetes Sister     Heart disease Sister     Strabismus Neg Hx     Retinal detachment Neg Hx     Glaucoma Neg Hx     Blindness Neg Hx     Amblyopia Neg Hx      Past Medical History:   Diagnosis Date    Abnormal CXR     Angina pectoris 8/28/2017    Angiodysplasia of small intestine     Atrial fibrillation     Chronic upper GI bleeding     due to angiodysplasia in proximal small intestine    Coronary atherosclerosis of unspecified type of vessel, native or graft     Depression     Diabetes mellitus without complication     Emphysema of lung     History of prostate cancer 2007    prostatectomy    Hyperlipidemia associated with type 2 diabetes mellitus     Hypertension associated with diabetes     LAILA (iron deficiency  anemia) 2021    due to angiodysplasia in small intestine    Intention tremor     Moderate episode of recurrent major depressive disorder 2019    Morbid (severe) obesity due to excess calories 2019    SHORTY (obstructive sleep apnea)     Prostate cancer     Trouble in sleeping     Urinary incontinence      Social History     Socioeconomic History    Marital status:     Highest education level: Some college, no degree   Tobacco Use    Smoking status: Former Smoker     Packs/day: 0.50     Years: 40.00     Pack years: 20.00     Types: Cigarettes     Start date: 1962     Quit date:      Years since quittin.1    Smokeless tobacco: Never Used   Substance and Sexual Activity    Alcohol use: Yes     Alcohol/week: 7.0 standard drinks     Types: 7 Shots of liquor per week    Drug use: No    Sexual activity: Not Currently     Current Outpatient Medications on File Prior to Visit   Medication Sig Dispense Refill    allopurinoL (ZYLOPRIM) 100 MG tablet Take 1 tablet (100 mg total) by mouth once daily. 90 tablet 3    amLODIPine (NORVASC) 5 MG tablet Take 1 tablet (5 mg total) by mouth once daily. 30 tablet 6    aspirin (ECOTRIN) 81 MG EC tablet Take 81 mg by mouth once daily.      atorvastatin (LIPITOR) 40 MG tablet TAKE ONE TABLET BY MOUTH DAILY 30 tablet 6    ferrous sulfate (FEOSOL) Tab tablet Take 1 tablet (1 each total) by mouth 2 (two) times daily. 60 tablet 5    isosorbide mononitrate (IMDUR) 30 MG 24 hr tablet Take 1 tablet (30 mg total) by mouth once daily. 30 tablet 11    losartan (COZAAR) 50 MG tablet TAKE 1 TABLET (50 MG TOTAL) BY MOUTH 2 (TWO) TIMES A DAY. 180 tablet 3    melatonin 3 mg Tab Take 1 tablet by mouth nightly.      metFORMIN (GLUCOPHAGE) 500 MG tablet TAKE 1 TABLET (500 MG TOTAL) BY MOUTH 2 (TWO) TIMES DAILY WITH MEALS. 180 tablet 3    methylPREDNISolone (MEDROL DOSEPACK) 4 mg tablet use as directed 1 each 0    metOLazone (ZAROXOLYN) 2.5 MG tablet  Take 1 tablet (2.5 mg total) by mouth every Tues, Thurs. 8 tablet 11    torsemide (DEMADEX) 10 MG Tab TAKE ONE TABLET BY MOUTH DAILY  90 tablet 3    warfarin (COUMADIN) 5 MG tablet TAKE 1 TABLET (5 MG TOTAL) BY MOUTH DAILY. OR AS DIRECTED BY COUMADIN CLINIC (Patient taking differently: Take 5 mg by mouth Daily. Except 2.5 mg every Tuesday and Thursday.) 30 tablet 11    pantoprazole (PROTONIX) 40 MG tablet Take 1 tablet (40 mg total) by mouth 2 (two) times daily. 60 tablet 2    traMADoL (ULTRAM) 50 mg tablet Take 1 tablet (50 mg total) by mouth every 6 (six) hours as needed for Pain. (Patient not taking: Reported on 2/25/2022) 30 each 1     No current facility-administered medications on file prior to visit.     Review of patient's allergies indicates:  No Known Allergies    Objective:     Physical Exam  Vitals and nursing note reviewed.   Constitutional:       Appearance: He is well-developed.   HENT:      Head: Normocephalic and atraumatic.   Eyes:      Conjunctiva/sclera: Conjunctivae normal.      Pupils: Pupils are equal, round, and reactive to light.   Cardiovascular:      Rate and Rhythm: Normal rate. Rhythm irregular.      Pulses: Intact distal pulses.      Heart sounds: Normal heart sounds.   Pulmonary:      Effort: Pulmonary effort is normal.      Breath sounds: Normal breath sounds.   Abdominal:      General: Bowel sounds are normal.      Palpations: Abdomen is soft.   Musculoskeletal:      Cervical back: Normal range of motion and neck supple.   Skin:     General: Skin is warm and dry.   Neurological:      Mental Status: He is alert and oriented to person, place, and time.         Assessment:     1. Paroxysmal atrial fibrillation    2. Atherosclerosis of native coronary artery of native heart without angina pectoris    3. Hyperlipidemia associated with type 2 diabetes mellitus    4. Hypertension associated with diabetes    5. Atherosclerosis of aorta    6. Current use of long term anticoagulation    7.  SHORTY (obstructive sleep apnea)        Plan:     Paroxysmal atrial fibrillation    Atherosclerosis of native coronary artery of native heart without angina pectoris    Hyperlipidemia associated with type 2 diabetes mellitus    Hypertension associated with diabetes    Atherosclerosis of aorta    Current use of long term anticoagulation    SHORTY (obstructive sleep apnea)      Continue statin-hlp  Continue norvasc, losartan,demedex -htn  Continue coumadin-afib  Keep EP consult

## 2022-02-25 NOTE — TELEPHONE ENCOUNTER
Pt notified of the appt they verbalized understanding pb      ----- Message from Gera Mcfarlane sent at 2/25/2022  9:43 AM CST -----  Good Morning Nelda, Can you contact pt with an appt with either Adin or Sofiya HERMOSILLO Per Oh, I show next avail is Adin but not until April, please contact the pt at [phone]. Thank You

## 2022-02-28 ENCOUNTER — TELEPHONE (OUTPATIENT)
Dept: CARDIOLOGY | Facility: CLINIC | Age: 82
End: 2022-02-28
Payer: MEDICARE

## 2022-02-28 NOTE — TELEPHONE ENCOUNTER
Dr Oakley,     Dr Griffith has placed a referral for you to see this patient due to pauses and frequent PVCs on holter.     Could you review the holter monitor for me and see if there are any adjustments or recs prior to the scheduled appt with you in April?     He continues to have FLORES and fatigue symptoms.     Thanks  Sarah

## 2022-03-01 ENCOUNTER — TELEPHONE (OUTPATIENT)
Dept: CARDIOLOGY | Facility: CLINIC | Age: 82
End: 2022-03-01
Payer: MEDICARE

## 2022-03-01 NOTE — TELEPHONE ENCOUNTER
Pt and his wife notified and verbalized understanding. They will keep the appt on April 6th pbb        Sarah Solorio, DESEAN Chavez LPN  Caller: Unspecified (Yesterday,  8:18 AM)  Dr Oakley has reviewed the holter, PVC's are only 1.4%, pauses are less than 5 seconds in afib. NO need for PPM at this time.     Please notify patient and wife,

## 2022-03-07 ENCOUNTER — LAB VISIT (OUTPATIENT)
Dept: LAB | Facility: HOSPITAL | Age: 82
End: 2022-03-07
Attending: INTERNAL MEDICINE
Payer: MEDICARE

## 2022-03-07 ENCOUNTER — OFFICE VISIT (OUTPATIENT)
Dept: INTERNAL MEDICINE | Facility: CLINIC | Age: 82
End: 2022-03-07
Payer: MEDICARE

## 2022-03-07 VITALS
HEART RATE: 95 BPM | HEIGHT: 67 IN | WEIGHT: 203.25 LBS | DIASTOLIC BLOOD PRESSURE: 62 MMHG | SYSTOLIC BLOOD PRESSURE: 92 MMHG | OXYGEN SATURATION: 98 % | BODY MASS INDEX: 31.9 KG/M2

## 2022-03-07 DIAGNOSIS — K55.20 ANGIODYSPLASIA OF SMALL INTESTINE: ICD-10-CM

## 2022-03-07 DIAGNOSIS — I25.10 ATHEROSCLEROSIS OF NATIVE CORONARY ARTERY OF NATIVE HEART WITHOUT ANGINA PECTORIS: ICD-10-CM

## 2022-03-07 DIAGNOSIS — I15.2 HYPERTENSION ASSOCIATED WITH DIABETES: ICD-10-CM

## 2022-03-07 DIAGNOSIS — R53.83 FATIGUE, UNSPECIFIED TYPE: ICD-10-CM

## 2022-03-07 DIAGNOSIS — E11.9 DIABETES MELLITUS WITHOUT COMPLICATION: ICD-10-CM

## 2022-03-07 DIAGNOSIS — I48.0 PAROXYSMAL ATRIAL FIBRILLATION: ICD-10-CM

## 2022-03-07 DIAGNOSIS — E11.59 HYPERTENSION ASSOCIATED WITH DIABETES: ICD-10-CM

## 2022-03-07 DIAGNOSIS — K92.2 CHRONIC UPPER GI BLEEDING: ICD-10-CM

## 2022-03-07 DIAGNOSIS — R06.02 SOB (SHORTNESS OF BREATH): ICD-10-CM

## 2022-03-07 DIAGNOSIS — R07.9 CHEST PAIN, UNSPECIFIED TYPE: Primary | ICD-10-CM

## 2022-03-07 LAB
ALBUMIN SERPL BCP-MCNC: 3.9 G/DL (ref 3.5–5.2)
ALP SERPL-CCNC: 61 U/L (ref 55–135)
ALT SERPL W/O P-5'-P-CCNC: 29 U/L (ref 10–44)
ANION GAP SERPL CALC-SCNC: 12 MMOL/L (ref 8–16)
AST SERPL-CCNC: 22 U/L (ref 10–40)
BASOPHILS # BLD AUTO: 0.03 K/UL (ref 0–0.2)
BASOPHILS NFR BLD: 0.3 % (ref 0–1.9)
BILIRUB SERPL-MCNC: 1.1 MG/DL (ref 0.1–1)
BUN SERPL-MCNC: 24 MG/DL (ref 8–23)
CALCIUM SERPL-MCNC: 9.8 MG/DL (ref 8.7–10.5)
CHLORIDE SERPL-SCNC: 94 MMOL/L (ref 95–110)
CO2 SERPL-SCNC: 28 MMOL/L (ref 23–29)
CREAT SERPL-MCNC: 1 MG/DL (ref 0.5–1.4)
DIFFERENTIAL METHOD: ABNORMAL
EOSINOPHIL # BLD AUTO: 0.1 K/UL (ref 0–0.5)
EOSINOPHIL NFR BLD: 0.9 % (ref 0–8)
ERYTHROCYTE [DISTWIDTH] IN BLOOD BY AUTOMATED COUNT: 14.7 % (ref 11.5–14.5)
EST. GFR  (AFRICAN AMERICAN): >60 ML/MIN/1.73 M^2
EST. GFR  (NON AFRICAN AMERICAN): >60 ML/MIN/1.73 M^2
GLUCOSE SERPL-MCNC: 128 MG/DL (ref 70–110)
HCT VFR BLD AUTO: 44.7 % (ref 40–54)
HGB BLD-MCNC: 14.4 G/DL (ref 14–18)
IMM GRANULOCYTES # BLD AUTO: 0.06 K/UL (ref 0–0.04)
IMM GRANULOCYTES NFR BLD AUTO: 0.6 % (ref 0–0.5)
LYMPHOCYTES # BLD AUTO: 2.3 K/UL (ref 1–4.8)
LYMPHOCYTES NFR BLD: 23.6 % (ref 18–48)
MCH RBC QN AUTO: 28.9 PG (ref 27–31)
MCHC RBC AUTO-ENTMCNC: 32.2 G/DL (ref 32–36)
MCV RBC AUTO: 90 FL (ref 82–98)
MONOCYTES # BLD AUTO: 1 K/UL (ref 0.3–1)
MONOCYTES NFR BLD: 9.6 % (ref 4–15)
NEUTROPHILS # BLD AUTO: 6.4 K/UL (ref 1.8–7.7)
NEUTROPHILS NFR BLD: 65 % (ref 38–73)
NRBC BLD-RTO: 0 /100 WBC
PLATELET # BLD AUTO: 250 K/UL (ref 150–450)
PMV BLD AUTO: 10.2 FL (ref 9.2–12.9)
POTASSIUM SERPL-SCNC: 3.7 MMOL/L (ref 3.5–5.1)
PROT SERPL-MCNC: 7 G/DL (ref 6–8.4)
RBC # BLD AUTO: 4.98 M/UL (ref 4.6–6.2)
SODIUM SERPL-SCNC: 134 MMOL/L (ref 136–145)
WBC # BLD AUTO: 9.9 K/UL (ref 3.9–12.7)

## 2022-03-07 PROCEDURE — 3074F SYST BP LT 130 MM HG: CPT | Mod: CPTII,S$GLB,, | Performed by: INTERNAL MEDICINE

## 2022-03-07 PROCEDURE — 93005 ELECTROCARDIOGRAM TRACING: CPT

## 2022-03-07 PROCEDURE — 3078F DIAST BP <80 MM HG: CPT | Mod: CPTII,S$GLB,, | Performed by: INTERNAL MEDICINE

## 2022-03-07 PROCEDURE — 1101F PR PT FALLS ASSESS DOC 0-1 FALLS W/OUT INJ PAST YR: ICD-10-PCS | Mod: CPTII,S$GLB,, | Performed by: INTERNAL MEDICINE

## 2022-03-07 PROCEDURE — 1159F PR MEDICATION LIST DOCUMENTED IN MEDICAL RECORD: ICD-10-PCS | Mod: CPTII,S$GLB,, | Performed by: INTERNAL MEDICINE

## 2022-03-07 PROCEDURE — 1126F AMNT PAIN NOTED NONE PRSNT: CPT | Mod: CPTII,S$GLB,, | Performed by: INTERNAL MEDICINE

## 2022-03-07 PROCEDURE — 1126F PR PAIN SEVERITY QUANTIFIED, NO PAIN PRESENT: ICD-10-PCS | Mod: CPTII,S$GLB,, | Performed by: INTERNAL MEDICINE

## 2022-03-07 PROCEDURE — 99214 PR OFFICE/OUTPT VISIT, EST, LEVL IV, 30-39 MIN: ICD-10-PCS | Mod: S$GLB,,, | Performed by: INTERNAL MEDICINE

## 2022-03-07 PROCEDURE — 93010 ELECTROCARDIOGRAM REPORT: CPT | Mod: S$GLB,,, | Performed by: INTERNAL MEDICINE

## 2022-03-07 PROCEDURE — 3072F PR LOW RISK FOR RETINOPATHY: ICD-10-PCS | Mod: CPTII,S$GLB,, | Performed by: INTERNAL MEDICINE

## 2022-03-07 PROCEDURE — 3288F PR FALLS RISK ASSESSMENT DOCUMENTED: ICD-10-PCS | Mod: CPTII,S$GLB,, | Performed by: INTERNAL MEDICINE

## 2022-03-07 PROCEDURE — 99999 PR PBB SHADOW E&M-EST. PATIENT-LVL III: ICD-10-PCS | Mod: PBBFAC,,, | Performed by: INTERNAL MEDICINE

## 2022-03-07 PROCEDURE — 99214 OFFICE O/P EST MOD 30 MIN: CPT | Mod: S$GLB,,, | Performed by: INTERNAL MEDICINE

## 2022-03-07 PROCEDURE — 93010 EKG 12-LEAD: ICD-10-PCS | Mod: S$GLB,,, | Performed by: INTERNAL MEDICINE

## 2022-03-07 PROCEDURE — 3072F LOW RISK FOR RETINOPATHY: CPT | Mod: CPTII,S$GLB,, | Performed by: INTERNAL MEDICINE

## 2022-03-07 PROCEDURE — 85025 COMPLETE CBC W/AUTO DIFF WBC: CPT | Performed by: INTERNAL MEDICINE

## 2022-03-07 PROCEDURE — 3074F PR MOST RECENT SYSTOLIC BLOOD PRESSURE < 130 MM HG: ICD-10-PCS | Mod: CPTII,S$GLB,, | Performed by: INTERNAL MEDICINE

## 2022-03-07 PROCEDURE — 80053 COMPREHEN METABOLIC PANEL: CPT | Performed by: INTERNAL MEDICINE

## 2022-03-07 PROCEDURE — 36415 COLL VENOUS BLD VENIPUNCTURE: CPT | Mod: PO | Performed by: INTERNAL MEDICINE

## 2022-03-07 PROCEDURE — 99999 PR PBB SHADOW E&M-EST. PATIENT-LVL III: CPT | Mod: PBBFAC,,, | Performed by: INTERNAL MEDICINE

## 2022-03-07 PROCEDURE — 1159F MED LIST DOCD IN RCRD: CPT | Mod: CPTII,S$GLB,, | Performed by: INTERNAL MEDICINE

## 2022-03-07 PROCEDURE — 1101F PT FALLS ASSESS-DOCD LE1/YR: CPT | Mod: CPTII,S$GLB,, | Performed by: INTERNAL MEDICINE

## 2022-03-07 PROCEDURE — 3288F FALL RISK ASSESSMENT DOCD: CPT | Mod: CPTII,S$GLB,, | Performed by: INTERNAL MEDICINE

## 2022-03-07 PROCEDURE — 3078F PR MOST RECENT DIASTOLIC BLOOD PRESSURE < 80 MM HG: ICD-10-PCS | Mod: CPTII,S$GLB,, | Performed by: INTERNAL MEDICINE

## 2022-03-07 NOTE — PROGRESS NOTES
"HPI:  Patient is 81-year-old man who has a history AFib, coronary disease, and hypertension who comes in today with complaints of shortness of breath associated with lower left-sided chest pain upper epigastric pain over the last 24 hours intermittently.  He states that sometimes he short of breath without the pain sometimes he has the pain without being short of breath sign times are all together.  There is no relationship to any exertional activity.  Patient lives a very sedentary life.  Patient also complains of abdominal bloating.  He has also been constipated for the last couple of days.  He denies any nausea vomiting.    He had a nuclear stress test done about 2 weeks ago that was unremarkable.  He has a Holter monitor that shows he is in AFib continuously but also has frequent PVCs.    Current meds have been verified and updated per the EMR  Exam:BP 92/62 (BP Location: Right arm)   Pulse 95   Ht 5' 7" (1.702 m)   Wt 92.2 kg (203 lb 4.2 oz)   SpO2 98%   BMI 31.84 kg/m²   Chest clear  Cardiovascular irregular rate and rhythm without any significant murmur gallop or rub  Abdomen soft and benign.  No rebound or guarding or distention.  Bowel sounds are normal.    EKG shows AFib with occasional PVC.  No ST T wave changes  Lab Results   Component Value Date    WBC 6.00 01/14/2022    HGB 12.5 (L) 01/14/2022    HCT 41.1 01/14/2022     01/14/2022    CHOL 104 (L) 10/05/2021    TRIG 58 10/05/2021    HDL 35 (L) 10/05/2021    ALT 24 09/20/2021    AST 26 09/20/2021     (L) 10/05/2021    K 4.6 10/05/2021     10/05/2021    CREATININE 0.9 10/05/2021    BUN 13 10/05/2021    CO2 21 (L) 10/05/2021    TSH 1.068 10/05/2021    PSA <0.010 08/20/2013    INR 2.4 02/10/2022    HGBA1C 5.8 (H) 10/05/2021       Impression:  Fatigue/weakness/shortness of breath.  Patient has a history of angiodysplasias in the small intestine causing intermittent anemia due to GI bleeding.  Last lab work was about 2 months " ago.  Abdominal distension and bloating, suspect air hunger is cause him to swallow air which then leads to bloating.  Constipation  Patient Active Problem List   Diagnosis    Atrial fibrillation    Coronary atherosclerosis    History of prostate cancer    Refractive error    Family history of macular degeneration    Posterior capsular opacification    Pseudophakia of both eyes    History of colon polyps    Diverticulosis of large intestine without hemorrhage    Current use of long term anticoagulation    Pulmonary hypertension, mild    SHORTY (obstructive sleep apnea)    Diabetes mellitus without complication    Hyperlipidemia associated with type 2 diabetes mellitus    Hypertension associated with diabetes    Intention tremor    Atherosclerosis of aorta    History of major depression;Moderate episode of recurrent major depressive disorder    Adrenal nodule    Lung nodules    Obesity (BMI 30.0-34.9)    Major depressive disorder, recurrent, moderate    Angiodysplasia of small intestine    Chronic upper GI bleeding    LAILA (iron deficiency anemia)       Plan:  Orders Placed This Encounter    CBC Auto Differential    Comprehensive Metabolic Panel    EKG 12-lead     Patient has been encouraged to use MiraLax and are blister suppositories to resolve his constipation.  Patient will have lab work done today to look for anemia.  We will call him with results    This note is generated with speech recognition software and is subject to transcription error and sound alike phrases that may be missed by proofreading.

## 2022-03-08 ENCOUNTER — PATIENT MESSAGE (OUTPATIENT)
Dept: INTERNAL MEDICINE | Facility: CLINIC | Age: 82
End: 2022-03-08
Payer: MEDICARE

## 2022-03-10 ENCOUNTER — ANTI-COAG VISIT (OUTPATIENT)
Dept: CARDIOLOGY | Facility: CLINIC | Age: 82
End: 2022-03-10
Payer: MEDICARE

## 2022-03-10 DIAGNOSIS — I48.0 PAROXYSMAL ATRIAL FIBRILLATION: ICD-10-CM

## 2022-03-10 DIAGNOSIS — Z79.01 LONG TERM (CURRENT) USE OF ANTICOAGULANTS: Primary | ICD-10-CM

## 2022-03-10 LAB — INR PPP: 3.5 (ref 2–3)

## 2022-03-10 PROCEDURE — 93793 PR ANTICOAGULANT MGMT FOR PT TAKING WARFARIN: ICD-10-PCS | Mod: S$GLB,,,

## 2022-03-10 PROCEDURE — 85610 PROTHROMBIN TIME: CPT | Mod: QW,S$GLB,, | Performed by: INTERNAL MEDICINE

## 2022-03-10 PROCEDURE — 85610 POCT INR: ICD-10-PCS | Mod: QW,S$GLB,, | Performed by: INTERNAL MEDICINE

## 2022-03-10 PROCEDURE — 93793 ANTICOAG MGMT PT WARFARIN: CPT | Mod: S$GLB,,,

## 2022-03-10 NOTE — PROGRESS NOTES
INR not at goal. Medications, chart, and patient findings reviewed. See calendar for adjustments to dose and follow up plan.  We will lower dose tomorrow (wafarin taken this AM) and lower overall dose due to DDI with allopurinol resumption.  Repeat in 2 weeks.

## 2022-03-10 NOTE — PROGRESS NOTES
INR is supratherapetic at 3.5.  Patient reports allopurinol was restarted 2 weeks ago for gout.  No bleeding issues reported.  Current dose verified and followed.  Bleeding precautions in place - ED for any issues.  Will send to PharmD for possible dose decrease or holding instructions.

## 2022-03-23 RX ORDER — FERROUS SULFATE 324(65)MG
TABLET, DELAYED RELEASE (ENTERIC COATED) ORAL
Qty: 60 TABLET | Refills: 5 | Status: SHIPPED | OUTPATIENT
Start: 2022-03-23 | End: 2022-09-27

## 2022-03-24 ENCOUNTER — ANTI-COAG VISIT (OUTPATIENT)
Dept: CARDIOLOGY | Facility: CLINIC | Age: 82
End: 2022-03-24
Payer: MEDICARE

## 2022-03-24 DIAGNOSIS — Z79.01 LONG TERM (CURRENT) USE OF ANTICOAGULANTS: Primary | ICD-10-CM

## 2022-03-24 DIAGNOSIS — I48.0 PAROXYSMAL ATRIAL FIBRILLATION: ICD-10-CM

## 2022-03-24 LAB — INR PPP: 2 (ref 2–3)

## 2022-03-24 PROCEDURE — 93793 PR ANTICOAGULANT MGMT FOR PT TAKING WARFARIN: ICD-10-PCS | Mod: S$GLB,,,

## 2022-03-24 PROCEDURE — 85610 PROTHROMBIN TIME: CPT | Mod: QW,S$GLB,, | Performed by: INTERNAL MEDICINE

## 2022-03-24 PROCEDURE — 85610 POCT INR: ICD-10-PCS | Mod: QW,S$GLB,, | Performed by: INTERNAL MEDICINE

## 2022-03-24 PROCEDURE — 93793 ANTICOAG MGMT PT WARFARIN: CPT | Mod: S$GLB,,,

## 2022-03-24 NOTE — PROGRESS NOTES
INR is therapeutic at 2.0.  Previous warfarin instructions were verified and followed.  No recent changes reported.  Instructions given to continue 2.5 mg every Sunday, Tuesday, Thursday; and 5 mg on all other days.  Recheck in 2 weeks.  Dose calendar given and reviewed with patient.  Patient verbalized understanding.

## 2022-04-05 ENCOUNTER — LAB VISIT (OUTPATIENT)
Dept: LAB | Facility: HOSPITAL | Age: 82
End: 2022-04-05
Attending: INTERNAL MEDICINE
Payer: MEDICARE

## 2022-04-05 DIAGNOSIS — E78.00 PURE HYPERCHOLESTEROLEMIA: ICD-10-CM

## 2022-04-05 DIAGNOSIS — E11.9 DIABETES MELLITUS WITHOUT COMPLICATION: ICD-10-CM

## 2022-04-05 LAB
ALBUMIN SERPL BCP-MCNC: 4 G/DL (ref 3.5–5.2)
ALP SERPL-CCNC: 56 U/L (ref 55–135)
ALT SERPL W/O P-5'-P-CCNC: 27 U/L (ref 10–44)
ANION GAP SERPL CALC-SCNC: 8 MMOL/L (ref 8–16)
AST SERPL-CCNC: 24 U/L (ref 10–40)
BILIRUB SERPL-MCNC: 1.3 MG/DL (ref 0.1–1)
BUN SERPL-MCNC: 14 MG/DL (ref 8–23)
CALCIUM SERPL-MCNC: 9.4 MG/DL (ref 8.7–10.5)
CHLORIDE SERPL-SCNC: 98 MMOL/L (ref 95–110)
CHOLEST SERPL-MCNC: 139 MG/DL (ref 120–199)
CHOLEST/HDLC SERPL: 2.4 {RATIO} (ref 2–5)
CO2 SERPL-SCNC: 27 MMOL/L (ref 23–29)
CREAT SERPL-MCNC: 0.9 MG/DL (ref 0.5–1.4)
EST. GFR  (AFRICAN AMERICAN): >60 ML/MIN/1.73 M^2
EST. GFR  (NON AFRICAN AMERICAN): >60 ML/MIN/1.73 M^2
ESTIMATED AVG GLUCOSE: 151 MG/DL (ref 68–131)
GLUCOSE SERPL-MCNC: 102 MG/DL (ref 70–110)
HBA1C MFR BLD: 6.9 % (ref 4–5.6)
HDLC SERPL-MCNC: 57 MG/DL (ref 40–75)
HDLC SERPL: 41 % (ref 20–50)
LDLC SERPL CALC-MCNC: 69.4 MG/DL (ref 63–159)
NONHDLC SERPL-MCNC: 82 MG/DL
POTASSIUM SERPL-SCNC: 4 MMOL/L (ref 3.5–5.1)
PROT SERPL-MCNC: 6.9 G/DL (ref 6–8.4)
SODIUM SERPL-SCNC: 133 MMOL/L (ref 136–145)
TRIGL SERPL-MCNC: 63 MG/DL (ref 30–150)
TSH SERPL DL<=0.005 MIU/L-ACNC: 1.11 UIU/ML (ref 0.4–4)

## 2022-04-05 PROCEDURE — 83036 HEMOGLOBIN GLYCOSYLATED A1C: CPT | Performed by: INTERNAL MEDICINE

## 2022-04-05 PROCEDURE — 80053 COMPREHEN METABOLIC PANEL: CPT | Performed by: INTERNAL MEDICINE

## 2022-04-05 PROCEDURE — 84443 ASSAY THYROID STIM HORMONE: CPT | Performed by: INTERNAL MEDICINE

## 2022-04-05 PROCEDURE — 85025 COMPLETE CBC W/AUTO DIFF WBC: CPT | Performed by: INTERNAL MEDICINE

## 2022-04-05 PROCEDURE — 36415 COLL VENOUS BLD VENIPUNCTURE: CPT | Mod: PO | Performed by: INTERNAL MEDICINE

## 2022-04-05 PROCEDURE — 80061 LIPID PANEL: CPT | Performed by: INTERNAL MEDICINE

## 2022-04-05 RX ORDER — ATORVASTATIN CALCIUM 40 MG/1
40 TABLET, FILM COATED ORAL DAILY
Qty: 30 TABLET | Refills: 6 | Status: SHIPPED | OUTPATIENT
Start: 2022-04-05 | End: 2022-09-20 | Stop reason: SDUPTHER

## 2022-04-05 NOTE — TELEPHONE ENCOUNTER
Care Due:                  Date            Visit Type   Department     Provider  --------------------------------------------------------------------------------                                EP -                              PRIMARY      [facility] INTERNAL  Last Visit: 03-      CARE ([facility])   MEDICINE       Giuliano Moran                              [facility]                              PRIMARY      [facility] INTERNAL  Next Visit: 04-      CARE ([facility])   [facility]       Giuliano Moran                                                            Last  Test          Frequency    Reason                     Performed    Due Date  --------------------------------------------------------------------------------    HBA1C.......  6 months...  metFORMIN................  10-   04-    Powered by [employer]. Reference number: [id number].   4/05/2022 3:10:20 PM CDT

## 2022-04-06 ENCOUNTER — ANTI-COAG VISIT (OUTPATIENT)
Dept: CARDIOLOGY | Facility: CLINIC | Age: 82
End: 2022-04-06
Payer: MEDICARE

## 2022-04-06 ENCOUNTER — OFFICE VISIT (OUTPATIENT)
Dept: CARDIOLOGY | Facility: CLINIC | Age: 82
End: 2022-04-06
Payer: MEDICARE

## 2022-04-06 VITALS
DIASTOLIC BLOOD PRESSURE: 62 MMHG | OXYGEN SATURATION: 99 % | HEIGHT: 67 IN | SYSTOLIC BLOOD PRESSURE: 100 MMHG | BODY MASS INDEX: 32.73 KG/M2 | WEIGHT: 208.56 LBS | HEART RATE: 100 BPM

## 2022-04-06 DIAGNOSIS — I48.0 PAROXYSMAL ATRIAL FIBRILLATION: ICD-10-CM

## 2022-04-06 DIAGNOSIS — Z79.01 LONG TERM (CURRENT) USE OF ANTICOAGULANTS: Primary | ICD-10-CM

## 2022-04-06 LAB
BASOPHILS # BLD AUTO: 0.02 K/UL (ref 0–0.2)
BASOPHILS NFR BLD: 0.3 % (ref 0–1.9)
DIFFERENTIAL METHOD: ABNORMAL
EOSINOPHIL # BLD AUTO: 0.1 K/UL (ref 0–0.5)
EOSINOPHIL NFR BLD: 0.9 % (ref 0–8)
ERYTHROCYTE [DISTWIDTH] IN BLOOD BY AUTOMATED COUNT: 14.3 % (ref 11.5–14.5)
HCT VFR BLD AUTO: 41 % (ref 40–54)
HGB BLD-MCNC: 13.2 G/DL (ref 14–18)
IMM GRANULOCYTES # BLD AUTO: 0.04 K/UL (ref 0–0.04)
IMM GRANULOCYTES NFR BLD AUTO: 0.6 % (ref 0–0.5)
INR PPP: 2 (ref 2–3)
LYMPHOCYTES # BLD AUTO: 2 K/UL (ref 1–4.8)
LYMPHOCYTES NFR BLD: 29.8 % (ref 18–48)
MCH RBC QN AUTO: 29.9 PG (ref 27–31)
MCHC RBC AUTO-ENTMCNC: 32.2 G/DL (ref 32–36)
MCV RBC AUTO: 93 FL (ref 82–98)
MONOCYTES # BLD AUTO: 0.7 K/UL (ref 0.3–1)
MONOCYTES NFR BLD: 10.9 % (ref 4–15)
NEUTROPHILS # BLD AUTO: 3.8 K/UL (ref 1.8–7.7)
NEUTROPHILS NFR BLD: 57.5 % (ref 38–73)
NRBC BLD-RTO: 0 /100 WBC
PLATELET # BLD AUTO: 256 K/UL (ref 150–450)
PMV BLD AUTO: 9.7 FL (ref 9.2–12.9)
RBC # BLD AUTO: 4.41 M/UL (ref 4.6–6.2)
WBC # BLD AUTO: 6.58 K/UL (ref 3.9–12.7)

## 2022-04-06 PROCEDURE — 3072F PR LOW RISK FOR RETINOPATHY: ICD-10-PCS | Mod: CPTII,S$GLB,, | Performed by: INTERNAL MEDICINE

## 2022-04-06 PROCEDURE — 1126F AMNT PAIN NOTED NONE PRSNT: CPT | Mod: CPTII,S$GLB,, | Performed by: INTERNAL MEDICINE

## 2022-04-06 PROCEDURE — 3074F PR MOST RECENT SYSTOLIC BLOOD PRESSURE < 130 MM HG: ICD-10-PCS | Mod: CPTII,S$GLB,, | Performed by: INTERNAL MEDICINE

## 2022-04-06 PROCEDURE — 1159F MED LIST DOCD IN RCRD: CPT | Mod: CPTII,S$GLB,, | Performed by: INTERNAL MEDICINE

## 2022-04-06 PROCEDURE — 93793 ANTICOAG MGMT PT WARFARIN: CPT | Mod: S$GLB,,,

## 2022-04-06 PROCEDURE — 99205 PR OFFICE/OUTPT VISIT, NEW, LEVL V, 60-74 MIN: ICD-10-PCS | Mod: S$GLB,,, | Performed by: INTERNAL MEDICINE

## 2022-04-06 PROCEDURE — 3078F PR MOST RECENT DIASTOLIC BLOOD PRESSURE < 80 MM HG: ICD-10-PCS | Mod: CPTII,S$GLB,, | Performed by: INTERNAL MEDICINE

## 2022-04-06 PROCEDURE — 85610 POCT INR: ICD-10-PCS | Mod: QW,S$GLB,, | Performed by: INTERNAL MEDICINE

## 2022-04-06 PROCEDURE — 93793 PR ANTICOAGULANT MGMT FOR PT TAKING WARFARIN: ICD-10-PCS | Mod: S$GLB,,,

## 2022-04-06 PROCEDURE — 99999 PR PBB SHADOW E&M-EST. PATIENT-LVL IV: CPT | Mod: PBBFAC,,, | Performed by: INTERNAL MEDICINE

## 2022-04-06 PROCEDURE — 3074F SYST BP LT 130 MM HG: CPT | Mod: CPTII,S$GLB,, | Performed by: INTERNAL MEDICINE

## 2022-04-06 PROCEDURE — 99205 OFFICE O/P NEW HI 60 MIN: CPT | Mod: S$GLB,,, | Performed by: INTERNAL MEDICINE

## 2022-04-06 PROCEDURE — 3072F LOW RISK FOR RETINOPATHY: CPT | Mod: CPTII,S$GLB,, | Performed by: INTERNAL MEDICINE

## 2022-04-06 PROCEDURE — 99999 PR PBB SHADOW E&M-EST. PATIENT-LVL IV: ICD-10-PCS | Mod: PBBFAC,,, | Performed by: INTERNAL MEDICINE

## 2022-04-06 PROCEDURE — 1126F PR PAIN SEVERITY QUANTIFIED, NO PAIN PRESENT: ICD-10-PCS | Mod: CPTII,S$GLB,, | Performed by: INTERNAL MEDICINE

## 2022-04-06 PROCEDURE — 3078F DIAST BP <80 MM HG: CPT | Mod: CPTII,S$GLB,, | Performed by: INTERNAL MEDICINE

## 2022-04-06 PROCEDURE — 1159F PR MEDICATION LIST DOCUMENTED IN MEDICAL RECORD: ICD-10-PCS | Mod: CPTII,S$GLB,, | Performed by: INTERNAL MEDICINE

## 2022-04-06 PROCEDURE — 85610 PROTHROMBIN TIME: CPT | Mod: QW,S$GLB,, | Performed by: INTERNAL MEDICINE

## 2022-04-06 RX ORDER — ATORVASTATIN CALCIUM 40 MG/1
40 TABLET, FILM COATED ORAL DAILY
Qty: 30 TABLET | Refills: 6 | Status: SHIPPED | OUTPATIENT
Start: 2022-04-06 | End: 2022-07-17

## 2022-04-06 NOTE — PROGRESS NOTES
INR remains therapeutic at 2.0.  Patient reports no recent changes.  Currently taking 2.5 mg every Sunday, Tuesday, Thursday; and 5 mg on all other days.  No change in dose.  Recheck in 1 month.  Dose calendar given and reviewed with patient and patient's wife - both verbalized understanding.

## 2022-04-06 NOTE — PROGRESS NOTES
Subjective:    Patient ID:  Anthony Biggs Jr. is a 81 y.o. male who presents for follow-up of Atrial Fibrillation (Referral dr briggs., 48 hour holter in the chart pb), Breathing Problem, Fatigue, and Shortness of Breath      81 yoM referred for AF management. He has been in AF on all ECGs since 6/4/12. He wore a holter monitor 2/15/22 that showed non-significant pauses and very rare PVCs at 1-2% with average HR of 88 bpm. The longest pauses were nocturnal, 4.7s maximum. Normal EF and no ischemia on recent stress test.     Echo 9/21:  · The left ventricle is normal in size with normal systolic function.  · The estimated ejection fraction is 65%.  · Indeterminate left ventricular diastolic function.  · Normal right ventricular size with normal right ventricular systolic function.  · Moderate tricuspid regurgitation.  · Mild mitral regurgitation.  · Mild-to-moderate aortic regurgitation.    NM Stress 2/22:    Normal myocardial perfusion scan. There is no evidence of myocardial ischemia or infarction.    There is a  trivial intensity fixed perfusion abnormality in the inferior wall of the left ventricle secondary to diaphragm attenuation.    The gated perfusion images showed an ejection fraction of 74% at rest. The gated perfusion images showed an ejection fraction of 84% post stress.    The EKG portion of this study is negative for ischemia.    During stress, rare PVCs are noted.    Past Medical History:  No date: Abnormal CXR  8/28/2017: Angina pectoris  No date: Angiodysplasia of small intestine  No date: Atrial fibrillation  No date: Chronic upper GI bleeding      Comment:  due to angiodysplasia in proximal small intestine  No date: Coronary atherosclerosis of unspecified type of vessel,   native or graft  No date: Depression  No date: Diabetes mellitus without complication  No date: Emphysema of lung  2007: History of prostate cancer      Comment:  prostatectomy  No date: Hyperlipidemia associated with type  2 diabetes mellitus  No date: Hypertension associated with diabetes  09/20/2021: LAILA (iron deficiency anemia)      Comment:  due to angiodysplasia in small intestine  No date: Intention tremor  4/1/2019: Moderate episode of recurrent major depressive disorder  4/1/2019: Morbid (severe) obesity due to excess calories  No date: SHORTY (obstructive sleep apnea)  No date: Prostate cancer  No date: Trouble in sleeping  No date: Urinary incontinence    Past Surgical History:  No date: ABDOMINAL HERNIA REPAIR  No date: APPENDECTOMY  No date: bladder sx  No date: CARDIAC CATHETERIZATION  Restor OU: CATARACT EXTRACTION W/  INTRAOCULAR LENS IMPLANT  5/16/2017: COLONOSCOPY; N/A      Comment:  Procedure: COLONOSCOPY;  Surgeon: Alfredo Naidu MD;                 Location: [facility];  Service: Endoscopy;  Laterality:                N/A;  10/29/2021: ESOPHAGOGASTRODUODENOSCOPY; N/A      Comment:  Procedure: SBE (Push Enteroscopy) with Dr. Wayne;                 Surgeon: Arlette Wayne MD;  Location: [facility];  Service: Endoscopy;  Laterality: N/A;  Plavix held               indefinitely as of 10-20-21. Must also hold Coumadin 5                days prior. Ok to continue to use ASA.  No date: inguinal hernia  10/4/2021: INTRALUMINAL GASTROINTESTINAL TRACT IMAGING VIA CAPSULE; N/  A      Comment:  Procedure: IMAGING PROCEDURE, GI TRACT, INTRALUMINAL,                VIA CAPSULE;  Surgeon: Joaquin Stack RN;  Location: [facility]                ENDO;  Service: Endoscopy;  Laterality: N/A;  7/20/2021: LEFT HEART CATHETERIZATION; Left      Comment:  Procedure: CATHETERIZATION, HEART, LEFT;  Surgeon:                Darryl Griffith MD;  Location: [facility] CATH LAB;                 Service: Cardiology;  Laterality: Left;  No date: lung sx  7/20/2021: PERCUTANEOUS TRANSLUMINAL BALLOON ANGIOPLASTY OF CORONARY   ARTERY      Comment:  Procedure: Angioplasty-coronary;  Surgeon: Darryl Griffith MD;   Location: [facility] CATH LAB;  Service:                Cardiology;;  No date: PROSTATE SURGERY    Social History    Socioeconomic History      Marital status:       Highest education level: Some college, no degree    Tobacco Use      Smoking status: Former Smoker        Packs/day: 0.50        Years: 40.00        Pack years: 20        Types: Cigarettes        Start date: 1962        Quit date:         Years since quittin.2      Smokeless tobacco: Never Used    Substance and Sexual Activity      Alcohol use: Yes        Alcohol/week: 7.0 standard drinks        Types: 7 Shots of liquor per week      Drug use: No      Sexual activity: Not Currently      Review of patient's family history indicates:  Problem: Heart disease      Relation: Mother          Age of Onset: (Not Specified)  Problem: Cancer      Relation: Father          Age of Onset: (Not Specified)          Comment: lung  Problem: Macular degeneration      Relation: Sister          Age of Onset: (Not Specified)  Problem: Diabetes      Relation: Sister          Age of Onset: (Not Specified)  Problem: Heart disease      Relation: Sister          Age of Onset: (Not Specified)  Problem: Strabismus      Relation: Neg Hx          Age of Onset: (Not Specified)  Problem: Retinal detachment      Relation: Neg Hx          Age of Onset: (Not Specified)  Problem: Glaucoma      Relation: Neg Hx          Age of Onset: (Not Specified)  Problem: Blindness      Relation: Neg Hx          Age of Onset: (Not Specified)  Problem: Amblyopia      Relation: Neg Hx          Age of Onset: (Not Specified)    Current Outpatient Medications:  allopurinoL (ZYLOPRIM) 100 MG tablet, Take 1 tablet (100 mg total) by mouth once daily., Disp: 90 tablet, Rfl: 3  amLODIPine (NORVASC) 5 MG tablet, Take 1 tablet (5 mg total) by mouth once daily., Disp: 30 tablet, Rfl: 6  aspirin (ECOTRIN) 81 MG EC tablet, Take 81 mg by mouth once daily., Disp: , Rfl:   atorvastatin (LIPITOR) 40 MG  tablet, Take 1 tablet (40 mg total) by mouth once daily., Disp: 30 tablet, Rfl: 6  atorvastatin (LIPITOR) 40 MG tablet, Take 1 tablet (40 mg total) by mouth once daily., Disp: 30 tablet, Rfl: 6  ferrous sulfate 324 mg (65 mg iron) TbEC, TAKE ONE TABLET BY MOUTH TWICE DAILY, Disp: 60 tablet, Rfl: 5  isosorbide mononitrate (IMDUR) 30 MG 24 hr tablet, Take 1 tablet (30 mg total) by mouth once daily., Disp: 30 tablet, Rfl: 11  losartan (COZAAR) 50 MG tablet, TAKE 1 TABLET (50 MG TOTAL) BY MOUTH 2 (TWO) TIMES A DAY., Disp: 180 tablet, Rfl: 3  melatonin 3 mg Tab, Take 1 tablet by mouth nightly., Disp: , Rfl:   metFORMIN (GLUCOPHAGE) 500 MG tablet, TAKE 1 TABLET (500 MG TOTAL) BY MOUTH 2 (TWO) TIMES DAILY WITH MEALS., Disp: 180 tablet, Rfl: 3  metOLazone (ZAROXOLYN) 2.5 MG tablet, Take 1 tablet (2.5 mg total) by mouth every Tues, Thurs., Disp: 8 tablet, Rfl: 11  torsemide (DEMADEX) 10 MG Tab, TAKE ONE TABLET BY MOUTH DAILY , Disp: 90 tablet, Rfl: 3  traMADoL (ULTRAM) 50 mg tablet, Take 1 tablet (50 mg total) by mouth every 6 (six) hours as needed for Pain., Disp: 30 each, Rfl: 1  warfarin (COUMADIN) 5 MG tablet, TAKE 1 TABLET (5 MG TOTAL) BY MOUTH DAILY. OR AS DIRECTED BY COUMADIN CLINIC (Patient taking differently: Take 5 mg by mouth Daily. Except 2.5 mg every Sunday, Tuesday and Thursday.), Disp: 30 tablet, Rfl: 11    No current facility-administered medications for this visit.          Review of Systems   Constitutional: Negative.   HENT: Negative.    Eyes: Negative.    Cardiovascular: Negative for chest pain, dyspnea on exertion, leg swelling, near-syncope, palpitations and syncope.   Respiratory: Negative.  Negative for shortness of breath.    Endocrine: Negative.    Hematologic/Lymphatic: Negative.    Skin: Negative.    Musculoskeletal: Negative.    Gastrointestinal: Negative.    Genitourinary: Negative.    Neurological: Negative.  Negative for dizziness and light-headedness.   Psychiatric/Behavioral: Negative.     Allergic/Immunologic: Negative.         Objective:    Physical Exam  Vitals reviewed.   Constitutional:       General: He is not in acute distress.     Appearance: He is well-developed.   HENT:      Head: Normocephalic and atraumatic.   Eyes:      Pupils: Pupils are equal, round, and reactive to light.   Neck:      Thyroid: No thyromegaly.      Vascular: No JVD.   Cardiovascular:      Rate and Rhythm: Normal rate. Rhythm irregular.      Chest Wall: PMI is not displaced.      Heart sounds: Normal heart sounds, S1 normal and S2 normal. No murmur heard.    No friction rub. No gallop.   Pulmonary:      Effort: Pulmonary effort is normal. No respiratory distress.      Breath sounds: Normal breath sounds. No wheezing or rales.   Abdominal:      General: Bowel sounds are normal. There is no distension.      Palpations: Abdomen is soft.      Tenderness: There is no abdominal tenderness. There is no guarding or rebound.   Musculoskeletal:         General: No tenderness. Normal range of motion.      Cervical back: Normal range of motion.   Skin:     General: Skin is warm and dry.      Findings: No erythema or rash.   Neurological:      Mental Status: He is alert and oriented to person, place, and time.      Cranial Nerves: No cranial nerve deficit.   Psychiatric:         Behavior: Behavior normal.         Thought Content: Thought content normal.         Judgment: Judgment normal.           Assessment:       1. Long-standing persistent atrial fibrillation         Plan:       81 yoM here for AF management. He has LSP AF for 10+ years. He had a holter with 7 pauses >2s, most occurring overnight. No indication for PM. I discussed ablation in detail and he declined. RTC as needed

## 2022-04-12 ENCOUNTER — OFFICE VISIT (OUTPATIENT)
Dept: INTERNAL MEDICINE | Facility: CLINIC | Age: 82
End: 2022-04-12
Payer: MEDICARE

## 2022-04-12 VITALS
HEIGHT: 67 IN | BODY MASS INDEX: 33.64 KG/M2 | WEIGHT: 214.31 LBS | OXYGEN SATURATION: 98 % | DIASTOLIC BLOOD PRESSURE: 82 MMHG | HEART RATE: 89 BPM | SYSTOLIC BLOOD PRESSURE: 122 MMHG

## 2022-04-12 DIAGNOSIS — Z79.01 CURRENT USE OF LONG TERM ANTICOAGULATION: ICD-10-CM

## 2022-04-12 DIAGNOSIS — E27.8 ADRENAL NODULE: ICD-10-CM

## 2022-04-12 DIAGNOSIS — K92.2 CHRONIC UPPER GI BLEEDING: ICD-10-CM

## 2022-04-12 DIAGNOSIS — E66.9 OBESITY (BMI 30.0-34.9): ICD-10-CM

## 2022-04-12 DIAGNOSIS — Z86.59 HISTORY OF MAJOR DEPRESSION: ICD-10-CM

## 2022-04-12 DIAGNOSIS — E11.9 DIABETES MELLITUS WITHOUT COMPLICATION: ICD-10-CM

## 2022-04-12 DIAGNOSIS — G25.2 INTENTION TREMOR: ICD-10-CM

## 2022-04-12 DIAGNOSIS — I27.20 PULMONARY HYPERTENSION, MILD: ICD-10-CM

## 2022-04-12 DIAGNOSIS — E78.5 HYPERLIPIDEMIA ASSOCIATED WITH TYPE 2 DIABETES MELLITUS: ICD-10-CM

## 2022-04-12 DIAGNOSIS — I48.0 PAROXYSMAL ATRIAL FIBRILLATION: ICD-10-CM

## 2022-04-12 DIAGNOSIS — Z85.46 HISTORY OF PROSTATE CANCER: ICD-10-CM

## 2022-04-12 DIAGNOSIS — I15.2 HYPERTENSION ASSOCIATED WITH DIABETES: Primary | ICD-10-CM

## 2022-04-12 DIAGNOSIS — I25.10 ATHEROSCLEROSIS OF NATIVE CORONARY ARTERY OF NATIVE HEART WITHOUT ANGINA PECTORIS: ICD-10-CM

## 2022-04-12 DIAGNOSIS — E11.59 HYPERTENSION ASSOCIATED WITH DIABETES: Primary | ICD-10-CM

## 2022-04-12 DIAGNOSIS — R91.8 LUNG NODULES: ICD-10-CM

## 2022-04-12 DIAGNOSIS — D50.0 IRON DEFICIENCY ANEMIA DUE TO CHRONIC BLOOD LOSS: ICD-10-CM

## 2022-04-12 DIAGNOSIS — G47.33 OSA (OBSTRUCTIVE SLEEP APNEA): ICD-10-CM

## 2022-04-12 DIAGNOSIS — K55.20 ANGIODYSPLASIA OF SMALL INTESTINE: ICD-10-CM

## 2022-04-12 DIAGNOSIS — I70.0 ATHEROSCLEROSIS OF AORTA: ICD-10-CM

## 2022-04-12 DIAGNOSIS — E11.69 HYPERLIPIDEMIA ASSOCIATED WITH TYPE 2 DIABETES MELLITUS: ICD-10-CM

## 2022-04-12 PROCEDURE — 1101F PT FALLS ASSESS-DOCD LE1/YR: CPT | Mod: CPTII,S$GLB,, | Performed by: INTERNAL MEDICINE

## 2022-04-12 PROCEDURE — 3074F PR MOST RECENT SYSTOLIC BLOOD PRESSURE < 130 MM HG: ICD-10-PCS | Mod: CPTII,S$GLB,, | Performed by: INTERNAL MEDICINE

## 2022-04-12 PROCEDURE — 1101F PR PT FALLS ASSESS DOC 0-1 FALLS W/OUT INJ PAST YR: ICD-10-PCS | Mod: CPTII,S$GLB,, | Performed by: INTERNAL MEDICINE

## 2022-04-12 PROCEDURE — 3079F DIAST BP 80-89 MM HG: CPT | Mod: CPTII,S$GLB,, | Performed by: INTERNAL MEDICINE

## 2022-04-12 PROCEDURE — 1159F PR MEDICATION LIST DOCUMENTED IN MEDICAL RECORD: ICD-10-PCS | Mod: CPTII,S$GLB,, | Performed by: INTERNAL MEDICINE

## 2022-04-12 PROCEDURE — 3072F LOW RISK FOR RETINOPATHY: CPT | Mod: CPTII,S$GLB,, | Performed by: INTERNAL MEDICINE

## 2022-04-12 PROCEDURE — 3074F SYST BP LT 130 MM HG: CPT | Mod: CPTII,S$GLB,, | Performed by: INTERNAL MEDICINE

## 2022-04-12 PROCEDURE — 99214 PR OFFICE/OUTPT VISIT, EST, LEVL IV, 30-39 MIN: ICD-10-PCS | Mod: S$GLB,,, | Performed by: INTERNAL MEDICINE

## 2022-04-12 PROCEDURE — 3288F FALL RISK ASSESSMENT DOCD: CPT | Mod: CPTII,S$GLB,, | Performed by: INTERNAL MEDICINE

## 2022-04-12 PROCEDURE — 3079F PR MOST RECENT DIASTOLIC BLOOD PRESSURE 80-89 MM HG: ICD-10-PCS | Mod: CPTII,S$GLB,, | Performed by: INTERNAL MEDICINE

## 2022-04-12 PROCEDURE — 99214 OFFICE O/P EST MOD 30 MIN: CPT | Mod: S$GLB,,, | Performed by: INTERNAL MEDICINE

## 2022-04-12 PROCEDURE — 3072F PR LOW RISK FOR RETINOPATHY: ICD-10-PCS | Mod: CPTII,S$GLB,, | Performed by: INTERNAL MEDICINE

## 2022-04-12 PROCEDURE — 1126F PR PAIN SEVERITY QUANTIFIED, NO PAIN PRESENT: ICD-10-PCS | Mod: CPTII,S$GLB,, | Performed by: INTERNAL MEDICINE

## 2022-04-12 PROCEDURE — 99999 PR PBB SHADOW E&M-EST. PATIENT-LVL III: ICD-10-PCS | Mod: PBBFAC,,, | Performed by: INTERNAL MEDICINE

## 2022-04-12 PROCEDURE — 99999 PR PBB SHADOW E&M-EST. PATIENT-LVL III: CPT | Mod: PBBFAC,,, | Performed by: INTERNAL MEDICINE

## 2022-04-12 PROCEDURE — 1159F MED LIST DOCD IN RCRD: CPT | Mod: CPTII,S$GLB,, | Performed by: INTERNAL MEDICINE

## 2022-04-12 PROCEDURE — 1126F AMNT PAIN NOTED NONE PRSNT: CPT | Mod: CPTII,S$GLB,, | Performed by: INTERNAL MEDICINE

## 2022-04-12 PROCEDURE — 3288F PR FALLS RISK ASSESSMENT DOCUMENTED: ICD-10-PCS | Mod: CPTII,S$GLB,, | Performed by: INTERNAL MEDICINE

## 2022-04-12 NOTE — PROGRESS NOTES
"HPI:  Patient is 81-year-old man who comes today for follow-up of his atrial fibrillation, diabetes, hypertension, lipids coronary disease and chronic fatigue.  Patient has been doing about the same.  He states still has his major chronic fatigue.  His recent cardiac evaluation is actually quite good.  His nuclear stress test was unremarkable.  His echocardiogram shows normal cardiac function.  He saw the cardiologist last week.  He denies any hypoglycemic problems.  His blood pressures been well controlled.    Current meds have been verified and updated per the EMR  Exam:/82 (BP Location: Right arm)   Pulse 89   Ht 5' 7" (1.702 m)   Wt 97.2 kg (214 lb 4.6 oz)   SpO2 98%   BMI 33.56 kg/m²   Very blunted affect.  Exam otherwise deferred    Lab Results   Component Value Date    WBC 6.58 04/05/2022    HGB 13.2 (L) 04/05/2022    HCT 41.0 04/05/2022     04/05/2022    CHOL 139 04/05/2022    TRIG 63 04/05/2022    HDL 57 04/05/2022    ALT 27 04/05/2022    AST 24 04/05/2022     (L) 04/05/2022    K 4.0 04/05/2022    CL 98 04/05/2022    CREATININE 0.9 04/05/2022    BUN 14 04/05/2022    CO2 27 04/05/2022    TSH 1.113 04/05/2022    PSA <0.010 08/20/2013    INR 2.0 04/06/2022    HGBA1C 6.9 (H) 04/05/2022       Impression:  Chronic fatigue, he really has no cardiac or pulmonary reason for him to be so short of breath.  I really think the major problem is his depression and debility due to extreme deconditioning  Numerous other medical problems below, stable  Patient Active Problem List   Diagnosis    Atrial fibrillation    Coronary atherosclerosis    History of prostate cancer    Refractive error    Family history of macular degeneration    Posterior capsular opacification    Pseudophakia of both eyes    History of colon polyps    Diverticulosis of large intestine without hemorrhage    Current use of long term anticoagulation    Pulmonary hypertension, mild    SHORTY (obstructive sleep apnea)    " Diabetes mellitus without complication    Hyperlipidemia associated with type 2 diabetes mellitus    Hypertension associated with diabetes    Intention tremor    Atherosclerosis of aorta    History of major depression;Moderate episode of recurrent major depressive disorder    Adrenal nodule    Lung nodules    Obesity (BMI 30.0-34.9)    Major depressive disorder, recurrent, moderate    Angiodysplasia of small intestine    Chronic upper GI bleeding    LAILA (iron deficiency anemia)       Plan:  Orders Placed This Encounter    Hemoglobin A1C    Lipid Panel    Basic Metabolic Panel    TSH    CBC Auto Differential     Patient has been highly encouraged to get out of start exercising for 5 days a week.  He can start a very low levels and gradually increase.  Patient will see me again in 6 months with above lab work.  Medications remain the same.    This note is generated with speech recognition software and is subject to transcription error and sound alike phrases that may be missed by proofreading.

## 2022-05-04 ENCOUNTER — ANTI-COAG VISIT (OUTPATIENT)
Dept: CARDIOLOGY | Facility: CLINIC | Age: 82
End: 2022-05-04
Payer: MEDICARE

## 2022-05-04 DIAGNOSIS — Z79.01 LONG TERM (CURRENT) USE OF ANTICOAGULANTS: Primary | ICD-10-CM

## 2022-05-04 DIAGNOSIS — I48.0 PAROXYSMAL ATRIAL FIBRILLATION: ICD-10-CM

## 2022-05-04 LAB — INR PPP: 1.9 (ref 2–3)

## 2022-05-04 PROCEDURE — 93793 ANTICOAG MGMT PT WARFARIN: CPT | Mod: S$GLB,,,

## 2022-05-04 PROCEDURE — 85610 POCT INR: ICD-10-PCS | Mod: QW,S$GLB,, | Performed by: INTERNAL MEDICINE

## 2022-05-04 PROCEDURE — 85610 PROTHROMBIN TIME: CPT | Mod: QW,S$GLB,, | Performed by: INTERNAL MEDICINE

## 2022-05-04 PROCEDURE — 93793 PR ANTICOAGULANT MGMT FOR PT TAKING WARFARIN: ICD-10-PCS | Mod: S$GLB,,,

## 2022-05-04 NOTE — PROGRESS NOTES
Chart reviewed, agree with LPN plan.  Needs to stay at lower end of goal, agree with no boost and recheck.

## 2022-05-04 NOTE — PROGRESS NOTES
INR is just below goal at 1.9 - subtherapeutic.  Patient reports no missed doses or diet changes.  Currently taking warfarin 2.5 mg every Sunday, Tuesday, Thursday; and 5 mg on all other days as directed.  No change in dose, will re-challenge.  Recheck in 3 weeks.  Dose calendar given and reviewed with patient.  Patient verbalized understanding.

## 2022-05-10 RX ORDER — TORSEMIDE 10 MG/1
TABLET ORAL
Qty: 90 TABLET | Refills: 1 | Status: ON HOLD | OUTPATIENT
Start: 2022-05-10 | End: 2022-10-07 | Stop reason: HOSPADM

## 2022-05-10 NOTE — TELEPHONE ENCOUNTER
No new care gaps identified.  [facility] Embedded Care Gaps. Reference number: [id number]. 5/10/2022   8:44:42 AM DEJANT

## 2022-05-10 NOTE — TELEPHONE ENCOUNTER
Refill Routing Note   Medication(s) are not appropriate for processing by [facility] for the following reason(s):      - Drug-Drug Interaction (metOLazone)    [facility] action(s):  Defer Medication-related problems identified: Drug-drug interaction        Medication reconciliation completed: No     Appointments  past 12m or future 3m with PCP    Date Provider   Last Visit   4/12/2022 Giuliano Moran MD   Next Visit   Visit date not found Giuliano Moran MD   ED visits in past 90 days: 0        Note composed:11:12 AM 05/10/2022

## 2022-05-24 ENCOUNTER — ANTI-COAG VISIT (OUTPATIENT)
Dept: CARDIOLOGY | Facility: CLINIC | Age: 82
End: 2022-05-24
Payer: MEDICARE

## 2022-05-24 DIAGNOSIS — Z79.01 LONG TERM (CURRENT) USE OF ANTICOAGULANTS: Primary | ICD-10-CM

## 2022-05-24 DIAGNOSIS — I48.0 PAROXYSMAL ATRIAL FIBRILLATION: ICD-10-CM

## 2022-05-24 LAB — INR PPP: 2.6 (ref 2–3)

## 2022-05-24 PROCEDURE — 93793 ANTICOAG MGMT PT WARFARIN: CPT | Mod: S$GLB,,,

## 2022-05-24 PROCEDURE — 85610 POCT INR: ICD-10-PCS | Mod: QW,S$GLB,, | Performed by: INTERNAL MEDICINE

## 2022-05-24 PROCEDURE — 93793 PR ANTICOAGULANT MGMT FOR PT TAKING WARFARIN: ICD-10-PCS | Mod: S$GLB,,,

## 2022-05-24 PROCEDURE — 85610 PROTHROMBIN TIME: CPT | Mod: QW,S$GLB,, | Performed by: INTERNAL MEDICINE

## 2022-05-24 NOTE — PROGRESS NOTES
INR is therapeutic at 2.6.  Patient reports no recent changes.  Currently taking warfarin 2.5 mg every Sunday, Tuesday, Thursday; and 5 mg on all other days as directed.  Instructions given to continue same dose of warfarin.  Keep diet consistent with greens 2 x per week.  Recheck in 1 month.  Dose calendar given and reviewed with patient.  Patient verbalized understanding.

## 2022-06-21 ENCOUNTER — ANTI-COAG VISIT (OUTPATIENT)
Dept: CARDIOLOGY | Facility: CLINIC | Age: 82
End: 2022-06-21
Payer: MEDICARE

## 2022-06-21 DIAGNOSIS — I48.0 PAROXYSMAL ATRIAL FIBRILLATION: ICD-10-CM

## 2022-06-21 DIAGNOSIS — Z79.01 LONG TERM (CURRENT) USE OF ANTICOAGULANTS: Primary | ICD-10-CM

## 2022-06-21 LAB — INR PPP: 1.9 (ref 2–3)

## 2022-06-21 PROCEDURE — 93793 PR ANTICOAGULANT MGMT FOR PT TAKING WARFARIN: ICD-10-PCS | Mod: S$GLB,,,

## 2022-06-21 PROCEDURE — 85610 PROTHROMBIN TIME: CPT | Mod: QW,S$GLB,, | Performed by: INTERNAL MEDICINE

## 2022-06-21 PROCEDURE — 85610 POCT INR: ICD-10-PCS | Mod: QW,S$GLB,, | Performed by: INTERNAL MEDICINE

## 2022-06-21 PROCEDURE — 93793 ANTICOAG MGMT PT WARFARIN: CPT | Mod: S$GLB,,,

## 2022-06-21 NOTE — PROGRESS NOTES
INR is just below goal at 1.9.  Patient reports more salads in diet last week.  Current warfarin dose verified and followed.  Instructions given to continue same dose of warfarin.  Keep diet consistent.  Recheck in 1 month.  Dose calendar given and reviewed with patient.  Patient verbalized understanding.

## 2022-06-26 ENCOUNTER — OFFICE VISIT (OUTPATIENT)
Dept: URGENT CARE | Facility: CLINIC | Age: 82
End: 2022-06-26
Payer: MEDICARE

## 2022-06-26 ENCOUNTER — TELEPHONE (OUTPATIENT)
Dept: URGENT CARE | Facility: CLINIC | Age: 82
End: 2022-06-26

## 2022-06-26 VITALS
SYSTOLIC BLOOD PRESSURE: 124 MMHG | BODY MASS INDEX: 34.3 KG/M2 | HEART RATE: 102 BPM | OXYGEN SATURATION: 97 % | RESPIRATION RATE: 17 BRPM | TEMPERATURE: 99 F | HEIGHT: 67 IN | DIASTOLIC BLOOD PRESSURE: 74 MMHG | WEIGHT: 218.56 LBS

## 2022-06-26 DIAGNOSIS — U07.1 COVID: ICD-10-CM

## 2022-06-26 DIAGNOSIS — R05.9 COUGH: ICD-10-CM

## 2022-06-26 DIAGNOSIS — U07.1 COVID-19 VIRUS INFECTION: Primary | ICD-10-CM

## 2022-06-26 LAB
CTP QC/QA: YES
SARS-COV-2 RDRP RESP QL NAA+PROBE: POSITIVE

## 2022-06-26 PROCEDURE — 1160F PR REVIEW ALL MEDS BY PRESCRIBER/CLIN PHARMACIST DOCUMENTED: ICD-10-PCS | Mod: CPTII,S$GLB,, | Performed by: NURSE PRACTITIONER

## 2022-06-26 PROCEDURE — 1159F PR MEDICATION LIST DOCUMENTED IN MEDICAL RECORD: ICD-10-PCS | Mod: CPTII,S$GLB,, | Performed by: NURSE PRACTITIONER

## 2022-06-26 PROCEDURE — 1126F AMNT PAIN NOTED NONE PRSNT: CPT | Mod: CPTII,S$GLB,, | Performed by: NURSE PRACTITIONER

## 2022-06-26 PROCEDURE — 99203 OFFICE O/P NEW LOW 30 MIN: CPT | Mod: S$GLB,,, | Performed by: NURSE PRACTITIONER

## 2022-06-26 PROCEDURE — 99203 PR OFFICE/OUTPT VISIT, NEW, LEVL III, 30-44 MIN: ICD-10-PCS | Mod: S$GLB,,, | Performed by: NURSE PRACTITIONER

## 2022-06-26 PROCEDURE — 3074F PR MOST RECENT SYSTOLIC BLOOD PRESSURE < 130 MM HG: ICD-10-PCS | Mod: CPTII,S$GLB,, | Performed by: NURSE PRACTITIONER

## 2022-06-26 PROCEDURE — 1160F RVW MEDS BY RX/DR IN RCRD: CPT | Mod: CPTII,S$GLB,, | Performed by: NURSE PRACTITIONER

## 2022-06-26 PROCEDURE — U0002 COVID-19 LAB TEST NON-CDC: HCPCS | Mod: QW,S$GLB,, | Performed by: NURSE PRACTITIONER

## 2022-06-26 PROCEDURE — 3078F DIAST BP <80 MM HG: CPT | Mod: CPTII,S$GLB,, | Performed by: NURSE PRACTITIONER

## 2022-06-26 PROCEDURE — 3078F PR MOST RECENT DIASTOLIC BLOOD PRESSURE < 80 MM HG: ICD-10-PCS | Mod: CPTII,S$GLB,, | Performed by: NURSE PRACTITIONER

## 2022-06-26 PROCEDURE — 3074F SYST BP LT 130 MM HG: CPT | Mod: CPTII,S$GLB,, | Performed by: NURSE PRACTITIONER

## 2022-06-26 PROCEDURE — 1126F PR PAIN SEVERITY QUANTIFIED, NO PAIN PRESENT: ICD-10-PCS | Mod: CPTII,S$GLB,, | Performed by: NURSE PRACTITIONER

## 2022-06-26 PROCEDURE — 1159F MED LIST DOCD IN RCRD: CPT | Mod: CPTII,S$GLB,, | Performed by: NURSE PRACTITIONER

## 2022-06-26 PROCEDURE — U0002: ICD-10-PCS | Mod: QW,S$GLB,, | Performed by: NURSE PRACTITIONER

## 2022-06-26 PROCEDURE — 3072F PR LOW RISK FOR RETINOPATHY: ICD-10-PCS | Mod: CPTII,S$GLB,, | Performed by: NURSE PRACTITIONER

## 2022-06-26 PROCEDURE — 3072F LOW RISK FOR RETINOPATHY: CPT | Mod: CPTII,S$GLB,, | Performed by: NURSE PRACTITIONER

## 2022-06-26 RX ORDER — BENZONATATE 200 MG/1
200 CAPSULE ORAL 3 TIMES DAILY PRN
Qty: 30 CAPSULE | Refills: 0 | Status: SHIPPED | OUTPATIENT
Start: 2022-06-26 | End: 2022-07-17

## 2022-06-26 RX ORDER — ALBUTEROL SULFATE 90 UG/1
2 AEROSOL, METERED RESPIRATORY (INHALATION) EVERY 6 HOURS PRN
Qty: 6.7 G | Refills: 0 | Status: SHIPPED | OUTPATIENT
Start: 2022-06-26 | End: 2022-07-17

## 2022-06-26 NOTE — TELEPHONE ENCOUNTER
Pt wife called in because her  has a hard time remembering things. Pt wife states that she was confused about his discharge instruction and he couldn't explain them to her so she could help him.

## 2022-06-26 NOTE — PROGRESS NOTES
"Subjective:       Patient ID: Anthony Biggs Jr. is a 81 y.o. male.    Vitals:  height is 5' 7" (1.702 m) and weight is 99.2 kg (218 lb 9.4 oz). His oral temperature is 98.9 °F (37.2 °C). His blood pressure is 124/74 and his pulse is 102. His respiration is 17 and oxygen saturation is 97%.     Chief Complaint: Cough    Patient is a 81 year old male who presents to Urgent Care complaining of a Dry, Non Productive Cough and Nasal Congestion that began approx 2-3 days ago and has progressively gotten worse. Patient states his wife tested positive for Covid19 3 Days ago and he is concerned he may have gotten it as well. Patient states his cough is worsening and he is getting no relief or rest. Patient is vaccinated against Covid and has received one booster. Patient states he has been taking OTC cough drops for his symptoms, which has provided no relief. Patient denies any SOB, Fever, Sore Throat or Pain at this time.     Provider note begins below:  Patient denies any chest pain, shortness of breath, fever, vomiting, diarrhea or abdominal pain.  Patient sitting in chair in no acute distress.  Respirations are even and nonlabored.  Answering all questions appropriately.    Cough  This is a new problem. The current episode started in the past 7 days. The problem has been gradually worsening. The problem occurs constantly. The cough is non-productive. Associated symptoms include nasal congestion. Pertinent negatives include no chest pain, chills, ear congestion, ear pain, eye redness, fever, headaches, heartburn, hemoptysis, myalgias, postnasal drip, rash, rhinorrhea, sore throat, shortness of breath, sweats, weight loss or wheezing. He has tried OTC cough suppressant for the symptoms. The treatment provided no relief. There is no history of asthma, bronchiectasis, bronchitis, COPD, emphysema, environmental allergies or pneumonia.       Constitution: Positive for fatigue. Negative for chills and fever.   HENT: Negative for " ear pain, ear discharge, facial swelling, postnasal drip, sinus pressure and sore throat.    Neck: Negative for neck pain, neck stiffness and painful lymph nodes.   Cardiovascular: Negative.  Negative for chest pain and sob on exertion.   Eyes: Negative.  Negative for eye itching, eye pain and eye redness.   Respiratory: Positive for cough. Negative for chest tightness, bloody sputum, shortness of breath, wheezing and asthma.    Gastrointestinal: Negative.  Negative for abdominal pain, nausea, vomiting and heartburn.   Endocrine: negative. excessive thirst.   Genitourinary: Negative.  Negative for dysuria, frequency, urgency and flank pain.   Musculoskeletal: Negative.  Negative for pain, trauma, joint pain, joint swelling and muscle ache.   Skin: Negative.  Negative for rash, wound, lesion and hives.   Allergic/Immunologic: Negative.  Negative for environmental allergies, eczema, asthma, hives, itching and sneezing.   Neurological: Negative.  Negative for dizziness, passing out, headaches, disorientation and altered mental status.   Hematologic/Lymphatic: Negative.  Negative for swollen lymph nodes.   Psychiatric/Behavioral: Negative.  Negative for altered mental status, disorientation and confusion.       Objective:      Physical Exam   Constitutional: He is oriented to person, place, and time. He appears well-developed. He is cooperative.  Non-toxic appearance. He does not appear ill. No distress.   HENT:   Head: Normocephalic and atraumatic.   Ears:   Right Ear: Hearing, tympanic membrane, external ear and ear canal normal.   Left Ear: Hearing, tympanic membrane, external ear and ear canal normal.   Nose: Nose normal. No mucosal edema, rhinorrhea or nasal deformity. No epistaxis. Right sinus exhibits no maxillary sinus tenderness and no frontal sinus tenderness. Left sinus exhibits no maxillary sinus tenderness and no frontal sinus tenderness.   Mouth/Throat: Uvula is midline, oropharynx is clear and moist and  mucous membranes are normal. No trismus in the jaw. Normal dentition. No uvula swelling. No oropharyngeal exudate, posterior oropharyngeal edema or posterior oropharyngeal erythema.   Eyes: Conjunctivae and lids are normal. No scleral icterus.   Neck: Trachea normal and phonation normal. Neck supple. No edema present. No erythema present. No neck rigidity present.   Cardiovascular: Normal rate, regular rhythm, normal heart sounds and normal pulses.   Pulmonary/Chest: Effort normal and breath sounds normal. No stridor. No respiratory distress. He has no decreased breath sounds. He has no wheezes. He has no rhonchi. He has no rales. He exhibits no tenderness.   Abdominal: Normal appearance. Soft. flat abdomen There is no abdominal tenderness. There is no left CVA tenderness and no right CVA tenderness.   Musculoskeletal: Normal range of motion.         General: No deformity. Normal range of motion.   Lymphadenopathy:     He has no cervical adenopathy.   Neurological: no focal deficit. He is alert and oriented to person, place, and time. He exhibits normal muscle tone. Coordination normal.   Skin: Skin is warm, dry, intact, not diaphoretic and not pale.   Psychiatric: His speech is normal and behavior is normal. Mood, judgment and thought content normal.   Nursing note and vitals reviewed.     The following results have been reviewed with the patient:  LABS-  Results for orders placed or performed in visit on 06/26/22   POCT COVID-19 Rapid Screening   Result Value Ref Range    POC Rapid COVID Positive (A) Negative     Acceptable Yes         IMAGING-  No results found.      Assessment:       1. COVID-19 virus infection    2. Cough    3. COVID          Plan:       FOLLOWUP  Follow up if symptoms worsen or fail to improve, for PLEASE CONTACT PCP OR CONTACT THE EMERGENCY ROOM..     PATIENT INSTRUCTIONS  Patient Instructions   INSTRUCTIONS:  - Rest.  - Drink plenty of fluids.  - Take Tylenol and/or Ibuprofen  as directed as needed for fever/pain.  Do not take more than the recommended dose.  - follow up with your PCP within the next 1-2 weeks as needed.  - You must understand that you have received an Urgent Care treatment only and that you may be released before all of your medical problems are known or treated.   - You, the patient, will arrange for follow up care as instructed.   - If your condition worsens or fails to improve we recommend that you receive another evaluation at the ER immediately or contact your PCP to discuss your concerns.   - You can call [phone] or [phone] to help schedule an appointment with the appropriate provider.       OVER THE COUNTER RECOMMENDATIONS/SUGGESTIONS.     Make sure to stay well hydrated.     Use Nasal Saline to mechanically move any post nasal drip from your eustachian tube or from the back of your throat.     Use warm salt water gargles to ease your throat pain. Warm salt water gargles as needed for sore throat-  1/2 tsp salt to 1 cup warm water, gargle as desired.     Use an antihistamine such as Claritin, Zyrtec or Allegra to dry you out.      Use pseudoephedrine (behind the counter) to decongest. Pseudoephedrine  30 mg up to 240 mg /day. It can raise your blood pressure and give you palpitations.     Use mucinex (guaifenisin) to break up mucous up to 2400mg/day to loosen any mucous.   The mucinex DM pill has a cough suppressant that can be sedating. It can be used at night to stop the tickle at the back of your throat.  You can use Mucinex D (it has guaifenesin and a high dose of pseudoephedrine) in the mornings to help decongest.        Use Afrin in each nare for no longer than 3 days, as it is addictive. It can also dry out your mucous membranes and cause elevated blood pressure. This is especially useful if you are flying.     Use Flonase 1-2 sprays/nostril per day. It is a local acting steroid nasal spray, if you develop a bloody nose, stop using the  medication immediately.     Sometimes Nyquil at night is beneficial to help you get some rest, however it is sedating and it does have an antihistamine, and tylenol.     Honey is a natural cough suppressant that can be used.     Tylenol up to 4,000 mg a day is safe for short periods and can be used for body aches, pain, and fever. However in high doses and prolonged use it can cause liver irritation.     Ibuprofen is a non-steroidal anti-inflammatory that can be used for body aches, pain, and fever.However it can also cause stomach irritation if over used.           THANK YOU FOR ALLOWING ME TO PARTICIPATE IN YOUR HEALTHCARE,     Dev Boogie, NP   COVID-19 virus infection  -     COVID-19 Home Symptom Monitoring  - Duration (days): 14  -     Ambulatory referral/consult to [facility]  -     benzonatate (TESSALON) 200 MG capsule; Take 1 capsule (200 mg total) by mouth 3 (three) times daily as needed for Cough.  Dispense: 30 capsule; Refill: 0  -     albuterol (VENTOLIN HFA) 90 mcg/actuation inhaler; Inhale 2 puffs into the lungs every 6 (six) hours as needed for Wheezing or Shortness of Breath. Rescue  Dispense: 6.7 g; Refill: 0    Cough  -     POCT COVID-19 Rapid Screening  -     benzonatate (TESSALON) 200 MG capsule; Take 1 capsule (200 mg total) by mouth 3 (three) times daily as needed for Cough.  Dispense: 30 capsule; Refill: 0    COVID

## 2022-06-26 NOTE — PATIENT INSTRUCTIONS
INSTRUCTIONS:  - Rest.  - Drink plenty of fluids.  - Take Tylenol and/or Ibuprofen as directed as needed for fever/pain.  Do not take more than the recommended dose.  - follow up with your PCP within the next 1-2 weeks as needed.  - You must understand that you have received an Urgent Care treatment only and that you may be released before all of your medical problems are known or treated.   - You, the patient, will arrange for follow up care as instructed.   - If your condition worsens or fails to improve we recommend that you receive another evaluation at the ER immediately or contact your PCP to discuss your concerns.   - You can call [phone] or [phone] to help schedule an appointment with the appropriate provider.       OVER THE COUNTER RECOMMENDATIONS/SUGGESTIONS.     Make sure to stay well hydrated.     Use Nasal Saline to mechanically move any post nasal drip from your eustachian tube or from the back of your throat.     Use warm salt water gargles to ease your throat pain. Warm salt water gargles as needed for sore throat-  1/2 tsp salt to 1 cup warm water, gargle as desired.     Use an antihistamine such as Claritin, Zyrtec or Allegra to dry you out.      Use pseudoephedrine (behind the counter) to decongest. Pseudoephedrine  30 mg up to 240 mg /day. It can raise your blood pressure and give you palpitations.     Use mucinex (guaifenisin) to break up mucous up to 2400mg/day to loosen any mucous.   The mucinex DM pill has a cough suppressant that can be sedating. It can be used at night to stop the tickle at the back of your throat.  You can use Mucinex D (it has guaifenesin and a high dose of pseudoephedrine) in the mornings to help decongest.        Use Afrin in each nare for no longer than 3 days, as it is addictive. It can also dry out your mucous membranes and cause elevated blood pressure. This is especially useful if you are flying.     Use Flonase 1-2 sprays/nostril per day. It is a  local acting steroid nasal spray, if you develop a bloody nose, stop using the medication immediately.     Sometimes Nyquil at night is beneficial to help you get some rest, however it is sedating and it does have an antihistamine, and tylenol.     Honey is a natural cough suppressant that can be used.     Tylenol up to 4,000 mg a day is safe for short periods and can be used for body aches, pain, and fever. However in high doses and prolonged use it can cause liver irritation.     Ibuprofen is a non-steroidal anti-inflammatory that can be used for body aches, pain, and fever.However it can also cause stomach irritation if over used.

## 2022-06-27 ENCOUNTER — TELEPHONE (OUTPATIENT)
Dept: INTERNAL MEDICINE | Facility: CLINIC | Age: 82
End: 2022-06-27
Payer: MEDICARE

## 2022-06-27 NOTE — TELEPHONE ENCOUNTER
----- Message from Janet Park sent at 6/27/2022 11:41 AM CDT -----  Contact: Laurie/Wife  Patient's spouse is calling to speak with a nurse regarding covid. Patient's spouse reports visiting urgent care yesterday due to positve covid result and has been recommended an infusion injection. Patient's wife request to be informed if injection would be recommended by provider. Please give patient a call back at [phone] when possible.  Thank you,  GH

## 2022-06-27 NOTE — TELEPHONE ENCOUNTER
Advise patient given his age and medical problems I would encourage him to get the infusion.  Spoke with pt wife. Allowed time to ask question. Pt verbalized understanding.

## 2022-06-27 NOTE — TELEPHONE ENCOUNTER
Patient is positive for covid  and has been set up for an infusion and is asking your opinion on him having the infusion .   Please advise

## 2022-06-28 ENCOUNTER — INFUSION (OUTPATIENT)
Dept: INFECTIOUS DISEASES | Facility: HOSPITAL | Age: 82
End: 2022-06-28
Attending: INTERNAL MEDICINE
Payer: MEDICARE

## 2022-06-28 VITALS
OXYGEN SATURATION: 98 % | RESPIRATION RATE: 17 BRPM | SYSTOLIC BLOOD PRESSURE: 125 MMHG | HEART RATE: 99 BPM | DIASTOLIC BLOOD PRESSURE: 76 MMHG | TEMPERATURE: 98 F

## 2022-06-28 DIAGNOSIS — U07.1 COVID-19: Primary | ICD-10-CM

## 2022-06-28 PROCEDURE — 63600175 PHARM REV CODE 636 W HCPCS: Performed by: INTERNAL MEDICINE

## 2022-06-28 PROCEDURE — M0222 HC IV INJECTION, BEBTELOVIMAB, INCL POST ADMIN MONIT: HCPCS | Performed by: INTERNAL MEDICINE

## 2022-06-28 RX ORDER — DIPHENHYDRAMINE HYDROCHLORIDE 50 MG/ML
25 INJECTION INTRAMUSCULAR; INTRAVENOUS
Status: ACTIVE | OUTPATIENT
Start: 2022-06-28 | End: 2022-06-29

## 2022-06-28 RX ORDER — BEBTELOVIMAB 87.5 MG/ML
175 INJECTION, SOLUTION INTRAVENOUS
Status: COMPLETED | OUTPATIENT
Start: 2022-06-28 | End: 2022-06-28

## 2022-06-28 RX ORDER — ONDANSETRON 4 MG/1
4 TABLET, ORALLY DISINTEGRATING ORAL
Status: ACTIVE | OUTPATIENT
Start: 2022-06-28 | End: 2022-06-29

## 2022-06-28 RX ORDER — EPINEPHRINE 0.3 MG/.3ML
0.3 INJECTION SUBCUTANEOUS
Status: ACTIVE | OUTPATIENT
Start: 2022-06-28 | End: 2022-07-01

## 2022-06-28 RX ORDER — ACETAMINOPHEN 325 MG/1
650 TABLET ORAL
Status: ACTIVE | OUTPATIENT
Start: 2022-06-28 | End: 2022-06-29

## 2022-06-28 RX ORDER — ALBUTEROL SULFATE 90 UG/1
2 AEROSOL, METERED RESPIRATORY (INHALATION)
Status: ACTIVE | OUTPATIENT
Start: 2022-06-28 | End: 2022-07-01

## 2022-06-28 RX ADMIN — BEBTELOVIMAB 175 MG: 87.5 INJECTION, SOLUTION INTRAVENOUS at 11:06

## 2022-06-28 NOTE — PROGRESS NOTES
If you have any further COVID related symptoms that have not improved or feel you're having a reaction to your infusion please notify your primary care physician, go to the nearest emergency room or call 911. Patient discharged with escort to front door of hospital in no apparent distress. Tolerated infusion well. Instructed patient to notify primary care physician if symptoms do not resolve or worsen and to continue to quarantine for the full 5-10 day period.  Patient verbalized intent to comply.    
negative

## 2022-06-29 ENCOUNTER — TELEPHONE (OUTPATIENT)
Dept: URGENT CARE | Facility: CLINIC | Age: 82
End: 2022-06-29
Payer: MEDICARE

## 2022-07-17 ENCOUNTER — HOSPITAL ENCOUNTER (OUTPATIENT)
Facility: HOSPITAL | Age: 82
Discharge: HOME | End: 2022-07-18
Attending: EMERGENCY MEDICINE | Admitting: INTERNAL MEDICINE
Payer: MEDICARE

## 2022-07-17 ENCOUNTER — OFFICE VISIT (OUTPATIENT)
Dept: URGENT CARE | Facility: CLINIC | Age: 82
End: 2022-07-17
Payer: MEDICARE

## 2022-07-17 VITALS
HEIGHT: 67 IN | WEIGHT: 215 LBS | HEART RATE: 98 BPM | TEMPERATURE: 98 F | BODY MASS INDEX: 33.74 KG/M2 | OXYGEN SATURATION: 98 % | SYSTOLIC BLOOD PRESSURE: 152 MMHG | DIASTOLIC BLOOD PRESSURE: 81 MMHG | RESPIRATION RATE: 18 BRPM

## 2022-07-17 DIAGNOSIS — S30.1XXA HEMATOMA OF RECTUS SHEATH, INITIAL ENCOUNTER: Primary | ICD-10-CM

## 2022-07-17 DIAGNOSIS — R10.9 ABDOMINAL PAIN, UNSPECIFIED ABDOMINAL LOCATION: Primary | ICD-10-CM

## 2022-07-17 DIAGNOSIS — R07.9 CHEST PAIN: ICD-10-CM

## 2022-07-17 DIAGNOSIS — Z79.01 LONG TERM (CURRENT) USE OF ANTICOAGULANTS: ICD-10-CM

## 2022-07-17 DIAGNOSIS — Z79.01 ANTICOAGULATED ON COUMADIN: ICD-10-CM

## 2022-07-17 PROBLEM — F33.1 MAJOR DEPRESSIVE DISORDER, RECURRENT, MODERATE: Status: RESOLVED | Noted: 2021-04-12 | Resolved: 2022-07-17

## 2022-07-17 PROBLEM — U07.1 COVID: Status: RESOLVED | Noted: 2022-06-26 | Resolved: 2022-07-17

## 2022-07-17 PROBLEM — E66.2 MORBID (SEVERE) OBESITY WITH ALVEOLAR HYPOVENTILATION: Status: ACTIVE | Noted: 2022-07-17

## 2022-07-17 LAB
ALBUMIN SERPL BCP-MCNC: 4 G/DL (ref 3.5–5.2)
ALP SERPL-CCNC: 74 U/L (ref 55–135)
ALT SERPL W/O P-5'-P-CCNC: 33 U/L (ref 10–44)
ANION GAP SERPL CALC-SCNC: 8 MMOL/L (ref 8–16)
APTT BLDCRRT: 29.1 SEC (ref 21–32)
AST SERPL-CCNC: 27 U/L (ref 10–40)
BASOPHILS # BLD AUTO: 0.02 K/UL (ref 0–0.2)
BASOPHILS NFR BLD: 0.3 % (ref 0–1.9)
BILIRUB SERPL-MCNC: 1 MG/DL (ref 0.1–1)
BILIRUB UR QL STRIP: NEGATIVE
BUN SERPL-MCNC: 11 MG/DL (ref 8–23)
CALCIUM SERPL-MCNC: 9.8 MG/DL (ref 8.7–10.5)
CHLORIDE SERPL-SCNC: 101 MMOL/L (ref 95–110)
CLARITY UR: CLEAR
CO2 SERPL-SCNC: 27 MMOL/L (ref 23–29)
COLOR UR: YELLOW
CREAT SERPL-MCNC: 0.8 MG/DL (ref 0.5–1.4)
CTP QC/QA: YES
DIFFERENTIAL METHOD: ABNORMAL
EOSINOPHIL # BLD AUTO: 0.2 K/UL (ref 0–0.5)
EOSINOPHIL NFR BLD: 2.3 % (ref 0–8)
ERYTHROCYTE [DISTWIDTH] IN BLOOD BY AUTOMATED COUNT: 12.9 % (ref 11.5–14.5)
ERYTHROCYTE [DISTWIDTH] IN BLOOD BY AUTOMATED COUNT: 13 % (ref 11.5–14.5)
EST. GFR  (AFRICAN AMERICAN): >60 ML/MIN/1.73 M^2
EST. GFR  (NON AFRICAN AMERICAN): >60 ML/MIN/1.73 M^2
GLUCOSE SERPL-MCNC: 108 MG/DL (ref 70–110)
GLUCOSE UR QL STRIP: NEGATIVE
HCT VFR BLD AUTO: 34.5 % (ref 40–54)
HCT VFR BLD AUTO: 38.5 % (ref 40–54)
HGB BLD-MCNC: 11.4 G/DL (ref 14–18)
HGB BLD-MCNC: 12.5 G/DL (ref 14–18)
HGB UR QL STRIP: NEGATIVE
IMM GRANULOCYTES # BLD AUTO: 0.02 K/UL (ref 0–0.04)
IMM GRANULOCYTES NFR BLD AUTO: 0.3 % (ref 0–0.5)
INR PPP: 1.4 (ref 0.8–1.2)
KETONES UR QL STRIP: NEGATIVE
LACTATE SERPL-SCNC: 1 MMOL/L (ref 0.5–2.2)
LEUKOCYTE ESTERASE UR QL STRIP: NEGATIVE
LIPASE SERPL-CCNC: 35 U/L (ref 4–60)
LYMPHOCYTES # BLD AUTO: 1.8 K/UL (ref 1–4.8)
LYMPHOCYTES NFR BLD: 26 % (ref 18–48)
MCH RBC QN AUTO: 30 PG (ref 27–31)
MCH RBC QN AUTO: 30.3 PG (ref 27–31)
MCHC RBC AUTO-ENTMCNC: 32.5 G/DL (ref 32–36)
MCHC RBC AUTO-ENTMCNC: 33 G/DL (ref 32–36)
MCV RBC AUTO: 92 FL (ref 82–98)
MCV RBC AUTO: 93 FL (ref 82–98)
MONOCYTES # BLD AUTO: 0.7 K/UL (ref 0.3–1)
MONOCYTES NFR BLD: 10.2 % (ref 4–15)
NEUTROPHILS # BLD AUTO: 4.2 K/UL (ref 1.8–7.7)
NEUTROPHILS NFR BLD: 60.9 % (ref 38–73)
NITRITE UR QL STRIP: NEGATIVE
NRBC BLD-RTO: 0 /100 WBC
PH UR STRIP: 7 [PH] (ref 5–8)
PLATELET # BLD AUTO: 337 K/UL (ref 150–450)
PLATELET # BLD AUTO: 366 K/UL (ref 150–450)
PMV BLD AUTO: 9 FL (ref 9.2–12.9)
PMV BLD AUTO: 9.6 FL (ref 9.2–12.9)
POCT GLUCOSE: 107 MG/DL (ref 70–110)
POTASSIUM SERPL-SCNC: 4.5 MMOL/L (ref 3.5–5.1)
PROT SERPL-MCNC: 7.1 G/DL (ref 6–8.4)
PROT UR QL STRIP: NEGATIVE
PROTHROMBIN TIME: 14.7 SEC (ref 9–12.5)
RBC # BLD AUTO: 3.76 M/UL (ref 4.6–6.2)
RBC # BLD AUTO: 4.16 M/UL (ref 4.6–6.2)
SARS-COV-2 RDRP RESP QL NAA+PROBE: NEGATIVE
SODIUM SERPL-SCNC: 136 MMOL/L (ref 136–145)
SP GR UR STRIP: >1.03 (ref 1–1.03)
URN SPEC COLLECT METH UR: ABNORMAL
UROBILINOGEN UR STRIP-ACNC: NEGATIVE EU/DL
WBC # BLD AUTO: 6.95 K/UL (ref 3.9–12.7)
WBC # BLD AUTO: 7.38 K/UL (ref 3.9–12.7)

## 2022-07-17 PROCEDURE — 3079F DIAST BP 80-89 MM HG: CPT | Mod: CPTII,S$GLB,, | Performed by: EMERGENCY MEDICINE

## 2022-07-17 PROCEDURE — G0378 HOSPITAL OBSERVATION PER HR: HCPCS

## 2022-07-17 PROCEDURE — 96374 THER/PROPH/DIAG INJ IV PUSH: CPT | Mod: 59

## 2022-07-17 PROCEDURE — 1125F PR PAIN SEVERITY QUANTIFIED, PAIN PRESENT: ICD-10-PCS | Mod: CPTII,S$GLB,, | Performed by: EMERGENCY MEDICINE

## 2022-07-17 PROCEDURE — 96376 TX/PRO/DX INJ SAME DRUG ADON: CPT

## 2022-07-17 PROCEDURE — 99215 OFFICE O/P EST HI 40 MIN: CPT | Mod: S$GLB,,, | Performed by: EMERGENCY MEDICINE

## 2022-07-17 PROCEDURE — 83605 ASSAY OF LACTIC ACID: CPT | Performed by: EMERGENCY MEDICINE

## 2022-07-17 PROCEDURE — 3072F LOW RISK FOR RETINOPATHY: CPT | Mod: CPTII,S$GLB,, | Performed by: EMERGENCY MEDICINE

## 2022-07-17 PROCEDURE — 3072F PR LOW RISK FOR RETINOPATHY: ICD-10-PCS | Mod: CPTII,S$GLB,, | Performed by: EMERGENCY MEDICINE

## 2022-07-17 PROCEDURE — 96375 TX/PRO/DX INJ NEW DRUG ADDON: CPT

## 2022-07-17 PROCEDURE — 99285 EMERGENCY DEPT VISIT HI MDM: CPT | Mod: 25

## 2022-07-17 PROCEDURE — 63600175 PHARM REV CODE 636 W HCPCS: Performed by: EMERGENCY MEDICINE

## 2022-07-17 PROCEDURE — 83036 HEMOGLOBIN GLYCOSYLATED A1C: CPT | Performed by: NURSE PRACTITIONER

## 2022-07-17 PROCEDURE — 25000003 PHARM REV CODE 250: Performed by: NURSE PRACTITIONER

## 2022-07-17 PROCEDURE — 3077F PR MOST RECENT SYSTOLIC BLOOD PRESSURE >= 140 MM HG: ICD-10-PCS | Mod: CPTII,S$GLB,, | Performed by: EMERGENCY MEDICINE

## 2022-07-17 PROCEDURE — 63600175 PHARM REV CODE 636 W HCPCS: Performed by: NURSE PRACTITIONER

## 2022-07-17 PROCEDURE — 27000190 HC CPAP FULL FACE MASK W/VALVE

## 2022-07-17 PROCEDURE — 85025 COMPLETE CBC W/AUTO DIFF WBC: CPT | Performed by: REGISTERED NURSE

## 2022-07-17 PROCEDURE — 1159F MED LIST DOCD IN RCRD: CPT | Mod: CPTII,S$GLB,, | Performed by: EMERGENCY MEDICINE

## 2022-07-17 PROCEDURE — 83690 ASSAY OF LIPASE: CPT | Performed by: REGISTERED NURSE

## 2022-07-17 PROCEDURE — 99900035 HC TECH TIME PER 15 MIN (STAT)

## 2022-07-17 PROCEDURE — 81003 URINALYSIS AUTO W/O SCOPE: CPT | Performed by: REGISTERED NURSE

## 2022-07-17 PROCEDURE — U0002 COVID-19 LAB TEST NON-CDC: HCPCS | Performed by: EMERGENCY MEDICINE

## 2022-07-17 PROCEDURE — 1159F PR MEDICATION LIST DOCUMENTED IN MEDICAL RECORD: ICD-10-PCS | Mod: CPTII,S$GLB,, | Performed by: EMERGENCY MEDICINE

## 2022-07-17 PROCEDURE — 85730 THROMBOPLASTIN TIME PARTIAL: CPT | Performed by: REGISTERED NURSE

## 2022-07-17 PROCEDURE — 94761 N-INVAS EAR/PLS OXIMETRY MLT: CPT

## 2022-07-17 PROCEDURE — 85027 COMPLETE CBC AUTOMATED: CPT | Mod: 91 | Performed by: NURSE PRACTITIONER

## 2022-07-17 PROCEDURE — 85610 PROTHROMBIN TIME: CPT | Performed by: REGISTERED NURSE

## 2022-07-17 PROCEDURE — 3077F SYST BP >= 140 MM HG: CPT | Mod: CPTII,S$GLB,, | Performed by: EMERGENCY MEDICINE

## 2022-07-17 PROCEDURE — 36415 COLL VENOUS BLD VENIPUNCTURE: CPT | Performed by: NURSE PRACTITIONER

## 2022-07-17 PROCEDURE — 1160F PR REVIEW ALL MEDS BY PRESCRIBER/CLIN PHARMACIST DOCUMENTED: ICD-10-PCS | Mod: CPTII,S$GLB,, | Performed by: EMERGENCY MEDICINE

## 2022-07-17 PROCEDURE — 99215 PR OFFICE/OUTPT VISIT, EST, LEVL V, 40-54 MIN: ICD-10-PCS | Mod: S$GLB,,, | Performed by: EMERGENCY MEDICINE

## 2022-07-17 PROCEDURE — 96361 HYDRATE IV INFUSION ADD-ON: CPT

## 2022-07-17 PROCEDURE — 80053 COMPREHEN METABOLIC PANEL: CPT | Performed by: REGISTERED NURSE

## 2022-07-17 PROCEDURE — 27000221 HC OXYGEN, UP TO 24 HOURS

## 2022-07-17 PROCEDURE — 1125F AMNT PAIN NOTED PAIN PRSNT: CPT | Mod: CPTII,S$GLB,, | Performed by: EMERGENCY MEDICINE

## 2022-07-17 PROCEDURE — 3079F PR MOST RECENT DIASTOLIC BLOOD PRESSURE 80-89 MM HG: ICD-10-PCS | Mod: CPTII,S$GLB,, | Performed by: EMERGENCY MEDICINE

## 2022-07-17 PROCEDURE — 1160F RVW MEDS BY RX/DR IN RCRD: CPT | Mod: CPTII,S$GLB,, | Performed by: EMERGENCY MEDICINE

## 2022-07-17 PROCEDURE — 25500020 PHARM REV CODE 255: Performed by: EMERGENCY MEDICINE

## 2022-07-17 RX ORDER — TALC
6 POWDER (GRAM) TOPICAL NIGHTLY PRN
Status: DISCONTINUED | OUTPATIENT
Start: 2022-07-17 | End: 2022-07-18 | Stop reason: HOSPADM

## 2022-07-17 RX ORDER — ACETAMINOPHEN 325 MG/1
650 TABLET ORAL EVERY 8 HOURS PRN
Status: DISCONTINUED | OUTPATIENT
Start: 2022-07-17 | End: 2022-07-18 | Stop reason: HOSPADM

## 2022-07-17 RX ORDER — MAG HYDROX/ALUMINUM HYD/SIMETH 200-200-20
30 SUSPENSION, ORAL (FINAL DOSE FORM) ORAL 4 TIMES DAILY PRN
Status: DISCONTINUED | OUTPATIENT
Start: 2022-07-17 | End: 2022-07-18 | Stop reason: HOSPADM

## 2022-07-17 RX ORDER — ISOSORBIDE MONONITRATE 30 MG/1
30 TABLET, EXTENDED RELEASE ORAL DAILY
Status: DISCONTINUED | OUTPATIENT
Start: 2022-07-18 | End: 2022-07-18 | Stop reason: HOSPADM

## 2022-07-17 RX ORDER — ONDANSETRON 8 MG/1
8 TABLET, ORALLY DISINTEGRATING ORAL EVERY 8 HOURS PRN
Status: DISCONTINUED | OUTPATIENT
Start: 2022-07-17 | End: 2022-07-18 | Stop reason: HOSPADM

## 2022-07-17 RX ORDER — IBUPROFEN 200 MG
16 TABLET ORAL
Status: DISCONTINUED | OUTPATIENT
Start: 2022-07-17 | End: 2022-07-18 | Stop reason: HOSPADM

## 2022-07-17 RX ORDER — DIPHENHYDRAMINE HCL 25 MG
25 CAPSULE ORAL EVERY 6 HOURS PRN
Status: DISCONTINUED | OUTPATIENT
Start: 2022-07-17 | End: 2022-07-18 | Stop reason: HOSPADM

## 2022-07-17 RX ORDER — MORPHINE SULFATE 2 MG/ML
2 INJECTION, SOLUTION INTRAMUSCULAR; INTRAVENOUS EVERY 4 HOURS PRN
Status: DISCONTINUED | OUTPATIENT
Start: 2022-07-17 | End: 2022-07-18 | Stop reason: HOSPADM

## 2022-07-17 RX ORDER — GLUCAGON 1 MG
1 KIT INJECTION
Status: DISCONTINUED | OUTPATIENT
Start: 2022-07-17 | End: 2022-07-17 | Stop reason: SDUPTHER

## 2022-07-17 RX ORDER — AMOXICILLIN 250 MG
1 CAPSULE ORAL 2 TIMES DAILY PRN
Status: DISCONTINUED | OUTPATIENT
Start: 2022-07-17 | End: 2022-07-18 | Stop reason: HOSPADM

## 2022-07-17 RX ORDER — PROMETHAZINE HYDROCHLORIDE 25 MG/1
25 TABLET ORAL EVERY 6 HOURS PRN
Status: DISCONTINUED | OUTPATIENT
Start: 2022-07-17 | End: 2022-07-18 | Stop reason: HOSPADM

## 2022-07-17 RX ORDER — GLUCAGON 1 MG
1 KIT INJECTION
Status: DISCONTINUED | OUTPATIENT
Start: 2022-07-17 | End: 2022-07-18 | Stop reason: HOSPADM

## 2022-07-17 RX ORDER — ONDANSETRON 2 MG/ML
4 INJECTION INTRAMUSCULAR; INTRAVENOUS
Status: COMPLETED | OUTPATIENT
Start: 2022-07-17 | End: 2022-07-17

## 2022-07-17 RX ORDER — ALLOPURINOL 100 MG/1
100 TABLET ORAL DAILY
Status: DISCONTINUED | OUTPATIENT
Start: 2022-07-18 | End: 2022-07-18 | Stop reason: HOSPADM

## 2022-07-17 RX ORDER — IBUPROFEN 200 MG
24 TABLET ORAL
Status: DISCONTINUED | OUTPATIENT
Start: 2022-07-17 | End: 2022-07-18 | Stop reason: HOSPADM

## 2022-07-17 RX ORDER — ATORVASTATIN CALCIUM 40 MG/1
40 TABLET, FILM COATED ORAL DAILY
Status: DISCONTINUED | OUTPATIENT
Start: 2022-07-18 | End: 2022-07-18 | Stop reason: HOSPADM

## 2022-07-17 RX ORDER — NALOXONE HCL 0.4 MG/ML
0.02 VIAL (ML) INJECTION
Status: DISCONTINUED | OUTPATIENT
Start: 2022-07-17 | End: 2022-07-18 | Stop reason: HOSPADM

## 2022-07-17 RX ORDER — LOSARTAN POTASSIUM 50 MG/1
50 TABLET ORAL 2 TIMES DAILY
Status: DISCONTINUED | OUTPATIENT
Start: 2022-07-17 | End: 2022-07-18 | Stop reason: HOSPADM

## 2022-07-17 RX ORDER — MORPHINE SULFATE 4 MG/ML
4 INJECTION, SOLUTION INTRAMUSCULAR; INTRAVENOUS EVERY 4 HOURS PRN
Status: DISCONTINUED | OUTPATIENT
Start: 2022-07-17 | End: 2022-07-18 | Stop reason: HOSPADM

## 2022-07-17 RX ORDER — SODIUM CHLORIDE 0.9 % (FLUSH) 0.9 %
10 SYRINGE (ML) INJECTION EVERY 8 HOURS
Status: DISCONTINUED | OUTPATIENT
Start: 2022-07-17 | End: 2022-07-18 | Stop reason: HOSPADM

## 2022-07-17 RX ORDER — SIMETHICONE 80 MG
1 TABLET,CHEWABLE ORAL 4 TIMES DAILY PRN
Status: DISCONTINUED | OUTPATIENT
Start: 2022-07-17 | End: 2022-07-18 | Stop reason: HOSPADM

## 2022-07-17 RX ORDER — IBUPROFEN 200 MG
16 TABLET ORAL
Status: DISCONTINUED | OUTPATIENT
Start: 2022-07-17 | End: 2022-07-17 | Stop reason: SDUPTHER

## 2022-07-17 RX ORDER — AMLODIPINE BESYLATE 5 MG/1
5 TABLET ORAL DAILY
Status: DISCONTINUED | OUTPATIENT
Start: 2022-07-18 | End: 2022-07-18 | Stop reason: HOSPADM

## 2022-07-17 RX ORDER — IBUPROFEN 200 MG
24 TABLET ORAL
Status: DISCONTINUED | OUTPATIENT
Start: 2022-07-17 | End: 2022-07-17 | Stop reason: SDUPTHER

## 2022-07-17 RX ORDER — SODIUM CHLORIDE 9 MG/ML
INJECTION, SOLUTION INTRAVENOUS CONTINUOUS
Status: DISCONTINUED | OUTPATIENT
Start: 2022-07-17 | End: 2022-07-18 | Stop reason: HOSPADM

## 2022-07-17 RX ORDER — MORPHINE SULFATE 4 MG/ML
4 INJECTION, SOLUTION INTRAMUSCULAR; INTRAVENOUS
Status: COMPLETED | OUTPATIENT
Start: 2022-07-17 | End: 2022-07-17

## 2022-07-17 RX ORDER — TORSEMIDE 10 MG/1
10 TABLET ORAL DAILY
Status: DISCONTINUED | OUTPATIENT
Start: 2022-07-18 | End: 2022-07-17

## 2022-07-17 RX ORDER — INSULIN ASPART 100 [IU]/ML
0-5 INJECTION, SOLUTION INTRAVENOUS; SUBCUTANEOUS
Status: DISCONTINUED | OUTPATIENT
Start: 2022-07-17 | End: 2022-07-18 | Stop reason: HOSPADM

## 2022-07-17 RX ADMIN — MORPHINE SULFATE 4 MG: 4 INJECTION INTRAVENOUS at 11:07

## 2022-07-17 RX ADMIN — DIPHENHYDRAMINE HYDROCHLORIDE 25 MG: 25 CAPSULE ORAL at 08:07

## 2022-07-17 RX ADMIN — MELATONIN TAB 3 MG 6 MG: 3 TAB at 09:07

## 2022-07-17 RX ADMIN — LOSARTAN POTASSIUM 50 MG: 50 TABLET, FILM COATED ORAL at 08:07

## 2022-07-17 RX ADMIN — MORPHINE SULFATE 4 MG: 4 INJECTION INTRAVENOUS at 08:07

## 2022-07-17 RX ADMIN — SODIUM CHLORIDE: 0.9 INJECTION, SOLUTION INTRAVENOUS at 04:07

## 2022-07-17 RX ADMIN — ONDANSETRON 4 MG: 2 INJECTION INTRAMUSCULAR; INTRAVENOUS at 11:07

## 2022-07-17 RX ADMIN — MORPHINE SULFATE 4 MG: 4 INJECTION INTRAVENOUS at 04:07

## 2022-07-17 RX ADMIN — IOHEXOL 100 ML: 350 INJECTION, SOLUTION INTRAVENOUS at 11:07

## 2022-07-17 NOTE — SUBJECTIVE & OBJECTIVE
Past Medical History:   Diagnosis Date    Abnormal CXR     Angina pectoris 8/28/2017    Angiodysplasia of small intestine     Atrial fibrillation     Chronic upper GI bleeding     due to angiodysplasia in proximal small intestine    Coronary atherosclerosis of unspecified type of vessel, native or graft     Depression     Diabetes mellitus without complication     Emphysema of lung     History of prostate cancer 2007    prostatectomy    Hyperlipidemia associated with type 2 diabetes mellitus     Hypertension associated with diabetes     LAILA (iron deficiency anemia) 09/20/2021    due to angiodysplasia in small intestine    Intention tremor     Moderate episode of recurrent major depressive disorder 4/1/2019    Morbid (severe) obesity due to excess calories 4/1/2019    SHORTY (obstructive sleep apnea)     Prostate cancer     Trouble in sleeping     Urinary incontinence        Past Surgical History:   Procedure Laterality Date    ABDOMINAL HERNIA REPAIR      APPENDECTOMY      bladder sx      CARDIAC CATHETERIZATION      CATARACT EXTRACTION W/  INTRAOCULAR LENS IMPLANT  Restor OU    COLONOSCOPY N/A 5/16/2017    Procedure: COLONOSCOPY;  Surgeon: Alfredo Naidu MD;  Location: [facility];  Service: Endoscopy;  Laterality: N/A;    ESOPHAGOGASTRODUODENOSCOPY N/A 10/29/2021    Procedure: SBE (Push Enteroscopy) with Dr. Wayne;  Surgeon: Arlette Wayne MD;  Location: [facility];  Service: Endoscopy;  Laterality: N/A;  Plavix held indefinitely as of 10-20-21. Must also hold Coumadin 5 days prior. Ok to continue to use ASA.    inguinal hernia      INTRALUMINAL GASTROINTESTINAL TRACT IMAGING VIA CAPSULE N/A 10/4/2021    Procedure: IMAGING PROCEDURE, GI TRACT, INTRALUMINAL, VIA CAPSULE;  Surgeon: Joaquin Stack RN;  Location: [facility] ENDO;  Service: Endoscopy;  Laterality: N/A;    LEFT HEART CATHETERIZATION Left 7/20/2021    Procedure: CATHETERIZATION, HEART, LEFT;  Surgeon: Darryl Griffith MD;  Location: [facility] CATH LAB;   Service: Cardiology;  Laterality: Left;    lung sx      PERCUTANEOUS TRANSLUMINAL BALLOON ANGIOPLASTY OF CORONARY ARTERY  7/20/2021    Procedure: Angioplasty-coronary;  Surgeon: Darryl Griffith MD;  Location: [facility] CATH LAB;  Service: Cardiology;;    PROSTATE SURGERY         Review of patient's allergies indicates:  No Known Allergies    No current facility-administered medications on file prior to encounter.     Current Outpatient Medications on File Prior to Encounter   Medication Sig    albuterol (VENTOLIN HFA) 90 mcg/actuation inhaler Inhale 2 puffs into the lungs every 6 (six) hours as needed for Wheezing or Shortness of Breath. Rescue    allopurinoL (ZYLOPRIM) 100 MG tablet Take 1 tablet (100 mg total) by mouth once daily.    amLODIPine (NORVASC) 5 MG tablet TAKE 1 TABLET (5 MG TOTAL) BY MOUTH ONCE DAILY.    aspirin (ECOTRIN) 81 MG EC tablet Take 81 mg by mouth once daily.    atorvastatin (LIPITOR) 40 MG tablet Take 1 tablet (40 mg total) by mouth once daily.    atorvastatin (LIPITOR) 40 MG tablet Take 1 tablet (40 mg total) by mouth once daily.    benzonatate (TESSALON) 200 MG capsule Take 1 capsule (200 mg total) by mouth 3 (three) times daily as needed for Cough.    ferrous sulfate 324 mg (65 mg iron) TbEC TAKE ONE TABLET BY MOUTH TWICE DAILY    isosorbide mononitrate (IMDUR) 30 MG 24 hr tablet Take 1 tablet (30 mg total) by mouth once daily.    losartan (COZAAR) 50 MG tablet TAKE ONE TABLET BY MOUTH TWICE DAILY    melatonin 3 mg Tab Take 1 tablet by mouth nightly.    metFORMIN (GLUCOPHAGE) 500 MG tablet TAKE 1 TABLET (500 MG TOTAL) BY MOUTH 2 (TWO) TIMES DAILY WITH MEALS.    metOLazone (ZAROXOLYN) 2.5 MG tablet Take 1 tablet (2.5 mg total) by mouth every Tues, Thurs.    torsemide (DEMADEX) 10 MG Tab TAKE ONE TABLET BY MOUTH DAILY    traMADoL (ULTRAM) 50 mg tablet Take 1 tablet (50 mg total) by mouth every 6 (six) hours as needed for Pain.    warfarin (COUMADIN) 5 MG tablet TAKE 1 TABLET (5 MG TOTAL) BY  MOUTH DAILY. OR AS DIRECTED BY COUMADIN CLINIC (Patient taking differently: Take 5 mg by mouth Daily. Except 2.5 mg every , Tuesday and Thursday.)     Family History       Problem Relation (Age of Onset)    Cancer Father    Diabetes Sister    Heart disease Mother, Sister    Macular degeneration Sister          Tobacco Use    Smoking status: Former Smoker     Packs/day: 0.50     Years: 40.00     Pack years: 20.00     Types: Cigarettes     Start date: 1962     Quit date:      Years since quittin.5    Smokeless tobacco: Never Used   Substance and Sexual Activity    Alcohol use: Yes     Alcohol/week: 7.0 standard drinks     Types: 7 Shots of liquor per week    Drug use: No    Sexual activity: Not Currently     Review of Systems   Constitutional:  Negative for activity change, appetite change, chills, fatigue and fever.   HENT:  Negative for dental problem, ear pain, hearing loss, mouth sores, nosebleeds, sinus pressure, sore throat, tinnitus and trouble swallowing.    Eyes:  Negative for pain, discharge and visual disturbance.   Respiratory:  Negative for cough, choking, chest tightness, shortness of breath and wheezing.    Cardiovascular:  Negative for chest pain, palpitations and leg swelling.   Gastrointestinal:  Positive for abdominal pain. Negative for abdominal distention, anal bleeding, blood in stool, constipation, diarrhea, nausea and vomiting.   Endocrine: Negative for cold intolerance, heat intolerance, polydipsia, polyphagia and polyuria.   Genitourinary:  Negative for decreased urine volume, difficulty urinating, flank pain, frequency, hematuria and urgency.   Musculoskeletal:  Negative for arthralgias, back pain, gait problem, myalgias, neck pain and neck stiffness.   Skin:  Negative for color change, rash and wound.   Allergic/Immunologic: Negative.    Neurological:  Negative for dizziness, tremors, seizures, syncope, speech difficulty, weakness, light-headedness and headaches.    Hematological:  Negative for adenopathy. Bruises/bleeds easily.   Psychiatric/Behavioral:  Negative for agitation, behavioral problems, confusion and sleep disturbance. The patient is not nervous/anxious.    All other systems reviewed and are negative.All pertinent labs within the past 24 hours have been reviewed.  Recent Lab Results         07/17/22  1252   07/17/22  1222   07/17/22  1058        Albumin     4.0       Alkaline Phosphatase     74       ALT     33       Anion Gap     8       Appearance, UA   Clear         aPTT     29.1  Comment: aPTT therapeutic range = 39-69 seconds       AST     27       Baso #     0.02       Basophil %     0.3       Bilirubin (UA)   Negative         BILIRUBIN TOTAL     1.0  Comment: For infants and newborns, interpretation of results should be based  on gestational age, weight and in agreement with clinical  observations.    Premature Infant recommended reference ranges:  Up to 24 hours.............<8.0 mg/dL  Up to 48 hours............<12.0 mg/dL  3-5 days..................<15.0 mg/dL  6-29 days.................<15.0 mg/dL         BUN     11       Calcium     9.8       Chloride     101       CO2     27       Color, UA   Yellow         Creatinine     0.8       Differential Method     Automated       eGFR if      >60       eGFR if non      >60  Comment: Calculation used to obtain the estimated glomerular filtration  rate (eGFR) is the CKD-EPI equation.          Eos #     0.2       Eosinophil %     2.3       Glucose     108       Glucose, UA   Negative         Gran # (ANC)     4.2       Gran %     60.9       Hematocrit     34.5       Hemoglobin     11.4       Immature Grans (Abs)     0.02  Comment: Mild elevation in immature granulocytes is non specific and   can be seen in a variety of conditions including stress response,   acute inflammation, trauma and pregnancy. Correlation with other   laboratory and clinical findings is essential.          Immature Granulocytes     0.3       INR     1.4  Comment: Coumadin Therapy:  2.0 - 3.0 for INR for all indicators except mechanical heart valves  and antiphospholipid syndromes which should use 2.5 - 3.5.         Ketones, UA   Negative         Lactate, Alvaro 1.0  Comment: Falsely low lactic acid results can be found in samples   containing >=13.0 mg/dL total bilirubin and/or >=3.5 mg/dL   direct bilirubin.             Leukocytes, UA   Negative         Lipase     35       Lymph #     1.8       Lymph %     26.0       MCH     30.3       MCHC     33.0       MCV     92       Mono #     0.7       Mono %     10.2       MPV     9.0       NITRITE UA   Negative         nRBC     0       Occult Blood UA   Negative         pH, UA   7.0         Platelets     337       Potassium     4.5       PROTEIN TOTAL     7.1       Protein, UA   Negative  Comment: Recommend a 24 hour urine protein or a urine   protein/creatinine ratio if globulin induced proteinuria is  clinically suspected.           Protime     14.7       RBC     3.76       RDW     13.0       Sodium     136       Specific Gravity, UA   >1.030         Specimen UA   Urine, Clean Catch         UROBILINOGEN UA   Negative         WBC     6.95             Objective:     Vital Signs (Most Recent):  Temp: 97.9 °F (36.6 °C) (07/17/22 1000)  Pulse: 87 (07/17/22 1300)  Resp: 20 (07/17/22 1300)  BP: 137/87 (07/17/22 1300)  SpO2: 97 % (07/17/22 1300) Vital Signs (24h Range):  Temp:  [97.9 °F (36.6 °C)-98 °F (36.7 °C)] 97.9 °F (36.6 °C)  Pulse:  [87-98] 87  Resp:  [16-20] 20  SpO2:  [97 %-98 %] 97 %  BP: (135-152)/(78-87) 137/87     Weight: 103 kg (227 lb)  Body mass index is 35.55 kg/m².    Physical Exam  Vitals and nursing note reviewed.   Constitutional:       General: He is not in acute distress.     Appearance: He is well-developed. He is not diaphoretic.   HENT:      Head: Normocephalic and atraumatic.      Nose: Nose normal.   Eyes:      General:         Right eye: No discharge.          Left eye: No discharge.      Conjunctiva/sclera: Conjunctivae normal.      Pupils: Pupils are equal, round, and reactive to light.   Neck:      Thyroid: No thyromegaly.      Vascular: No JVD.      Trachea: No tracheal deviation.   Cardiovascular:      Rate and Rhythm: Normal rate and regular rhythm.      Heart sounds: Normal heart sounds. No murmur heard.    No friction rub. No gallop.   Pulmonary:      Effort: Pulmonary effort is normal. No respiratory distress.      Breath sounds: Normal breath sounds. No wheezing or rales.   Chest:      Chest wall: No tenderness.   Abdominal:      General: Bowel sounds are normal. There is no distension.      Palpations: Abdomen is soft. There is no mass.      Tenderness: There is no abdominal tenderness.   Genitourinary:     Comments: deferred  Musculoskeletal:         General: No tenderness or deformity. Normal range of motion.      Cervical back: Normal range of motion and neck supple.   Skin:     General: Skin is warm and dry.      Capillary Refill: Capillary refill takes less than 2 seconds.      Findings: Bruising present. No erythema or rash.   Neurological:      Mental Status: He is alert and oriented to person, place, and time.      Motor: No abnormal muscle tone.      Coordination: Coordination normal.   Psychiatric:         Behavior: Behavior normal.         CRANIAL NERVES     CN III, IV, VI   Pupils are equal, round, and reactive to light.     Significant Labs: All pertinent labs within the past 24 hours have been reviewed.  Recent Lab Results         07/17/22  1252   07/17/22  1222   07/17/22  1058        Albumin     4.0       Alkaline Phosphatase     74       ALT     33       Anion Gap     8       Appearance, UA   Clear         aPTT     29.1  Comment: aPTT therapeutic range = 39-69 seconds       AST     27       Baso #     0.02       Basophil %     0.3       Bilirubin (UA)   Negative         BILIRUBIN TOTAL     1.0  Comment: For infants and newborns,  interpretation of results should be based  on gestational age, weight and in agreement with clinical  observations.    Premature Infant recommended reference ranges:  Up to 24 hours.............<8.0 mg/dL  Up to 48 hours............<12.0 mg/dL  3-5 days..................<15.0 mg/dL  6-29 days.................<15.0 mg/dL         BUN     11       Calcium     9.8       Chloride     101       CO2     27       Color, UA   Yellow         Creatinine     0.8       Differential Method     Automated       eGFR if      >60       eGFR if non      >60  Comment: Calculation used to obtain the estimated glomerular filtration  rate (eGFR) is the CKD-EPI equation.          Eos #     0.2       Eosinophil %     2.3       Glucose     108       Glucose, UA   Negative         Gran # (ANC)     4.2       Gran %     60.9       Hematocrit     34.5       Hemoglobin     11.4       Immature Grans (Abs)     0.02  Comment: Mild elevation in immature granulocytes is non specific and   can be seen in a variety of conditions including stress response,   acute inflammation, trauma and pregnancy. Correlation with other   laboratory and clinical findings is essential.         Immature Granulocytes     0.3       INR     1.4  Comment: Coumadin Therapy:  2.0 - 3.0 for INR for all indicators except mechanical heart valves  and antiphospholipid syndromes which should use 2.5 - 3.5.         Ketones, UA   Negative         Lactate, Alvaro 1.0  Comment: Falsely low lactic acid results can be found in samples   containing >=13.0 mg/dL total bilirubin and/or >=3.5 mg/dL   direct bilirubin.             Leukocytes, UA   Negative         Lipase     35       Lymph #     1.8       Lymph %     26.0       MCH     30.3       MCHC     33.0       MCV     92       Mono #     0.7       Mono %     10.2       MPV     9.0       NITRITE UA   Negative         nRBC     0       Occult Blood UA   Negative         pH, UA   7.0         Platelets     337        Potassium     4.5       PROTEIN TOTAL     7.1       Protein, UA   Negative  Comment: Recommend a 24 hour urine protein or a urine   protein/creatinine ratio if globulin induced proteinuria is  clinically suspected.           Protime     14.7       RBC     3.76       RDW     13.0       Sodium     136       Specific Gravity, UA   >1.030         Specimen UA   Urine, Clean Catch         UROBILINOGEN UA   Negative         WBC     6.95               Significant Imaging: I have reviewed all pertinent imaging results/findings within the past 24 hours.

## 2022-07-17 NOTE — ASSESSMENT & PLAN NOTE
--CT abd/pelvis shows 1.  5.0 x 4.5 x 1.3 cm intermediate density collection within the right rectus abdominus muscle most consistent with a rectus sheath hematoma.  There is induration of the right lateral abdominal wall soft tissues.  --discussed with cardiology - okay to hold anticoagulation  --hold coumadin  --trend H/H  --PRN analgesia  --gentle IVF's

## 2022-07-17 NOTE — H&P
[facility]  History & Physical    Patient Name: Anthony Biggs Jr.  MRN: [id number]  Patient Class: OP- Observation  Admission Date: 7/17/2022  Attending Physician: Joaquin Schulz MD   Primary Care Provider: Giuliano Moran MD         Patient information was obtained from patient, spouse/SO, past medical records and ER records.     Subjective:     Principal Problem:Hematoma of rectus sheath    Chief Complaint:   Chief Complaint   Patient presents with    Abdominal Pain     Abdominal pain x 1 week, wore compression band while mowing lawn x week ago, ecchymosis across mid abdomen from belt,  pain in this area, pt on anticoagulants, denies n/v/d / bloody stools          HPI: 82 y/o male with PMHx of HTN, HLD, CAD, A fib, long term anticoagulation use, DM, major depression, hx GI bleed, morbid obesity, iron deficiency anemia and PSHx of hernia repair, appendectomy and LHC who presented to the ED with c/o abd pain that onset gradually early this morning. Pain is described as  Sx are constant and moderate in severity. No mitigating or exacerbating factors reported. Associated sx include abd bruising. Pt denies fever, chills, dizziness, CP, SOB, N/V/D, dysuria, genital pain, scrotal edema, back pain, and all other sx at this time.  ED workup shows: H/H 11.4/34.5, PT 14.7, INR 1.4, CT abd/pelvis shows 1.  5.0 x 4.5 x 1.3 cm intermediate density collection within the right rectus abdominus muscle most consistent with a rectus sheath hematoma.  There is induration of the right lateral abdominal wall soft tissues.   He is a full code and his SDM is his wife, Marino.  He will be kept on OBS for Rectus sheath hematoma under the care of hospital medicine.      Past Medical History:   Diagnosis Date    Abnormal CXR     Angina pectoris 8/28/2017    Angiodysplasia of small intestine     Atrial fibrillation     Chronic upper GI bleeding     due to angiodysplasia in proximal small intestine    Coronary  atherosclerosis of unspecified type of vessel, native or graft     Depression     Diabetes mellitus without complication     Emphysema of lung     History of prostate cancer 2007    prostatectomy    Hyperlipidemia associated with type 2 diabetes mellitus     Hypertension associated with diabetes     LAILA (iron deficiency anemia) 09/20/2021    due to angiodysplasia in small intestine    Intention tremor     Moderate episode of recurrent major depressive disorder 4/1/2019    Morbid (severe) obesity due to excess calories 4/1/2019    SHORTY (obstructive sleep apnea)     Prostate cancer     Trouble in sleeping     Urinary incontinence        Past Surgical History:   Procedure Laterality Date    ABDOMINAL HERNIA REPAIR      APPENDECTOMY      bladder sx      CARDIAC CATHETERIZATION      CATARACT EXTRACTION W/  INTRAOCULAR LENS IMPLANT  Restor OU    COLONOSCOPY N/A 5/16/2017    Procedure: COLONOSCOPY;  Surgeon: Alfredo Naidu MD;  Location: [facility] ENDO;  Service: Endoscopy;  Laterality: N/A;    ESOPHAGOGASTRODUODENOSCOPY N/A 10/29/2021    Procedure: SBE (Push Enteroscopy) with Dr. Wayne;  Surgeon: Arlette Wayne MD;  Location: [facility] ENDO;  Service: Endoscopy;  Laterality: N/A;  Plavix held indefinitely as of 10-20-21. Must also hold Coumadin 5 days prior. Ok to continue to use ASA.    inguinal hernia      INTRALUMINAL GASTROINTESTINAL TRACT IMAGING VIA CAPSULE N/A 10/4/2021    Procedure: IMAGING PROCEDURE, GI TRACT, INTRALUMINAL, VIA CAPSULE;  Surgeon: Joaquin Stack RN;  Location: [facility] ENDO;  Service: Endoscopy;  Laterality: N/A;    LEFT HEART CATHETERIZATION Left 7/20/2021    Procedure: CATHETERIZATION, HEART, LEFT;  Surgeon: Darryl Griffith MD;  Location: [facility] CATH LAB;  Service: Cardiology;  Laterality: Left;    lung sx      PERCUTANEOUS TRANSLUMINAL BALLOON ANGIOPLASTY OF CORONARY ARTERY  7/20/2021    Procedure: Angioplasty-coronary;  Surgeon: Darryl Griffith MD;  Location: [facility]  CATH LAB;  Service: Cardiology;;    PROSTATE SURGERY         Review of patient's allergies indicates:  No Known Allergies    No current facility-administered medications on file prior to encounter.     Current Outpatient Medications on File Prior to Encounter   Medication Sig    albuterol (VENTOLIN HFA) 90 mcg/actuation inhaler Inhale 2 puffs into the lungs every 6 (six) hours as needed for Wheezing or Shortness of Breath. Rescue    allopurinoL (ZYLOPRIM) 100 MG tablet Take 1 tablet (100 mg total) by mouth once daily.    amLODIPine (NORVASC) 5 MG tablet TAKE 1 TABLET (5 MG TOTAL) BY MOUTH ONCE DAILY.    aspirin (ECOTRIN) 81 MG EC tablet Take 81 mg by mouth once daily.    atorvastatin (LIPITOR) 40 MG tablet Take 1 tablet (40 mg total) by mouth once daily.    atorvastatin (LIPITOR) 40 MG tablet Take 1 tablet (40 mg total) by mouth once daily.    benzonatate (TESSALON) 200 MG capsule Take 1 capsule (200 mg total) by mouth 3 (three) times daily as needed for Cough.    ferrous sulfate 324 mg (65 mg iron) TbEC TAKE ONE TABLET BY MOUTH TWICE DAILY    isosorbide mononitrate (IMDUR) 30 MG 24 hr tablet Take 1 tablet (30 mg total) by mouth once daily.    losartan (COZAAR) 50 MG tablet TAKE ONE TABLET BY MOUTH TWICE DAILY    melatonin 3 mg Tab Take 1 tablet by mouth nightly.    metFORMIN (GLUCOPHAGE) 500 MG tablet TAKE 1 TABLET (500 MG TOTAL) BY MOUTH 2 (TWO) TIMES DAILY WITH MEALS.    metOLazone (ZAROXOLYN) 2.5 MG tablet Take 1 tablet (2.5 mg total) by mouth every Tues, Thurs.    torsemide (DEMADEX) 10 MG Tab TAKE ONE TABLET BY MOUTH DAILY    traMADoL (ULTRAM) 50 mg tablet Take 1 tablet (50 mg total) by mouth every 6 (six) hours as needed for Pain.    warfarin (COUMADIN) 5 MG tablet TAKE 1 TABLET (5 MG TOTAL) BY MOUTH DAILY. OR AS DIRECTED BY COUMADIN CLINIC (Patient taking differently: Take 5 mg by mouth Daily. Except 2.5 mg every Sunday, Tuesday and Thursday.)     Family History       Problem Relation (Age  of Onset)    Cancer Father    Diabetes Sister    Heart disease Mother, Sister    Macular degeneration Sister          Tobacco Use    Smoking status: Former Smoker     Packs/day: 0.50     Years: 40.00     Pack years: 20.00     Types: Cigarettes     Start date: 1962     Quit date:      Years since quittin.5    Smokeless tobacco: Never Used   Substance and Sexual Activity    Alcohol use: Yes     Alcohol/week: 7.0 standard drinks     Types: 7 Shots of liquor per week    Drug use: No    Sexual activity: Not Currently     Review of Systems   Constitutional:  Negative for activity change, appetite change, chills, fatigue and fever.   HENT:  Negative for dental problem, ear pain, hearing loss, mouth sores, nosebleeds, sinus pressure, sore throat, tinnitus and trouble swallowing.    Eyes:  Negative for pain, discharge and visual disturbance.   Respiratory:  Negative for cough, choking, chest tightness, shortness of breath and wheezing.    Cardiovascular:  Negative for chest pain, palpitations and leg swelling.   Gastrointestinal:  Positive for abdominal pain. Negative for abdominal distention, anal bleeding, blood in stool, constipation, diarrhea, nausea and vomiting.   Endocrine: Negative for cold intolerance, heat intolerance, polydipsia, polyphagia and polyuria.   Genitourinary:  Negative for decreased urine volume, difficulty urinating, flank pain, frequency, hematuria and urgency.   Musculoskeletal:  Negative for arthralgias, back pain, gait problem, myalgias, neck pain and neck stiffness.   Skin:  Negative for color change, rash and wound.   Allergic/Immunologic: Negative.    Neurological:  Negative for dizziness, tremors, seizures, syncope, speech difficulty, weakness, light-headedness and headaches.   Hematological:  Negative for adenopathy. Bruises/bleeds easily.   Psychiatric/Behavioral:  Negative for agitation, behavioral problems, confusion and sleep disturbance. The patient is not  nervous/anxious.    All other systems reviewed and are negative.All pertinent labs within the past 24 hours have been reviewed.  Recent Lab Results         07/17/22  1252   07/17/22  1222   07/17/22  1058        Albumin     4.0       Alkaline Phosphatase     74       ALT     33       Anion Gap     8       Appearance, UA   Clear         aPTT     29.1  Comment: aPTT therapeutic range = 39-69 seconds       AST     27       Baso #     0.02       Basophil %     0.3       Bilirubin (UA)   Negative         BILIRUBIN TOTAL     1.0  Comment: For infants and newborns, interpretation of results should be based  on gestational age, weight and in agreement with clinical  observations.    Premature Infant recommended reference ranges:  Up to 24 hours.............<8.0 mg/dL  Up to 48 hours............<12.0 mg/dL  3-5 days..................<15.0 mg/dL  6-29 days.................<15.0 mg/dL         BUN     11       Calcium     9.8       Chloride     101       CO2     27       Color, UA   Yellow         Creatinine     0.8       Differential Method     Automated       eGFR if      >60       eGFR if non      >60  Comment: Calculation used to obtain the estimated glomerular filtration  rate (eGFR) is the CKD-EPI equation.          Eos #     0.2       Eosinophil %     2.3       Glucose     108       Glucose, UA   Negative         Gran # (ANC)     4.2       Gran %     60.9       Hematocrit     34.5       Hemoglobin     11.4       Immature Grans (Abs)     0.02  Comment: Mild elevation in immature granulocytes is non specific and   can be seen in a variety of conditions including stress response,   acute inflammation, trauma and pregnancy. Correlation with other   laboratory and clinical findings is essential.         Immature Granulocytes     0.3       INR     1.4  Comment: Coumadin Therapy:  2.0 - 3.0 for INR for all indicators except mechanical heart valves  and antiphospholipid syndromes which should use  2.5 - 3.5.         Ketones, UA   Negative         Lactate, Alvaro 1.0  Comment: Falsely low lactic acid results can be found in samples   containing >=13.0 mg/dL total bilirubin and/or >=3.5 mg/dL   direct bilirubin.             Leukocytes, UA   Negative         Lipase     35       Lymph #     1.8       Lymph %     26.0       MCH     30.3       MCHC     33.0       MCV     92       Mono #     0.7       Mono %     10.2       MPV     9.0       NITRITE UA   Negative         nRBC     0       Occult Blood UA   Negative         pH, UA   7.0         Platelets     337       Potassium     4.5       PROTEIN TOTAL     7.1       Protein, UA   Negative  Comment: Recommend a 24 hour urine protein or a urine   protein/creatinine ratio if globulin induced proteinuria is  clinically suspected.           Protime     14.7       RBC     3.76       RDW     13.0       Sodium     136       Specific Gravity, UA   >1.030         Specimen UA   Urine, Clean Catch         UROBILINOGEN UA   Negative         WBC     6.95             Objective:     Vital Signs (Most Recent):  Temp: 97.9 °F (36.6 °C) (07/17/22 1000)  Pulse: 87 (07/17/22 1300)  Resp: 20 (07/17/22 1300)  BP: 137/87 (07/17/22 1300)  SpO2: 97 % (07/17/22 1300) Vital Signs (24h Range):  Temp:  [97.9 °F (36.6 °C)-98 °F (36.7 °C)] 97.9 °F (36.6 °C)  Pulse:  [87-98] 87  Resp:  [16-20] 20  SpO2:  [97 %-98 %] 97 %  BP: (135-152)/(78-87) 137/87     Weight: 103 kg (227 lb)  Body mass index is 35.55 kg/m².    Physical Exam  Vitals and nursing note reviewed.   Constitutional:       General: He is not in acute distress.     Appearance: He is well-developed. He is not diaphoretic.   HENT:      Head: Normocephalic and atraumatic.      Nose: Nose normal.   Eyes:      General:         Right eye: No discharge.         Left eye: No discharge.      Conjunctiva/sclera: Conjunctivae normal.      Pupils: Pupils are equal, round, and reactive to light.   Neck:      Thyroid: No thyromegaly.      Vascular: No  JVD.      Trachea: No tracheal deviation.   Cardiovascular:      Rate and Rhythm: Normal rate and regular rhythm.      Heart sounds: Normal heart sounds. No murmur heard.    No friction rub. No gallop.   Pulmonary:      Effort: Pulmonary effort is normal. No respiratory distress.      Breath sounds: Normal breath sounds. No wheezing or rales.   Chest:      Chest wall: No tenderness.   Abdominal:      General: Bowel sounds are normal. There is no distension.      Palpations: Abdomen is soft. There is no mass.      Tenderness: There is no abdominal tenderness.   Genitourinary:     Comments: deferred  Musculoskeletal:         General: No tenderness or deformity. Normal range of motion.      Cervical back: Normal range of motion and neck supple.   Skin:     General: Skin is warm and dry.      Capillary Refill: Capillary refill takes less than 2 seconds.      Findings: Bruising present. No erythema or rash.   Neurological:      Mental Status: He is alert and oriented to person, place, and time.      Motor: No abnormal muscle tone.      Coordination: Coordination normal.   Psychiatric:         Behavior: Behavior normal.         CRANIAL NERVES     CN III, IV, VI   Pupils are equal, round, and reactive to light.     Significant Labs: All pertinent labs within the past 24 hours have been reviewed.  Recent Lab Results         07/17/22  1252   07/17/22  1222   07/17/22  1058        Albumin     4.0       Alkaline Phosphatase     74       ALT     33       Anion Gap     8       Appearance, UA   Clear         aPTT     29.1  Comment: aPTT therapeutic range = 39-69 seconds       AST     27       Baso #     0.02       Basophil %     0.3       Bilirubin (UA)   Negative         BILIRUBIN TOTAL     1.0  Comment: For infants and newborns, interpretation of results should be based  on gestational age, weight and in agreement with clinical  observations.    Premature Infant recommended reference ranges:  Up to 24 hours.............<8.0  mg/dL  Up to 48 hours............<12.0 mg/dL  3-5 days..................<15.0 mg/dL  6-29 days.................<15.0 mg/dL         BUN     11       Calcium     9.8       Chloride     101       CO2     27       Color, UA   Yellow         Creatinine     0.8       Differential Method     Automated       eGFR if      >60       eGFR if non      >60  Comment: Calculation used to obtain the estimated glomerular filtration  rate (eGFR) is the CKD-EPI equation.          Eos #     0.2       Eosinophil %     2.3       Glucose     108       Glucose, UA   Negative         Gran # (ANC)     4.2       Gran %     60.9       Hematocrit     34.5       Hemoglobin     11.4       Immature Grans (Abs)     0.02  Comment: Mild elevation in immature granulocytes is non specific and   can be seen in a variety of conditions including stress response,   acute inflammation, trauma and pregnancy. Correlation with other   laboratory and clinical findings is essential.         Immature Granulocytes     0.3       INR     1.4  Comment: Coumadin Therapy:  2.0 - 3.0 for INR for all indicators except mechanical heart valves  and antiphospholipid syndromes which should use 2.5 - 3.5.         Ketones, UA   Negative         Lactate, Alvaro 1.0  Comment: Falsely low lactic acid results can be found in samples   containing >=13.0 mg/dL total bilirubin and/or >=3.5 mg/dL   direct bilirubin.             Leukocytes, UA   Negative         Lipase     35       Lymph #     1.8       Lymph %     26.0       MCH     30.3       MCHC     33.0       MCV     92       Mono #     0.7       Mono %     10.2       MPV     9.0       NITRITE UA   Negative         nRBC     0       Occult Blood UA   Negative         pH, UA   7.0         Platelets     337       Potassium     4.5       PROTEIN TOTAL     7.1       Protein, UA   Negative  Comment: Recommend a 24 hour urine protein or a urine   protein/creatinine ratio if globulin induced proteinuria  is  clinically suspected.           Protime     14.7       RBC     3.76       RDW     13.0       Sodium     136       Specific Gravity, UA   >1.030         Specimen UA   Urine, Clean Catch         UROBILINOGEN UA   Negative         WBC     6.95               Significant Imaging: I have reviewed all pertinent imaging results/findings within the past 24 hours.    Assessment/Plan:     * Hematoma of rectus sheath  --CT abd/pelvis shows 1.  5.0 x 4.5 x 1.3 cm intermediate density collection within the right rectus abdominus muscle most consistent with a rectus sheath hematoma.  There is induration of the right lateral abdominal wall soft tissues.  --discussed with cardiology - okay to hold anticoagulation  --hold coumadin  --trend H/H  --PRN analgesia  --gentle IVF's      Hypertension associated with diabetes  --stable  --continue home amlodipine, losartan, isosorbide  --cardiac diet      Hyperlipidemia associated with type 2 diabetes mellitus  --continue statin  --accuchecks and SSI  --diabetic diet      SHORTY (obstructive sleep apnea)  --cpap q hs      Current use of long term anticoagulation  --hold coumadin with rectal sheath hematoma  --trend H/H and INR      Atrial fibrillation  --cardiac monitoring  --continue  --hold ASA and coumadin 2/2 hematoma  --discussed with cardiology        VTE Risk Mitigation (From admission, onward)         Ordered     Reason for No Pharmacological VTE Prophylaxis  Once        Question:  Reasons:  Answer:  Active Bleeding    07/17/22 1618     IP VTE HIGH RISK PATIENT  Once         07/17/22 1618     Place sequential compression device  Until discontinued         07/17/22 1618                   LAURA Le-C  Department of Hospital Medicine   [facility] [facility] [facility]

## 2022-07-17 NOTE — PLAN OF CARE
Pt remains free from injury/falls this shift. Safety precautions maintained. Pt c/o no pain at this time. VSS. No signs and symptoms of acute distress noted at this time. Chart reviewed, will continue to monitor.

## 2022-07-17 NOTE — PROGRESS NOTES
"Subjective:       Patient ID: Anthony Biggs Jr. is a 81 y.o. male.    Vitals:  height is 5' 7" (1.702 m) and weight is 97.5 kg (215 lb). His temperature is 98 °F (36.7 °C). His blood pressure is 152/81 (abnormal) and his pulse is 98. His respiration is 18 and oxygen saturation is 98%.     Chief Complaint: Abdominal Pain    81-year-old male presents to urgent care for evaluation of abdominal pain.  Wife states that he cut the grass about a week ago and was wearing a compression device on his abdominal wall for support.  Apparently he wore it to tightly and it causes some bruising to the skin.  Wife states that this bruise is resolving.  She states he did it again during the week.  Patient reports some pain off and on all week but states that this morning when he was getting dressed for [employer] he had a lot of difficulty buckling his belt and has a very some severe pain on the right side of his abdomen.  History of appendectomy but no history of any gallbladder issues or diverticulitis.  Denies lightheadedness.  Bowel movements have been normal.      Pt mentions symptoms of abdominal started 07/10/2022. Symptoms have worsened, pt is at 10/10 pain today for lower left abdominal. Denies any diarrhea, and hurts at palpitation. Currently showing some bruising in the abdominal area, but mentions its due to medications. No OTC meds taken.    Abdominal Pain  This is a new problem. The current episode started in the past 7 days. The onset quality is gradual. The problem occurs constantly. The pain is located in the LLQ. The pain is at a severity of 10/10. The quality of the pain is aching and sharp. The abdominal pain does not radiate. Pertinent negatives include no anorexia, arthralgias, belching, constipation, diarrhea, dysuria, fever, flatus, frequency, headaches, hematochezia, hematuria, melena, myalgias, nausea, vomiting or weight loss. The pain is aggravated by palpation. The pain is relieved by nothing. "       Constitution: Negative for fever.   Gastrointestinal: Positive for abdominal pain. Negative for nausea, vomiting, constipation, diarrhea and bright red blood in stool.   Genitourinary: Negative for dysuria, frequency and hematuria.   Musculoskeletal: Negative for joint pain and muscle ache.   Neurological: Negative for headaches.       Objective:      Physical Exam   Constitutional: He is oriented to person, place, and time.  Non-toxic appearance. He appears distressed.      Comments:Patient in a lot of pain.  Pain increases with movement and walking.     HENT:   Head: Normocephalic and atraumatic.   Eyes: Extraocular movement intact   Cardiovascular: Normal rate, regular rhythm, normal heart sounds and normal pulses.   Pulmonary/Chest: Effort normal and breath sounds normal.   Abdominal: Normal appearance. Soft. flat abdomen There is abdominal tenderness.      Comments: There is a healing bruise diffusely to the anterior abdominal wall.  There is a firm mass palpated on the right side of the abdomen.  No right upper or right lower quadrant tenderness.  The firm mass in the middle of the right side of the abdomen is very tender to the touch.  This mass extends around the side of the abdomen.   Neurological: He is alert and oriented to person, place, and time.   Skin: Skin is warm.   Psychiatric: His behavior is normal.   Nursing note and vitals reviewed.        Assessment:       1. Abdominal pain, unspecified abdominal location        Most recent INR was 1.9 on that was measured 3 weeks ago per chart review.  History pain from patient and his wife.  Patient's presentation is concerning for possible bleeding in the abdominal wall.  This increased dramatically with respect to pain in last 24 hours.  Patient is advised to go to the emergency room.  His wife feels comfortable driving him and his vital signs are largely stable although blood pressure is a little bit elevated.  Report called to BETSY Kwon at [facility]  Emergency [facility].    Plan:         Abdominal pain, unspecified abdominal location  -     Refer to Emergency Dept.

## 2022-07-17 NOTE — PHARMACY MED REC
"Admission Medication History     The home medication history was taken by Johnny Olivier.    You may go to "Admission" then "Reconcile Home Medications" tabs to review and/or act upon these items.      The home medication list has been updated by the Pharmacy department.    Please read ALL comments highlighted in yellow.    Please address this information as you see fit.     Feel free to contact us if you have any questions or require assistance.      The medications listed below were removed from the home medication list. Please reorder if appropriate:  Patient reports no longer taking the following medication(s):   ALBUTEROL HFA 90 MCG/ACTUATION INHALER   BENZONATATE 200 MG CAPSULE   METOLAZONE 2.5 MG TABLET   TRAMADOL 50 MG TABLET    Medications listed below were obtained from: Patient/family, [employer] and Patient's pharmacy  (Not in a hospital admission)      Potential issues to be addressed PRIOR TO DISCHARGE: NONE      Johnny Olivier CPhT  Pharmacy Technician Specialist-Medication History  [facility] [phone]  Secure chat preferred       Current Outpatient Medications on File Prior to Encounter   Medication Sig Dispense Refill Last Dose    allopurinoL (ZYLOPRIM) 100 MG tablet Take 1 tablet (100 mg total) by mouth once daily. 90 tablet 3 7/17/2022 at Unknown time    amLODIPine (NORVASC) 5 MG tablet TAKE 1 TABLET (5 MG TOTAL) BY MOUTH ONCE DAILY. 30 tablet 6 7/17/2022 at Unknown time    aspirin (ECOTRIN) 81 MG EC tablet Take 81 mg by mouth once daily.   7/17/2022 at Unknown time    atorvastatin (LIPITOR) 40 MG tablet Take 1 tablet (40 mg total) by mouth once daily. 30 tablet 6 7/16/2022 at Unknown time    ferrous sulfate 324 mg (65 mg iron) TbEC TAKE ONE TABLET BY MOUTH TWICE DAILY 60 tablet 5 7/17/2022 at Unknown time    isosorbide mononitrate (IMDUR) 30 MG 24 hr tablet Take 1 tablet (30 mg total) by mouth once daily. 30 tablet 11 7/17/2022 at Unknown time    losartan " (COZAAR) 50 MG tablet TAKE ONE TABLET BY MOUTH TWICE DAILY 180 tablet 2 7/17/2022 at Unknown time    melatonin 3 mg Tab Take 1 tablet by mouth nightly.   7/16/2022 at Unknown time    metFORMIN (GLUCOPHAGE) 500 MG tablet TAKE 1 TABLET (500 MG TOTAL) BY MOUTH 2 (TWO) TIMES DAILY WITH MEALS. 180 tablet 3 7/17/2022 at Unknown time    torsemide (DEMADEX) 10 MG Tab TAKE ONE TABLET BY MOUTH DAILY 90 tablet 1 7/17/2022 at Unknown time    warfarin (COUMADIN) 5 MG tablet TAKE 1 TABLET (5 MG TOTAL) BY MOUTH DAILY. OR AS DIRECTED BY COUMADIN CLINIC (Patient taking differently: Take 5 mg by mouth Daily. Except 2.5 mg every Sunday, Tuesday and Thursday.) 30 tablet 11 7/17/2022 at Unknown time    [DISCONTINUED] albuterol (VENTOLIN HFA) 90 mcg/actuation inhaler Inhale 2 puffs into the lungs every 6 (six) hours as needed for Wheezing or Shortness of Breath. Rescue 6.7 g 0     [DISCONTINUED] benzonatate (TESSALON) 200 MG capsule Take 1 capsule (200 mg total) by mouth 3 (three) times daily as needed for Cough. 30 capsule 0     [DISCONTINUED] metOLazone (ZAROXOLYN) 2.5 MG tablet Take 1 tablet (2.5 mg total) by mouth every Tues, Thurs. 8 tablet 11     [DISCONTINUED] traMADoL (ULTRAM) 50 mg tablet Take 1 tablet (50 mg total) by mouth every 6 (six) hours as needed for Pain. 30 each 1                              .

## 2022-07-17 NOTE — ED NOTES
Bed: 09  Expected date:   Expected time:   Means of arrival: Personal Transportation  Comments:  When clean

## 2022-07-17 NOTE — ED PROVIDER NOTES
SCRIBE #1 NOTE: I, Dank Biggs, am scribing for, and in the presence of, Jimmie Manzo MD. I have scribed the entire note.       History     Chief Complaint   Patient presents with    Abdominal Pain     Abdominal pain x 1 week, wore compression band while mowing lawn x week ago, ecchymosis across mid abdomen from belt,  pain in this area, pt on anticoagulants, denies n/v/d / bloody stools       Review of patient's allergies indicates:  No Known Allergies      History of Present Illness     HPI    7/17/2022, 11:03 AM  History obtained from the spouse and patient      History of Present Illness: Anthony Biggs Jr. is a 81 y.o. male patient with a PMHx of A-fib, DM, HLP, HTN, abdominal hernia repair, appendectomy and cardiac cathterization who presents to the Emergency Department for evaluation of abdominal pain which onset gradually this morning. Per spouse, Pt was wearing a compression belt while cutting the grass last week that was too tight, resulting in some abdominal pain and bruising. Pt states this pain is different from pain they have had before and is different from the pain from the compression belt bruising. Pt states he has had multiple hernias. Symptoms are constant and moderate in severity. No mitigating or exacerbating factors reported. No associated sxs. Patient denies any N/V/D, dysuria, genital pain, back pain, CP, and all other sxs at this time.No further complaints or concerns at this time.       Arrival mode: Personal vehicle    PCP: Giuliano Moran MD        Past Medical History:  Past Medical History:   Diagnosis Date    Abnormal CXR     Angina pectoris 8/28/2017    Angiodysplasia of small intestine     Atrial fibrillation     Chronic upper GI bleeding     due to angiodysplasia in proximal small intestine    Coronary atherosclerosis of unspecified type of vessel, native or graft     Depression     Diabetes mellitus without complication     Emphysema of lung     History of prostate  cancer 2007    prostatectomy    Hyperlipidemia associated with type 2 diabetes mellitus     Hypertension associated with diabetes     LAILA (iron deficiency anemia) 09/20/2021    due to angiodysplasia in small intestine    Intention tremor     Moderate episode of recurrent major depressive disorder 4/1/2019    Morbid (severe) obesity due to excess calories 4/1/2019    SHORTY (obstructive sleep apnea)     Prostate cancer     Trouble in sleeping     Urinary incontinence        Past Surgical History:  Past Surgical History:   Procedure Laterality Date    ABDOMINAL HERNIA REPAIR      APPENDECTOMY      bladder sx      CARDIAC CATHETERIZATION      CATARACT EXTRACTION W/  INTRAOCULAR LENS IMPLANT  Restor OU    COLONOSCOPY N/A 5/16/2017    Procedure: COLONOSCOPY;  Surgeon: Alfredo Naidu MD;  Location: [facility];  Service: Endoscopy;  Laterality: N/A;    ESOPHAGOGASTRODUODENOSCOPY N/A 10/29/2021    Procedure: SBE (Push Enteroscopy) with Dr. Wayne;  Surgeon: Arlette Wayne MD;  Location: [facility];  Service: Endoscopy;  Laterality: N/A;  Plavix held indefinitely as of 10-20-21. Must also hold Coumadin 5 days prior. Ok to continue to use ASA.    inguinal hernia      INTRALUMINAL GASTROINTESTINAL TRACT IMAGING VIA CAPSULE N/A 10/4/2021    Procedure: IMAGING PROCEDURE, GI TRACT, INTRALUMINAL, VIA CAPSULE;  Surgeon: Joaquin Stack RN;  Location: [facility] ENDO;  Service: Endoscopy;  Laterality: N/A;    LEFT HEART CATHETERIZATION Left 7/20/2021    Procedure: CATHETERIZATION, HEART, LEFT;  Surgeon: Darryl Griffith MD;  Location: [facility];  Service: Cardiology;  Laterality: Left;    lung sx      PERCUTANEOUS TRANSLUMINAL BALLOON ANGIOPLASTY OF CORONARY ARTERY  7/20/2021    Procedure: Angioplasty-coronary;  Surgeon: Darryl Griffith MD;  Location: [facility];  Service: Cardiology;;    PROSTATE SURGERY           Family History:  Family History   Problem Relation Age of Onset    Heart disease  Mother     Cancer Father         lung    Macular degeneration Sister     Diabetes Sister     Heart disease Sister     Strabismus Neg Hx     Retinal detachment Neg Hx     Glaucoma Neg Hx     Blindness Neg Hx     Amblyopia Neg Hx        Social History:  Social History     Tobacco Use    Smoking status: Former Smoker     Packs/day: 0.50     Years: 40.00     Pack years: 20.00     Types: Cigarettes     Start date: 1962     Quit date:      Years since quittin.5    Smokeless tobacco: Never Used   Substance and Sexual Activity    Alcohol use: Yes     Alcohol/week: 7.0 standard drinks     Types: 7 Shots of liquor per week    Drug use: No    Sexual activity: Not Currently        Review of Systems     Review of Systems   Constitutional: Negative for fever.   HENT: Negative for sore throat.    Respiratory: Negative for shortness of breath.    Cardiovascular: Negative for chest pain.   Gastrointestinal: Positive for abdominal pain. Negative for diarrhea, nausea and vomiting.   Genitourinary: Negative for dysuria, genital sores, penile pain and testicular pain.   Musculoskeletal: Negative for back pain.   Skin: Negative for rash.   Neurological: Negative for weakness.   Hematological: Does not bruise/bleed easily.   All other systems reviewed and are negative.       Physical Exam     Initial Vitals [22 1000]   BP Pulse Resp Temp SpO2   135/78 92 20 97.9 °F (36.6 °C) 97 %      MAP       --          Physical Exam  Nursing Notes and Vital Signs Reviewed.  Constitutional: Patient is in no acute distress. Well-developed and well-nourished.  Head: Atraumatic. Normocephalic.  Eyes: PERRL. EOM intact. Conjunctivae are not pale. No scleral icterus.  ENT: Mucous membranes are moist. Oropharynx is clear and symmetric.    Neck: Supple. Full ROM. No lymphadenopathy.  Cardiovascular: Regular rate. Regular rhythm. No murmurs, rubs, or gallops. Distal pulses are 2+ and symmetric.  Pulmonary/Chest: No respiratory  "distress. Clear to auscultation bilaterally. No wheezing or rales.  Abdominal: Soft and non-distended.  Right sided abdominal tenderness.  No rebound, guarding, or rigidity. Good bowel sounds. Linear ecchymosis to anterior abdomen.  Genitourinary: No CVA tenderness  Musculoskeletal: Moves all extremities. No obvious deformities. No edema. No calf tenderness.  Skin: Warm and dry.  Neurological:  Alert, awake, and appropriate.  Normal speech.  No acute focal neurological deficits are appreciated.  Psychiatric: Normal affect. Good eye contact. Appropriate in content.     ED Course   Procedures  ED Vital Signs:  Vitals:    07/17/22 1000 07/17/22 1051 07/17/22 1120   BP: 135/78     Pulse: 92     Resp: 20  16   Temp: 97.9 °F (36.6 °C)     SpO2: 97%     Weight: 97.5 kg (215 lb) 103 kg (227 lb)    Height: 5' 7" (1.702 m)         Abnormal Lab Results:  Labs Reviewed   CBC W/ AUTO DIFFERENTIAL - Abnormal; Notable for the following components:       Result Value    RBC 3.76 (*)     Hemoglobin 11.4 (*)     Hematocrit 34.5 (*)     MPV 9.0 (*)     All other components within normal limits   PROTIME-INR - Abnormal; Notable for the following components:    Prothrombin Time 14.7 (*)     INR 1.4 (*)     All other components within normal limits   URINALYSIS, REFLEX TO URINE CULTURE - Abnormal; Notable for the following components:    Specific Gravity, UA >1.030 (*)     All other components within normal limits    Narrative:     Specimen Source->Urine   COMPREHENSIVE METABOLIC PANEL   LIPASE   APTT   LACTIC ACID, PLASMA        All Lab Results:  Results for orders placed or performed during the hospital encounter of 07/17/22   CBC auto differential   Result Value Ref Range    WBC 6.95 3.90 - 12.70 K/uL    RBC 3.76 (L) 4.60 - 6.20 M/uL    Hemoglobin 11.4 (L) 14.0 - 18.0 g/dL    Hematocrit 34.5 (L) 40.0 - 54.0 %    MCV 92 82 - 98 fL    MCH 30.3 27.0 - 31.0 pg    MCHC 33.0 32.0 - 36.0 g/dL    RDW 13.0 11.5 - 14.5 %    Platelets 337 150 - " 450 K/uL    MPV 9.0 (L) 9.2 - 12.9 fL    Immature Granulocytes 0.3 0.0 - 0.5 %    Gran # (ANC) 4.2 1.8 - 7.7 K/uL    Immature Grans (Abs) 0.02 0.00 - 0.04 K/uL    Lymph # 1.8 1.0 - 4.8 K/uL    Mono # 0.7 0.3 - 1.0 K/uL    Eos # 0.2 0.0 - 0.5 K/uL    Baso # 0.02 0.00 - 0.20 K/uL    nRBC 0 0 /100 WBC    Gran % 60.9 38.0 - 73.0 %    Lymph % 26.0 18.0 - 48.0 %    Mono % 10.2 4.0 - 15.0 %    Eosinophil % 2.3 0.0 - 8.0 %    Basophil % 0.3 0.0 - 1.9 %    Differential Method Automated    Comprehensive metabolic panel   Result Value Ref Range    Sodium 136 136 - 145 mmol/L    Potassium 4.5 3.5 - 5.1 mmol/L    Chloride 101 95 - 110 mmol/L    CO2 27 23 - 29 mmol/L    Glucose 108 70 - 110 mg/dL    BUN 11 8 - 23 mg/dL    Creatinine 0.8 0.5 - 1.4 mg/dL    Calcium 9.8 8.7 - 10.5 mg/dL    Total Protein 7.1 6.0 - 8.4 g/dL    Albumin 4.0 3.5 - 5.2 g/dL    Total Bilirubin 1.0 0.1 - 1.0 mg/dL    Alkaline Phosphatase 74 55 - 135 U/L    AST 27 10 - 40 U/L    ALT 33 10 - 44 U/L    Anion Gap 8 8 - 16 mmol/L    eGFR if African American >60 >60 mL/min/1.73 m^2    eGFR if non African American >60 >60 mL/min/1.73 m^2   Lipase   Result Value Ref Range    Lipase 35 4 - 60 U/L   Protime-INR   Result Value Ref Range    Prothrombin Time 14.7 (H) 9.0 - 12.5 sec    INR 1.4 (H) 0.8 - 1.2   APTT   Result Value Ref Range    aPTT 29.1 21.0 - 32.0 sec   Urinalysis, Reflex to Urine Culture Urine, Clean Catch    Specimen: Urine   Result Value Ref Range    Specimen UA Urine, Clean Catch     Color, UA Yellow Yellow, Straw, [place]    Appearance, UA Clear Clear    pH, UA 7.0 5.0 - 8.0    Specific Gravity, UA >1.030 (A) 1.005 - 1.030    Protein, UA Negative Negative    Glucose, UA Negative Negative    Ketones, UA Negative Negative    Bilirubin (UA) Negative Negative    Occult Blood UA Negative Negative    Nitrite, UA Negative Negative    Urobilinogen, UA Negative <2.0 EU/dL    Leukocytes, UA Negative Negative   Lactic acid, plasma   Result Value Ref Range     Lactate (Lactic Acid) 1.0 0.5 - 2.2 mmol/L         Imaging Results:  Imaging Results          CT Abdomen Pelvis With Contrast (Final result)  Result time 07/17/22 12:17:07    Final result by Percy Kay MD (07/17/22 12:17:07)                 Impression:      1.  5.0 x 4.5 x 1.3 cm intermediate density collection within the right rectus abdominus muscle most consistent with a rectus sheath hematoma.  There is induration of the right lateral abdominal wall soft tissues.  Has the patient had recent trauma?    2. Negative for acute process otherwise.  Negative for inflammatory changes.  Negative for renal stone disease or hydronephrosis.  I do not see a normal or abnormal appendix.  Negative for free air.    3.  Dependent atelectasis in the lung bases.  Stable small nodules within the right lower lobe.  Coronary artery calcifications.  Cardiac chamber enlargement.  Mobile cecum.  Moderate diverticulosis.  Prostate gland is small or absent (there are clips within the pelvis).  Fat filled umbilical hernia.  Mild degenerative changes of the spine and pelvis.  Other stable and nonemergent findings as described above.    All CT scans at this facility are performed  using dose modulation techniques as appropriate to performed exam including the following:  automated exposure control; adjustment of mA and/or kV according to the patients size (this includes techniques or standardized protocols for targeted exams where dose is matched to indication/reason for exam: i.e. extremities or head);  iterative reconstruction technique.      Electronically signed by: Percy Kay MD  Date:    07/17/2022  Time:    12:17             Narrative:    EXAMINATION:  CT ABDOMEN PELVIS WITH CONTRAST, multiplanar reconstructions    CLINICAL HISTORY:  Abdominal pain, acute, nonlocalized;right sided abdominal pain;    TECHNIQUE:  Axial images through the abdomen and pelvis were obtained with the use of IV contrast.  Sagittal and coronal  reconstructions are provided for review.  Oral contrast was not utilized.    COMPARISON:  Chest CT scan from June 1, 2017    FINDINGS:  LUNG BASES: Mild dependent atelectasis in the lung bases.  Stable small nodules in the periphery of the right lower lobe anteriorly.  Lung bases are otherwise clear.  Negative for pleural or pericardial effusions. The distal esophagus is normal.  Coronary artery calcifications.  Cardiac chamber enlargement.    ABDOMEN: There is fatty infiltration of the liver.  Calcified granulomas within the spleen.  The liver, spleen and gallbladder otherwise appear normal.  The pancreas is unremarkable.  Kidneys and adrenal glands are normal.    Negative for adenopathy, free fluid or inflammatory change noted within the abdomen or pelvis.  Vascular calcifications are present without aneurysmal changes.  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  Retroaortic left renal vein.  Portal vein is patent.    The patient has a mobile cecum.  Large and small bowel loops are normal.  Moderate diverticulosis.  Negative for free air.  I do not see a normal or abnormal appendix.    PELVIS: The urinary bladder is unremarkable.  The prostate gland is small or absent.  There are clips within the pelvis.  The rest of the male pelvic organs are normal. There are pelvic phleboliths.    Fat filled umbilical hernia.  There appear to be anterior abdominal wall postoperative changes.  There is an incompletely evaluated (due to motion artifact) 5.0 x 4.5 x 1.3 cm intermediate density area in the mid right rectus abdominus muscle (reference image 91 of series 2 and 64 of series 602).  There is induration of the right lateral abdominal wall soft tissues as well.  The abdominal wall is otherwise.    No significant osseous abnormality is identified.  Negative for significant spinal canal stenosis. Mild multilevel marginal spondylosis and degenerative facet arthropathy.  Mild degenerative changes of the  pelvis.    Negative for groin adenopathy.                                      The Emergency Provider reviewed the vital signs and test results, which are outlined above.     ED Discussion       2:13 PM: Discussed case with Daija Wharton NP ([facility]). Dr. Schulz agrees with current care and management of pt and accepts admission.   Admitting Service: Hospital Medicine  Admitting Physician: Dr. Schulz  Admit to: Obs      Medical Decision Making:   Clinical Tests:   Lab Tests: Reviewed and Ordered  Radiological Study: Reviewed and Ordered  Rectus sheath hematoma.  Patient is anticoagulated on Coumadin.  INR 1.4.  Vital signs stable.  will place in observation to Hospital Medicine           ED Medication(s):  Medications   morphine injection 4 mg (4 mg Intravenous Given 7/17/22 1120)   ondansetron injection 4 mg (4 mg Intravenous Given 7/17/22 1120)   iohexoL (OMNIPAQUE 350) injection 100 mL (100 mLs Intravenous Given 7/17/22 1148)       New Prescriptions    No medications on file               Scribe Attestation:   Scribe #1: I performed the above scribed service and the documentation accurately describes the services I performed. I attest to the accuracy of the note.     Attending:   Physician Attestation Statement for Scribe #1: I, Jimmie Manzo MD, personally performed the services described in this documentation, as scribed by Dank Biggs, in my presence, and it is both accurate and complete.           Clinical Impression       ICD-10-CM ICD-9-CM   1. Hematoma of rectus sheath, initial encounter  S30.1XXA 922.2   2. Anticoagulated on Coumadin  Z79.01 V58.61       Disposition:   Disposition: Admitted  Condition: Fair         Jimmie Manoz MD  07/20/22 0754

## 2022-07-17 NOTE — HPI
80 y/o male with PMHx of HTN, HLD, CAD, A fib, long term anticoagulation use, DM, major depression, hx GI bleed, morbid obesity, iron deficiency anemia and PSHx of hernia repair, appendectomy and LHC who presented to the ED with c/o abd pain that onset gradually early this morning. Pain is described as  Sx are constant and moderate in severity. No mitigating or exacerbating factors reported. Associated sx include abd bruising. Pt denies fever, chills, dizziness, CP, SOB, N/V/D, dysuria, genital pain, scrotal edema, back pain, and all other sx at this time.  ED workup shows: H/H 11.4/34.5, PT 14.7, INR 1.4, CT abd/pelvis shows 1.  5.0 x 4.5 x 1.3 cm intermediate density collection within the right rectus abdominus muscle most consistent with a rectus sheath hematoma.  There is induration of the right lateral abdominal wall soft tissues.   He is a full code and his SDM is his wife, Marino.  He will be kept on OBS for Rectus sheath hematoma under the care of hospital medicine.

## 2022-07-17 NOTE — FIRST PROVIDER EVALUATION
Medical screening exam completed.  I have conducted a focused provider triage encounter, findings are as follows:    Brief history of present illness:  Abdominal pain sent from urgent care for further evaluation    There were no vitals filed for this visit.    Pertinent physical exam:  Bruising and tenderness noted to the lower abdomen    Brief workup plan:  Labs and further evaluation    Preliminary workup initiated; this workup will be continued and followed by the physician or advanced practice provider that is assigned to the patient when roomed.

## 2022-07-18 ENCOUNTER — ANTI-COAG VISIT (OUTPATIENT)
Dept: CARDIOLOGY | Facility: CLINIC | Age: 82
End: 2022-07-18
Payer: MEDICARE

## 2022-07-18 VITALS
HEIGHT: 67 IN | BODY MASS INDEX: 35.63 KG/M2 | TEMPERATURE: 98 F | HEART RATE: 89 BPM | OXYGEN SATURATION: 96 % | WEIGHT: 227 LBS | RESPIRATION RATE: 16 BRPM | SYSTOLIC BLOOD PRESSURE: 116 MMHG | DIASTOLIC BLOOD PRESSURE: 63 MMHG

## 2022-07-18 DIAGNOSIS — Z79.01 LONG TERM (CURRENT) USE OF ANTICOAGULANTS: Primary | ICD-10-CM

## 2022-07-18 DIAGNOSIS — I48.0 PAROXYSMAL ATRIAL FIBRILLATION: ICD-10-CM

## 2022-07-18 LAB
ANION GAP SERPL CALC-SCNC: 7 MMOL/L (ref 8–16)
BUN SERPL-MCNC: 9 MG/DL (ref 8–23)
CALCIUM SERPL-MCNC: 8.6 MG/DL (ref 8.7–10.5)
CHLORIDE SERPL-SCNC: 103 MMOL/L (ref 95–110)
CO2 SERPL-SCNC: 25 MMOL/L (ref 23–29)
CREAT SERPL-MCNC: 0.9 MG/DL (ref 0.5–1.4)
ERYTHROCYTE [DISTWIDTH] IN BLOOD BY AUTOMATED COUNT: 12.9 % (ref 11.5–14.5)
ERYTHROCYTE [DISTWIDTH] IN BLOOD BY AUTOMATED COUNT: 13.1 % (ref 11.5–14.5)
EST. GFR  (AFRICAN AMERICAN): >60 ML/MIN/1.73 M^2
EST. GFR  (NON AFRICAN AMERICAN): >60 ML/MIN/1.73 M^2
ESTIMATED AVG GLUCOSE: 134 MG/DL (ref 68–131)
GLUCOSE SERPL-MCNC: 142 MG/DL (ref 70–110)
HBA1C MFR BLD: 6.3 % (ref 4–5.6)
HCT VFR BLD AUTO: 32.7 % (ref 40–54)
HCT VFR BLD AUTO: 33.7 % (ref 40–54)
HCT VFR BLD AUTO: 34.7 % (ref 40–54)
HGB BLD-MCNC: 10.3 G/DL (ref 14–18)
HGB BLD-MCNC: 10.8 G/DL (ref 14–18)
HGB BLD-MCNC: 11.2 G/DL (ref 14–18)
INR PPP: 1.3 (ref 0.8–1.2)
INR PPP: 1.4 (ref 2–3)
MAGNESIUM SERPL-MCNC: 1.8 MG/DL (ref 1.6–2.6)
MCH RBC QN AUTO: 29.3 PG (ref 27–31)
MCH RBC QN AUTO: 29.8 PG (ref 27–31)
MCHC RBC AUTO-ENTMCNC: 31.5 G/DL (ref 32–36)
MCHC RBC AUTO-ENTMCNC: 32 G/DL (ref 32–36)
MCV RBC AUTO: 93 FL (ref 82–98)
MCV RBC AUTO: 93 FL (ref 82–98)
PHOSPHATE SERPL-MCNC: 2.4 MG/DL (ref 2.7–4.5)
PLATELET # BLD AUTO: 294 K/UL (ref 150–450)
PLATELET # BLD AUTO: 295 K/UL (ref 150–450)
PMV BLD AUTO: 9.2 FL (ref 9.2–12.9)
PMV BLD AUTO: 9.3 FL (ref 9.2–12.9)
POCT GLUCOSE: 132 MG/DL (ref 70–110)
POCT GLUCOSE: 144 MG/DL (ref 70–110)
POTASSIUM SERPL-SCNC: 4.4 MMOL/L (ref 3.5–5.1)
PROTHROMBIN TIME: 14.2 SEC (ref 9–12.5)
RBC # BLD AUTO: 3.51 M/UL (ref 4.6–6.2)
RBC # BLD AUTO: 3.63 M/UL (ref 4.6–6.2)
SODIUM SERPL-SCNC: 135 MMOL/L (ref 136–145)
WBC # BLD AUTO: 5.62 K/UL (ref 3.9–12.7)
WBC # BLD AUTO: 5.83 K/UL (ref 3.9–12.7)

## 2022-07-18 PROCEDURE — 25000003 PHARM REV CODE 250: Performed by: NURSE PRACTITIONER

## 2022-07-18 PROCEDURE — 96361 HYDRATE IV INFUSION ADD-ON: CPT

## 2022-07-18 PROCEDURE — 93793 PR ANTICOAGULANT MGMT FOR PT TAKING WARFARIN: ICD-10-PCS | Mod: S$GLB,,,

## 2022-07-18 PROCEDURE — 83735 ASSAY OF MAGNESIUM: CPT | Performed by: NURSE PRACTITIONER

## 2022-07-18 PROCEDURE — 85610 POCT INR: ICD-10-PCS | Mod: QW,S$GLB,, | Performed by: INTERNAL MEDICINE

## 2022-07-18 PROCEDURE — 85610 PROTHROMBIN TIME: CPT | Mod: QW,S$GLB,, | Performed by: INTERNAL MEDICINE

## 2022-07-18 PROCEDURE — 85018 HEMOGLOBIN: CPT | Mod: 59 | Performed by: NURSE PRACTITIONER

## 2022-07-18 PROCEDURE — 99900035 HC TECH TIME PER 15 MIN (STAT)

## 2022-07-18 PROCEDURE — 85014 HEMATOCRIT: CPT | Performed by: NURSE PRACTITIONER

## 2022-07-18 PROCEDURE — 80048 BASIC METABOLIC PNL TOTAL CA: CPT | Performed by: NURSE PRACTITIONER

## 2022-07-18 PROCEDURE — 93793 ANTICOAG MGMT PT WARFARIN: CPT | Mod: S$GLB,,,

## 2022-07-18 PROCEDURE — 85027 COMPLETE CBC AUTOMATED: CPT | Performed by: NURSE PRACTITIONER

## 2022-07-18 PROCEDURE — 84100 ASSAY OF PHOSPHORUS: CPT | Performed by: NURSE PRACTITIONER

## 2022-07-18 PROCEDURE — 94761 N-INVAS EAR/PLS OXIMETRY MLT: CPT

## 2022-07-18 PROCEDURE — 27000221 HC OXYGEN, UP TO 24 HOURS

## 2022-07-18 PROCEDURE — 36415 COLL VENOUS BLD VENIPUNCTURE: CPT | Performed by: NURSE PRACTITIONER

## 2022-07-18 PROCEDURE — 85610 PROTHROMBIN TIME: CPT | Performed by: NURSE PRACTITIONER

## 2022-07-18 PROCEDURE — G0378 HOSPITAL OBSERVATION PER HR: HCPCS

## 2022-07-18 RX ORDER — WARFARIN SODIUM 5 MG/1
5 TABLET ORAL DAILY
Qty: 30 TABLET | Refills: 11 | Status: SHIPPED | OUTPATIENT
Start: 2022-07-18 | End: 2022-09-20

## 2022-07-18 RX ADMIN — LOSARTAN POTASSIUM 50 MG: 50 TABLET, FILM COATED ORAL at 09:07

## 2022-07-18 RX ADMIN — AMLODIPINE BESYLATE 5 MG: 5 TABLET ORAL at 09:07

## 2022-07-18 RX ADMIN — ALLOPURINOL 100 MG: 100 TABLET ORAL at 09:07

## 2022-07-18 RX ADMIN — ISOSORBIDE MONONITRATE 30 MG: 30 TABLET, EXTENDED RELEASE ORAL at 09:07

## 2022-07-18 RX ADMIN — ATORVASTATIN CALCIUM 40 MG: 40 TABLET, FILM COATED ORAL at 09:07

## 2022-07-18 NOTE — PROGRESS NOTES
ER follow up from today - rectal sheath hematoma.  INR is subtherapeutic at 1.4.  Patient reports he has been taking his warfarin as prescribed - 2.5 mg every Sunday, Tuesday, Thursday; and 5 mg on all other days.  Patient has not taken his warfarin today.  Sent to PharmD for review/dosing.

## 2022-07-18 NOTE — PLAN OF CARE
Problem: Adult Inpatient Plan of Care  Goal: Absence of Hospital-Acquired Illness or Injury  Outcome: Ongoing, Progressing     Problem: Adult Inpatient Plan of Care  Goal: Optimal Comfort and Wellbeing  Outcome: Ongoing, Progressing     Problem: Adult Inpatient Plan of Care  Goal: Readiness for Transition of Care  Outcome: Ongoing, Progressing     Problem: Diabetes Comorbidity  Goal: Blood Glucose Level Within Targeted Range  Outcome: Ongoing, Progressing     Problem: Fall Injury Risk  Goal: Absence of Fall and Fall-Related Injury  Outcome: Ongoing, Progressing

## 2022-07-18 NOTE — PROGRESS NOTES
INR not at goal. Medications, chart, and patient findings reviewed. See calendar for adjustments to dose and follow up plan.  We will not resume warfarin today per inpatient team -  Janet Willett NP.  He will resume dose tomorrow with NO boost.  Repeat on Friday for close follow up.

## 2022-07-18 NOTE — HOSPITAL COURSE
82 y/o male with PMHx of HTN, HLD, CAD, A fib, long term anticoagulation use, DM, major depression, hx GI bleed, morbid obesity, iron deficiency anemia and PSHx of hernia repair, appendectomy and LHC who presented to the ED with c/o abd pain that onset gradually early this morning.     CT abd showed rectus sheath hematoma. Patient reports being on Coumadin for afib for years and is diligent about following up with the Coumadin clinic. Serial H/H stable. INR 1.4>1.3. Per chart review 3 weeks ago his INR was 1.9. Coumadin clinic to appropriately advise on amount and dosage. CM consulted to assist in obtaining Coumadin clinic apt ASAP. POC explained to patient and his wife in detail and all questions answered. Patient reports improvement in abd bruising and resolution of abd pain. In stable condition at time of discharge and patient agreeable with plan. Instructed to follow up with PCP for post hospital visit stay check up. Referred to NP at home program as well.

## 2022-07-18 NOTE — DISCHARGE SUMMARY
[facility]  Discharge Summary      Patient Name: Anthony Biggs Jr.  MRN: [id number]  Patient Class: OP- Observation  Admission Date: 7/17/2022  Hospital Length of Stay: 0 days  Discharge Date and Time: No discharge date for patient encounter.  Attending Physician: Huseyin Ren MD   Discharging Provider: Janet Weems NP  Primary Care Provider: Giuliano Moran MD      HPI:   82 y/o male with PMHx of HTN, HLD, CAD, A fib, long term anticoagulation use, DM, major depression, hx GI bleed, morbid obesity, iron deficiency anemia and PSHx of hernia repair, appendectomy and LHC who presented to the ED with c/o abd pain that onset gradually early this morning. Pain is described as  Sx are constant and moderate in severity. No mitigating or exacerbating factors reported. Associated sx include abd bruising. Pt denies fever, chills, dizziness, CP, SOB, N/V/D, dysuria, genital pain, scrotal edema, back pain, and all other sx at this time.  ED workup shows: H/H 11.4/34.5, PT 14.7, INR 1.4, CT abd/pelvis shows 1.  5.0 x 4.5 x 1.3 cm intermediate density collection within the right rectus abdominus muscle most consistent with a rectus sheath hematoma.  There is induration of the right lateral abdominal wall soft tissues.   He is a full code and his SDM is his wife, Marino.  He will be kept on OBS for Rectus sheath hematoma under the care of hospital medicine.      * No surgery found *      Hospital Course:    82 y/o male with PMHx of HTN, HLD, CAD, A fib, long term anticoagulation use, DM, major depression, hx GI bleed, morbid obesity, iron deficiency anemia and PSHx of hernia repair, appendectomy and LHC who presented to the ED with c/o abd pain that onset gradually early this morning.     CT abd showed rectus sheath hematoma. Patient reports being on Coumadin for afib for years and is diligent about following up with the Coumadin clinic. Serial H/H stable. INR 1.4>1.3. Per chart review 3 weeks ago his INR was  1.9. Coumadin clinic to appropriately advise on amount and dosage. CM consulted to assist in obtaining Coumadin clinic apt ASAP. POC explained to patient and his wife in detail and all questions answered. Patient reports improvement in abd bruising and resolution of abd pain. In stable condition at time of discharge and patient agreeable with plan. Instructed to follow up with PCP for post hospital visit stay check up. Referred to NP at home program as well.          Goals of Care Treatment Preferences:  Code Status: Full Code      Consults:     Morbid (severe) obesity with alveolar hypoventilation  --has SHORTY, refuses to wear CPaP  --counseled on need to lose weight and wer cPAP        Final Active Diagnoses:    Diagnosis Date Noted POA    PRINCIPAL PROBLEM:  Hematoma of rectus sheath [S30.1XXA] 07/17/2022 Yes    Morbid (severe) obesity with alveolar hypoventilation [E66.2] 07/17/2022 Yes    Hyperlipidemia associated with type 2 diabetes mellitus [E11.69, E78.5]  Yes    Hypertension associated with diabetes [E11.59, I15.2]  Yes    SHORTY (obstructive sleep apnea) [G47.33]  Yes    Current use of long term anticoagulation [Z79.01] 08/07/2017 Not Applicable    Atrial fibrillation [I48.91]  Yes      Problems Resolved During this Admission:    Diagnosis Date Noted Date Resolved POA    Major depressive disorder, recurrent, moderate [F33.1] 04/12/2021 07/17/2022 Yes       Discharged Condition: stable    Disposition: Home or Self Care    Follow Up:   Follow-up Information     Giuliano Moran MD. Go to.    Specialty: Internal Medicine  Why: For post hospital stay check up  Contact information:  [address]   [address]  [facility] [id number] [phone]                       Patient Instructions:      Ambulatory referral/consult to [facility]   Standing Status: Future   Referral Priority: Routine Referral Type: Consultation   Referral Reason: Specialty Services Required   Number of Visits Requested: 1      Diet diabetic     Diet Cardiac     Notify your health care provider if you experience any of the following:  temperature >100.4     Notify your health care provider if you experience any of the following:  redness, tenderness, or signs of infection (pain, swelling, redness, odor or green/yellow discharge around incision site)     Activity as tolerated       Significant Diagnostic Studies:   Results for orders placed or performed during the hospital encounter of 07/17/22   CBC auto differential   Result Value Ref Range    WBC 6.95 3.90 - 12.70 K/uL    RBC 3.76 (L) 4.60 - 6.20 M/uL    Hemoglobin 11.4 (L) 14.0 - 18.0 g/dL    Hematocrit 34.5 (L) 40.0 - 54.0 %    MCV 92 82 - 98 fL    MCH 30.3 27.0 - 31.0 pg    MCHC 33.0 32.0 - 36.0 g/dL    RDW 13.0 11.5 - 14.5 %    Platelets 337 150 - 450 K/uL    MPV 9.0 (L) 9.2 - 12.9 fL    Immature Granulocytes 0.3 0.0 - 0.5 %    Gran # (ANC) 4.2 1.8 - 7.7 K/uL    Immature Grans (Abs) 0.02 0.00 - 0.04 K/uL    Lymph # 1.8 1.0 - 4.8 K/uL    Mono # 0.7 0.3 - 1.0 K/uL    Eos # 0.2 0.0 - 0.5 K/uL    Baso # 0.02 0.00 - 0.20 K/uL    nRBC 0 0 /100 WBC    Gran % 60.9 38.0 - 73.0 %    Lymph % 26.0 18.0 - 48.0 %    Mono % 10.2 4.0 - 15.0 %    Eosinophil % 2.3 0.0 - 8.0 %    Basophil % 0.3 0.0 - 1.9 %    Differential Method Automated    Comprehensive metabolic panel   Result Value Ref Range    Sodium 136 136 - 145 mmol/L    Potassium 4.5 3.5 - 5.1 mmol/L    Chloride 101 95 - 110 mmol/L    CO2 27 23 - 29 mmol/L    Glucose 108 70 - 110 mg/dL    BUN 11 8 - 23 mg/dL    Creatinine 0.8 0.5 - 1.4 mg/dL    Calcium 9.8 8.7 - 10.5 mg/dL    Total Protein 7.1 6.0 - 8.4 g/dL    Albumin 4.0 3.5 - 5.2 g/dL    Total Bilirubin 1.0 0.1 - 1.0 mg/dL    Alkaline Phosphatase 74 55 - 135 U/L    AST 27 10 - 40 U/L    ALT 33 10 - 44 U/L    Anion Gap 8 8 - 16 mmol/L    eGFR if African American >60 >60 mL/min/1.73 m^2    eGFR if non African American >60 >60 mL/min/1.73 m^2   Lipase   Result Value Ref Range    Lipase 35  4 - 60 U/L   Protime-INR   Result Value Ref Range    Prothrombin Time 14.7 (H) 9.0 - 12.5 sec    INR 1.4 (H) 0.8 - 1.2   APTT   Result Value Ref Range    aPTT 29.1 21.0 - 32.0 sec   Urinalysis, Reflex to Urine Culture Urine, Clean Catch    Specimen: Urine   Result Value Ref Range    Specimen UA Urine, Clean Catch     Color, UA Yellow Yellow, Straw, [place]    Appearance, UA Clear Clear    pH, UA 7.0 5.0 - 8.0    Specific Gravity, UA >1.030 (A) 1.005 - 1.030    Protein, UA Negative Negative    Glucose, UA Negative Negative    Ketones, UA Negative Negative    Bilirubin (UA) Negative Negative    Occult Blood UA Negative Negative    Nitrite, UA Negative Negative    Urobilinogen, UA Negative <2.0 EU/dL    Leukocytes, UA Negative Negative   Lactic acid, plasma   Result Value Ref Range    Lactate (Lactic Acid) 1.0 0.5 - 2.2 mmol/L   CBC Without Differential   Result Value Ref Range    WBC 7.38 3.90 - 12.70 K/uL    RBC 4.16 (L) 4.60 - 6.20 M/uL    Hemoglobin 12.5 (L) 14.0 - 18.0 g/dL    Hematocrit 38.5 (L) 40.0 - 54.0 %    MCV 93 82 - 98 fL    MCH 30.0 27.0 - 31.0 pg    MCHC 32.5 32.0 - 36.0 g/dL    RDW 12.9 11.5 - 14.5 %    Platelets 366 150 - 450 K/uL    MPV 9.6 9.2 - 12.9 fL   Hemoglobin A1c if not done in past 3 months   Result Value Ref Range    Hemoglobin A1C 6.3 (H) 4.0 - 5.6 %    Estimated Avg Glucose 134 (H) 68 - 131 mg/dL   CBC Without Differential   Result Value Ref Range    WBC 5.83 3.90 - 12.70 K/uL    RBC 3.51 (L) 4.60 - 6.20 M/uL    Hemoglobin 10.3 (L) 14.0 - 18.0 g/dL    Hematocrit 32.7 (L) 40.0 - 54.0 %    MCV 93 82 - 98 fL    MCH 29.3 27.0 - 31.0 pg    MCHC 31.5 (L) 32.0 - 36.0 g/dL    RDW 12.9 11.5 - 14.5 %    Platelets 295 150 - 450 K/uL    MPV 9.3 9.2 - 12.9 fL   Magnesium   Result Value Ref Range    Magnesium 1.8 1.6 - 2.6 mg/dL   Phosphorus   Result Value Ref Range    Phosphorus 2.4 (L) 2.7 - 4.5 mg/dL   Basic Metabolic Panel (BMP)   Result Value Ref Range    Sodium 135 (L) 136 - 145 mmol/L    Potassium  4.4 3.5 - 5.1 mmol/L    Chloride 103 95 - 110 mmol/L    CO2 25 23 - 29 mmol/L    Glucose 142 (H) 70 - 110 mg/dL    BUN 9 8 - 23 mg/dL    Creatinine 0.9 0.5 - 1.4 mg/dL    Calcium 8.6 (L) 8.7 - 10.5 mg/dL    Anion Gap 7 (L) 8 - 16 mmol/L    eGFR if African American >60 >60 mL/min/1.73 m^2    eGFR if non African American >60 >60 mL/min/1.73 m^2   Protime-INR   Result Value Ref Range    Prothrombin Time 14.2 (H) 9.0 - 12.5 sec    INR 1.3 (H) 0.8 - 1.2   CBC Without Differential   Result Value Ref Range    WBC 5.62 3.90 - 12.70 K/uL    RBC 3.63 (L) 4.60 - 6.20 M/uL    Hemoglobin 10.8 (L) 14.0 - 18.0 g/dL    Hematocrit 33.7 (L) 40.0 - 54.0 %    MCV 93 82 - 98 fL    MCH 29.8 27.0 - 31.0 pg    MCHC 32.0 32.0 - 36.0 g/dL    RDW 13.1 11.5 - 14.5 %    Platelets 294 150 - 450 K/uL    MPV 9.2 9.2 - 12.9 fL   Hemoglobin   Result Value Ref Range    Hemoglobin 11.2 (L) 14.0 - 18.0 g/dL   Hematocrit   Result Value Ref Range    Hematocrit 34.7 (L) 40.0 - 54.0 %   POCT COVID-19 Rapid Screening   Result Value Ref Range    POC Rapid COVID Negative Negative     Acceptable Yes    POCT glucose   Result Value Ref Range    POCT Glucose 107 70 - 110 mg/dL   POCT glucose   Result Value Ref Range    POCT Glucose 132 (H) 70 - 110 mg/dL   POCT glucose   Result Value Ref Range    POCT Glucose 144 (H) 70 - 110 mg/dL         Pending Diagnostic Studies:     Procedure Component Value Units Date/Time    CBC Without Differential [[id number]]     Order Status: Sent Lab Status: No result     Specimen: Blood          Medications:  Reconciled Home Medications:      Medication List      CHANGE how you take these medications    warfarin 5 MG tablet  Commonly known as: COUMADIN  Take 1 tablet (5 mg total) by mouth Daily. OR as directed by coumadin clinic  What changed: additional instructions        CONTINUE taking these medications    allopurinoL 100 MG tablet  Commonly known as: ZYLOPRIM  Take 1 tablet (100 mg total) by mouth once daily.      amLODIPine 5 MG tablet  Commonly known as: NORVASC  TAKE 1 TABLET (5 MG TOTAL) BY MOUTH ONCE DAILY.     aspirin 81 MG EC tablet  Commonly known as: ECOTRIN  Take 81 mg by mouth once daily.     atorvastatin 40 MG tablet  Commonly known as: LIPITOR  Take 1 tablet (40 mg total) by mouth once daily.     ferrous sulfate 324 mg (65 mg iron) Tbec  TAKE ONE TABLET BY MOUTH TWICE DAILY     isosorbide mononitrate 30 MG 24 hr tablet  Commonly known as: IMDUR  Take 1 tablet (30 mg total) by mouth once daily.     losartan 50 MG tablet  Commonly known as: COZAAR  TAKE ONE TABLET BY MOUTH TWICE DAILY     melatonin 3 mg tablet  Commonly known as: MELATIN  Take 1 tablet by mouth nightly.     metFORMIN 500 MG tablet  Commonly known as: GLUCOPHAGE  TAKE 1 TABLET (500 MG TOTAL) BY MOUTH 2 (TWO) TIMES DAILY WITH MEALS.        STOP taking these medications    torsemide 10 MG Tab  Commonly known as: DEMADEX            Indwelling Lines/Drains at time of discharge:   Lines/Drains/Airways     None                 Time spent on the discharge of patient: 40 minutes         Janet Weems NP  Department of Hospital Medicine  [facility]

## 2022-07-18 NOTE — PLAN OF CARE
Pt condition adequate for discharge at this time. Reviewed discharge paperwork with patient. IV removed. Telemetry monitor removed. Bedside rx given to pt. Pt to go home with family. Awaiting set up with coumadin clinic.

## 2022-07-18 NOTE — PLAN OF CARE
[facility]  Initial Discharge Assessment       Primary Care Provider: Giuliano Moran MD    Admission Diagnosis: Anticoagulated on Coumadin [Z79.01]  Hematoma of rectus sheath, initial encounter [S30.1XXA]    Admission Date: 7/17/2022  Expected Discharge Date:     Discharge Barriers Identified: None    Payor: [employer] MANAGED MEDICARE / Plan: [employer] MEDICARE HMO / Product Type: Capitation /     Extended Emergency Contact Information  Primary Emergency Contact: Marino Biggs  Address: [address] [place] [address] [place] [address] [facility]  Home Phone: [phone]  Mobile Phone: [phone]  Relation: Spouse    Discharge Plan A: Home  Discharge Plan B: Home      [id number] [place] - [address]  [address] [facility]  [place] [address]  Phone: [phone] Fax: [phone]    [facility] [address] [place] [address]  Phone: [phone] Fax: [phone]      Initial Assessment (most recent)     Adult Discharge Assessment - 07/18/22 1110        Discharge Assessment    Assessment Type Discharge Planning Assessment     Confirmed/corrected address, phone number and insurance Yes     Confirmed Demographics Correct on Facesheet     Source of Information patient     Does patient/caregiver understand observation status Yes     Communicated RACHNA with patient/caregiver Date not available/Unable to determine     Reason For Admission hematoma of rectus sheath     Lives With spouse     Facility Arrived From: home     Do you expect to return to your current living situation? Yes     Do you have help at home or someone to help you manage your care at home? No     Prior to hospitilization cognitive status: Alert/Oriented     Current cognitive status: Alert/Oriented     Walking or Climbing Stairs Difficulty none     Dressing/Bathing Difficulty none     Equipment Currently Used at Home none     Readmission within 30 days? No     Patient currently being  followed by outpatient case management? No     Do you currently have service(s) that help you manage your care at home? No     Do you take prescription medications? Yes     Do you have prescription coverage? Yes     Coverage [employer]     Do you have any problems affording any of your prescribed medications? No     Is the patient taking medications as prescribed? yes     Who is going to help you get home at discharge? spouse     How do you get to doctors appointments? car, drives self;family or friend will provide     Are you on dialysis? No     Do you take coumadin? Yes     Who monitors your labs? [facility]     Discharge Plan A Home     Discharge Plan B Home     DME Needed Upon Discharge  none     Discharge Plan discussed with: Patient     Discharge Barriers Identified None               Initial assessment completed with patient. Patient reports independence with ADL's  prior to hospitalization. Patient uses no DME at home. Patient currently has no cm needs upon DC. CM will continue following to assist with other needs.   CM provided a transitional care folder, information on advanced directives, information on pharmacy bedside delivery, and discharge planning begins on admission with contact information for any needs/questions.

## 2022-07-18 NOTE — PLAN OF CARE
[facility]  Discharge Final Note    Primary Care Provider: Giuliano Moran MD    Expected Discharge Date: 7/18/2022    Final Discharge Note (most recent)     Final Note - 07/18/22 1423        Final Note    Assessment Type Final Discharge Note     Anticipated Discharge Disposition Home or Self Care     Hospital Resources/Appts/Education Provided Provided patient/caregiver with written discharge plan information;Appointments scheduled and added to AVS        Post-Acute Status    Discharge Delays None known at this time                 Important Message from Medicare             Contact Info     Giuliano Moran MD   Specialty: Internal Medicine   Relationship: PCP - General    [address]   [address]  [facility] [address]   Phone: [phone]       Next Steps: Go to    Instructions: For post hospital stay check up

## 2022-07-20 ENCOUNTER — TELEPHONE (OUTPATIENT)
Dept: URGENT CARE | Facility: CLINIC | Age: 82
End: 2022-07-20
Payer: MEDICARE

## 2022-07-22 ENCOUNTER — ANTI-COAG VISIT (OUTPATIENT)
Dept: CARDIOLOGY | Facility: CLINIC | Age: 82
End: 2022-07-22
Payer: MEDICARE

## 2022-07-22 DIAGNOSIS — I48.0 PAROXYSMAL ATRIAL FIBRILLATION: ICD-10-CM

## 2022-07-22 DIAGNOSIS — Z79.01 LONG TERM (CURRENT) USE OF ANTICOAGULANTS: Primary | ICD-10-CM

## 2022-07-22 LAB — INR PPP: 1.3 (ref 2–3)

## 2022-07-22 PROCEDURE — 85610 POCT INR: ICD-10-PCS | Mod: QW,S$GLB,, | Performed by: INTERNAL MEDICINE

## 2022-07-22 PROCEDURE — 93793 PR ANTICOAGULANT MGMT FOR PT TAKING WARFARIN: ICD-10-PCS | Mod: S$GLB,,,

## 2022-07-22 PROCEDURE — 85610 PROTHROMBIN TIME: CPT | Mod: QW,S$GLB,, | Performed by: INTERNAL MEDICINE

## 2022-07-22 PROCEDURE — 93793 ANTICOAG MGMT PT WARFARIN: CPT | Mod: S$GLB,,,

## 2022-07-22 NOTE — PROGRESS NOTES
INR not at goal. Medications, chart, and patient findings reviewed. See calendar for adjustments to dose and follow up plan.  Appetite is decreased and still having upper GI pain.  We will not boost today since he has only had 3 doses of warfarin (expalins low INR at this point) and I am concerned about lack of food intake.  He also has a recent Hx of GIB.  Repeat INR next week.  ER with any s/s of stroke or clotting.

## 2022-07-22 NOTE — PROGRESS NOTES
"INR remains below goal at 1.3 - subtherapeutic.  Patient reports no missed doses.  Previous warfarin instructions were verified.  Reports "appetite has not be great".  No s/s reported.  Sent to PharmD for further dosing.    "

## 2022-07-25 ENCOUNTER — OFFICE VISIT (OUTPATIENT)
Dept: INTERNAL MEDICINE | Facility: CLINIC | Age: 82
End: 2022-07-25
Payer: MEDICARE

## 2022-07-25 VITALS
OXYGEN SATURATION: 98 % | SYSTOLIC BLOOD PRESSURE: 118 MMHG | HEART RATE: 104 BPM | BODY MASS INDEX: 33.46 KG/M2 | HEIGHT: 67 IN | DIASTOLIC BLOOD PRESSURE: 82 MMHG | WEIGHT: 213.19 LBS

## 2022-07-25 DIAGNOSIS — S30.1XXD ABDOMINAL WALL HEMATOMA, SUBSEQUENT ENCOUNTER: Primary | ICD-10-CM

## 2022-07-25 PROCEDURE — 1125F PR PAIN SEVERITY QUANTIFIED, PAIN PRESENT: ICD-10-PCS | Mod: CPTII,S$GLB,, | Performed by: INTERNAL MEDICINE

## 2022-07-25 PROCEDURE — 3072F LOW RISK FOR RETINOPATHY: CPT | Mod: CPTII,S$GLB,, | Performed by: INTERNAL MEDICINE

## 2022-07-25 PROCEDURE — 1159F MED LIST DOCD IN RCRD: CPT | Mod: CPTII,S$GLB,, | Performed by: INTERNAL MEDICINE

## 2022-07-25 PROCEDURE — 3079F PR MOST RECENT DIASTOLIC BLOOD PRESSURE 80-89 MM HG: ICD-10-PCS | Mod: CPTII,S$GLB,, | Performed by: INTERNAL MEDICINE

## 2022-07-25 PROCEDURE — 3288F PR FALLS RISK ASSESSMENT DOCUMENTED: ICD-10-PCS | Mod: CPTII,S$GLB,, | Performed by: INTERNAL MEDICINE

## 2022-07-25 PROCEDURE — 3288F FALL RISK ASSESSMENT DOCD: CPT | Mod: CPTII,S$GLB,, | Performed by: INTERNAL MEDICINE

## 2022-07-25 PROCEDURE — 1125F AMNT PAIN NOTED PAIN PRSNT: CPT | Mod: CPTII,S$GLB,, | Performed by: INTERNAL MEDICINE

## 2022-07-25 PROCEDURE — 99999 PR PBB SHADOW E&M-EST. PATIENT-LVL IV: ICD-10-PCS | Mod: PBBFAC,,, | Performed by: INTERNAL MEDICINE

## 2022-07-25 PROCEDURE — 1160F RVW MEDS BY RX/DR IN RCRD: CPT | Mod: CPTII,S$GLB,, | Performed by: INTERNAL MEDICINE

## 2022-07-25 PROCEDURE — 99213 OFFICE O/P EST LOW 20 MIN: CPT | Mod: S$GLB,,, | Performed by: INTERNAL MEDICINE

## 2022-07-25 PROCEDURE — 3079F DIAST BP 80-89 MM HG: CPT | Mod: CPTII,S$GLB,, | Performed by: INTERNAL MEDICINE

## 2022-07-25 PROCEDURE — 3074F PR MOST RECENT SYSTOLIC BLOOD PRESSURE < 130 MM HG: ICD-10-PCS | Mod: CPTII,S$GLB,, | Performed by: INTERNAL MEDICINE

## 2022-07-25 PROCEDURE — 99999 PR PBB SHADOW E&M-EST. PATIENT-LVL IV: CPT | Mod: PBBFAC,,, | Performed by: INTERNAL MEDICINE

## 2022-07-25 PROCEDURE — 3072F PR LOW RISK FOR RETINOPATHY: ICD-10-PCS | Mod: CPTII,S$GLB,, | Performed by: INTERNAL MEDICINE

## 2022-07-25 PROCEDURE — 3074F SYST BP LT 130 MM HG: CPT | Mod: CPTII,S$GLB,, | Performed by: INTERNAL MEDICINE

## 2022-07-25 PROCEDURE — 99213 PR OFFICE/OUTPT VISIT, EST, LEVL III, 20-29 MIN: ICD-10-PCS | Mod: S$GLB,,, | Performed by: INTERNAL MEDICINE

## 2022-07-25 PROCEDURE — 1160F PR REVIEW ALL MEDS BY PRESCRIBER/CLIN PHARMACIST DOCUMENTED: ICD-10-PCS | Mod: CPTII,S$GLB,, | Performed by: INTERNAL MEDICINE

## 2022-07-25 PROCEDURE — 1159F PR MEDICATION LIST DOCUMENTED IN MEDICAL RECORD: ICD-10-PCS | Mod: CPTII,S$GLB,, | Performed by: INTERNAL MEDICINE

## 2022-07-25 PROCEDURE — 1101F PR PT FALLS ASSESS DOC 0-1 FALLS W/OUT INJ PAST YR: ICD-10-PCS | Mod: CPTII,S$GLB,, | Performed by: INTERNAL MEDICINE

## 2022-07-25 PROCEDURE — 1101F PT FALLS ASSESS-DOCD LE1/YR: CPT | Mod: CPTII,S$GLB,, | Performed by: INTERNAL MEDICINE

## 2022-07-25 NOTE — PROGRESS NOTES
"HPI:  Patient is 81-year-old man who comes in today for hospital follow-up.  I have reviewed all the hospital records.  Patient was admitted hospital after a spontaneous hematoma developed his abdominal wall.  Patient has been on Coumadin for quite some time for atrial fibrillation.  He was monitored for 24 hours and released.  His INR on Friday, 3 days ago, was 1.3.  He had only been back on Coumadin for 3 days.  He is followed by the Coumadin Clinic.  He will be have another INR in about 3 days from now    Current meds have been verified and updated per the EMR  Exam:/82 (BP Location: Left arm)   Pulse 104   Ht 5' 7" (1.702 m)   Wt 96.7 kg (213 lb 3 oz)   SpO2 98%   BMI 33.39 kg/m²   Abdomen soft benign.  No rebound or guarding or distention.  He has a large ecchymosis of his lower abdominal wall from left side all way to the right side.    Lab Results   Component Value Date    WBC 5.62 07/18/2022    HGB 11.2 (L) 07/18/2022    HCT 34.7 (L) 07/18/2022     07/18/2022    CHOL 139 04/05/2022    TRIG 63 04/05/2022    HDL 57 04/05/2022    ALT 33 07/17/2022    AST 27 07/17/2022     (L) 07/18/2022    K 4.4 07/18/2022     07/18/2022    CREATININE 0.9 07/18/2022    BUN 9 07/18/2022    CO2 25 07/18/2022    TSH 1.113 04/05/2022    PSA <0.010 08/20/2013    INR 1.3 (A) 07/22/2022    HGBA1C 6.3 (H) 07/17/2022       Impression:  Abdominal wall hematoma with ecchymosis  Patient Active Problem List   Diagnosis    Atrial fibrillation    Coronary atherosclerosis    History of prostate cancer    Refractive error    Family history of macular degeneration    Posterior capsular opacification    Pseudophakia of both eyes    History of colon polyps    Diverticulosis of large intestine without hemorrhage    Current use of long term anticoagulation    Pulmonary hypertension, mild    SHORTY (obstructive sleep apnea)    Diabetes mellitus without complication    Hyperlipidemia associated with type 2 " diabetes mellitus    Hypertension associated with diabetes    Intention tremor    Atherosclerosis of aorta    History of major depression;Moderate episode of recurrent major depressive disorder    Adrenal nodule    Lung nodules    Obesity (BMI 30.0-34.9)    Angiodysplasia of small intestine    Chronic upper GI bleeding    LAILA (iron deficiency anemia)    Hematoma of rectus sheath    Morbid (severe) obesity with alveolar hypoventilation       Plan:     Patient was told will take 2-3 weeks for the blood to be reabsorbed.  Nothing needs to be done.  He needs to continue to follow-up with Coumadin Clinic regarding his Coumadin.    This note is generated with speech recognition software and is subject to transcription error and sound alike phrases that may be missed by proofreading.

## 2022-07-28 ENCOUNTER — LAB VISIT (OUTPATIENT)
Dept: LAB | Facility: HOSPITAL | Age: 82
End: 2022-07-28
Attending: INTERNAL MEDICINE
Payer: MEDICARE

## 2022-07-28 ENCOUNTER — ANTI-COAG VISIT (OUTPATIENT)
Dept: CARDIOLOGY | Facility: CLINIC | Age: 82
End: 2022-07-28
Payer: MEDICARE

## 2022-07-28 DIAGNOSIS — I48.0 PAROXYSMAL ATRIAL FIBRILLATION: ICD-10-CM

## 2022-07-28 DIAGNOSIS — D64.9 ANEMIA, UNSPECIFIED TYPE: ICD-10-CM

## 2022-07-28 DIAGNOSIS — K55.20 ANGIODYSPLASIA OF SMALL INTESTINE: ICD-10-CM

## 2022-07-28 DIAGNOSIS — Z79.01 LONG TERM (CURRENT) USE OF ANTICOAGULANTS: Primary | ICD-10-CM

## 2022-07-28 DIAGNOSIS — D50.0 IRON DEFICIENCY ANEMIA DUE TO CHRONIC BLOOD LOSS: ICD-10-CM

## 2022-07-28 LAB
ANION GAP SERPL CALC-SCNC: 10 MMOL/L (ref 8–16)
BASOPHILS # BLD AUTO: 0.03 K/UL (ref 0–0.2)
BASOPHILS NFR BLD: 0.6 % (ref 0–1.9)
BUN SERPL-MCNC: 10 MG/DL (ref 8–23)
CALCIUM SERPL-MCNC: 9.7 MG/DL (ref 8.7–10.5)
CHLORIDE SERPL-SCNC: 101 MMOL/L (ref 95–110)
CO2 SERPL-SCNC: 22 MMOL/L (ref 23–29)
CREAT SERPL-MCNC: 0.8 MG/DL (ref 0.5–1.4)
DIFFERENTIAL METHOD: ABNORMAL
EOSINOPHIL # BLD AUTO: 0.1 K/UL (ref 0–0.5)
EOSINOPHIL NFR BLD: 2.4 % (ref 0–8)
ERYTHROCYTE [DISTWIDTH] IN BLOOD BY AUTOMATED COUNT: 13.5 % (ref 11.5–14.5)
EST. GFR  (AFRICAN AMERICAN): >60 ML/MIN/1.73 M^2
EST. GFR  (NON AFRICAN AMERICAN): >60 ML/MIN/1.73 M^2
FERRITIN SERPL-MCNC: 356 NG/ML (ref 20–300)
GLUCOSE SERPL-MCNC: 103 MG/DL (ref 70–110)
HCT VFR BLD AUTO: 35.7 % (ref 40–54)
HGB BLD-MCNC: 11.8 G/DL (ref 14–18)
IMM GRANULOCYTES # BLD AUTO: 0.03 K/UL (ref 0–0.04)
IMM GRANULOCYTES NFR BLD AUTO: 0.6 % (ref 0–0.5)
INR PPP: 1.2 (ref 2–3)
IRON SERPL-MCNC: 66 UG/DL (ref 45–160)
LYMPHOCYTES # BLD AUTO: 1.8 K/UL (ref 1–4.8)
LYMPHOCYTES NFR BLD: 34.6 % (ref 18–48)
MCH RBC QN AUTO: 29.8 PG (ref 27–31)
MCHC RBC AUTO-ENTMCNC: 33.1 G/DL (ref 32–36)
MCV RBC AUTO: 90 FL (ref 82–98)
MONOCYTES # BLD AUTO: 0.9 K/UL (ref 0.3–1)
MONOCYTES NFR BLD: 16 % (ref 4–15)
NEUTROPHILS # BLD AUTO: 2.4 K/UL (ref 1.8–7.7)
NEUTROPHILS NFR BLD: 45.8 % (ref 38–73)
NRBC BLD-RTO: 0 /100 WBC
PLATELET # BLD AUTO: 301 K/UL (ref 150–450)
PMV BLD AUTO: 9 FL (ref 9.2–12.9)
POTASSIUM SERPL-SCNC: 4.6 MMOL/L (ref 3.5–5.1)
RBC # BLD AUTO: 3.96 M/UL (ref 4.6–6.2)
SATURATED IRON: 19 % (ref 20–50)
SODIUM SERPL-SCNC: 133 MMOL/L (ref 136–145)
TOTAL IRON BINDING CAPACITY: 352 UG/DL (ref 250–450)
TRANSFERRIN SERPL-MCNC: 238 MG/DL (ref 200–375)
WBC # BLD AUTO: 5.32 K/UL (ref 3.9–12.7)

## 2022-07-28 PROCEDURE — 93793 ANTICOAG MGMT PT WARFARIN: CPT | Mod: S$GLB,,,

## 2022-07-28 PROCEDURE — 85025 COMPLETE CBC W/AUTO DIFF WBC: CPT | Performed by: INTERNAL MEDICINE

## 2022-07-28 PROCEDURE — 82728 ASSAY OF FERRITIN: CPT | Performed by: INTERNAL MEDICINE

## 2022-07-28 PROCEDURE — 84466 ASSAY OF TRANSFERRIN: CPT | Performed by: INTERNAL MEDICINE

## 2022-07-28 PROCEDURE — 93793 PR ANTICOAGULANT MGMT FOR PT TAKING WARFARIN: ICD-10-PCS | Mod: S$GLB,,,

## 2022-07-28 PROCEDURE — 85610 PROTHROMBIN TIME: CPT | Mod: QW,S$GLB,, | Performed by: INTERNAL MEDICINE

## 2022-07-28 PROCEDURE — 85610 POCT INR: ICD-10-PCS | Mod: QW,S$GLB,, | Performed by: INTERNAL MEDICINE

## 2022-07-28 PROCEDURE — 80048 BASIC METABOLIC PNL TOTAL CA: CPT | Performed by: INTERNAL MEDICINE

## 2022-07-28 NOTE — PROGRESS NOTES
INR not at goal. Medications, chart, and patient findings reviewed. See calendar for adjustments to dose and follow up plan.  INR is trending down and remains below goal so we will therefore be a little more aggressive with dosing.  Boost today and increase overall dose.  Repeat INR in 1.5 weeks.

## 2022-07-28 NOTE — PROGRESS NOTES
"INR: 1.2 - remains subtherapeutic.  Patient reports appetite has improved.  Remains with upper GI pain, "but has improved".  Previous warfarin instructions were verified and followed.  Patient has not taken his warfarin this morning.  Sent to PharmD for further instructions.    "

## 2022-08-09 ENCOUNTER — ANTI-COAG VISIT (OUTPATIENT)
Dept: CARDIOLOGY | Facility: CLINIC | Age: 82
End: 2022-08-09
Payer: MEDICARE

## 2022-08-09 ENCOUNTER — LAB VISIT (OUTPATIENT)
Dept: LAB | Facility: HOSPITAL | Age: 82
End: 2022-08-09
Attending: NURSE PRACTITIONER
Payer: MEDICARE

## 2022-08-09 ENCOUNTER — OFFICE VISIT (OUTPATIENT)
Dept: HEMATOLOGY/ONCOLOGY | Facility: CLINIC | Age: 82
End: 2022-08-09
Payer: MEDICARE

## 2022-08-09 VITALS
DIASTOLIC BLOOD PRESSURE: 93 MMHG | HEART RATE: 97 BPM | OXYGEN SATURATION: 97 % | TEMPERATURE: 98 F | BODY MASS INDEX: 33.21 KG/M2 | SYSTOLIC BLOOD PRESSURE: 139 MMHG | HEIGHT: 67 IN | WEIGHT: 211.63 LBS

## 2022-08-09 DIAGNOSIS — D50.0 IRON DEFICIENCY ANEMIA DUE TO CHRONIC BLOOD LOSS: ICD-10-CM

## 2022-08-09 DIAGNOSIS — D50.0 IRON DEFICIENCY ANEMIA DUE TO CHRONIC BLOOD LOSS: Primary | ICD-10-CM

## 2022-08-09 DIAGNOSIS — I48.0 PAROXYSMAL ATRIAL FIBRILLATION: ICD-10-CM

## 2022-08-09 DIAGNOSIS — Z79.01 LONG TERM (CURRENT) USE OF ANTICOAGULANTS: Primary | ICD-10-CM

## 2022-08-09 LAB
BASOPHILS # BLD AUTO: 0.02 K/UL (ref 0–0.2)
BASOPHILS NFR BLD: 0.2 % (ref 0–1.9)
DIFFERENTIAL METHOD: ABNORMAL
EOSINOPHIL # BLD AUTO: 0.1 K/UL (ref 0–0.5)
EOSINOPHIL NFR BLD: 1.4 % (ref 0–8)
ERYTHROCYTE [DISTWIDTH] IN BLOOD BY AUTOMATED COUNT: 13.2 % (ref 11.5–14.5)
HCT VFR BLD AUTO: 39.8 % (ref 40–54)
HGB BLD-MCNC: 12.9 G/DL (ref 14–18)
IMM GRANULOCYTES # BLD AUTO: 0.04 K/UL (ref 0–0.04)
IMM GRANULOCYTES NFR BLD AUTO: 0.4 % (ref 0–0.5)
INR PPP: 1.6 (ref 2–3)
LYMPHOCYTES # BLD AUTO: 1.9 K/UL (ref 1–4.8)
LYMPHOCYTES NFR BLD: 21.1 % (ref 18–48)
MCH RBC QN AUTO: 29.6 PG (ref 27–31)
MCHC RBC AUTO-ENTMCNC: 32.4 G/DL (ref 32–36)
MCV RBC AUTO: 91 FL (ref 82–98)
MONOCYTES # BLD AUTO: 1 K/UL (ref 0.3–1)
MONOCYTES NFR BLD: 11.1 % (ref 4–15)
NEUTROPHILS # BLD AUTO: 6 K/UL (ref 1.8–7.7)
NEUTROPHILS NFR BLD: 65.8 % (ref 38–73)
NRBC BLD-RTO: 0 /100 WBC
PLATELET # BLD AUTO: 277 K/UL (ref 150–450)
PMV BLD AUTO: 8.8 FL (ref 9.2–12.9)
RBC # BLD AUTO: 4.36 M/UL (ref 4.6–6.2)
WBC # BLD AUTO: 9.12 K/UL (ref 3.9–12.7)

## 2022-08-09 PROCEDURE — 85025 COMPLETE CBC W/AUTO DIFF WBC: CPT | Performed by: NURSE PRACTITIONER

## 2022-08-09 PROCEDURE — 1101F PR PT FALLS ASSESS DOC 0-1 FALLS W/OUT INJ PAST YR: ICD-10-PCS | Mod: CPTII,S$GLB,, | Performed by: NURSE PRACTITIONER

## 2022-08-09 PROCEDURE — 36415 COLL VENOUS BLD VENIPUNCTURE: CPT | Performed by: NURSE PRACTITIONER

## 2022-08-09 PROCEDURE — 3288F PR FALLS RISK ASSESSMENT DOCUMENTED: ICD-10-PCS | Mod: CPTII,S$GLB,, | Performed by: NURSE PRACTITIONER

## 2022-08-09 PROCEDURE — 1126F PR PAIN SEVERITY QUANTIFIED, NO PAIN PRESENT: ICD-10-PCS | Mod: CPTII,S$GLB,, | Performed by: NURSE PRACTITIONER

## 2022-08-09 PROCEDURE — 3288F FALL RISK ASSESSMENT DOCD: CPT | Mod: CPTII,S$GLB,, | Performed by: NURSE PRACTITIONER

## 2022-08-09 PROCEDURE — 3080F DIAST BP >= 90 MM HG: CPT | Mod: CPTII,S$GLB,, | Performed by: NURSE PRACTITIONER

## 2022-08-09 PROCEDURE — 3075F SYST BP GE 130 - 139MM HG: CPT | Mod: CPTII,S$GLB,, | Performed by: NURSE PRACTITIONER

## 2022-08-09 PROCEDURE — 3080F PR MOST RECENT DIASTOLIC BLOOD PRESSURE >= 90 MM HG: ICD-10-PCS | Mod: CPTII,S$GLB,, | Performed by: NURSE PRACTITIONER

## 2022-08-09 PROCEDURE — 3072F LOW RISK FOR RETINOPATHY: CPT | Mod: CPTII,S$GLB,, | Performed by: NURSE PRACTITIONER

## 2022-08-09 PROCEDURE — 1101F PT FALLS ASSESS-DOCD LE1/YR: CPT | Mod: CPTII,S$GLB,, | Performed by: NURSE PRACTITIONER

## 2022-08-09 PROCEDURE — 1160F RVW MEDS BY RX/DR IN RCRD: CPT | Mod: CPTII,S$GLB,, | Performed by: NURSE PRACTITIONER

## 2022-08-09 PROCEDURE — 1160F PR REVIEW ALL MEDS BY PRESCRIBER/CLIN PHARMACIST DOCUMENTED: ICD-10-PCS | Mod: CPTII,S$GLB,, | Performed by: NURSE PRACTITIONER

## 2022-08-09 PROCEDURE — 99999 PR PBB SHADOW E&M-EST. PATIENT-LVL III: ICD-10-PCS | Mod: PBBFAC,,, | Performed by: NURSE PRACTITIONER

## 2022-08-09 PROCEDURE — 3072F PR LOW RISK FOR RETINOPATHY: ICD-10-PCS | Mod: CPTII,S$GLB,, | Performed by: NURSE PRACTITIONER

## 2022-08-09 PROCEDURE — 85610 POCT INR: ICD-10-PCS | Mod: QW,S$GLB,, | Performed by: INTERNAL MEDICINE

## 2022-08-09 PROCEDURE — 1159F MED LIST DOCD IN RCRD: CPT | Mod: CPTII,S$GLB,, | Performed by: NURSE PRACTITIONER

## 2022-08-09 PROCEDURE — 85610 PROTHROMBIN TIME: CPT | Mod: QW,S$GLB,, | Performed by: INTERNAL MEDICINE

## 2022-08-09 PROCEDURE — 1126F AMNT PAIN NOTED NONE PRSNT: CPT | Mod: CPTII,S$GLB,, | Performed by: NURSE PRACTITIONER

## 2022-08-09 PROCEDURE — 99214 PR OFFICE/OUTPT VISIT, EST, LEVL IV, 30-39 MIN: ICD-10-PCS | Mod: S$GLB,,, | Performed by: NURSE PRACTITIONER

## 2022-08-09 PROCEDURE — 99999 PR PBB SHADOW E&M-EST. PATIENT-LVL III: CPT | Mod: PBBFAC,,, | Performed by: NURSE PRACTITIONER

## 2022-08-09 PROCEDURE — 93793 ANTICOAG MGMT PT WARFARIN: CPT | Mod: S$GLB,,,

## 2022-08-09 PROCEDURE — 99214 OFFICE O/P EST MOD 30 MIN: CPT | Mod: S$GLB,,, | Performed by: NURSE PRACTITIONER

## 2022-08-09 PROCEDURE — 93793 PR ANTICOAGULANT MGMT FOR PT TAKING WARFARIN: ICD-10-PCS | Mod: S$GLB,,,

## 2022-08-09 PROCEDURE — 1159F PR MEDICATION LIST DOCUMENTED IN MEDICAL RECORD: ICD-10-PCS | Mod: CPTII,S$GLB,, | Performed by: NURSE PRACTITIONER

## 2022-08-09 PROCEDURE — 3075F PR MOST RECENT SYSTOLIC BLOOD PRESS GE 130-139MM HG: ICD-10-PCS | Mod: CPTII,S$GLB,, | Performed by: NURSE PRACTITIONER

## 2022-08-09 RX ORDER — SODIUM CHLORIDE 0.9 % (FLUSH) 0.9 %
10 SYRINGE (ML) INJECTION
Status: CANCELLED | OUTPATIENT
Start: 2022-08-09

## 2022-08-09 RX ORDER — HEPARIN 100 UNIT/ML
500 SYRINGE INTRAVENOUS
OUTPATIENT
Start: 2022-08-25

## 2022-08-09 RX ORDER — HEPARIN 100 UNIT/ML
500 SYRINGE INTRAVENOUS
Status: CANCELLED | OUTPATIENT
Start: 2022-08-09

## 2022-08-09 RX ORDER — SODIUM CHLORIDE 0.9 % (FLUSH) 0.9 %
10 SYRINGE (ML) INJECTION
OUTPATIENT
Start: 2022-08-25

## 2022-08-09 NOTE — PROGRESS NOTES
Accompanied by Lester Kalyan.  Patient's INR is subtherapeutic at 1.6 - has improved.  Previous instructions verified - not followed.  Patient did take 7.5 mg (boost dose given on 7/28/2022 as directed), but 2.5 mg was taken on Sundays, instead of 5 mg. Patient has taken his warfarin this morning.  Instructions given:  Will attempt another boost of 7.5 mg for tomorrow (8/10/2022), then re-challenge previous new dose of 2.5 mg every Tuesday, Thursday; and 5 mg on all other days.  Recheck in 2 weeks.  Dose calendar given and reviewed with patient and patient's wife - both repeated back instructions correctly and confirms understanding.

## 2022-08-09 NOTE — PROGRESS NOTES
Subjective:       Patient ID: Anthony Biggs Jr. is a 81 y.o. male.    Chief Complaint: f/u h/o LAILA    HPI: 81 y.o male with h/o former smoker, Afib (on coumadin), GI bleed secondary to AVMs, Prostate cancer presenting today for follow up of his h/o LAILA previously treated with IV iron 2021 and 2021. He has since been on oral iron replacement therapy. Tolerating well without GI side effects or constipation. Patient had prior Colonscopy  remarkable for polyp removal, adenomatous per pathology. Recommended repeat in 5 years. Patient underwent EGD and VCE 10/2021. Angioectasias noted without  Bleeding that were treated.      Today he feels well outside of notable ongoing fatigue. Recently hospitalized for rectus sheath hematoma. He has maintained coumadin clinic follow up. He denies noting any abnormal bleeding. Has dark colored stools since starting oral iron therapy.     Social History     Socioeconomic History    Marital status:     Highest education level: Some college, no degree   Tobacco Use    Smoking status: Former Smoker     Packs/day: 0.50     Years: 40.00     Pack years: 20.00     Types: Cigarettes     Start date: 1962     Quit date:      Years since quittin.6    Smokeless tobacco: Never Used   Substance and Sexual Activity    Alcohol use: Yes     Alcohol/week: 7.0 standard drinks     Types: 7 Shots of liquor per week    Drug use: No    Sexual activity: Not Currently       Past Medical History:   Diagnosis Date    Abnormal CXR     Angina pectoris 2017    Angiodysplasia of small intestine     Atrial fibrillation     Chronic upper GI bleeding     due to angiodysplasia in proximal small intestine    Coronary atherosclerosis of unspecified type of vessel, native or graft     COVID 2022    Depression     Diabetes mellitus without complication     Emphysema of lung     History of prostate cancer 2007    prostatectomy    Hyperlipidemia associated with type 2  diabetes mellitus     Hypertension associated with diabetes     LAILA (iron deficiency anemia) 09/20/2021    due to angiodysplasia in small intestine    Intention tremor     Major depressive disorder, recurrent, moderate 4/12/2021    Moderate episode of recurrent major depressive disorder 4/1/2019    Morbid (severe) obesity due to excess calories 4/1/2019    SHORTY (obstructive sleep apnea)     Prostate cancer     Trouble in sleeping     Urinary incontinence        Family History   Problem Relation Age of Onset    Heart disease Mother     Cancer Father         lung    Macular degeneration Sister     Diabetes Sister     Heart disease Sister     Strabismus Neg Hx     Retinal detachment Neg Hx     Glaucoma Neg Hx     Blindness Neg Hx     Amblyopia Neg Hx        Past Surgical History:   Procedure Laterality Date    ABDOMINAL HERNIA REPAIR      APPENDECTOMY      bladder sx      CARDIAC CATHETERIZATION      CATARACT EXTRACTION W/  INTRAOCULAR LENS IMPLANT  Restor OU    COLONOSCOPY N/A 5/16/2017    Procedure: COLONOSCOPY;  Surgeon: Alfredo Naidu MD;  Location: [facility];  Service: Endoscopy;  Laterality: N/A;    ESOPHAGOGASTRODUODENOSCOPY N/A 10/29/2021    Procedure: SBE (Push Enteroscopy) with Dr. Wayne;  Surgeon: Arlette Wayne MD;  Location: [facility];  Service: Endoscopy;  Laterality: N/A;  Plavix held indefinitely as of 10-20-21. Must also hold Coumadin 5 days prior. Ok to continue to use ASA.    inguinal hernia      INTRALUMINAL GASTROINTESTINAL TRACT IMAGING VIA CAPSULE N/A 10/4/2021    Procedure: IMAGING PROCEDURE, GI TRACT, INTRALUMINAL, VIA CAPSULE;  Surgeon: Joaquin Stack RN;  Location: [facility] ENDO;  Service: Endoscopy;  Laterality: N/A;    LEFT HEART CATHETERIZATION Left 7/20/2021    Procedure: CATHETERIZATION, HEART, LEFT;  Surgeon: Darryl Griffith MD;  Location: [facility] CATH LAB;  Service: Cardiology;  Laterality: Left;    lung sx      PERCUTANEOUS TRANSLUMINAL BALLOON  ANGIOPLASTY OF CORONARY ARTERY  7/20/2021    Procedure: Angioplasty-coronary;  Surgeon: Darryl Griffith MD;  Location: [facility] CATH LAB;  Service: Cardiology;;    PROSTATE SURGERY         Review of Systems   Constitutional: Positive for fatigue. Negative for activity change, appetite change, chills, fever and unexpected weight change.   HENT: Negative for congestion, mouth sores, nosebleeds, sore throat, trouble swallowing and voice change.    Eyes: Negative for visual disturbance.   Respiratory: Negative for cough, chest tightness, shortness of breath and wheezing.    Cardiovascular: Negative for chest pain, palpitations and leg swelling.   Gastrointestinal: Negative for abdominal distention, abdominal pain, anal bleeding, blood in stool, constipation, diarrhea, nausea and vomiting.   Genitourinary: Negative for difficulty urinating, dysuria and hematuria.   Musculoskeletal: Negative for arthralgias, back pain and myalgias.   Skin: Negative for pallor, rash and wound.   Neurological: Negative for dizziness, syncope, weakness and headaches.   Hematological: Negative for adenopathy. Does not bruise/bleed easily.   Psychiatric/Behavioral: The patient is not nervous/anxious.          Medication List with Changes/Refills   Current Medications    ALLOPURINOL (ZYLOPRIM) 100 MG TABLET    Take 1 tablet (100 mg total) by mouth once daily.    AMLODIPINE (NORVASC) 5 MG TABLET    TAKE 1 TABLET (5 MG TOTAL) BY MOUTH ONCE DAILY.    ASPIRIN (ECOTRIN) 81 MG EC TABLET    Take 81 mg by mouth once daily.    ATORVASTATIN (LIPITOR) 40 MG TABLET    Take 1 tablet (40 mg total) by mouth once daily.    FERROUS SULFATE 324 MG (65 MG IRON) TBEC    TAKE ONE TABLET BY MOUTH TWICE DAILY    ISOSORBIDE MONONITRATE (IMDUR) 30 MG 24 HR TABLET    Take 1 tablet (30 mg total) by mouth once daily.    LOSARTAN (COZAAR) 50 MG TABLET    TAKE ONE TABLET BY MOUTH TWICE DAILY    MELATONIN 3 MG TAB    Take 1 tablet by mouth nightly.    METFORMIN (GLUCOPHAGE)  500 MG TABLET    TAKE 1 TABLET (500 MG TOTAL) BY MOUTH 2 (TWO) TIMES DAILY WITH MEALS.    WARFARIN (COUMADIN) 5 MG TABLET    Take 1 tablet (5 mg total) by mouth Daily. OR as directed by coumadin clinic     Objective:     Vitals:    08/09/22 1310   BP: (!) 139/93   Pulse: 97   Temp: 98.1 °F (36.7 °C)     Lab Results   Component Value Date    WBC 9.12 08/09/2022    HGB 12.9 (L) 08/09/2022    HCT 39.8 (L) 08/09/2022    MCV 91 08/09/2022     08/09/2022       BMP  Lab Results   Component Value Date     (L) 07/28/2022    K 4.6 07/28/2022     07/28/2022    CO2 22 (L) 07/28/2022    BUN 10 07/28/2022    CREATININE 0.8 07/28/2022    CALCIUM 9.7 07/28/2022    ANIONGAP 10 07/28/2022    ESTGFRAFRICA >60 07/28/2022    EGFRNONAA >60 07/28/2022     Lab Results   Component Value Date    IRON 66 07/28/2022    TIBC 352 07/28/2022    FERRITIN 356 (H) 07/28/2022     No results found for: [id number]      Physical Exam  Vitals reviewed.   Constitutional:       Appearance: He is well-developed.   HENT:      Head: Normocephalic.      Right Ear: External ear normal.      Left Ear: External ear normal.   Eyes:      General: Lids are normal. No scleral icterus.        Right eye: No discharge.         Left eye: No discharge.      Conjunctiva/sclera: Conjunctivae normal.   Neck:      Thyroid: No thyroid mass.   Cardiovascular:      Rate and Rhythm: Normal rate and regular rhythm.      Heart sounds: Normal heart sounds. No murmur heard.  Pulmonary:      Effort: Pulmonary effort is normal. No respiratory distress.      Breath sounds: Normal breath sounds.   Abdominal:      General: There is no distension.   Genitourinary:     Comments: deferred  Musculoskeletal:         General: Normal range of motion.      Cervical back: Normal range of motion.   Skin:     General: Skin is warm and dry.   Neurological:      Mental Status: He is alert and oriented to person, place, and time.   Psychiatric:         Speech: Speech normal.          Behavior: Behavior normal. Behavior is cooperative.         Thought Content: Thought content normal.          Assessment:     Problem List Items Addressed This Visit        Oncology    LAILA (iron deficiency anemia) - Primary     H/o GI bleed. Labs reviewed. Note recent decline in hemoglobin. Interval decline in iron studies. Saturated iron 19%>>39. Other iron indices adequate. Note rising TIBC. Recent hospitalization for abdominal hematoma. Has since resumed Coumadin without issues. He denies noting hematuria, hematochezia. Stool darkened since being on oral iron replacement therapy. C/o fatigue    Will check repeat CBC today. Arrange IV Iron. Based on repeat CBC and mild iron deficiency will arrange Injectafer x 1. Check occult stool x 3. GI follow up PRN. F/U 6-8 weeks with cbc, iron studies.           Relevant Orders    CBC Auto Differential    Iron and TIBC    Ferritin    Occult blood x 1, stool    Occult blood x 1, stool    Occult blood x 1, stool    CBC Auto Differential (Completed)            Plan:     Iron deficiency anemia due to chronic blood loss  -     CBC Auto Differential; Future; Expected date: 08/09/2022  -     Iron and TIBC; Future; Expected date: 08/09/2022  -     Ferritin; Future; Expected date: 08/09/2022  -     Occult blood x 1, stool; Future; Expected date: 08/09/2022  -     Occult blood x 1, stool; Future; Expected date: 08/09/2022  -     Occult blood x 1, stool; Future; Expected date: 08/09/2022  -     CBC Auto Differential; Future; Expected date: 08/09/2022    Other orders  -     ferric carboxymaltose (INJECTAFER) 750 mg in sodium chloride 0.9% 265 mL infusion  -     sodium chloride 0.9% 100 mL flush bag  -     sodium chloride 0.9% flush 10 mL  -     heparin, porcine (PF) 100 unit/mL injection flush 500 Units  -     alteplase injection 2 mg  -     ferric carboxymaltose (INJECTAFER) 750 mg in sodium chloride 0.9% 265 mL infusion  -     sodium chloride 0.9% 100 mL flush bag  -     sodium  chloride 0.9% flush 10 mL  -     heparin, porcine (PF) 100 unit/mL injection flush 500 Units  -     alteplase injection 2 mg              DESEAN Cordoba

## 2022-08-09 NOTE — ASSESSMENT & PLAN NOTE
H/o GI bleed. Labs reviewed. Note recent decline in hemoglobin. Interval decline in iron studies. Saturated iron 19%>>39. Other iron indices adequate. Note rising TIBC. Recent hospitalization for abdominal hematoma. Has since resumed Coumadin without issues. He denies noting hematuria, hematochezia. Stool darkened since being on oral iron replacement therapy. C/o fatigue    Will check repeat CBC today. Arrange IV Iron. Based on repeat CBC and mild iron deficiency will arrange Injectafer x 1. Check occult stool x 3. GI follow up PRN. F/U 6-8 weeks with cbc, iron studies.

## 2022-08-10 ENCOUNTER — PATIENT MESSAGE (OUTPATIENT)
Dept: HEMATOLOGY/ONCOLOGY | Facility: CLINIC | Age: 82
End: 2022-08-10
Payer: MEDICARE

## 2022-08-11 ENCOUNTER — LAB VISIT (OUTPATIENT)
Dept: LAB | Facility: HOSPITAL | Age: 82
End: 2022-08-11
Attending: INTERNAL MEDICINE
Payer: MEDICARE

## 2022-08-11 DIAGNOSIS — D50.0 IRON DEFICIENCY ANEMIA DUE TO CHRONIC BLOOD LOSS: ICD-10-CM

## 2022-08-11 PROCEDURE — 82272 OCCULT BLD FECES 1-3 TESTS: CPT | Mod: 91 | Performed by: NURSE PRACTITIONER

## 2022-08-12 LAB
OB PNL STL: NEGATIVE

## 2022-08-18 NOTE — PROGRESS NOTES
"HPI:  Patient is 77-year-old man who comes today for follow-up of his depression.  We increased his Wellbutrin about one month ago.  Since then, he has noticed a significant amount.  Sleeping better.  He has more energy.  He's been more active.    Current meds have been verified and updated per the EMR  Exam:/86   Pulse 84   Temp 99.1 °F (37.3 °C) (Tympanic)   Ht 5' 7" (1.702 m)   Wt 99 kg (218 lb 4.1 oz)   SpO2 99%   BMI 34.18 kg/m²   Exam deferred    Lab Results   Component Value Date    WBC 6.92 08/28/2017    HGB 14.7 08/28/2017    HCT 42.9 08/28/2017     08/28/2017    CHOL 178 09/13/2017    TRIG 118 09/13/2017    HDL 47 09/13/2017    ALT 39 09/13/2017    AST 32 09/13/2017     09/13/2017    K 4.4 09/13/2017     09/13/2017    CREATININE 0.9 09/13/2017    BUN 23 09/13/2017    CO2 27 09/13/2017    TSH 1.226 09/11/2017    PSA <0.010 08/20/2013    INR 2.7 10/24/2017       Impression:  Depression, improving on current therapy  Patient Active Problem List   Diagnosis    Pure hypercholesterolemia    Atrial fibrillation    Coronary atherosclerosis    Essential hypertension    History of prostate cancer    Abnormal CXR    Refractive error    Family history of macular degeneration    Posterior capsular opacification    Pseudophakia of both eyes    History of colon polyps    Diverticulosis of large intestine without hemorrhage    Current use of long term anticoagulation    Pulmonary hypertension, mild    Impaired fasting glucose    SHORTY (obstructive sleep apnea)    Depression       Plan:     He'll be seen again in 6 weeks by the nurse practitioner.    " Met with pt to discuss aftercare plans. Pt reports that he is interested in an outpatient in-person treatment program. Pt states he has not been to treatment in the past, this is his first time in a detox program. Pt reports he is drinking 3-4 times/week, up to 7 drinks per use. Reports he is currently unemployed and is living with his parents in [place]. Pt was shown paperwork to complete for CD assessment and referral. Pt is working on paperwork and will turn into RN when complete for comprehensive assessment. Pt has [employer] Plus for insurance.  AVS initiated

## 2022-08-24 ENCOUNTER — ANTI-COAG VISIT (OUTPATIENT)
Dept: CARDIOLOGY | Facility: CLINIC | Age: 82
End: 2022-08-24
Payer: MEDICARE

## 2022-08-24 ENCOUNTER — INFUSION (OUTPATIENT)
Dept: INFUSION THERAPY | Facility: HOSPITAL | Age: 82
End: 2022-08-24
Attending: NURSE PRACTITIONER
Payer: MEDICARE

## 2022-08-24 VITALS
SYSTOLIC BLOOD PRESSURE: 140 MMHG | TEMPERATURE: 97 F | RESPIRATION RATE: 18 BRPM | OXYGEN SATURATION: 98 % | DIASTOLIC BLOOD PRESSURE: 83 MMHG | HEART RATE: 88 BPM

## 2022-08-24 DIAGNOSIS — Z79.01 LONG TERM (CURRENT) USE OF ANTICOAGULANTS: Primary | ICD-10-CM

## 2022-08-24 DIAGNOSIS — I48.0 PAROXYSMAL ATRIAL FIBRILLATION: ICD-10-CM

## 2022-08-24 DIAGNOSIS — D50.0 IRON DEFICIENCY ANEMIA DUE TO CHRONIC BLOOD LOSS: Primary | ICD-10-CM

## 2022-08-24 LAB — INR PPP: 2.1 (ref 2–3)

## 2022-08-24 PROCEDURE — 85610 PROTHROMBIN TIME: CPT | Mod: QW,S$GLB,, | Performed by: INTERNAL MEDICINE

## 2022-08-24 PROCEDURE — 63600175 PHARM REV CODE 636 W HCPCS: Mod: JG | Performed by: NURSE PRACTITIONER

## 2022-08-24 PROCEDURE — 93793 ANTICOAG MGMT PT WARFARIN: CPT | Mod: S$GLB,,,

## 2022-08-24 PROCEDURE — 85610 POCT INR: ICD-10-PCS | Mod: QW,S$GLB,, | Performed by: INTERNAL MEDICINE

## 2022-08-24 PROCEDURE — 25000003 PHARM REV CODE 250: Performed by: NURSE PRACTITIONER

## 2022-08-24 PROCEDURE — 96365 THER/PROPH/DIAG IV INF INIT: CPT

## 2022-08-24 PROCEDURE — 93793 PR ANTICOAGULANT MGMT FOR PT TAKING WARFARIN: ICD-10-PCS | Mod: S$GLB,,,

## 2022-08-24 RX ADMIN — SODIUM CHLORIDE: 9 INJECTION, SOLUTION INTRAVENOUS at 02:08

## 2022-08-24 RX ADMIN — FERRIC CARBOXYMALTOSE INJECTION 750 MG: 50 INJECTION, SOLUTION INTRAVENOUS at 02:08

## 2022-08-24 NOTE — PROGRESS NOTES
INR is therapeutic at 2.1.  Previous warfarin instructions were verified and followed.  Patient was instructed to continue warfarin 2.5 mg (one half tablet) every Tuesday, Thursday; and 5 mg on all other days.  Recheck on 9/08/2022.  Dose calendar given and reviewed with patient and Mrs. Biggs - both verbalized understanding.

## 2022-08-24 NOTE — PLAN OF CARE
Problem: Adult Inpatient Plan of Care  Goal: Plan of Care Review  Outcome: Ongoing, Progressing  Flowsheets (Taken 8/24/2022 1534)  Plan of Care Reviewed With: patient  Goal: Patient-Specific Goal (Individualized)  Outcome: Ongoing, Progressing  Flowsheets (Taken 8/24/2022 1534)  Anxieties, Fears or Concerns: pillow and blanket offered for comfort measures  Individualized Care Needs: pillow and blanket blanket offered for comfort measures  Patient-Specific Goals (Include Timeframe): tolerate treatment without adverse reaction  Goal: Optimal Comfort and Wellbeing  Outcome: Ongoing, Progressing  Intervention: Provide Person-Centered Care  Flowsheets (Taken 8/24/2022 1534)  Trust Relationship/Rapport:   care explained   empathic listening provided   reassurance provided   choices provided   thoughts/feelings acknowledged   emotional support provided   questions answered   questions encouraged

## 2022-09-06 ENCOUNTER — ANTI-COAG VISIT (OUTPATIENT)
Dept: CARDIOLOGY | Facility: CLINIC | Age: 82
End: 2022-09-06
Payer: MEDICARE

## 2022-09-06 DIAGNOSIS — I48.91 ATRIAL FIBRILLATION, UNSPECIFIED TYPE: ICD-10-CM

## 2022-09-06 DIAGNOSIS — Z79.01 LONG TERM (CURRENT) USE OF ANTICOAGULANTS: Primary | ICD-10-CM

## 2022-09-06 LAB — INR PPP: 2.7 (ref 2–3)

## 2022-09-06 PROCEDURE — 85610 PROTHROMBIN TIME: CPT | Mod: QW,S$GLB,, | Performed by: INTERNAL MEDICINE

## 2022-09-06 PROCEDURE — 93793 PR ANTICOAGULANT MGMT FOR PT TAKING WARFARIN: ICD-10-PCS | Mod: S$GLB,,,

## 2022-09-06 PROCEDURE — 85610 POCT INR: ICD-10-PCS | Mod: QW,S$GLB,, | Performed by: INTERNAL MEDICINE

## 2022-09-06 PROCEDURE — 93793 ANTICOAG MGMT PT WARFARIN: CPT | Mod: S$GLB,,,

## 2022-09-06 NOTE — PROGRESS NOTES
INR is therapeutic at 2.7.  Patient reports taking warfarin 2.5 mg every Tues, Thurs; and 5 mg on all other days as directed.  No recent changes reported.  Instructions given to continue same dose of warfarin.  Recheck on 10/03/2022 with scheduled labs ([facility]).  Will call with result.  Dose calendar given and reviewed with patient.  Patient verbalized understanding.

## 2022-09-20 DIAGNOSIS — E78.00 PURE HYPERCHOLESTEROLEMIA: ICD-10-CM

## 2022-09-20 RX ORDER — ATORVASTATIN CALCIUM 40 MG/1
40 TABLET, FILM COATED ORAL DAILY
Qty: 90 TABLET | Refills: 3 | Status: SHIPPED | OUTPATIENT
Start: 2022-09-20 | End: 2023-04-11 | Stop reason: SDUPTHER

## 2022-09-20 NOTE — TELEPHONE ENCOUNTER
Care Due:                  Date            Visit Type   Department     Provider  --------------------------------------------------------------------------------                                HOSPITAL     [facility] INTERNAL  Last Visit: 07-      FOLLOW UP    MEDICINE       Giuliano Moran                               -                              [facility] INTERNAL  Next Visit: 10-      CARE ([facility])   MEDICINE       Giuliano Moran                                                            Last  Test          Frequency    Reason                     Performed    Due Date  --------------------------------------------------------------------------------    Uric Acid...  12 months..  allopurinoL..............  12- 12-    [facility] Embedded Care Gaps. Reference number: [id number]. 9/20/2022   11:13:27 AM CDT

## 2022-09-23 ENCOUNTER — TELEPHONE (OUTPATIENT)
Dept: INTERNAL MEDICINE | Facility: CLINIC | Age: 82
End: 2022-09-23
Payer: MEDICARE

## 2022-09-23 DIAGNOSIS — R07.9 CHEST PAIN, UNSPECIFIED TYPE: Primary | ICD-10-CM

## 2022-09-23 RX ORDER — ISOSORBIDE MONONITRATE 30 MG/1
30 TABLET, EXTENDED RELEASE ORAL DAILY
Qty: 90 TABLET | Refills: 3 | Status: SHIPPED | OUTPATIENT
Start: 2022-09-23 | End: 2023-04-11 | Stop reason: SDUPTHER

## 2022-09-23 RX ORDER — ISOSORBIDE MONONITRATE 30 MG/1
30 TABLET, EXTENDED RELEASE ORAL DAILY
Qty: 30 TABLET | Refills: 11 | Status: CANCELLED | OUTPATIENT
Start: 2022-09-23 | End: 2023-09-23

## 2022-09-23 RX ORDER — ISOSORBIDE MONONITRATE 30 MG/1
30 TABLET, EXTENDED RELEASE ORAL DAILY
Qty: 90 TABLET | Refills: 3 | Status: CANCELLED | OUTPATIENT
Start: 2022-09-23 | End: 2023-09-23

## 2022-09-23 NOTE — TELEPHONE ENCOUNTER
----- Message from Tereza Villalta sent at 2022  1:49 PM CDT -----  Contact: Laurie/ wife  .Type:  RX Refill Request    Who Called: Laurie   Refill or New Rx:Refill   RX Name and Strength: isosorbide mononitrate (IMDUR) 30 MG 24 hr tablet  How is the patient currently taking it? (ex. 1XDay):as prescribed   Is this a 30 day or 90 day RX: 30  Preferred Pharmacy with phone number: [id number] - [facility] [address] [address] [address]  Phone: [phone] Fax: [phone]  Local or Mail Order:local   Ordering Provider:Dr. Griffith   Would the patient rather a call back or a response via [facility]? call  Best Call Back Number: .[phone]  Additional Information: Patient requesting medication to be refilled. Reports it has  please give patient a call

## 2022-09-23 NOTE — TELEPHONE ENCOUNTER
----- Message from Jeronimo Barbour NP sent at 9/23/2022  2:03 PM CDT -----  Regarding: refill  good afternoon I am at [facility] on [address] and I just received a frantic call from Mrs Biggs stating that her  has been without isosorbide for  a few days and the primary pharmacy would not give her an emergency supply. I am trying to see what I can do to get them a rx for the weekend

## 2022-10-03 ENCOUNTER — LAB VISIT (OUTPATIENT)
Dept: LAB | Facility: HOSPITAL | Age: 82
End: 2022-10-03
Attending: INTERNAL MEDICINE
Payer: MEDICARE

## 2022-10-03 DIAGNOSIS — E11.9 DIABETES MELLITUS WITHOUT COMPLICATION: ICD-10-CM

## 2022-10-03 DIAGNOSIS — Z79.01 LONG TERM (CURRENT) USE OF ANTICOAGULANTS: ICD-10-CM

## 2022-10-03 LAB
ANION GAP SERPL CALC-SCNC: 10 MMOL/L (ref 8–16)
BASOPHILS # BLD AUTO: 0.02 K/UL (ref 0–0.2)
BASOPHILS NFR BLD: 0.4 % (ref 0–1.9)
BUN SERPL-MCNC: 10 MG/DL (ref 8–23)
CALCIUM SERPL-MCNC: 9.5 MG/DL (ref 8.7–10.5)
CHLORIDE SERPL-SCNC: 97 MMOL/L (ref 95–110)
CHOLEST SERPL-MCNC: 118 MG/DL (ref 120–199)
CHOLEST/HDLC SERPL: 2.7 {RATIO} (ref 2–5)
CO2 SERPL-SCNC: 25 MMOL/L (ref 23–29)
CREAT SERPL-MCNC: 0.8 MG/DL (ref 0.5–1.4)
DIFFERENTIAL METHOD: NORMAL
EOSINOPHIL # BLD AUTO: 0.1 K/UL (ref 0–0.5)
EOSINOPHIL NFR BLD: 1.4 % (ref 0–8)
ERYTHROCYTE [DISTWIDTH] IN BLOOD BY AUTOMATED COUNT: 13.8 % (ref 11.5–14.5)
EST. GFR  (NO RACE VARIABLE): >60 ML/MIN/1.73 M^2
ESTIMATED AVG GLUCOSE: 128 MG/DL (ref 68–131)
GLUCOSE SERPL-MCNC: 99 MG/DL (ref 70–110)
HBA1C MFR BLD: 6.1 % (ref 4–5.6)
HCT VFR BLD AUTO: 43.5 % (ref 40–54)
HDLC SERPL-MCNC: 44 MG/DL (ref 40–75)
HDLC SERPL: 37.3 % (ref 20–50)
HGB BLD-MCNC: 14.1 G/DL (ref 14–18)
IMM GRANULOCYTES # BLD AUTO: 0.03 K/UL (ref 0–0.04)
IMM GRANULOCYTES NFR BLD AUTO: 0.5 % (ref 0–0.5)
INR PPP: 1.4 (ref 0.8–1.2)
LDLC SERPL CALC-MCNC: 61.4 MG/DL (ref 63–159)
LYMPHOCYTES # BLD AUTO: 2 K/UL (ref 1–4.8)
LYMPHOCYTES NFR BLD: 35.7 % (ref 18–48)
MCH RBC QN AUTO: 29.9 PG (ref 27–31)
MCHC RBC AUTO-ENTMCNC: 32.4 G/DL (ref 32–36)
MCV RBC AUTO: 92 FL (ref 82–98)
MONOCYTES # BLD AUTO: 0.8 K/UL (ref 0.3–1)
MONOCYTES NFR BLD: 13.6 % (ref 4–15)
NEUTROPHILS # BLD AUTO: 2.7 K/UL (ref 1.8–7.7)
NEUTROPHILS NFR BLD: 48.4 % (ref 38–73)
NONHDLC SERPL-MCNC: 74 MG/DL
NRBC BLD-RTO: 0 /100 WBC
PLATELET # BLD AUTO: 277 K/UL (ref 150–450)
PMV BLD AUTO: 9.8 FL (ref 9.2–12.9)
POTASSIUM SERPL-SCNC: 4.7 MMOL/L (ref 3.5–5.1)
PROTHROMBIN TIME: 13.8 SEC (ref 9–12.5)
RBC # BLD AUTO: 4.72 M/UL (ref 4.6–6.2)
SODIUM SERPL-SCNC: 132 MMOL/L (ref 136–145)
TRIGL SERPL-MCNC: 63 MG/DL (ref 30–150)
TSH SERPL DL<=0.005 MIU/L-ACNC: 1.13 UIU/ML (ref 0.4–4)
WBC # BLD AUTO: 5.66 K/UL (ref 3.9–12.7)

## 2022-10-03 PROCEDURE — 85025 COMPLETE CBC W/AUTO DIFF WBC: CPT | Performed by: INTERNAL MEDICINE

## 2022-10-03 PROCEDURE — 80048 BASIC METABOLIC PNL TOTAL CA: CPT | Performed by: INTERNAL MEDICINE

## 2022-10-03 PROCEDURE — 85610 PROTHROMBIN TIME: CPT | Performed by: INTERNAL MEDICINE

## 2022-10-03 PROCEDURE — 80061 LIPID PANEL: CPT | Performed by: INTERNAL MEDICINE

## 2022-10-03 PROCEDURE — 84443 ASSAY THYROID STIM HORMONE: CPT | Performed by: INTERNAL MEDICINE

## 2022-10-03 PROCEDURE — 83036 HEMOGLOBIN GLYCOSYLATED A1C: CPT | Performed by: INTERNAL MEDICINE

## 2022-10-03 PROCEDURE — 36415 COLL VENOUS BLD VENIPUNCTURE: CPT | Mod: PO | Performed by: INTERNAL MEDICINE

## 2022-10-04 ENCOUNTER — ANTI-COAG VISIT (OUTPATIENT)
Dept: CARDIOLOGY | Facility: CLINIC | Age: 82
End: 2022-10-04
Payer: MEDICARE

## 2022-10-04 DIAGNOSIS — I48.91 ATRIAL FIBRILLATION, UNSPECIFIED TYPE: ICD-10-CM

## 2022-10-04 DIAGNOSIS — Z79.01 LONG TERM (CURRENT) USE OF ANTICOAGULANTS: Primary | ICD-10-CM

## 2022-10-04 PROCEDURE — 93793 ANTICOAG MGMT PT WARFARIN: CPT | Mod: S$GLB,,,

## 2022-10-04 PROCEDURE — 93793 PR ANTICOAGULANT MGMT FOR PT TAKING WARFARIN: ICD-10-PCS | Mod: S$GLB,,,

## 2022-10-04 NOTE — PROGRESS NOTES
"Patient contacted:  INR is below goal at 1.4 - subtherapeutic.  Lab collected on yesterday.  Patient reports no greens intake - "possible a missed dose".  No s/s reported.  Current warfarin dose verified.  Patient takes warfarin in the morning - "recently taken".  Instructions given to take an additional 2.5 mg now (boost by 8.3%), then will re-challenge current dose of 2.5 mg every Tuesday, Thursday; and 5 mg on all other days.  Patient requested a return visit with other labs on 10/17/2022 - orders linked.  Reviewed s/s of a stroke - ED for any abnormal issues.  Patient confirms understanding of all instructions given.    "

## 2022-10-07 ENCOUNTER — OFFICE VISIT (OUTPATIENT)
Dept: INTERNAL MEDICINE | Facility: CLINIC | Age: 82
End: 2022-10-07
Payer: MEDICARE

## 2022-10-07 VITALS
OXYGEN SATURATION: 98 % | HEART RATE: 88 BPM | DIASTOLIC BLOOD PRESSURE: 84 MMHG | WEIGHT: 215.81 LBS | HEIGHT: 67 IN | SYSTOLIC BLOOD PRESSURE: 122 MMHG | BODY MASS INDEX: 33.87 KG/M2 | TEMPERATURE: 98 F

## 2022-10-07 DIAGNOSIS — Z79.01 CURRENT USE OF LONG TERM ANTICOAGULATION: ICD-10-CM

## 2022-10-07 DIAGNOSIS — Z00.00 ROUTINE GENERAL MEDICAL EXAMINATION AT A HEALTH CARE FACILITY: Primary | ICD-10-CM

## 2022-10-07 DIAGNOSIS — Z85.46 HISTORY OF PROSTATE CANCER: ICD-10-CM

## 2022-10-07 DIAGNOSIS — I25.10 ATHEROSCLEROSIS OF NATIVE CORONARY ARTERY OF NATIVE HEART WITHOUT ANGINA PECTORIS: ICD-10-CM

## 2022-10-07 DIAGNOSIS — E11.59 HYPERTENSION ASSOCIATED WITH DIABETES: ICD-10-CM

## 2022-10-07 DIAGNOSIS — E11.69 HYPERLIPIDEMIA ASSOCIATED WITH TYPE 2 DIABETES MELLITUS: ICD-10-CM

## 2022-10-07 DIAGNOSIS — I15.2 HYPERTENSION ASSOCIATED WITH DIABETES: ICD-10-CM

## 2022-10-07 DIAGNOSIS — I70.0 ATHEROSCLEROSIS OF AORTA: ICD-10-CM

## 2022-10-07 DIAGNOSIS — F33.1 MAJOR DEPRESSIVE DISORDER, RECURRENT, MODERATE: ICD-10-CM

## 2022-10-07 DIAGNOSIS — K92.2 CHRONIC UPPER GI BLEEDING: ICD-10-CM

## 2022-10-07 DIAGNOSIS — E66.9 OBESITY (BMI 30.0-34.9): ICD-10-CM

## 2022-10-07 DIAGNOSIS — G47.33 OSA (OBSTRUCTIVE SLEEP APNEA): ICD-10-CM

## 2022-10-07 DIAGNOSIS — I48.0 PAROXYSMAL ATRIAL FIBRILLATION: ICD-10-CM

## 2022-10-07 DIAGNOSIS — E78.5 HYPERLIPIDEMIA ASSOCIATED WITH TYPE 2 DIABETES MELLITUS: ICD-10-CM

## 2022-10-07 DIAGNOSIS — Z86.010 HISTORY OF COLON POLYPS: ICD-10-CM

## 2022-10-07 DIAGNOSIS — E11.9 DIABETES MELLITUS WITHOUT COMPLICATION: ICD-10-CM

## 2022-10-07 DIAGNOSIS — D50.0 IRON DEFICIENCY ANEMIA DUE TO CHRONIC BLOOD LOSS: ICD-10-CM

## 2022-10-07 DIAGNOSIS — K55.20 ANGIODYSPLASIA OF SMALL INTESTINE: ICD-10-CM

## 2022-10-07 DIAGNOSIS — G25.2 INTENTION TREMOR: ICD-10-CM

## 2022-10-07 PROCEDURE — 1159F MED LIST DOCD IN RCRD: CPT | Mod: CPTII,S$GLB,, | Performed by: INTERNAL MEDICINE

## 2022-10-07 PROCEDURE — 99999 PR PBB SHADOW E&M-EST. PATIENT-LVL III: CPT | Mod: PBBFAC,,, | Performed by: INTERNAL MEDICINE

## 2022-10-07 PROCEDURE — 3079F PR MOST RECENT DIASTOLIC BLOOD PRESSURE 80-89 MM HG: ICD-10-PCS | Mod: CPTII,S$GLB,, | Performed by: INTERNAL MEDICINE

## 2022-10-07 PROCEDURE — 3288F PR FALLS RISK ASSESSMENT DOCUMENTED: ICD-10-PCS | Mod: CPTII,S$GLB,, | Performed by: INTERNAL MEDICINE

## 2022-10-07 PROCEDURE — 1126F AMNT PAIN NOTED NONE PRSNT: CPT | Mod: CPTII,S$GLB,, | Performed by: INTERNAL MEDICINE

## 2022-10-07 PROCEDURE — 99499 RISK ADDL DX/OHS AUDIT: ICD-10-PCS | Mod: HCNC,S$GLB,, | Performed by: INTERNAL MEDICINE

## 2022-10-07 PROCEDURE — G0008 ADMIN INFLUENZA VIRUS VAC: HCPCS | Mod: S$GLB,,, | Performed by: INTERNAL MEDICINE

## 2022-10-07 PROCEDURE — 99999 PR PBB SHADOW E&M-EST. PATIENT-LVL III: ICD-10-PCS | Mod: PBBFAC,,, | Performed by: INTERNAL MEDICINE

## 2022-10-07 PROCEDURE — 99397 PER PM REEVAL EST PAT 65+ YR: CPT | Mod: 25,S$GLB,, | Performed by: INTERNAL MEDICINE

## 2022-10-07 PROCEDURE — G0008 FLU VACCINE - QUADRIVALENT - ADJUVANTED: ICD-10-PCS | Mod: S$GLB,,, | Performed by: INTERNAL MEDICINE

## 2022-10-07 PROCEDURE — 3072F PR LOW RISK FOR RETINOPATHY: ICD-10-PCS | Mod: CPTII,S$GLB,, | Performed by: INTERNAL MEDICINE

## 2022-10-07 PROCEDURE — 1160F RVW MEDS BY RX/DR IN RCRD: CPT | Mod: CPTII,S$GLB,, | Performed by: INTERNAL MEDICINE

## 2022-10-07 PROCEDURE — 3288F FALL RISK ASSESSMENT DOCD: CPT | Mod: CPTII,S$GLB,, | Performed by: INTERNAL MEDICINE

## 2022-10-07 PROCEDURE — 1160F PR REVIEW ALL MEDS BY PRESCRIBER/CLIN PHARMACIST DOCUMENTED: ICD-10-PCS | Mod: CPTII,S$GLB,, | Performed by: INTERNAL MEDICINE

## 2022-10-07 PROCEDURE — 3074F SYST BP LT 130 MM HG: CPT | Mod: CPTII,S$GLB,, | Performed by: INTERNAL MEDICINE

## 2022-10-07 PROCEDURE — 90694 VACC AIIV4 NO PRSRV 0.5ML IM: CPT | Mod: S$GLB,,, | Performed by: INTERNAL MEDICINE

## 2022-10-07 PROCEDURE — 99397 PR PREVENTIVE VISIT,EST,65 & OVER: ICD-10-PCS | Mod: 25,S$GLB,, | Performed by: INTERNAL MEDICINE

## 2022-10-07 PROCEDURE — 3074F PR MOST RECENT SYSTOLIC BLOOD PRESSURE < 130 MM HG: ICD-10-PCS | Mod: CPTII,S$GLB,, | Performed by: INTERNAL MEDICINE

## 2022-10-07 PROCEDURE — 90694 FLU VACCINE - QUADRIVALENT - ADJUVANTED: ICD-10-PCS | Mod: S$GLB,,, | Performed by: INTERNAL MEDICINE

## 2022-10-07 PROCEDURE — 1126F PR PAIN SEVERITY QUANTIFIED, NO PAIN PRESENT: ICD-10-PCS | Mod: CPTII,S$GLB,, | Performed by: INTERNAL MEDICINE

## 2022-10-07 PROCEDURE — 1159F PR MEDICATION LIST DOCUMENTED IN MEDICAL RECORD: ICD-10-PCS | Mod: CPTII,S$GLB,, | Performed by: INTERNAL MEDICINE

## 2022-10-07 PROCEDURE — 1101F PR PT FALLS ASSESS DOC 0-1 FALLS W/OUT INJ PAST YR: ICD-10-PCS | Mod: CPTII,S$GLB,, | Performed by: INTERNAL MEDICINE

## 2022-10-07 PROCEDURE — 3079F DIAST BP 80-89 MM HG: CPT | Mod: CPTII,S$GLB,, | Performed by: INTERNAL MEDICINE

## 2022-10-07 PROCEDURE — 1101F PT FALLS ASSESS-DOCD LE1/YR: CPT | Mod: CPTII,S$GLB,, | Performed by: INTERNAL MEDICINE

## 2022-10-07 PROCEDURE — 99499 UNLISTED E&M SERVICE: CPT | Mod: HCNC,S$GLB,, | Performed by: INTERNAL MEDICINE

## 2022-10-07 PROCEDURE — 3072F LOW RISK FOR RETINOPATHY: CPT | Mod: CPTII,S$GLB,, | Performed by: INTERNAL MEDICINE

## 2022-10-07 RX ORDER — TORSEMIDE 10 MG/1
10 TABLET ORAL DAILY
Qty: 90 TABLET | Refills: 3 | Status: SHIPPED | OUTPATIENT
Start: 2022-10-07 | End: 2022-11-02 | Stop reason: SDUPTHER

## 2022-10-07 NOTE — PROGRESS NOTES
HPI:  Patient is an 82-year-old man comes today for follow-up of his diabetes, hypertension, lipids, coronary disease, atrial fibrillation and for his annual physical.  Patient overall been doing about the same.  There has been no significant new problems or complaints.  He still just feels fatigued and tired and no energy all the time.  I think this has mostly to do with his major depressive disorder.      Current MEDS: medcard review, verified and update  Allergies: Per the electronic medical record    Past Medical History:   Diagnosis Date    Abnormal CXR     Angina pectoris 8/28/2017    Angiodysplasia of small intestine     Atrial fibrillation     Chronic upper GI bleeding     due to angiodysplasia in proximal small intestine    Coronary atherosclerosis of unspecified type of vessel, native or graft     COVID 6/26/2022    Depression     Diabetes mellitus without complication     Emphysema of lung     History of prostate cancer 2007    prostatectomy    Hyperlipidemia associated with type 2 diabetes mellitus     Hypertension associated with diabetes     LAILA (iron deficiency anemia) 09/20/2021    due to angiodysplasia in small intestine    Intention tremor     Major depressive disorder, recurrent, moderate 4/12/2021    Moderate episode of recurrent major depressive disorder 4/1/2019    Morbid (severe) obesity due to excess calories 4/1/2019    SHORTY (obstructive sleep apnea)     Prostate cancer     Trouble in sleeping     Urinary incontinence        Past Surgical History:   Procedure Laterality Date    ABDOMINAL HERNIA REPAIR      APPENDECTOMY      bladder sx      CARDIAC CATHETERIZATION      CATARACT EXTRACTION W/  INTRAOCULAR LENS IMPLANT  Restor OU    COLONOSCOPY N/A 5/16/2017    Procedure: COLONOSCOPY;  Surgeon: Alfredo Naidu MD;  Location: [facility];  Service: Endoscopy;  Laterality: N/A;    ESOPHAGOGASTRODUODENOSCOPY N/A 10/29/2021    Procedure: SBE (Push Enteroscopy) with Dr. Wayne;  Surgeon: Arlette  "DONOVAN Wayne MD;  Location: [facility] ENDO;  Service: Endoscopy;  Laterality: N/A;  Plavix held indefinitely as of 10-20-21. Must also hold Coumadin 5 days prior. Ok to continue to use ASA.    inguinal hernia      INTRALUMINAL GASTROINTESTINAL TRACT IMAGING VIA CAPSULE N/A 10/4/2021    Procedure: IMAGING PROCEDURE, GI TRACT, INTRALUMINAL, VIA CAPSULE;  Surgeon: Joaquin Stack RN;  Location: [facility] ENDO;  Service: Endoscopy;  Laterality: N/A;    LEFT HEART CATHETERIZATION Left 7/20/2021    Procedure: CATHETERIZATION, HEART, LEFT;  Surgeon: Darryl Griffith MD;  Location: [facility] CATH LAB;  Service: Cardiology;  Laterality: Left;    lung sx      PERCUTANEOUS TRANSLUMINAL BALLOON ANGIOPLASTY OF CORONARY ARTERY  7/20/2021    Procedure: Angioplasty-coronary;  Surgeon: Darryl Griffith MD;  Location: [facility] CATH LAB;  Service: Cardiology;;    PROSTATE SURGERY         SHx: per the electronic medical record    FHx: recorded in the electronic medical record    ROS:    denies any chest pains or shortness of breath. Denies any nausea, vomiting or diarrhea. Denies any fever, chills or sweats. Denies any change in weight, voice, stool, skin or hair. Denies any dysuria, dyspepsia or dysphagia. Denies any change in vision, hearing or headaches. Denies any swollen lymph nodes or loss of memory.    PE:  /84   Pulse 88   Temp 97.8 °F (36.6 °C)   Ht 5' 7" (1.702 m)   Wt 97.9 kg (215 lb 13.3 oz)   SpO2 98%   BMI 33.80 kg/m²   Gen: Well-developed, well-nourished, male, in no acute distress, oriented x3  HEENT: neck is supple, no adenopathy, carotids 2+ equal without bruits, thyroid exam normal size without nodules.  CHEST: clear to auscultation and percussion  CVS: irregular rate and rhythm with 1 to 2/6 systolic murmur, no gallop, no rubs  ABD: soft, benign, no rebound no guarding, no distention.  Bowel sounds are normal.     nontender.  No palpable masses.  No organomegaly and no audible bruits.  RECTAL:  Deferred.  EXT: no " clubbing, cyanosis.  2+ bilateral lower extremity edema    LYMPH: no cervical, inguinal, or axillary adenopathy  FEET: no loss of sensation.  No ulcers or pressure sores.  Monofilament testing normal  NEURO: gait normal.  Cranial nerves II- XII intact. No nystagmus.  Speech normal.   Gross motor and sensory unremarkable.    Lab Results   Component Value Date    WBC 5.66 10/03/2022    HGB 14.1 10/03/2022    HCT 43.5 10/03/2022     10/03/2022    CHOL 118 (L) 10/03/2022    TRIG 63 10/03/2022    HDL 44 10/03/2022    ALT 33 07/17/2022    AST 27 07/17/2022     (L) 10/03/2022    K 4.7 10/03/2022    CL 97 10/03/2022    CREATININE 0.8 10/03/2022    BUN 10 10/03/2022    CO2 25 10/03/2022    TSH 1.126 10/03/2022    PSA <0.010 08/20/2013    INR 1.4 (H) 10/03/2022    HGBA1C 6.1 (H) 10/03/2022       Impression:  Diabetes, hypertension, lipids all very well controlled  AFib/coronary disease, stable  Major depressive disorder, unchanged  Anemia, markedly improved H&H  Edema, patient will restart his Demadex  Patient Active Problem List   Diagnosis    Atrial fibrillation    Coronary atherosclerosis    History of prostate cancer    Refractive error    Family history of macular degeneration    Posterior capsular opacification    Pseudophakia of both eyes    History of colon polyps    Diverticulosis of large intestine without hemorrhage    Current use of long term anticoagulation    Pulmonary hypertension, mild    SHORTY (obstructive sleep apnea)    Diabetes mellitus without complication    Hyperlipidemia associated with type 2 diabetes mellitus    Hypertension associated with diabetes    Intention tremor    Atherosclerosis of aorta    Adrenal nodule    Lung nodules    Obesity (BMI 30.0-34.9)    Major depressive disorder, recurrent, moderate    Angiodysplasia of small intestine    Chronic upper GI bleeding    LAILA (iron deficiency anemia)       Plan:   Orders Placed This Encounter    Hemoglobin A1C    Comprehensive Metabolic  Panel    Lipid Panel    TSH    CBC Auto Differential    Microalbumin/Creatinine Ratio, Urine    Prostate Specific Antigen, Diagnostic    torsemide (DEMADEX) 10 MG Tab     Patient should we wear knee-high support hose.  He was restarted on his Demadex.  He will see me again in 6 months with above lab work.  Medications otherwise remain the same.  He was given high-dose influenza vaccine today  This note is generated with speech recognition software and is subject to transcription error and sound alike phrases that may be missed by proofreading.

## 2022-10-17 ENCOUNTER — LAB VISIT (OUTPATIENT)
Dept: LAB | Facility: HOSPITAL | Age: 82
End: 2022-10-17
Attending: NURSE PRACTITIONER
Payer: MEDICARE

## 2022-10-17 ENCOUNTER — ANTI-COAG VISIT (OUTPATIENT)
Dept: CARDIOLOGY | Facility: CLINIC | Age: 82
End: 2022-10-17
Payer: MEDICARE

## 2022-10-17 DIAGNOSIS — D50.0 IRON DEFICIENCY ANEMIA DUE TO CHRONIC BLOOD LOSS: ICD-10-CM

## 2022-10-17 DIAGNOSIS — Z79.01 LONG TERM (CURRENT) USE OF ANTICOAGULANTS: Primary | ICD-10-CM

## 2022-10-17 DIAGNOSIS — Z79.01 LONG TERM (CURRENT) USE OF ANTICOAGULANTS: ICD-10-CM

## 2022-10-17 LAB
BASOPHILS # BLD AUTO: 0.03 K/UL (ref 0–0.2)
BASOPHILS NFR BLD: 0.5 % (ref 0–1.9)
DIFFERENTIAL METHOD: ABNORMAL
EOSINOPHIL # BLD AUTO: 0.1 K/UL (ref 0–0.5)
EOSINOPHIL NFR BLD: 1.7 % (ref 0–8)
ERYTHROCYTE [DISTWIDTH] IN BLOOD BY AUTOMATED COUNT: 13.3 % (ref 11.5–14.5)
FERRITIN SERPL-MCNC: 530 NG/ML (ref 20–300)
HCT VFR BLD AUTO: 43.1 % (ref 40–54)
HGB BLD-MCNC: 13.9 G/DL (ref 14–18)
IMM GRANULOCYTES # BLD AUTO: 0.02 K/UL (ref 0–0.04)
IMM GRANULOCYTES NFR BLD AUTO: 0.3 % (ref 0–0.5)
INR PPP: 1.5 (ref 0.8–1.2)
IRON SERPL-MCNC: 76 UG/DL (ref 45–160)
LYMPHOCYTES # BLD AUTO: 2.3 K/UL (ref 1–4.8)
LYMPHOCYTES NFR BLD: 36.1 % (ref 18–48)
MCH RBC QN AUTO: 29.1 PG (ref 27–31)
MCHC RBC AUTO-ENTMCNC: 32.3 G/DL (ref 32–36)
MCV RBC AUTO: 90 FL (ref 82–98)
MONOCYTES # BLD AUTO: 0.8 K/UL (ref 0.3–1)
MONOCYTES NFR BLD: 12.5 % (ref 4–15)
NEUTROPHILS # BLD AUTO: 3.1 K/UL (ref 1.8–7.7)
NEUTROPHILS NFR BLD: 48.9 % (ref 38–73)
NRBC BLD-RTO: 0 /100 WBC
PLATELET # BLD AUTO: 285 K/UL (ref 150–450)
PMV BLD AUTO: 10 FL (ref 9.2–12.9)
PROTHROMBIN TIME: 15.6 SEC (ref 9–12.5)
RBC # BLD AUTO: 4.77 M/UL (ref 4.6–6.2)
SATURATED IRON: 24 % (ref 20–50)
TOTAL IRON BINDING CAPACITY: 321 UG/DL (ref 250–450)
TRANSFERRIN SERPL-MCNC: 217 MG/DL (ref 200–375)
WBC # BLD AUTO: 6.38 K/UL (ref 3.9–12.7)

## 2022-10-17 PROCEDURE — 82728 ASSAY OF FERRITIN: CPT | Performed by: NURSE PRACTITIONER

## 2022-10-17 PROCEDURE — 85610 PROTHROMBIN TIME: CPT | Performed by: INTERNAL MEDICINE

## 2022-10-17 PROCEDURE — 36415 COLL VENOUS BLD VENIPUNCTURE: CPT | Mod: PO | Performed by: INTERNAL MEDICINE

## 2022-10-17 PROCEDURE — 93793 ANTICOAG MGMT PT WARFARIN: CPT | Mod: S$GLB,,,

## 2022-10-17 PROCEDURE — 93793 PR ANTICOAGULANT MGMT FOR PT TAKING WARFARIN: ICD-10-PCS | Mod: S$GLB,,,

## 2022-10-17 PROCEDURE — 84466 ASSAY OF TRANSFERRIN: CPT | Performed by: NURSE PRACTITIONER

## 2022-10-17 PROCEDURE — 85025 COMPLETE CBC W/AUTO DIFF WBC: CPT | Performed by: NURSE PRACTITIONER

## 2022-10-17 NOTE — PROGRESS NOTES
INR not at goal. Medications, chart, and patient findings reviewed. See calendar for adjustments to dose and follow up plan.  Boost a little more aggressively this week, repeat INR soon - 1.5 weeks.  Denies missed doses and extra greens.  Generally stable on this dose.  Pt would like to return to cent[facility] lab.

## 2022-10-21 ENCOUNTER — OFFICE VISIT (OUTPATIENT)
Dept: HEMATOLOGY/ONCOLOGY | Facility: CLINIC | Age: 82
End: 2022-10-21
Payer: MEDICARE

## 2022-10-21 VITALS
TEMPERATURE: 98 F | SYSTOLIC BLOOD PRESSURE: 136 MMHG | WEIGHT: 215.81 LBS | DIASTOLIC BLOOD PRESSURE: 92 MMHG | HEIGHT: 67 IN | BODY MASS INDEX: 33.87 KG/M2 | OXYGEN SATURATION: 98 % | HEART RATE: 86 BPM

## 2022-10-21 DIAGNOSIS — Z79.01 CURRENT USE OF LONG TERM ANTICOAGULATION: ICD-10-CM

## 2022-10-21 DIAGNOSIS — D50.0 IRON DEFICIENCY ANEMIA DUE TO CHRONIC BLOOD LOSS: Primary | ICD-10-CM

## 2022-10-21 PROCEDURE — 1126F PR PAIN SEVERITY QUANTIFIED, NO PAIN PRESENT: ICD-10-PCS | Mod: CPTII,S$GLB,, | Performed by: NURSE PRACTITIONER

## 2022-10-21 PROCEDURE — 99999 PR PBB SHADOW E&M-EST. PATIENT-LVL IV: ICD-10-PCS | Mod: PBBFAC,,, | Performed by: NURSE PRACTITIONER

## 2022-10-21 PROCEDURE — 3288F FALL RISK ASSESSMENT DOCD: CPT | Mod: CPTII,S$GLB,, | Performed by: NURSE PRACTITIONER

## 2022-10-21 PROCEDURE — 3072F PR LOW RISK FOR RETINOPATHY: ICD-10-PCS | Mod: CPTII,S$GLB,, | Performed by: NURSE PRACTITIONER

## 2022-10-21 PROCEDURE — 1159F MED LIST DOCD IN RCRD: CPT | Mod: CPTII,S$GLB,, | Performed by: NURSE PRACTITIONER

## 2022-10-21 PROCEDURE — 99214 PR OFFICE/OUTPT VISIT, EST, LEVL IV, 30-39 MIN: ICD-10-PCS | Mod: S$GLB,,, | Performed by: NURSE PRACTITIONER

## 2022-10-21 PROCEDURE — 3288F PR FALLS RISK ASSESSMENT DOCUMENTED: ICD-10-PCS | Mod: CPTII,S$GLB,, | Performed by: NURSE PRACTITIONER

## 2022-10-21 PROCEDURE — 1101F PR PT FALLS ASSESS DOC 0-1 FALLS W/OUT INJ PAST YR: ICD-10-PCS | Mod: CPTII,S$GLB,, | Performed by: NURSE PRACTITIONER

## 2022-10-21 PROCEDURE — 3080F PR MOST RECENT DIASTOLIC BLOOD PRESSURE >= 90 MM HG: ICD-10-PCS | Mod: CPTII,S$GLB,, | Performed by: NURSE PRACTITIONER

## 2022-10-21 PROCEDURE — 3080F DIAST BP >= 90 MM HG: CPT | Mod: CPTII,S$GLB,, | Performed by: NURSE PRACTITIONER

## 2022-10-21 PROCEDURE — 1160F PR REVIEW ALL MEDS BY PRESCRIBER/CLIN PHARMACIST DOCUMENTED: ICD-10-PCS | Mod: CPTII,S$GLB,, | Performed by: NURSE PRACTITIONER

## 2022-10-21 PROCEDURE — 3075F PR MOST RECENT SYSTOLIC BLOOD PRESS GE 130-139MM HG: ICD-10-PCS | Mod: CPTII,S$GLB,, | Performed by: NURSE PRACTITIONER

## 2022-10-21 PROCEDURE — 99999 PR PBB SHADOW E&M-EST. PATIENT-LVL IV: CPT | Mod: PBBFAC,,, | Performed by: NURSE PRACTITIONER

## 2022-10-21 PROCEDURE — 1160F RVW MEDS BY RX/DR IN RCRD: CPT | Mod: CPTII,S$GLB,, | Performed by: NURSE PRACTITIONER

## 2022-10-21 PROCEDURE — 1101F PT FALLS ASSESS-DOCD LE1/YR: CPT | Mod: CPTII,S$GLB,, | Performed by: NURSE PRACTITIONER

## 2022-10-21 PROCEDURE — 99214 OFFICE O/P EST MOD 30 MIN: CPT | Mod: S$GLB,,, | Performed by: NURSE PRACTITIONER

## 2022-10-21 PROCEDURE — 1159F PR MEDICATION LIST DOCUMENTED IN MEDICAL RECORD: ICD-10-PCS | Mod: CPTII,S$GLB,, | Performed by: NURSE PRACTITIONER

## 2022-10-21 PROCEDURE — 3075F SYST BP GE 130 - 139MM HG: CPT | Mod: CPTII,S$GLB,, | Performed by: NURSE PRACTITIONER

## 2022-10-21 PROCEDURE — 3072F LOW RISK FOR RETINOPATHY: CPT | Mod: CPTII,S$GLB,, | Performed by: NURSE PRACTITIONER

## 2022-10-21 PROCEDURE — 1126F AMNT PAIN NOTED NONE PRSNT: CPT | Mod: CPTII,S$GLB,, | Performed by: NURSE PRACTITIONER

## 2022-10-21 NOTE — PROGRESS NOTES
Subjective:       Patient ID: Anthony Biggs Jr. is a 82 y.o. male.    Chief Complaint: f/u h/o LAILA    HPI: 82 y.o male with h/o former smoker, Afib (on coumadin), GI bleed secondary to AVMs, Prostate cancer presenting today for follow up of his h/o LAILA previously treated with IV iron 2021 and 2021. He has since been on oral iron replacement therapy. Tolerating well without GI side effects or constipation. Patient had prior Colonscopy  remarkable for polyp removal, adenomatous per pathology. Recommended repeat in 5 years. Patient underwent EGD and VCE 10/2021. Angioectasias noted without  Bleeding that were treated.      Today he feels well outside of notable ongoing fatigue. He denies noting any abnormal bleeding. Has dark colored stools since starting oral iron therapy. Reports BLE swelling x 1 week. Denies pain    Social History     Socioeconomic History    Marital status:     Highest education level: Some college, no degree   Tobacco Use    Smoking status: Former     Packs/day: 0.50     Years: 40.00     Pack years: 20.00     Types: Cigarettes     Start date: 1962     Quit date:      Years since quittin.8    Smokeless tobacco: Never   Substance and Sexual Activity    Alcohol use: Yes     Alcohol/week: 7.0 standard drinks     Types: 7 Shots of liquor per week    Drug use: No    Sexual activity: Not Currently       Past Medical History:   Diagnosis Date    Abnormal CXR     Angina pectoris 2017    Angiodysplasia of small intestine     Atrial fibrillation     Chronic upper GI bleeding     due to angiodysplasia in proximal small intestine    Coronary atherosclerosis of unspecified type of vessel, native or graft     COVID 2022    Depression     Diabetes mellitus without complication     Emphysema of lung     History of prostate cancer 2007    prostatectomy    Hyperlipidemia associated with type 2 diabetes mellitus     Hypertension associated with diabetes     LAILA (iron deficiency  anemia) 09/20/2021    due to angiodysplasia in small intestine    Intention tremor     Major depressive disorder, recurrent, moderate 4/12/2021    Moderate episode of recurrent major depressive disorder 4/1/2019    Morbid (severe) obesity due to excess calories 4/1/2019    SHORTY (obstructive sleep apnea)     Prostate cancer     Trouble in sleeping     Urinary incontinence        Family History   Problem Relation Age of Onset    Heart disease Mother     Cancer Father         lung    Macular degeneration Sister     Diabetes Sister     Heart disease Sister     Strabismus Neg Hx     Retinal detachment Neg Hx     Glaucoma Neg Hx     Blindness Neg Hx     Amblyopia Neg Hx        Past Surgical History:   Procedure Laterality Date    ABDOMINAL HERNIA REPAIR      APPENDECTOMY      bladder sx      CARDIAC CATHETERIZATION      CATARACT EXTRACTION W/  INTRAOCULAR LENS IMPLANT  Restor OU    COLONOSCOPY N/A 5/16/2017    Procedure: COLONOSCOPY;  Surgeon: Alfredo Naidu MD;  Location: [facility];  Service: Endoscopy;  Laterality: N/A;    ESOPHAGOGASTRODUODENOSCOPY N/A 10/29/2021    Procedure: SBE (Push Enteroscopy) with Dr. Wayne;  Surgeon: Arlette Wayne MD;  Location: [facility];  Service: Endoscopy;  Laterality: N/A;  Plavix held indefinitely as of 10-20-21. Must also hold Coumadin 5 days prior. Ok to continue to use ASA.    inguinal hernia      INTRALUMINAL GASTROINTESTINAL TRACT IMAGING VIA CAPSULE N/A 10/4/2021    Procedure: IMAGING PROCEDURE, GI TRACT, INTRALUMINAL, VIA CAPSULE;  Surgeon: Joaquin Stack RN;  Location: [facility] ENDO;  Service: Endoscopy;  Laterality: N/A;    LEFT HEART CATHETERIZATION Left 7/20/2021    Procedure: CATHETERIZATION, HEART, LEFT;  Surgeon: Darryl Griffith MD;  Location: [facility] CATH LAB;  Service: Cardiology;  Laterality: Left;    lung sx      PERCUTANEOUS TRANSLUMINAL BALLOON ANGIOPLASTY OF CORONARY ARTERY  7/20/2021    Procedure: Angioplasty-coronary;  Surgeon: Darryl Griffith MD;   Location: [facility] CATH LAB;  Service: Cardiology;;    PROSTATE SURGERY         Review of Systems   Constitutional:  Positive for fatigue. Negative for activity change, appetite change, chills, fever and unexpected weight change.   HENT:  Negative for congestion, mouth sores, nosebleeds, sore throat, trouble swallowing and voice change.    Eyes:  Negative for visual disturbance.   Respiratory:  Negative for cough, chest tightness, shortness of breath and wheezing.    Cardiovascular:  Positive for leg swelling. Negative for chest pain and palpitations.   Gastrointestinal:  Negative for abdominal distention, abdominal pain, anal bleeding, blood in stool, constipation, diarrhea, nausea and vomiting.   Genitourinary:  Negative for difficulty urinating, dysuria and hematuria.   Musculoskeletal:  Negative for arthralgias, back pain and myalgias.   Skin:  Negative for pallor, rash and wound.   Neurological:  Negative for dizziness, syncope, weakness and headaches.   Hematological:  Negative for adenopathy. Does not bruise/bleed easily.   Psychiatric/Behavioral:  The patient is not nervous/anxious.        Medication List with Changes/Refills   Current Medications    ALLOPURINOL (ZYLOPRIM) 100 MG TABLET    Take 1 tablet (100 mg total) by mouth once daily.    AMLODIPINE (NORVASC) 5 MG TABLET    TAKE 1 TABLET (5 MG TOTAL) BY MOUTH ONCE DAILY.    ASPIRIN (ECOTRIN) 81 MG EC TABLET    Take 81 mg by mouth once daily.    ATORVASTATIN (LIPITOR) 40 MG TABLET    Take 1 tablet (40 mg total) by mouth once daily. for 90 doses    FERROUS SULFATE 324 MG (65 MG IRON) TBEC    TAKE ONE TABLET BY MOUTH TWICE DAILY    ISOSORBIDE MONONITRATE (IMDUR) 30 MG 24 HR TABLET    Take 1 tablet (30 mg total) by mouth once daily.    LOSARTAN (COZAAR) 50 MG TABLET    TAKE ONE TABLET BY MOUTH TWICE DAILY    MELATONIN 3 MG TAB    Take 1 tablet by mouth nightly.    METFORMIN (GLUCOPHAGE) 500 MG TABLET    TAKE 1 TABLET (500 MG TOTAL) BY MOUTH 2 (TWO) TIMES DAILY  WITH MEALS.    TORSEMIDE (DEMADEX) 10 MG TAB    Take 1 tablet (10 mg total) by mouth once daily.    WARFARIN (COUMADIN) 5 MG TABLET    TAKE 1 TABLET (5 MG TOTAL) BY MOUTH DAILY. OR AS DIRECTED BY COUMADIN CLINIC     Objective:     Vitals:    10/21/22 1312   BP: (!) 136/92   Pulse: 86   Temp: 97.8 °F (36.6 °C)     Lab Results   Component Value Date    WBC 6.38 10/17/2022    HGB 13.9 (L) 10/17/2022    HCT 43.1 10/17/2022    MCV 90 10/17/2022     10/17/2022       BMP  Lab Results   Component Value Date     (L) 10/03/2022    K 4.7 10/03/2022    CL 97 10/03/2022    CO2 25 10/03/2022    BUN 10 10/03/2022    CREATININE 0.8 10/03/2022    CALCIUM 9.5 10/03/2022    ANIONGAP 10 10/03/2022    ESTGFRAFRICA >60 07/28/2022    EGFRNONAA >60 07/28/2022     Lab Results   Component Value Date    IRON 76 10/17/2022    TIBC 321 10/17/2022    FERRITIN 530 (H) 10/17/2022     No results found for: [id number]      Physical Exam  Vitals reviewed.   Constitutional:       Appearance: He is well-developed.   HENT:      Head: Normocephalic.      Right Ear: External ear normal.      Left Ear: External ear normal.   Eyes:      General: Lids are normal. No scleral icterus.        Right eye: No discharge.         Left eye: No discharge.      Conjunctiva/sclera: Conjunctivae normal.   Neck:      Thyroid: No thyroid mass.   Cardiovascular:      Rate and Rhythm: Normal rate and regular rhythm.      Heart sounds: Normal heart sounds. No murmur heard.  Pulmonary:      Effort: Pulmonary effort is normal. No respiratory distress.      Breath sounds: Normal breath sounds.   Abdominal:      General: There is no distension.   Genitourinary:     Comments: deferred  Musculoskeletal:         General: Normal range of motion.      Cervical back: Normal range of motion.      Right lower leg: Edema present.      Left lower leg: Edema present.   Skin:     General: Skin is warm and dry.   Neurological:      Mental Status: He is alert and oriented to  person, place, and time.   Psychiatric:         Speech: Speech normal.         Behavior: Behavior normal. Behavior is cooperative.         Thought Content: Thought content normal.        Assessment:     Problem List Items Addressed This Visit          Hematology    Current use of long term anticoagulation     Continue Coumadin clinic follow up. Most recent INR subtherapeutic.     Patient with c/o BLE swelling. Recommended BLE US to r/o DVT. Pt declines would like to discuss with PCP            Oncology    LAILA (iron deficiency anemia) - Primary     Iron levels replenished s/p Injectafer x 1. Hg 13.9    Given h/o AVMs will plan for 4-6 week f/u with repeat cbc iron studies. Discussed S&S to report sooner         Relevant Orders    CBC Auto Differential    Iron and TIBC    Ferritin         Plan:     Iron deficiency anemia due to chronic blood loss  -     CBC Auto Differential; Future; Expected date: 10/21/2022  -     Iron and TIBC; Future; Expected date: 10/21/2022  -     Ferritin; Future; Expected date: 10/21/2022    Current use of long term anticoagulation            DESEAN Cordoba

## 2022-10-21 NOTE — ASSESSMENT & PLAN NOTE
Iron levels replenished s/p Injectafer x 1. Hg 13.9    Given h/o AVMs will plan for 4-6 week f/u with repeat cbc iron studies. Discussed S&S to report sooner

## 2022-10-21 NOTE — ASSESSMENT & PLAN NOTE
Continue Coumadin clinic follow up. Most recent INR subtherapeutic.     Patient with c/o BLE swelling. Recommended BLE US to r/o DVT. Pt declines would like to discuss with PCP

## 2022-10-24 ENCOUNTER — TELEPHONE (OUTPATIENT)
Dept: INTERNAL MEDICINE | Facility: CLINIC | Age: 82
End: 2022-10-24
Payer: MEDICARE

## 2022-10-24 NOTE — TELEPHONE ENCOUNTER
----- Message from Shannan Lay sent at 10/24/2022  7:38 AM CDT -----  Contact: Wife(Marino)-[phone]  Type:  Same Day Appointment Request    Caller is requesting a same day appointment.  Caller declined first available appointment listed below.    Name of Caller:Marino  When is the first available appointment?10/25/2022 W/ Dr. Kohli  Symptoms:Swollen ankle, feet, and legs  Best Call Back Number:[phone]  Additional Information:

## 2022-10-25 ENCOUNTER — OFFICE VISIT (OUTPATIENT)
Dept: INTERNAL MEDICINE | Facility: CLINIC | Age: 82
End: 2022-10-25
Payer: MEDICARE

## 2022-10-25 ENCOUNTER — LAB VISIT (OUTPATIENT)
Dept: LAB | Facility: HOSPITAL | Age: 82
End: 2022-10-25
Attending: INTERNAL MEDICINE
Payer: MEDICARE

## 2022-10-25 VITALS
SYSTOLIC BLOOD PRESSURE: 130 MMHG | TEMPERATURE: 98 F | HEART RATE: 80 BPM | OXYGEN SATURATION: 95 % | DIASTOLIC BLOOD PRESSURE: 80 MMHG | HEIGHT: 67 IN | BODY MASS INDEX: 34.73 KG/M2 | WEIGHT: 221.31 LBS

## 2022-10-25 DIAGNOSIS — B35.3 TINEA PEDIS OF RIGHT FOOT: ICD-10-CM

## 2022-10-25 DIAGNOSIS — I48.0 PAROXYSMAL ATRIAL FIBRILLATION: ICD-10-CM

## 2022-10-25 DIAGNOSIS — M79.89 LEG SWELLING: Primary | ICD-10-CM

## 2022-10-25 DIAGNOSIS — M79.89 LEG SWELLING: ICD-10-CM

## 2022-10-25 DIAGNOSIS — Z79.01 LONG TERM (CURRENT) USE OF ANTICOAGULANTS: ICD-10-CM

## 2022-10-25 PROCEDURE — 1101F PT FALLS ASSESS-DOCD LE1/YR: CPT | Mod: CPTII,S$GLB,, | Performed by: FAMILY MEDICINE

## 2022-10-25 PROCEDURE — 3072F LOW RISK FOR RETINOPATHY: CPT | Mod: CPTII,S$GLB,, | Performed by: FAMILY MEDICINE

## 2022-10-25 PROCEDURE — 36415 COLL VENOUS BLD VENIPUNCTURE: CPT | Mod: PO | Performed by: INTERNAL MEDICINE

## 2022-10-25 PROCEDURE — 1126F PR PAIN SEVERITY QUANTIFIED, NO PAIN PRESENT: ICD-10-PCS | Mod: CPTII,S$GLB,, | Performed by: FAMILY MEDICINE

## 2022-10-25 PROCEDURE — 3288F FALL RISK ASSESSMENT DOCD: CPT | Mod: CPTII,S$GLB,, | Performed by: FAMILY MEDICINE

## 2022-10-25 PROCEDURE — 1126F AMNT PAIN NOTED NONE PRSNT: CPT | Mod: CPTII,S$GLB,, | Performed by: FAMILY MEDICINE

## 2022-10-25 PROCEDURE — 99999 PR PBB SHADOW E&M-EST. PATIENT-LVL IV: CPT | Mod: PBBFAC,,, | Performed by: FAMILY MEDICINE

## 2022-10-25 PROCEDURE — 99999 PR PBB SHADOW E&M-EST. PATIENT-LVL IV: ICD-10-PCS | Mod: PBBFAC,,, | Performed by: FAMILY MEDICINE

## 2022-10-25 PROCEDURE — 3072F PR LOW RISK FOR RETINOPATHY: ICD-10-PCS | Mod: CPTII,S$GLB,, | Performed by: FAMILY MEDICINE

## 2022-10-25 PROCEDURE — 3079F PR MOST RECENT DIASTOLIC BLOOD PRESSURE 80-89 MM HG: ICD-10-PCS | Mod: CPTII,S$GLB,, | Performed by: FAMILY MEDICINE

## 2022-10-25 PROCEDURE — 3075F PR MOST RECENT SYSTOLIC BLOOD PRESS GE 130-139MM HG: ICD-10-PCS | Mod: CPTII,S$GLB,, | Performed by: FAMILY MEDICINE

## 2022-10-25 PROCEDURE — 99214 PR OFFICE/OUTPT VISIT, EST, LEVL IV, 30-39 MIN: ICD-10-PCS | Mod: S$GLB,,, | Performed by: FAMILY MEDICINE

## 2022-10-25 PROCEDURE — 1101F PR PT FALLS ASSESS DOC 0-1 FALLS W/OUT INJ PAST YR: ICD-10-PCS | Mod: CPTII,S$GLB,, | Performed by: FAMILY MEDICINE

## 2022-10-25 PROCEDURE — 3079F DIAST BP 80-89 MM HG: CPT | Mod: CPTII,S$GLB,, | Performed by: FAMILY MEDICINE

## 2022-10-25 PROCEDURE — 99214 OFFICE O/P EST MOD 30 MIN: CPT | Mod: S$GLB,,, | Performed by: FAMILY MEDICINE

## 2022-10-25 PROCEDURE — 3075F SYST BP GE 130 - 139MM HG: CPT | Mod: CPTII,S$GLB,, | Performed by: FAMILY MEDICINE

## 2022-10-25 PROCEDURE — 3288F PR FALLS RISK ASSESSMENT DOCUMENTED: ICD-10-PCS | Mod: CPTII,S$GLB,, | Performed by: FAMILY MEDICINE

## 2022-10-25 RX ORDER — FUROSEMIDE 40 MG/1
40 TABLET ORAL DAILY
Qty: 30 TABLET | Refills: 1 | Status: SHIPPED | OUTPATIENT
Start: 2022-10-25 | End: 2022-11-02

## 2022-10-25 RX ORDER — NYSTATIN AND TRIAMCINOLONE ACETONIDE 100000; 1 [USP'U]/G; MG/G
CREAM TOPICAL 4 TIMES DAILY
Qty: 30 G | Refills: 1 | Status: SHIPPED | OUTPATIENT
Start: 2022-10-25

## 2022-10-26 ENCOUNTER — TELEPHONE (OUTPATIENT)
Dept: CARDIOLOGY | Facility: CLINIC | Age: 82
End: 2022-10-26
Payer: MEDICARE

## 2022-10-26 ENCOUNTER — TELEPHONE (OUTPATIENT)
Dept: LAB | Facility: HOSPITAL | Age: 82
End: 2022-10-26

## 2022-10-26 NOTE — TELEPHONE ENCOUNTER
Margo at [facility] lab says the specimen   (INR included) was sent to [facility] lab b/c instrument was down at [facility] but never was taken out of the freezer to be spun. States she sent a message to ordering provider to re-enter orders and she is calling patient to see if he will come back on tomorrow (10/27/22) for re-draw.

## 2022-10-27 ENCOUNTER — LAB VISIT (OUTPATIENT)
Dept: LAB | Facility: HOSPITAL | Age: 82
End: 2022-10-27
Attending: PSYCHIATRY & NEUROLOGY
Payer: MEDICARE

## 2022-10-27 ENCOUNTER — ANTI-COAG VISIT (OUTPATIENT)
Dept: CARDIOLOGY | Facility: CLINIC | Age: 82
End: 2022-10-27
Payer: MEDICARE

## 2022-10-27 DIAGNOSIS — Z79.01 LONG TERM (CURRENT) USE OF ANTICOAGULANTS: Primary | ICD-10-CM

## 2022-10-27 DIAGNOSIS — Z79.01 LONG TERM (CURRENT) USE OF ANTICOAGULANTS: ICD-10-CM

## 2022-10-27 DIAGNOSIS — M79.89 LEG SWELLING: ICD-10-CM

## 2022-10-27 LAB
ANION GAP SERPL CALC-SCNC: 10 MMOL/L (ref 8–16)
BNP SERPL-MCNC: 70 PG/ML (ref 0–99)
BUN SERPL-MCNC: 15 MG/DL (ref 8–23)
CALCIUM SERPL-MCNC: 9.8 MG/DL (ref 8.7–10.5)
CHLORIDE SERPL-SCNC: 97 MMOL/L (ref 95–110)
CO2 SERPL-SCNC: 28 MMOL/L (ref 23–29)
CREAT SERPL-MCNC: 0.9 MG/DL (ref 0.5–1.4)
EST. GFR  (NO RACE VARIABLE): >60 ML/MIN/1.73 M^2
GLUCOSE SERPL-MCNC: 130 MG/DL (ref 70–110)
INR PPP: 1.5 (ref 0.8–1.2)
POTASSIUM SERPL-SCNC: 3.9 MMOL/L (ref 3.5–5.1)
PROTHROMBIN TIME: 15.5 SEC (ref 9–12.5)
SODIUM SERPL-SCNC: 135 MMOL/L (ref 136–145)

## 2022-10-27 PROCEDURE — 83880 ASSAY OF NATRIURETIC PEPTIDE: CPT | Performed by: FAMILY MEDICINE

## 2022-10-27 PROCEDURE — 85610 PROTHROMBIN TIME: CPT | Mod: PO | Performed by: INTERNAL MEDICINE

## 2022-10-27 PROCEDURE — 93793 ANTICOAG MGMT PT WARFARIN: CPT | Mod: S$GLB,,,

## 2022-10-27 PROCEDURE — 93793 PR ANTICOAGULANT MGMT FOR PT TAKING WARFARIN: ICD-10-PCS | Mod: S$GLB,,,

## 2022-10-27 PROCEDURE — 36415 COLL VENOUS BLD VENIPUNCTURE: CPT | Mod: PO | Performed by: INTERNAL MEDICINE

## 2022-10-27 PROCEDURE — 80048 BASIC METABOLIC PNL TOTAL CA: CPT | Performed by: FAMILY MEDICINE

## 2022-10-27 NOTE — PROGRESS NOTES
INR not at goal. Medications, chart, and patient findings reviewed. See calendar for adjustments to dose and follow up plan.  No missed doses, no greens.  Reports swelling still present in legs, has started lasix.  We will increase his overall warfarin dose at this time.  Repeat in 1 week for close follow up.  Reviewed dose several times with Mr Biggs.  Verbalizes understanding.

## 2022-10-30 NOTE — PROGRESS NOTES
Subjective:      Patient ID: Anthony Biggs Jr. is a 82 y.o. male.    Chief Complaint: solon feet & ankles      Patient reports swelling of bilateral LE in feet and ankles.  Also has spot on left forearm - nonpainful, has had for months, no change since he first noticed it.     Review of Systems   Respiratory:  Negative for shortness of breath.    Cardiovascular:  Positive for leg swelling.   Skin:  Positive for color change.   Past Medical History:   Diagnosis Date    Abnormal CXR     Angina pectoris 8/28/2017    Angiodysplasia of small intestine     Atrial fibrillation     Chronic upper GI bleeding     due to angiodysplasia in proximal small intestine    Coronary atherosclerosis of unspecified type of vessel, native or graft     COVID 6/26/2022    Depression     Diabetes mellitus without complication     Emphysema of lung     History of prostate cancer 2007    prostatectomy    Hyperlipidemia associated with type 2 diabetes mellitus     Hypertension associated with diabetes     LAILA (iron deficiency anemia) 09/20/2021    due to angiodysplasia in small intestine    Intention tremor     Major depressive disorder, recurrent, moderate 4/12/2021    Moderate episode of recurrent major depressive disorder 4/1/2019    Morbid (severe) obesity due to excess calories 4/1/2019    SHORTY (obstructive sleep apnea)     Prostate cancer     Trouble in sleeping     Urinary incontinence           Past Surgical History:   Procedure Laterality Date    ABDOMINAL HERNIA REPAIR      APPENDECTOMY      bladder sx      CARDIAC CATHETERIZATION      CATARACT EXTRACTION W/  INTRAOCULAR LENS IMPLANT  Restor OU    COLONOSCOPY N/A 5/16/2017    Procedure: COLONOSCOPY;  Surgeon: Alfredo Naidu MD;  Location: [facility];  Service: Endoscopy;  Laterality: N/A;    ESOPHAGOGASTRODUODENOSCOPY N/A 10/29/2021    Procedure: SBE (Push Enteroscopy) with Dr. Wayne;  Surgeon: Arlette Wayne MD;  Location: [facility];  Service: Endoscopy;  Laterality:  "N/A;  Plavix held indefinitely as of 10-20-21. Must also hold Coumadin 5 days prior. Ok to continue to use ASA.    inguinal hernia      INTRALUMINAL GASTROINTESTINAL TRACT IMAGING VIA CAPSULE N/A 10/4/2021    Procedure: IMAGING PROCEDURE, GI TRACT, INTRALUMINAL, VIA CAPSULE;  Surgeon: Joaquin Stack RN;  Location: [facility] ENDO;  Service: Endoscopy;  Laterality: N/A;    LEFT HEART CATHETERIZATION Left 2021    Procedure: CATHETERIZATION, HEART, LEFT;  Surgeon: Darryl Griffith MD;  Location: [facility] CATH LAB;  Service: Cardiology;  Laterality: Left;    lung sx      PERCUTANEOUS TRANSLUMINAL BALLOON ANGIOPLASTY OF CORONARY ARTERY  2021    Procedure: Angioplasty-coronary;  Surgeon: Darryl Griffith MD;  Location: [facility] CATH LAB;  Service: Cardiology;;    PROSTATE SURGERY       Family History   Problem Relation Age of Onset    Heart disease Mother     Cancer Father         lung    Macular degeneration Sister     Diabetes Sister     Heart disease Sister     Strabismus Neg Hx     Retinal detachment Neg Hx     Glaucoma Neg Hx     Blindness Neg Hx     Amblyopia Neg Hx      Social History     Socioeconomic History    Marital status:     Highest education level: Some college, no degree   Tobacco Use    Smoking status: Former     Packs/day: 0.50     Years: 40.00     Pack years: 20.00     Types: Cigarettes     Start date: 1962     Quit date:      Years since quittin.8    Smokeless tobacco: Never   Substance and Sexual Activity    Alcohol use: Yes     Alcohol/week: 7.0 standard drinks     Types: 7 Shots of liquor per week    Drug use: No    Sexual activity: Not Currently     Review of patient's allergies indicates:  No Known Allergies    Objective:       /80 (BP Location: Left arm, Patient Position: Sitting, BP Method: Large (Manual))   Pulse 80   Temp 98 °F (36.7 °C) (Tympanic)   Ht 5' 7" (1.702 m)   Wt 100.4 kg (221 lb 5.5 oz)   SpO2 95%   BMI 34.67 kg/m²   Physical Exam  Constitutional:  "      General: He is not in acute distress.     Appearance: Normal appearance. He is well-developed. He is not ill-appearing or diaphoretic.   Cardiovascular:      Rate and Rhythm: Normal rate. Rhythm irregular.      Heart sounds: Normal heart sounds.   Pulmonary:      Effort: Pulmonary effort is normal.      Breath sounds: Normal breath sounds.   Musculoskeletal:         General: Normal range of motion.      Right lower leg: Edema (trace to just above ankles) present.      Left lower leg: Edema (trace to just avove ankle) present.   Skin:     Findings: Erythema (irregular contusion left forearm) present.      Comments: Tinea pedis right foot   Neurological:      General: No focal deficit present.      Mental Status: He is alert and oriented to person, place, and time.   Psychiatric:         Mood and Affect: Mood normal.         Behavior: Behavior normal.         Thought Content: Thought content normal.         Judgment: Judgment normal.     Assessment:     1. Leg swelling    2. Paroxysmal atrial fibrillation    3. Tinea pedis of right foot      Plan:   Leg swelling  -     Basic Metabolic Panel; Future; Expected date: 10/25/2022  -     B-TYPE NATRIURETIC PEPTIDE; Future; Expected date: 10/25/2022  -     Basic Metabolic Panel; Future; Expected date: 10/27/2022  -     B-TYPE NATRIURETIC PEPTIDE; Future; Expected date: 10/27/2022    Paroxysmal atrial fibrillation  -     Basic Metabolic Panel; Future; Expected date: 10/25/2022  -     B-TYPE NATRIURETIC PEPTIDE; Future; Expected date: 10/25/2022    Tinea pedis of right foot    Other orders  -     furosemide (LASIX) 40 MG tablet; Take 1 tablet (40 mg total) by mouth once daily.  Dispense: 30 tablet; Refill: 1  -     nystatin-triamcinolone (MYCOLOG II) cream; Apply topically 4 (four) times daily.  Dispense: 30 g; Refill: 1      Medication List with Changes/Refills   New Medications    FUROSEMIDE (LASIX) 40 MG TABLET    Take 1 tablet (40 mg total) by mouth once daily.     NYSTATIN-TRIAMCINOLONE (MYCOLOG II) CREAM    Apply topically 4 (four) times daily.   Current Medications    ALLOPURINOL (ZYLOPRIM) 100 MG TABLET    Take 1 tablet (100 mg total) by mouth once daily.    AMLODIPINE (NORVASC) 5 MG TABLET    TAKE 1 TABLET (5 MG TOTAL) BY MOUTH ONCE DAILY.    ASPIRIN (ECOTRIN) 81 MG EC TABLET    Take 81 mg by mouth once daily.    ATORVASTATIN (LIPITOR) 40 MG TABLET    Take 1 tablet (40 mg total) by mouth once daily. for 90 doses    FERROUS SULFATE 324 MG (65 MG IRON) TBEC    TAKE ONE TABLET BY MOUTH TWICE DAILY    ISOSORBIDE MONONITRATE (IMDUR) 30 MG 24 HR TABLET    Take 1 tablet (30 mg total) by mouth once daily.    LOSARTAN (COZAAR) 50 MG TABLET    TAKE ONE TABLET BY MOUTH TWICE DAILY    MELATONIN 3 MG TAB    Take 1 tablet by mouth nightly.    METFORMIN (GLUCOPHAGE) 500 MG TABLET    TAKE 1 TABLET (500 MG TOTAL) BY MOUTH 2 (TWO) TIMES DAILY WITH MEALS.    TORSEMIDE (DEMADEX) 10 MG TAB    Take 1 tablet (10 mg total) by mouth once daily.    WARFARIN (COUMADIN) 5 MG TABLET    TAKE 1 TABLET (5 MG TOTAL) BY MOUTH DAILY. OR AS DIRECTED BY COUMADIN CLINIC

## 2022-11-02 ENCOUNTER — OFFICE VISIT (OUTPATIENT)
Dept: INTERNAL MEDICINE | Facility: CLINIC | Age: 82
End: 2022-11-02
Payer: MEDICARE

## 2022-11-02 ENCOUNTER — LAB VISIT (OUTPATIENT)
Dept: LAB | Facility: HOSPITAL | Age: 82
End: 2022-11-02
Attending: INTERNAL MEDICINE
Payer: MEDICARE

## 2022-11-02 VITALS
HEART RATE: 92 BPM | WEIGHT: 217 LBS | SYSTOLIC BLOOD PRESSURE: 120 MMHG | DIASTOLIC BLOOD PRESSURE: 88 MMHG | OXYGEN SATURATION: 97 % | BODY MASS INDEX: 34.06 KG/M2 | HEIGHT: 67 IN

## 2022-11-02 DIAGNOSIS — Z79.01 LONG TERM (CURRENT) USE OF ANTICOAGULANTS: ICD-10-CM

## 2022-11-02 DIAGNOSIS — R60.9 EDEMA, UNSPECIFIED TYPE: Primary | ICD-10-CM

## 2022-11-02 PROCEDURE — 1159F PR MEDICATION LIST DOCUMENTED IN MEDICAL RECORD: ICD-10-PCS | Mod: CPTII,S$GLB,, | Performed by: INTERNAL MEDICINE

## 2022-11-02 PROCEDURE — 99213 PR OFFICE/OUTPT VISIT, EST, LEVL III, 20-29 MIN: ICD-10-PCS | Mod: S$GLB,,, | Performed by: INTERNAL MEDICINE

## 2022-11-02 PROCEDURE — 81003 URINALYSIS AUTO W/O SCOPE: CPT | Performed by: INTERNAL MEDICINE

## 2022-11-02 PROCEDURE — 1160F PR REVIEW ALL MEDS BY PRESCRIBER/CLIN PHARMACIST DOCUMENTED: ICD-10-PCS | Mod: CPTII,S$GLB,, | Performed by: INTERNAL MEDICINE

## 2022-11-02 PROCEDURE — 3072F PR LOW RISK FOR RETINOPATHY: ICD-10-PCS | Mod: CPTII,S$GLB,, | Performed by: INTERNAL MEDICINE

## 2022-11-02 PROCEDURE — 1101F PR PT FALLS ASSESS DOC 0-1 FALLS W/OUT INJ PAST YR: ICD-10-PCS | Mod: CPTII,S$GLB,, | Performed by: INTERNAL MEDICINE

## 2022-11-02 PROCEDURE — 1126F PR PAIN SEVERITY QUANTIFIED, NO PAIN PRESENT: ICD-10-PCS | Mod: CPTII,S$GLB,, | Performed by: INTERNAL MEDICINE

## 2022-11-02 PROCEDURE — 1126F AMNT PAIN NOTED NONE PRSNT: CPT | Mod: CPTII,S$GLB,, | Performed by: INTERNAL MEDICINE

## 2022-11-02 PROCEDURE — 3288F FALL RISK ASSESSMENT DOCD: CPT | Mod: CPTII,S$GLB,, | Performed by: INTERNAL MEDICINE

## 2022-11-02 PROCEDURE — 99999 PR PBB SHADOW E&M-EST. PATIENT-LVL III: CPT | Mod: PBBFAC,,, | Performed by: INTERNAL MEDICINE

## 2022-11-02 PROCEDURE — 1159F MED LIST DOCD IN RCRD: CPT | Mod: CPTII,S$GLB,, | Performed by: INTERNAL MEDICINE

## 2022-11-02 PROCEDURE — 3074F SYST BP LT 130 MM HG: CPT | Mod: CPTII,S$GLB,, | Performed by: INTERNAL MEDICINE

## 2022-11-02 PROCEDURE — 99999 PR PBB SHADOW E&M-EST. PATIENT-LVL III: ICD-10-PCS | Mod: PBBFAC,,, | Performed by: INTERNAL MEDICINE

## 2022-11-02 PROCEDURE — 99213 OFFICE O/P EST LOW 20 MIN: CPT | Mod: S$GLB,,, | Performed by: INTERNAL MEDICINE

## 2022-11-02 PROCEDURE — 1160F RVW MEDS BY RX/DR IN RCRD: CPT | Mod: CPTII,S$GLB,, | Performed by: INTERNAL MEDICINE

## 2022-11-02 PROCEDURE — 3079F PR MOST RECENT DIASTOLIC BLOOD PRESSURE 80-89 MM HG: ICD-10-PCS | Mod: CPTII,S$GLB,, | Performed by: INTERNAL MEDICINE

## 2022-11-02 PROCEDURE — 3074F PR MOST RECENT SYSTOLIC BLOOD PRESSURE < 130 MM HG: ICD-10-PCS | Mod: CPTII,S$GLB,, | Performed by: INTERNAL MEDICINE

## 2022-11-02 PROCEDURE — 3079F DIAST BP 80-89 MM HG: CPT | Mod: CPTII,S$GLB,, | Performed by: INTERNAL MEDICINE

## 2022-11-02 PROCEDURE — 1101F PT FALLS ASSESS-DOCD LE1/YR: CPT | Mod: CPTII,S$GLB,, | Performed by: INTERNAL MEDICINE

## 2022-11-02 PROCEDURE — 3072F LOW RISK FOR RETINOPATHY: CPT | Mod: CPTII,S$GLB,, | Performed by: INTERNAL MEDICINE

## 2022-11-02 PROCEDURE — 3288F PR FALLS RISK ASSESSMENT DOCUMENTED: ICD-10-PCS | Mod: CPTII,S$GLB,, | Performed by: INTERNAL MEDICINE

## 2022-11-02 RX ORDER — TORSEMIDE 10 MG/1
10 TABLET ORAL 2 TIMES DAILY PRN
Qty: 180 TABLET | Refills: 3 | Status: SHIPPED | OUTPATIENT
Start: 2022-11-02 | End: 2022-11-28

## 2022-11-02 NOTE — PROGRESS NOTES
"HPI:   Patient is an 82-year-old man who comes today with complaints of bilateral swelling of his feet.  He states his been progressive for the last several weeks.  He denies any excessive salt intake.  Denies any chest pains or shortness of breath.    Current meds have been verified and updated per the EMR  Exam:/88 (BP Location: Right arm)   Pulse 92   Ht 5' 7" (1.702 m)   Wt 98.4 kg (217 lb)   SpO2 97%   BMI 33.99 kg/m²     Chest clear   Cardiovascular  irregular rate and rhythm   Extremities with 3+ bilateral lower extremity edema    Lab Results   Component Value Date    WBC 6.38 10/17/2022    HGB 13.9 (L) 10/17/2022    HCT 43.1 10/17/2022     10/17/2022    CHOL 118 (L) 10/03/2022    TRIG 63 10/03/2022    HDL 44 10/03/2022    ALT 33 07/17/2022    AST 27 07/17/2022     (L) 10/27/2022    K 3.9 10/27/2022    CL 97 10/27/2022    CREATININE 0.9 10/27/2022    BUN 15 10/27/2022    CO2 28 10/27/2022    TSH 1.126 10/03/2022    PSA <0.010 08/20/2013    INR 1.5 (H) 10/27/2022    HGBA1C 6.1 (H) 10/03/2022   BNP was normal    Impression:   Bilateral peripheral edema, unremarkable cardiac exam, BNP   Was normal  Patient Active Problem List   Diagnosis    Atrial fibrillation    Coronary atherosclerosis    History of prostate cancer    Refractive error    Family history of macular degeneration    Posterior capsular opacification    Pseudophakia of both eyes    History of colon polyps    Diverticulosis of large intestine without hemorrhage    Current use of long term anticoagulation    Pulmonary hypertension, mild    SHORTY (obstructive sleep apnea)    Diabetes mellitus without complication    Hyperlipidemia associated with type 2 diabetes mellitus    Hypertension associated with diabetes    Intention tremor    Atherosclerosis of aorta    Adrenal nodule    Lung nodules    Obesity (BMI 30.0-34.9)    Major depressive disorder, recurrent, moderate    Angiodysplasia of small intestine    Chronic upper GI " bleeding    LAILA (iron deficiency anemia)       Plan:  Orders Placed This Encounter    COMPREHENSIVE METABOLIC PANEL    Urinalysis    torsemide (DEMADEX) 10 MG Tab      He will discontinue the amlodipine.  He will discontinue furosemide and only take the Demadex twice a day.  He will wear knee-high support hose and markedly decrease the salt in his food.  If it is not markedly better in 5-7 days he will let me know.    This note is generated with speech recognition software and is subject to transcription error and sound alike phrases that may be missed by proofreading.

## 2022-11-03 ENCOUNTER — LAB VISIT (OUTPATIENT)
Dept: LAB | Facility: HOSPITAL | Age: 82
End: 2022-11-03
Attending: INTERNAL MEDICINE
Payer: MEDICARE

## 2022-11-03 ENCOUNTER — ANTI-COAG VISIT (OUTPATIENT)
Dept: CARDIOLOGY | Facility: CLINIC | Age: 82
End: 2022-11-03
Payer: MEDICARE

## 2022-11-03 ENCOUNTER — PATIENT MESSAGE (OUTPATIENT)
Dept: INTERNAL MEDICINE | Facility: CLINIC | Age: 82
End: 2022-11-03
Payer: MEDICARE

## 2022-11-03 DIAGNOSIS — Z79.01 LONG TERM (CURRENT) USE OF ANTICOAGULANTS: ICD-10-CM

## 2022-11-03 DIAGNOSIS — I48.91 ATRIAL FIBRILLATION, UNSPECIFIED TYPE: ICD-10-CM

## 2022-11-03 DIAGNOSIS — Z79.01 LONG TERM (CURRENT) USE OF ANTICOAGULANTS: Primary | ICD-10-CM

## 2022-11-03 LAB
BILIRUB UR QL STRIP: NEGATIVE
CLARITY UR REFRACT.AUTO: CLEAR
COLOR UR AUTO: YELLOW
GLUCOSE UR QL STRIP: NEGATIVE
HGB UR QL STRIP: NEGATIVE
INR PPP: 1.6 (ref 0.8–1.2)
KETONES UR QL STRIP: NEGATIVE
LEUKOCYTE ESTERASE UR QL STRIP: NEGATIVE
NITRITE UR QL STRIP: NEGATIVE
PH UR STRIP: 7 [PH] (ref 5–8)
PROT UR QL STRIP: NEGATIVE
PROTHROMBIN TIME: 17.1 SEC (ref 9–12.5)
SP GR UR STRIP: 1.01 (ref 1–1.03)
URN SPEC COLLECT METH UR: NORMAL

## 2022-11-03 PROCEDURE — 85610 PROTHROMBIN TIME: CPT | Performed by: INTERNAL MEDICINE

## 2022-11-03 PROCEDURE — 93793 ANTICOAG MGMT PT WARFARIN: CPT | Mod: S$GLB,,,

## 2022-11-03 PROCEDURE — 36415 COLL VENOUS BLD VENIPUNCTURE: CPT | Mod: PO | Performed by: INTERNAL MEDICINE

## 2022-11-03 PROCEDURE — 93793 PR ANTICOAGULANT MGMT FOR PT TAKING WARFARIN: ICD-10-PCS | Mod: S$GLB,,,

## 2022-11-03 NOTE — PROGRESS NOTES
Patient contacted:  INR is slightly improving at 1.6 - subtherapeutic.  Previous warfarin instructions were verified and followed.  Reports no greens intake.  Patient reports lower legs and feet are still swollen - remains on Lasix.  Instructions given:  Will boost today's (Thursday) dose to 7.5 mg, then re-challenge current new dose of 2.5 mg every Tuesday and 5 mg on all other days.  Will continue to monitor closely and recheck next Thursday, 11/10/2022 ([facility] lab).  Patient reports instructions were wrote - repeated back correctly and confirms understanding.

## 2022-11-07 ENCOUNTER — PATIENT MESSAGE (OUTPATIENT)
Dept: INTERNAL MEDICINE | Facility: CLINIC | Age: 82
End: 2022-11-07
Payer: MEDICARE

## 2022-11-07 ENCOUNTER — TELEPHONE (OUTPATIENT)
Dept: INTERNAL MEDICINE | Facility: CLINIC | Age: 82
End: 2022-11-07
Payer: MEDICARE

## 2022-11-07 NOTE — TELEPHONE ENCOUNTER
I just saw him five days ago, not three weeks. Make sure he stopped the amlodipine and the furosemide. Make sure he is taking the demadex (torsemide) twice per day. Make sure he is wearing support hose as well.

## 2022-11-07 NOTE — TELEPHONE ENCOUNTER
Called patient educated on medication that was suppose dto be stopped and new medication that was supposed to be started . Patient stated he is taking that and not wearing his stockings he wore them to bed one night did not like it . Educate do wear stocking when he gets up in am and keep on till bath time patient stated okay he will try wearing them during the day

## 2022-11-08 ENCOUNTER — OFFICE VISIT (OUTPATIENT)
Dept: CARDIOLOGY | Facility: CLINIC | Age: 82
End: 2022-11-08
Payer: MEDICARE

## 2022-11-08 VITALS
HEART RATE: 82 BPM | WEIGHT: 222.69 LBS | BODY MASS INDEX: 34.95 KG/M2 | OXYGEN SATURATION: 97 % | SYSTOLIC BLOOD PRESSURE: 130 MMHG | HEIGHT: 67 IN | DIASTOLIC BLOOD PRESSURE: 80 MMHG

## 2022-11-08 DIAGNOSIS — I50.32 CHRONIC DIASTOLIC HEART FAILURE: ICD-10-CM

## 2022-11-08 DIAGNOSIS — I15.2 HYPERTENSION ASSOCIATED WITH DIABETES: ICD-10-CM

## 2022-11-08 DIAGNOSIS — E11.59 HYPERTENSION ASSOCIATED WITH DIABETES: ICD-10-CM

## 2022-11-08 DIAGNOSIS — I48.21 PERMANENT ATRIAL FIBRILLATION: ICD-10-CM

## 2022-11-08 DIAGNOSIS — R60.0 EDEMA OF BOTH LEGS: Primary | ICD-10-CM

## 2022-11-08 PROCEDURE — 3075F PR MOST RECENT SYSTOLIC BLOOD PRESS GE 130-139MM HG: ICD-10-PCS | Mod: CPTII,S$GLB,, | Performed by: INTERNAL MEDICINE

## 2022-11-08 PROCEDURE — 3079F PR MOST RECENT DIASTOLIC BLOOD PRESSURE 80-89 MM HG: ICD-10-PCS | Mod: CPTII,S$GLB,, | Performed by: INTERNAL MEDICINE

## 2022-11-08 PROCEDURE — 3075F SYST BP GE 130 - 139MM HG: CPT | Mod: CPTII,S$GLB,, | Performed by: INTERNAL MEDICINE

## 2022-11-08 PROCEDURE — 3072F PR LOW RISK FOR RETINOPATHY: ICD-10-PCS | Mod: CPTII,S$GLB,, | Performed by: INTERNAL MEDICINE

## 2022-11-08 PROCEDURE — 3079F DIAST BP 80-89 MM HG: CPT | Mod: CPTII,S$GLB,, | Performed by: INTERNAL MEDICINE

## 2022-11-08 PROCEDURE — 99214 PR OFFICE/OUTPT VISIT, EST, LEVL IV, 30-39 MIN: ICD-10-PCS | Mod: S$GLB,,, | Performed by: INTERNAL MEDICINE

## 2022-11-08 PROCEDURE — 3072F LOW RISK FOR RETINOPATHY: CPT | Mod: CPTII,S$GLB,, | Performed by: INTERNAL MEDICINE

## 2022-11-08 PROCEDURE — 99214 OFFICE O/P EST MOD 30 MIN: CPT | Mod: S$GLB,,, | Performed by: INTERNAL MEDICINE

## 2022-11-08 PROCEDURE — 99999 PR PBB SHADOW E&M-EST. PATIENT-LVL II: CPT | Mod: PBBFAC,,, | Performed by: INTERNAL MEDICINE

## 2022-11-08 PROCEDURE — 99999 PR PBB SHADOW E&M-EST. PATIENT-LVL II: ICD-10-PCS | Mod: PBBFAC,,, | Performed by: INTERNAL MEDICINE

## 2022-11-08 RX ORDER — METOLAZONE 5 MG/1
5 TABLET ORAL
Qty: 12 TABLET | Refills: 11 | Status: SHIPPED | OUTPATIENT
Start: 2022-11-09 | End: 2022-11-21

## 2022-11-08 NOTE — PROGRESS NOTES
Subjective:    Patient ID:  Anthony Biggs Jr. is a 82 y.o. male who presents for evaluation of Leg Swelling      HPI  pt presents for leg edema.  I don't know patient. Chart reviewed.  He was added on to my schedule this am.  Has permanent a fib, diastolic CHF, MR/TR, AI.  Sees Dr. Griffith, Cardiology, for years.  Reports B LE edema x one month.  PCP stopped Amlodipine 1 week ago and increased Torsemide to bid from qd dosing.  Also they tried Lasix.  No CP.  Some chronic FLORES.  Has not helped yet.  Using compression stockings recently.  BNP checked by PCP 10/27/22 is normal.  Was elevated 1 year ago.  Labs are ok.  - stress MPI Feb 2022.  Echo Sept 2022 normal EF, mod TR, mild MR, mild-mod AI.        Per Dr Griffith's note from 2/22 appt.  Pt with SOB/FLOERS and fatigue  H/H improved though but still sx ( Hb 7 now 12)  PCI mid LAD 6-21 temporarily helped sx  Echo 9-21 nml lv function, mild - moderate AI  Nuclear stress test nml 2-22  Recent holter afib avg HR 88 bpm, frequent PVCs, , episoded of pauses > 2 seconds( 7 episodes)  Awaiting EP consult  Discussed tachy masoud and possible PPM in past      Past Medical History:   Diagnosis Date    Abnormal CXR     Angina pectoris 8/28/2017    Angiodysplasia of small intestine     Atrial fibrillation     Chronic diastolic heart failure 11/8/2022    Chronic upper GI bleeding     due to angiodysplasia in proximal small intestine    Coronary atherosclerosis of unspecified type of vessel, native or graft     COVID 6/26/2022    Depression     Diabetes mellitus without complication     Emphysema of lung     History of prostate cancer 2007    prostatectomy    Hyperlipidemia associated with type 2 diabetes mellitus     Hypertension associated with diabetes     LAILA (iron deficiency anemia) 09/20/2021    due to angiodysplasia in small intestine    Intention tremor     Major depressive disorder, recurrent, moderate 4/12/2021    Moderate episode of recurrent major depressive disorder  "4/1/2019    Morbid (severe) obesity due to excess calories 4/1/2019    SHORTY (obstructive sleep apnea)     Prostate cancer     Trouble in sleeping     Urinary incontinence      Current Outpatient Medications   Medication Instructions    allopurinoL (ZYLOPRIM) 100 mg, Oral, Daily    aspirin (ECOTRIN) 81 mg, Oral, Daily    atorvastatin (LIPITOR) 40 mg, Oral, Daily    ferrous sulfate 324 mg (65 mg iron) TbEC TAKE ONE TABLET BY MOUTH TWICE DAILY    isosorbide mononitrate (IMDUR) 30 mg, Oral, Daily    losartan (COZAAR) 50 MG tablet TAKE ONE TABLET BY MOUTH TWICE DAILY    melatonin 3 mg Tab 1 tablet, Oral, Nightly    metFORMIN (GLUCOPHAGE) 500 mg, Oral, 2 times daily with meals    nystatin-triamcinolone (MYCOLOG II) cream Topical (Top), 4 times daily    torsemide (DEMADEX) 10 mg, Oral, 2 times daily PRN    warfarin (COUMADIN) 5 mg, Oral, Daily, OR as directed by coumadin clinic         Review of Systems   Constitutional: Negative.   HENT: Negative.     Eyes: Negative.    Cardiovascular:  Positive for dyspnea on exertion and leg swelling.   Respiratory:  Positive for shortness of breath.    Endocrine: Negative.    Hematologic/Lymphatic: Negative.    Skin: Negative.    Musculoskeletal: Negative.    Gastrointestinal: Negative.    Genitourinary: Negative.    Neurological: Negative.    Psychiatric/Behavioral: Negative.     Allergic/Immunologic: Negative.         /80 (BP Location: Left arm, Patient Position: Sitting, BP Method: Large (Manual))   Pulse 82   Ht 5' 7" (1.702 m)   Wt 101 kg (222 lb 10.6 oz)   SpO2 97%   BMI 34.87 kg/m²       Wt Readings from Last 3 Encounters:   11/08/22 101 kg (222 lb 10.6 oz)   11/02/22 98.4 kg (217 lb)   10/25/22 100.4 kg (221 lb 5.5 oz)     Temp Readings from Last 3 Encounters:   10/25/22 98 °F (36.7 °C) (Tympanic)   10/21/22 97.8 °F (36.6 °C)   10/07/22 97.8 °F (36.6 °C)     BP Readings from Last 3 Encounters:   11/08/22 130/80   11/02/22 120/88   10/25/22 130/80     Pulse Readings " from Last 3 Encounters:   11/08/22 82   11/02/22 92   10/25/22 80       Objective:    Physical Exam  Vitals and nursing note reviewed.   Constitutional:       Appearance: He is well-developed.   HENT:      Head: Normocephalic.   Neck:      Thyroid: No thyromegaly.      Vascular: No carotid bruit or JVD.   Cardiovascular:      Rate and Rhythm: Normal rate. Rhythm irregularly irregular.      Pulses:           Radial pulses are 2+ on the right side and 2+ on the left side.      Heart sounds: S1 normal and S2 normal. Heart sounds not distant. No midsystolic click and no opening snap. No murmur heard.    No friction rub. No S3 or S4 sounds.   Pulmonary:      Effort: Pulmonary effort is normal.      Breath sounds: Normal breath sounds. No wheezing or rales.   Abdominal:      General: Bowel sounds are normal. There is no distension or abdominal bruit.      Palpations: Abdomen is soft. There is no mass.      Tenderness: There is no abdominal tenderness.   Musculoskeletal:      Cervical back: Neck supple.      Right lower leg: Edema present.      Left lower leg: Edema present.   Skin:     General: Skin is warm.   Neurological:      Mental Status: He is alert and oriented to person, place, and time.   Psychiatric:         Behavior: Behavior normal.       I have reviewed all pertinent labs and cardiac studies.      Chemistry        Component Value Date/Time     (L) 11/02/2022 0848    K 4.1 11/02/2022 0848    CL 96 11/02/2022 0848    CO2 27 11/02/2022 0848    BUN 15 11/02/2022 0848    CREATININE 0.8 11/02/2022 0848     11/02/2022 0848        Component Value Date/Time    CALCIUM 9.3 11/02/2022 0848    ALKPHOS 72 11/02/2022 0848    AST 25 11/02/2022 0848    ALT 27 11/02/2022 0848    BILITOT 0.5 11/02/2022 0848    ESTGFRAFRICA >60 07/28/2022 1036    EGFRNONAA >60 07/28/2022 1036            Lab Results   Component Value Date    WBC 6.38 10/17/2022    HGB 13.9 (L) 10/17/2022    HCT 43.1 10/17/2022    MCV 90 10/17/2022      10/17/2022     Lab Results   Component Value Date    TSH 1.126 10/03/2022     Lab Results   Component Value Date    HGBA1C 6.1 (H) 10/03/2022       Lab Results   Component Value Date    CHOL 118 (L) 10/03/2022    CHOL 139 04/05/2022    CHOL 104 (L) 10/05/2021     Lab Results   Component Value Date    HDL 44 10/03/2022    HDL 57 04/05/2022    HDL 35 (L) 10/05/2021     Lab Results   Component Value Date    LDLCALC 61.4 (L) 10/03/2022    LDLCALC 69.4 04/05/2022    LDLCALC 57.4 (L) 10/05/2021     Lab Results   Component Value Date    TRIG 63 10/03/2022    TRIG 63 04/05/2022    TRIG 58 10/05/2021     Lab Results   Component Value Date    CHOLHDL 37.3 10/03/2022    CHOLHDL 41.0 04/05/2022    CHOLHDL 33.7 10/05/2021     Lab Results   Component Value Date    INR 1.6 (H) 11/03/2022    INR 1.5 (H) 10/27/2022    INR 1.5 (H) 10/17/2022         Results for orders placed during the hospital encounter of 09/20/21    Echo    Interpretation Summary  · The left ventricle is normal in size with normal systolic function.  · The estimated ejection fraction is 65%.  · Indeterminate left ventricular diastolic function.  · Normal right ventricular size with normal right ventricular systolic function.  · Moderate tricuspid regurgitation.  · Mild mitral regurgitation.  · Mild-to-moderate aortic regurgitation.        Assessment:       1. Edema of both legs    2. Permanent atrial fibrillation    3. Hypertension associated with diabetes    4. Chronic diastolic heart failure         Plan:             Add Metolazone 5 mg M, W, Friday am.  Continue Torsemide 10 mg bid.  CMP + BNP in 10 days.  Low salt.  Compression stockings.  B LE venous u/s.  Echo.  Continue other meds.  Schedule f/u appt w Dr. Griffith, primary cardiologist, asap.

## 2022-11-10 ENCOUNTER — LAB VISIT (OUTPATIENT)
Dept: LAB | Facility: HOSPITAL | Age: 82
End: 2022-11-10
Attending: INTERNAL MEDICINE
Payer: MEDICARE

## 2022-11-10 ENCOUNTER — ANTI-COAG VISIT (OUTPATIENT)
Dept: CARDIOLOGY | Facility: CLINIC | Age: 82
End: 2022-11-10
Payer: MEDICARE

## 2022-11-10 DIAGNOSIS — Z79.01 LONG TERM (CURRENT) USE OF ANTICOAGULANTS: ICD-10-CM

## 2022-11-10 DIAGNOSIS — Z79.01 LONG TERM (CURRENT) USE OF ANTICOAGULANTS: Primary | ICD-10-CM

## 2022-11-10 LAB
INR PPP: 1.3 (ref 0.8–1.2)
PROTHROMBIN TIME: 13.7 SEC (ref 9–12.5)

## 2022-11-10 PROCEDURE — 85610 PROTHROMBIN TIME: CPT | Performed by: INTERNAL MEDICINE

## 2022-11-10 PROCEDURE — 93793 PR ANTICOAGULANT MGMT FOR PT TAKING WARFARIN: ICD-10-PCS | Mod: S$GLB,,,

## 2022-11-10 PROCEDURE — 93793 ANTICOAG MGMT PT WARFARIN: CPT | Mod: S$GLB,,,

## 2022-11-10 PROCEDURE — 36415 COLL VENOUS BLD VENIPUNCTURE: CPT | Mod: PO | Performed by: INTERNAL MEDICINE

## 2022-11-10 NOTE — PROGRESS NOTES
INR not at goal. Medications, chart, and patient findings reviewed. See calendar for adjustments to dose and follow up plan.  Difficultly with INR management as of late.  We have boosted and increase his dose for several weeks, INR has dropped further. He denies missed doses, extra greens or any OTC/Boost supplements.  May need to consider change to DOAC if on insurance med list, Hx of GIB.  Patient repeated correct dose back.  Repeat INR in 1 week with lab already scheduled.

## 2022-11-11 ENCOUNTER — HOSPITAL ENCOUNTER (OUTPATIENT)
Dept: CARDIOLOGY | Facility: HOSPITAL | Age: 82
Discharge: HOME | End: 2022-11-11
Attending: INTERNAL MEDICINE
Payer: MEDICARE

## 2022-11-11 ENCOUNTER — TELEPHONE (OUTPATIENT)
Dept: CARDIOLOGY | Facility: CLINIC | Age: 82
End: 2022-11-11
Payer: MEDICARE

## 2022-11-11 VITALS
BODY MASS INDEX: 34.84 KG/M2 | HEIGHT: 67 IN | DIASTOLIC BLOOD PRESSURE: 80 MMHG | WEIGHT: 222 LBS | SYSTOLIC BLOOD PRESSURE: 130 MMHG | HEIGHT: 67 IN | DIASTOLIC BLOOD PRESSURE: 80 MMHG | WEIGHT: 222 LBS | SYSTOLIC BLOOD PRESSURE: 130 MMHG | BODY MASS INDEX: 34.84 KG/M2

## 2022-11-11 DIAGNOSIS — I48.21 PERMANENT ATRIAL FIBRILLATION: ICD-10-CM

## 2022-11-11 DIAGNOSIS — I50.32 CHRONIC DIASTOLIC HEART FAILURE: ICD-10-CM

## 2022-11-11 DIAGNOSIS — E11.59 HYPERTENSION ASSOCIATED WITH DIABETES: ICD-10-CM

## 2022-11-11 DIAGNOSIS — I15.2 HYPERTENSION ASSOCIATED WITH DIABETES: ICD-10-CM

## 2022-11-11 DIAGNOSIS — R60.0 EDEMA OF BOTH LEGS: ICD-10-CM

## 2022-11-11 LAB
AORTIC ROOT ANNULUS: 3.59 CM
ASCENDING AORTA: 4.02 CM
AV INDEX (PROSTH): 0.72
AV MEAN GRADIENT: 3 MMHG
AV PEAK GRADIENT: 6 MMHG
AV REGURGITATION PRESSURE HALF TIME: 430.83 MS
AV VALVE AREA: 2.31 CM2
AV VELOCITY RATIO: 0.69
BSA FOR ECHO PROCEDURE: 2.18 M2
CV ECHO LV RWT: 0.63 CM
DOP CALC AO PEAK VEL: 1.27 M/S
DOP CALC AO VTI: 22.2 CM
DOP CALC LVOT AREA: 3.2 CM2
DOP CALC LVOT DIAMETER: 2.02 CM
DOP CALC LVOT PEAK VEL: 0.88 M/S
DOP CALC LVOT STROKE VOLUME: 51.25 CM3
DOP CALC RVOT PEAK VEL: 0.72 M/S
DOP CALC RVOT VTI: 12.2 CM
DOP CALCLVOT PEAK VEL VTI: 16 CM
ECHO LV POSTERIOR WALL: 1.25 CM (ref 0.6–1.1)
EJECTION FRACTION: 60 %
FRACTIONAL SHORTENING: 35 % (ref 28–44)
INTERVENTRICULAR SEPTUM: 1.2 CM (ref 0.6–1.1)
LA MAJOR: 6.74 CM
LA MINOR: 7.15 CM
LA WIDTH: 4.6 CM
LEFT ATRIUM SIZE: 4.94 CM
LEFT ATRIUM VOLUME INDEX MOD: 44.5 ML/M2
LEFT ATRIUM VOLUME INDEX: 63.5 ML/M2
LEFT ATRIUM VOLUME MOD: 93.79 CM3
LEFT ATRIUM VOLUME: 134.03 CM3
LEFT INTERNAL DIMENSION IN SYSTOLE: 2.57 CM (ref 2.1–4)
LEFT VENTRICLE DIASTOLIC VOLUME INDEX: 32.63 ML/M2
LEFT VENTRICLE DIASTOLIC VOLUME: 68.84 ML
LEFT VENTRICLE MASS INDEX: 80 G/M2
LEFT VENTRICLE SYSTOLIC VOLUME INDEX: 11.4 ML/M2
LEFT VENTRICLE SYSTOLIC VOLUME: 23.97 ML
LEFT VENTRICULAR INTERNAL DIMENSION IN DIASTOLE: 3.97 CM (ref 3.5–6)
LEFT VENTRICULAR MASS: 168.7 G
LVOT MG: 1.68 MMHG
LVOT MV: 0.6 CM/S
PISA AR MAX VEL: 4.4 M/S
PISA TR MAX VEL: 2.8 M/S
PV MEAN GRADIENT: 0.9 MMHG
PV PEAK VELOCITY: 0.81 CM/S
RA MAJOR: 6.18 CM
RA PRESSURE: 3 MMHG
RA WIDTH: 4.49 CM
RIGHT VENTRICULAR END-DIASTOLIC DIMENSION: 3.53 CM
SINUS: 3.71 CM
STJ: 3.46 CM
TR MAX PG: 31 MMHG
TRICUSPID ANNULAR PLANE SYSTOLIC EXCURSION: 1.73 CM
TV REST PULMONARY ARTERY PRESSURE: 34 MMHG

## 2022-11-11 PROCEDURE — 93970 EXTREMITY STUDY: CPT | Mod: 26,,, | Performed by: INTERNAL MEDICINE

## 2022-11-11 PROCEDURE — 93306 TTE W/DOPPLER COMPLETE: CPT

## 2022-11-11 PROCEDURE — 93306 TTE W/DOPPLER COMPLETE: CPT | Mod: 26,,, | Performed by: INTERNAL MEDICINE

## 2022-11-11 PROCEDURE — 93306 ECHO (CUPID ONLY): ICD-10-PCS | Mod: 26,,, | Performed by: INTERNAL MEDICINE

## 2022-11-11 PROCEDURE — 93970 CV US DOPPLER VENOUS LEGS BILATERAL (CUPID ONLY): ICD-10-PCS | Mod: 26,,, | Performed by: INTERNAL MEDICINE

## 2022-11-11 PROCEDURE — 93970 EXTREMITY STUDY: CPT

## 2022-11-11 NOTE — TELEPHONE ENCOUNTER
Please call pt.    B LE venous ultrasound:  no blood clots noted.    Echo shows normal heart strength, leakage of heart valves which seems in same range as prior echo.    Continue plan as outlined at recent appt and f/u with Dr. Griffith as scheduled this month.    Dr Casiano

## 2022-11-14 ENCOUNTER — TELEPHONE (OUTPATIENT)
Dept: CARDIOLOGY | Facility: CLINIC | Age: 82
End: 2022-11-14
Payer: MEDICARE

## 2022-11-14 NOTE — TELEPHONE ENCOUNTER
Please call pt.     B LE venous ultrasound:  no blood clots noted.     Echo shows normal heart strength, leakage of heart valves which seems in same range as prior echo.     Continue plan as outlined at recent appt and f/u with Dr. Griffith as scheduled this month.     Dr Casiano       Spoke to patient's wife, verbalized understanding

## 2022-11-15 ENCOUNTER — TELEPHONE (OUTPATIENT)
Dept: ADMINISTRATIVE | Facility: HOSPITAL | Age: 82
End: 2022-11-15
Payer: MEDICARE

## 2022-11-18 ENCOUNTER — LAB VISIT (OUTPATIENT)
Dept: LAB | Facility: HOSPITAL | Age: 82
End: 2022-11-18
Attending: INTERNAL MEDICINE
Payer: MEDICARE

## 2022-11-18 ENCOUNTER — ANTI-COAG VISIT (OUTPATIENT)
Dept: CARDIOLOGY | Facility: CLINIC | Age: 82
End: 2022-11-18
Payer: MEDICARE

## 2022-11-18 DIAGNOSIS — R60.0 EDEMA OF BOTH LEGS: ICD-10-CM

## 2022-11-18 DIAGNOSIS — Z79.01 LONG TERM (CURRENT) USE OF ANTICOAGULANTS: ICD-10-CM

## 2022-11-18 DIAGNOSIS — I48.91 ATRIAL FIBRILLATION, UNSPECIFIED TYPE: ICD-10-CM

## 2022-11-18 DIAGNOSIS — Z79.01 LONG TERM (CURRENT) USE OF ANTICOAGULANTS: Primary | ICD-10-CM

## 2022-11-18 DIAGNOSIS — I50.32 CHRONIC DIASTOLIC HEART FAILURE: ICD-10-CM

## 2022-11-18 LAB
ALBUMIN SERPL BCP-MCNC: 3.8 G/DL (ref 3.5–5.2)
ALP SERPL-CCNC: 81 U/L (ref 55–135)
ALT SERPL W/O P-5'-P-CCNC: 32 U/L (ref 10–44)
ANION GAP SERPL CALC-SCNC: 9 MMOL/L (ref 8–16)
AST SERPL-CCNC: 27 U/L (ref 10–40)
BILIRUB SERPL-MCNC: 0.6 MG/DL (ref 0.1–1)
BNP SERPL-MCNC: 40 PG/ML (ref 0–99)
BUN SERPL-MCNC: 20 MG/DL (ref 8–23)
CALCIUM SERPL-MCNC: 9.3 MG/DL (ref 8.7–10.5)
CHLORIDE SERPL-SCNC: 93 MMOL/L (ref 95–110)
CO2 SERPL-SCNC: 31 MMOL/L (ref 23–29)
CREAT SERPL-MCNC: 1 MG/DL (ref 0.5–1.4)
EST. GFR  (NO RACE VARIABLE): >60 ML/MIN/1.73 M^2
GLUCOSE SERPL-MCNC: 137 MG/DL (ref 70–110)
INR PPP: 2 (ref 0.8–1.2)
POTASSIUM SERPL-SCNC: 3.5 MMOL/L (ref 3.5–5.1)
PROT SERPL-MCNC: 7.1 G/DL (ref 6–8.4)
PROTHROMBIN TIME: 20.8 SEC (ref 9–12.5)
SODIUM SERPL-SCNC: 133 MMOL/L (ref 136–145)

## 2022-11-18 PROCEDURE — 85610 PROTHROMBIN TIME: CPT | Performed by: INTERNAL MEDICINE

## 2022-11-18 PROCEDURE — 93793 PR ANTICOAGULANT MGMT FOR PT TAKING WARFARIN: ICD-10-PCS | Mod: S$GLB,,,

## 2022-11-18 PROCEDURE — 80053 COMPREHEN METABOLIC PANEL: CPT | Performed by: INTERNAL MEDICINE

## 2022-11-18 PROCEDURE — 36415 COLL VENOUS BLD VENIPUNCTURE: CPT | Mod: PO | Performed by: INTERNAL MEDICINE

## 2022-11-18 PROCEDURE — 93793 ANTICOAG MGMT PT WARFARIN: CPT | Mod: S$GLB,,,

## 2022-11-18 PROCEDURE — 83880 ASSAY OF NATRIURETIC PEPTIDE: CPT | Performed by: INTERNAL MEDICINE

## 2022-11-18 NOTE — PROGRESS NOTES
Patient contacted:  INR is 2.0 - at goal.  Lab collected.  Previous warfarin instructions were verified and followed.  No other changes reported.  Instructions given to continue 5 mg daily.  Retest with other labs on 12/01/2022 ([facility]).  Patient repeated back instructions correctly and confirms understanding.

## 2022-11-21 ENCOUNTER — OFFICE VISIT (OUTPATIENT)
Dept: CARDIOLOGY | Facility: CLINIC | Age: 82
End: 2022-11-21
Payer: MEDICARE

## 2022-11-21 VITALS
OXYGEN SATURATION: 98 % | BODY MASS INDEX: 34.29 KG/M2 | SYSTOLIC BLOOD PRESSURE: 130 MMHG | WEIGHT: 218.5 LBS | HEIGHT: 67 IN | DIASTOLIC BLOOD PRESSURE: 78 MMHG | HEART RATE: 85 BPM

## 2022-11-21 DIAGNOSIS — E11.59 HYPERTENSION ASSOCIATED WITH DIABETES: ICD-10-CM

## 2022-11-21 DIAGNOSIS — I50.32 CHRONIC DIASTOLIC HEART FAILURE: ICD-10-CM

## 2022-11-21 DIAGNOSIS — E11.69 HYPERLIPIDEMIA ASSOCIATED WITH TYPE 2 DIABETES MELLITUS: ICD-10-CM

## 2022-11-21 DIAGNOSIS — I48.0 PAROXYSMAL ATRIAL FIBRILLATION: Primary | ICD-10-CM

## 2022-11-21 DIAGNOSIS — I15.2 HYPERTENSION ASSOCIATED WITH DIABETES: ICD-10-CM

## 2022-11-21 DIAGNOSIS — I25.10 ATHEROSCLEROSIS OF NATIVE CORONARY ARTERY OF NATIVE HEART WITHOUT ANGINA PECTORIS: ICD-10-CM

## 2022-11-21 DIAGNOSIS — Z79.01 CURRENT USE OF LONG TERM ANTICOAGULATION: ICD-10-CM

## 2022-11-21 DIAGNOSIS — E78.5 HYPERLIPIDEMIA ASSOCIATED WITH TYPE 2 DIABETES MELLITUS: ICD-10-CM

## 2022-11-21 DIAGNOSIS — R60.0 EDEMA OF BOTH LEGS: ICD-10-CM

## 2022-11-21 DIAGNOSIS — I70.0 ATHEROSCLEROSIS OF AORTA: ICD-10-CM

## 2022-11-21 PROCEDURE — 3078F DIAST BP <80 MM HG: CPT | Mod: CPTII,S$GLB,, | Performed by: INTERNAL MEDICINE

## 2022-11-21 PROCEDURE — 1125F AMNT PAIN NOTED PAIN PRSNT: CPT | Mod: CPTII,S$GLB,, | Performed by: INTERNAL MEDICINE

## 2022-11-21 PROCEDURE — 1125F PR PAIN SEVERITY QUANTIFIED, PAIN PRESENT: ICD-10-PCS | Mod: CPTII,S$GLB,, | Performed by: INTERNAL MEDICINE

## 2022-11-21 PROCEDURE — 3288F FALL RISK ASSESSMENT DOCD: CPT | Mod: CPTII,S$GLB,, | Performed by: INTERNAL MEDICINE

## 2022-11-21 PROCEDURE — 3078F PR MOST RECENT DIASTOLIC BLOOD PRESSURE < 80 MM HG: ICD-10-PCS | Mod: CPTII,S$GLB,, | Performed by: INTERNAL MEDICINE

## 2022-11-21 PROCEDURE — 3072F LOW RISK FOR RETINOPATHY: CPT | Mod: CPTII,S$GLB,, | Performed by: INTERNAL MEDICINE

## 2022-11-21 PROCEDURE — 3072F PR LOW RISK FOR RETINOPATHY: ICD-10-PCS | Mod: CPTII,S$GLB,, | Performed by: INTERNAL MEDICINE

## 2022-11-21 PROCEDURE — 99214 PR OFFICE/OUTPT VISIT, EST, LEVL IV, 30-39 MIN: ICD-10-PCS | Mod: S$GLB,,, | Performed by: INTERNAL MEDICINE

## 2022-11-21 PROCEDURE — 3075F PR MOST RECENT SYSTOLIC BLOOD PRESS GE 130-139MM HG: ICD-10-PCS | Mod: CPTII,S$GLB,, | Performed by: INTERNAL MEDICINE

## 2022-11-21 PROCEDURE — 1101F PT FALLS ASSESS-DOCD LE1/YR: CPT | Mod: CPTII,S$GLB,, | Performed by: INTERNAL MEDICINE

## 2022-11-21 PROCEDURE — 99999 PR PBB SHADOW E&M-EST. PATIENT-LVL IV: ICD-10-PCS | Mod: PBBFAC,,, | Performed by: INTERNAL MEDICINE

## 2022-11-21 PROCEDURE — 3288F PR FALLS RISK ASSESSMENT DOCUMENTED: ICD-10-PCS | Mod: CPTII,S$GLB,, | Performed by: INTERNAL MEDICINE

## 2022-11-21 PROCEDURE — 99999 PR PBB SHADOW E&M-EST. PATIENT-LVL IV: CPT | Mod: PBBFAC,,, | Performed by: INTERNAL MEDICINE

## 2022-11-21 PROCEDURE — 99499 UNLISTED E&M SERVICE: CPT | Mod: HCNC,S$GLB,, | Performed by: INTERNAL MEDICINE

## 2022-11-21 PROCEDURE — 99214 OFFICE O/P EST MOD 30 MIN: CPT | Mod: S$GLB,,, | Performed by: INTERNAL MEDICINE

## 2022-11-21 PROCEDURE — 3075F SYST BP GE 130 - 139MM HG: CPT | Mod: CPTII,S$GLB,, | Performed by: INTERNAL MEDICINE

## 2022-11-21 PROCEDURE — 1101F PR PT FALLS ASSESS DOC 0-1 FALLS W/OUT INJ PAST YR: ICD-10-PCS | Mod: CPTII,S$GLB,, | Performed by: INTERNAL MEDICINE

## 2022-11-21 RX ORDER — POTASSIUM CHLORIDE 750 MG/1
10 TABLET, EXTENDED RELEASE ORAL DAILY
Qty: 30 TABLET | Refills: 11 | Status: ON HOLD | OUTPATIENT
Start: 2022-11-21 | End: 2023-03-03 | Stop reason: HOSPADM

## 2022-11-21 RX ORDER — METOLAZONE 5 MG/1
5 TABLET ORAL DAILY
Qty: 30 TABLET | Refills: 11 | Status: ON HOLD | OUTPATIENT
Start: 2022-11-21 | End: 2023-03-03 | Stop reason: HOSPADM

## 2022-11-21 NOTE — PROGRESS NOTES
Subjective:   Patient ID:  Anthony Biggs Jr. is a 82 y.o. male who presents for follow-up of Leg Swelling  Pt with BLE edema x 5 weeks. Zaroxolyn added 2 weeks ago with no relief.    Hypertension  This is a chronic problem. The current episode started more than 1 year ago. The problem has been gradually improving since onset. The problem is controlled. Pertinent negatives include no chest pain, palpitations or shortness of breath. Past treatments include angiotensin blockers. The current treatment provides moderate improvement. There are no compliance problems.    Hyperlipidemia  This is a chronic problem. The current episode started more than 1 year ago. The problem is controlled. Pertinent negatives include no chest pain or shortness of breath. Current antihyperlipidemic treatment includes statins. The current treatment provides moderate improvement of lipids. There are no compliance problems.    Coronary Artery Disease  Presents for follow-up visit. Pertinent negatives include no chest pain, chest pressure, chest tightness, dizziness, leg swelling, muscle weakness, palpitations, shortness of breath or weight gain. Risk factors include hyperlipidemia. The symptoms have been stable. Compliance with diet is good. Compliance with exercise is good. Compliance with medications is good.     Review of Systems   Constitutional: Negative. Negative for weight gain.   HENT: Negative.     Eyes: Negative.    Cardiovascular: Negative.  Negative for chest pain, leg swelling and palpitations.   Respiratory: Negative.  Negative for chest tightness and shortness of breath.    Endocrine: Negative.    Hematologic/Lymphatic: Negative.    Skin: Negative.    Musculoskeletal:  Negative for muscle weakness.   Gastrointestinal: Negative.    Genitourinary: Negative.    Neurological: Negative.  Negative for dizziness.   Psychiatric/Behavioral: Negative.     Allergic/Immunologic: Negative.    All other systems reviewed and are  negative.  Family History   Problem Relation Age of Onset    Heart disease Mother     Cancer Father         lung    Macular degeneration Sister     Diabetes Sister     Heart disease Sister     Strabismus Neg Hx     Retinal detachment Neg Hx     Glaucoma Neg Hx     Blindness Neg Hx     Amblyopia Neg Hx      Past Medical History:   Diagnosis Date    Abnormal CXR     Angina pectoris 2017    Angiodysplasia of small intestine     Atrial fibrillation     Chronic diastolic heart failure 2022    Chronic upper GI bleeding     due to angiodysplasia in proximal small intestine    Coronary atherosclerosis of unspecified type of vessel, native or graft     COVID 2022    Depression     Diabetes mellitus without complication     Emphysema of lung     History of prostate cancer 2007    prostatectomy    Hyperlipidemia associated with type 2 diabetes mellitus     Hypertension associated with diabetes     LAILA (iron deficiency anemia) 2021    due to angiodysplasia in small intestine    Intention tremor     Major depressive disorder, recurrent, moderate 2021    Moderate episode of recurrent major depressive disorder 2019    Morbid (severe) obesity due to excess calories 2019    SHORTY (obstructive sleep apnea)     Prostate cancer     Trouble in sleeping     Urinary incontinence      Social History     Socioeconomic History    Marital status:     Highest education level: Some college, no degree   Tobacco Use    Smoking status: Former     Packs/day: 0.50     Years: 40.00     Pack years: 20.00     Types: Cigarettes     Start date: 1962     Quit date:      Years since quittin.9    Smokeless tobacco: Never   Substance and Sexual Activity    Alcohol use: Yes     Alcohol/week: 7.0 standard drinks     Types: 7 Shots of liquor per week    Drug use: No    Sexual activity: Not Currently     Current Outpatient Medications on File Prior to Visit   Medication Sig Dispense Refill    allopurinoL  (ZYLOPRIM) 100 MG tablet Take 1 tablet (100 mg total) by mouth once daily. 90 tablet 3    aspirin (ECOTRIN) 81 MG EC tablet Take 81 mg by mouth once daily.      atorvastatin (LIPITOR) 40 MG tablet Take 1 tablet (40 mg total) by mouth once daily. for 90 doses 90 tablet 3    ferrous sulfate 324 mg (65 mg iron) TbEC TAKE ONE TABLET BY MOUTH TWICE DAILY 60 tablet 5    isosorbide mononitrate (IMDUR) 30 MG 24 hr tablet Take 1 tablet (30 mg total) by mouth once daily. 90 tablet 3    losartan (COZAAR) 50 MG tablet TAKE ONE TABLET BY MOUTH TWICE DAILY 180 tablet 2    melatonin 3 mg Tab Take 1 tablet by mouth nightly.      metFORMIN (GLUCOPHAGE) 500 MG tablet TAKE 1 TABLET (500 MG TOTAL) BY MOUTH 2 (TWO) TIMES DAILY WITH MEALS. 180 tablet 3    metOLazone (ZAROXOLYN) 5 MG tablet Take 1 tablet (5 mg total) by mouth every Mon, Wed, Fri. 12 tablet 11    nystatin-triamcinolone (MYCOLOG II) cream Apply topically 4 (four) times daily. 30 g 1    torsemide (DEMADEX) 10 MG Tab Take 1 tablet (10 mg total) by mouth 2 (two) times daily as needed. 180 tablet 3    warfarin (COUMADIN) 5 MG tablet TAKE 1 TABLET (5 MG TOTAL) BY MOUTH DAILY. OR AS DIRECTED BY COUMADIN CLINIC 30 tablet 11     No current facility-administered medications on file prior to visit.     Review of patient's allergies indicates:  No Known Allergies    Objective:     Physical Exam  Vitals and nursing note reviewed.   Constitutional:       Appearance: He is well-developed.   HENT:      Head: Normocephalic and atraumatic.   Eyes:      Conjunctiva/sclera: Conjunctivae normal.      Pupils: Pupils are equal, round, and reactive to light.   Cardiovascular:      Rate and Rhythm: Normal rate and regular rhythm.      Pulses: Intact distal pulses.      Heart sounds: Normal heart sounds.   Pulmonary:      Effort: Pulmonary effort is normal.      Breath sounds: Normal breath sounds.   Abdominal:      General: Bowel sounds are normal.      Palpations: Abdomen is soft.    Musculoskeletal:      Cervical back: Normal range of motion and neck supple.   Skin:     General: Skin is warm and dry.   Neurological:      Mental Status: He is alert and oriented to person, place, and time.       Assessment:     1. Paroxysmal atrial fibrillation    2. Atherosclerosis of native coronary artery of native heart without angina pectoris    3. Hyperlipidemia associated with type 2 diabetes mellitus    4. Hypertension associated with diabetes    5. Atherosclerosis of aorta    6. Chronic diastolic heart failure    7. Current use of long term anticoagulation    8. Edema of both legs        Plan:     Paroxysmal atrial fibrillation    Atherosclerosis of native coronary artery of native heart without angina pectoris    Hyperlipidemia associated with type 2 diabetes mellitus    Hypertension associated with diabetes    Atherosclerosis of aorta    Chronic diastolic heart failure    Current use of long term anticoagulation    Edema of both legs      Increase zaroxolyn q day with K  Check BMP 1 week  F/u 1 week  Continue losartan-htn  Continue statin -hlp  Continue imdur, asa- cad

## 2022-11-22 ENCOUNTER — OFFICE VISIT (OUTPATIENT)
Dept: OPHTHALMOLOGY | Facility: CLINIC | Age: 82
End: 2022-11-22
Payer: MEDICARE

## 2022-11-22 DIAGNOSIS — E11.9 DIABETES MELLITUS TYPE 2 WITHOUT RETINOPATHY: Primary | ICD-10-CM

## 2022-11-22 DIAGNOSIS — H35.3131 EARLY DRY STAGE NONEXUDATIVE AGE-RELATED MACULAR DEGENERATION OF BOTH EYES: ICD-10-CM

## 2022-11-22 DIAGNOSIS — Z96.1 PSEUDOPHAKIA OF BOTH EYES: ICD-10-CM

## 2022-11-22 PROCEDURE — 2023F DILAT RTA XM W/O RTNOPTHY: CPT | Mod: CPTII,S$GLB,, | Performed by: OPHTHALMOLOGY

## 2022-11-22 PROCEDURE — 99999 PR PBB SHADOW E&M-EST. PATIENT-LVL I: CPT | Mod: PBBFAC,,, | Performed by: OPHTHALMOLOGY

## 2022-11-22 PROCEDURE — 1159F MED LIST DOCD IN RCRD: CPT | Mod: CPTII,S$GLB,, | Performed by: OPHTHALMOLOGY

## 2022-11-22 PROCEDURE — 99999 PR PBB SHADOW E&M-EST. PATIENT-LVL I: ICD-10-PCS | Mod: PBBFAC,,, | Performed by: OPHTHALMOLOGY

## 2022-11-22 PROCEDURE — 1160F PR REVIEW ALL MEDS BY PRESCRIBER/CLIN PHARMACIST DOCUMENTED: ICD-10-PCS | Mod: CPTII,S$GLB,, | Performed by: OPHTHALMOLOGY

## 2022-11-22 PROCEDURE — 2023F PR DILATED RETINAL EXAM W/O EVID OF RETINOPATHY: ICD-10-PCS | Mod: CPTII,S$GLB,, | Performed by: OPHTHALMOLOGY

## 2022-11-22 PROCEDURE — 1160F RVW MEDS BY RX/DR IN RCRD: CPT | Mod: CPTII,S$GLB,, | Performed by: OPHTHALMOLOGY

## 2022-11-22 PROCEDURE — 92014 PR EYE EXAM, EST PATIENT,COMPREHESV: ICD-10-PCS | Mod: S$GLB,,, | Performed by: OPHTHALMOLOGY

## 2022-11-22 PROCEDURE — 1159F PR MEDICATION LIST DOCUMENTED IN MEDICAL RECORD: ICD-10-PCS | Mod: CPTII,S$GLB,, | Performed by: OPHTHALMOLOGY

## 2022-11-22 PROCEDURE — 92134 CPTRZ OPH DX IMG PST SGM RTA: CPT | Mod: S$GLB,,, | Performed by: OPHTHALMOLOGY

## 2022-11-22 PROCEDURE — 92014 COMPRE OPH EXAM EST PT 1/>: CPT | Mod: S$GLB,,, | Performed by: OPHTHALMOLOGY

## 2022-11-22 PROCEDURE — 92134 POSTERIOR SEGMENT OCT RETINA (OCULAR COHERENCE TOMOGRAPHY)-BOTH EYES: ICD-10-PCS | Mod: S$GLB,,, | Performed by: OPHTHALMOLOGY

## 2022-11-22 NOTE — PROGRESS NOTES
SUBJECTIVE  [place] DEJA Biggs Jr. is 82 y.o. male  Uncorrected distance visual acuity was 20/70 in the right eye and 20/50 in the left eye.   Chief Complaint   Patient presents with    Diabetic Eye Exam     Annual          HPI     Diabetic Eye Exam     Additional comments: Annual           Comments    States that his vision is stable and denies any new ocular issues at this   time.     1. Restor IOL OD 12/07 (no glasses at all)  2. Restor IOL OS 11/07  Yag OS 7/08  3. Mild CF OD  4. Mild AMD (states no family history)  5.DM x 2019            Last edited by Renee Ward on 11/22/2022  8:47 AM.         Assessment /Plan :  1. Diabetes mellitus type 2 without retinopathy No diabetic retinopathy at this time. Reviewed diabetic eye precautions including avoiding tobacco use,  Good glucose control, and importance of regular follow up.      2. Early dry stage nonexudative age-related macular degeneration of both eyes - Stable, monitor for now   3. Pseudophakia of both eyes  -- Condition stable, no therapeutic change required. Monitoring routinely.       RTC in 1 year with MOCT or prn any changes

## 2022-11-25 ENCOUNTER — LAB VISIT (OUTPATIENT)
Dept: LAB | Facility: HOSPITAL | Age: 82
End: 2022-11-25
Attending: INTERNAL MEDICINE
Payer: MEDICARE

## 2022-11-25 DIAGNOSIS — R60.0 EDEMA OF BOTH LEGS: ICD-10-CM

## 2022-11-25 LAB
ANION GAP SERPL CALC-SCNC: 14 MMOL/L (ref 8–16)
BUN SERPL-MCNC: 28 MG/DL (ref 8–23)
CALCIUM SERPL-MCNC: 9.5 MG/DL (ref 8.7–10.5)
CHLORIDE SERPL-SCNC: 90 MMOL/L (ref 95–110)
CO2 SERPL-SCNC: 31 MMOL/L (ref 23–29)
CREAT SERPL-MCNC: 1 MG/DL (ref 0.5–1.4)
EST. GFR  (NO RACE VARIABLE): >60 ML/MIN/1.73 M^2
GLUCOSE SERPL-MCNC: 131 MG/DL (ref 70–110)
POTASSIUM SERPL-SCNC: 3.6 MMOL/L (ref 3.5–5.1)
SODIUM SERPL-SCNC: 135 MMOL/L (ref 136–145)

## 2022-11-25 PROCEDURE — 36415 COLL VENOUS BLD VENIPUNCTURE: CPT | Mod: PO | Performed by: INTERNAL MEDICINE

## 2022-11-25 PROCEDURE — 80048 BASIC METABOLIC PNL TOTAL CA: CPT | Performed by: INTERNAL MEDICINE

## 2022-11-28 ENCOUNTER — HOSPITAL ENCOUNTER (OUTPATIENT)
Dept: CARDIOLOGY | Facility: HOSPITAL | Age: 82
Discharge: HOME | End: 2022-11-28
Payer: MEDICARE

## 2022-11-28 ENCOUNTER — OFFICE VISIT (OUTPATIENT)
Dept: CARDIOLOGY | Facility: CLINIC | Age: 82
End: 2022-11-28
Payer: MEDICARE

## 2022-11-28 VITALS
HEART RATE: 83 BPM | DIASTOLIC BLOOD PRESSURE: 60 MMHG | OXYGEN SATURATION: 96 % | SYSTOLIC BLOOD PRESSURE: 128 MMHG | HEIGHT: 67 IN | BODY MASS INDEX: 34.5 KG/M2 | WEIGHT: 219.81 LBS

## 2022-11-28 DIAGNOSIS — I50.32 CHRONIC DIASTOLIC HEART FAILURE: Primary | ICD-10-CM

## 2022-11-28 DIAGNOSIS — E11.69 HYPERLIPIDEMIA ASSOCIATED WITH TYPE 2 DIABETES MELLITUS: ICD-10-CM

## 2022-11-28 DIAGNOSIS — I70.0 ATHEROSCLEROSIS OF AORTA: ICD-10-CM

## 2022-11-28 DIAGNOSIS — R60.0 EDEMA OF BOTH LEGS: ICD-10-CM

## 2022-11-28 DIAGNOSIS — E11.59 HYPERTENSION ASSOCIATED WITH DIABETES: ICD-10-CM

## 2022-11-28 DIAGNOSIS — I25.10 ATHEROSCLEROSIS OF NATIVE CORONARY ARTERY OF NATIVE HEART WITHOUT ANGINA PECTORIS: ICD-10-CM

## 2022-11-28 DIAGNOSIS — E78.5 HYPERLIPIDEMIA ASSOCIATED WITH TYPE 2 DIABETES MELLITUS: ICD-10-CM

## 2022-11-28 DIAGNOSIS — I15.2 HYPERTENSION ASSOCIATED WITH DIABETES: ICD-10-CM

## 2022-11-28 DIAGNOSIS — I48.0 PAROXYSMAL ATRIAL FIBRILLATION: ICD-10-CM

## 2022-11-28 PROCEDURE — 3074F SYST BP LT 130 MM HG: CPT | Mod: CPTII,S$GLB,, | Performed by: INTERNAL MEDICINE

## 2022-11-28 PROCEDURE — 3072F PR LOW RISK FOR RETINOPATHY: ICD-10-PCS | Mod: CPTII,S$GLB,, | Performed by: INTERNAL MEDICINE

## 2022-11-28 PROCEDURE — 99499 UNLISTED E&M SERVICE: CPT | Mod: HCNC,S$GLB,, | Performed by: INTERNAL MEDICINE

## 2022-11-28 PROCEDURE — 1160F RVW MEDS BY RX/DR IN RCRD: CPT | Mod: CPTII,S$GLB,, | Performed by: INTERNAL MEDICINE

## 2022-11-28 PROCEDURE — 1101F PT FALLS ASSESS-DOCD LE1/YR: CPT | Mod: CPTII,S$GLB,, | Performed by: INTERNAL MEDICINE

## 2022-11-28 PROCEDURE — 99214 PR OFFICE/OUTPT VISIT, EST, LEVL IV, 30-39 MIN: ICD-10-PCS | Mod: S$GLB,,, | Performed by: INTERNAL MEDICINE

## 2022-11-28 PROCEDURE — 1101F PR PT FALLS ASSESS DOC 0-1 FALLS W/OUT INJ PAST YR: ICD-10-PCS | Mod: CPTII,S$GLB,, | Performed by: INTERNAL MEDICINE

## 2022-11-28 PROCEDURE — 1159F MED LIST DOCD IN RCRD: CPT | Mod: CPTII,S$GLB,, | Performed by: INTERNAL MEDICINE

## 2022-11-28 PROCEDURE — 1160F PR REVIEW ALL MEDS BY PRESCRIBER/CLIN PHARMACIST DOCUMENTED: ICD-10-PCS | Mod: CPTII,S$GLB,, | Performed by: INTERNAL MEDICINE

## 2022-11-28 PROCEDURE — 3074F PR MOST RECENT SYSTOLIC BLOOD PRESSURE < 130 MM HG: ICD-10-PCS | Mod: CPTII,S$GLB,, | Performed by: INTERNAL MEDICINE

## 2022-11-28 PROCEDURE — 3078F DIAST BP <80 MM HG: CPT | Mod: CPTII,S$GLB,, | Performed by: INTERNAL MEDICINE

## 2022-11-28 PROCEDURE — 99999 PR PBB SHADOW E&M-EST. PATIENT-LVL IV: CPT | Mod: PBBFAC,,, | Performed by: INTERNAL MEDICINE

## 2022-11-28 PROCEDURE — 3288F FALL RISK ASSESSMENT DOCD: CPT | Mod: CPTII,S$GLB,, | Performed by: INTERNAL MEDICINE

## 2022-11-28 PROCEDURE — 1126F AMNT PAIN NOTED NONE PRSNT: CPT | Mod: CPTII,S$GLB,, | Performed by: INTERNAL MEDICINE

## 2022-11-28 PROCEDURE — 99999 PR PBB SHADOW E&M-EST. PATIENT-LVL IV: ICD-10-PCS | Mod: PBBFAC,,, | Performed by: INTERNAL MEDICINE

## 2022-11-28 PROCEDURE — 3288F PR FALLS RISK ASSESSMENT DOCUMENTED: ICD-10-PCS | Mod: CPTII,S$GLB,, | Performed by: INTERNAL MEDICINE

## 2022-11-28 PROCEDURE — 1126F PR PAIN SEVERITY QUANTIFIED, NO PAIN PRESENT: ICD-10-PCS | Mod: CPTII,S$GLB,, | Performed by: INTERNAL MEDICINE

## 2022-11-28 PROCEDURE — 99214 OFFICE O/P EST MOD 30 MIN: CPT | Mod: S$GLB,,, | Performed by: INTERNAL MEDICINE

## 2022-11-28 PROCEDURE — 1159F PR MEDICATION LIST DOCUMENTED IN MEDICAL RECORD: ICD-10-PCS | Mod: CPTII,S$GLB,, | Performed by: INTERNAL MEDICINE

## 2022-11-28 PROCEDURE — 3072F LOW RISK FOR RETINOPATHY: CPT | Mod: CPTII,S$GLB,, | Performed by: INTERNAL MEDICINE

## 2022-11-28 PROCEDURE — 3078F PR MOST RECENT DIASTOLIC BLOOD PRESSURE < 80 MM HG: ICD-10-PCS | Mod: CPTII,S$GLB,, | Performed by: INTERNAL MEDICINE

## 2022-11-28 RX ORDER — FUROSEMIDE 40 MG/1
80 TABLET ORAL 2 TIMES DAILY
Qty: 120 TABLET | Refills: 11
Start: 2022-11-28 | End: 2022-12-02 | Stop reason: SDUPTHER

## 2022-11-28 NOTE — PROGRESS NOTES
Subjective:   Patient ID:  Anthony Biggs Jr. is a 82 y.o. male who presents for follow-up of No chief complaint on file.    11/28/22- Pt here today 1 week f/u with BLE edema, SOB not worse per pt. Pt taking torsemide BID and metolazone (previously tried 40mg lasix BID)  11/11/22 Echo nml EF, mod AR mod MR, ascending aorta mild dilation  Pt denies CP, angina and anginal equivalent at this time    11/21/22- Pt with BLE edema x 5 weeks. Zaroxolyn added 2 weeks ago with no relief.    Edema  This is a recurrent problem. The current episode started more than 1 month ago. The problem occurs constantly. The problem has been gradually worsening. Pertinent negatives include no chest pain.   Hyperlipidemia  This is a chronic problem. The current episode started more than 1 year ago. The problem is controlled. Associated symptoms include shortness of breath. Pertinent negatives include no chest pain. Current antihyperlipidemic treatment includes statins. The current treatment provides moderate improvement of lipids. There are no compliance problems.    Coronary Artery Disease  Presents for follow-up visit. Symptoms include leg swelling and shortness of breath. Pertinent negatives include no chest pain, chest pressure, chest tightness, dizziness, muscle weakness, palpitations or weight gain. Risk factors include hyperlipidemia. The symptoms have been stable. Compliance with diet is good. Compliance with exercise is good. Compliance with medications is good.     Review of Systems   Constitutional: Negative. Negative for weight gain.   HENT: Negative.     Eyes: Negative.    Cardiovascular:  Positive for leg swelling. Negative for chest pain and palpitations.   Respiratory:  Positive for shortness of breath. Negative for chest tightness.    Skin: Negative.    Musculoskeletal: Negative.  Negative for muscle weakness.   Gastrointestinal: Negative.    Genitourinary: Negative.    Neurological: Negative.  Negative for dizziness.    Psychiatric/Behavioral: Negative.     Family History   Problem Relation Age of Onset    Heart disease Mother     Cancer Father         lung    Macular degeneration Sister     Diabetes Sister     Heart disease Sister     Strabismus Neg Hx     Retinal detachment Neg Hx     Glaucoma Neg Hx     Blindness Neg Hx     Amblyopia Neg Hx      Past Medical History:   Diagnosis Date    Abnormal CXR     Angina pectoris 2017    Angiodysplasia of small intestine     Atrial fibrillation     Chronic diastolic heart failure 2022    Chronic upper GI bleeding     due to angiodysplasia in proximal small intestine    Coronary atherosclerosis of unspecified type of vessel, native or graft     COVID 2022    Depression     Diabetes mellitus without complication     Emphysema of lung     History of prostate cancer 2007    prostatectomy    Hyperlipidemia associated with type 2 diabetes mellitus     Hypertension associated with diabetes     LAILA (iron deficiency anemia) 2021    due to angiodysplasia in small intestine    Intention tremor     Major depressive disorder, recurrent, moderate 2021    Moderate episode of recurrent major depressive disorder 2019    Morbid (severe) obesity due to excess calories 2019    SHORTY (obstructive sleep apnea)     Prostate cancer     Trouble in sleeping     Urinary incontinence      Social History     Socioeconomic History    Marital status:     Highest education level: Some college, no degree   Tobacco Use    Smoking status: Former     Packs/day: 0.50     Years: 40.00     Pack years: 20.00     Types: Cigarettes     Start date: 1962     Quit date:      Years since quittin.9    Smokeless tobacco: Never   Substance and Sexual Activity    Alcohol use: Yes     Alcohol/week: 7.0 standard drinks     Types: 7 Shots of liquor per week    Drug use: No    Sexual activity: Not Currently     Current Outpatient Medications on File Prior to Visit   Medication Sig Dispense  Refill    allopurinoL (ZYLOPRIM) 100 MG tablet Take 1 tablet (100 mg total) by mouth once daily. 90 tablet 3    aspirin (ECOTRIN) 81 MG EC tablet Take 81 mg by mouth once daily.      atorvastatin (LIPITOR) 40 MG tablet Take 1 tablet (40 mg total) by mouth once daily. for 90 doses 90 tablet 3    ferrous sulfate 324 mg (65 mg iron) TbEC TAKE ONE TABLET BY MOUTH TWICE DAILY 60 tablet 5    isosorbide mononitrate (IMDUR) 30 MG 24 hr tablet Take 1 tablet (30 mg total) by mouth once daily. 90 tablet 3    losartan (COZAAR) 50 MG tablet TAKE ONE TABLET BY MOUTH TWICE DAILY 180 tablet 2    melatonin 3 mg Tab Take 1 tablet by mouth nightly.      metFORMIN (GLUCOPHAGE) 500 MG tablet TAKE ONE TABLET BY MOUTH TWICE A DAY WITH MEALS 180 tablet 1    metOLazone (ZAROXOLYN) 5 MG tablet Take 1 tablet (5 mg total) by mouth once daily. 30 tablet 11    nystatin-triamcinolone (MYCOLOG II) cream Apply topically 4 (four) times daily. 30 g 1    potassium chloride SA (K-DUR,KLOR-CON M) 10 MEQ tablet Take 1 tablet (10 mEq total) by mouth once daily. 30 tablet 11    torsemide (DEMADEX) 10 MG Tab Take 1 tablet (10 mg total) by mouth 2 (two) times daily as needed. 180 tablet 3    warfarin (COUMADIN) 5 MG tablet TAKE 1 TABLET (5 MG TOTAL) BY MOUTH DAILY. OR AS DIRECTED BY COUMADIN CLINIC 30 tablet 11     No current facility-administered medications on file prior to visit.     Review of patient's allergies indicates:  No Known Allergies    Objective:     Physical Exam  Vitals and nursing note reviewed.   Constitutional:       Appearance: Normal appearance.   HENT:      Head: Normocephalic and atraumatic.   Eyes:      General:         Right eye: No discharge.         Left eye: No discharge.      Pupils: Pupils are equal, round, and reactive to light.   Cardiovascular:      Rate and Rhythm: Normal rate. Rhythm regularly irregular.      Heart sounds: S1 normal and S2 normal. No murmur heard.    No friction rub.   Pulmonary:      Effort: Pulmonary  effort is normal. No respiratory distress.      Breath sounds: Normal breath sounds. No rales.   Abdominal:      Palpations: Abdomen is soft.      Tenderness: There is no abdominal tenderness.   Musculoskeletal:      Cervical back: Neck supple.      Right lower leg: Edema present.      Left lower leg: Edema present.   Skin:     General: Skin is warm and dry.   Neurological:      General: No focal deficit present.      Mental Status: He is alert and oriented to person, place, and time.   Psychiatric:         Mood and Affect: Mood normal.         Behavior: Behavior normal.         Thought Content: Thought content normal.     Assessment:     1. Paroxysmal atrial fibrillation    2. Atherosclerosis of native coronary artery of native heart without angina pectoris    3. Hyperlipidemia associated with type 2 diabetes mellitus    4. Hypertension associated with diabetes    5. Atherosclerosis of aorta    6. Chronic diastolic heart failure        Plan:     Paroxysmal atrial fibrillation    Atherosclerosis of native coronary artery of native heart without angina pectoris    Hyperlipidemia associated with type 2 diabetes mellitus    Hypertension associated with diabetes    Atherosclerosis of aorta    Chronic diastolic heart failure    Lasix 40 BID, Metolazone daily  1wk f/u  Check BMP  Venous insufficiency studies

## 2022-12-01 ENCOUNTER — ANTI-COAG VISIT (OUTPATIENT)
Dept: CARDIOLOGY | Facility: CLINIC | Age: 82
End: 2022-12-01
Payer: MEDICARE

## 2022-12-01 ENCOUNTER — LAB VISIT (OUTPATIENT)
Dept: LAB | Facility: HOSPITAL | Age: 82
End: 2022-12-01
Attending: NURSE PRACTITIONER
Payer: MEDICARE

## 2022-12-01 ENCOUNTER — OFFICE VISIT (OUTPATIENT)
Dept: INTERNAL MEDICINE | Facility: CLINIC | Age: 82
End: 2022-12-01
Payer: MEDICARE

## 2022-12-01 VITALS
WEIGHT: 224.44 LBS | HEART RATE: 88 BPM | OXYGEN SATURATION: 98 % | DIASTOLIC BLOOD PRESSURE: 80 MMHG | SYSTOLIC BLOOD PRESSURE: 120 MMHG | BODY MASS INDEX: 35.22 KG/M2 | HEIGHT: 67 IN

## 2022-12-01 DIAGNOSIS — B37.49 CANDIDIASIS OF UROGENITAL SITE: Primary | ICD-10-CM

## 2022-12-01 DIAGNOSIS — I48.0 PAROXYSMAL ATRIAL FIBRILLATION: ICD-10-CM

## 2022-12-01 DIAGNOSIS — Z79.01 LONG TERM (CURRENT) USE OF ANTICOAGULANTS: ICD-10-CM

## 2022-12-01 DIAGNOSIS — D50.0 IRON DEFICIENCY ANEMIA DUE TO CHRONIC BLOOD LOSS: ICD-10-CM

## 2022-12-01 DIAGNOSIS — Z79.01 LONG TERM (CURRENT) USE OF ANTICOAGULANTS: Primary | ICD-10-CM

## 2022-12-01 LAB
BASOPHILS # BLD AUTO: 0.02 K/UL (ref 0–0.2)
BASOPHILS NFR BLD: 0.2 % (ref 0–1.9)
DIFFERENTIAL METHOD: ABNORMAL
EOSINOPHIL # BLD AUTO: 0.2 K/UL (ref 0–0.5)
EOSINOPHIL NFR BLD: 2 % (ref 0–8)
ERYTHROCYTE [DISTWIDTH] IN BLOOD BY AUTOMATED COUNT: 12.3 % (ref 11.5–14.5)
FERRITIN SERPL-MCNC: 693 NG/ML (ref 20–300)
HCT VFR BLD AUTO: 37.7 % (ref 40–54)
HGB BLD-MCNC: 12.6 G/DL (ref 14–18)
IMM GRANULOCYTES # BLD AUTO: 0.06 K/UL (ref 0–0.04)
IMM GRANULOCYTES NFR BLD AUTO: 0.7 % (ref 0–0.5)
INR PPP: 1.9 (ref 0.8–1.2)
IRON SERPL-MCNC: 81 UG/DL (ref 45–160)
LYMPHOCYTES # BLD AUTO: 2.7 K/UL (ref 1–4.8)
LYMPHOCYTES NFR BLD: 32.8 % (ref 18–48)
MCH RBC QN AUTO: 29.5 PG (ref 27–31)
MCHC RBC AUTO-ENTMCNC: 33.4 G/DL (ref 32–36)
MCV RBC AUTO: 88 FL (ref 82–98)
MONOCYTES # BLD AUTO: 0.9 K/UL (ref 0.3–1)
MONOCYTES NFR BLD: 11.5 % (ref 4–15)
NEUTROPHILS # BLD AUTO: 4.3 K/UL (ref 1.8–7.7)
NEUTROPHILS NFR BLD: 52.8 % (ref 38–73)
NRBC BLD-RTO: 0 /100 WBC
PLATELET # BLD AUTO: 234 K/UL (ref 150–450)
PMV BLD AUTO: 10.3 FL (ref 9.2–12.9)
PROTHROMBIN TIME: 19.5 SEC (ref 9–12.5)
RBC # BLD AUTO: 4.27 M/UL (ref 4.6–6.2)
SATURATED IRON: 26 % (ref 20–50)
TOTAL IRON BINDING CAPACITY: 306 UG/DL (ref 250–450)
TRANSFERRIN SERPL-MCNC: 207 MG/DL (ref 200–375)
WBC # BLD AUTO: 8.2 K/UL (ref 3.9–12.7)

## 2022-12-01 PROCEDURE — 1101F PR PT FALLS ASSESS DOC 0-1 FALLS W/OUT INJ PAST YR: ICD-10-PCS | Mod: CPTII,S$GLB,, | Performed by: INTERNAL MEDICINE

## 2022-12-01 PROCEDURE — 3079F DIAST BP 80-89 MM HG: CPT | Mod: CPTII,S$GLB,, | Performed by: INTERNAL MEDICINE

## 2022-12-01 PROCEDURE — 3288F FALL RISK ASSESSMENT DOCD: CPT | Mod: CPTII,S$GLB,, | Performed by: INTERNAL MEDICINE

## 2022-12-01 PROCEDURE — 1126F AMNT PAIN NOTED NONE PRSNT: CPT | Mod: CPTII,S$GLB,, | Performed by: INTERNAL MEDICINE

## 2022-12-01 PROCEDURE — 93793 PR ANTICOAGULANT MGMT FOR PT TAKING WARFARIN: ICD-10-PCS | Mod: S$GLB,,,

## 2022-12-01 PROCEDURE — 85610 PROTHROMBIN TIME: CPT | Performed by: INTERNAL MEDICINE

## 2022-12-01 PROCEDURE — 3074F SYST BP LT 130 MM HG: CPT | Mod: CPTII,S$GLB,, | Performed by: INTERNAL MEDICINE

## 2022-12-01 PROCEDURE — 3072F LOW RISK FOR RETINOPATHY: CPT | Mod: CPTII,S$GLB,, | Performed by: INTERNAL MEDICINE

## 2022-12-01 PROCEDURE — 1159F PR MEDICATION LIST DOCUMENTED IN MEDICAL RECORD: ICD-10-PCS | Mod: CPTII,S$GLB,, | Performed by: INTERNAL MEDICINE

## 2022-12-01 PROCEDURE — 3074F PR MOST RECENT SYSTOLIC BLOOD PRESSURE < 130 MM HG: ICD-10-PCS | Mod: CPTII,S$GLB,, | Performed by: INTERNAL MEDICINE

## 2022-12-01 PROCEDURE — 85025 COMPLETE CBC W/AUTO DIFF WBC: CPT | Performed by: NURSE PRACTITIONER

## 2022-12-01 PROCEDURE — 99999 PR PBB SHADOW E&M-EST. PATIENT-LVL IV: ICD-10-PCS | Mod: PBBFAC,,, | Performed by: INTERNAL MEDICINE

## 2022-12-01 PROCEDURE — 3288F PR FALLS RISK ASSESSMENT DOCUMENTED: ICD-10-PCS | Mod: CPTII,S$GLB,, | Performed by: INTERNAL MEDICINE

## 2022-12-01 PROCEDURE — 93793 ANTICOAG MGMT PT WARFARIN: CPT | Mod: S$GLB,,,

## 2022-12-01 PROCEDURE — 3079F PR MOST RECENT DIASTOLIC BLOOD PRESSURE 80-89 MM HG: ICD-10-PCS | Mod: CPTII,S$GLB,, | Performed by: INTERNAL MEDICINE

## 2022-12-01 PROCEDURE — 3072F PR LOW RISK FOR RETINOPATHY: ICD-10-PCS | Mod: CPTII,S$GLB,, | Performed by: INTERNAL MEDICINE

## 2022-12-01 PROCEDURE — 99213 OFFICE O/P EST LOW 20 MIN: CPT | Mod: S$GLB,,, | Performed by: INTERNAL MEDICINE

## 2022-12-01 PROCEDURE — 1159F MED LIST DOCD IN RCRD: CPT | Mod: CPTII,S$GLB,, | Performed by: INTERNAL MEDICINE

## 2022-12-01 PROCEDURE — 1126F PR PAIN SEVERITY QUANTIFIED, NO PAIN PRESENT: ICD-10-PCS | Mod: CPTII,S$GLB,, | Performed by: INTERNAL MEDICINE

## 2022-12-01 PROCEDURE — 1101F PT FALLS ASSESS-DOCD LE1/YR: CPT | Mod: CPTII,S$GLB,, | Performed by: INTERNAL MEDICINE

## 2022-12-01 PROCEDURE — 99999 PR PBB SHADOW E&M-EST. PATIENT-LVL IV: CPT | Mod: PBBFAC,,, | Performed by: INTERNAL MEDICINE

## 2022-12-01 PROCEDURE — 84466 ASSAY OF TRANSFERRIN: CPT | Performed by: NURSE PRACTITIONER

## 2022-12-01 PROCEDURE — 99213 PR OFFICE/OUTPT VISIT, EST, LEVL III, 20-29 MIN: ICD-10-PCS | Mod: S$GLB,,, | Performed by: INTERNAL MEDICINE

## 2022-12-01 PROCEDURE — 82728 ASSAY OF FERRITIN: CPT | Performed by: NURSE PRACTITIONER

## 2022-12-01 NOTE — PROGRESS NOTES
Patient contacted:  INR is just below goal at 1.9.  Lab collected.  Patient reports taking 5 mg daily.  No dietary changes reported.  Patient requested a recheck to be added to future lab on Monday, 12/05/2022 - linked.  Instructions given to continue 5 mg daily.  May need another increase in dose, pending next INR result.  Patient confirms understanding.

## 2022-12-01 NOTE — PROGRESS NOTES
"HPI:  Patient is 82-year-old man who comes today with complaints of rash in his right inguinal groin area.  Is been present for several days.    Current meds have been verified and updated per the EMR  Exam:/80 (BP Location: Right arm)   Pulse 88   Ht 5' 7" (1.702 m)   Wt 101.8 kg (224 lb 6.9 oz)   SpO2 98%   BMI 35.15 kg/m²   He has a typical yeast infection in his right inguinal fold.    Lab Results   Component Value Date    WBC 6.38 10/17/2022    HGB 13.9 (L) 10/17/2022    HCT 43.1 10/17/2022     10/17/2022    CHOL 118 (L) 10/03/2022    TRIG 63 10/03/2022    HDL 44 10/03/2022    ALT 32 11/18/2022    AST 27 11/18/2022     (L) 11/25/2022    K 3.6 11/25/2022    CL 90 (L) 11/25/2022    CREATININE 1.0 11/25/2022    BUN 28 (H) 11/25/2022    CO2 31 (H) 11/25/2022    TSH 1.126 10/03/2022    PSA <0.010 08/20/2013    INR 2.0 (H) 11/18/2022    HGBA1C 6.1 (H) 10/03/2022       Impression:  Yeast infection  Patient Active Problem List   Diagnosis    Atrial fibrillation    Coronary atherosclerosis    History of prostate cancer    Refractive error    Family history of macular degeneration    Posterior capsular opacification    Pseudophakia of both eyes    History of colon polyps    Diverticulosis of large intestine without hemorrhage    Current use of long term anticoagulation    Pulmonary hypertension, mild    SHORTY (obstructive sleep apnea)    Diabetes mellitus without complication    Hyperlipidemia associated with type 2 diabetes mellitus    Hypertension associated with diabetes    Intention tremor    Atherosclerosis of aorta    Adrenal nodule    Lung nodules    Obesity (BMI 30.0-34.9)    Major depressive disorder, recurrent, moderate    Angiodysplasia of small intestine    Chronic upper GI bleeding    LAILA (iron deficiency anemia)    Edema of both legs    Chronic diastolic heart failure       Plan:     Patient was told to use Lotrimin cream 3 times a day.  He needs chronic keep the area dry.    This note " is generated with speech recognition software and is subject to transcription error and sound alike phrases that may be missed by proofreading.

## 2022-12-02 ENCOUNTER — TELEPHONE (OUTPATIENT)
Dept: CARDIOLOGY | Facility: HOSPITAL | Age: 82
End: 2022-12-02
Payer: MEDICARE

## 2022-12-02 RX ORDER — FUROSEMIDE 40 MG/1
80 TABLET ORAL 2 TIMES DAILY
Qty: 120 TABLET | Refills: 11
Start: 2022-12-02 | End: 2022-12-05 | Stop reason: SDUPTHER

## 2022-12-02 NOTE — TELEPHONE ENCOUNTER
No new care gaps identified.  [facility] Embedded Care Gaps. Reference number: [id number]. 12/02/2022   3:08:38 PM CST

## 2022-12-02 NOTE — TELEPHONE ENCOUNTER
Pt contacted and informed not to go to 12:30 appointment today / Dr. Griffith nurse will contact him to schedule a VI outpatient ultrasound with the vascular lab in [place].

## 2022-12-05 ENCOUNTER — LAB VISIT (OUTPATIENT)
Dept: LAB | Facility: HOSPITAL | Age: 82
End: 2022-12-05
Payer: MEDICARE

## 2022-12-05 ENCOUNTER — ANTI-COAG VISIT (OUTPATIENT)
Dept: CARDIOLOGY | Facility: CLINIC | Age: 82
End: 2022-12-05
Payer: MEDICARE

## 2022-12-05 DIAGNOSIS — Z79.01 LONG TERM (CURRENT) USE OF ANTICOAGULANTS: Primary | ICD-10-CM

## 2022-12-05 DIAGNOSIS — R60.0 EDEMA OF BOTH LEGS: ICD-10-CM

## 2022-12-05 DIAGNOSIS — Z79.01 LONG TERM (CURRENT) USE OF ANTICOAGULANTS: ICD-10-CM

## 2022-12-05 DIAGNOSIS — I50.32 CHRONIC DIASTOLIC HEART FAILURE: ICD-10-CM

## 2022-12-05 LAB
ANION GAP SERPL CALC-SCNC: 15 MMOL/L (ref 8–16)
BUN SERPL-MCNC: 35 MG/DL (ref 8–23)
CALCIUM SERPL-MCNC: 9.7 MG/DL (ref 8.7–10.5)
CHLORIDE SERPL-SCNC: 91 MMOL/L (ref 95–110)
CO2 SERPL-SCNC: 28 MMOL/L (ref 23–29)
CREAT SERPL-MCNC: 1.3 MG/DL (ref 0.5–1.4)
EST. GFR  (NO RACE VARIABLE): 56 ML/MIN/1.73 M^2
GLUCOSE SERPL-MCNC: 114 MG/DL (ref 70–110)
INR PPP: 1.7 (ref 0.8–1.2)
POTASSIUM SERPL-SCNC: 3.5 MMOL/L (ref 3.5–5.1)
PROTHROMBIN TIME: 18 SEC (ref 9–12.5)
SODIUM SERPL-SCNC: 134 MMOL/L (ref 136–145)

## 2022-12-05 PROCEDURE — 93793 ANTICOAG MGMT PT WARFARIN: CPT | Mod: S$GLB,,,

## 2022-12-05 PROCEDURE — 85610 PROTHROMBIN TIME: CPT | Performed by: INTERNAL MEDICINE

## 2022-12-05 PROCEDURE — 80048 BASIC METABOLIC PNL TOTAL CA: CPT

## 2022-12-05 PROCEDURE — 93793 PR ANTICOAGULANT MGMT FOR PT TAKING WARFARIN: ICD-10-PCS | Mod: S$GLB,,,

## 2022-12-05 PROCEDURE — 36415 COLL VENOUS BLD VENIPUNCTURE: CPT | Mod: PO | Performed by: INTERNAL MEDICINE

## 2022-12-05 RX ORDER — FUROSEMIDE 40 MG/1
80 TABLET ORAL 2 TIMES DAILY
Qty: 120 TABLET | Refills: 11
Start: 2022-12-05 | End: 2022-12-08 | Stop reason: SDUPTHER

## 2022-12-05 NOTE — TELEPHONE ENCOUNTER
----- Message from Aleida Shine sent at 12/5/2022  9:52 AM CST -----  Contact: [phone]  Type:  RX Refill Request    Who Called: wife  Refill or New Rx:refill  RX Name and Strength:furosemide (LASIX) 40 MG tablet      How is the patient currently taking it? (ex. 1XDay):2xday  Is this a 30 day or 90 day RX:30  Preferred Pharmacy with phone number:  [id number] - [facility] [address] [address] [address]  Phone: [phone] Fax: [phone]  Local or Mail Order:local  Ordering Provider:  Would the patient rather a call back or a response via [facility]? call  Best Call Back Number:[phone]  Additional Information:

## 2022-12-05 NOTE — PROGRESS NOTES
INR not at goal. Medications, chart, and patient findings reviewed. See calendar for adjustments to dose and follow up plan.  INR has dropped.  We will increase overall dose and repeat INR In 2 weeks.  Dosing and follow up date reviewed with patient.  Patient confirms understanding new dose.

## 2022-12-05 NOTE — TELEPHONE ENCOUNTER
Rx was filled on 12/2 but I do not see where or how it was delivered to the pharmacy/ pt is calling to refill it / please resubmit refill/ please advise

## 2022-12-07 ENCOUNTER — TELEPHONE (OUTPATIENT)
Dept: CARDIOLOGY | Facility: CLINIC | Age: 82
End: 2022-12-07
Payer: MEDICARE

## 2022-12-07 NOTE — TELEPHONE ENCOUNTER
Spoke with [facility] in [place]. Faxed VI study order to them at [phone]. Staff informed me that they will call pt to scheduled this study and will fax us the results.

## 2022-12-08 ENCOUNTER — TELEPHONE (OUTPATIENT)
Dept: CARDIOLOGY | Facility: CLINIC | Age: 82
End: 2022-12-08
Payer: MEDICARE

## 2022-12-08 NOTE — TELEPHONE ENCOUNTER
----- Message from Darryl Griffith MD sent at 12/7/2022  8:39 PM CST -----  BMP reviewed  Continue current meds

## 2022-12-08 NOTE — TELEPHONE ENCOUNTER
Pt's wife needs Rx fixed and sent to [facility] on [address].     ----- Message from Norma Engel sent at 12/8/2022 12:53 PM CST -----  Contact: Mynor(Wife)/ [phone]  Mynor is calling in regards to pt medication (furosemide (LASIX) 40 MG tablet) refill sent to     [id number] - [place], [place] - [address] [facility]  [address] [facility] [address]  Phone: [phone] Fax: [phone]    She also states she missed a call about pt test results.Please give her a call back at [phone]. Thank you s/g

## 2022-12-09 RX ORDER — FUROSEMIDE 40 MG/1
80 TABLET ORAL 2 TIMES DAILY
Qty: 360 TABLET | Refills: 3 | Status: ON HOLD | OUTPATIENT
Start: 2022-12-09 | End: 2023-03-03 | Stop reason: HOSPADM

## 2022-12-12 ENCOUNTER — PATIENT MESSAGE (OUTPATIENT)
Dept: CARDIOLOGY | Facility: CLINIC | Age: 82
End: 2022-12-12
Payer: MEDICARE

## 2022-12-12 ENCOUNTER — TELEPHONE (OUTPATIENT)
Dept: CARDIOLOGY | Facility: CLINIC | Age: 82
End: 2022-12-12
Payer: MEDICARE

## 2022-12-12 NOTE — TELEPHONE ENCOUNTER
Darlene from [facility] in  called to report that pt's VI u/s showed venous insuff bilaterally. Wanted to know if Dr. Griffith wants them to set pt up with an appt with one of their specialists. Dr. Griffith advised that he does want pt to see one of their specialists. Darlene notified and will schedule pt's appt. Faxed pt's last office visit note to them @ [phone].

## 2022-12-19 ENCOUNTER — LAB VISIT (OUTPATIENT)
Dept: LAB | Facility: HOSPITAL | Age: 82
End: 2022-12-19
Attending: INTERNAL MEDICINE
Payer: MEDICARE

## 2022-12-19 ENCOUNTER — ANTI-COAG VISIT (OUTPATIENT)
Dept: CARDIOLOGY | Facility: CLINIC | Age: 82
End: 2022-12-19
Payer: MEDICARE

## 2022-12-19 DIAGNOSIS — Z79.01 LONG TERM (CURRENT) USE OF ANTICOAGULANTS: ICD-10-CM

## 2022-12-19 DIAGNOSIS — Z79.01 LONG TERM (CURRENT) USE OF ANTICOAGULANTS: Primary | ICD-10-CM

## 2022-12-19 LAB
INR PPP: 2.3 (ref 0.8–1.2)
PROTHROMBIN TIME: 23.2 SEC (ref 9–12.5)

## 2022-12-19 PROCEDURE — 36415 COLL VENOUS BLD VENIPUNCTURE: CPT | Mod: HCNC,PO | Performed by: INTERNAL MEDICINE

## 2022-12-19 PROCEDURE — 85610 PROTHROMBIN TIME: CPT | Mod: HCNC | Performed by: INTERNAL MEDICINE

## 2022-12-19 PROCEDURE — 93793 ANTICOAG MGMT PT WARFARIN: CPT | Mod: S$GLB,,,

## 2022-12-19 PROCEDURE — 93793 PR ANTICOAGULANT MGMT FOR PT TAKING WARFARIN: ICD-10-PCS | Mod: S$GLB,,,

## 2022-12-19 NOTE — PROGRESS NOTES
INR at goal. Medications and chart reviewed. No changes noted to necessitate adjustment of warfarin or follow-up plan. See calendar.  Patient contacted.  Reviewed newer dose, confirms understanding.  Repeat INR in 3 weeks.

## 2023-01-09 ENCOUNTER — LAB VISIT (OUTPATIENT)
Dept: LAB | Facility: HOSPITAL | Age: 83
End: 2023-01-09
Attending: INTERNAL MEDICINE
Payer: MEDICARE

## 2023-01-09 ENCOUNTER — ANTI-COAG VISIT (OUTPATIENT)
Dept: CARDIOLOGY | Facility: CLINIC | Age: 83
End: 2023-01-09
Payer: MEDICARE

## 2023-01-09 DIAGNOSIS — Z79.01 LONG TERM (CURRENT) USE OF ANTICOAGULANTS: ICD-10-CM

## 2023-01-09 DIAGNOSIS — Z79.01 LONG TERM (CURRENT) USE OF ANTICOAGULANTS: Primary | ICD-10-CM

## 2023-01-09 LAB
INR PPP: 1.8 (ref 0.8–1.2)
PROTHROMBIN TIME: 18.8 SEC (ref 9–12.5)

## 2023-01-09 PROCEDURE — 93793 ANTICOAG MGMT PT WARFARIN: CPT | Mod: S$GLB,,,

## 2023-01-09 PROCEDURE — 36415 COLL VENOUS BLD VENIPUNCTURE: CPT | Mod: HCNC,PO | Performed by: INTERNAL MEDICINE

## 2023-01-09 PROCEDURE — 93793 PR ANTICOAGULANT MGMT FOR PT TAKING WARFARIN: ICD-10-PCS | Mod: S$GLB,,,

## 2023-01-09 PROCEDURE — 85610 PROTHROMBIN TIME: CPT | Mod: HCNC | Performed by: INTERNAL MEDICINE

## 2023-01-09 NOTE — PROGRESS NOTES
INR not at goal. Medications, chart, and patient findings reviewed. See calendar for adjustments to dose and follow up plan.  Patient reports that he has been taking 5 mg QD  - incorrect dose.  We will ask him to increase to the prescribed dose of 7.5 mg on Tuesdays and 5 mg on AOD.  Patient verbalizes understanding.  Dose repeated for him again.

## 2023-01-25 ENCOUNTER — ANTI-COAG VISIT (OUTPATIENT)
Dept: CARDIOLOGY | Facility: CLINIC | Age: 83
End: 2023-01-25
Payer: MEDICARE

## 2023-01-25 ENCOUNTER — LAB VISIT (OUTPATIENT)
Dept: LAB | Facility: HOSPITAL | Age: 83
End: 2023-01-25
Attending: INTERNAL MEDICINE
Payer: MEDICARE

## 2023-01-25 DIAGNOSIS — Z79.01 LONG TERM (CURRENT) USE OF ANTICOAGULANTS: Primary | ICD-10-CM

## 2023-01-25 DIAGNOSIS — Z79.01 LONG TERM (CURRENT) USE OF ANTICOAGULANTS: ICD-10-CM

## 2023-01-25 LAB
INR PPP: 2.3 (ref 0.8–1.2)
PROTHROMBIN TIME: 23.4 SEC (ref 9–12.5)

## 2023-01-25 PROCEDURE — 36415 COLL VENOUS BLD VENIPUNCTURE: CPT | Mod: HCNC,PO | Performed by: INTERNAL MEDICINE

## 2023-01-25 PROCEDURE — 93793 PR ANTICOAGULANT MGMT FOR PT TAKING WARFARIN: ICD-10-PCS | Mod: S$GLB,,,

## 2023-01-25 PROCEDURE — 93793 ANTICOAG MGMT PT WARFARIN: CPT | Mod: S$GLB,,,

## 2023-01-25 PROCEDURE — 85610 PROTHROMBIN TIME: CPT | Mod: HCNC | Performed by: INTERNAL MEDICINE

## 2023-01-25 NOTE — PROGRESS NOTES
INR at goal. Medications and chart reviewed. No changes noted to necessitate adjustment of warfarin or follow-up plan. See calendar.  Patient contacted, confirms dose.  Repeat INR in 3 weeks.  [facility] lab 2/15/23.

## 2023-02-07 DIAGNOSIS — Z00.00 ENCOUNTER FOR MEDICARE ANNUAL WELLNESS EXAM: ICD-10-CM

## 2023-02-09 DIAGNOSIS — Z00.00 ENCOUNTER FOR MEDICARE ANNUAL WELLNESS EXAM: ICD-10-CM

## 2023-02-15 ENCOUNTER — ANTI-COAG VISIT (OUTPATIENT)
Dept: CARDIOLOGY | Facility: CLINIC | Age: 83
End: 2023-02-15
Payer: MEDICARE

## 2023-02-15 ENCOUNTER — LAB VISIT (OUTPATIENT)
Dept: LAB | Facility: HOSPITAL | Age: 83
End: 2023-02-15
Attending: INTERNAL MEDICINE
Payer: MEDICARE

## 2023-02-15 DIAGNOSIS — Z79.01 LONG TERM (CURRENT) USE OF ANTICOAGULANTS: Primary | ICD-10-CM

## 2023-02-15 DIAGNOSIS — Z79.01 LONG TERM (CURRENT) USE OF ANTICOAGULANTS: ICD-10-CM

## 2023-02-15 LAB
INR PPP: 4.4 (ref 0.8–1.2)
PROTHROMBIN TIME: 43.7 SEC (ref 9–12.5)

## 2023-02-15 PROCEDURE — 85610 PROTHROMBIN TIME: CPT | Mod: HCNC | Performed by: INTERNAL MEDICINE

## 2023-02-15 PROCEDURE — 93793 ANTICOAG MGMT PT WARFARIN: CPT | Mod: S$GLB,,,

## 2023-02-15 PROCEDURE — 93793 PR ANTICOAGULANT MGMT FOR PT TAKING WARFARIN: ICD-10-PCS | Mod: S$GLB,,,

## 2023-02-15 PROCEDURE — 36415 COLL VENOUS BLD VENIPUNCTURE: CPT | Mod: HCNC,PO | Performed by: INTERNAL MEDICINE

## 2023-02-15 NOTE — PROGRESS NOTES
INR not at goal. Medications, chart, and patient findings reviewed. See calendar for adjustments to dose and follow up plan.  Denies any bleeding, no changes in health, medications or diet.  We will hold dose tomorrow as he has taken his dose today.  I have asked him to eat a small serving of greens today as well.  Repeat INR in 1 week.

## 2023-02-22 ENCOUNTER — DOCUMENTATION ONLY (OUTPATIENT)
Dept: CARDIOLOGY | Facility: CLINIC | Age: 83
End: 2023-02-22
Payer: MEDICARE

## 2023-02-22 ENCOUNTER — TELEPHONE (OUTPATIENT)
Dept: CARDIOLOGY | Facility: CLINIC | Age: 83
End: 2023-02-22
Payer: MEDICARE

## 2023-02-22 ENCOUNTER — ANTI-COAG VISIT (OUTPATIENT)
Dept: CARDIOLOGY | Facility: CLINIC | Age: 83
End: 2023-02-22
Payer: MEDICARE

## 2023-02-22 DIAGNOSIS — Z79.01 LONG TERM (CURRENT) USE OF ANTICOAGULANTS: Primary | ICD-10-CM

## 2023-02-22 PROCEDURE — 93793 PR ANTICOAGULANT MGMT FOR PT TAKING WARFARIN: ICD-10-PCS | Mod: S$GLB,,,

## 2023-02-22 PROCEDURE — 93793 ANTICOAG MGMT PT WARFARIN: CPT | Mod: S$GLB,,,

## 2023-02-22 NOTE — PROGRESS NOTES
INR not at goal. Medications, chart, and patient findings reviewed. See calendar for adjustments to dose and follow up plan.  Critical INR - 8.2 - patient has followed dosing directions from last week.  Mr Biggs reports no changes, no bleeding, however, Mrs Biggs is concerned about his lack of appetite.  He is not eating meals, but is requesting sweets only.  He decreased food and water intake are risks for bleeding.  This was discussed in depth with Mrs Biggs.  She will serve him some Boost supplements/V8 juice tonight as she reports that he has refused meals and salads.  He has taken his warfarin already today , but will skip 3 days of warfarin dosing through Saturday PM.  He will resume at a lower dose and will repeat INR Monday at  lab.  We discussed in depth the s/s of bleeding and to report immediately to the ER w/SOB, LH, weakness, tarry stools, falls, etc.

## 2023-02-22 NOTE — PROGRESS NOTES
Shilpi Biggs, Cardiology Supervisor- at [facility], reports she received a call from Lizeth Patel stating patient's INR is 8.18--Critical.  Advised Shilpi I will forward information to Twyla Weeks, PharmD in [facility] to follow up with patient.

## 2023-02-23 ENCOUNTER — TELEPHONE (OUTPATIENT)
Dept: INTERNAL MEDICINE | Facility: CLINIC | Age: 83
End: 2023-02-23
Payer: MEDICARE

## 2023-02-23 ENCOUNTER — ANESTHESIA (OUTPATIENT)
Dept: ENDOSCOPY | Facility: HOSPITAL | Age: 83
DRG: 377 | End: 2023-02-23
Payer: MEDICARE

## 2023-02-23 ENCOUNTER — ANESTHESIA EVENT (OUTPATIENT)
Dept: ENDOSCOPY | Facility: HOSPITAL | Age: 83
DRG: 377 | End: 2023-02-23
Payer: MEDICARE

## 2023-02-23 ENCOUNTER — HOSPITAL ENCOUNTER (INPATIENT)
Facility: HOSPITAL | Age: 83
LOS: 8 days | Discharge: NURSING, REHAB OR LONG-TERM CARE | DRG: 377 | End: 2023-03-03
Attending: EMERGENCY MEDICINE | Admitting: HOSPITALIST
Payer: MEDICARE

## 2023-02-23 VITALS
OXYGEN SATURATION: 95 % | HEART RATE: 89 BPM | DIASTOLIC BLOOD PRESSURE: 47 MMHG | SYSTOLIC BLOOD PRESSURE: 84 MMHG | RESPIRATION RATE: 29 BRPM

## 2023-02-23 DIAGNOSIS — K92.1 GASTROINTESTINAL HEMORRHAGE WITH MELENA: ICD-10-CM

## 2023-02-23 DIAGNOSIS — D62 ACUTE ON CHRONIC BLOOD LOSS ANEMIA: ICD-10-CM

## 2023-02-23 DIAGNOSIS — T45.511A POISONING BY WARFARIN SODIUM, ACCIDENTAL OR UNINTENTIONAL, INITIAL ENCOUNTER: ICD-10-CM

## 2023-02-23 DIAGNOSIS — I50.32 CHRONIC DIASTOLIC HEART FAILURE: ICD-10-CM

## 2023-02-23 DIAGNOSIS — N17.9 ACUTE RENAL FAILURE, UNSPECIFIED ACUTE RENAL FAILURE TYPE: ICD-10-CM

## 2023-02-23 DIAGNOSIS — G93.41 ENCEPHALOPATHY, METABOLIC: ICD-10-CM

## 2023-02-23 DIAGNOSIS — I48.0 PAROXYSMAL ATRIAL FIBRILLATION: ICD-10-CM

## 2023-02-23 DIAGNOSIS — K92.2 ACUTE UPPER GI BLEED: Primary | ICD-10-CM

## 2023-02-23 DIAGNOSIS — Z79.01 CHRONIC ANTICOAGULATION: ICD-10-CM

## 2023-02-23 DIAGNOSIS — R53.1 WEAKNESS: ICD-10-CM

## 2023-02-23 DIAGNOSIS — E86.1 INTRAVASCULAR VOLUME DEPLETION: ICD-10-CM

## 2023-02-23 DIAGNOSIS — R41.89 COGNITIVE DECLINE: ICD-10-CM

## 2023-02-23 DIAGNOSIS — I48.20 CHRONIC ATRIAL FIBRILLATION: ICD-10-CM

## 2023-02-23 PROBLEM — R57.8 HEMORRHAGIC SHOCK: Status: ACTIVE | Noted: 2023-02-23

## 2023-02-23 PROBLEM — R79.1 SUPRATHERAPEUTIC INR: Status: ACTIVE | Noted: 2023-02-23

## 2023-02-23 LAB
ABO + RH BLD: NORMAL
ALBUMIN SERPL BCP-MCNC: 3.6 G/DL (ref 3.5–5.2)
ALP SERPL-CCNC: 49 U/L (ref 55–135)
ALT SERPL W/O P-5'-P-CCNC: 19 U/L (ref 10–44)
ANION GAP SERPL CALC-SCNC: 14 MMOL/L (ref 8–16)
APTT BLDCRRT: 63.5 SEC (ref 21–32)
AST SERPL-CCNC: 23 U/L (ref 10–40)
BASOPHILS # BLD AUTO: 0.01 K/UL (ref 0–0.2)
BASOPHILS NFR BLD: 0.2 % (ref 0–1.9)
BILIRUB SERPL-MCNC: 0.3 MG/DL (ref 0.1–1)
BILIRUB UR QL STRIP: NEGATIVE
BLD GP AB SCN CELLS X3 SERPL QL: NORMAL
BLD PROD TYP BPU: NORMAL
BLD PROD TYP BPU: NORMAL
BLOOD UNIT EXPIRATION DATE: NORMAL
BLOOD UNIT EXPIRATION DATE: NORMAL
BLOOD UNIT TYPE CODE: 5100
BLOOD UNIT TYPE CODE: 5100
BLOOD UNIT TYPE: NORMAL
BLOOD UNIT TYPE: NORMAL
BNP SERPL-MCNC: 116 PG/ML (ref 0–99)
BUN SERPL-MCNC: 114 MG/DL (ref 8–23)
CALCIUM SERPL-MCNC: 9.3 MG/DL (ref 8.7–10.5)
CHLORIDE SERPL-SCNC: 98 MMOL/L (ref 95–110)
CLARITY UR: CLEAR
CO2 SERPL-SCNC: 23 MMOL/L (ref 23–29)
CODING SYSTEM: NORMAL
CODING SYSTEM: NORMAL
COLOR UR: COLORLESS
CREAT SERPL-MCNC: 3.5 MG/DL (ref 0.5–1.4)
CROSSMATCH INTERPRETATION: NORMAL
CROSSMATCH INTERPRETATION: NORMAL
DIFFERENTIAL METHOD: ABNORMAL
DISPENSE STATUS: NORMAL
DISPENSE STATUS: NORMAL
EOSINOPHIL # BLD AUTO: 0.1 K/UL (ref 0–0.5)
EOSINOPHIL NFR BLD: 2.1 % (ref 0–8)
ERYTHROCYTE [DISTWIDTH] IN BLOOD BY AUTOMATED COUNT: 14.4 % (ref 11.5–14.5)
EST. GFR  (NO RACE VARIABLE): 17 ML/MIN/1.73 M^2
GLUCOSE SERPL-MCNC: 138 MG/DL (ref 70–110)
GLUCOSE UR QL STRIP: NEGATIVE
HCT VFR BLD AUTO: 17.5 % (ref 40–54)
HCT VFR BLD AUTO: 25.1 % (ref 40–54)
HCT VFR BLD AUTO: 25.6 % (ref 40–54)
HCV AB SERPL QL IA: NEGATIVE
HGB BLD-MCNC: 5.8 G/DL (ref 14–18)
HGB BLD-MCNC: 8.4 G/DL (ref 14–18)
HGB BLD-MCNC: 8.7 G/DL (ref 14–18)
HGB UR QL STRIP: NEGATIVE
HIV 1+2 AB+HIV1 P24 AG SERPL QL IA: NEGATIVE
IMM GRANULOCYTES # BLD AUTO: 0.03 K/UL (ref 0–0.04)
IMM GRANULOCYTES NFR BLD AUTO: 0.5 % (ref 0–0.5)
INFLUENZA A, MOLECULAR: NEGATIVE
INFLUENZA B, MOLECULAR: NEGATIVE
INR PPP: 1.2 (ref 0.8–1.2)
INR PPP: >10 (ref 0.8–1.2)
KETONES UR QL STRIP: NEGATIVE
LACTATE SERPL-SCNC: 2.1 MMOL/L (ref 0.5–2.2)
LEUKOCYTE ESTERASE UR QL STRIP: NEGATIVE
LYMPHOCYTES # BLD AUTO: 1.6 K/UL (ref 1–4.8)
LYMPHOCYTES NFR BLD: 26.5 % (ref 18–48)
MAGNESIUM SERPL-MCNC: 1.8 MG/DL (ref 1.6–2.6)
MCH RBC QN AUTO: 31.4 PG (ref 27–31)
MCHC RBC AUTO-ENTMCNC: 33.1 G/DL (ref 32–36)
MCV RBC AUTO: 95 FL (ref 82–98)
MONOCYTES # BLD AUTO: 0.6 K/UL (ref 0.3–1)
MONOCYTES NFR BLD: 9.5 % (ref 4–15)
NEUTROPHILS # BLD AUTO: 3.8 K/UL (ref 1.8–7.7)
NEUTROPHILS NFR BLD: 61.2 % (ref 38–73)
NITRITE UR QL STRIP: NEGATIVE
NRBC BLD-RTO: 0 /100 WBC
NUM UNITS TRANS PACKED RBC: NORMAL
NUM UNITS TRANS PACKED RBC: NORMAL
OB PNL STL: POSITIVE
PH UR STRIP: 5 [PH] (ref 5–8)
PLATELET # BLD AUTO: 224 K/UL (ref 150–450)
PMV BLD AUTO: 10 FL (ref 9.2–12.9)
POCT GLUCOSE: 111 MG/DL (ref 70–110)
POCT GLUCOSE: 113 MG/DL (ref 70–110)
POCT GLUCOSE: 139 MG/DL (ref 70–110)
POCT GLUCOSE: 151 MG/DL (ref 70–110)
POTASSIUM SERPL-SCNC: 4.6 MMOL/L (ref 3.5–5.1)
PROCALCITONIN SERPL IA-MCNC: 0.35 NG/ML
PROT SERPL-MCNC: 6.6 G/DL (ref 6–8.4)
PROT UR QL STRIP: NEGATIVE
PROTHROMBIN TIME: 13.2 SEC (ref 9–12.5)
PROTHROMBIN TIME: >100 SEC (ref 9–12.5)
RBC # BLD AUTO: 1.85 M/UL (ref 4.6–6.2)
SARS-COV-2 RDRP RESP QL NAA+PROBE: NEGATIVE
SODIUM SERPL-SCNC: 135 MMOL/L (ref 136–145)
SP GR UR STRIP: 1.01 (ref 1–1.03)
SPECIMEN SOURCE: NORMAL
TROPONIN I SERPL DL<=0.01 NG/ML-MCNC: 0.04 NG/ML (ref 0–0.03)
URN SPEC COLLECT METH UR: ABNORMAL
UROBILINOGEN UR STRIP-ACNC: NEGATIVE EU/DL
WBC # BLD AUTO: 6.2 K/UL (ref 3.9–12.7)

## 2023-02-23 PROCEDURE — 99223 PR INITIAL HOSPITAL CARE,LEVL III: ICD-10-PCS | Mod: HCNC,,, | Performed by: INTERNAL MEDICINE

## 2023-02-23 PROCEDURE — 99285 EMERGENCY DEPT VISIT HI MDM: CPT | Mod: 25,HCNC

## 2023-02-23 PROCEDURE — 63600175 PHARM REV CODE 636 W HCPCS: Mod: JG,HCNC | Performed by: EMERGENCY MEDICINE

## 2023-02-23 PROCEDURE — 85610 PROTHROMBIN TIME: CPT | Mod: 91,HCNC | Performed by: INTERNAL MEDICINE

## 2023-02-23 PROCEDURE — 43270 EGD LESION ABLATION: CPT | Mod: HCNC,,, | Performed by: INTERNAL MEDICINE

## 2023-02-23 PROCEDURE — 36415 COLL VENOUS BLD VENIPUNCTURE: CPT | Mod: HCNC | Performed by: INTERNAL MEDICINE

## 2023-02-23 PROCEDURE — 36415 COLL VENOUS BLD VENIPUNCTURE: CPT | Mod: HCNC | Performed by: NURSE PRACTITIONER

## 2023-02-23 PROCEDURE — 36430 TRANSFUSION BLD/BLD COMPNT: CPT | Mod: HCNC

## 2023-02-23 PROCEDURE — 87502 INFLUENZA DNA AMP PROBE: CPT | Mod: HCNC | Performed by: EMERGENCY MEDICINE

## 2023-02-23 PROCEDURE — 25000003 PHARM REV CODE 250: Mod: HCNC | Performed by: NURSE ANESTHETIST, CERTIFIED REGISTERED

## 2023-02-23 PROCEDURE — 83735 ASSAY OF MAGNESIUM: CPT | Mod: HCNC | Performed by: EMERGENCY MEDICINE

## 2023-02-23 PROCEDURE — 27202087 HC PROBE, APC: Mod: HCNC | Performed by: INTERNAL MEDICINE

## 2023-02-23 PROCEDURE — 20000000 HC ICU ROOM: Mod: HCNC

## 2023-02-23 PROCEDURE — 94761 N-INVAS EAR/PLS OXIMETRY MLT: CPT | Mod: HCNC

## 2023-02-23 PROCEDURE — 43270 EGD LESION ABLATION: CPT | Mod: HCNC | Performed by: INTERNAL MEDICINE

## 2023-02-23 PROCEDURE — 82272 OCCULT BLD FECES 1-3 TESTS: CPT | Mod: HCNC | Performed by: EMERGENCY MEDICINE

## 2023-02-23 PROCEDURE — A4216 STERILE WATER/SALINE, 10 ML: HCPCS | Mod: HCNC | Performed by: EMERGENCY MEDICINE

## 2023-02-23 PROCEDURE — 63600175 PHARM REV CODE 636 W HCPCS: Mod: HCNC | Performed by: NURSE ANESTHETIST, CERTIFIED REGISTERED

## 2023-02-23 PROCEDURE — 96374 THER/PROPH/DIAG INJ IV PUSH: CPT | Mod: HCNC

## 2023-02-23 PROCEDURE — 99223 1ST HOSP IP/OBS HIGH 75: CPT | Mod: HCNC,,, | Performed by: INTERNAL MEDICINE

## 2023-02-23 PROCEDURE — U0002 COVID-19 LAB TEST NON-CDC: HCPCS | Mod: HCNC | Performed by: EMERGENCY MEDICINE

## 2023-02-23 PROCEDURE — 25000003 PHARM REV CODE 250: Mod: HCNC | Performed by: INTERNAL MEDICINE

## 2023-02-23 PROCEDURE — 63600175 PHARM REV CODE 636 W HCPCS: Mod: TB,JG,HCNC | Performed by: NURSE PRACTITIONER

## 2023-02-23 PROCEDURE — 86900 BLOOD TYPING SEROLOGIC ABO: CPT | Mod: HCNC | Performed by: EMERGENCY MEDICINE

## 2023-02-23 PROCEDURE — 85610 PROTHROMBIN TIME: CPT | Mod: HCNC | Performed by: EMERGENCY MEDICINE

## 2023-02-23 PROCEDURE — P9016 RBC LEUKOCYTES REDUCED: HCPCS | Mod: HCNC | Performed by: EMERGENCY MEDICINE

## 2023-02-23 PROCEDURE — 85018 HEMOGLOBIN: CPT | Mod: HCNC | Performed by: NURSE PRACTITIONER

## 2023-02-23 PROCEDURE — 96361 HYDRATE IV INFUSION ADD-ON: CPT | Mod: HCNC

## 2023-02-23 PROCEDURE — 87389 HIV-1 AG W/HIV-1&-2 AB AG IA: CPT | Mod: HCNC | Performed by: EMERGENCY MEDICINE

## 2023-02-23 PROCEDURE — 87040 BLOOD CULTURE FOR BACTERIA: CPT | Mod: 59,HCNC | Performed by: EMERGENCY MEDICINE

## 2023-02-23 PROCEDURE — 93010 ELECTROCARDIOGRAM REPORT: CPT | Mod: HCNC,,, | Performed by: INTERNAL MEDICINE

## 2023-02-23 PROCEDURE — 63600175 PHARM REV CODE 636 W HCPCS: Mod: HCNC | Performed by: NURSE PRACTITIONER

## 2023-02-23 PROCEDURE — 43270 PR EGD, FLEX, W/ABLATION, TUMOR/POLYP/LESION(S), W/ DILATION: ICD-10-PCS | Mod: HCNC,,, | Performed by: INTERNAL MEDICINE

## 2023-02-23 PROCEDURE — 86803 HEPATITIS C AB TEST: CPT | Mod: HCNC | Performed by: EMERGENCY MEDICINE

## 2023-02-23 PROCEDURE — 51702 INSERT TEMP BLADDER CATH: CPT | Mod: HCNC

## 2023-02-23 PROCEDURE — 85014 HEMATOCRIT: CPT | Mod: 91,HCNC | Performed by: NURSE PRACTITIONER

## 2023-02-23 PROCEDURE — 37000008 HC ANESTHESIA 1ST 15 MINUTES: Mod: HCNC | Performed by: INTERNAL MEDICINE

## 2023-02-23 PROCEDURE — 63600175 PHARM REV CODE 636 W HCPCS: Mod: HCNC | Performed by: INTERNAL MEDICINE

## 2023-02-23 PROCEDURE — C9113 INJ PANTOPRAZOLE SODIUM, VIA: HCPCS | Mod: HCNC | Performed by: EMERGENCY MEDICINE

## 2023-02-23 PROCEDURE — 85025 COMPLETE CBC W/AUTO DIFF WBC: CPT | Mod: HCNC | Performed by: EMERGENCY MEDICINE

## 2023-02-23 PROCEDURE — 83880 ASSAY OF NATRIURETIC PEPTIDE: CPT | Mod: HCNC | Performed by: EMERGENCY MEDICINE

## 2023-02-23 PROCEDURE — 37000009 HC ANESTHESIA EA ADD 15 MINS: Mod: HCNC | Performed by: INTERNAL MEDICINE

## 2023-02-23 PROCEDURE — 93010 EKG 12-LEAD: ICD-10-PCS | Mod: HCNC,,, | Performed by: INTERNAL MEDICINE

## 2023-02-23 PROCEDURE — 85730 THROMBOPLASTIN TIME PARTIAL: CPT | Mod: HCNC | Performed by: EMERGENCY MEDICINE

## 2023-02-23 PROCEDURE — 82962 GLUCOSE BLOOD TEST: CPT | Mod: HCNC

## 2023-02-23 PROCEDURE — 84145 PROCALCITONIN (PCT): CPT | Mod: HCNC | Performed by: EMERGENCY MEDICINE

## 2023-02-23 PROCEDURE — 86920 COMPATIBILITY TEST SPIN: CPT | Mod: HCNC | Performed by: EMERGENCY MEDICINE

## 2023-02-23 PROCEDURE — 81003 URINALYSIS AUTO W/O SCOPE: CPT | Mod: HCNC | Performed by: EMERGENCY MEDICINE

## 2023-02-23 PROCEDURE — 80053 COMPREHEN METABOLIC PANEL: CPT | Mod: HCNC | Performed by: EMERGENCY MEDICINE

## 2023-02-23 PROCEDURE — 93005 ELECTROCARDIOGRAM TRACING: CPT | Mod: HCNC

## 2023-02-23 PROCEDURE — 83605 ASSAY OF LACTIC ACID: CPT | Mod: HCNC | Performed by: EMERGENCY MEDICINE

## 2023-02-23 PROCEDURE — 25000003 PHARM REV CODE 250: Mod: HCNC | Performed by: NURSE PRACTITIONER

## 2023-02-23 PROCEDURE — 84484 ASSAY OF TROPONIN QUANT: CPT | Mod: HCNC | Performed by: EMERGENCY MEDICINE

## 2023-02-23 PROCEDURE — 25000003 PHARM REV CODE 250: Mod: HCNC | Performed by: EMERGENCY MEDICINE

## 2023-02-23 RX ORDER — PANTOPRAZOLE SODIUM 40 MG/10ML
40 INJECTION, POWDER, LYOPHILIZED, FOR SOLUTION INTRAVENOUS DAILY
Status: DISCONTINUED | OUTPATIENT
Start: 2023-02-24 | End: 2023-02-27

## 2023-02-23 RX ORDER — SODIUM CHLORIDE, SODIUM LACTATE, POTASSIUM CHLORIDE, CALCIUM CHLORIDE 600; 310; 30; 20 MG/100ML; MG/100ML; MG/100ML; MG/100ML
INJECTION, SOLUTION INTRAVENOUS CONTINUOUS PRN
Status: DISCONTINUED | OUTPATIENT
Start: 2023-02-23 | End: 2023-02-23

## 2023-02-23 RX ORDER — INSULIN ASPART 100 [IU]/ML
0-5 INJECTION, SOLUTION INTRAVENOUS; SUBCUTANEOUS EVERY 6 HOURS PRN
Status: DISCONTINUED | OUTPATIENT
Start: 2023-02-23 | End: 2023-03-03 | Stop reason: HOSPADM

## 2023-02-23 RX ORDER — MULTIVITAMIN
1 TABLET ORAL DAILY
COMMUNITY
End: 2023-02-23 | Stop reason: CLARIF

## 2023-02-23 RX ORDER — TRAZODONE HYDROCHLORIDE 50 MG/1
50 TABLET ORAL NIGHTLY PRN
Status: DISCONTINUED | OUTPATIENT
Start: 2023-02-23 | End: 2023-03-03 | Stop reason: HOSPADM

## 2023-02-23 RX ORDER — LIDOCAINE 50 MG/G
1 PATCH TOPICAL
Status: DISCONTINUED | OUTPATIENT
Start: 2023-02-23 | End: 2023-03-03 | Stop reason: HOSPADM

## 2023-02-23 RX ORDER — LIDOCAINE HYDROCHLORIDE 10 MG/ML
INJECTION, SOLUTION EPIDURAL; INFILTRATION; INTRACAUDAL; PERINEURAL
Status: DISCONTINUED | OUTPATIENT
Start: 2023-02-23 | End: 2023-02-23

## 2023-02-23 RX ORDER — SODIUM CHLORIDE 0.9 % (FLUSH) 0.9 %
10 SYRINGE (ML) INJECTION
Status: DISCONTINUED | OUTPATIENT
Start: 2023-02-23 | End: 2023-03-03 | Stop reason: HOSPADM

## 2023-02-23 RX ORDER — CALCIUM GLUCONATE 98 MG/ML
1 INJECTION, SOLUTION INTRAVENOUS ONCE
Status: COMPLETED | OUTPATIENT
Start: 2023-02-23 | End: 2023-02-23

## 2023-02-23 RX ORDER — MUPIROCIN 20 MG/G
OINTMENT TOPICAL 2 TIMES DAILY
Status: DISPENSED | OUTPATIENT
Start: 2023-02-24 | End: 2023-03-01

## 2023-02-23 RX ORDER — HYDROCODONE BITARTRATE AND ACETAMINOPHEN 5; 325 MG/1; MG/1
1 TABLET ORAL EVERY 6 HOURS PRN
Status: DISCONTINUED | OUTPATIENT
Start: 2023-02-23 | End: 2023-03-03 | Stop reason: HOSPADM

## 2023-02-23 RX ORDER — TORSEMIDE 10 MG/1
10 TABLET ORAL DAILY
Status: ON HOLD | COMMUNITY
Start: 2023-01-27 | End: 2023-03-03 | Stop reason: HOSPADM

## 2023-02-23 RX ORDER — MORPHINE SULFATE 2 MG/ML
2 INJECTION, SOLUTION INTRAMUSCULAR; INTRAVENOUS ONCE
Status: COMPLETED | OUTPATIENT
Start: 2023-02-23 | End: 2023-02-23

## 2023-02-23 RX ORDER — PANTOPRAZOLE SODIUM 40 MG/10ML
40 INJECTION, POWDER, LYOPHILIZED, FOR SOLUTION INTRAVENOUS 2 TIMES DAILY
Status: DISCONTINUED | OUTPATIENT
Start: 2023-02-23 | End: 2023-02-23

## 2023-02-23 RX ORDER — GLUCAGON 1 MG
1 KIT INJECTION
Status: DISCONTINUED | OUTPATIENT
Start: 2023-02-23 | End: 2023-03-03 | Stop reason: HOSPADM

## 2023-02-23 RX ORDER — MAGNESIUM SULFATE HEPTAHYDRATE 40 MG/ML
2 INJECTION, SOLUTION INTRAVENOUS ONCE
Status: COMPLETED | OUTPATIENT
Start: 2023-02-23 | End: 2023-02-23

## 2023-02-23 RX ORDER — PANTOPRAZOLE SODIUM 40 MG/10ML
80 INJECTION, POWDER, LYOPHILIZED, FOR SOLUTION INTRAVENOUS
Status: COMPLETED | OUTPATIENT
Start: 2023-02-23 | End: 2023-02-23

## 2023-02-23 RX ORDER — PHENYLEPHRINE HYDROCHLORIDE 10 MG/ML
INJECTION INTRAVENOUS
Status: DISCONTINUED | OUTPATIENT
Start: 2023-02-23 | End: 2023-02-23

## 2023-02-23 RX ORDER — HYDROCORTISONE 25 MG/G
CREAM TOPICAL 3 TIMES DAILY PRN
Status: DISCONTINUED | OUTPATIENT
Start: 2023-02-23 | End: 2023-03-03 | Stop reason: HOSPADM

## 2023-02-23 RX ORDER — IBUPROFEN 200 MG
200 TABLET ORAL NIGHTLY
Status: ON HOLD | COMMUNITY
End: 2023-03-03 | Stop reason: HOSPADM

## 2023-02-23 RX ORDER — AMOXICILLIN 250 MG
2 CAPSULE ORAL DAILY
Status: DISCONTINUED | OUTPATIENT
Start: 2023-02-24 | End: 2023-03-03 | Stop reason: HOSPADM

## 2023-02-23 RX ORDER — PROPOFOL 10 MG/ML
VIAL (ML) INTRAVENOUS
Status: DISCONTINUED | OUTPATIENT
Start: 2023-02-23 | End: 2023-02-23

## 2023-02-23 RX ORDER — ONDANSETRON 2 MG/ML
4 INJECTION INTRAMUSCULAR; INTRAVENOUS EVERY 8 HOURS PRN
Status: DISCONTINUED | OUTPATIENT
Start: 2023-02-23 | End: 2023-03-03 | Stop reason: HOSPADM

## 2023-02-23 RX ORDER — HYDROCODONE BITARTRATE AND ACETAMINOPHEN 500; 5 MG/1; MG/1
TABLET ORAL
Status: DISCONTINUED | OUTPATIENT
Start: 2023-02-23 | End: 2023-03-03 | Stop reason: HOSPADM

## 2023-02-23 RX ORDER — SUCRALFATE 1 G/10ML
1 SUSPENSION ORAL DAILY
Status: DISCONTINUED | OUTPATIENT
Start: 2023-02-24 | End: 2023-03-03 | Stop reason: HOSPADM

## 2023-02-23 RX ORDER — PROCHLORPERAZINE EDISYLATE 5 MG/ML
5 INJECTION INTRAMUSCULAR; INTRAVENOUS EVERY 6 HOURS PRN
Status: DISCONTINUED | OUTPATIENT
Start: 2023-02-23 | End: 2023-03-03 | Stop reason: HOSPADM

## 2023-02-23 RX ADMIN — PHENYLEPHRINE HYDROCHLORIDE 300 MCG: 10 INJECTION INTRAVENOUS at 05:02

## 2023-02-23 RX ADMIN — LIDOCAINE 1 PATCH: 50 PATCH CUTANEOUS at 10:02

## 2023-02-23 RX ADMIN — SODIUM CHLORIDE 1000 ML: 9 INJECTION, SOLUTION INTRAVENOUS at 10:02

## 2023-02-23 RX ADMIN — CALCIUM GLUCONATE 1 G: 98 INJECTION, SOLUTION INTRAVENOUS at 12:02

## 2023-02-23 RX ADMIN — TRAZODONE HYDROCHLORIDE 50 MG: 50 TABLET ORAL at 07:02

## 2023-02-23 RX ADMIN — PANTOPRAZOLE SODIUM 80 MG: 40 INJECTION, POWDER, FOR SOLUTION INTRAVENOUS at 11:02

## 2023-02-23 RX ADMIN — HYDROCORTISONE: 25 CREAM TOPICAL at 05:02

## 2023-02-23 RX ADMIN — SODIUM CHLORIDE 500 ML: 9 INJECTION, SOLUTION INTRAVENOUS at 11:02

## 2023-02-23 RX ADMIN — MORPHINE SULFATE 2 MG: 2 INJECTION, SOLUTION INTRAMUSCULAR; INTRAVENOUS at 11:02

## 2023-02-23 RX ADMIN — MAGNESIUM SULFATE HEPTAHYDRATE 2 G: 40 INJECTION, SOLUTION INTRAVENOUS at 03:02

## 2023-02-23 RX ADMIN — PROPOFOL 20 MG: 10 INJECTION, EMULSION INTRAVENOUS at 04:02

## 2023-02-23 RX ADMIN — PHENYLEPHRINE HYDROCHLORIDE 200 MCG: 10 INJECTION INTRAVENOUS at 05:02

## 2023-02-23 RX ADMIN — PROPOFOL 80 MG: 10 INJECTION, EMULSION INTRAVENOUS at 04:02

## 2023-02-23 RX ADMIN — Medication 4889 UNITS: at 12:02

## 2023-02-23 RX ADMIN — GLYCOPYRROLATE 0.2 MG: 0.2 INJECTION, SOLUTION INTRAMUSCULAR; INTRAVITREAL at 04:02

## 2023-02-23 RX ADMIN — SODIUM CHLORIDE, SODIUM LACTATE, POTASSIUM CHLORIDE, AND CALCIUM CHLORIDE: .6; .31; .03; .02 INJECTION, SOLUTION INTRAVENOUS at 04:02

## 2023-02-23 RX ADMIN — PHYTONADIONE 10 MG: 10 INJECTION, EMULSION INTRAMUSCULAR; INTRAVENOUS; SUBCUTANEOUS at 12:02

## 2023-02-23 RX ADMIN — HYDROCODONE BITARTRATE AND ACETAMINOPHEN 1 TABLET: 5; 325 TABLET ORAL at 06:02

## 2023-02-23 RX ADMIN — PROPOFOL 30 MG: 10 INJECTION, EMULSION INTRAVENOUS at 04:02

## 2023-02-23 RX ADMIN — LIDOCAINE HYDROCHLORIDE 50 MG: 10 INJECTION, SOLUTION EPIDURAL; INFILTRATION; INTRACAUDAL; PERINEURAL at 04:02

## 2023-02-23 RX ADMIN — MORPHINE SULFATE 2 MG: 2 INJECTION, SOLUTION INTRAMUSCULAR; INTRAVENOUS at 10:02

## 2023-02-23 NOTE — ASSESSMENT & PLAN NOTE
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

## 2023-02-23 NOTE — CONSULTS
"[facility] - Emergency Dept.  Gastroenterology  Consult Note    Patient Name: Anthony Biggs Jr.  MRN: [id number]  Admission Date: 2/23/2023  Hospital Length of Stay: 0 days  Code Status: Full Code   Attending Provider: Sung Stephenson MD   Consulting Provider: Diana Goldman PA-C  Primary Care Physician: Giuliano Moran MD  Principal Problem:<principal problem not specified>    Inpatient consult to Gastroenterology  Consult performed by: Diana Goldman PA-C  Consult ordered by: Jessica Santos MD  Reason for consult: GI bleed        Subjective:     HPI:  Patient is an 83yo male with PMHx including Afib on Coumadin who reported to the ED this morning after being sent by his GP for elevated INR. Wife is bedside. She reports that he had his INR checked yesterday and they received a call last night saying his INR was 8. Upon arrival to the ED, INR >10 with melena on rectal exam. Wife reports that patient began with change in appetite about 7-10 days ago - only wanting carbohydrates. Since then, he has started to become weaker and not feeling well. No specific complaints. Patient denies recent change in stool color or black stools. 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 These were treated. Denies any GI complaints since then. Upon arrival to the ED, patient was hypotensive but initially responded well to fluid resuscitation. He is now again hypotensive (62/30). Staff and ED physician aware. Patient denies abdominal pain. Only complaint is "spasms".      Past Medical History:   Diagnosis Date    Abnormal CXR     Angina pectoris 8/28/2017    Angiodysplasia of small intestine     Atrial fibrillation     Chronic diastolic heart failure 11/8/2022    Chronic upper GI bleeding     due to angiodysplasia in proximal small intestine    Coronary atherosclerosis of unspecified type of vessel, native or graft     COVID 6/26/2022    Depression     Diabetes mellitus without " complication     Emphysema of lung     History of prostate cancer 2007    prostatectomy    Hyperlipidemia associated with type 2 diabetes mellitus     Hypertension associated with diabetes     LAILA (iron deficiency anemia) 09/20/2021    due to angiodysplasia in small intestine    Intention tremor     Major depressive disorder, recurrent, moderate 4/12/2021    Moderate episode of recurrent major depressive disorder 4/1/2019    Morbid (severe) obesity due to excess calories 4/1/2019    SHORTY (obstructive sleep apnea)     Prostate cancer     Trouble in sleeping     Urinary incontinence        Past Surgical History:   Procedure Laterality Date    ABDOMINAL HERNIA REPAIR      APPENDECTOMY      bladder sx      CARDIAC CATHETERIZATION      CATARACT EXTRACTION W/  INTRAOCULAR LENS IMPLANT  Restor OU    COLONOSCOPY N/A 5/16/2017    Procedure: COLONOSCOPY;  Surgeon: Alfredo Naidu MD;  [facility];  Service: Endoscopy;  Laterality: N/A;    ESOPHAGOGASTRODUODENOSCOPY N/A 10/29/2021    Procedure: SBE (Push Enteroscopy) with Dr. Wayne;  Surgeon: Arlette Wayne MD;  Location: [facility];  Service: Endoscopy;  Laterality: N/A;  Plavix held indefinitely as of 10-20-21. Must also hold Coumadin 5 days prior. Ok to continue to use ASA.    inguinal hernia      INTRALUMINAL GASTROINTESTINAL TRACT IMAGING VIA CAPSULE N/A 10/4/2021    Procedure: IMAGING PROCEDURE, GI TRACT, INTRALUMINAL, VIA CAPSULE;  Surgeon: Joaquin Stack RN;  Location: [facility];  Service: Endoscopy;  Laterality: N/A;    LEFT HEART CATHETERIZATION Left 7/20/2021    Procedure: CATHETERIZATION, HEART, LEFT;  Surgeon: Darryl Griffith MD;  Location: [facility] CATH LAB;  Service: Cardiology;  Laterality: Left;    lung sx      PERCUTANEOUS TRANSLUMINAL BALLOON ANGIOPLASTY OF CORONARY ARTERY  7/20/2021    Procedure: Angioplasty-coronary;  Surgeon: Darryl Griffith MD;  Location: [facility] CATH LAB;  Service: Cardiology;;    PROSTATE  SURGERY         Review of patient's allergies indicates:  No Known Allergies  Family History       Problem Relation (Age of Onset)    Cancer Father    Diabetes Sister    Heart disease Mother, Sister    Macular degeneration Sister          Tobacco Use    Smoking status: Former     Packs/day: 0.50     Years: 40.00     Pack years: 20.00     Types: Cigarettes     Start date: 1962     Quit date:      Years since quittin.1    Smokeless tobacco: Never   Substance and Sexual Activity    Alcohol use: Yes     Alcohol/week: 7.0 standard drinks     Types: 7 Shots of liquor per week    Drug use: No    Sexual activity: Not Currently     Review of Systems   Constitutional:  Positive for appetite change and fatigue. Negative for chills, diaphoresis and fever.   Cardiovascular:  Negative for chest pain.   Gastrointestinal:  Negative for abdominal distention, abdominal pain, anal bleeding, blood in stool, constipation, diarrhea, nausea and vomiting.   Neurological:  Positive for weakness. Negative for syncope.   Objective:     Vital Signs (Most Recent):  Temp: 97.7 °F (36.5 °C) (23 1000)  Pulse: 82 (23 1031)  Resp: (!) 21 (23 1031)  BP: (!) 94/50 (23 1032)  SpO2: 96 % (23 1031)   Vital Signs (24h Range):  Temp:  [97.7 °F (36.5 °C)] 97.7 °F (36.5 °C)  Pulse:  [82-89] 82  Resp:  [18-21] 21  SpO2:  [96 %] 96 %  BP: (73-94)/(37-50) 94/50     Weight: 101.2 kg (223 lb 3.2 oz) (23 1016)  Body mass index is 34.96 kg/m².      Intake/Output Summary (Last 24 hours) at 2023 1155  Last data filed at 2023 1145  Gross per 24 hour   Intake 2000 ml   Output --   Net 2000 ml       Lines/Drains/Airways       Peripheral Intravenous Line  Duration                  Peripheral IV - Single Lumen 23 1024 20 G Anterior;Distal;Left Upper Arm <1 day         Peripheral IV - Single Lumen 23 1133 20 G Posterior;Right Hand <1 day                    Physical Exam  Constitutional:        Appearance: He is ill-appearing.   HENT:      Head: Normocephalic and atraumatic.   Eyes:      General: No scleral icterus.     Extraocular Movements: Extraocular movements intact.   Cardiovascular:      Rate and Rhythm: Normal rate.   Pulmonary:      Effort: Pulmonary effort is normal. No respiratory distress.   Abdominal:      General: Bowel sounds are normal. There is no distension.      Palpations: Abdomen is soft.      Tenderness: There is no abdominal tenderness. There is no guarding.   Musculoskeletal:      Cervical back: Normal range of motion.   Skin:     General: Skin is warm and dry.      Coloration: Skin is pale. Skin is not jaundiced.   Neurological:      Mental Status: He is alert.      Comments: Mildly disoriented. Patient answering questions appropriately but does not answer immediately       Significant Labs:  CBC:   Recent Labs   Lab 02/23/23  1024   WBC 6.20   HGB 5.8*   HCT 17.5*        CMP:   Recent Labs   Lab 02/23/23  1024   *   CALCIUM 9.3   ALBUMIN 3.6   PROT 6.6   *   K 4.6   CO2 23   CL 98   *   CREATININE 3.5*   ALKPHOS 49*   ALT 19   AST 23   BILITOT 0.3     Coagulation:   Recent Labs   Lab 02/23/23  1024   INR >10.0*   APTT 63.5*       Significant Imaging:  Imaging results within the past 24 hours have been reviewed.    Assessment/Plan:     Cardiac/Vascular  Atrial fibrillation  -Afib on Warfarin with INR >10.  -Hold anticoagulation in setting of severe anemia and GI bleed.     GI  Melena  -Patient with hgb 5.8, melena on rectal exam, and hypotension.   -History of AVMs back in 2021 seen on VCE - treated on EGD with push enteroscopy.   -Hold anticoagulation. Correct INR. ED has already ordered Kcentra.  -Agree with urgent blood transfusion, as patient remains hypotensive even with fluid resuscitation.   -Once stabilized, plan for EGD.  -Keep NPO.  -Agree with transfer to ICU.  -Continue with IV Protonix.            Thank you for your consult. I will follow-up  with patient. Please contact us if you have any additional questions.    Diana Goldman PA-C  Gastroenterology  [facility] - Emergency Dept.

## 2023-02-23 NOTE — HPI
An 83 yo male with afib diastolic chf angiodysplasia by capsule endoscopy presents with severe anemia mental status changes severe anemia significantly elevated inr hypotension. Basically he is in hypovolemic shock . No change in meds or diet.

## 2023-02-23 NOTE — ED PROVIDER NOTES
SCRIBE #1 NOTE: I, Beth Ghotra, am scribing for, and in the presence of, Jessica Santos MD. I have scribed the entire note.      History      Chief Complaint   Patient presents with    Fatigue     Pt. Has been feeling weak and fatigued. Coumadin levels were high yesterday.       Review of patient's allergies indicates:  No Known Allergies     HPI   HPI    2/23/2023, 10:20 AM   History obtained from the patient and the pt's wife at bedside      History of Present Illness: Anthony Biggs Jr. is a 82 y.o. male patient with a PMHx of atrial fibrillation, angiodysplasia of small intestine, chronic diastolic heart failure, chronic diastolic heart failure, coronary atherosclerosis, DM, emphysema of lung, HLD, HTN, LAILA, and SHORTY who presents to the Emergency Department for generalized weakness which onset gradually couple of weeks ago. Now, for the past couple of days, the pt has had a decreased appetite and has only eating sweets and pastries, per the pt's wife. Yesterday, the pt had his INR checked and it was 8.2. Pt denies experiencing any bleeding or having any recent falls or injuries. His symptoms are constant and moderate in severity. No mitigating or exacerbating factors reported. Associated sxs include light-headedness, dizziness, reduced appetite, and fatigue. Patient denies any fever, abdominal pain, CP, SOB, N/V/D, difficulty urinating, melena, hematochezia, hematemesis, and all other sxs at this time. No prior Tx reported. Pt is on Lasix and blood pressure medications which he took this morning. No further complaints or concerns at this time.           Arrival mode: Personal vehicle      PCP: Giuliano Moran MD       Past Medical History:  Past Medical History:   Diagnosis Date    Abnormal CXR     SHAYLA (acute kidney injury) 2/23/2023    Angina pectoris 8/28/2017    Angiodysplasia of small intestine     Atrial fibrillation     Chronic diastolic heart failure 11/8/2022    Chronic upper GI bleeding     due to  angiodysplasia in proximal small intestine    Coronary atherosclerosis of unspecified type of vessel, native or graft     COVID 6/26/2022    Depression     Diabetes mellitus without complication     Emphysema of lung     History of prostate cancer 2007    prostatectomy    Hyperlipidemia associated with type 2 diabetes mellitus     Hypertension associated with diabetes     LAILA (iron deficiency anemia) 09/20/2021    due to angiodysplasia in small intestine    Intention tremor     Major depressive disorder, recurrent, moderate 4/12/2021    Moderate episode of recurrent major depressive disorder 4/1/2019    Morbid (severe) obesity due to excess calories 4/1/2019    SHORTY (obstructive sleep apnea)     Prostate cancer     Trouble in sleeping     Urinary incontinence        Past Surgical History:  Past Surgical History:   Procedure Laterality Date    ABDOMINAL HERNIA REPAIR      APPENDECTOMY      bladder sx      CARDIAC CATHETERIZATION      CATARACT EXTRACTION W/  INTRAOCULAR LENS IMPLANT  Restor OU    COLONOSCOPY N/A 5/16/2017    Procedure: COLONOSCOPY;  Surgeon: Alfredo Naidu MD;  Location: [facility];  Service: Endoscopy;  Laterality: N/A;    ESOPHAGOGASTRODUODENOSCOPY N/A 10/29/2021    Procedure: SBE (Push Enteroscopy) with Dr. Wayne;  Surgeon: Arlette Wayne MD;  Location: [facility];  Service: Endoscopy;  Laterality: N/A;  Plavix held indefinitely as of 10-20-21. Must also hold Coumadin 5 days prior. Ok to continue to use ASA.    inguinal hernia      INTRALUMINAL GASTROINTESTINAL TRACT IMAGING VIA CAPSULE N/A 10/4/2021    Procedure: IMAGING PROCEDURE, GI TRACT, INTRALUMINAL, VIA CAPSULE;  Surgeon: Joaquin Stack RN;  Location: [facility] ENDO;  Service: Endoscopy;  Laterality: N/A;    LEFT HEART CATHETERIZATION Left 7/20/2021    Procedure: CATHETERIZATION, HEART, LEFT;  Surgeon: Darryl Griffith MD;  Location: [facility] CATH LAB;  Service: Cardiology;  Laterality: Left;    lung sx      PERCUTANEOUS TRANSLUMINAL  BALLOON ANGIOPLASTY OF CORONARY ARTERY  2021    Procedure: Angioplasty-coronary;  Surgeon: Darryl Griffith MD;  Location: [facility] CATH LAB;  Service: Cardiology;;    PROSTATE SURGERY           Family History:  Family History   Problem Relation Age of Onset    Heart disease Mother     Cancer Father         lung    Macular degeneration Sister     Diabetes Sister     Heart disease Sister     Strabismus Neg Hx     Retinal detachment Neg Hx     Glaucoma Neg Hx     Blindness Neg Hx     Amblyopia Neg Hx        Social History:  Social History     Tobacco Use    Smoking status: Former     Packs/day: 0.50     Years: 40.00     Pack years: 20.00     Types: Cigarettes     Start date: 1962     Quit date:      Years since quittin.1    Smokeless tobacco: Never   Substance and Sexual Activity    Alcohol use: Yes     Alcohol/week: 7.0 standard drinks     Types: 7 Shots of liquor per week    Drug use: No    Sexual activity: Not Currently       ROS   Review of Systems   Constitutional:  Positive for appetite change (reduced) and fatigue. Negative for fever.   HENT:  Negative for sore throat.    Respiratory:  Negative for shortness of breath.    Cardiovascular:  Negative for chest pain.   Gastrointestinal:  Negative for abdominal pain, blood in stool, diarrhea, nausea and vomiting.        (-) melena   (-) hematemesis   Genitourinary:  Negative for difficulty urinating and dysuria.   Musculoskeletal:  Negative for back pain.   Skin:  Negative for rash.   Neurological:  Positive for dizziness, weakness (generalized) and light-headedness.   Hematological:  Does not bruise/bleed easily.   All other systems reviewed and are negative.    Physical Exam      Initial Vitals [23 1000]   BP Pulse Resp Temp SpO2   (!) 73/37 89 18 97.7 °F (36.5 °C) 96 %      MAP       --          Physical Exam  Nursing Notes and Vital Signs Reviewed.  Constitutional: Patient is in no acute distress. Weak and frail appearing.  Head:  Atraumatic. Normocephalic.  Eyes: PERRL. EOM intact. Conjunctivae are not pale. No scleral icterus.  ENT: Mucous membranes are moist. Oropharynx is clear and symmetric.    Neck: Supple. Full ROM. No lymphadenopathy.  Cardiovascular: Regular rate. Irregularly irregular rhythm. No murmurs, rubs, or gallops. Distal pulses are 2+ and symmetric.  Pulmonary/Chest: No respiratory distress. Clear to auscultation bilaterally. No wheezing or rales.  Abdominal: Soft and non-distended.  There is no tenderness.  No rebound, guarding, or rigidity.   Rectal: Male chaperone present for the duration of the rectal exam.There gross melena on rectal exam.  Musculoskeletal: Moves all extremities. No obvious deformities. No edema.  Skin: Warm and dry. Pale.  Neurological:  Alert, awake, and oriented.  Conversant. Normal speech.  No acute focal neurological deficits are appreciated.  Psychiatric: Normal affect. Good eye contact. Appropriate in content.    ED Course    Critical Care    Date/Time: 2/23/2023 11:41 AM  Performed by: Jessica Santos MD  Authorized by: Jessica Santos MD   Direct patient critical care time: 30 minutes  Additional history critical care time: 10 minutes  Ordering / reviewing critical care time: 10 minutes  Documentation critical care time: 9 minutes  Consulting other physicians critical care time: 25 minutes  Total critical care time (exclusive of procedural time) : 84 minutes  Critical care time was exclusive of separately billable procedures and treating other patients and teaching time.  Critical care was necessary to treat or prevent imminent or life-threatening deterioration of the following conditions: circulatory failure and dehydration (acute upper GI bleed).  Critical care was time spent personally by me on the following activities: blood draw for specimens, development of treatment plan with patient or surrogate, discussions with consultants, evaluation of patient's response to treatment,  "interpretation of cardiac output measurements, examination of patient, obtaining history from patient or surrogate, ordering and performing treatments and interventions, ordering and review of laboratory studies, ordering and review of radiographic studies, pulse oximetry, review of old charts and re-evaluation of patient's condition.      ED Vital Signs:  Vitals:    02/23/23 1210 02/23/23 1215 02/23/23 1220 02/23/23 1222   BP: (!) 73/41 (!) 92/51 (!) 63/38 (!) 63/38   Pulse: 85 88 92 84   Resp: (!) 26 (!) 24 20 20   Temp:   97.9 °F (36.6 °C) 97.9 °F (36.6 °C)   TempSrc:   Oral Oral   SpO2: 100% 97% 96% 95%   Weight:       Height:        02/23/23 1227 02/23/23 1238 02/23/23 1251 02/23/23 1252   BP: (!) 71/43 (!) 87/43  (!) 96/57   Pulse: 88 86 83    Resp: 20 (!) 23 (!) 22    Temp:       TempSrc:       SpO2: (!) 94% 98% 98%    Weight:       Height:        02/23/23 1257 02/23/23 1302 02/23/23 1342 02/23/23 1450   BP: (!) 96/50 (!) 111/55 (!) 103/57 117/74   Pulse: 87 84 84 85   Resp: 20 (!) 24 (!) 22 (!) 34   Temp:    97.6 °F (36.4 °C)   TempSrc:    Oral   SpO2: 98% 96% 95% 96%   Weight:    100.4 kg (221 lb 5.5 oz)   Height:    5' 7" (1.702 m)    02/23/23 1500 02/23/23 1530 02/23/23 1632   BP: (!) 110/56 (!) 103/57 (!) 109/55   Pulse: 87 84 81   Resp: (!) 36 (!) 28 18   Temp:      TempSrc:      SpO2: 96% 98% 97%   Weight:      Height:          Abnormal Lab Results:  Labs Reviewed   CBC W/ AUTO DIFFERENTIAL - Abnormal; Notable for the following components:       Result Value    RBC 1.85 (*)     Hemoglobin 5.8 (*)     Hematocrit 17.5 (*)     MCH 31.4 (*)     All other components within normal limits    Narrative:     Release to patient->Immediate  HGB and HCT critical result(s) called and verbal readback obtained   from TERESITA PARRA RN by AMARI 02/23/2023 11:04   COMPREHENSIVE METABOLIC PANEL - Abnormal; Notable for the following components:    Sodium 135 (*)     Glucose 138 (*)      (*)     Creatinine 3.5 (*)  "    Alkaline Phosphatase 49 (*)     eGFR 17 (*)     All other components within normal limits    Narrative:     Release to patient->Immediate   PROTIME-INR - Abnormal; Notable for the following components:    Prothrombin Time >100.0 (*)     INR >10.0 (*)     All other components within normal limits    Narrative:     Release to patient->Immediate    result(s) called and verbal readback obtained from rob lipscomb rn   by AMARI 02/23/2023 11:11   APTT - Abnormal; Notable for the following components:    aPTT 63.5 (*)     All other components within normal limits    Narrative:     Release to patient->Immediate   B-TYPE NATRIURETIC PEPTIDE - Abnormal; Notable for the following components:     (*)     All other components within normal limits    Narrative:     Release to patient->Immediate   TROPONIN I - Abnormal; Notable for the following components:    Troponin I 0.035 (*)     All other components within normal limits    Narrative:     Release to patient->Immediate   PROCALCITONIN - Abnormal; Notable for the following components:    Procalcitonin 0.35 (*)     All other components within normal limits    Narrative:     Release to patient->Immediate   OCCULT BLOOD X 1, STOOL - Abnormal; Notable for the following components:    Occult Blood Positive (*)     All other components within normal limits   POCT GLUCOSE - Abnormal; Notable for the following components:    POCT Glucose 139 (*)     All other components within normal limits   POCT GLUCOSE - Abnormal; Notable for the following components:    POCT Glucose 151 (*)     All other components within normal limits   POCT GLUCOSE - Abnormal; Notable for the following components:    POCT Glucose 113 (*)     All other components within normal limits   INFLUENZA A & B BY MOLECULAR   CULTURE, BLOOD   CULTURE, BLOOD   HIV 1 / 2 ANTIBODY    Narrative:     Release to patient->Immediate   HEPATITIS C ANTIBODY    Narrative:     Release to patient->Immediate   LACTIC ACID,  PLASMA    Narrative:     Release to patient->Immediate   MAGNESIUM    Narrative:     Release to patient->Immediate   SARS-COV-2 RNA AMPLIFICATION, QUAL   HEP C VIRUS HOLD SPECIMEN   LACTIC ACID, PLASMA   TYPE & SCREEN   POCT GLUCOSE MONITORING CONTINUOUS   PREPARE RBC SOFT        All Lab Results:  Results for orders placed or performed during the hospital encounter of 02/23/23   Influenza A & B by Molecular    Specimen: Nasopharyngeal Swab   Result Value Ref Range    Influenza A, Molecular Negative Negative    Influenza B, Molecular Negative Negative    Flu A & B Source Nasal swab    HIV 1/2 Ag/Ab (4th Gen)   Result Value Ref Range    HIV 1/2 Ag/Ab Negative Negative   Hepatitis C Antibody   Result Value Ref Range    Hepatitis C Ab Negative Negative   CBC auto differential   Result Value Ref Range    WBC 6.20 3.90 - 12.70 K/uL    RBC 1.85 (L) 4.60 - 6.20 M/uL    Hemoglobin 5.8 (LL) 14.0 - 18.0 g/dL    Hematocrit 17.5 (LL) 40.0 - 54.0 %    MCV 95 82 - 98 fL    MCH 31.4 (H) 27.0 - 31.0 pg    MCHC 33.1 32.0 - 36.0 g/dL    RDW 14.4 11.5 - 14.5 %    Platelets 224 150 - 450 K/uL    MPV 10.0 9.2 - 12.9 fL    Immature Granulocytes 0.5 0.0 - 0.5 %    Gran # (ANC) 3.8 1.8 - 7.7 K/uL    Immature Grans (Abs) 0.03 0.00 - 0.04 K/uL    Lymph # 1.6 1.0 - 4.8 K/uL    Mono # 0.6 0.3 - 1.0 K/uL    Eos # 0.1 0.0 - 0.5 K/uL    Baso # 0.01 0.00 - 0.20 K/uL    nRBC 0 0 /100 WBC    Gran % 61.2 38.0 - 73.0 %    Lymph % 26.5 18.0 - 48.0 %    Mono % 9.5 4.0 - 15.0 %    Eosinophil % 2.1 0.0 - 8.0 %    Basophil % 0.2 0.0 - 1.9 %    Differential Method Automated    Comprehensive metabolic panel   Result Value Ref Range    Sodium 135 (L) 136 - 145 mmol/L    Potassium 4.6 3.5 - 5.1 mmol/L    Chloride 98 95 - 110 mmol/L    CO2 23 23 - 29 mmol/L    Glucose 138 (H) 70 - 110 mg/dL     (H) 8 - 23 mg/dL    Creatinine 3.5 (H) 0.5 - 1.4 mg/dL    Calcium 9.3 8.7 - 10.5 mg/dL    Total Protein 6.6 6.0 - 8.4 g/dL    Albumin 3.6 3.5 - 5.2 g/dL    Total  Bilirubin 0.3 0.1 - 1.0 mg/dL    Alkaline Phosphatase 49 (L) 55 - 135 U/L    AST 23 10 - 40 U/L    ALT 19 10 - 44 U/L    Anion Gap 14 8 - 16 mmol/L    eGFR 17 (A) >60 mL/min/1.73 m^2   Lactic acid, plasma #1   Result Value Ref Range    Lactate (Lactic Acid) 2.1 0.5 - 2.2 mmol/L   Urinalysis, Reflex to Urine Culture Urine, Clean Catch    Specimen: Urine   Result Value Ref Range    Specimen UA Urine, Clean Catch     Color, UA Colorless (A) Yellow, Straw, [place]    Appearance, UA Clear Clear    pH, UA 5.0 5.0 - 8.0    Specific Gravity, UA 1.010 1.005 - 1.030    Protein, UA Negative Negative    Glucose, UA Negative Negative    Ketones, UA Negative Negative    Bilirubin (UA) Negative Negative    Occult Blood UA Negative Negative    Nitrite, UA Negative Negative    Urobilinogen, UA Negative <2.0 EU/dL    Leukocytes, UA Negative Negative   Magnesium   Result Value Ref Range    Magnesium 1.8 1.6 - 2.6 mg/dL   Protime-INR   Result Value Ref Range    Prothrombin Time >100.0 (H) 9.0 - 12.5 sec    INR >10.0 (HH) 0.8 - 1.2   APTT   Result Value Ref Range    aPTT 63.5 (H) 21.0 - 32.0 sec   Brain natriuretic peptide   Result Value Ref Range     (H) 0 - 99 pg/mL   Troponin I   Result Value Ref Range    Troponin I 0.035 (H) 0.000 - 0.026 ng/mL   Procalcitonin   Result Value Ref Range    Procalcitonin 0.35 (H) <0.25 ng/mL   COVID-19 Rapid Screening   Result Value Ref Range    SARS-CoV-2 RNA, Amplification, Qual Negative Negative   Occult blood x 1, stool    Specimen: Stool   Result Value Ref Range    Occult Blood Positive (A) Negative   Hemoglobin   Result Value Ref Range    Hemoglobin 8.4 (L) 14.0 - 18.0 g/dL   Hematocrit   Result Value Ref Range    Hematocrit 25.1 (L) 40.0 - 54.0 %   Protime-INR   Result Value Ref Range    Prothrombin Time 13.2 (H) 9.0 - 12.5 sec    INR 1.2 0.8 - 1.2   Type & Screen   Result Value Ref Range    Group & Rh O POS     Indirect Evi NEG    POCT glucose   Result Value Ref Range    POCT Glucose  139 (H) 70 - 110 mg/dL   POCT glucose   Result Value Ref Range    POCT Glucose 151 (H) 70 - 110 mg/dL   POCT glucose   Result Value Ref Range    POCT Glucose 113 (H) 70 - 110 mg/dL   Prepare RBC 2 Units; hgb 5.8 on coumadin   Result Value Ref Range    UNIT NUMBER [id number]     Product Code C7178A75     DISPENSE STATUS ISSUED     CODING SYSTEM CTYF386     Unit Blood Type Code 5100     Unit Blood Type O POS     Unit Expiration [id number]     CROSSMATCH INTERPRETATION Compatible     UNIT NUMBER [id number]     Product Code [id number]     DISPENSE STATUS ISSUED     CODING SYSTEM [id number]     Unit Blood Type Code 5100     Unit Blood Type O POS     Unit Expiration [id number]     CROSSMATCH INTERPRETATION Compatible              Imaging Results:  Imaging Results              X-Ray Chest AP Portable (Final result)  Result time 02/23/23 10:39:11      Final result by JESSICA Silva Sr., MD (02/23/23 10:39:11)                   Impression:      1. The lungs are clear.  2. The size of the heart is prominent.  This may be secondary to magnification.  .      Electronically signed by: Sonido Silva MD  Date:    02/23/2023  Time:    10:39               Narrative:    EXAMINATION:  XR CHEST AP PORTABLE    CLINICAL HISTORY:  weakness;    COMPARISON:  02/10/2022    FINDINGS:  The size of the heart is prominent.  The lungs are clear. There is no pneumothorax.  The costophrenic angles are sharp.                                     The EKG was ordered, reviewed, and independently interpreted by the ED provider.  Interpretation time: 10:03  Rate: 81 BPM  Rhythm: atrial fibrillation with premature ventricular or aberrantly conducted complexes  Interpretation: Low voltage QRS. Cannot rule out Anterior infarct, age undetermined. No STEMI.             The Emergency Provider reviewed the vital signs and test results, which are outlined above.    ED Discussion     11:18 AM: Discussed pt's case with Dr. Wright (Gastroenterology) who  states that her team will be in the ER to evaluate the pt shortly.    11:21 AM: Discussed pt's case with Dr. Stephenson (Critical Care Medicine) who states that he will come to the ER to see the pt as soon as possible.    11:24 AM: Discussed pt's case with Pharmacy who recommends Kcentra and IV Vitamin KI.    11:31 AM: Discussed case with Dr. Stephenson (Critical Care Medicine). Dr. Stephenson agrees with current care and management of pt and accepts admission.   Admitting Service: Critical Care Medicine  Admitting Physician: Dr. Stephenson  Admit to: ICU    11:32 AM: Re-evaluated pt. I have discussed test results, shared treatment plan, and the need for admission with patient and family at bedside. Pt and family express understanding at this time and agree with all information. All questions answered. Pt and family have no further questions or concerns at this time. Pt is ready for admit.           ED Medication(s):  Medications   0.9%  NaCl infusion (for blood administration) (has no administration in time range)   sodium chloride 0.9% flush 10 mL (has no administration in time range)   ondansetron injection 4 mg (has no administration in time range)   prochlorperazine injection Soln 5 mg (has no administration in time range)   pantoprazole injection 40 mg (has no administration in time range)   insulin aspart U-100 pen 0-5 Units (has no administration in time range)   glucagon (human recombinant) injection 1 mg (has no administration in time range)   dextrose 10% bolus 125 mL 125 mL (has no administration in time range)   dextrose 10% bolus 250 mL 250 mL (has no administration in time range)   magnesium sulfate 2g in water 50mL IVPB (premix) (0 g Intravenous Stopped 2/23/23 1729)   hydrocortisone 1 % cream (has no administration in time range)   sodium chloride 0.9% bolus 1,000 mL 1,000 mL (0 mLs Intravenous Stopped 2/23/23 1057)   sodium chloride 0.9% bolus 1,000 mL 1,000 mL (0 mLs Intravenous Stopped 2/23/23 1145)    pantoprazole injection 80 mg (80 mg Intravenous Given 2/23/23 1120)   human prothrombin complex (PCC) (KCENTRA) 4,889 Units in sterile water for injection IVPB (0 Units Intravenous Stopped 2/23/23 1240)   phytonadione vitamin k (AQUA-MEPHYTON) 10 mg in dextrose 5 % (D5W) 50 mL IVPB (0 mg Intravenous Stopped 2/23/23 1351)   sodium chloride 0.9% bolus 500 mL 500 mL (0 mLs Intravenous Stopped 2/23/23 1254)   calcium gluconate 100 mg/mL (10%) injection 1 g (1 g Intravenous Given 2/23/23 1203)     Current Discharge Medication List                Medical Decision Making    Medical Decision Making:   History:   Old Records Summarized: other records.       <> Summary of Records: External records reviewed: Pt had an upper GI endoscopy on 10/29/21.  Initial Assessment:   INR 8.2 yesterday  Clinical Tests:   Lab Tests: Ordered and Reviewed  Radiological Study: Ordered and Reviewed  Medical Tests: Ordered and Reviewed  ED Management:  Patient presents with weakness and fatigue, on coumadin for chronic atrial fib, INR over 8 yesterday, denies any acute blood loss, hypotensive upon arrival to the ER but responded to fluid resuscitation, hemoglobin 5.8, INR over 10, BUN and CR elevated, gross melena on rectal exam, patient given 2 units of blood, fluids in the ER, protonix, Vit K 10 IV and Kcentra for INR reversal, GI consulted for upper gi bleed, Cardiology consulted and ICU consulted for admission.  Patient's BP stabilized after aggressive resuscitation and admitted to ICU for continued monitoring.          Scribe Attestation:   Scribe #1: I performed the above scribed service and the documentation accurately describes the services I performed. I attest to the accuracy of the note.    Attending:   Physician Attestation Statement for Scribe #1: I, Jessica Santos MD, personally performed the services described in this documentation, as scribed by Beth Ghotra, in my presence, and it is both accurate and complete.           Clinical Impression       ICD-10-CM ICD-9-CM   1. Acute upper GI bleed  K92.2 578.9   2. Weakness  R53.1 780.79   3. Poisoning by warfarin sodium, accidental or unintentional, initial encounter  T45.511A 964.2     E858.2   4. Chronic atrial fibrillation  I48.20 427.31   5. Acute renal failure, unspecified acute renal failure type  N17.9 584.9   6. Chronic anticoagulation  Z79.01 V58.61   7. Intravascular volume depletion  E86.1 276.52   8. Gastrointestinal hemorrhage with melena  K92.1 578.1   9. Acute on chronic blood loss anemia  D62 285.1       Disposition:   Disposition: Admitted  Condition: Critical       Jessica Santos MD  02/23/23 4929

## 2023-02-23 NOTE — INTERVAL H&P NOTE
The patient has been examined and the H&P has been reviewed:    I concur with the findings and no changes have occurred since H&P was written.    Anesthesia/Surgery risks, benefits and alternative options discussed and understood by patient/family.      The risks, benefits and alternatives of the procedure were discussed with the patient in detail. This discussion was had in the presence of his wife. The risks include, risks of adverse reaction to sedation requiring the use of reversal agents, bleeding requiring blood transfusion, perforation requiring surgical intervention and technical failure. Other risks include aspiration leading to respiratory distress and respiratory failure resulting in endotracheal intubation and mechanical ventilation including death. If anesthesia is being utilized for this procedure, it is up to the anesthesiologist to determine airway safety including elective endotracheal intubation. Questions were answered, they agree to proceed. There was no language barriers.      Active Hospital Problems    Diagnosis  POA    *Gastrointestinal hemorrhage with melena [K92.1]  Yes    Hemorrhagic shock [R57.8]  Yes    Supratherapeutic INR [R79.1]  Yes    SHAYLA (acute kidney injury) [N17.9]  Yes    Chronic diastolic heart failure [I50.32]  Yes    Angiodysplasia of small intestine [K55.20]  Yes    LAILA (iron deficiency anemia) [D50.9]  Yes     due to angiodysplasia in small intestine      Hyperlipidemia associated with type 2 diabetes mellitus [E11.69, E78.5]  Yes    Hypertension associated with diabetes [E11.59, I15.2]  Yes    Atrial fibrillation [I48.91]  Yes    Coronary atherosclerosis [I25.10]  Yes      Resolved Hospital Problems   No resolved problems to display.

## 2023-02-23 NOTE — ASSESSMENT & PLAN NOTE
supratherapeutic INR >10   Melena on HERMILO  S/p 2.5L IVFs in ED  2 units PRBC STAT   PCC  Vitamin K   GI consulted   NPO  protonix 80 mg given in ED, will continue 40mg BID  Serial H/H and transfuse as indicated- hgb < 7 or active bleeding  SBP goal > 90 mmHg

## 2023-02-23 NOTE — ASSESSMENT & PLAN NOTE
-Patient with hgb 5.8, melena on rectal exam, and hypotension.   -History of AVMs back in 2021 seen on VCE - treated on EGD with push enteroscopy.   -Hold anticoagulation. Correct INR. ED has already ordered Kcentra.  -Agree with urgent blood transfusion, as patient remains hypotensive even with fluid resuscitation.   -Once stabilized, plan for EGD.  -Keep NPO.  -Agree with transfer to ICU.  -Continue with IV Protonix.

## 2023-02-23 NOTE — H&P
[facility] - Emergency Dept.  Critical Care Medicine  History & Physical    Patient Name: Anthony Biggs Jr.  MRN: [id number]  Admission Date: 2/23/2023  Hospital Length of Stay: 0 days  Code Status: Full Code  Attending Physician: Sung Stephenson MD   Primary Care Provider: Giuliano Moran MD   Principal Problem: Gastrointestinal hemorrhage with melena    Subjective:     HPI:  Information obtained from chart review and spouse at bedside as patient is confused and unable to provide reliable information.     82-year-old male with a known past medical history of permanent atrial fibrillation (Coumadin), HTN, HLD, DM, LAILA, and angiodysplasia of the small intestine who presented to the ED 2/23/2023 for evaluation of weakness and AMS. Spouse notes several days of decreased appetite and progressive weakness. This morning, he was confused and very weak, prompting her to request a same day PCP visit at which time her PCP instructed them to go to the ED. Notably, patient's INR 8.2 yesterday upon routine assessment and he was instructed to hold Coumadin x 3 days and resume at half dose with reassessment to follow. ED evaluation revealing hypotension, hgb 5.8, and melena on HERMILO. 2 units PRBC ordered STAT and IVFs given. PCC and Vitamin K also given. GI consulted. Critical care team consulted for ICU admission.     Upon exam in ED, Mr. Biggs with SBP in the 60s. He is confused and pale. Nursing at bedside preparing 500 mL NS bolus and PRBC also arrived during this time as well. Spouse at bedside and updated on plan.       Hospital/ICU Course:  No notes on file     Past Medical History:   Diagnosis Date    Abnormal CXR     Angina pectoris 8/28/2017    Angiodysplasia of small intestine     Atrial fibrillation     Chronic diastolic heart failure 11/8/2022    Chronic upper GI bleeding     due to angiodysplasia in proximal small intestine    Coronary atherosclerosis of unspecified type of vessel, native or graft     COVID  6/26/2022    Depression     Diabetes mellitus without complication     Emphysema of lung     History of prostate cancer 2007    prostatectomy    Hyperlipidemia associated with type 2 diabetes mellitus     Hypertension associated with diabetes     LAILA (iron deficiency anemia) 09/20/2021    due to angiodysplasia in small intestine    Intention tremor     Major depressive disorder, recurrent, moderate 4/12/2021    Moderate episode of recurrent major depressive disorder 4/1/2019    Morbid (severe) obesity due to excess calories 4/1/2019    SHORTY (obstructive sleep apnea)     Prostate cancer     Trouble in sleeping     Urinary incontinence        Past Surgical History:   Procedure Laterality Date    ABDOMINAL HERNIA REPAIR      APPENDECTOMY      bladder sx      CARDIAC CATHETERIZATION      CATARACT EXTRACTION W/  INTRAOCULAR LENS IMPLANT  Restor OU    COLONOSCOPY N/A 5/16/2017    Procedure: COLONOSCOPY;  Surgeon: Alfredo Naidu MD;  Location: [facility];  Service: Endoscopy;  Laterality: N/A;    ESOPHAGOGASTRODUODENOSCOPY N/A 10/29/2021    Procedure: SBE (Push Enteroscopy) with Dr. Wayne;  Surgeon: Arlette Wayne MD;  Location: [facility];  Service: Endoscopy;  Laterality: N/A;  Plavix held indefinitely as of 10-20-21. Must also hold Coumadin 5 days prior. Ok to continue to use ASA.    inguinal hernia      INTRALUMINAL GASTROINTESTINAL TRACT IMAGING VIA CAPSULE N/A 10/4/2021    Procedure: IMAGING PROCEDURE, GI TRACT, INTRALUMINAL, VIA CAPSULE;  Surgeon: Joaquin Stack RN;  Location: [facility] ENDO;  Service: Endoscopy;  Laterality: N/A;    LEFT HEART CATHETERIZATION Left 7/20/2021    Procedure: CATHETERIZATION, HEART, LEFT;  Surgeon: Darryl Griffith MD;  [facility] C[facility] LAB;  Service: Cardiology;  Laterality: Left;    lung sx      PERCUTANEOUS TRANSLUMINAL BALLOON ANGIOPLASTY OF CORONARY ARTERY  7/20/2021    Procedure: Angioplasty-coronary;  Surgeon: Darryl Griffith MD;   Location: [facility] CATH LAB;  Service: Cardiology;;    PROSTATE SURGERY         Review of patient's allergies indicates:  No Known Allergies    Family History       Problem Relation (Age of Onset)    Cancer Father    Diabetes Sister    Heart disease Mother, Sister    Macular degeneration Sister          Tobacco Use    Smoking status: Former     Packs/day: 0.50     Years: 40.00     Pack years: 20.00     Types: Cigarettes     Start date: 1962     Quit date:      Years since quittin.1    Smokeless tobacco: Never   Substance and Sexual Activity    Alcohol use: Yes     Alcohol/week: 7.0 standard drinks     Types: 7 Shots of liquor per week    Drug use: No    Sexual activity: Not Currently         Review of Systems   Unable to perform ROS: Acuity of condition   Objective:     Vital Signs (Most Recent):  Temp: 97.9 °F (36.6 °C) (23 1222)  Pulse: 84 (23 1342)  Resp: (!) 22 (23 1342)  BP: (!) 103/57 (23 1342)  SpO2: 95 % (23 1342)   Vital Signs (24h Range):  Temp:  [97.7 °F (36.5 °C)-98 °F (36.7 °C)] 97.9 °F (36.6 °C)  Pulse:  [] 84  Resp:  [18-34] 22  SpO2:  [85 %-100 %] 95 %  BP: ()/(34-57) 103/57     Weight: 101.2 kg (223 lb 3.2 oz)  Body mass index is 34.96 kg/m².      Intake/Output Summary (Last 24 hours) at 2023 1347  Last data filed at 2023 1254  Gross per 24 hour   Intake 2500 ml   Output --   Net 2500 ml       Physical Exam  Vitals reviewed.   Constitutional:       Appearance: He is ill-appearing.   HENT:      Head: Normocephalic and atraumatic.      Mouth/Throat:      Mouth: Mucous membranes are moist.      Pharynx: Oropharynx is clear.   Eyes:      Extraocular Movements: Extraocular movements intact.      Conjunctiva/sclera: Conjunctivae normal.   Cardiovascular:      Rate and Rhythm: Normal rate. Rhythm irregular.      Comments: No cyanosis or mottling.   Pulmonary:      Effort: Pulmonary effort is normal.      Breath sounds: No wheezing or  rhonchi.   Abdominal:      Palpations: Abdomen is soft.      Tenderness: There is no abdominal tenderness. There is no guarding.   Musculoskeletal:      Cervical back: Normal range of motion and neck supple.      Right lower leg: No edema.      Left lower leg: No edema.   Skin:     General: Skin is warm and dry.      Coloration: Skin is pale.   Neurological:      Mental Status: He is easily aroused. He is lethargic.      Comments: Oriented to self, place and time. Disoriented to situation. Moving all extremities   Psychiatric:         Behavior: Behavior is cooperative.       Vents:       Lines/Drains/Airways       Peripheral Intravenous Line  Duration                  Peripheral IV - Single Lumen 02/23/23 1024 20 G Anterior;Distal;Left Upper Arm <1 day         Peripheral IV - Single Lumen 02/23/23 1238 18 G Distal;Left;Posterior Forearm <1 day                    Significant Labs:    CBC/Anemia Profile:  Recent Labs   Lab 02/23/23  1024 02/23/23  1107   WBC 6.20  --    HGB 5.8*  --    HCT 17.5*  --      --    MCV 95  --    RDW 14.4  --    OCCULTBLOOD  --  Positive*        Chemistries:  Recent Labs   Lab 02/23/23  1024   *   K 4.6   CL 98   CO2 23   *   CREATININE 3.5*   CALCIUM 9.3   ALBUMIN 3.6   PROT 6.6   BILITOT 0.3   ALKPHOS 49*   ALT 19   AST 23   MG 1.8       All pertinent labs within the past 24 hours have been reviewed.    Significant Imaging:   I have reviewed all pertinent imaging results/findings within the past 24 hours.    Assessment/Plan:     Cardiac/Vascular  Hemorrhagic shock  See problem: gastrointestinal hemorrhage     Chronic diastolic heart failure  Does not appear overloaded on exam   Holding diuretics   Monitor     Hypertension associated with diabetes  Currently in shock with rescusitiation in progress   Holding all anti-hypertensives     Hyperlipidemia associated with type 2 diabetes mellitus  Resume statin when appropriate     Coronary atherosclerosis  Holding PO meds,  resume as appropriate     Atrial fibrillation  Rate controlled   Holding PO meds for now - resume as appropriate   Obviously holding anticoagulation in setting of acute GIB and supratherapeutic INR  Cardiac monitoring     Renal/  SHAYLA (acute kidney injury)  No hx of CKD with Cr usually ~1  Etiology prerenal in setting of IVVD 2/2 hemorrhage  S/p IVFs  Maintain acceptable hemodynamics to provide adequate renal perfusion   Renal dose medications and avoid nephrotoxic agents as able  Follow chemistries     Hematology  Supratherapeutic INR  INR 8.2 yesterday with routine check and was instructed to hold coumadin x 3 days   INR > 10 today   Holding coumadin   Vit K   PCC  Monitor     Oncology  LAILA (iron deficiency anemia)  Resume supplements when able    GI  * Gastrointestinal hemorrhage with melena  supratherapeutic INR >10   Melena on HERMILO  S/p 2.5L IVFs in ED  2 units PRBC STAT   PCC  Vitamin K   GI consulted   NPO  protonix 80 mg given in ED, will continue 40mg BID  Serial H/H and transfuse as indicated- hgb < 7 or active bleeding  SBP goal > 90 mmHg     Angiodysplasia of small intestine  May be source of bleeding  GI consulted       DVT prophylaxis: SCDs- no chemical ppx 2/2 supratherapeutic INR and active bleeding   GI prophylaxis: Protonix     Critical Care Time: 41 minutes  Critical secondary to Patient has a condition that poses threat to life and bodily function: hemorrhagic shock, GIB, acute renal failure- requiring resuscitation and close hemodynamic monitoring. At risk for further hemodynamic, respiratory or neurologic decline, progressive mulit-organ failure, life-threatening metabolic derangements, or death.      Critical care was time spent personally by me on the following activities: development of treatment plan with patient or surrogate and bedside caregivers, discussions with consultants, evaluation of patient's response to treatment, examination of patient, ordering and performing treatments and  interventions, ordering and review of laboratory studies, ordering and review of radiographic studies, pulse oximetry, re-evaluation of patient's condition. This critical care time did not overlap with that of any other provider or involve time for any procedures.     Nuno Gambino NP  Critical Care Medicine  [facility] - Emergency Dept.

## 2023-02-23 NOTE — SUBJECTIVE & OBJECTIVE
Past Medical History:   Diagnosis Date    Abnormal CXR     Angina pectoris 8/28/2017    Angiodysplasia of small intestine     Atrial fibrillation     Chronic diastolic heart failure 11/8/2022    Chronic upper GI bleeding     due to angiodysplasia in proximal small intestine    Coronary atherosclerosis of unspecified type of vessel, native or graft     COVID 6/26/2022    Depression     Diabetes mellitus without complication     Emphysema of lung     History of prostate cancer 2007    prostatectomy    Hyperlipidemia associated with type 2 diabetes mellitus     Hypertension associated with diabetes     LAILA (iron deficiency anemia) 09/20/2021    due to angiodysplasia in small intestine    Intention tremor     Major depressive disorder, recurrent, moderate 4/12/2021    Moderate episode of recurrent major depressive disorder 4/1/2019    Morbid (severe) obesity due to excess calories 4/1/2019    SHORTY (obstructive sleep apnea)     Prostate cancer     Trouble in sleeping     Urinary incontinence        Past Surgical History:   Procedure Laterality Date    ABDOMINAL HERNIA REPAIR      APPENDECTOMY      bladder sx      CARDIAC CATHETERIZATION      CATARACT EXTRACTION W/  INTRAOCULAR LENS IMPLANT  Restor OU    COLONOSCOPY N/A 5/16/2017    Procedure: COLONOSCOPY;  Surgeon: Alfredo Naidu MD;  Location: [facility];  Service: Endoscopy;  Laterality: N/A;    ESOPHAGOGASTRODUODENOSCOPY N/A 10/29/2021    Procedure: SBE (Push Enteroscopy) with Dr. Wayne;  Surgeon: Arlette Wayne MD;  Location: [facility];  Service: Endoscopy;  Laterality: N/A;  Plavix held indefinitely as of 10-20-21. Must also hold Coumadin 5 days prior. Ok to continue to use ASA.    inguinal hernia      INTRALUMINAL GASTROINTESTINAL TRACT IMAGING VIA CAPSULE N/A 10/4/2021    Procedure: IMAGING PROCEDURE, GI TRACT, INTRALUMINAL, VIA CAPSULE;  Surgeon: Joaquin Stack RN;  Location: [facility];  Service: Endoscopy;  Laterality: N/A;    LEFT HEART  CATHETERIZATION Left 7/20/2021    Procedure: CATHETERIZATION, HEART, LEFT;  Surgeon: Darryl Griffith MD;  Location: [facility] CATH LAB;  Service: Cardiology;  Laterality: Left;    lung sx      PERCUTANEOUS TRANSLUMINAL BALLOON ANGIOPLASTY OF CORONARY ARTERY  7/20/2021    Procedure: Angioplasty-coronary;  Surgeon: Darryl Griffith MD;  Location: [facility] CATH LAB;  Service: Cardiology;;    PROSTATE SURGERY         Review of patient's allergies indicates:  No Known Allergies    No current facility-administered medications on file prior to encounter.     Current Outpatient Medications on File Prior to Encounter   Medication Sig    allopurinoL (ZYLOPRIM) 100 MG tablet TAKE ONE TABLET BY MOUTH DAILY    amLODIPine (NORVASC) 5 MG tablet TAKE 1 TABLET (5 MG TOTAL) BY MOUTH ONCE DAILY.    aspirin (ECOTRIN) 81 MG EC tablet Take 81 mg by mouth once daily.    ferrous sulfate 324 mg (65 mg iron) TbEC TAKE ONE TABLET BY MOUTH TWICE DAILY    furosemide (LASIX) 40 MG tablet Take 2 tablets (80 mg total) by mouth 2 (two) times daily.    ibuprofen (ADVIL,MOTRIN) 200 MG tablet Take 200 mg by mouth every evening.    isosorbide mononitrate (IMDUR) 30 MG 24 hr tablet Take 1 tablet (30 mg total) by mouth once daily.    losartan (COZAAR) 50 MG tablet TAKE ONE TABLET BY MOUTH TWICE DAILY    melatonin 3 mg Tab Take 1 tablet by mouth nightly.    metFORMIN (GLUCOPHAGE) 500 MG tablet TAKE ONE TABLET BY MOUTH TWICE A DAY WITH MEALS    metOLazone (ZAROXOLYN) 5 MG tablet Take 1 tablet (5 mg total) by mouth once daily.    nystatin-triamcinolone (MYCOLOG II) cream Apply topically 4 (four) times daily.    potassium chloride SA (K-DUR,KLOR-CON M) 10 MEQ tablet Take 1 tablet (10 mEq total) by mouth once daily.    torsemide (DEMADEX) 10 MG Tab Take 10 mg by mouth once daily.    warfarin (COUMADIN) 5 MG tablet TAKE 1 TABLET (5 MG TOTAL) BY MOUTH DAILY. OR AS DIRECTED BY COUMADIN CLINIC    [DISCONTINUED] multivitamin (ONE DAILY MULTIVITAMIN) per tablet Take  1 tablet by mouth once daily.    atorvastatin (LIPITOR) 40 MG tablet Take 1 tablet (40 mg total) by mouth once daily. for 90 doses     Family History       Problem Relation (Age of Onset)    Cancer Father    Diabetes Sister    Heart disease Mother, Sister    Macular degeneration Sister          Tobacco Use    Smoking status: Former     Packs/day: 0.50     Years: 40.00     Pack years: 20.00     Types: Cigarettes     Start date: 1962     Quit date:      Years since quittin.1    Smokeless tobacco: Never   Substance and Sexual Activity    Alcohol use: Yes     Alcohol/week: 7.0 standard drinks     Types: 7 Shots of liquor per week    Drug use: No    Sexual activity: Not Currently     Review of Systems   Constitutional: Positive for malaise/fatigue.   Eyes:  Negative for blurred vision.   Cardiovascular:  Negative for chest pain, claudication, cyanosis, dyspnea on exertion, irregular heartbeat, leg swelling, near-syncope, orthopnea, palpitations and paroxysmal nocturnal dyspnea.   Respiratory:  Negative for cough, hemoptysis and shortness of breath.    Hematologic/Lymphatic: Negative for bleeding problem. Does not bruise/bleed easily.   Skin:  Negative for dry skin and itching.   Musculoskeletal:  Negative for falls, muscle weakness and myalgias.   Gastrointestinal:  Negative for abdominal pain, diarrhea, heartburn, hematemesis, hematochezia and melena.   Genitourinary:  Negative for flank pain and hematuria.   Neurological:  Positive for weakness. Negative for dizziness, focal weakness, headaches, light-headedness, numbness, paresthesias and seizures.   Psychiatric/Behavioral:  Positive for altered mental status. Negative for memory loss. The patient is not nervous/anxious.    Allergic/Immunologic: Negative for hives.   Objective:     Vital Signs (Most Recent):  Temp: 97.9 °F (36.6 °C) (23 1222)  Pulse: 84 (23 1222)  Resp: 20 (23 1222)  BP: (!) 63/38 (23 1222)  SpO2: 95 % (23  1222)   Vital Signs (24h Range):  Temp:  [97.7 °F (36.5 °C)-98 °F (36.7 °C)] 97.9 °F (36.6 °C)  Pulse:  [] 84  Resp:  [18-34] 20  SpO2:  [85 %-100 %] 95 %  BP: (58-96)/(34-57) 63/38     Weight: 101.2 kg (223 lb 3.2 oz)  Body mass index is 34.96 kg/m².    SpO2: 95 %         Intake/Output Summary (Last 24 hours) at 2/23/2023 1240  Last data filed at 2/23/2023 1145  Gross per 24 hour   Intake 2000 ml   Output --   Net 2000 ml       Lines/Drains/Airways       Peripheral Intravenous Line  Duration                  Peripheral IV - Single Lumen 02/23/23 1024 20 G Anterior;Distal;Left Upper Arm <1 day         Peripheral IV - Single Lumen 02/23/23 1133 20 G Posterior;Right Hand <1 day         Peripheral IV - Single Lumen 02/23/23 1238 18 G Distal;Left;Posterior Forearm <1 day                    Physical Exam  Vitals and nursing note reviewed.   Constitutional:       General: He is not in acute distress.     Appearance: He is well-developed. He is not diaphoretic.   HENT:      Head: Normocephalic and atraumatic.   Eyes:      General:         Right eye: No discharge.         Left eye: No discharge.      Pupils: Pupils are equal, round, and reactive to light.   Neck:      Thyroid: No thyromegaly.      Vascular: No JVD.   Cardiovascular:      Rate and Rhythm: Normal rate. Rhythm irregular.      Pulses: Normal pulses and intact distal pulses.      Heart sounds: Normal heart sounds. No murmur heard.    No friction rub. No gallop.   Pulmonary:      Effort: Pulmonary effort is normal. No respiratory distress.      Breath sounds: Normal breath sounds. No wheezing or rales.   Chest:      Chest wall: No tenderness.   Abdominal:      General: Bowel sounds are normal. There is no distension.      Palpations: Abdomen is soft.      Tenderness: There is no abdominal tenderness.   Musculoskeletal:         General: Normal range of motion.      Cervical back: Neck supple.      Right lower leg: No edema.      Left lower leg: No edema.    Skin:     General: Skin is warm and dry.      Findings: No erythema or rash.   Neurological:      Mental Status: He is alert and oriented to person, place, and time.      Cranial Nerves: No cranial nerve deficit.      Comments: Patient is very restless oriented. Has purposeless movement .   Psychiatric:      Comments: restless       Significant Labs:   Recent Lab Results  (Last 5 results in the past 24 hours)        02/23/23  1107   02/23/23  1045   02/23/23  1024   02/23/23  1021   02/23/23  1002        Influenza A, Molecular   Negative             Influenza B, Molecular   Negative             Unit Blood Type Code     5100  [P]                5100  [P]           Unit Expiration     [id number]  [P]                [id number]  [P]           Procalcitonin     0.35  Comment: A concentration < 0.25 ng/mL represents a low risk of bacterial   infection.  Procalcitonin may not be accurate among patients with localized   infection, recent trauma or major surgery, immunosuppressed state,   invasive fungal infection, renal dysfunction. Decisions regarding   initiation or continuation of antibiotic therapy should not be based   solely on procalcitonin levels.             Unit Blood Type     O POS  [P]                O POS  [P]           Albumin     3.6           Alkaline Phosphatase     49           ALT     19           Anion Gap     14           aPTT     63.5  Comment: aPTT therapeutic range = 39-69 seconds           AST     23           Baso #     0.01           Basophil %     0.2           BILIRUBIN TOTAL     0.3  Comment: For infants and newborns, interpretation of results should be based  on gestational age, weight and in agreement with clinical  observations.    Premature Infant recommended reference ranges:  Up to 24 hours.............<8.0 mg/dL  Up to 48 hours............<12.0 mg/dL  3-5 days..................<15.0 mg/dL  6-29 days.................<15.0 mg/dL             BNP     116  Comment: Values of less than  100 pg/ml are consistent with non-CHF populations.           BUN     114           Calcium     9.3           Chloride     98           CO2     23           CODING SYSTEM     [id number]  [P]                [id number]  [P]           Creatinine     3.5           CROSSMATCH INTERPRETATION     Compatible  [P]                Compatible  [P]           Differential Method     Automated           DISPENSE STATUS     ISSUED  [P]                ISSUED  [P]           eGFR     17           Eos #     0.1           Eosinophil %     2.1           Flu A & B Source   Nasal swab             Glucose     138           Gran # (ANC)     3.8           Gran %     61.2           Group & Rh     O POS           Hematocrit     17.5  Comment: HGB and HCT critical result(s) called and verbal readback obtained   from TERESITA PARRA RN by [facility] 02/23/2023 11:04             Hemoglobin     5.8  Comment: HGB and HCT critical result(s) called and verbal readback obtained   from TERESITA PARRA RN by [facility] 02/23/2023 11:04             Hepatitis C Ab     Negative           HIV 1/2 Ag/Ab     Negative           Immature Grans (Abs)     0.03  Comment: Mild elevation in immature granulocytes is non specific and   can be seen in a variety of conditions including stress response,   acute inflammation, trauma and pregnancy. Correlation with other   laboratory and clinical findings is essential.             Immature Granulocytes     0.5           INDIRECT MALIA     NEG           INR     >10.0  Comment: Coumadin Therapy:  2.0 - 3.0 for INR for all indicators except mechanical heart valves  and antiphospholipid syndromes which should use 2.5 - 3.5.  result(s) called and verbal readback obtained from rob lipscomb rn   by [facility] 02/23/2023 11:11             Lactate, Alvaro     2.1  Comment: Falsely low lactic acid results can be found in samples   containing >=13.0 mg/dL total bilirubin and/or >=3.5 mg/dL   direct bilirubin.             Lymph #     1.6           Lymph %     26.5            Magnesium     1.8           MCH     31.4           MCHC     33.1           MCV     95           Mono #     0.6           Mono %     9.5           MPV     10.0           nRBC     0           Occult Blood Positive               Platelets     224           POCT Glucose       151   139       Potassium     4.6           Product Code     [id number]  [P]                [id number]  [P]           PROTEIN TOTAL     6.6           Protime     >100.0           RBC     1.85           RDW     14.4           SARS-CoV-2 RNA, Amplification, Qual   Negative  Comment: This test utilizes isothermal nucleic acid amplification technology   to   detect the SARS-CoV-2 RdRp nucleic acid segment. The analytical   sensitivity   (limit of detection) is 500 copies/swab.     A POSITIVE result is indicative of the presence of SARS-CoV-2 RNA;   clinical   correlation with patient history and other diagnostic information is   necessary to determine patient infection status.    A NEGATIVE result means that SARS-CoV-2 nucleic acids are not present   above   the limit of detection. A NEGATIVE result should be treated as   presumptive.   It does not rule out the possibility of COVID-19 and should not be   the sole   basis for treatment decisions. If COVID-19 is strongly suspected   based on   clinical and exposure history, re-testing using an alternate   molecular assay   should be considered.     This test is only for use under the Food and Drug Administration s   Emergency   Use Authorization (EUA).     Commercial kits are provided by [employer]. Performance   characteristics of the EUA have been independently verified by   [facility] Department of Pathology and Laboratory Medicine.   _________________________________________________________________   The authorized Fact Sheet for Healthcare Providers and the authorized   Fact   Sheet for Patients of the ID NOW COVID-19 are available on the FDA   website:    https://www.fda.gov/media/565190/download   https://www.fda.gov/media/288319/download               Sodium     135           Troponin I     0.035  Comment: The reference interval for Troponin I represents the 99th percentile   cutoff   for our facility and is consistent with 3rd generation assay   performance.             UNIT NUMBER     [id number]  [P]                [id number]  [P]           WBC     6.20                                   [P] - Preliminary Result               Significant Imaging:

## 2023-02-23 NOTE — HPI
Information obtained from chart review and spouse at bedside as patient is confused and unable to provide reliable information.     82-year-old male with a known past medical history of permanent atrial fibrillation (Coumadin), HTN, HLD, DM, LAILA, and angiodysplasia of the small intestine who presented to the ED 2/23/2023 for evaluation of weakness and AMS. Spouse notes several days of decreased appetite and progressive weakness. This morning, he was confused and very weak, prompting her to request a same day PCP visit at which time her PCP instructed them to go to the ED. Notably, patient's INR 8.2 yesterday upon routine assessment and he was instructed to hold Coumadin x 3 days and resume at half dose with reassessment to follow. ED evaluation revealing hypotension, hgb 5.8, and melena on HERMILO. 2 units PRBC ordered STAT and IVFs given. PCC and Vitamin K also given. GI consulted. Critical care team consulted for ICU admission.

## 2023-02-23 NOTE — PHARMACY MED REC
"Admission Medication History     The home medication history was taken by Johnny Olivier.    You may go to "Admission" then "Reconcile Home Medications" tabs to review and/or act upon these items.     The home medication list has been updated by the Pharmacy department.   Please read ALL comments highlighted in yellow.   Please address this information as you see fit.    Feel free to contact us if you have any questions or require assistance.      Medications listed below were obtained from: Patient/family, [employer]- [employer], and Patient's pharmacy  (Not in a hospital admission)      Potential issues to be addressed PRIOR TO DISCHARGE  Please discuss with the patient barriers to adherence with medication treatment plans  Patient requires education regarding drug therapies (WARFARIN)      Johnny Olivier CPhT-Adv  Pharmacy Technician Specialist-Medication History  [facility] [phone]  Secure chat preferred     Current Outpatient Medications on File Prior to Encounter   Medication Sig Dispense Refill Last Dose    allopurinoL (ZYLOPRIM) 100 MG tablet TAKE ONE TABLET BY MOUTH DAILY 90 tablet 0 2/22/2023    amLODIPine (NORVASC) 5 MG tablet TAKE 1 TABLET (5 MG TOTAL) BY MOUTH ONCE DAILY. 30 tablet 11 2/22/2023    aspirin (ECOTRIN) 81 MG EC tablet Take 81 mg by mouth once daily.   2/22/2023    ferrous sulfate 324 mg (65 mg iron) TbEC TAKE ONE TABLET BY MOUTH TWICE DAILY 60 tablet 5 2/22/2023    furosemide (LASIX) 40 MG tablet Take 2 tablets (80 mg total) by mouth 2 (two) times daily. 360 tablet 3 2/22/2023    ibuprofen (ADVIL,MOTRIN) 200 MG tablet Take 200 mg by mouth every evening.   2/22/2023    isosorbide mononitrate (IMDUR) 30 MG 24 hr tablet Take 1 tablet (30 mg total) by mouth once daily. 90 tablet 3 2/22/2023    losartan (COZAAR) 50 MG tablet TAKE ONE TABLET BY MOUTH TWICE DAILY 180 tablet 2 2/22/2023    melatonin 3 mg Tab Take 1 tablet by mouth nightly.   2/22/2023    metFORMIN (GLUCOPHAGE) 500 MG " tablet TAKE ONE TABLET BY MOUTH TWICE A DAY WITH MEALS 180 tablet 1 2/22/2023    metOLazone (ZAROXOLYN) 5 MG tablet Take 1 tablet (5 mg total) by mouth once daily. 30 tablet 11 2/22/2023    nystatin-triamcinolone (MYCOLOG II) cream Apply topically 4 (four) times daily. 30 g 1 2/22/2023    potassium chloride SA (K-DUR,KLOR-CON M) 10 MEQ tablet Take 1 tablet (10 mEq total) by mouth once daily. 30 tablet 11 2/22/2023    torsemide (DEMADEX) 10 MG Tab Take 10 mg by mouth once daily.   2/22/2023    warfarin (COUMADIN) 5 MG tablet TAKE 1 TABLET (5 MG TOTAL) BY MOUTH DAILY. OR AS DIRECTED BY COUMADIN CLINIC 30 tablet 11 2/22/2023    atorvastatin (LIPITOR) 40 MG tablet Take 1 tablet (40 mg total) by mouth once daily. for 90 doses 90 tablet 3                            .

## 2023-02-23 NOTE — HPI
"Patient is an 81yo male with PMHx including Afib on Coumadin who reported to the ED this morning after being sent by his GP for elevated INR. Wife is bedside. She reports that he had his INR checked yesterday and they received a call last night saying his INR was 8. Upon arrival to the ED, INR >10 with melena on rectal exam. Wife reports that patient began with change in appetite about 7-10 days ago - only wanting carbohydrates. Since then, he has started to become weaker and not feeling well. No specific complaints. Patient denies recent change in stool color or black stools. 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 These were treated. Denies any GI complaints since then. Upon arrival to the ED, patient was hypotensive but initially responded well to fluid resuscitation. He is now again hypotensive (62/30). Staff and ED physician aware. Patient denies abdominal pain. Only complaint is "spasms".  "

## 2023-02-23 NOTE — ANESTHESIA POSTPROCEDURE EVALUATION
Anesthesia Post Evaluation    Patient: Medora DEJA Biggs JrLester    Procedure(s) Performed: Procedure(s) (LRB):  EGD (ESOPHAGOGASTRODUODENOSCOPY) (N/A)    Final Anesthesia Type: MAC      Patient location during evaluation: ICU  Patient participation: No - Unable to Participate, Sedation  Level of consciousness: sedated  Post-procedure vital signs: reviewed and stable  Pain management: adequate  Airway patency: patent    PONV status at discharge: No PONV  Anesthetic complications: no      Cardiovascular status: blood pressure returned to baseline and hemodynamically stable  Respiratory status: unassisted, spontaneous ventilation and nasal cannula  Hydration status: euvolemic  Follow-up not needed.          Vitals Value Taken Time   BP 90/53 02/23/23 1707   Temp  02/23/23 1708   Pulse 90 02/23/23 1707   Resp 21 02/23/23 1707   SpO2 95 % 02/23/23 1707   Vitals shown include unvalidated device data.      No case tracking events are documented in the log.      Pain/Sophy Score: No data recorded

## 2023-02-23 NOTE — H&P (VIEW-ONLY)
"[facility] - Emergency Dept.  Gastroenterology  Consult Note    Patient Name: Anthony Biggs Jr.  MRN: [id number]  Admission Date: 2/23/2023  Hospital Length of Stay: 0 days  Code Status: Full Code   Attending Provider: Sung Stephenson MD   Consulting Provider: Diana Goldman PA-C  Primary Care Physician: Giuliano Moran MD  Principal Problem:<principal problem not specified>    Inpatient consult to Gastroenterology  Consult performed by: Diana Goldman PA-C  Consult ordered by: Jessica Santos MD  Reason for consult: GI bleed        Subjective:     HPI:  Patient is an 83yo male with PMHx including Afib on Coumadin who reported to the ED this morning after being sent by his GP for elevated INR. Wife is bedside. She reports that he had his INR checked yesterday and they received a call last night saying his INR was 8. Upon arrival to the ED, INR >10 with melena on rectal exam. Wife reports that patient began with change in appetite about 7-10 days ago - only wanting carbohydrates. Since then, he has started to become weaker and not feeling well. No specific complaints. Patient denies recent change in stool color or black stools. 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 These were treated. Denies any GI complaints since then. Upon arrival to the ED, patient was hypotensive but initially responded well to fluid resuscitation. He is now again hypotensive (62/30). Staff and ED physician aware. Patient denies abdominal pain. Only complaint is "spasms".      Past Medical History:   Diagnosis Date    Abnormal CXR     Angina pectoris 8/28/2017    Angiodysplasia of small intestine     Atrial fibrillation     Chronic diastolic heart failure 11/8/2022    Chronic upper GI bleeding     due to angiodysplasia in proximal small intestine    Coronary atherosclerosis of unspecified type of vessel, native or graft     COVID 6/26/2022    Depression     Diabetes mellitus without " complication     Emphysema of lung     History of prostate cancer 2007    prostatectomy    Hyperlipidemia associated with type 2 diabetes mellitus     Hypertension associated with diabetes     LAILA (iron deficiency anemia) 09/20/2021    due to angiodysplasia in small intestine    Intention tremor     Major depressive disorder, recurrent, moderate 4/12/2021    Moderate episode of recurrent major depressive disorder 4/1/2019    Morbid (severe) obesity due to excess calories 4/1/2019    SHORTY (obstructive sleep apnea)     Prostate cancer     Trouble in sleeping     Urinary incontinence        Past Surgical History:   Procedure Laterality Date    ABDOMINAL HERNIA REPAIR      APPENDECTOMY      bladder sx      CARDIAC CATHETERIZATION      CATARACT EXTRACTION W/  INTRAOCULAR LENS IMPLANT  Restor OU    COLONOSCOPY N/A 5/16/2017    Procedure: COLONOSCOPY;  Surgeon: Alfredo Naidu MD;  Location: [facility];  Service: Endoscopy;  Laterality: N/A;    ESOPHAGOGASTRODUODENOSCOPY N/A 10/29/2021    Procedure: SBE (Push Enteroscopy) with Dr. Wayne;  Surgeon: Arlette Wayne MD;  Location: [facility];  Service: Endoscopy;  Laterality: N/A;  Plavix held indefinitely as of 10-20-21. Must also hold Coumadin 5 days prior. Ok to continue to use ASA.    inguinal hernia      INTRALUMINAL GASTROINTESTINAL TRACT IMAGING VIA CAPSULE N/A 10/4/2021    Procedure: IMAGING PROCEDURE, GI TRACT, INTRALUMINAL, VIA CAPSULE;  Surgeon: Joaquin Stack RN;  Location: [facility];  Service: Endoscopy;  Laterality: N/A;    LEFT HEART CATHETERIZATION Left 7/20/2021    Procedure: CATHETERIZATION, HEART, LEFT;  Surgeon: Darryl Griffith MD;  Location: [facility] CATH LAB;  Service: Cardiology;  Laterality: Left;    lung sx      PERCUTANEOUS TRANSLUMINAL BALLOON ANGIOPLASTY OF CORONARY ARTERY  7/20/2021    Procedure: Angioplasty-coronary;  Surgeon: Darryl Griffith MD;  Location: [facility] CATH LAB;  Service: Cardiology;;    PROSTATE  SURGERY         Review of patient's allergies indicates:  No Known Allergies  Family History       Problem Relation (Age of Onset)    Cancer Father    Diabetes Sister    Heart disease Mother, Sister    Macular degeneration Sister          Tobacco Use    Smoking status: Former     Packs/day: 0.50     Years: 40.00     Pack years: 20.00     Types: Cigarettes     Start date: 1962     Quit date:      Years since quittin.1    Smokeless tobacco: Never   Substance and Sexual Activity    Alcohol use: Yes     Alcohol/week: 7.0 standard drinks     Types: 7 Shots of liquor per week    Drug use: No    Sexual activity: Not Currently     Review of Systems   Constitutional:  Positive for appetite change and fatigue. Negative for chills, diaphoresis and fever.   Cardiovascular:  Negative for chest pain.   Gastrointestinal:  Negative for abdominal distention, abdominal pain, anal bleeding, blood in stool, constipation, diarrhea, nausea and vomiting.   Neurological:  Positive for weakness. Negative for syncope.   Objective:     Vital Signs (Most Recent):  Temp: 97.7 °F (36.5 °C) (23 1000)  Pulse: 82 (23 1031)  Resp: (!) 21 (23 1031)  BP: (!) 94/50 (23 1032)  SpO2: 96 % (23 1031)   Vital Signs (24h Range):  Temp:  [97.7 °F (36.5 °C)] 97.7 °F (36.5 °C)  Pulse:  [82-89] 82  Resp:  [18-21] 21  SpO2:  [96 %] 96 %  BP: (73-94)/(37-50) 94/50     Weight: 101.2 kg (223 lb 3.2 oz) (23 1016)  Body mass index is 34.96 kg/m².      Intake/Output Summary (Last 24 hours) at 2023 1155  Last data filed at 2023 1145  Gross per 24 hour   Intake 2000 ml   Output --   Net 2000 ml       Lines/Drains/Airways       Peripheral Intravenous Line  Duration                  Peripheral IV - Single Lumen 23 1024 20 G Anterior;Distal;Left Upper Arm <1 day         Peripheral IV - Single Lumen 23 1133 20 G Posterior;Right Hand <1 day                    Physical Exam  Constitutional:        Appearance: He is ill-appearing.   HENT:      Head: Normocephalic and atraumatic.   Eyes:      General: No scleral icterus.     Extraocular Movements: Extraocular movements intact.   Cardiovascular:      Rate and Rhythm: Normal rate.   Pulmonary:      Effort: Pulmonary effort is normal. No respiratory distress.   Abdominal:      General: Bowel sounds are normal. There is no distension.      Palpations: Abdomen is soft.      Tenderness: There is no abdominal tenderness. There is no guarding.   Musculoskeletal:      Cervical back: Normal range of motion.   Skin:     General: Skin is warm and dry.      Coloration: Skin is pale. Skin is not jaundiced.   Neurological:      Mental Status: He is alert.      Comments: Mildly disoriented. Patient answering questions appropriately but does not answer immediately       Significant Labs:  CBC:   Recent Labs   Lab 02/23/23  1024   WBC 6.20   HGB 5.8*   HCT 17.5*        CMP:   Recent Labs   Lab 02/23/23  1024   *   CALCIUM 9.3   ALBUMIN 3.6   PROT 6.6   *   K 4.6   CO2 23   CL 98   *   CREATININE 3.5*   ALKPHOS 49*   ALT 19   AST 23   BILITOT 0.3     Coagulation:   Recent Labs   Lab 02/23/23  1024   INR >10.0*   APTT 63.5*       Significant Imaging:  Imaging results within the past 24 hours have been reviewed.    Assessment/Plan:     Cardiac/Vascular  Atrial fibrillation  -Afib on Warfarin with INR >10.  -Hold anticoagulation in setting of severe anemia and GI bleed.     GI  Melena  -Patient with hgb 5.8, melena on rectal exam, and hypotension.   -History of AVMs back in 2021 seen on VCE - treated on EGD with push enteroscopy.   -Hold anticoagulation. Correct INR. ED has already ordered Kcentra.  -Agree with urgent blood transfusion, as patient remains hypotensive even with fluid resuscitation.   -Once stabilized, plan for EGD.  -Keep NPO.  -Agree with transfer to ICU.  -Continue with IV Protonix.            Thank you for your consult. I will follow-up  with patient. Please contact us if you have any additional questions.    Diana Goldman PA-C  Gastroenterology  [facility] - Emergency Dept.

## 2023-02-23 NOTE — ED NOTES
T/c to pharmacy.  Kcentra and vitamin K are being compounded at this time and will be ready shortly.

## 2023-02-23 NOTE — ANESTHESIA PREPROCEDURE EVALUATION
02/23/2023  [place] DEJA Biggs Jr. is a 82 y.o., male.      Pre-op Assessment    I have reviewed the Patient Summary Reports.     I have reviewed the Nursing Notes. I have reviewed the NPO Status.   I have reviewed the Medications.     Review of Systems  Anesthesia Hx:  No problems with previous Anesthesia  Denies Family Hx of Anesthesia complications.   Denies Personal Hx of Anesthesia complications.   Social:  Former Smoker, Alcohol Use    Hematology/Oncology:         -- Anemia: Oncology Comments: prostate    Cardiovascular:   Hypertension Denies MI. CAD    Denies CABG/stent. Dysrhythmias atrial fibrillation Angina CHF hyperlipidemia ECG has been reviewed. ekg 9/2021:  Atrial fibrillation 89  Septal infarct ,age undetermined   Nonspecific T wave abnormality   Abnormal ECG     Echo 11/2022:  ? The left ventricle is normal in size with concentric remodeling and normal systolic function.  ? Severe left atrial enlargement.  ? The estimated ejection fraction is 60%.  ? Atrial fibrillation observed.  ? Normal right ventricular size with normal right ventricular systolic function.  ? Moderate right atrial enlargement.  ? Moderate aortic regurgitation.  ? Mild-to-moderate mitral regurgitation.  ? Mild tricuspid regurgitation.  ? Normal central venous pressure (3 mmHg).  ? The estimated PA systolic pressure is 34 mmHg.  ? The ascending aorta is moderately dilated    Chronic diastolic heart failure   Pulmonary:   COPD Denies Asthma. Sleep Apnea covid    Pulmonary hypertension, mild   Renal/:   Chronic Renal Disease, ARF Urinary incontinence   Hepatic/GI:   Denies GERD. Denies Liver Disease.  Denies Hepatitis. Chronic upper GI bleeding   Neurological:   Denies CVA. Denies Seizures. Intention tremor   Endocrine:   Diabetes, type 2 Denies Hypothyroidism. Denies Hyperthyroidism.  Obesity / BMI > 30  Psych:   depression           Physical Exam    Airway:  Mallampati: II   Mouth Opening: Normal    Dental:  Intact    Chest/Lungs:  Clear to auscultation, Normal Respiratory Rate    Heart:  Rate: Normal  Rhythm: Irregularly Irregular        Anesthesia Plan  Type of Anesthesia, risks & benefits discussed:    Anesthesia Type: MAC  Intra-op Monitoring Plan: Standard ASA Monitors  Induction:  IV  Informed Consent: Informed consent signed with the Patient and all parties understand the risks and agree with anesthesia plan.  All questions answered.   ASA Score: 3  Day of Surgery Review of History & Physical: H&P Update referred to the surgeon/provider.    Ready For Surgery From Anesthesia Perspective.     .

## 2023-02-23 NOTE — ED NOTES
Pt resting in comfortably in ED stretcher with wife at bedside. Skin warm and dry, RR even and unlabored. VSS. [facility].

## 2023-02-23 NOTE — TELEPHONE ENCOUNTER
On 2/22/23 at 4:54 P.M.  I received a call from Shilpi Biggs, Cardiology Supervisor- [facility], reports she received a call from Lizeth Patel stating patient's INR is 8.18--Critical.  Advised Shilpi I will forward information to Twyla Weeks, Nathalie in [facility] to follow up with patient.

## 2023-02-23 NOTE — ASSESSMENT & PLAN NOTE
INR 8.2 yesterday with routine check and was instructed to hold coumadin x 3 days   INR > 10 today   Holding coumadin   Vit K   PCC  Monitor

## 2023-02-23 NOTE — TELEPHONE ENCOUNTER
----- Message from Alondra Hanson sent at 2/23/2023  7:10 AM CST -----  Contact: Pt wife - be  Type:  Same Day Appointment Request    Caller is requesting a same day appointment.  Caller declined first available appointment listed below.    Name of Caller: pt wife  When is the first available appointment? 3/2  Symptoms: couma 8.2/listless/ weak  Best Call Back Number:   Additional Information: on call didn't answer

## 2023-02-23 NOTE — SUBJECTIVE & OBJECTIVE
Past Medical History:   Diagnosis Date    Abnormal CXR     Angina pectoris 8/28/2017    Angiodysplasia of small intestine     Atrial fibrillation     Chronic diastolic heart failure 11/8/2022    Chronic upper GI bleeding     due to angiodysplasia in proximal small intestine    Coronary atherosclerosis of unspecified type of vessel, native or graft     COVID 6/26/2022    Depression     Diabetes mellitus without complication     Emphysema of lung     History of prostate cancer 2007    prostatectomy    Hyperlipidemia associated with type 2 diabetes mellitus     Hypertension associated with diabetes     LAILA (iron deficiency anemia) 09/20/2021    due to angiodysplasia in small intestine    Intention tremor     Major depressive disorder, recurrent, moderate 4/12/2021    Moderate episode of recurrent major depressive disorder 4/1/2019    Morbid (severe) obesity due to excess calories 4/1/2019    SHORTY (obstructive sleep apnea)     Prostate cancer     Trouble in sleeping     Urinary incontinence        Past Surgical History:   Procedure Laterality Date    ABDOMINAL HERNIA REPAIR      APPENDECTOMY      bladder sx      CARDIAC CATHETERIZATION      CATARACT EXTRACTION W/  INTRAOCULAR LENS IMPLANT  Restor OU    COLONOSCOPY N/A 5/16/2017    Procedure: COLONOSCOPY;  Surgeon: Alfredo Naidu MD;  Location: [facility];  Service: Endoscopy;  Laterality: N/A;    ESOPHAGOGASTRODUODENOSCOPY N/A 10/29/2021    Procedure: SBE (Push Enteroscopy) with Dr. Wayne;  Surgeon: Arlette Wayne MD;  Location: [facility];  Service: Endoscopy;  Laterality: N/A;  Plavix held indefinitely as of 10-20-21. Must also hold Coumadin 5 days prior. Ok to continue to use ASA.    inguinal hernia      INTRALUMINAL GASTROINTESTINAL TRACT IMAGING VIA CAPSULE N/A 10/4/2021    Procedure: IMAGING PROCEDURE, GI TRACT, INTRALUMINAL, VIA CAPSULE;  Surgeon: Joaquin Stack RN;  Location: [facility];  Service: Endoscopy;  Laterality: N/A;    LEFT HEART  CATHETERIZATION Left 2021    Procedure: CATHETERIZATION, HEART, LEFT;  Surgeon: Darryl Griffith MD;  Location: [facility] CATH LAB;  Service: Cardiology;  Laterality: Left;    lung sx      PERCUTANEOUS TRANSLUMINAL BALLOON ANGIOPLASTY OF CORONARY ARTERY  2021    Procedure: Angioplasty-coronary;  Surgeon: Darryl Griffith MD;  Location: [facility] CATH LAB;  Service: Cardiology;;    PROSTATE SURGERY         Review of patient's allergies indicates:  No Known Allergies    Family History       Problem Relation (Age of Onset)    Cancer Father    Diabetes Sister    Heart disease Mother, Sister    Macular degeneration Sister          Tobacco Use    Smoking status: Former     Packs/day: 0.50     Years: 40.00     Pack years: 20.00     Types: Cigarettes     Start date: 1962     Quit date:      Years since quittin.1    Smokeless tobacco: Never   Substance and Sexual Activity    Alcohol use: Yes     Alcohol/week: 7.0 standard drinks     Types: 7 Shots of liquor per week    Drug use: No    Sexual activity: Not Currently         Review of Systems   Unable to perform ROS: Acuity of condition   Objective:     Vital Signs (Most Recent):  Temp: 97.9 °F (36.6 °C) (23 1222)  Pulse: 84 (23 1342)  Resp: (!) 22 (23 1342)  BP: (!) 103/57 (23 1342)  SpO2: 95 % (23 1342)   Vital Signs (24h Range):  Temp:  [97.7 °F (36.5 °C)-98 °F (36.7 °C)] 97.9 °F (36.6 °C)  Pulse:  [] 84  Resp:  [18-34] 22  SpO2:  [85 %-100 %] 95 %  BP: ()/(34-57) 103/57     Weight: 101.2 kg (223 lb 3.2 oz)  Body mass index is 34.96 kg/m².      Intake/Output Summary (Last 24 hours) at 2023 1347  Last data filed at 2023 1254  Gross per 24 hour   Intake 2500 ml   Output --   Net 2500 ml       Physical Exam  Vitals reviewed.   Constitutional:       Appearance: He is ill-appearing.   HENT:      Head: Normocephalic and atraumatic.      Mouth/Throat:      Mouth: Mucous membranes are moist.      Pharynx:  Oropharynx is clear.   Eyes:      Extraocular Movements: Extraocular movements intact.      Conjunctiva/sclera: Conjunctivae normal.   Cardiovascular:      Rate and Rhythm: Normal rate. Rhythm irregular.      Comments: No cyanosis or mottling.   Pulmonary:      Effort: Pulmonary effort is normal.      Breath sounds: No wheezing or rhonchi.   Abdominal:      Palpations: Abdomen is soft.      Tenderness: There is no abdominal tenderness. There is no guarding.   Musculoskeletal:      Cervical back: Normal range of motion and neck supple.      Right lower leg: No edema.      Left lower leg: No edema.   Skin:     General: Skin is warm and dry.      Coloration: Skin is pale.   Neurological:      Mental Status: He is easily aroused. He is lethargic.      Comments: Oriented to self, place and time. Disoriented to situation. Moving all extremities   Psychiatric:         Behavior: Behavior is cooperative.       Vents:       Lines/Drains/Airways       Peripheral Intravenous Line  Duration                  Peripheral IV - Single Lumen 02/23/23 1024 20 G Anterior;Distal;Left Upper Arm <1 day         Peripheral IV - Single Lumen 02/23/23 1238 18 G Distal;Left;Posterior Forearm <1 day                    Significant Labs:    CBC/Anemia Profile:  Recent Labs   Lab 02/23/23  1024 02/23/23  1107   WBC 6.20  --    HGB 5.8*  --    HCT 17.5*  --      --    MCV 95  --    RDW 14.4  --    OCCULTBLOOD  --  Positive*        Chemistries:  Recent Labs   Lab 02/23/23  1024   *   K 4.6   CL 98   CO2 23   *   CREATININE 3.5*   CALCIUM 9.3   ALBUMIN 3.6   PROT 6.6   BILITOT 0.3   ALKPHOS 49*   ALT 19   AST 23   MG 1.8       All pertinent labs within the past 24 hours have been reviewed.    Significant Imaging:   I have reviewed all pertinent imaging results/findings within the past 24 hours.

## 2023-02-23 NOTE — TELEPHONE ENCOUNTER
Message sent to Twyla Weeks PharmD concerning patient's critical INR.  Twyla advises she has spoken with patient and advised patient of warfarin dosing.

## 2023-02-23 NOTE — SUBJECTIVE & OBJECTIVE
Past Medical History:   Diagnosis Date    Abnormal CXR     Angina pectoris 8/28/2017    Angiodysplasia of small intestine     Atrial fibrillation     Chronic diastolic heart failure 11/8/2022    Chronic upper GI bleeding     due to angiodysplasia in proximal small intestine    Coronary atherosclerosis of unspecified type of vessel, native or graft     COVID 6/26/2022    Depression     Diabetes mellitus without complication     Emphysema of lung     History of prostate cancer 2007    prostatectomy    Hyperlipidemia associated with type 2 diabetes mellitus     Hypertension associated with diabetes     LAILA (iron deficiency anemia) 09/20/2021    due to angiodysplasia in small intestine    Intention tremor     Major depressive disorder, recurrent, moderate 4/12/2021    Moderate episode of recurrent major depressive disorder 4/1/2019    Morbid (severe) obesity due to excess calories 4/1/2019    SHORTY (obstructive sleep apnea)     Prostate cancer     Trouble in sleeping     Urinary incontinence        Past Surgical History:   Procedure Laterality Date    ABDOMINAL HERNIA REPAIR      APPENDECTOMY      bladder sx      CARDIAC CATHETERIZATION      CATARACT EXTRACTION W/  INTRAOCULAR LENS IMPLANT  Restor OU    COLONOSCOPY N/A 5/16/2017    Procedure: COLONOSCOPY;  Surgeon: Alfredo Naidu MD;  Location: [facility];  Service: Endoscopy;  Laterality: N/A;    ESOPHAGOGASTRODUODENOSCOPY N/A 10/29/2021    Procedure: SBE (Push Enteroscopy) with Dr. Wayne;  Surgeon: Arlette Wayne MD;  Location: [facility];  Service: Endoscopy;  Laterality: N/A;  Plavix held indefinitely as of 10-20-21. Must also hold Coumadin 5 days prior. Ok to continue to use ASA.    inguinal hernia      INTRALUMINAL GASTROINTESTINAL TRACT IMAGING VIA CAPSULE N/A 10/4/2021    Procedure: IMAGING PROCEDURE, GI TRACT, INTRALUMINAL, VIA CAPSULE;  Surgeon: Joaquin Stack RN;  Location: [facility];  Service: Endoscopy;  Laterality: N/A;    LEFT HEART  CATHETERIZATION Left 2021    Procedure: CATHETERIZATION, HEART, LEFT;  Surgeon: Darryl Griffith MD;  Location[facility] CATH LAB;  Service: Cardiology;  Laterality: Left;    lung sx      PERCUTANEOUS TRANSLUMINAL BALLOON ANGIOPLASTY OF CORONARY ARTERY  2021    Procedure: Angioplasty-coronary;  Surgeon: Darryl Griffith MD;  Location: [facility] CATH LAB;  Service: Cardiology;;    PROSTATE SURGERY         Review of patient's allergies indicates:  No Known Allergies  Family History       Problem Relation (Age of Onset)    Cancer Father    Diabetes Sister    Heart disease Mother, Sister    Macular degeneration Sister          Tobacco Use    Smoking status: Former     Packs/day: 0.50     Years: 40.00     Pack years: 20.00     Types: Cigarettes     Start date: 1962     Quit date:      Years since quittin.1    Smokeless tobacco: Never   Substance and Sexual Activity    Alcohol use: Yes     Alcohol/week: 7.0 standard drinks     Types: 7 Shots of liquor per week    Drug use: No    Sexual activity: Not Currently     Review of Systems   Constitutional:  Positive for appetite change and fatigue. Negative for chills, diaphoresis and fever.   Cardiovascular:  Negative for chest pain.   Gastrointestinal:  Negative for abdominal distention, abdominal pain, anal bleeding, blood in stool, constipation, diarrhea, nausea and vomiting.   Neurological:  Positive for weakness. Negative for syncope.   Objective:     Vital Signs (Most Recent):  Temp: 97.7 °F (36.5 °C) (23 1000)  Pulse: 82 (23 1031)  Resp: (!) 21 (23 1031)  BP: (!) 94/50 (23 1032)  SpO2: 96 % (23 1031)   Vital Signs (24h Range):  Temp:  [97.7 °F (36.5 °C)] 97.7 °F (36.5 °C)  Pulse:  [82-89] 82  Resp:  [18-21] 21  SpO2:  [96 %] 96 %  BP: (73-94)/(37-50) 94/50     Weight: 101.2 kg (223 lb 3.2 oz) (23 1016)  Body mass index is 34.96 kg/m².      Intake/Output Summary (Last 24 hours) at 2023 1155  Last data filed at  2/23/2023 1145  Gross per 24 hour   Intake 2000 ml   Output --   Net 2000 ml       Lines/Drains/Airways       Peripheral Intravenous Line  Duration                  Peripheral IV - Single Lumen 02/23/23 1024 20 G Anterior;Distal;Left Upper Arm <1 day         Peripheral IV - Single Lumen 02/23/23 1133 20 G Posterior;Right Hand <1 day                    Physical Exam  Constitutional:       Appearance: He is ill-appearing.   HENT:      Head: Normocephalic and atraumatic.   Eyes:      General: No scleral icterus.     Extraocular Movements: Extraocular movements intact.   Cardiovascular:      Rate and Rhythm: Normal rate.   Pulmonary:      Effort: Pulmonary effort is normal. No respiratory distress.   Abdominal:      General: Bowel sounds are normal. There is no distension.      Palpations: Abdomen is soft.      Tenderness: There is no abdominal tenderness. There is no guarding.   Musculoskeletal:      Cervical back: Normal range of motion.   Skin:     General: Skin is warm and dry.      Coloration: Skin is pale. Skin is not jaundiced.   Neurological:      Mental Status: He is alert.      Comments: Mildly disoriented. Patient answering questions appropriately but does not answer immediately       Significant Labs:  CBC:   Recent Labs   Lab 02/23/23  1024   WBC 6.20   HGB 5.8*   HCT 17.5*        CMP:   Recent Labs   Lab 02/23/23  1024   *   CALCIUM 9.3   ALBUMIN 3.6   PROT 6.6   *   K 4.6   CO2 23   CL 98   *   CREATININE 3.5*   ALKPHOS 49*   ALT 19   AST 23   BILITOT 0.3     Coagulation:   Recent Labs   Lab 02/23/23  1024   INR >10.0*   APTT 63.5*       Significant Imaging:  Imaging results within the past 24 hours have been reviewed.

## 2023-02-23 NOTE — TRANSFER OF CARE
"Anesthesia Transfer of Care Note    Patient: Altha DEJA Biggs JrLester    Procedure(s) Performed: Procedure(s) (LRB):  EGD (ESOPHAGOGASTRODUODENOSCOPY) (N/A)    Patient location: ICU    Anesthesia Type: MAC    Post pain: adequate analgesia    Post assessment: no apparent anesthetic complications and tolerated procedure well    Post vital signs: stable    Level of consciousness: sedated    Nausea/Vomiting: no nausea/vomiting    Complications: none    Transfer of care protocol was followed      Last vitals:   Visit Vitals  BP (!) 109/55   Pulse 81   Temp 36.4 °C (97.6 °F) (Oral)   Resp 18   Ht 5' 7" (1.702 m)   Wt 100.4 kg (221 lb 5.5 oz)   SpO2 97%   BMI 34.67 kg/m²     "

## 2023-02-23 NOTE — ASSESSMENT & PLAN NOTE
Rate controlled   Holding PO meds for now - resume as appropriate   Obviously holding anticoagulation in setting of acute GIB and supratherapeutic INR  Cardiac monitoring

## 2023-02-23 NOTE — BRIEF OP NOTE
Inpatient Endoscopy Brief Operative Note      Admit Date: 2/23/2023    Procedure Date and Time:  2/23/2023 5:08 PM    Attending Physician: Sung Stephenson MD     Principal Admitting Diagnoses: Gastrointestinal hemorrhage with melena         Discharge Diagnosis: The primary encounter diagnosis was Acute upper GI bleed. Diagnoses of Weakness, Poisoning by warfarin sodium, accidental or unintentional, initial encounter, Chronic atrial fibrillation, Acute renal failure, unspecified acute renal failure type, Chronic anticoagulation, Intravascular volume depletion, Gastrointestinal hemorrhage with melena, and Acute on chronic blood loss anemia were also pertinent to this visit.     Discharged Condition: Stable    Indication for Admission: Gastrointestinal hemorrhage with melena     Procedure Performed: EGD with APC    SEE PROVATIONS REPORTS FOR DETAILS.    Pathology (if any):  Specimen (24h ago, onward)      None            Estimated Blood Loss: 1 ml.    Discussed with: family.    Disposition: Return to hospital [facility].    Recommendations:   Continue Protonix 40mg IV daily  May add carafate 1g suspension PO daily  May restart clear liquid      Communicated with ICU service regarding the above findings.       Opal Wright MD  Inpatient Gastroenterology  [facility]

## 2023-02-23 NOTE — ED NOTES
MD notified that pt has new onset of wheezing bilaterally in upper lobes with weak cough. SPO2 97% RA. ICU MD at bedside evaluating pt at this time.

## 2023-02-23 NOTE — CONSULTS
[facility] - Emergency Dept.  Cardiology  Consult Note    Patient Name: Anthony Biggs Jr.  MRN: [id number]  Admission Date: 2/23/2023  Hospital Length of Stay: 0 days  Code Status: Full Code   Attending Provider: Sung Stephenson MD   Consulting Provider: Erik Alba MD  Primary Care Physician: Giuliano Moran MD  Principal Problem:<principal problem not specified>    Patient information was obtained from spouse/SO, past medical records and ER records.     Inpatient consult to Cardiology  Consult performed by: Norma Alba MD  Consult ordered by: Jessica Santos MD        Subjective:     Chief Complaint:  Hypotension hypovolemic shock     HPI:   An 83 yo male with afib diastolic chf angiodysplasia by capsule endoscopy presents with severe anemia mental status changes severe anemia significantly elevated inr hypotension. Basically he is in hypovolemic shock . No change in meds or diet.      Past Medical History:   Diagnosis Date    Abnormal CXR     Angina pectoris 8/28/2017    Angiodysplasia of small intestine     Atrial fibrillation     Chronic diastolic heart failure 11/8/2022    Chronic upper GI bleeding     due to angiodysplasia in proximal small intestine    Coronary atherosclerosis of unspecified type of vessel, native or graft     COVID 6/26/2022    Depression     Diabetes mellitus without complication     Emphysema of lung     History of prostate cancer 2007    prostatectomy    Hyperlipidemia associated with type 2 diabetes mellitus     Hypertension associated with diabetes     LAILA (iron deficiency anemia) 09/20/2021    due to angiodysplasia in small intestine    Intention tremor     Major depressive disorder, recurrent, moderate 4/12/2021    Moderate episode of recurrent major depressive disorder 4/1/2019    Morbid (severe) obesity due to excess calories 4/1/2019    SHORTY (obstructive sleep apnea)     Prostate cancer     Trouble in sleeping     Urinary incontinence        Past Surgical  History:   Procedure Laterality Date    ABDOMINAL HERNIA REPAIR      APPENDECTOMY      bladder sx      CARDIAC CATHETERIZATION      CATARACT EXTRACTION W/  INTRAOCULAR LENS IMPLANT  Restor OU    COLONOSCOPY N/A 5/16/2017    Procedure: COLONOSCOPY;  Surgeon: Alfredo Naidu MD;  Location: [facility] ENDO;  Service: Endoscopy;  Laterality: N/A;    ESOPHAGOGASTRODUODENOSCOPY N/A 10/29/2021    Procedure: SBE (Push Enteroscopy) with Dr. Wayne;  Surgeon: Arlette Wayne MD;  Location: [facility] ENDO;  Service: Endoscopy;  Laterality: N/A;  Plavix held indefinitely as of 10-20-21. Must also hold Coumadin 5 days prior. Ok to continue to use ASA.    inguinal hernia      INTRALUMINAL GASTROINTESTINAL TRACT IMAGING VIA CAPSULE N/A 10/4/2021    Procedure: IMAGING PROCEDURE, GI TRACT, INTRALUMINAL, VIA CAPSULE;  Surgeon: Joaquin Stack RN;  Location: [facility] ENDO;  Service: Endoscopy;  Laterality: N/A;    LEFT HEART CATHETERIZATION Left 7/20/2021    Procedure: CATHETERIZATION, HEART, LEFT;  Surgeon: Darryl Griffith MD;  Location: [facility] CATH LAB;  Service: Cardiology;  Laterality: Left;    lung sx      PERCUTANEOUS TRANSLUMINAL BALLOON ANGIOPLASTY OF CORONARY ARTERY  7/20/2021    Procedure: Angioplasty-coronary;  Surgeon: Darryl Griffith MD;  Location: [facility] CATH LAB;  Service: Cardiology;;    PROSTATE SURGERY         Review of patient's allergies indicates:  No Known Allergies    No current facility-administered medications on file prior to encounter.     Current Outpatient Medications on File Prior to Encounter   Medication Sig    allopurinoL (ZYLOPRIM) 100 MG tablet TAKE ONE TABLET BY MOUTH DAILY    amLODIPine (NORVASC) 5 MG tablet TAKE 1 TABLET (5 MG TOTAL) BY MOUTH ONCE DAILY.    aspirin (ECOTRIN) 81 MG EC tablet Take 81 mg by mouth once daily.    ferrous sulfate 324 mg (65 mg iron) TbEC TAKE ONE TABLET BY MOUTH TWICE DAILY    furosemide (LASIX) 40 MG tablet Take 2 tablets (80 mg total) by mouth 2 (two)  times daily.    ibuprofen (ADVIL,MOTRIN) 200 MG tablet Take 200 mg by mouth every evening.    isosorbide mononitrate (IMDUR) 30 MG 24 hr tablet Take 1 tablet (30 mg total) by mouth once daily.    losartan (COZAAR) 50 MG tablet TAKE ONE TABLET BY MOUTH TWICE DAILY    melatonin 3 mg Tab Take 1 tablet by mouth nightly.    metFORMIN (GLUCOPHAGE) 500 MG tablet TAKE ONE TABLET BY MOUTH TWICE A DAY WITH MEALS    metOLazone (ZAROXOLYN) 5 MG tablet Take 1 tablet (5 mg total) by mouth once daily.    nystatin-triamcinolone (MYCOLOG II) cream Apply topically 4 (four) times daily.    potassium chloride SA (K-DUR,KLOR-CON M) 10 MEQ tablet Take 1 tablet (10 mEq total) by mouth once daily.    torsemide (DEMADEX) 10 MG Tab Take 10 mg by mouth once daily.    warfarin (COUMADIN) 5 MG tablet TAKE 1 TABLET (5 MG TOTAL) BY MOUTH DAILY. OR AS DIRECTED BY COUMADIN CLINIC    [DISCONTINUED] multivitamin (ONE DAILY MULTIVITAMIN) per tablet Take 1 tablet by mouth once daily.    atorvastatin (LIPITOR) 40 MG tablet Take 1 tablet (40 mg total) by mouth once daily. for 90 doses     Family History       Problem Relation (Age of Onset)    Cancer Father    Diabetes Sister    Heart disease Mother, Sister    Macular degeneration Sister          Tobacco Use    Smoking status: Former     Packs/day: 0.50     Years: 40.00     Pack years: 20.00     Types: Cigarettes     Start date: 1962     Quit date:      Years since quittin.1    Smokeless tobacco: Never   Substance and Sexual Activity    Alcohol use: Yes     Alcohol/week: 7.0 standard drinks     Types: 7 Shots of liquor per week    Drug use: No    Sexual activity: Not Currently     Review of Systems   Constitutional: Positive for malaise/fatigue.   Eyes:  Negative for blurred vision.   Cardiovascular:  Negative for chest pain, claudication, cyanosis, dyspnea on exertion, irregular heartbeat, leg swelling, near-syncope, orthopnea, palpitations and paroxysmal nocturnal dyspnea.    Respiratory:  Negative for cough, hemoptysis and shortness of breath.    Hematologic/Lymphatic: Negative for bleeding problem. Does not bruise/bleed easily.   Skin:  Negative for dry skin and itching.   Musculoskeletal:  Negative for falls, muscle weakness and myalgias.   Gastrointestinal:  Negative for abdominal pain, diarrhea, heartburn, hematemesis, hematochezia and melena.   Genitourinary:  Negative for flank pain and hematuria.   Neurological:  Positive for weakness. Negative for dizziness, focal weakness, headaches, light-headedness, numbness, paresthesias and seizures.   Psychiatric/Behavioral:  Positive for altered mental status. Negative for memory loss. The patient is not nervous/anxious.    Allergic/Immunologic: Negative for hives.   Objective:     Vital Signs (Most Recent):  Temp: 97.9 °F (36.6 °C) (02/23/23 1222)  Pulse: 84 (02/23/23 1222)  Resp: 20 (02/23/23 1222)  BP: (!) 63/38 (02/23/23 1222)  SpO2: 95 % (02/23/23 1222)   Vital Signs (24h Range):  Temp:  [97.7 °F (36.5 °C)-98 °F (36.7 °C)] 97.9 °F (36.6 °C)  Pulse:  [] 84  Resp:  [18-34] 20  SpO2:  [85 %-100 %] 95 %  BP: (58-96)/(34-57) 63/38     Weight: 101.2 kg (223 lb 3.2 oz)  Body mass index is 34.96 kg/m².    SpO2: 95 %         Intake/Output Summary (Last 24 hours) at 2/23/2023 1240  Last data filed at 2/23/2023 1145  Gross per 24 hour   Intake 2000 ml   Output --   Net 2000 ml       Lines/Drains/Airways       Peripheral Intravenous Line  Duration                  Peripheral IV - Single Lumen 02/23/23 1024 20 G Anterior;Distal;Left Upper Arm <1 day         Peripheral IV - Single Lumen 02/23/23 1133 20 G Posterior;Right Hand <1 day         Peripheral IV - Single Lumen 02/23/23 1238 18 G Distal;Left;Posterior Forearm <1 day                    Physical Exam  Vitals and nursing note reviewed.   Constitutional:       General: He is not in acute distress.     Appearance: He is well-developed. He is not diaphoretic.   HENT:      Head:  Normocephalic and atraumatic.   Eyes:      General:         Right eye: No discharge.         Left eye: No discharge.      Pupils: Pupils are equal, round, and reactive to light.   Neck:      Thyroid: No thyromegaly.      Vascular: No JVD.   Cardiovascular:      Rate and Rhythm: Normal rate. Rhythm irregular.      Pulses: Normal pulses and intact distal pulses.      Heart sounds: Normal heart sounds. No murmur heard.    No friction rub. No gallop.   Pulmonary:      Effort: Pulmonary effort is normal. No respiratory distress.      Breath sounds: Normal breath sounds. No wheezing or rales.   Chest:      Chest wall: No tenderness.   Abdominal:      General: Bowel sounds are normal. There is no distension.      Palpations: Abdomen is soft.      Tenderness: There is no abdominal tenderness.   Musculoskeletal:         General: Normal range of motion.      Cervical back: Neck supple.      Right lower leg: No edema.      Left lower leg: No edema.   Skin:     General: Skin is warm and dry.      Findings: No erythema or rash.   Neurological:      Mental Status: He is alert and oriented to person, place, and time.      Cranial Nerves: No cranial nerve deficit.      Comments: Patient is very restless oriented. Has purposeless movement .   Psychiatric:      Comments: restless       Significant Labs:   Recent Lab Results  (Last 5 results in the past 24 hours)        02/23/23  1107   02/23/23  1045   02/23/23  1024   02/23/23  1021   02/23/23  1002        Influenza A, Molecular   Negative             Influenza B, Molecular   Negative             Unit Blood Type Code     5100  [P]                5100  [P]           Unit Expiration     [id number]  [P]                [id number]  [P]           Procalcitonin     0.35  Comment: A concentration < 0.25 ng/mL represents a low risk of bacterial   infection.  Procalcitonin may not be accurate among patients with localized   infection, recent trauma or major surgery, immunosuppressed  state,   invasive fungal infection, renal dysfunction. Decisions regarding   initiation or continuation of antibiotic therapy should not be based   solely on procalcitonin levels.             Unit Blood Type     O POS  [P]                O POS  [P]           Albumin     3.6           Alkaline Phosphatase     49           ALT     19           Anion Gap     14           aPTT     63.5  Comment: aPTT therapeutic range = 39-69 seconds           AST     23           Baso #     0.01           Basophil %     0.2           BILIRUBIN TOTAL     0.3  Comment: For infants and newborns, interpretation of results should be based  on gestational age, weight and in agreement with clinical  observations.    Premature Infant recommended reference ranges:  Up to 24 hours.............<8.0 mg/dL  Up to 48 hours............<12.0 mg/dL  3-5 days..................<15.0 mg/dL  6-29 days.................<15.0 mg/dL             BNP     116  Comment: Values of less than 100 pg/ml are consistent with non-CHF populations.           BUN     114           Calcium     9.3           Chloride     98           CO2     23           CODING SYSTEM     SIFF637  [P]                [id number]  [P]           Creatinine     3.5           CROSSMATCH INTERPRETATION     Compatible  [P]                Compatible  [P]           Differential Method     Automated           DISPENSE STATUS     ISSUED  [P]                ISSUED  [P]           eGFR     17           Eos #     0.1           Eosinophil %     2.1           Flu A & B Source   Nasal swab             Glucose     138           Gran # (ANC)     3.8           Gran %     61.2           Group & Rh     O POS           Hematocrit     17.5  Comment: HGB and HCT critical result(s) called and verbal readback obtained   from TERESITA PARRA RN by [facility] 02/23/2023 11:04             Hemoglobin     5.8  Comment: HGB and HCT critical result(s) called and verbal readback obtained   from TERESITA PARRA RN by [facility] 02/23/2023 11:04              Hepatitis C Ab     Negative           HIV 1/2 Ag/Ab     Negative           Immature Grans (Abs)     0.03  Comment: Mild elevation in immature granulocytes is non specific and   can be seen in a variety of conditions including stress response,   acute inflammation, trauma and pregnancy. Correlation with other   laboratory and clinical findings is essential.             Immature Granulocytes     0.5           INDIRECT MALIA     NEG           INR     >10.0  Comment: Coumadin Therapy:  2.0 - 3.0 for INR for all indicators except mechanical heart valves  and antiphospholipid syndromes which should use 2.5 - 3.5.  result(s) called and verbal readback obtained from rob lipscomb rn   by [facility] 02/23/2023 11:11             Lactate, Alvaro     2.1  Comment: Falsely low lactic acid results can be found in samples   containing >=13.0 mg/dL total bilirubin and/or >=3.5 mg/dL   direct bilirubin.             Lymph #     1.6           Lymph %     26.5           Magnesium     1.8           MCH     31.4           MCHC     33.1           MCV     95           Mono #     0.6           Mono %     9.5           MPV     10.0           nRBC     0           Occult Blood Positive               Platelets     224           POCT Glucose       151   139       Potassium     4.6           Product Code     [id number]  [P]                [id number]  [P]           PROTEIN TOTAL     6.6           Protime     >100.0           RBC     1.85           RDW     14.4           SARS-CoV-2 RNA, Amplification, Qual   Negative  Comment: This test utilizes isothermal nucleic acid amplification technology   to   detect the SARS-CoV-2 RdRp nucleic acid segment. The analytical   sensitivity   (limit of detection) is 500 copies/swab.     A POSITIVE result is indicative of the presence of SARS-CoV-2 RNA;   clinical   correlation with patient history and other diagnostic information is   necessary to determine patient infection status.    A NEGATIVE result means that  SARS-CoV-2 nucleic acids are not present   above   the limit of detection. A NEGATIVE result should be treated as   presumptive.   It does not rule out the possibility of COVID-19 and should not be   the sole   basis for treatment decisions. If COVID-19 is strongly suspected   based on   clinical and exposure history, re-testing using an alternate   molecular assay   should be considered.     This test is only for use under the Food and Drug Administration s   Emergency   Use Authorization (EUA).     Commercial kits are provided by [employer]. Performance   characteristics of the EUA have been independently verified by   [facility] Department of Pathology and Laboratory Medicine.   _________________________________________________________________   The authorized Fact Sheet for Healthcare Providers and the authorized   Fact   Sheet for Patients of the ID NOW COVID-19 are available on the FDA   website:   https://www.fda.gov/media/417431/download   https://www.fda.gov/media/388193/download               Sodium     135           Troponin I     0.035  Comment: The reference interval for Troponin I represents the 99th percentile   cutoff   for our facility and is consistent with 3rd generation assay   performance.             UNIT NUMBER     [id number]  [P]                [id number]  [P]           WBC     6.20                                   [P] - Preliminary Result               Significant Imaging:    Assessment and Plan:     Melena  secondaruy to gi bleed hold coumadin transfuse vit k and proceed with endoscopy when stable.     Chronic diastolic heart failure  Appears well compensated due tyo hypovolemic shock from bleeding    LAILA (iron deficiency anemia)  See melena    Hypertension associated with diabetes  Hold meds due to shock    Hyperlipidemia associated with type 2 diabetes mellitus  continue home meds.    Coronary atherosclerosis  Asymptomatic will check troponin    Atrial  fibrillation  Chronic on coumadin previous gi bleed presents with gi bleed. Hypovolemic shock        VTE Risk Mitigation (From admission, onward)         Ordered     Reason for No Pharmacological VTE Prophylaxis  Once        Question:  Reasons:  Answer:  Active Bleeding    02/23/23 1135     IP VTE HIGH RISK PATIENT  Once         02/23/23 1135     Place sequential compression device  Until discontinued         02/23/23 1135                Thank you for your consult. I will follow-up with patient. Please contact us if you have any additional questions.    Erik Alba MD  Cardiology   [facility] - Emergency Dept.

## 2023-02-24 PROBLEM — G93.41 ENCEPHALOPATHY, METABOLIC: Status: ACTIVE | Noted: 2023-02-24

## 2023-02-24 LAB
ALBUMIN SERPL BCP-MCNC: 3.4 G/DL (ref 3.5–5.2)
ALP SERPL-CCNC: 58 U/L (ref 55–135)
ALT SERPL W/O P-5'-P-CCNC: 20 U/L (ref 10–44)
ANION GAP SERPL CALC-SCNC: 14 MMOL/L (ref 8–16)
AST SERPL-CCNC: 33 U/L (ref 10–40)
BASOPHILS # BLD AUTO: 0.01 K/UL (ref 0–0.2)
BASOPHILS NFR BLD: 0.1 % (ref 0–1.9)
BILIRUB SERPL-MCNC: 1.2 MG/DL (ref 0.1–1)
BUN SERPL-MCNC: 76 MG/DL (ref 8–23)
CALCIUM SERPL-MCNC: 9.1 MG/DL (ref 8.7–10.5)
CHLORIDE SERPL-SCNC: 105 MMOL/L (ref 95–110)
CO2 SERPL-SCNC: 17 MMOL/L (ref 23–29)
CREAT SERPL-MCNC: 2.1 MG/DL (ref 0.5–1.4)
DIFFERENTIAL METHOD: ABNORMAL
EOSINOPHIL # BLD AUTO: 0.1 K/UL (ref 0–0.5)
EOSINOPHIL NFR BLD: 1.8 % (ref 0–8)
ERYTHROCYTE [DISTWIDTH] IN BLOOD BY AUTOMATED COUNT: 17.5 % (ref 11.5–14.5)
EST. GFR  (NO RACE VARIABLE): 31 ML/MIN/1.73 M^2
GLUCOSE SERPL-MCNC: 117 MG/DL (ref 70–110)
HCT VFR BLD AUTO: 28.8 % (ref 40–54)
HCT VFR BLD AUTO: 29.4 % (ref 40–54)
HGB BLD-MCNC: 9.6 G/DL (ref 14–18)
HGB BLD-MCNC: 9.8 G/DL (ref 14–18)
IMM GRANULOCYTES # BLD AUTO: 0.04 K/UL (ref 0–0.04)
IMM GRANULOCYTES NFR BLD AUTO: 0.6 % (ref 0–0.5)
LYMPHOCYTES # BLD AUTO: 1.4 K/UL (ref 1–4.8)
LYMPHOCYTES NFR BLD: 20 % (ref 18–48)
MAGNESIUM SERPL-MCNC: 2 MG/DL (ref 1.6–2.6)
MCH RBC QN AUTO: 29.3 PG (ref 27–31)
MCHC RBC AUTO-ENTMCNC: 33.3 G/DL (ref 32–36)
MCV RBC AUTO: 88 FL (ref 82–98)
MONOCYTES # BLD AUTO: 0.5 K/UL (ref 0.3–1)
MONOCYTES NFR BLD: 6.7 % (ref 4–15)
NEUTROPHILS # BLD AUTO: 4.8 K/UL (ref 1.8–7.7)
NEUTROPHILS NFR BLD: 70.8 % (ref 38–73)
NRBC BLD-RTO: 0 /100 WBC
PLATELET # BLD AUTO: 198 K/UL (ref 150–450)
PMV BLD AUTO: 9.9 FL (ref 9.2–12.9)
POCT GLUCOSE: 113 MG/DL (ref 70–110)
POCT GLUCOSE: 128 MG/DL (ref 70–110)
POCT GLUCOSE: 132 MG/DL (ref 70–110)
POTASSIUM SERPL-SCNC: 4.2 MMOL/L (ref 3.5–5.1)
PROT SERPL-MCNC: 6.6 G/DL (ref 6–8.4)
RBC # BLD AUTO: 3.34 M/UL (ref 4.6–6.2)
SODIUM SERPL-SCNC: 136 MMOL/L (ref 136–145)
WBC # BLD AUTO: 6.76 K/UL (ref 3.9–12.7)

## 2023-02-24 PROCEDURE — 83735 ASSAY OF MAGNESIUM: CPT | Mod: HCNC | Performed by: NURSE PRACTITIONER

## 2023-02-24 PROCEDURE — 25000003 PHARM REV CODE 250: Mod: HCNC | Performed by: NURSE PRACTITIONER

## 2023-02-24 PROCEDURE — 85014 HEMATOCRIT: CPT | Mod: HCNC | Performed by: NURSE PRACTITIONER

## 2023-02-24 PROCEDURE — 85025 COMPLETE CBC W/AUTO DIFF WBC: CPT | Mod: HCNC | Performed by: NURSE PRACTITIONER

## 2023-02-24 PROCEDURE — C9399 UNCLASSIFIED DRUGS OR BIOLOG: HCPCS | Mod: HCNC | Performed by: NURSE PRACTITIONER

## 2023-02-24 PROCEDURE — 99231 PR SUBSEQUENT HOSPITAL CARE,LEVL I: ICD-10-PCS | Mod: HCNC,,, | Performed by: PHYSICIAN ASSISTANT

## 2023-02-24 PROCEDURE — 80053 COMPREHEN METABOLIC PANEL: CPT | Mod: HCNC | Performed by: NURSE PRACTITIONER

## 2023-02-24 PROCEDURE — 20000000 HC ICU ROOM: Mod: HCNC

## 2023-02-24 PROCEDURE — 63600175 PHARM REV CODE 636 W HCPCS: Mod: HCNC | Performed by: NURSE PRACTITIONER

## 2023-02-24 PROCEDURE — 25000003 PHARM REV CODE 250: Mod: HCNC

## 2023-02-24 PROCEDURE — 25000003 PHARM REV CODE 250: Mod: HCNC | Performed by: INTERNAL MEDICINE

## 2023-02-24 PROCEDURE — C9399 UNCLASSIFIED DRUGS OR BIOLOG: HCPCS | Mod: HCNC

## 2023-02-24 PROCEDURE — 99232 PR SUBSEQUENT HOSPITAL CARE,LEVL II: ICD-10-PCS | Mod: HCNC,,,

## 2023-02-24 PROCEDURE — 36415 COLL VENOUS BLD VENIPUNCTURE: CPT | Mod: HCNC | Performed by: NURSE PRACTITIONER

## 2023-02-24 PROCEDURE — 85018 HEMOGLOBIN: CPT | Mod: HCNC | Performed by: NURSE PRACTITIONER

## 2023-02-24 PROCEDURE — C9113 INJ PANTOPRAZOLE SODIUM, VIA: HCPCS | Mod: HCNC | Performed by: NURSE PRACTITIONER

## 2023-02-24 PROCEDURE — 99232 SBSQ HOSP IP/OBS MODERATE 35: CPT | Mod: HCNC,,,

## 2023-02-24 PROCEDURE — 99231 SBSQ HOSP IP/OBS SF/LOW 25: CPT | Mod: HCNC,,, | Performed by: PHYSICIAN ASSISTANT

## 2023-02-24 RX ORDER — DEXMEDETOMIDINE HYDROCHLORIDE 4 UG/ML
0-1.4 INJECTION INTRAVENOUS CONTINUOUS
Status: DISCONTINUED | OUTPATIENT
Start: 2023-02-24 | End: 2023-02-24

## 2023-02-24 RX ORDER — DEXMEDETOMIDINE HYDROCHLORIDE 4 UG/ML
INJECTION INTRAVENOUS
Status: COMPLETED
Start: 2023-02-24 | End: 2023-02-24

## 2023-02-24 RX ORDER — PROMETHAZINE HYDROCHLORIDE 25 MG/1
25 TABLET ORAL ONCE
Status: COMPLETED | OUTPATIENT
Start: 2023-02-24 | End: 2023-02-24

## 2023-02-24 RX ORDER — METOPROLOL TARTRATE 1 MG/ML
5 INJECTION, SOLUTION INTRAVENOUS EVERY 5 MIN PRN
Status: DISCONTINUED | OUTPATIENT
Start: 2023-02-24 | End: 2023-03-03 | Stop reason: HOSPADM

## 2023-02-24 RX ADMIN — QUETIAPINE FUMARATE 12.5 MG: 25 TABLET ORAL at 10:02

## 2023-02-24 RX ADMIN — DEXMEDETOMIDINE HYDROCHLORIDE 0.2 MCG/KG/HR: 4 INJECTION INTRAVENOUS at 02:02

## 2023-02-24 RX ADMIN — PROMETHAZINE HYDROCHLORIDE 25 MG: 25 TABLET ORAL at 12:02

## 2023-02-24 RX ADMIN — MUPIROCIN: 20 OINTMENT TOPICAL at 09:02

## 2023-02-24 RX ADMIN — LIDOCAINE 1 PATCH: 50 PATCH CUTANEOUS at 10:02

## 2023-02-24 RX ADMIN — QUETIAPINE FUMARATE 12.5 MG: 25 TABLET ORAL at 09:02

## 2023-02-24 RX ADMIN — PANTOPRAZOLE SODIUM 40 MG: 40 INJECTION, POWDER, FOR SOLUTION INTRAVENOUS at 09:02

## 2023-02-24 RX ADMIN — DEXMEDETOMIDINE HYDROCHLORIDE 0.2 MCG/KG/HR: 4 INJECTION INTRAVENOUS at 06:02

## 2023-02-24 NOTE — HOSPITAL COURSE
2/24/23 pt seen and examined today, sedated, family at bedside. S/p upper GI. Labs reviewed, H/H 9.8 and 29.4, Crt 2.1 BUN 76, BP 70s/40s during exam    2/25/2023 seen examined more alert oriented conversational today hct improved to 32.no cardiac complaints gi ok to resume anticoagulation as of tomorrow.    2/26/2023 seen and  examined discussed wife anticoagulation regimen with eliquis he is awake alert has no cardiac complaints heart rate controlled.

## 2023-02-24 NOTE — ASSESSMENT & PLAN NOTE
-EGD completed yesterday with duodenal ulcer and AVMs. No active bleeding.  -Continue to monitor H/H and vitals. Transfuse as indicated. Anemia continuing to improve with current hgb 9.8.  -Continue Protonix IV and sucralfate. Will need discharge with Protonix as well and close outpatient GI follow up.

## 2023-02-24 NOTE — ASSESSMENT & PLAN NOTE
Rate controlled   Holding PO meds for now - resume as appropriate   Cont holding anticoagulation  Cardiac monitoring

## 2023-02-24 NOTE — ASSESSMENT & PLAN NOTE
Confused upon admission without any focal motor deficits; initially suspected to be related to hypotension + severe anemia   Increased confusion, agitation, combativeness overnight necessitating need for precedex and restraints   Will obtain CT head to rule out intracranial pathology   Will discuss baseline neurologic status and social hx (meds, ETOH, etc) with spouse upon her arrival today   Safety and fall precautions in place   Additional work up as indicated

## 2023-02-24 NOTE — ASSESSMENT & PLAN NOTE
No hx of CKD with Cr usually ~1  Etiology prerenal in setting of IVVD 2/2 hemorrhage  S/p IVFs  Maintain acceptable hemodynamics to provide adequate renal perfusion   Renal dose medications and avoid nephrotoxic agents as able  Follow chemistries - renal function improving with stable electrolytes and acid/base balance

## 2023-02-24 NOTE — PROGRESS NOTES
[facility] ([facility])  Gastroenterology  Progress Note    Patient Name: Anthony Biggs Jr.  MRN: [id number]  Admission Date: 2/23/2023  Hospital Length of Stay: 1 days  Code Status: Full Code   Attending Provider: Sung Stephenson MD  Consulting Provider: Diana Goldman PA-C  Primary Care Physician: Giuliano Moran MD  Principal Problem: Gastrointestinal hemorrhage with melena      Subjective:     Interval History: Patient resting comfortably in bed. Sleeping. No family bedside. EGD yesterday with non bleeding duodenal ulcer and AVMs. Anemia continues to improve. INR has normalized.    Review of Systems  Objective:     Vital Signs (Most Recent):  Temp: 99 °F (37.2 °C) (02/24/23 0715)  Pulse: 67 (02/24/23 0900)  Resp: 20 (02/24/23 0900)  BP: (!) 81/51 (02/24/23 0900)  SpO2: (!) 91 % (02/24/23 0900)   Vital Signs (24h Range):  Temp:  [97.6 °F (36.4 °C)-99 °F (37.2 °C)] 99 °F (37.2 °C)  Pulse:  [] 67  Resp:  [18-36] 20  SpO2:  [82 %-100 %] 91 %  BP: ()/(34-91) 81/51     Weight: 95.8 kg (211 lb 3.2 oz) (02/24/23 0547)  Body mass index is 33.08 kg/m².      Intake/Output Summary (Last 24 hours) at 2/24/2023 0930  Last data filed at 2/24/2023 0902  Gross per 24 hour   Intake 3123.27 ml   Output 3465 ml   Net -341.73 ml       Lines/Drains/Airways       Drain  Duration                  Urethral Catheter 02/23/23 1450 Straight-tip 16 Fr. <1 day              Peripheral Intravenous Line  Duration                  Peripheral IV - Single Lumen 02/23/23 1024 20 G Anterior;Distal;Left Upper Arm <1 day         Peripheral IV - Single Lumen 02/23/23 1238 18 G Distal;Left;Posterior Forearm <1 day                    Physical Exam  Constitutional:       General: He is not in acute distress.     Appearance: Normal appearance. He is not ill-appearing, toxic-appearing or diaphoretic.   HENT:      Head: Normocephalic and atraumatic.   Cardiovascular:      Rate and Rhythm: Normal rate and regular rhythm.   Pulmonary:       Effort: Pulmonary effort is normal.   Abdominal:      General: Bowel sounds are normal. There is no distension.      Palpations: Abdomen is soft.   Musculoskeletal:      Right lower leg: No edema.      Left lower leg: No edema.   Skin:     General: Skin is warm and dry.      Coloration: Skin is not pale.       Significant Labs:  CBC:   Recent Labs   Lab 02/23/23  1024 02/23/23  1432 02/23/23  1817 02/24/23  0016 02/24/23  0503   WBC 6.20  --   --   --  6.76   HGB 5.8*   < > 8.7* 9.6* 9.8*   HCT 17.5*   < > 25.6* 28.8* 29.4*     --   --   --  198    < > = values in this interval not displayed.     CMP:   Recent Labs   Lab 02/24/23  0503   *   CALCIUM 9.1   ALBUMIN 3.4*   PROT 6.6      K 4.2   CO2 17*      BUN 76*   CREATININE 2.1*   ALKPHOS 58   ALT 20   AST 33   BILITOT 1.2*     Coagulation:   Recent Labs   Lab 02/23/23  1024 02/23/23  1508   INR >10.0* 1.2   APTT 63.5*  --          Significant Imaging:  Imaging results within the past 24 hours have been reviewed.    Assessment/Plan:     Cardiac/Vascular  Atrial fibrillation  -INR normalized.   -Anticoagulation on hold in the setting of GI bleed.    Hematology  Supratherapeutic INR  -Normalized.    GI  * Gastrointestinal hemorrhage with melena  -EGD completed yesterday with duodenal ulcer and AVMs. No active bleeding.  -Continue to monitor H/H and vitals. Transfuse as indicated. Anemia continuing to improve with current hgb 9.8.  -Continue Protonix IV and sucralfate. Will need discharge with Protonix as well and close outpatient GI follow up.               Thank you for your consult. I will follow-up with patient. Please contact us if you have any additional questions.    Diana Goldman PA-C  Gastroenterology  [facility] ([facility])

## 2023-02-24 NOTE — PLAN OF CARE
Oriented to people & time.  Disoriented to situation & place.  Weaned precedex off; went down for CT of head (negative).  Passed bedside swallow test; reordered CLD.  BG did not require coverage.  Family updated at bedside by providers.

## 2023-02-24 NOTE — PLAN OF CARE
At start of shift PT complaints of pain/being uncomfortable, PRN pain medicine given, per PT no relief. PT stated that he wanted to leave and go home, I explained the importance of staying under medical supervision. Updated TIFFANIE Smith about conversation with PT. PT agreed to stay once explained that we will do what we can to comfort him. See MAR for orders. Despite new med orders, along with getting pt in recliner, PT still had no relief and was moved back to the bed. PT then starting becoming very agitated/restless and verbally inappropriate. Precedex was ordered with a goal RASS of -1. RASS prior to starting gtt was +2 and reached goal 15min after initiation and remains at goal.  VSS, Afib on monitor, on room air and normotensive.   PT arouses to voice/light touch. GCS 13-14.  Bed is locked and lowered, side rails up X3, bed alarm set, call light tin reach.

## 2023-02-24 NOTE — SUBJECTIVE & OBJECTIVE
Subjective:     Interval History: Patient resting comfortably in bed. Sleeping. No family bedside. EGD yesterday with non bleeding duodenal ulcer and AVMs. Anemia continues to improve. INR has normalized.    Review of Systems  Objective:     Vital Signs (Most Recent):  Temp: 99 °F (37.2 °C) (02/24/23 0715)  Pulse: 67 (02/24/23 0900)  Resp: 20 (02/24/23 0900)  BP: (!) 81/51 (02/24/23 0900)  SpO2: (!) 91 % (02/24/23 0900)   Vital Signs (24h Range):  Temp:  [97.6 °F (36.4 °C)-99 °F (37.2 °C)] 99 °F (37.2 °C)  Pulse:  [] 67  Resp:  [18-36] 20  SpO2:  [82 %-100 %] 91 %  BP: ()/(34-91) 81/51     Weight: 95.8 kg (211 lb 3.2 oz) (02/24/23 0547)  Body mass index is 33.08 kg/m².      Intake/Output Summary (Last 24 hours) at 2/24/2023 0930  Last data filed at 2/24/2023 0902  Gross per 24 hour   Intake 3123.27 ml   Output 3465 ml   Net -341.73 ml       Lines/Drains/Airways       Drain  Duration                  Urethral Catheter 02/23/23 1450 Straight-tip 16 Fr. <1 day              Peripheral Intravenous Line  Duration                  Peripheral IV - Single Lumen 02/23/23 1024 20 G Anterior;Distal;Left Upper Arm <1 day         Peripheral IV - Single Lumen 02/23/23 1238 18 G Distal;Left;Posterior Forearm <1 day                    Physical Exam  Constitutional:       General: He is not in acute distress.     Appearance: Normal appearance. He is not ill-appearing, toxic-appearing or diaphoretic.   HENT:      Head: Normocephalic and atraumatic.   Cardiovascular:      Rate and Rhythm: Normal rate and regular rhythm.   Pulmonary:      Effort: Pulmonary effort is normal.   Abdominal:      General: Bowel sounds are normal. There is no distension.      Palpations: Abdomen is soft.   Musculoskeletal:      Right lower leg: No edema.      Left lower leg: No edema.   Skin:     General: Skin is warm and dry.      Coloration: Skin is not pale.       Significant Labs:  CBC:   Recent Labs   Lab 02/23/23  1024 02/23/23  1432  02/23/23  1817 02/24/23  0016 02/24/23  0503   WBC 6.20  --   --   --  6.76   HGB 5.8*   < > 8.7* 9.6* 9.8*   HCT 17.5*   < > 25.6* 28.8* 29.4*     --   --   --  198    < > = values in this interval not displayed.     CMP:   Recent Labs   Lab 02/24/23  0503   *   CALCIUM 9.1   ALBUMIN 3.4*   PROT 6.6      K 4.2   CO2 17*      BUN 76*   CREATININE 2.1*   ALKPHOS 58   ALT 20   AST 33   BILITOT 1.2*     Coagulation:   Recent Labs   Lab 02/23/23  1024 02/23/23  1508   INR >10.0* 1.2   APTT 63.5*  --          Significant Imaging:  Imaging results within the past 24 hours have been reviewed.

## 2023-02-24 NOTE — SUBJECTIVE & OBJECTIVE
Review of Systems   Reason unable to perform ROS: sedated.   Objective:     Vital Signs (Most Recent):  Temp: 98.8 °F (37.1 °C) (02/24/23 1115)  Pulse: 88 (02/24/23 1400)  Resp: (!) 40 (02/24/23 1400)  BP: 124/65 (02/24/23 1400)  SpO2: (!) 93 % (02/24/23 1400)   Vital Signs (24h Range):  Temp:  [98 °F (36.7 °C)-99 °F (37.2 °C)] 98.8 °F (37.1 °C)  Pulse:  [67-94] 88  Resp:  [18-40] 40  SpO2:  [82 %-98 %] 93 %  BP: ()/(45-91) 124/65     Weight: 95.8 kg (211 lb 3.2 oz)  Body mass index is 33.08 kg/m².     SpO2: (!) 93 %         Intake/Output Summary (Last 24 hours) at 2/24/2023 1528  Last data filed at 2/24/2023 1332  Gross per 24 hour   Intake 574.14 ml   Output 3700 ml   Net -3125.86 ml       Lines/Drains/Airways       Drain  Duration                  Urethral Catheter 02/23/23 1450 Straight-tip 16 Fr. 1 day              Peripheral Intravenous Line  Duration                  Peripheral IV - Single Lumen 02/23/23 1024 20 G Anterior;Distal;Left Upper Arm 1 day         Peripheral IV - Single Lumen 02/23/23 1238 18 G Distal;Left;Posterior Forearm 1 day                    Physical Exam  Vitals and nursing note reviewed.   Constitutional:       Appearance: He is ill-appearing.   HENT:      Head: Normocephalic and atraumatic.   Eyes:      General:         Right eye: No discharge.         Left eye: No discharge.      Pupils: Pupils are equal, round, and reactive to light.   Cardiovascular:      Rate and Rhythm: Normal rate. Rhythm irregular.      Heart sounds: S1 normal and S2 normal. No murmur heard.    No friction rub.   Pulmonary:      Effort: Pulmonary effort is normal. No respiratory distress.      Breath sounds: Normal breath sounds. No rales.   Abdominal:      Palpations: Abdomen is soft.      Tenderness: There is no abdominal tenderness.   Musculoskeletal:      Cervical back: Neck supple.      Right lower leg: No edema.      Left lower leg: No edema.   Skin:     General: Skin is warm and dry.    Neurological:      Comments: sedated   Psychiatric:         Mood and Affect: Mood normal.         Behavior: Behavior normal.         Thought Content: Thought content normal.       Significant Labs: BMP:   Recent Labs   Lab 02/23/23  1024 02/24/23  0503   * 117*   * 136   K 4.6 4.2   CL 98 105   CO2 23 17*   * 76*   CREATININE 3.5* 2.1*   CALCIUM 9.3 9.1   MG 1.8 2.0   , CMP   Recent Labs   Lab 02/23/23  1024 02/24/23  0503   * 136   K 4.6 4.2   CL 98 105   CO2 23 17*   * 117*   * 76*   CREATININE 3.5* 2.1*   CALCIUM 9.3 9.1   PROT 6.6 6.6   ALBUMIN 3.6 3.4*   BILITOT 0.3 1.2*   ALKPHOS 49* 58   AST 23 33   ALT 19 20   ANIONGAP 14 14   , CBC   Recent Labs   Lab 02/23/23  1024 02/23/23  1432 02/23/23  1817 02/24/23  0016 02/24/23  0503   WBC 6.20  --   --   --  6.76   HGB 5.8*   < > 8.7* 9.6* 9.8*   HCT 17.5*   < > 25.6* 28.8* 29.4*     --   --   --  198    < > = values in this interval not displayed.   , INR   Recent Labs   Lab 02/23/23  1024 02/23/23  1508   INR >10.0* 1.2   , Lipid Panel No results for input(s): CHOL, HDL, LDLCALC, TRIG, CHOLHDL in the last 48 hours., Troponin   Recent Labs   Lab 02/23/23  1024   TROPONINI 0.035*   , and All pertinent lab results from the last 24 hours have been reviewed.    Significant Imaging: Echocardiogram: Transthoracic echo (TTE) complete (Cupid Only):   Results for orders placed or performed during the hospital encounter of 11/11/22   Echo   Result Value Ref Range    BSA 2.18 m2    LA WIDTH 4.60 cm    Left Ventricular Outflow Tract Mean Velocity 0.60 cm/s    Left Ventricular Outflow Tract Mean Gradient 1.68 mmHg    PV PEAK VELOCITY 0.81 cm/s    LVIDd 3.97 3.5 - 6.0 cm    IVS 1.20 (A) 0.6 - 1.1 cm    Posterior Wall 1.25 (A) 0.6 - 1.1 cm    Ao root annulus 3.59 cm    LVIDs 2.57 2.1 - 4.0 cm    FS 35 28 - 44 %    LA volume 134.03 cm3    Sinus 3.71 cm    STJ 3.46 cm    Ascending aorta 4.02 cm    LV mass 168.70 g    LA size 4.94 cm     RVDD 3.53 cm    TAPSE 1.73 cm    Left Ventricle Relative Wall Thickness 0.63 cm    AV regurgitation pressure 1/2 time [id number] ms    AV mean gradient 3 mmHg    AV valve area 2.31 cm2    AV Velocity Ratio 0.69     AV index (prosthetic) 0.72     LVOT diameter 2.02 cm    LVOT area 3.2 cm2    LVOT peak noah 0.88 m/s    LVOT peak VTI 16.00 cm    Ao peak noah 1.27 m/s    Ao VTI 22.2 cm    RVOT peak noah 0.72 m/s    RVOT peak VTI 12.2 cm    LVOT stroke volume 51.25 cm3    AV peak gradient 6 mmHg    PV mean gradient 0.90 mmHg    AR Max Onah 4.40 m/s    TR Max Noah 2.80 m/s    LV Systolic Volume 23.97 mL    LV Systolic Volume Index 11.4 mL/m2    LV Diastolic Volume 68.84 mL    LV Diastolic Volume Index 32.63 mL/m2    LA Volume Index 63.5 mL/m2    LV Mass Index 80 g/m2    RA Major Axis 6.18 cm    Left Atrium Minor Axis 7.15 cm    Left Atrium Major Axis 6.74 cm    Triscuspid Valve Regurgitation Peak Gradient 31 mmHg    LA Volume Index (Mod) 44.5 mL/m2    LA volume (mod) 93.79 cm3    RA Width 4.49 cm    Right Atrial Pressure (from IVC) 3 mmHg    EF 60 %    TV rest pulmonary artery pressure 34 mmHg    Narrative    · The left ventricle is normal in size with concentric remodeling and   normal systolic function.  · Severe left atrial enlargement.  · The estimated ejection fraction is 60%.  · Atrial fibrillation observed.  · Normal right ventricular size with normal right ventricular systolic   function.  · Moderate right atrial enlargement.  · Moderate aortic regurgitation.  · Mild-to-moderate mitral regurgitation.  · Mild tricuspid regurgitation.  · Normal central venous pressure (3 mmHg).  · The estimated PA systolic pressure is 34 mmHg.  · The ascending aorta is moderately dilated.      , EKG: reviewed, Stress Test: reviewed, and X-Ray: CXR: X-Ray Chest 1 View (CXR): No results found for this visit on 02/23/23.

## 2023-02-24 NOTE — ASSESSMENT & PLAN NOTE
INR elevated on routine check 2/22- instructed to hold; wife reports poor PO intake x 1 week likely contributing   INR > 10 on admission    Holding coumadin   S/p PCC and Vitamin K   Follow up INR 1.2

## 2023-02-24 NOTE — HOSPITAL COURSE
2/24: confused, combative, agitated overnight requiring initiation of precedex. Attempted to stop this am with ongoing agitation and combativeness. H/H has been stable. Hemodynamics acceptable. Choking on water overnight- NPO until evaluated by  EGD yesterday revealing non-bleeding duodenal ulcer and angioectasias x3 in duodenum treated with argon plasma coagulation   2/25: no acute events overnight, reports sleeping well. No subjective complaints. Hemodynamics acceptable, H/H stable, actually better. Afib 100-110s.

## 2023-02-24 NOTE — ASSESSMENT & PLAN NOTE
GI following and underwent EGD 2/23 with finding of non-bleeding duodenal ulcer and 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 treated with argon plasma coagulation   protonix IV daily   carafate daily   Ok for CLD per GI- on hold with dyspagia until evaluated by ST   Holding coumadin per recs   Appreciate GI input

## 2023-02-24 NOTE — ASSESSMENT & PLAN NOTE
Resolved and has had stable hemodynamics since resuscitation with blood and IVFs  See problem: gastrointestinal hemorrhage

## 2023-02-24 NOTE — ASSESSMENT & PLAN NOTE
Chronic on coumadin previous gi bleed presents with gi bleed. Hypovolemic shock    2/24/23  Rate currently controlled  Cont hold Coumadin

## 2023-02-24 NOTE — CONSULTS
[facility] ([facility])  Wound Care    Patient Name:  Anthony Biggs Jr.   MRN:  [id number]  Date: 2023  Diagnosis: Gastrointestinal hemorrhage with melena    History:     Past Medical History:   Diagnosis Date    Abnormal CXR     SHAYLA (acute kidney injury) 2023    Angina pectoris 2017    Angiodysplasia of small intestine     Atrial fibrillation     Chronic diastolic heart failure 2022    Chronic upper GI bleeding     due to angiodysplasia in proximal small intestine    Coronary atherosclerosis of unspecified type of vessel, native or graft     COVID 2022    Depression     Diabetes mellitus without complication     Emphysema of lung     History of prostate cancer 2007    prostatectomy    Hyperlipidemia associated with type 2 diabetes mellitus     Hypertension associated with diabetes     LAILA (iron deficiency anemia) 2021    due to angiodysplasia in small intestine    Intention tremor     Major depressive disorder, recurrent, moderate 2021    Moderate episode of recurrent major depressive disorder 2019    Morbid (severe) obesity due to excess calories 2019    SHORTY (obstructive sleep apnea)     Prostate cancer     Trouble in sleeping     Urinary incontinence        Social History     Socioeconomic History    Marital status:     Highest education level: Some college, no degree   Tobacco Use    Smoking status: Former     Packs/day: 0.50     Years: 40.00     Pack years: 20.00     Types: Cigarettes     Start date: 1962     Quit date:      Years since quittin.1    Smokeless tobacco: Never   Substance and Sexual Activity    Alcohol use: Yes     Alcohol/week: 7.0 standard drinks     Types: 7 Shots of liquor per week    Drug use: No    Sexual activity: Not Currently       Precautions:     Allergies as of 2023    (No Known Allergies)       [facility] Assessment Details/Treatment     Consulted on this 81 y/o M patient due to present on admission skin breakdown  to bilateral buttock. Family reports patient spends much of his time in a recliner chair. On assessment, stage 3 pressure injuries are noted to bilateral buttock and midline gluteal cleft. All are moist pink and yellow subcutaneous tissue to wound beds, no slough, scant serous drainage. Cleansed all with saline and thick layer zguard paste applied. Recommend turn q2 hours, avoid diapers. Discussed recommendations with primary nurse and patient and family at bedside.       02/24/23 1015   WOCN Assessment   WOCN Total Time (mins) 60   Visit Date 02/24/23   Visit Time 1015   Consult Type New   WOCN Speciality Wound   Wound pressure   Number of Wounds 3   Intervention assessed;applied;chart review;coordination of care;team conference;orders   Teaching on-going;complication;discharge        Altered Skin Integrity 02/23/23 1450 Left Buttocks #1 Full thickness tissue loss. Subcutaneous fat may be visible but bone, tendon or muscle are not exposed   Date First Assessed/Time First Assessed: 02/23/23 1450   Altered Skin Integrity Present on Admission: yes  Side: Left  Location: Buttocks  Wound Number: #1  Description of Altered Skin Integrity: Full thickness tissue loss. Subcutaneous fat may be vis...   Wound Image    Description of Altered Skin Integrity Full thickness tissue loss. Subcutaneous fat may be visible but bone, tendon or muscle are not exposed   Dressing Appearance Open to air   Drainage Amount Scant   Drainage Characteristics/Odor Serous   Appearance Pink;Yellow;Moist;Adipose   Tissue loss description Full thickness   Wound Length (cm) 0.5 cm   Wound Width (cm) 0.5 cm   Wound Depth (cm) 0.1 cm   Wound Volume (cm^3) 0.025 cm^3   Wound Surface Area (cm^2) 0.25 cm^2   Care Cleansed with:;Sterile normal saline;Applied:;Skin Barrier   Dressing Removed  (moisture barrier paste applied)        Altered Skin Integrity 02/23/23 1450 Right Buttocks #2 Full thickness tissue loss. Subcutaneous fat may be visible but bone,  tendon or muscle are not exposed   Date First Assessed/Time First Assessed: 02/23/23 1450   Altered Skin Integrity Present on Admission: yes  Side: Right  Location: Buttocks  Wound Number: #2  Description of Altered Skin Integrity: Full thickness tissue loss. Subcutaneous fat may be vi...   Description of Altered Skin Integrity Full thickness tissue loss. Subcutaneous fat may be visible but bone, tendon or muscle are not exposed   Dressing Appearance Open to air   Drainage Amount Scant   Drainage Characteristics/Odor Serous   Appearance Pink;Yellow;Moist;Adipose   Tissue loss description Full thickness   Wound Length (cm) 0.5 cm   Wound Width (cm) 0.5 cm   Wound Depth (cm) 0.1 cm   Wound Volume (cm^3) 0.025 cm^3   Wound Surface Area (cm^2) 0.25 cm^2   Care Cleansed with:;Sterile normal saline;Applied:;Skin Barrier   Dressing Removed  (moisture barrier paste applied)        Altered Skin Integrity 02/24/23 midline Gluteal cleft Full thickness tissue loss. Subcutaneous fat may be visible but bone, tendon or muscle are not exposed   Date First Assessed: 02/24/23   Altered Skin Integrity Present on Admission: yes  Orientation: midline  Location: Gluteal cleft  Description of Altered Skin Integrity: Full thickness tissue loss. Subcutaneous fat may be visible but bone, tendon or mus...   Description of Altered Skin Integrity Full thickness tissue loss. Subcutaneous fat may be visible but bone, tendon or muscle are not exposed   Dressing Appearance Open to air   Drainage Amount Scant   Drainage Characteristics/Odor Serous   Appearance Pink;Yellow;Moist;Adipose   Tissue loss description Full thickness   Periwound Area Intact   Wound Edges Open   Wound Length (cm) 1 cm   Wound Width (cm) 0.2 cm   Wound Depth (cm) 0.1 cm   Wound Volume (cm^3) 0.02 cm^3   Wound Surface Area (cm^2) 0.2 cm^2   Care Cleansed with:;Sterile normal saline;Applied:;Skin Barrier  (moisture barrier paste)       Recommendations made to primary team for  frequent repositioning, moisture management, pressure injury prevention . Orders placed.     02/24/2023

## 2023-02-24 NOTE — PLAN OF CARE
[facility] - Intensive Care (Hospital)  Initial Discharge Assessment       Primary Care Provider: Giuliano Moran MD    Admission Diagnosis: Weakness [R53.1]  Chronic anticoagulation [Z79.01]  Chronic atrial fibrillation [I48.20]  Intravascular volume depletion [E86.1]  Acute upper GI bleed [K92.2]  Gastrointestinal hemorrhage with melena [K92.1]  Poisoning by warfarin sodium, accidental or unintentional, initial encounter [T45.511A]  Acute renal failure, unspecified acute renal failure type [N17.9]  Acute on chronic blood loss anemia [D62]    Admission Date: 2/23/2023  Expected Discharge Date:     Discharge Barriers Identified: None    Payor: HU[employer] MANAGED MEDICARE / Plan: HU[employer] MEDICARE HMO / Product Type: Capitation /     Extended Emergency Contact Information  Primary Emergency Contact: Marino Biggs  Address: [address] [place] [address] [place]IVARES [address] [facility]  Home Phone: [phone]  Mobile Phone: [phone]  Relation: Spouse    Discharge Plan A: Home Health         [id number] [place] [address] [facility] [place] [address]  Phone: [phone] Fax: [phone]    [facility] [address] [place] [address]  Phone: [phone] Fax: [phone]      Initial Assessment (most recent)       Adult Discharge Assessment - 02/24/23 1117          Discharge Assessment    Assessment Type Discharge Planning Assessment     Confirmed/corrected address, phone number and insurance Yes     Confirmed Demographics Correct on Facesheet     Source of Information family     Communicated RACHNA with patient/caregiver Date not available/Unable to determine     Reason For Admission Gastrointestinal hemorrhage with melena     People in Home spouse     Facility Arrived From: home     Do you expect to return to your current living situation? Yes     Do you have help at home or someone to help you manage your care at home? Yes     Who are your  caregiver(s) and their phone number(s)? Marino Biggs (Spouse)     Prior to hospitilization cognitive status: Alert/Oriented     Current cognitive status: Unable to Assess     Dressing/Bathing bathing difficulty, requires equipment     Dressing/Bathing Management shower chair     Home Accessibility wheelchair accessible     Home Layout Able to live on 1st floor     Equipment Currently Used at Home shower chair     Readmission within 30 days? No     Patient currently being followed by outpatient case management? No     Do you currently have service(s) that help you manage your care at home? No     Do you take prescription medications? Yes     Do you have prescription coverage? Yes     Do you have any problems affording any of your prescribed medications? No     Is the patient taking medications as prescribed? yes     Who is going to help you get home at discharge? Spouse Marino     How do you get to doctors appointments? family or friend will provide     Are you on dialysis? No     Do you take coumadin? No     Discharge Plan A Home Health     DME Needed Upon Discharge  none     Discharge Plan discussed with: Patient     Discharge Barriers Identified None                   Anticipated DC dispo: home health/home with family   Prior Level of Function: independent with ADLs, lives with spouse   PCP: Giuliano Moran MD    Comments:  CM met with patient's spouse at bedside to introduce role and discuss discharge planning. Patient lives with his spouse, Marino, who will also be help at home and can provide transport at time of discharge. CM discharge needs depends on hospital progress. CM will continue following to assist with other needs.

## 2023-02-24 NOTE — PROVATION PATIENT INSTRUCTIONS
Discharge Summary/Instructions after an Endoscopic Procedure  Patient Name: Anthony Biggs  Patient MRN: [id number]  Patient YOB: 1940 Thursday, February 23, 2023 Opal Wright MD  Dear patient,  As a result of recent federal legislation (The Federal Cures Act), you may   receive lab or pathology results from your procedure in your [facility]   account before your physician is able to contact you. Your physician or   their representative will relay the results to you with their   recommendations at their soonest availability.  Thank you,  RESTRICTIONS:  During your procedure today, you received medications for sedation.  These   medications may affect your judgment, balance and coordination.  Therefore,   for 24 hours, you have the following restrictions:   - DO NOT drive a car, operate machinery, make legal/financial decisions,   sign important papers or drink alcohol.    ACTIVITY:  Today: no heavy lifting, straining or running due to procedural   sedation/anesthesia.  The following day: return to full activity including work.  DIET:  Eat and drink normally unless instructed otherwise.     TREATMENT FOR COMMON SIDE EFFECTS:  - Mild abdominal pain, nausea, belching, bloating or excessive gas:  rest,   eat lightly and use a heating pad.  - Sore Throat: treat with throat lozenges and/or gargle with warm salt   water.  - Because air was used during the procedure, expelling large amounts of air   from your rectum or belching is normal.  - If a bowel prep was taken, you may not have a bowel movement for 1-3 days.    This is normal.  SYMPTOMS TO WATCH FOR AND REPORT TO YOUR PHYSICIAN:  1. Abdominal pain or bloating, other than gas cramps.  2. Chest pain.  3. Back pain.  4. Signs of infection such as: chills or fever occurring within 24 hours   after the procedure.  5. Rectal bleeding, which would show as bright red, maroon, or black stools.   (A tablespoon of blood from the rectum is not serious, especially if    hemorrhoids are present.)  6. Vomiting.  7. Weakness or dizziness.  GO DIRECTLY TO THE NEAREST EMERGENCY ROOM IF YOU HAVE ANY OF THE FOLLOWING:      Difficulty breathing              Chills and/or fever over 101 F   Persistent vomiting and/or vomiting blood   Severe abdominal pain   Severe chest pain   Black, tarry stools   Bleeding- more than one tablespoon   Any other symptom or condition that you feel may need urgent attention  Your doctor recommends these additional instructions:  If any biopsies were taken, your doctors clinic will contact you in 1 to 2   weeks with any results.  - Return patient to ICU for ongoing care.   - Clear liquid diet.   - Continue present medications.   - Use Protonix (pantoprazole) 40 mg IV daily.   - Continue to hold Warfarin  - May add Carafate 1g suspension BID  - Communicated via secure chat with ICU team.   - The findings and recommendations were discussed with the patient's   family.  For questions, problems or results please call your physician Opal Wright MD at Work:  [phone]  If you have any questions about the above instructions, call the GI   department at [phone] or call the endoscopy unit at [phone]   from 7am until 3 pm.  [facility], EMERGENCY ROOM PHONE NUMBER:   [phone]  IF A COMPLICATION OR EMERGENCY SITUATION ARISES AND YOU ARE UNABLE TO REACH   YOUR PHYSICIAN - GO DIRECTLY TO THE EMERGENCY ROOM.  I have read or have had read to me these discharge instructions for my   procedure and have received a written copy.  I understand these   instructions and will follow-up with my physician if I have any questions.     __________________________________       _____________________________________  Nurse Signature                                          Patient/Designated   Responsible Party Signature  MD Opal Trujillo MD  2/23/2023 6:07:48 PM  This report has been verified and signed  electronically.  Dear patient,  As a result of recent federal legislation (The Federal Cures Act), you may   receive lab or pathology results from your procedure in your [facility]   account before your physician is able to contact you. Your physician or   their representative will relay the results to you with their   recommendations at their soonest availability.  Thank you,  PROVATION

## 2023-02-24 NOTE — ASSESSMENT & PLAN NOTE
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

## 2023-02-24 NOTE — PROGRESS NOTES
[facility] - Intensive Care ([facility])  Critical Care Medicine  Progress Note    Patient Name: Anthony Biggs Jr.  MRN: [id number]  Admission Date: 2/23/2023  Hospital Length of Stay: 1 days  Code Status: Full Code  Attending Provider: Sung Stephenson MD  Primary Care Provider: Giuliano Moran MD   Principal Problem: Gastrointestinal hemorrhage with melena    Subjective:     HPI:  Information obtained from chart review and spouse at bedside as patient is confused and unable to provide reliable information.     82-year-old male with a known past medical history of permanent atrial fibrillation (Coumadin), HTN, HLD, DM, LAILA, and angiodysplasia of the small intestine who presented to the ED 2/23/2023 for evaluation of weakness and AMS. Spouse notes several days of decreased appetite and progressive weakness. This morning, he was confused and very weak, prompting her to request a same day PCP visit at which time her PCP instructed them to go to the ED. Notably, patient's INR 8.2 yesterday upon routine assessment and he was instructed to hold Coumadin x 3 days and resume at half dose with reassessment to follow. ED evaluation revealing hypotension, hgb 5.8, and melena on HERMILO. 2 units PRBC ordered STAT and IVFs given. PCC and Vitamin K also given. GI consulted. Critical care team consulted for ICU admission.           Hospital/ICU Course:  2/24: confused, combative, agitated overnight requiring initiation of precedex. Attempted to stop this am with ongoing agitation and combativeness. H/H has been stable. Hemodynamics acceptable. Choking on water overnight- NPO until evaluated by [facility] EGD yesterday revealing non-bleeding duodenal ulcer and angioectasias x3 in duodenum treated with argon plasma coagulation         Objective:     Vital Signs (Most Recent):  Temp: 98 °F (36.7 °C) (02/23/23 2336)  Pulse: 87 (02/24/23 0520)  Resp: (!) 22 (02/24/23 0520)  BP: (!) 123/91 (02/24/23 0520)  SpO2: (!) 93 % (02/24/23 0520)   Vital Signs  (24h Range):  Temp:  [97.6 °F (36.4 °C)-98.6 °F (37 °C)] 98 °F (36.7 °C)  Pulse:  [] 87  Resp:  [18-36] 22  SpO2:  [82 %-100 %] 93 %  BP: ()/(34-91) 123/91     Weight: 95.8 kg (211 lb 3.2 oz)  Body mass index is 33.08 kg/m².      Intake/Output Summary (Last 24 hours) at 2/24/2023 0817  Last data filed at 2/24/2023 0744  Gross per 24 hour   Intake 3103.19 ml   Output 3365 ml   Net -261.81 ml       Physical Exam  Vitals reviewed.   Constitutional:       Appearance: He is well-developed and well-groomed. He is not ill-appearing.      Interventions: He is sedated and restrained.   HENT:      Head: Normocephalic and atraumatic.      Mouth/Throat:      Mouth: Mucous membranes are moist.      Pharynx: Oropharynx is clear.   Eyes:      General: No scleral icterus.     Conjunctiva/sclera: Conjunctivae normal.   Cardiovascular:      Rate and Rhythm: Normal rate. Rhythm irregular.      Pulses: Normal pulses.   Pulmonary:      Effort: Pulmonary effort is normal.      Breath sounds: No wheezing or rhonchi.   Abdominal:      General: Bowel sounds are normal.      Palpations: Abdomen is soft.   Musculoskeletal:      Cervical back: Neck supple.      Right lower leg: No edema.      Left lower leg: No edema.   Skin:     General: Skin is warm and dry.   Neurological:      Comments: Sedated- limiting exam      Review of Systems   Reason unable to perform ROS: sedated.       Lines/Drains/Airways       Drain  Duration                  Urethral Catheter 02/23/23 1450 Straight-tip 16 Fr. <1 day              Peripheral Intravenous Line  Duration                  Peripheral IV - Single Lumen 02/23/23 1024 20 G Anterior;Distal;Left Upper Arm <1 day         Peripheral IV - Single Lumen 02/23/23 1238 18 G Distal;Left;Posterior Forearm <1 day                    Significant Labs:    CBC/Anemia Profile:  Recent Labs   Lab 02/23/23  1024 02/23/23  1107 02/23/23  1432 02/23/23  1817 02/24/23  0016 02/24/23  0503   WBC 6.20  --   --   --    --  6.76   HGB 5.8*  --    < > 8.7* 9.6* 9.8*   HCT 17.5*  --    < > 25.6* 28.8* 29.4*     --   --   --   --  198   MCV 95  --   --   --   --  88   RDW 14.4  --   --   --   --  17.5*   OCCULTBLOOD  --  Positive*  --   --   --   --     < > = values in this interval not displayed.        Chemistries:  Recent Labs   Lab 02/23/23  1024 02/24/23  0503   * 136   K 4.6 4.2   CL 98 105   CO2 23 17*   * 76*   CREATININE 3.5* 2.1*   CALCIUM 9.3 9.1   ALBUMIN 3.6 3.4*   PROT 6.6 6.6   BILITOT 0.3 1.2*   ALKPHOS 49* 58   ALT 19 20   AST 23 33   MG 1.8  --        All pertinent labs within the past 24 hours have been reviewed.    Significant Imaging:  I have reviewed all pertinent imaging results/findings within the past 24 hours.      ABG  No results for input(s): PH, PO2, PCO2, HCO3, BE in the last 168 hours.  Assessment/Plan:     Neuro  Encephalopathy, metabolic  Confused upon admission without any focal motor deficits; initially suspected to be related to hypotension + severe anemia   Increased confusion, agitation, combativeness overnight necessitating need for precedex and restraints   Will obtain CT head to rule out intracranial pathology   Will discuss baseline neurologic status and social hx (meds, ETOH, etc) with spouse upon her arrival today   Safety and fall precautions in place   Additional work up as indicated     Cardiac/Vascular  Hemorrhagic shock  Resolved and has had stable hemodynamics since resuscitation with blood and IVFs  See problem: gastrointestinal hemorrhage     Chronic diastolic heart failure  Does not appear overloaded on exam   Holding diuretics   Monitor     Hypertension associated with diabetes  BP trends better   Holding all anti-hypertensives, will resume as needed/appropriate     Hyperlipidemia associated with type 2 diabetes mellitus  Resume statin when appropriate     Coronary atherosclerosis  Holding PO meds, resume as appropriate     Atrial fibrillation  Rate controlled    Holding PO meds for now - resume as appropriate   Cont holding anticoagulation  Cardiac monitoring     Renal/  SHAYLA (acute kidney injury)  No hx of CKD with Cr usually ~1  Etiology prerenal in setting of IVVD 2/2 hemorrhage  S/p IVFs  Maintain acceptable hemodynamics to provide adequate renal perfusion   Renal dose medications and avoid nephrotoxic agents as able  Follow chemistries - renal function improving with stable electrolytes and acid/base balance     Hematology  Supratherapeutic INR  INR elevated on routine check 2/22- instructed to hold; wife reports poor PO intake x 1 week likely contributing   INR > 10 on admission    Holding coumadin   S/p PCC and Vitamin K   Follow up INR 1.2       Oncology  LAILA (iron deficiency anemia)  Resume supplements when able    GI  * Gastrointestinal hemorrhage with melena  supratherapeutic INR >10   Melena on HERMILO  S/p 2.5L IVFs in ED, 2 units PRBC, PCC and Vitamin K   EGD 2/23 with finding of non-bleeding duodenal ulcer and 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 treated with argon plasma coagulation   No evidence of ongoing bleeding   Serial H/H improved and stable   protonix and carafate   Holding coumadin   CLD - getting ST to eval due to dysphagia   Appreciate GI assistance       Angiodysplasia of small intestine  GI following and underwent EGD 2/23 with finding of non-bleeding duodenal ulcer and 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 At risk for further neurologic, hemodynamic and/or respiratory decline, progressive multi-organ failure, life threatening  hemorrhage, or death.      Critical care was time spent personally by me on the following activities: development of treatment plan with patient or surrogate and bedside caregivers, discussions with consultants, evaluation of patient's response to treatment, examination of patient, ordering and performing treatments and interventions, ordering and review of laboratory studies, ordering and review of radiographic studies, pulse oximetry, re-evaluation of patient's condition. This critical care time did not overlap with that of any other provider or involve time for any procedures.     Nuno Gambino NP  Critical Care Medicine  [facility] - Intensive Care [facility] [place])

## 2023-02-24 NOTE — SUBJECTIVE & OBJECTIVE
Objective:     Vital Signs (Most Recent):  Temp: 98 °F (36.7 °C) (02/23/23 2336)  Pulse: 87 (02/24/23 0520)  Resp: (!) 22 (02/24/23 0520)  BP: (!) 123/91 (02/24/23 0520)  SpO2: (!) 93 % (02/24/23 0520)   Vital Signs (24h Range):  Temp:  [97.6 °F (36.4 °C)-98.6 °F (37 °C)] 98 °F (36.7 °C)  Pulse:  [] 87  Resp:  [18-36] 22  SpO2:  [82 %-100 %] 93 %  BP: ()/(34-91) 123/91     Weight: 95.8 kg (211 lb 3.2 oz)  Body mass index is 33.08 kg/m².      Intake/Output Summary (Last 24 hours) at 2/24/2023 0817  Last data filed at 2/24/2023 0744  Gross per 24 hour   Intake 3103.19 ml   Output 3365 ml   Net -261.81 ml       Physical Exam  Vitals reviewed.   Constitutional:       Appearance: He is well-developed and well-groomed. He is not ill-appearing.      Interventions: He is sedated and restrained.   HENT:      Head: Normocephalic and atraumatic.      Mouth/Throat:      Mouth: Mucous membranes are moist.      Pharynx: Oropharynx is clear.   Eyes:      General: No scleral icterus.     Conjunctiva/sclera: Conjunctivae normal.   Cardiovascular:      Rate and Rhythm: Normal rate. Rhythm irregular.      Pulses: Normal pulses.   Pulmonary:      Effort: Pulmonary effort is normal.      Breath sounds: No wheezing or rhonchi.   Abdominal:      General: Bowel sounds are normal.      Palpations: Abdomen is soft.   Musculoskeletal:      Cervical back: Neck supple.      Right lower leg: No edema.      Left lower leg: No edema.   Skin:     General: Skin is warm and dry.   Neurological:      Comments: Sedated- limiting exam      Review of Systems   Reason unable to perform ROS: sedated.       Lines/Drains/Airways       Drain  Duration                  Urethral Catheter 02/23/23 1450 Straight-tip 16 Fr. <1 day              Peripheral Intravenous Line  Duration                  Peripheral IV - Single Lumen 02/23/23 1024 20 G Anterior;Distal;Left Upper Arm <1 day         Peripheral IV - Single Lumen 02/23/23 1238 18 G  Distal;Left;Posterior Forearm <1 day                    Significant Labs:    CBC/Anemia Profile:  Recent Labs   Lab 02/23/23  1024 02/23/23  1107 02/23/23  1432 02/23/23  1817 02/24/23  0016 02/24/23  0503   WBC 6.20  --   --   --   --  6.76   HGB 5.8*  --    < > 8.7* 9.6* 9.8*   HCT 17.5*  --    < > 25.6* 28.8* 29.4*     --   --   --   --  198   MCV 95  --   --   --   --  88   RDW 14.4  --   --   --   --  17.5*   OCCULTBLOOD  --  Positive*  --   --   --   --     < > = values in this interval not displayed.        Chemistries:  Recent Labs   Lab 02/23/23  1024 02/24/23  0503   * 136   K 4.6 4.2   CL 98 105   CO2 23 17*   * 76*   CREATININE 3.5* 2.1*   CALCIUM 9.3 9.1   ALBUMIN 3.6 3.4*   PROT 6.6 6.6   BILITOT 0.3 1.2*   ALKPHOS 49* 58   ALT 19 20   AST 23 33   MG 1.8  --        All pertinent labs within the past 24 hours have been reviewed.    Significant Imaging:  I have reviewed all pertinent imaging results/findings within the past 24 hours.

## 2023-02-25 PROBLEM — R53.81 DEBILITY: Status: ACTIVE | Noted: 2023-02-25

## 2023-02-25 LAB
ALBUMIN SERPL BCP-MCNC: 3.2 G/DL (ref 3.5–5.2)
ALP SERPL-CCNC: 55 U/L (ref 55–135)
ALT SERPL W/O P-5'-P-CCNC: 18 U/L (ref 10–44)
ANION GAP SERPL CALC-SCNC: 13 MMOL/L (ref 8–16)
AST SERPL-CCNC: 45 U/L (ref 10–40)
BASOPHILS # BLD AUTO: 0.01 K/UL (ref 0–0.2)
BASOPHILS NFR BLD: 0.1 % (ref 0–1.9)
BILIRUB SERPL-MCNC: 1.3 MG/DL (ref 0.1–1)
BUN SERPL-MCNC: 46 MG/DL (ref 8–23)
CALCIUM SERPL-MCNC: 9.4 MG/DL (ref 8.7–10.5)
CHLORIDE SERPL-SCNC: 104 MMOL/L (ref 95–110)
CO2 SERPL-SCNC: 25 MMOL/L (ref 23–29)
CREAT SERPL-MCNC: 1.6 MG/DL (ref 0.5–1.4)
DIFFERENTIAL METHOD: ABNORMAL
EOSINOPHIL # BLD AUTO: 0 K/UL (ref 0–0.5)
EOSINOPHIL NFR BLD: 0.2 % (ref 0–8)
ERYTHROCYTE [DISTWIDTH] IN BLOOD BY AUTOMATED COUNT: 17.2 % (ref 11.5–14.5)
EST. GFR  (NO RACE VARIABLE): 43 ML/MIN/1.73 M^2
GLUCOSE SERPL-MCNC: 130 MG/DL (ref 70–110)
HCT VFR BLD AUTO: 32.7 % (ref 40–54)
HGB BLD-MCNC: 10.8 G/DL (ref 14–18)
IMM GRANULOCYTES # BLD AUTO: 0.08 K/UL (ref 0–0.04)
IMM GRANULOCYTES NFR BLD AUTO: 0.9 % (ref 0–0.5)
LYMPHOCYTES # BLD AUTO: 1.1 K/UL (ref 1–4.8)
LYMPHOCYTES NFR BLD: 12.7 % (ref 18–48)
MCH RBC QN AUTO: 29.5 PG (ref 27–31)
MCHC RBC AUTO-ENTMCNC: 33 G/DL (ref 32–36)
MCV RBC AUTO: 89 FL (ref 82–98)
MONOCYTES # BLD AUTO: 0.5 K/UL (ref 0.3–1)
MONOCYTES NFR BLD: 5.7 % (ref 4–15)
NEUTROPHILS # BLD AUTO: 7.2 K/UL (ref 1.8–7.7)
NEUTROPHILS NFR BLD: 80.4 % (ref 38–73)
NRBC BLD-RTO: 0 /100 WBC
PLATELET # BLD AUTO: 228 K/UL (ref 150–450)
PMV BLD AUTO: 10.1 FL (ref 9.2–12.9)
POCT GLUCOSE: 126 MG/DL (ref 70–110)
POCT GLUCOSE: 128 MG/DL (ref 70–110)
POCT GLUCOSE: 132 MG/DL (ref 70–110)
POCT GLUCOSE: 134 MG/DL (ref 70–110)
POTASSIUM SERPL-SCNC: 3.5 MMOL/L (ref 3.5–5.1)
PROT SERPL-MCNC: 6.7 G/DL (ref 6–8.4)
RBC # BLD AUTO: 3.66 M/UL (ref 4.6–6.2)
SODIUM SERPL-SCNC: 142 MMOL/L (ref 136–145)
WBC # BLD AUTO: 9 K/UL (ref 3.9–12.7)

## 2023-02-25 PROCEDURE — 11000001 HC ACUTE MED/SURG PRIVATE ROOM: Mod: HCNC

## 2023-02-25 PROCEDURE — 36415 COLL VENOUS BLD VENIPUNCTURE: CPT | Mod: HCNC | Performed by: NURSE PRACTITIONER

## 2023-02-25 PROCEDURE — 63600175 PHARM REV CODE 636 W HCPCS: Mod: HCNC | Performed by: NURSE PRACTITIONER

## 2023-02-25 PROCEDURE — 99233 SBSQ HOSP IP/OBS HIGH 50: CPT | Mod: HCNC,,, | Performed by: INTERNAL MEDICINE

## 2023-02-25 PROCEDURE — 80053 COMPREHEN METABOLIC PANEL: CPT | Mod: HCNC | Performed by: NURSE PRACTITIONER

## 2023-02-25 PROCEDURE — 99233 PR SUBSEQUENT HOSPITAL CARE,LEVL III: ICD-10-PCS | Mod: HCNC,,, | Performed by: INTERNAL MEDICINE

## 2023-02-25 PROCEDURE — 85025 COMPLETE CBC W/AUTO DIFF WBC: CPT | Mod: HCNC | Performed by: NURSE PRACTITIONER

## 2023-02-25 PROCEDURE — 25000003 PHARM REV CODE 250: Mod: HCNC | Performed by: NURSE PRACTITIONER

## 2023-02-25 PROCEDURE — 25000003 PHARM REV CODE 250: Mod: HCNC | Performed by: INTERNAL MEDICINE

## 2023-02-25 PROCEDURE — C9113 INJ PANTOPRAZOLE SODIUM, VIA: HCPCS | Mod: HCNC | Performed by: NURSE PRACTITIONER

## 2023-02-25 RX ORDER — TALC
3 POWDER (GRAM) TOPICAL NIGHTLY
Status: DISCONTINUED | OUTPATIENT
Start: 2023-02-25 | End: 2023-03-03 | Stop reason: HOSPADM

## 2023-02-25 RX ORDER — LANOLIN ALCOHOL/MO/W.PET/CERES
1 CREAM (GRAM) TOPICAL DAILY
Status: DISCONTINUED | OUTPATIENT
Start: 2023-02-25 | End: 2023-03-03 | Stop reason: HOSPADM

## 2023-02-25 RX ORDER — ALLOPURINOL 100 MG/1
100 TABLET ORAL DAILY
Status: DISCONTINUED | OUTPATIENT
Start: 2023-02-25 | End: 2023-03-03 | Stop reason: HOSPADM

## 2023-02-25 RX ORDER — METOPROLOL TARTRATE 25 MG/1
12.5 TABLET ORAL 2 TIMES DAILY
Status: DISCONTINUED | OUTPATIENT
Start: 2023-02-25 | End: 2023-03-03 | Stop reason: HOSPADM

## 2023-02-25 RX ORDER — ATORVASTATIN CALCIUM 40 MG/1
40 TABLET, FILM COATED ORAL DAILY
Status: DISCONTINUED | OUTPATIENT
Start: 2023-02-25 | End: 2023-03-03 | Stop reason: HOSPADM

## 2023-02-25 RX ORDER — ISOSORBIDE MONONITRATE 30 MG/1
30 TABLET, EXTENDED RELEASE ORAL DAILY
Status: DISCONTINUED | OUTPATIENT
Start: 2023-02-25 | End: 2023-03-03 | Stop reason: HOSPADM

## 2023-02-25 RX ADMIN — ISOSORBIDE MONONITRATE 30 MG: 30 TABLET, EXTENDED RELEASE ORAL at 09:02

## 2023-02-25 RX ADMIN — ALLOPURINOL 100 MG: 100 TABLET ORAL at 09:02

## 2023-02-25 RX ADMIN — LIDOCAINE 1 PATCH: 50 PATCH CUTANEOUS at 10:02

## 2023-02-25 RX ADMIN — QUETIAPINE FUMARATE 12.5 MG: 25 TABLET ORAL at 08:02

## 2023-02-25 RX ADMIN — FERROUS SULFATE TAB 325 MG (65 MG ELEMENTAL FE) 1 EACH: 325 (65 FE) TAB at 09:02

## 2023-02-25 RX ADMIN — MUPIROCIN: 20 OINTMENT TOPICAL at 09:02

## 2023-02-25 RX ADMIN — SUCRALFATE 1 G: 1 SUSPENSION ORAL at 08:02

## 2023-02-25 RX ADMIN — MUPIROCIN: 20 OINTMENT TOPICAL at 08:02

## 2023-02-25 RX ADMIN — METOPROLOL TARTRATE 12.5 MG: 25 TABLET, FILM COATED ORAL at 08:02

## 2023-02-25 RX ADMIN — ATORVASTATIN CALCIUM 40 MG: 40 TABLET, FILM COATED ORAL at 09:02

## 2023-02-25 RX ADMIN — HYDROCODONE BITARTRATE AND ACETAMINOPHEN 1 TABLET: 5; 325 TABLET ORAL at 09:02

## 2023-02-25 RX ADMIN — METOPROLOL TARTRATE 12.5 MG: 25 TABLET, FILM COATED ORAL at 09:02

## 2023-02-25 RX ADMIN — SENNOSIDES AND DOCUSATE SODIUM 2 TABLET: 50; 8.6 TABLET ORAL at 08:02

## 2023-02-25 RX ADMIN — PANTOPRAZOLE SODIUM 40 MG: 40 INJECTION, POWDER, FOR SOLUTION INTRAVENOUS at 08:02

## 2023-02-25 RX ADMIN — QUETIAPINE FUMARATE 12.5 MG: 25 TABLET ORAL at 09:02

## 2023-02-25 RX ADMIN — MELATONIN TAB 3 MG 3 MG: 3 TAB at 09:02

## 2023-02-25 NOTE — ASSESSMENT & PLAN NOTE
secondaruy to gi bleed hold coumadin transfuse vit k and proceed with endoscopy when stable.     2/25/2023   Resolved   Stable now. Post endoscopic intervention willr esume anticoagulation per gi recommnedation

## 2023-02-25 NOTE — ASSESSMENT & PLAN NOTE
BP trends acceptable  Started BB for HR, will hold off on adding home anti-hypertensives for now and monitor   Resume as needed

## 2023-02-25 NOTE — ASSESSMENT & PLAN NOTE
INR now normal.  Unclear why he became supratherapeutic.  Now that bleeding has stopped and he is on ppi, ok to restart coumadin but will need close outpatient follow up.

## 2023-02-25 NOTE — ASSESSMENT & PLAN NOTE
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

## 2023-02-25 NOTE — HPI
Anthony Biggs Jr. is a 82 y.o. male with Angiodysplasia of small intestine, Atrial fibrillation on Coumadin, Chronic diastolic heart failure, Chronic upper GI bleeding, CAD, Depression, Diabetes mellitus, HTN, HLD, Emphysema of lung, who initially presented to ED on 2/23/2023 with complaints of weakness and altered mental status.  Spouse reported that patient had decreased appetite and progressive weakness prior to ED presentation.  Additionally the previous day patient was found to have elevated INR of 8.2 and had been instructed to hold Coumadin.  In the ED patient was found to be hypotensive with hemoglobin of 5.8, INR > 10, melena on HERMILO.  Patient was initiated on Kcentra, and transfused 2 units of PRBC.  Given persistent hypotension critical care medicine was consulted, patient admitted to ICU.  GI consulted, patient underwent urgent bedside EGD which showed nonbleeding duodenal ulcer and AVMs

## 2023-02-25 NOTE — SUBJECTIVE & OBJECTIVE
Objective:     Vital Signs (Most Recent):  Temp: 98 °F (36.7 °C) (02/25/23 0301)  Pulse: 94 (02/25/23 0817)  Resp: (!) 35 (02/25/23 0601)  BP: (!) 106/58 (02/25/23 0817)  SpO2: (!) 94 % (02/25/23 0601)   Vital Signs (24h Range):  Temp:  [98 °F (36.7 °C)-98.9 °F (37.2 °C)] 98 °F (36.7 °C)  Pulse:  [] 94  Resp:  [20-52] 35  SpO2:  [88 %-95 %] 94 %  BP: ()/(51-88) 106/58     Weight: 84 kg (185 lb 3 oz)  Body mass index is 29 kg/m².      Intake/Output Summary (Last 24 hours) at 2/25/2023 0836  Last data filed at 2/25/2023 0600  Gross per 24 hour   Intake 130.91 ml   Output 3190 ml   Net -3059.09 ml       Physical Exam  Vitals reviewed.   Constitutional:       General: He is not in acute distress.  HENT:      Head: Normocephalic and atraumatic.      Mouth/Throat:      Mouth: Mucous membranes are moist.      Pharynx: Oropharynx is clear.   Eyes:      Extraocular Movements: Extraocular movements intact.      Conjunctiva/sclera: Conjunctivae normal.   Cardiovascular:      Rate and Rhythm: Tachycardia present. Rhythm irregular.      Pulses: Normal pulses.   Pulmonary:      Effort: Pulmonary effort is normal.      Breath sounds: No wheezing or rhonchi.   Abdominal:      General: Bowel sounds are normal.      Palpations: Abdomen is soft.   Musculoskeletal:         General: No deformity.      Cervical back: Normal range of motion and neck supple.      Right lower leg: No edema.      Left lower leg: No edema.   Skin:     General: Skin is warm and dry.   Neurological:      Mental Status: He is alert and oriented to person, place, and time.      Comments: Disoriented to situation. Speech clear. No focal motor deficit   Psychiatric:         Behavior: Behavior is cooperative.     Review of Systems   Constitutional:  Negative for chills and fever.   HENT:  Negative for congestion and sore throat.    Respiratory:  Negative for cough and shortness of breath.    Cardiovascular:  Negative for chest pain and palpitations.    Gastrointestinal:  Negative for nausea and vomiting.   Endocrine: Negative for cold intolerance and polydipsia.   Genitourinary:  Negative for difficulty urinating and dysuria.   Musculoskeletal:  Negative for back pain and joint swelling.   Neurological:  Negative for dizziness and headaches.   Psychiatric/Behavioral:  Negative for agitation and hallucinations.      Vents:       Lines/Drains/Airways       Drain  Duration                  Urethral Catheter 02/23/23 1450 Straight-tip 16 Fr. 1 day              Peripheral Intravenous Line  Duration                  Peripheral IV - Single Lumen 02/23/23 1024 20 G Anterior;Distal;Left Upper Arm 1 day         Peripheral IV - Single Lumen 02/23/23 1238 18 G Distal;Left;Posterior Forearm 1 day                    Significant Labs:    CBC/Anemia Profile:  Recent Labs   Lab 02/23/23  1024 02/23/23  1107 02/23/23  1432 02/24/23  0016 02/24/23  0503 02/25/23  0520   WBC 6.20  --   --   --  6.76 9.00   HGB 5.8*  --    < > 9.6* 9.8* 10.8*   HCT 17.5*  --    < > 28.8* 29.4* 32.7*     --   --   --  198 228   MCV 95  --   --   --  88 89   RDW 14.4  --   --   --  17.5* 17.2*   OCCULTBLOOD  --  Positive*  --   --   --   --     < > = values in this interval not displayed.        Chemistries:  Recent Labs   Lab 02/23/23  1024 02/24/23  0503 02/25/23  0520   * 136 142   K 4.6 4.2 3.5   CL 98 105 104   CO2 23 17* 25   * 76* 46*   CREATININE 3.5* 2.1* 1.6*   CALCIUM 9.3 9.1 9.4   ALBUMIN 3.6 3.4* 3.2*   PROT 6.6 6.6 6.7   BILITOT 0.3 1.2* 1.3*   ALKPHOS 49* 58 55   ALT 19 20 18   AST 23 33 45*   MG 1.8 2.0  --        All pertinent labs within the past 24 hours have been reviewed.    Significant Imaging:  I have reviewed all pertinent imaging results/findings within the past 24 hours.

## 2023-02-25 NOTE — ASSESSMENT & PLAN NOTE
Rate has been variable- this morning 100-110s  Does not appear to be on rate controlling med at home per chart review   Will start metoprolol 12.5 mg BID   Cont holding anticoagulation  Cardiac monitoring

## 2023-02-25 NOTE — ASSESSMENT & PLAN NOTE
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

## 2023-02-25 NOTE — SUBJECTIVE & OBJECTIVE
Interval History: denies chest pain    Review of Systems   Constitutional: Negative for malaise/fatigue.   Eyes:  Negative for blurred vision.   Cardiovascular:  Negative for chest pain, claudication, cyanosis, dyspnea on exertion, irregular heartbeat, leg swelling, near-syncope, orthopnea, palpitations and paroxysmal nocturnal dyspnea.   Respiratory:  Negative for cough, hemoptysis and shortness of breath.    Hematologic/Lymphatic: Negative for bleeding problem. Does not bruise/bleed easily.   Skin:  Negative for dry skin and itching.   Musculoskeletal:  Negative for falls, muscle weakness and myalgias.   Gastrointestinal:  Negative for abdominal pain, diarrhea, heartburn, hematemesis, hematochezia and melena.   Genitourinary:  Negative for flank pain and hematuria.   Neurological:  Negative for dizziness, focal weakness, headaches, light-headedness, numbness, paresthesias, seizures and weakness.   Psychiatric/Behavioral:  Negative for altered mental status and memory loss. The patient is not nervous/anxious.    Allergic/Immunologic: Negative for hives.   Objective:     Vital Signs (Most Recent):  Temp: 97.6 °F (36.4 °C) (02/25/23 1500)  Pulse: 87 (02/25/23 1500)  Resp: 20 (02/25/23 1500)  BP: 103/65 (02/25/23 1500)  SpO2: 96 % (02/25/23 1500) Vital Signs (24h Range):  Temp:  [97.4 °F (36.3 °C)-98.3 °F (36.8 °C)] 97.6 °F (36.4 °C)  Pulse:  [] 87  Resp:  [20-52] 20  SpO2:  [91 %-96 %] 96 %  BP: ()/(58-85) 103/65     Weight: 84 kg (185 lb 3 oz)  Body mass index is 29 kg/m².     SpO2: 96 %         Intake/Output Summary (Last 24 hours) at 2/25/2023 1743  Last data filed at 2/25/2023 1500  Gross per 24 hour   Intake 120 ml   Output 2960 ml   Net -2840 ml       Lines/Drains/Airways       Drain  Duration                  Urethral Catheter 02/23/23 1450 Straight-tip 16 Fr. 2 days              Peripheral Intravenous Line  Duration                  Peripheral IV - Single Lumen 02/23/23 1024 20 G  Anterior;Distal;Left Upper Arm 2 days         Peripheral IV - Single Lumen 02/23/23 1238 18 G Distal;Left;Posterior Forearm 2 days                    Physical Exam  Vitals and nursing note reviewed.   Constitutional:       General: He is not in acute distress.     Appearance: He is well-developed. He is not diaphoretic.   HENT:      Head: Normocephalic and atraumatic.   Eyes:      General:         Right eye: No discharge.         Left eye: No discharge.      Pupils: Pupils are equal, round, and reactive to light.   Neck:      Thyroid: No thyromegaly.      Vascular: No JVD.   Cardiovascular:      Rate and Rhythm: Normal rate. Rhythm irregular.      Pulses: Intact distal pulses.      Heart sounds: Normal heart sounds. No murmur heard.    No friction rub. No gallop.   Pulmonary:      Effort: Pulmonary effort is normal. No respiratory distress.      Breath sounds: Normal breath sounds. No wheezing or rales.   Chest:      Chest wall: No tenderness.   Abdominal:      General: Bowel sounds are normal. There is no distension.      Palpations: Abdomen is soft.      Tenderness: There is no abdominal tenderness.   Musculoskeletal:         General: Normal range of motion.      Cervical back: Neck supple.   Skin:     General: Skin is warm and dry.      Findings: No erythema or rash.   Neurological:      Mental Status: He is alert and oriented to person, place, and time.      Cranial Nerves: No cranial nerve deficit.   Psychiatric:         Behavior: Behavior normal.       Significant Labs:   Recent Lab Results  (Last 5 results in the past 24 hours)        02/25/23  1303   02/25/23  0614   02/25/23  0520   02/25/23  0009   02/24/23  1749        Albumin     3.2           Alkaline Phosphatase     55           ALT     18           Anion Gap     13           AST     45           Baso #     0.01           Basophil %     0.1           BILIRUBIN TOTAL     1.3  Comment: For infants and newborns, interpretation of results should be  based  on gestational age, weight and in agreement with clinical  observations.    Premature Infant recommended reference ranges:  Up to 24 hours.............<8.0 mg/dL  Up to 48 hours............<12.0 mg/dL  3-5 days..................<15.0 mg/dL  6-29 days.................<15.0 mg/dL             BUN     46           Calcium     9.4           Chloride     104           CO2     25           Creatinine     1.6           Differential Method     Automated           eGFR     43           Eos #     0.0           Eosinophil %     0.2           Glucose     130           Gran # (ANC)     7.2           Gran %     80.4           Hematocrit     32.7           Hemoglobin     10.8           Immature Grans (Abs)     0.08  Comment: Mild elevation in immature granulocytes is non specific and   can be seen in a variety of conditions including stress response,   acute inflammation, trauma and pregnancy. Correlation with other   laboratory and clinical findings is essential.             Immature Granulocytes     0.9           Lymph #     1.1           Lymph %     12.7           MCH     29.5           MCHC     33.0           MCV     89           Mono #     0.5           Mono %     5.7           MPV     10.1           nRBC     0           Platelets     228           POCT Glucose 134   132     126   128       Potassium     3.5           PROTEIN TOTAL     6.7           RBC     3.66           RDW     17.2           Sodium     142           WBC     9.00

## 2023-02-25 NOTE — ASSESSMENT & PLAN NOTE
Duodenal ulcer is likely source of bleeding in the setting of a supratherapeutic inr.  AVMs treated during endoscopy.    - transition to oral protonix 40 mg bid.  Will need to continue for 2 months and then decrease to once daily for an additional month.    - can continue sucralfate bid for 2 weeks   - ok to restart coumadin with close outpatient follow up  - outpatient follow up in GI clinic

## 2023-02-25 NOTE — PLAN OF CARE
Problem: Restraint, Nonbehavioral (Nonviolent)  Goal: Absence of Harm or Injury  2/25/2023 0545 by Marina Anne RN  Outcome: Met  2/24/2023 2230 by Marina Anne RN  Outcome: Ongoing, Progressing     Pt no longer requires the use of restraints.  Pt is cooperative and redirectable.      POC reviewed with pt and pts daughter.  Pt AO x2.  VSS, Afib on monitor.  Arshad catheter in place with adequate UOP.  PIVs remain saline locked.   CHG bath, linen changed.   Skin barrier cream on buttock wounds.  Lidocaine patch placed on left lower back, see MAR.  Pt remained free from falls and injury during shift.  Call light an belongings in reach, bed alarms set alarms audible.

## 2023-02-25 NOTE — PROGRESS NOTES
[facility] ([facility])  Cardiology  Progress Note    Patient Name: Anthony Biggs Jr.  MRN: [id number]  Admission Date: 2/23/2023  Hospital Length of Stay: 2 days  Code Status: Full Code   Attending Physician: Sung Stephenson MD   Primary Care Physician: Giuliano Moran MD  Expected Discharge Date:   Principal Problem:Gastrointestinal hemorrhage with melena    Subjective:     Hospital Course:   2/24/23 pt seen and examined today, sedated, family at bedside. S/p upper GI. Labs reviewed, H/H 9.8 and 29.4, Crt 2.1 BUN 76, BP 70s/40s during exam    2/25/2023 seen examined more alert oriented conversational today hct improved to 32.no cardiac complaints gi ok to resume anticoagulation as of tomorrow.      Interval History: denies chest pain    Review of Systems   Constitutional: Negative for malaise/fatigue.   Eyes:  Negative for blurred vision.   Cardiovascular:  Negative for chest pain, claudication, cyanosis, dyspnea on exertion, irregular heartbeat, leg swelling, near-syncope, orthopnea, palpitations and paroxysmal nocturnal dyspnea.   Respiratory:  Negative for cough, hemoptysis and shortness of breath.    Hematologic/Lymphatic: Negative for bleeding problem. Does not bruise/bleed easily.   Skin:  Negative for dry skin and itching.   Musculoskeletal:  Negative for falls, muscle weakness and myalgias.   Gastrointestinal:  Negative for abdominal pain, diarrhea, heartburn, hematemesis, hematochezia and melena.   Genitourinary:  Negative for flank pain and hematuria.   Neurological:  Negative for dizziness, focal weakness, headaches, light-headedness, numbness, paresthesias, seizures and weakness.   Psychiatric/Behavioral:  Negative for altered mental status and memory loss. The patient is not nervous/anxious.    Allergic/Immunologic: Negative for hives.   Objective:     Vital Signs (Most Recent):  Temp: 97.6 °F (36.4 °C) (02/25/23 1500)  Pulse: 87 (02/25/23 1500)  Resp: 20 (02/25/23 1500)  BP: 103/65  (02/25/23 1500)  SpO2: 96 % (02/25/23 1500) Vital Signs (24h Range):  Temp:  [97.4 °F (36.3 °C)-98.3 °F (36.8 °C)] 97.6 °F (36.4 °C)  Pulse:  [] 87  Resp:  [20-52] 20  SpO2:  [91 %-96 %] 96 %  BP: ()/(58-85) 103/65     Weight: 84 kg (185 lb 3 oz)  Body mass index is 29 kg/m².     SpO2: 96 %         Intake/Output Summary (Last 24 hours) at 2/25/2023 1743  Last data filed at 2/25/2023 1500  Gross per 24 hour   Intake 120 ml   Output 2960 ml   Net -2840 ml       Lines/Drains/Airways       Drain  Duration                  Urethral Catheter 02/23/23 1450 Straight-tip 16 Fr. 2 days              Peripheral Intravenous Line  Duration                  Peripheral IV - Single Lumen 02/23/23 1024 20 G Anterior;Distal;Left Upper Arm 2 days         Peripheral IV - Single Lumen 02/23/23 1238 18 G Distal;Left;Posterior Forearm 2 days                    Physical Exam  Vitals and nursing note reviewed.   Constitutional:       General: He is not in acute distress.     Appearance: He is well-developed. He is not diaphoretic.   HENT:      Head: Normocephalic and atraumatic.   Eyes:      General:         Right eye: No discharge.         Left eye: No discharge.      Pupils: Pupils are equal, round, and reactive to light.   Neck:      Thyroid: No thyromegaly.      Vascular: No JVD.   Cardiovascular:      Rate and Rhythm: Normal rate. Rhythm irregular.      Pulses: Intact distal pulses.      Heart sounds: Normal heart sounds. No murmur heard.    No friction rub. No gallop.   Pulmonary:      Effort: Pulmonary effort is normal. No respiratory distress.      Breath sounds: Normal breath sounds. No wheezing or rales.   Chest:      Chest wall: No tenderness.   Abdominal:      General: Bowel sounds are normal. There is no distension.      Palpations: Abdomen is soft.      Tenderness: There is no abdominal tenderness.   Musculoskeletal:         General: Normal range of motion.      Cervical back: Neck supple.   Skin:     General: Skin  is warm and dry.      Findings: No erythema or rash.   Neurological:      Mental Status: He is alert and oriented to person, place, and time.      Cranial Nerves: No cranial nerve deficit.   Psychiatric:         Behavior: Behavior normal.       Significant Labs:   Recent Lab Results  (Last 5 results in the past 24 hours)        02/25/23  1303   02/25/23  0614   02/25/23  0520   02/25/23  0009   02/24/23  1749        Albumin     3.2           Alkaline Phosphatase     55           ALT     18           Anion Gap     13           AST     45           Baso #     0.01           Basophil %     0.1           BILIRUBIN TOTAL     1.3  Comment: For infants and newborns, interpretation of results should be based  on gestational age, weight and in agreement with clinical  observations.    Premature Infant recommended reference ranges:  Up to 24 hours.............<8.0 mg/dL  Up to 48 hours............<12.0 mg/dL  3-5 days..................<15.0 mg/dL  6-29 days.................<15.0 mg/dL             BUN     46           Calcium     9.4           Chloride     104           CO2     25           Creatinine     1.6           Differential Method     Automated           eGFR     43           Eos #     0.0           Eosinophil %     0.2           Glucose     130           Gran # (ANC)     7.2           Gran %     80.4           Hematocrit     32.7           Hemoglobin     10.8           Immature Grans (Abs)     0.08  Comment: Mild elevation in immature granulocytes is non specific and   can be seen in a variety of conditions including stress response,   acute inflammation, trauma and pregnancy. Correlation with other   laboratory and clinical findings is essential.             Immature Granulocytes     0.9           Lymph #     1.1           Lymph %     12.7           MCH     29.5           MCHC     33.0           MCV     89           Mono #     0.5           Mono %     5.7           MPV     10.1           nRBC     0            Platelets     228           POCT Glucose 134   132     126   128       Potassium     3.5           PROTEIN TOTAL     6.7           RBC     3.66           RDW     17.2           Sodium     142           WBC     9.00                                      Assessment and Plan:     Brief HPI: an 83 yo male with chronic afib presents with gi bleed secondary to supratherapeutic inr is s/p endoscopic intervention with improvement of hct to 32 no active bleed willr esume anticoagulation in am. Per gi recommendation    * Gastrointestinal hemorrhage with melena  secondaruy to gi bleed hold coumadin transfuse vit k and proceed with endoscopy when stable.     2/25/2023   Resolved   Stable now. Post endoscopic intervention willr esume anticoagulation per gi recommnedation    Supratherapeutic INR  resolved    Chronic diastolic heart failure  Appears well compensated due tyo hypovolemic shock from bleeding    LAILA (iron deficiency anemia)  See melena    2/25/2023  improved    Hypertension associated with diabetes  Hold meds due to shock    Hyperlipidemia associated with type 2 diabetes mellitus  continue home meds.    Coronary atherosclerosis  Asymptomatic will check troponin    2/24/23  Cont statin    Atrial fibrillation  Chronic on coumadin previous gi bleed presents with gi bleed. Hypovolemic shock    2/24/23  Rate currently controlled  Cont hold Coumadin    2/25/2023  Resume anticoagulation with close monitoring        VTE Risk Mitigation (From admission, onward)         Ordered     Reason for No Pharmacological VTE Prophylaxis  Once        Question:  Reasons:  Answer:  Active Bleeding    02/23/23 1135     IP VTE HIGH RISK PATIENT  Once         02/23/23 1135     Place sequential compression device  Until discontinued         02/23/23 1135                Erik Alba MD  Cardiology  [facility] ([facility])

## 2023-02-25 NOTE — PROGRESS NOTES
[facility] ([facility])  Gastroenterology  Progress Note    Patient Name: Anthony Biggs Jr.  MRN: [id number]  Admission Date: 2/23/2023  Hospital Length of Stay: 2 days  Code Status: Full Code   Attending Provider: Sung Stephenson MD  Consulting Provider: Ade Lyons MD  Primary Care Physician: Giuliano Moran MD  Principal Problem: Gastrointestinal hemorrhage with melena      Subjective:     Interval History: reports of some confusion overnight.  Pt tolerated regular diet and no signs of overt bleeding.  Denies abdominal pain/n/v/hematochezia     Review of Systems   All other systems reviewed and are negative.  Objective:     Vital Signs (Most Recent):  Temp: 97.7 °F (36.5 °C) (02/25/23 0701)  Pulse: 88 (02/25/23 0900)  Resp: (!) 24 (02/25/23 0900)  BP: 104/70 (02/25/23 0900)  SpO2: (!) 93 % (02/25/23 0900)   Vital Signs (24h Range):  Temp:  [97.7 °F (36.5 °C)-98.9 °F (37.2 °C)] 97.7 °F (36.5 °C)  Pulse:  [] 88  Resp:  [22-52] 24  SpO2:  [88 %-95 %] 93 %  BP: (101-138)/(58-88) 104/70     Weight: 84 kg (185 lb 3 oz) (02/25/23 0546)  Body mass index is 29 kg/m².      Intake/Output Summary (Last 24 hours) at 2/25/2023 1032  Last data filed at 2/25/2023 0900  Gross per 24 hour   Intake 120 ml   Output 3340 ml   Net -3220 ml       Lines/Drains/Airways       Drain  Duration                  Urethral Catheter 02/23/23 1450 Straight-tip 16 Fr. 1 day              Peripheral Intravenous Line  Duration                  Peripheral IV - Single Lumen 02/23/23 1024 20 G Anterior;Distal;Left Upper Arm 2 days         Peripheral IV - Single Lumen 02/23/23 1238 18 G Distal;Left;Posterior Forearm 1 day                    Physical Exam  Constitutional:       General: He is not in acute distress.     Appearance: Normal appearance.   HENT:      Head: Normocephalic and atraumatic.   Eyes:      General: No scleral icterus.     Extraocular Movements: Extraocular movements intact.   Cardiovascular:      Pulses: Normal  pulses.   Pulmonary:      Effort: Pulmonary effort is normal.      Breath sounds: No wheezing.   Abdominal:      General: Bowel sounds are normal. There is no distension.      Palpations: Abdomen is soft.      Tenderness: There is abdominal tenderness.      Comments: Mild midepigastric tenderness on palpation   Musculoskeletal:         General: No swelling or tenderness.   Skin:     General: Skin is warm and dry.      Coloration: Skin is not jaundiced.       Significant Labs:  CBC:   Recent Labs   Lab 02/24/23  0016 02/24/23  0503 02/25/23  0520   WBC  --  6.76 9.00   HGB 9.6* 9.8* 10.8*   HCT 28.8* 29.4* 32.7*   PLT  --  198 228     CMP:   Recent Labs   Lab 02/25/23  0520   *   CALCIUM 9.4   ALBUMIN 3.2*   PROT 6.7      K 3.5   CO2 25      BUN 46*   CREATININE 1.6*   ALKPHOS 55   ALT 18   AST 45*   BILITOT 1.3*         Significant Imaging:  Imaging results within the past 24 hours have been reviewed.    Assessment/Plan:     Cardiac/Vascular  Atrial fibrillation  INR now normal.      Hematology  Supratherapeutic INR  INR now normal.  Unclear why he became supratherapeutic.  Now that bleeding has stopped and he is on ppi, ok to restart coumadin but will need close outpatient follow up.      GI  * Gastrointestinal hemorrhage with melena  Duodenal ulcer is likely source of bleeding in the setting of a supratherapeutic inr.  AVMs treated during endoscopy.    - transition to oral protonix 40 mg bid.  Will need to continue for 2 months and then decrease to once daily for an additional month.    - can continue sucralfate bid for 2 weeks   - ok to restart coumadin with close outpatient follow up  - outpatient follow up in GI clinic             Thank you for your consult. I will sign off. Please contact us if you have any additional questions.    Ade Lyons MD  Gastroenterology  [facility] ([facility])

## 2023-02-25 NOTE — SUBJECTIVE & OBJECTIVE
Subjective:     Interval History: reports of some confusion overnight.  Pt tolerated regular diet and no signs of overt bleeding.  Denies abdominal pain/n/v/hematochezia     Review of Systems   All other systems reviewed and are negative.  Objective:     Vital Signs (Most Recent):  Temp: 97.7 °F (36.5 °C) (02/25/23 0701)  Pulse: 88 (02/25/23 0900)  Resp: (!) 24 (02/25/23 0900)  BP: 104/70 (02/25/23 0900)  SpO2: (!) 93 % (02/25/23 0900)   Vital Signs (24h Range):  Temp:  [97.7 °F (36.5 °C)-98.9 °F (37.2 °C)] 97.7 °F (36.5 °C)  Pulse:  [] 88  Resp:  [22-52] 24  SpO2:  [88 %-95 %] 93 %  BP: (101-138)/(58-88) 104/70     Weight: 84 kg (185 lb 3 oz) (02/25/23 0546)  Body mass index is 29 kg/m².      Intake/Output Summary (Last 24 hours) at 2/25/2023 1032  Last data filed at 2/25/2023 0900  Gross per 24 hour   Intake 120 ml   Output 3340 ml   Net -3220 ml       Lines/Drains/Airways       Drain  Duration                  Urethral Catheter 02/23/23 1450 Straight-tip 16 Fr. 1 day              Peripheral Intravenous Line  Duration                  Peripheral IV - Single Lumen 02/23/23 1024 20 G Anterior;Distal;Left Upper Arm 2 days         Peripheral IV - Single Lumen 02/23/23 1238 18 G Distal;Left;Posterior Forearm 1 day                    Physical Exam  Constitutional:       General: He is not in acute distress.     Appearance: Normal appearance.   HENT:      Head: Normocephalic and atraumatic.   Eyes:      General: No scleral icterus.     Extraocular Movements: Extraocular movements intact.   Cardiovascular:      Pulses: Normal pulses.   Pulmonary:      Effort: Pulmonary effort is normal.      Breath sounds: No wheezing.   Abdominal:      General: Bowel sounds are normal. There is no distension.      Palpations: Abdomen is soft.      Tenderness: There is abdominal tenderness.      Comments: Mild midepigastric tenderness on palpation   Musculoskeletal:         General: No swelling or tenderness.   Skin:     General:  Skin is warm and dry.      Coloration: Skin is not jaundiced.       Significant Labs:  CBC:   Recent Labs   Lab 02/24/23  0016 02/24/23  0503 02/25/23  0520   WBC  --  6.76 9.00   HGB 9.6* 9.8* 10.8*   HCT 28.8* 29.4* 32.7*   PLT  --  198 228     CMP:   Recent Labs   Lab 02/25/23  0520   *   CALCIUM 9.4   ALBUMIN 3.2*   PROT 6.7      K 3.5   CO2 25      BUN 46*   CREATININE 1.6*   ALKPHOS 55   ALT 18   AST 45*   BILITOT 1.3*         Significant Imaging:  Imaging results within the past 24 hours have been reviewed.

## 2023-02-25 NOTE — HOSPITAL COURSE
ICU course complicated by confusion and agitation requiring initiation of Precedex, which was able to be weaned off with patient transitioned to seroquel.  Spouse reports that patient has been declining over the past few months with increased confusion and weakness.  Patient stable for transfer of ICU to 02/25/2023.  Hospital medicine consulted to continue care.    Given hemorrhagic shock secondary to supratherapeutic INR, Coumadin is not the best anticoagulation choice for patient.  This was discussed with Cardiology and patient's family.  They are agreeable to starting patient on Eliquis instead.    PT/OT evaluation ordered.  Recommended for SNF placement, Social work has been consulted    Due to concern for dysphagia, SLP was consulted.  Patient recommended for Minced & Moist Diet - IDDSI Level 5, Thin liquids - IDDSI Level 0 (no straws) and aspiration protocol

## 2023-02-25 NOTE — ASSESSMENT & PLAN NOTE
Chronic on coumadin previous gi bleed presents with gi bleed. Hypovolemic shock    2/24/23  Rate currently controlled  Cont hold Coumadin    2/25/2023  Resume anticoagulation with close monitoring

## 2023-02-25 NOTE — ASSESSMENT & PLAN NOTE
INR elevated on routine check 2/22- instructed to hold; wife reports poor PO intake x 1 week likely contributing   INR > 10 on admission    Holding coumadin   S/p PCC and Vitamin K   Follow up INR 1.2   resume coumadin with bridging per GI recs

## 2023-02-25 NOTE — PROGRESS NOTES
[facility] ([facility])  Critical Care Medicine  Progress Note    Patient Name: Anthony Biggs Jr.  MRN: [id number]  Admission Date: 2/23/2023  Hospital Length of Stay: 2 days  Code Status: Full Code  Attending Provider: Sung Stephenson MD  Primary Care Provider: Giuliano Moran MD   Principal Problem: Gastrointestinal hemorrhage with melena    Subjective:     HPI:  Information obtained from chart review and spouse at bedside as patient is confused and unable to provide reliable information.     82-year-old male with a known past medical history of permanent atrial fibrillation (Coumadin), HTN, HLD, DM, LAILA, and angiodysplasia of the small intestine who presented to the ED 2/23/2023 for evaluation of weakness and AMS. Spouse notes several days of decreased appetite and progressive weakness. This morning, he was confused and very weak, prompting her to request a same day PCP visit at which time her PCP instructed them to go to the ED. Notably, patient's INR 8.2 yesterday upon routine assessment and he was instructed to hold Coumadin x 3 days and resume at half dose with reassessment to follow. ED evaluation revealing hypotension, hgb 5.8, and melena on HERMILO. 2 units PRBC ordered STAT and IVFs given. PCC and Vitamin K also given. GI consulted. Critical care team consulted for ICU admission.           Hospital/ICU Course:  2/24: confused, combative, agitated overnight requiring initiation of precedex. Attempted to stop this am with ongoing agitation and combativeness. H/H has been stable. Hemodynamics acceptable. Choking on water overnight- NPO until evaluated by ST. EGD yesterday revealing non-bleeding duodenal ulcer and angioectasias x3 in duodenum treated with argon plasma coagulation   2/25: no acute events overnight, reports sleeping well. No subjective complaints. Hemodynamics acceptable, H/H stable, actually better. Afib 100-110s.         Objective:     Vital Signs (Most Recent):  Temp: 98 °F (36.7 °C)  (02/25/23 0301)  Pulse: 94 (02/25/23 0817)  Resp: (!) 35 (02/25/23 0601)  BP: (!) 106/58 (02/25/23 0817)  SpO2: (!) 94 % (02/25/23 0601)   Vital Signs (24h Range):  Temp:  [98 °F (36.7 °C)-98.9 °F (37.2 °C)] 98 °F (36.7 °C)  Pulse:  [] 94  Resp:  [20-52] 35  SpO2:  [88 %-95 %] 94 %  BP: ()/(51-88) 106/58     Weight: 84 kg (185 lb 3 oz)  Body mass index is 29 kg/m².      Intake/Output Summary (Last 24 hours) at 2/25/2023 0836  Last data filed at 2/25/2023 0600  Gross per 24 hour   Intake 130.91 ml   Output 3190 ml   Net -3059.09 ml       Physical Exam  Vitals reviewed.   Constitutional:       General: He is not in acute distress.  HENT:      Head: Normocephalic and atraumatic.      Mouth/Throat:      Mouth: Mucous membranes are moist.      Pharynx: Oropharynx is clear.   Eyes:      Extraocular Movements: Extraocular movements intact.      Conjunctiva/sclera: Conjunctivae normal.   Cardiovascular:      Rate and Rhythm: Tachycardia present. Rhythm irregular.      Pulses: Normal pulses.   Pulmonary:      Effort: Pulmonary effort is normal.      Breath sounds: No wheezing or rhonchi.   Abdominal:      General: Bowel sounds are normal.      Palpations: Abdomen is soft.   Musculoskeletal:         General: No deformity.      Cervical back: Normal range of motion and neck supple.      Right lower leg: No edema.      Left lower leg: No edema.   Skin:     General: Skin is warm and dry.   Neurological:      Mental Status: He is alert and oriented to person, place, and time.      Comments: Disoriented to situation. Speech clear. No focal motor deficit   Psychiatric:         Behavior: Behavior is cooperative.     Review of Systems   Constitutional:  Negative for chills and fever.   HENT:  Negative for congestion and sore throat.    Respiratory:  Negative for cough and shortness of breath.    Cardiovascular:  Negative for chest pain and palpitations.   Gastrointestinal:  Negative for nausea and vomiting.   Endocrine:  Negative for cold intolerance and polydipsia.   Genitourinary:  Negative for difficulty urinating and dysuria.   Musculoskeletal:  Negative for back pain and joint swelling.   Neurological:  Negative for dizziness and headaches.   Psychiatric/Behavioral:  Negative for agitation and hallucinations.      Vents:       Lines/Drains/Airways       Drain  Duration                  Urethral Catheter 02/23/23 1450 Straight-tip 16 Fr. 1 day              Peripheral Intravenous Line  Duration                  Peripheral IV - Single Lumen 02/23/23 1024 20 G Anterior;Distal;Left Upper Arm 1 day         Peripheral IV - Single Lumen 02/23/23 1238 18 G Distal;Left;Posterior Forearm 1 day                    Significant Labs:    CBC/Anemia Profile:  Recent Labs   Lab 02/23/23  1024 02/23/23  1107 02/23/23  1432 02/24/23  0016 02/24/23  0503 02/25/23  0520   WBC 6.20  --   --   --  6.76 9.00   HGB 5.8*  --    < > 9.6* 9.8* 10.8*   HCT 17.5*  --    < > 28.8* 29.4* 32.7*     --   --   --  198 228   MCV 95  --   --   --  88 89   RDW 14.4  --   --   --  17.5* 17.2*   OCCULTBLOOD  --  Positive*  --   --   --   --     < > = values in this interval not displayed.        Chemistries:  Recent Labs   Lab 02/23/23  1024 02/24/23  0503 02/25/23  0520   * 136 142   K 4.6 4.2 3.5   CL 98 105 104   CO2 23 17* 25   * 76* 46*   CREATININE 3.5* 2.1* 1.6*   CALCIUM 9.3 9.1 9.4   ALBUMIN 3.6 3.4* 3.2*   PROT 6.6 6.6 6.7   BILITOT 0.3 1.2* 1.3*   ALKPHOS 49* 58 55   ALT 19 20 18   AST 23 33 45*   MG 1.8 2.0  --        All pertinent labs within the past 24 hours have been reviewed.    Significant Imaging:  I have reviewed all pertinent imaging results/findings within the past 24 hours.      ABG  No results for input(s): PH, PO2, PCO2, HCO3, BE in the last 168 hours.  Assessment/Plan:     Neuro  Encephalopathy, metabolic  Confused upon admission without any focal motor deficits; initially suspected to be related to hypotension + severe  anemia   Increased confusion, agitation, combativeness night of 2/23 necessitating need for precedex and restraints   CT head negative  Mental status is improved  Family explains patient has been having symptoms of suspected dementia (irritability, intermittent confusion, repeating things, memory loss) for approximately 2 years- would like to arrange follow up with outpatient Neurology for further evaluation   Safety and fall precautions    Cardiac/Vascular  Hemorrhagic shock  Resolved and has had stable hemodynamics since resuscitation with blood and IVFs  See problem: gastrointestinal hemorrhage     Chronic diastolic heart failure  Does not appear overloaded on exam   Holding diuretics   Monitor     Hypertension associated with diabetes  BP trends acceptable  Started BB for HR, will hold off on adding home anti-hypertensives for now and monitor   Resume as needed    Hyperlipidemia associated with type 2 diabetes mellitus  Statin resumed     Coronary atherosclerosis  Resumed Imdur   Holding ASA     Atrial fibrillation  Rate has been variable- this morning 100-110s  Does not appear to be on rate controlling med at home per chart review   Will start metoprolol 12.5 mg BID   Cont holding anticoagulation  Cardiac monitoring     Renal/  SHAYLA (acute kidney injury)  No hx of CKD with Cr usually ~1  Etiology prerenal in setting of IVVD 2/2 hemorrhage  S/p IVFs  Maintain acceptable hemodynamics to provide adequate renal perfusion   Renal dose medications and avoid nephrotoxic agents as able  renal function improving with stable electrolytes and acid/base balance   Can discontinue maki catheter now    Hematology  Supratherapeutic INR  INR elevated on routine check 2/22- instructed to hold; wife reports poor PO intake x 1 week likely contributing   INR > 10 on admission    Holding coumadin   S/p PCC and Vitamin K   Follow up INR 1.2   resume coumadin with bridging per GI recs     Oncology  LAILA (iron deficiency anemia)  Iron  supplement resumed    GI  * Gastrointestinal hemorrhage with melena  supratherapeutic INR >10   Melena on HERMILO  S/p 2.5L IVFs in ED, 2 units PRBC, PCC and Vitamin K   EGD 2/23 with finding of non-bleeding duodenal ulcer and 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 treated with argon plasma coagulation   No evidence of ongoing bleeding   Serial H/H improved and stable - now CBC daily   protonix and carafate   Holding coumadin   Advancing to regular diet this am   Appreciate GI assistance       Angiodysplasia of small intestine  GI following and underwent EGD 2/23 with finding of non-bleeding duodenal ulcer and 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 treated with argon plasma coagulation   protonix IV daily   carafate daily   Diet advanced  Holding coumadin per recs   Appreciate GI input     DVT prophylaxis: SCDs  GI prophylaxis: protonix     Mr. Garciar is stable for ongoing management out of the ICU setting. Will consult hospital medicine team to assume medical management. Critical care will sign off.      Nuno Gambino NP  Critical Care Medicine  [facility] - [facility]ensive [facility] ([facility])

## 2023-02-25 NOTE — ASSESSMENT & PLAN NOTE
No hx of CKD with Cr usually ~1  Etiology prerenal in setting of IVVD 2/2 hemorrhage  S/p IVFs  Maintain acceptable hemodynamics to provide adequate renal perfusion   Renal dose medications and avoid nephrotoxic agents as able  renal function improving with stable electrolytes and acid/base balance   Can discontinue maki catheter now

## 2023-02-25 NOTE — ASSESSMENT & PLAN NOTE
Confused upon admission without any focal motor deficits; initially suspected to be related to hypotension + severe anemia   Increased confusion, agitation, combativeness night of 2/23 necessitating need for precedex and restraints   CT head negative  Mental status is improved  Family explains patient has been having symptoms of suspected dementia (irritability, intermittent confusion, repeating things, memory loss) for approximately 2 years- would like to arrange follow up with outpatient Neurology for further evaluation   Safety and fall precautions

## 2023-02-26 PROBLEM — R41.89 COGNITIVE DECLINE: Status: ACTIVE | Noted: 2023-02-25

## 2023-02-26 LAB
ALBUMIN SERPL BCP-MCNC: 2.9 G/DL (ref 3.5–5.2)
ALP SERPL-CCNC: 49 U/L (ref 55–135)
ALT SERPL W/O P-5'-P-CCNC: 19 U/L (ref 10–44)
ANION GAP SERPL CALC-SCNC: 11 MMOL/L (ref 8–16)
AST SERPL-CCNC: 44 U/L (ref 10–40)
BASOPHILS # BLD AUTO: 0.01 K/UL (ref 0–0.2)
BASOPHILS NFR BLD: 0.1 % (ref 0–1.9)
BILIRUB SERPL-MCNC: 0.9 MG/DL (ref 0.1–1)
BUN SERPL-MCNC: 45 MG/DL (ref 8–23)
CALCIUM SERPL-MCNC: 9.4 MG/DL (ref 8.7–10.5)
CHLORIDE SERPL-SCNC: 106 MMOL/L (ref 95–110)
CO2 SERPL-SCNC: 27 MMOL/L (ref 23–29)
CREAT SERPL-MCNC: 1.5 MG/DL (ref 0.5–1.4)
DIFFERENTIAL METHOD: ABNORMAL
EOSINOPHIL # BLD AUTO: 0.2 K/UL (ref 0–0.5)
EOSINOPHIL NFR BLD: 3.6 % (ref 0–8)
ERYTHROCYTE [DISTWIDTH] IN BLOOD BY AUTOMATED COUNT: 16.7 % (ref 11.5–14.5)
EST. GFR  (NO RACE VARIABLE): 47 ML/MIN/1.73 M^2
GLUCOSE SERPL-MCNC: 98 MG/DL (ref 70–110)
HCT VFR BLD AUTO: 30.7 % (ref 40–54)
HGB BLD-MCNC: 10 G/DL (ref 14–18)
IMM GRANULOCYTES # BLD AUTO: 0.02 K/UL (ref 0–0.04)
IMM GRANULOCYTES NFR BLD AUTO: 0.3 % (ref 0–0.5)
LYMPHOCYTES # BLD AUTO: 1.5 K/UL (ref 1–4.8)
LYMPHOCYTES NFR BLD: 22.1 % (ref 18–48)
MAGNESIUM SERPL-MCNC: 1.9 MG/DL (ref 1.6–2.6)
MCH RBC QN AUTO: 29.4 PG (ref 27–31)
MCHC RBC AUTO-ENTMCNC: 32.6 G/DL (ref 32–36)
MCV RBC AUTO: 90 FL (ref 82–98)
MONOCYTES # BLD AUTO: 0.5 K/UL (ref 0.3–1)
MONOCYTES NFR BLD: 7.2 % (ref 4–15)
NEUTROPHILS # BLD AUTO: 4.5 K/UL (ref 1.8–7.7)
NEUTROPHILS NFR BLD: 66.7 % (ref 38–73)
NRBC BLD-RTO: 0 /100 WBC
PLATELET # BLD AUTO: 223 K/UL (ref 150–450)
PMV BLD AUTO: 9.9 FL (ref 9.2–12.9)
POCT GLUCOSE: 100 MG/DL (ref 70–110)
POCT GLUCOSE: 118 MG/DL (ref 70–110)
POCT GLUCOSE: 145 MG/DL (ref 70–110)
POCT GLUCOSE: 98 MG/DL (ref 70–110)
POTASSIUM SERPL-SCNC: 3.3 MMOL/L (ref 3.5–5.1)
PROT SERPL-MCNC: 6.5 G/DL (ref 6–8.4)
RBC # BLD AUTO: 3.4 M/UL (ref 4.6–6.2)
SODIUM SERPL-SCNC: 144 MMOL/L (ref 136–145)
WBC # BLD AUTO: 6.69 K/UL (ref 3.9–12.7)

## 2023-02-26 PROCEDURE — 36415 COLL VENOUS BLD VENIPUNCTURE: CPT | Mod: HCNC | Performed by: NURSE PRACTITIONER

## 2023-02-26 PROCEDURE — 99233 PR SUBSEQUENT HOSPITAL CARE,LEVL III: ICD-10-PCS | Mod: HCNC,,, | Performed by: INTERNAL MEDICINE

## 2023-02-26 PROCEDURE — 97530 THERAPEUTIC ACTIVITIES: CPT | Mod: HCNC

## 2023-02-26 PROCEDURE — 83735 ASSAY OF MAGNESIUM: CPT | Mod: HCNC | Performed by: NURSE PRACTITIONER

## 2023-02-26 PROCEDURE — 63600175 PHARM REV CODE 636 W HCPCS: Mod: HCNC | Performed by: INTERNAL MEDICINE

## 2023-02-26 PROCEDURE — 92610 EVALUATE SWALLOWING FUNCTION: CPT | Mod: HCNC

## 2023-02-26 PROCEDURE — 97166 OT EVAL MOD COMPLEX 45 MIN: CPT | Mod: HCNC

## 2023-02-26 PROCEDURE — 25000003 PHARM REV CODE 250: Mod: HCNC | Performed by: NURSE PRACTITIONER

## 2023-02-26 PROCEDURE — C9113 INJ PANTOPRAZOLE SODIUM, VIA: HCPCS | Mod: HCNC | Performed by: NURSE PRACTITIONER

## 2023-02-26 PROCEDURE — 99233 SBSQ HOSP IP/OBS HIGH 50: CPT | Mod: HCNC,,, | Performed by: INTERNAL MEDICINE

## 2023-02-26 PROCEDURE — 63600175 PHARM REV CODE 636 W HCPCS: Mod: HCNC | Performed by: NURSE PRACTITIONER

## 2023-02-26 PROCEDURE — 25000003 PHARM REV CODE 250: Mod: HCNC | Performed by: INTERNAL MEDICINE

## 2023-02-26 PROCEDURE — 97162 PT EVAL MOD COMPLEX 30 MIN: CPT | Mod: HCNC

## 2023-02-26 PROCEDURE — 11000001 HC ACUTE MED/SURG PRIVATE ROOM: Mod: HCNC

## 2023-02-26 PROCEDURE — 85025 COMPLETE CBC W/AUTO DIFF WBC: CPT | Mod: HCNC | Performed by: NURSE PRACTITIONER

## 2023-02-26 PROCEDURE — 80053 COMPREHEN METABOLIC PANEL: CPT | Mod: HCNC | Performed by: NURSE PRACTITIONER

## 2023-02-26 RX ORDER — ENOXAPARIN SODIUM 100 MG/ML
1 INJECTION SUBCUTANEOUS
Status: DISCONTINUED | OUTPATIENT
Start: 2023-02-26 | End: 2023-02-26

## 2023-02-26 RX ADMIN — QUETIAPINE FUMARATE 12.5 MG: 25 TABLET ORAL at 08:02

## 2023-02-26 RX ADMIN — ALLOPURINOL 100 MG: 100 TABLET ORAL at 08:02

## 2023-02-26 RX ADMIN — MUPIROCIN: 20 OINTMENT TOPICAL at 08:02

## 2023-02-26 RX ADMIN — MELATONIN TAB 3 MG 3 MG: 3 TAB at 08:02

## 2023-02-26 RX ADMIN — FERROUS SULFATE TAB 325 MG (65 MG ELEMENTAL FE) 1 EACH: 325 (65 FE) TAB at 08:02

## 2023-02-26 RX ADMIN — SENNOSIDES AND DOCUSATE SODIUM 2 TABLET: 50; 8.6 TABLET ORAL at 08:02

## 2023-02-26 RX ADMIN — ENOXAPARIN SODIUM 80 MG: 100 INJECTION SUBCUTANEOUS at 08:02

## 2023-02-26 RX ADMIN — POTASSIUM BICARBONATE 50 MEQ: 978 TABLET, EFFERVESCENT ORAL at 12:02

## 2023-02-26 RX ADMIN — PANTOPRAZOLE SODIUM 40 MG: 40 INJECTION, POWDER, FOR SOLUTION INTRAVENOUS at 08:02

## 2023-02-26 RX ADMIN — ATORVASTATIN CALCIUM 40 MG: 40 TABLET, FILM COATED ORAL at 08:02

## 2023-02-26 RX ADMIN — METOPROLOL TARTRATE 12.5 MG: 25 TABLET, FILM COATED ORAL at 08:02

## 2023-02-26 RX ADMIN — ISOSORBIDE MONONITRATE 30 MG: 30 TABLET, EXTENDED RELEASE ORAL at 08:02

## 2023-02-26 RX ADMIN — LIDOCAINE 1 PATCH: 50 PATCH CUTANEOUS at 10:02

## 2023-02-26 RX ADMIN — APIXABAN 2.5 MG: 2.5 TABLET, FILM COATED ORAL at 08:02

## 2023-02-26 RX ADMIN — SUCRALFATE 1 G: 1 SUSPENSION ORAL at 08:02

## 2023-02-26 NOTE — PROGRESS NOTES
[facility] ([facility])  Cardiology  Progress Note    Patient Name: Anthony Biggs Jr.  MRN: [id number]  Admission Date: 2/23/2023  Hospital Length of Stay: 3 days  Code Status: Full Code   Attending Physician: Jennifer Tyler DO   Primary Care Physician: Giuliano Moran MD  Expected Discharge Date:   Principal Problem:Gastrointestinal hemorrhage with melena    Subjective:     Hospital Course:   2/24/23 pt seen and examined today, sedated, family at bedside. S/p upper GI. Labs reviewed, H/H 9.8 and 29.4, Crt 2.1 BUN 76, BP 70s/40s during exam    2/25/2023 seen examined more alert oriented conversational today hct improved to 32.no cardiac complaints gi ok to resume anticoagulation as of tomorrow.    2/26/2023 seen and  examined discussed wife anticoagulation regimen with eliquis he is awake alert has no cardiac complaints heart rate controlled.       Interval History: back to normal with his mental status.    Review of Systems   Constitutional: Negative for malaise/fatigue.   Eyes:  Negative for blurred vision.   Cardiovascular:  Negative for chest pain, claudication, cyanosis, dyspnea on exertion, irregular heartbeat, leg swelling, near-syncope, orthopnea, palpitations and paroxysmal nocturnal dyspnea.   Respiratory:  Negative for cough, hemoptysis and shortness of breath.    Hematologic/Lymphatic: Negative for bleeding problem. Does not bruise/bleed easily.   Skin:  Negative for dry skin and itching.   Musculoskeletal:  Negative for falls, muscle weakness and myalgias.   Gastrointestinal:  Negative for abdominal pain, diarrhea, heartburn, hematemesis, hematochezia and melena.   Genitourinary:  Negative for flank pain and hematuria.   Neurological:  Negative for dizziness, focal weakness, headaches, light-headedness, numbness, paresthesias, seizures and weakness.   Psychiatric/Behavioral:  Negative for altered mental status and memory loss. The patient is not nervous/anxious.    Allergic/Immunologic: Negative  for hives.   Objective:     Vital Signs (Most Recent):  Temp: 98.4 °F (36.9 °C) (02/26/23 1200)  Pulse: 105 (02/26/23 1200)  Resp: (!) 37 (02/26/23 1200)  BP: 110/75 (02/26/23 1200)  SpO2: 96 % (02/26/23 1200)   Vital Signs (24h Range):  Temp:  [97.5 °F (36.4 °C)-98.4 °F (36.9 °C)] 98.4 °F (36.9 °C)  Pulse:  [] 105  Resp:  [22-42] 37  SpO2:  [88 %-96 %] 96 %  BP: (103-138)/(57-88) 110/75     Weight: 84 kg (185 lb 3 oz)  Body mass index is 29 kg/m².     SpO2: 96 %         Intake/Output Summary (Last 24 hours) at 2/26/2023 1610  Last data filed at 2/26/2023 1200  Gross per 24 hour   Intake --   Output 476 ml   Net -476 ml       Lines/Drains/Airways       Peripheral Intravenous Line  Duration                  Peripheral IV - Single Lumen 02/23/23 1024 20 G Anterior;Distal;Left Upper Arm 3 days         Peripheral IV - Single Lumen 02/23/23 1238 18 G Distal;Left;Posterior Forearm 3 days                    Physical Exam  Vitals and nursing note reviewed.   Constitutional:       General: He is not in acute distress.     Appearance: He is well-developed. He is not diaphoretic.   HENT:      Head: Normocephalic and atraumatic.   Eyes:      General:         Right eye: No discharge.         Left eye: No discharge.      Pupils: Pupils are equal, round, and reactive to light.   Neck:      Thyroid: No thyromegaly.      Vascular: No JVD.   Cardiovascular:      Rate and Rhythm: Normal rate. Rhythm irregular.      Pulses: Normal pulses and intact distal pulses.      Heart sounds: Normal heart sounds. No murmur heard.    No friction rub. No gallop.   Pulmonary:      Effort: Pulmonary effort is normal. No respiratory distress.      Breath sounds: Normal breath sounds. No wheezing or rales.   Chest:      Chest wall: No tenderness.   Abdominal:      General: Bowel sounds are normal. There is no distension.      Palpations: Abdomen is soft.      Tenderness: There is no abdominal tenderness.   Musculoskeletal:         General: Normal  range of motion.      Cervical back: Neck supple.      Right lower leg: No edema.      Left lower leg: No edema.   Skin:     General: Skin is warm and dry.      Findings: No erythema or rash.   Neurological:      Mental Status: He is alert and oriented to person, place, and time.      Cranial Nerves: No cranial nerve deficit.   Psychiatric:         Behavior: Behavior normal.         Judgment: Judgment normal.       Significant Labs:   Recent Lab Results  (Last 5 results in the past 24 hours)        02/26/23  1216   02/26/23  0638   02/26/23  0314   02/26/23  0030   02/25/23  1741        Albumin     2.9           Alkaline Phosphatase     49           ALT     19           Anion Gap     11           AST     44           Baso #     0.01           Basophil %     0.1           BILIRUBIN TOTAL     0.9  Comment: For infants and newborns, interpretation of results should be based  on gestational age, weight and in agreement with clinical  observations.    Premature Infant recommended reference ranges:  Up to 24 hours.............<8.0 mg/dL  Up to 48 hours............<12.0 mg/dL  3-5 days..................<15.0 mg/dL  6-29 days.................<15.0 mg/dL             BUN     45           Calcium     9.4           Chloride     106           CO2     27           Creatinine     1.5           Differential Method     Automated           eGFR     47           Eos #     0.2           Eosinophil %     3.6           Glucose     98           Gran # (ANC)     4.5           Gran %     66.7           Hematocrit     30.7           Hemoglobin     10.0           Immature Grans (Abs)     0.02  Comment: Mild elevation in immature granulocytes is non specific and   can be seen in a variety of conditions including stress response,   acute inflammation, trauma and pregnancy. Correlation with other   laboratory and clinical findings is essential.             Immature Granulocytes     0.3           Lymph #     1.5           Lymph %     22.1            Magnesium     1.9           MCH     29.4           MCHC     32.6           MCV     90           Mono #     0.5           Mono %     7.2           MPV     9.9           nRBC     0           Platelets     223           POCT Glucose 145   118     98   128       Potassium     3.3           PROTEIN TOTAL     6.5           RBC     3.40           RDW     16.7           Sodium     144           WBC     6.69                                  Significant Imaging: X-Ray: CXR: X-Ray Chest 1 View (CXR): No results found for this visit on 02/23/23.    Assessment and Plan:     Brief HPI: a 81 yo male with afib chronic on coumadin presents with gib bleed severe anemia requiring transfusion will switch to eliquis low dose and will follow antixa levels in 10 days no further recommendations. Discussed with hospital medicine team    * Gastrointestinal hemorrhage with melena  secondaruy to gi bleed hold coumadin transfuse vit k and proceed with endoscopy when stable.     2/25/2023   Resolved   Stable now. Post endoscopic intervention willr esume anticoagulation per gi recommnedation    Supratherapeutic INR  resolved    Chronic diastolic heart failure  Appears well compensated due tyo hypovolemic shock from bleeding    LAILA (iron deficiency anemia)  See melena    2/25/2023  improved    Hypertension associated with diabetes  Hold meds due to shock    Hyperlipidemia associated with type 2 diabetes mellitus  continue home meds.    Coronary atherosclerosis  Asymptomatic will check troponin    2/24/23  Cont statin    Atrial fibrillation  Chronic on coumadin previous gi bleed presents with gi bleed. Hypovolemic shock    2/24/23  Rate currently controlled  Cont hold Coumadin    2/25/2023  Resume anticoagulation with close monitoring    2/26/2023   Discussed with wife advantages of noac vs coumadin and will start eliquis 2.5 mg po bid and obtain antixa level in 10 days.         VTE Risk Mitigation (From admission, onward)         Ordered      apixaban tablet 2.5 mg  2 times daily         02/26/23 1413     Reason for No Pharmacological VTE Prophylaxis  Once        Question:  Reasons:  Answer:  Active Bleeding    02/23/23 1135     IP VTE HIGH RISK PATIENT  Once         02/23/23 1135     Place sequential compression device  Until discontinued         02/23/23 1135                Erik Alba MD  Cardiology  [facility] ([facility])

## 2023-02-26 NOTE — SUBJECTIVE & OBJECTIVE
Past Medical History:   Diagnosis Date    Abnormal CXR     SHAYLA (acute kidney injury) 2/23/2023    Angina pectoris 8/28/2017    Angiodysplasia of small intestine     Atrial fibrillation     Chronic diastolic heart failure 11/8/2022    Chronic upper GI bleeding     due to angiodysplasia in proximal small intestine    Coronary atherosclerosis of unspecified type of vessel, native or graft     COVID 6/26/2022    Depression     Diabetes mellitus without complication     Emphysema of lung     History of prostate cancer 2007    prostatectomy    Hyperlipidemia associated with type 2 diabetes mellitus     Hypertension associated with diabetes     LAILA (iron deficiency anemia) 09/20/2021    due to angiodysplasia in small intestine    Intention tremor     Major depressive disorder, recurrent, moderate 4/12/2021    Moderate episode of recurrent major depressive disorder 4/1/2019    Morbid (severe) obesity due to excess calories 4/1/2019    SHORTY (obstructive sleep apnea)     Prostate cancer     Trouble in sleeping     Urinary incontinence        Past Surgical History:   Procedure Laterality Date    ABDOMINAL HERNIA REPAIR      APPENDECTOMY      bladder sx      CARDIAC CATHETERIZATION      CATARACT EXTRACTION W/  INTRAOCULAR LENS IMPLANT  Restor OU    COLONOSCOPY N/A 5/16/2017    Procedure: COLONOSCOPY;  Surgeon: Alfredo Naidu MD;  Location: [facility];  Service: Endoscopy;  Laterality: N/A;    ESOPHAGOGASTRODUODENOSCOPY N/A 10/29/2021    Procedure: SBE (Push Enteroscopy) with Dr. Wayne;  Surgeon: Arlette Wayne MD;  Location: [facility];  Service: Endoscopy;  Laterality: N/A;  Plavix held indefinitely as of 10-20-21. Must also hold Coumadin 5 days prior. Ok to continue to use ASA.    ESOPHAGOGASTRODUODENOSCOPY N/A 2/23/2023    Procedure: EGD (ESOPHAGOGASTRODUODENOSCOPY);  Surgeon: Opal Wright MD;  Location: [facility];  Service: Endoscopy;  Laterality: N/A;    inguinal hernia      INTRALUMINAL GASTROINTESTINAL  TRACT IMAGING VIA CAPSULE N/A 10/4/2021    Procedure: IMAGING PROCEDURE, GI TRACT, INTRALUMINAL, VIA CAPSULE;  Surgeon: Joaquin Stack RN;  Location: [facility];  Service: Endoscopy;  Laterality: N/A;    LEFT HEART CATHETERIZATION Left 7/20/2021    Procedure: CATHETERIZATION, HEART, LEFT;  Surgeon: Darryl Griffith MD;  Location: [facility] CATH LAB;  Service: Cardiology;  Laterality: Left;    lung sx      PERCUTANEOUS TRANSLUMINAL BALLOON ANGIOPLASTY OF CORONARY ARTERY  7/20/2021    Procedure: Angioplasty-coronary;  Surgeon: Darryl Griffith MD;  Location: [facility] CATH LAB;  Service: Cardiology;;    PROSTATE SURGERY         Review of patient's allergies indicates:  No Known Allergies    No current facility-administered medications on file prior to encounter.     Current Outpatient Medications on File Prior to Encounter   Medication Sig    allopurinoL (ZYLOPRIM) 100 MG tablet TAKE ONE TABLET BY MOUTH DAILY    amLODIPine (NORVASC) 5 MG tablet TAKE 1 TABLET (5 MG TOTAL) BY MOUTH ONCE DAILY.    aspirin (ECOTRIN) 81 MG EC tablet Take 81 mg by mouth once daily.    ferrous sulfate 324 mg (65 mg iron) TbEC TAKE ONE TABLET BY MOUTH TWICE DAILY    furosemide (LASIX) 40 MG tablet Take 2 tablets (80 mg total) by mouth 2 (two) times daily.    ibuprofen (ADVIL,MOTRIN) 200 MG tablet Take 200 mg by mouth every evening.    isosorbide mononitrate (IMDUR) 30 MG 24 hr tablet Take 1 tablet (30 mg total) by mouth once daily.    losartan (COZAAR) 50 MG tablet TAKE ONE TABLET BY MOUTH TWICE DAILY    melatonin 3 mg Tab Take 1 tablet by mouth nightly.    metFORMIN (GLUCOPHAGE) 500 MG tablet TAKE ONE TABLET BY MOUTH TWICE A DAY WITH MEALS    metOLazone (ZAROXOLYN) 5 MG tablet Take 1 tablet (5 mg total) by mouth once daily.    nystatin-triamcinolone (MYCOLOG II) cream Apply topically 4 (four) times daily.    potassium chloride SA (K-DUR,KLOR-CON M) 10 MEQ tablet Take 1 tablet (10 mEq total) by mouth once daily.    torsemide (DEMADEX) 10 MG Tab Take  10 mg by mouth once daily.    warfarin (COUMADIN) 5 MG tablet TAKE 1 TABLET (5 MG TOTAL) BY MOUTH DAILY. OR AS DIRECTED BY COUMADIN CLINIC    atorvastatin (LIPITOR) 40 MG tablet Take 1 tablet (40 mg total) by mouth once daily. for 90 doses     Family History       Problem Relation (Age of Onset)    Cancer Father    Diabetes Sister    Heart disease Mother, Sister    Macular degeneration Sister          Tobacco Use    Smoking status: Former     Packs/day: 0.50     Years: 40.00     Pack years: 20.00     Types: Cigarettes     Start date: 1962     Quit date:      Years since quittin.1    Smokeless tobacco: Never   Substance and Sexual Activity    Alcohol use: Yes     Alcohol/week: 7.0 standard drinks     Types: 7 Shots of liquor per week    Drug use: No    Sexual activity: Not Currently     Review of Systems   Constitutional:  Negative for chills and fever.   Respiratory:  Negative for cough, shortness of breath and wheezing.    Cardiovascular:  Negative for chest pain and palpitations.   Gastrointestinal:  Negative for abdominal pain, constipation, diarrhea, nausea and vomiting.   Neurological:  Negative for dizziness and headaches.   All other systems reviewed and are negative.  Objective:     Vital Signs (Most Recent):  Temp: 97.6 °F (36.4 °C) (23 1501)  Pulse: 86 (23 190)  Resp: (!) 22 (23 190)  BP: 115/73 (23)  SpO2: (!) 88 % (23)   Vital Signs (24h Range):  Temp:  [97.4 °F (36.3 °C)-98.2 °F (36.8 °C)] 97.6 °F (36.4 °C)  Pulse:  [] 86  Resp:  [20-52] 22  SpO2:  [88 %-96 %] 88 %  BP: ()/(58-85) 115/73     Weight: 84 kg (185 lb 3 oz)  Body mass index is 29 kg/m².    Physical Exam  Vitals and nursing note reviewed. Exam conducted with a chaperone present.   Constitutional:       General: He is not in acute distress.     Comments: Drowsy, however easily awakens.  Frail-appearing   HENT:      Head: Normocephalic and atraumatic.      Right Ear: External  ear normal.      Left Ear: External ear normal.      Nose: Nose normal.      Mouth/Throat:      Mouth: Mucous membranes are moist.   Eyes:      Extraocular Movements: Extraocular movements intact.      Pupils: Pupils are equal, round, and reactive to light.   Cardiovascular:      Rate and Rhythm: Normal rate and regular rhythm.   Pulmonary:      Effort: Pulmonary effort is normal. No respiratory distress.      Breath sounds: No wheezing.   Abdominal:      General: Bowel sounds are normal.      Palpations: Abdomen is soft.      Tenderness: There is no abdominal tenderness. There is no guarding or rebound.   Musculoskeletal:      Cervical back: Neck supple.      Right lower leg: No edema.      Left lower leg: No edema.   Skin:     General: Skin is warm and dry.   Neurological:      Mental Status: He is oriented to person, place, and time. Mental status is at baseline.   Psychiatric:         Behavior: Behavior is cooperative.       Significant Labs: All pertinent labs within the past 24 hours have been reviewed.  CBC:   Recent Labs   Lab 02/24/23  0016 02/24/23  0503 02/25/23  0520   WBC  --  6.76 9.00   HGB 9.6* 9.8* 10.8*   HCT 28.8* 29.4* 32.7*   PLT  --  198 228     CMP:   Recent Labs   Lab 02/24/23  0503 02/25/23  0520    142   K 4.2 3.5    104   CO2 17* 25   * 130*   BUN 76* 46*   CREATININE 2.1* 1.6*   CALCIUM 9.1 9.4   PROT 6.6 6.7   ALBUMIN 3.4* 3.2*   BILITOT 1.2* 1.3*   ALKPHOS 58 55   AST 33 45*   ALT 20 18   ANIONGAP 14 13       Significant Imaging: I have reviewed all pertinent imaging results/findings within the past 24 hours.

## 2023-02-26 NOTE — SUBJECTIVE & OBJECTIVE
Interval History:  No acute events overnight per RN.  Patient more awake, and alert today.  Seen and examined interacting with multiple family members in his room today.  States that he feels good, denies any complaints except he continues to have poor appetite.  PT/OT evaluated patient and recommended SNF.  SLP evaluated and recommended IDDSI level 5 diet with thin liquids    Review of Systems  Objective:     Vital Signs (Most Recent):  Temp: 98.4 °F (36.9 °C) (02/26/23 1200)  Pulse: 105 (02/26/23 1200)  Resp: (!) 37 (02/26/23 1200)  BP: 110/75 (02/26/23 1200)  SpO2: 96 % (02/26/23 1200)   Vital Signs (24h Range):  Temp:  [97.5 °F (36.4 °C)-98.4 °F (36.9 °C)] 98.4 °F (36.9 °C)  Pulse:  [] 105  Resp:  [20-42] 37  SpO2:  [88 %-96 %] 96 %  BP: (103-138)/(57-88) 110/75     Weight: 84 kg (185 lb 3 oz)  Body mass index is 29 kg/m².    Intake/Output Summary (Last 24 hours) at 2/26/2023 1421  Last data filed at 2/26/2023 1200  Gross per 24 hour   Intake --   Output 551 ml   Net -551 ml      Physical Exam  Vitals and nursing note reviewed. Exam conducted with a chaperone present.   Constitutional:       General: He is not in acute distress.  HENT:      Head: Normocephalic and atraumatic.      Right Ear: External ear normal.      Left Ear: External ear normal.      Nose: Nose normal.      Mouth/Throat:      Mouth: Mucous membranes are moist.   Eyes:      Extraocular Movements: Extraocular movements intact.      Pupils: Pupils are equal, round, and reactive to light.   Cardiovascular:      Rate and Rhythm: Normal rate. Rhythm irregular.   Pulmonary:      Effort: Pulmonary effort is normal. No respiratory distress.      Breath sounds: No wheezing.   Abdominal:      General: Bowel sounds are normal.      Palpations: Abdomen is soft.      Tenderness: There is no abdominal tenderness. There is no guarding or rebound.   Musculoskeletal:      Cervical back: Neck supple.   Skin:     General: Skin is warm and dry.    Neurological:      Mental Status: He is alert. Mental status is at baseline.       Significant Labs: All pertinent labs within the past 24 hours have been reviewed.  CBC:   Recent Labs   Lab 02/25/23  0520 02/26/23 0314   WBC 9.00 6.69   HGB 10.8* 10.0*   HCT 32.7* 30.7*    223     CMP:   Recent Labs   Lab 02/25/23 0520 02/26/23 0314    144   K 3.5 3.3*    106   CO2 25 27   * 98   BUN 46* 45*   CREATININE 1.6* 1.5*   CALCIUM 9.4 9.4   PROT 6.7 6.5   ALBUMIN 3.2* 2.9*   BILITOT 1.3* 0.9   ALKPHOS 55 49*   AST 45* 44*   ALT 18 19   ANIONGAP 13 11       Significant Imaging: I have reviewed all pertinent imaging results/findings within the past 24 hours.

## 2023-02-26 NOTE — ASSESSMENT & PLAN NOTE
Secondary to angiodysplasia in small intestine   S/p 2 units PRBC transfusion on 02/23  Continue iron supplement

## 2023-02-26 NOTE — ASSESSMENT & PLAN NOTE
INR elevated at 8.2 on 2/22 day prior to admission  INR >10 on admission  Poor oral intake prior to admission likely contributing  S/p PCC and vitamin K  Cleared by GI to resume Coumadin although this may not be the best anticoagulation option for patient

## 2023-02-26 NOTE — ASSESSMENT & PLAN NOTE
Patient with acute kidney injury likely due to IVVD/dehydration secondary to hemorrhage SHAYLA is currently improving. Labs reviewed- Renal function/electrolytes with Estimated Creatinine Clearance: 38.2 mL/min (A) (based on SCr of 1.6 mg/dL (H)). according to latest data. Monitor urine output and serial BMP and adjust therapy as needed. Avoid nephrotoxins and renally dose meds for GFR listed above.

## 2023-02-26 NOTE — PROGRESS NOTES
[facility] - Intensive Care ([facility])  [facility]  Progress Note    Patient Name: Anthony Biggs Jr.  MRN: [id number]  Patient Class: IP- Inpatient   Admission Date: 2/23/2023  Length of Stay: 3 days  Attending Physician: Jennifer Tyler DO  Primary Care Provider: Giuliano Moran MD        Subjective:     Principal Problem:Gastrointestinal hemorrhage with melena        HPI:  Anthony Biggs Jr. is a 82 y.o. male with Angiodysplasia of small intestine, Atrial fibrillation on Coumadin, Chronic diastolic heart failure, Chronic upper GI bleeding, CAD, Depression, Diabetes mellitus, HTN, HLD, Emphysema of lung, who initially presented to ED on 2/23/2023 with complaints of weakness and altered mental status.  Spouse reported that patient had decreased appetite and progressive weakness prior to ED presentation.  Additionally the previous day patient was found to have elevated INR of 8.2 and had been instructed to hold Coumadin.  In the ED patient was found to be hypotensive with hemoglobin of 5.8, INR > 10, melena on HERMILO.  Patient was initiated on Kcentra, and transfused 2 units of PRBC.  Given persistent hypotension critical care medicine was consulted, patient admitted to ICU.  GI consulted, patient underwent urgent bedside EGD which showed nonbleeding duodenal ulcer and AVMs        Overview/Hospital Course:  ICU course complicated by confusion and agitation requiring initiation of Precedex, which was able to be weaned off with patient transitioned to seroquel.  Spouse reports that patient has been declining over the past few months with increased confusion and weakness.  Patient stable for transfer of ICU to 02/25/2023.  Hospital medicine consulted to continue care.    Given hemorrhagic shock secondary to supratherapeutic INR, Coumadin is not the best anticoagulation choice for patient.  This was discussed with Cardiology and patient's family.  They are agreeable to starting patient on Eliquis instead.    PT/OT evaluation  ordered.  Recommended for SNF placement, Social work has been consulted    Due to concern for dysphagia, SLP was consulted.  Patient recommended for Minced & Moist Diet - IDDSI Level 5, Thin liquids - IDDSI Level 0 (no straws) and aspiration protocol      Interval History:  No acute events overnight per RN.  Patient more awake, and alert today.  Seen and examined interacting with multiple family members in his room today.  States that he feels good, denies any complaints except he continues to have poor appetite.  PT/OT evaluated patient and recommended SNF.  SLP evaluated and recommended IDDSI level 5 diet with thin liquids    Review of Systems  Objective:     Vital Signs (Most Recent):  Temp: 98.4 °F (36.9 °C) (02/26/23 1200)  Pulse: 105 (02/26/23 1200)  Resp: (!) 37 (02/26/23 1200)  BP: 110/75 (02/26/23 1200)  SpO2: 96 % (02/26/23 1200)   Vital Signs (24h Range):  Temp:  [97.5 °F (36.4 °C)-98.4 °F (36.9 °C)] 98.4 °F (36.9 °C)  Pulse:  [] 105  Resp:  [20-42] 37  SpO2:  [88 %-96 %] 96 %  BP: (103-138)/(57-88) 110/75     Weight: 84 kg (185 lb 3 oz)  Body mass index is 29 kg/m².    Intake/Output Summary (Last 24 hours) at 2/26/2023 1421  Last data filed at 2/26/2023 1200  Gross per 24 hour   Intake --   Output 551 ml   Net -551 ml      Physical Exam  Vitals and nursing note reviewed. Exam conducted with a chaperone present.   Constitutional:       General: He is not in acute distress.  HENT:      Head: Normocephalic and atraumatic.      Right Ear: External ear normal.      Left Ear: External ear normal.      Nose: Nose normal.      Mouth/Throat:      Mouth: Mucous membranes are moist.   Eyes:      Extraocular Movements: Extraocular movements intact.      Pupils: Pupils are equal, round, and reactive to light.   Cardiovascular:      Rate and Rhythm: Normal rate. Rhythm irregular.   Pulmonary:      Effort: Pulmonary effort is normal. No respiratory distress.      Breath sounds: No wheezing.   Abdominal:       General: Bowel sounds are normal.      Palpations: Abdomen is soft.      Tenderness: There is no abdominal tenderness. There is no guarding or rebound.   Musculoskeletal:      Cervical back: Neck supple.   Skin:     General: Skin is warm and dry.   Neurological:      Mental Status: He is alert. Mental status is at baseline.       Significant Labs: All pertinent labs within the past 24 hours have been reviewed.  CBC:   Recent Labs   Lab 02/25/23  0520 02/26/23  0314   WBC 9.00 6.69   HGB 10.8* 10.0*   HCT 32.7* 30.7*    223     CMP:   Recent Labs   Lab 02/25/23  0520 02/26/23  0314    144   K 3.5 3.3*    106   CO2 25 27   * 98   BUN 46* 45*   CREATININE 1.6* 1.5*   CALCIUM 9.4 9.4   PROT 6.7 6.5   ALBUMIN 3.2* 2.9*   BILITOT 1.3* 0.9   ALKPHOS 55 49*   AST 45* 44*   ALT 18 19   ANIONGAP 13 11       Significant Imaging: I have reviewed all pertinent imaging results/findings within the past 24 hours.      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  Social work has been consulted  SLP recommends  Minced & Moist Diet - IDDSI Level 5, Thin liquids - IDDSI Level 0 (no straws), aspiration precaution      Encephalopathy, metabolic  Confusion noted on admission suspected to be related to hypotension and severe anemia  Patient developed increased confusion, agitation combativeness on the night of 2/23 this is stating initiation of Precedex in restrains.  Able to be weaned off Precedex on 02/24  Per family patient has been having symptoms of suspected dementia (irritability, intermittent confusion, repeating things, memory loss) for approximately 2 years  Plan ambulatory referral to Neurology on discharge for further evaluation  Delirium precautions    SHAYLA (acute kidney injury)  Patient with acute kidney injury likely due to IVVD/dehydration secondary to hemorrhage SHAYLA is currently improving. Labs reviewed- Renal function/electrolytes with Estimated Creatinine Clearance: 40.7 mL/min (A) (based on SCr of 1.5 mg/dL (H)). according to latest data. Monitor urine output and serial BMP and adjust therapy as needed. Avoid nephrotoxins and renally dose meds for GFR listed above.     Supratherapeutic INR  INR elevated at 8.2 on 2/22 day prior to admission  INR >10 on admission  Poor oral intake prior to admission likely contributing  S/p PCC and vitamin K  Cleared by GI to resume anticoagulation.    However Coumadin is not the best choice for patient for anticoagulation, Eliquis to be started instead    Hemorrhagic shock  Secondary to gastrointestinal hemorrhage   Resolved with IV fluid, PRBC      Chronic diastolic heart failure  Patient is identified as having Diastolic (HFpEF) heart failure that is Chronic. CHF is currently controlled. Latest ECHO performed and demonstrates- Results for orders placed during the hospital encounter of 11/11/22    Echo    Interpretation Summary  · The left ventricle is normal in size with concentric remodeling and normal systolic function.  · Severe left atrial enlargement.  · The estimated  ejection fraction is 60%.  · Atrial fibrillation observed.  · Normal right ventricular size with normal right ventricular systolic function.  · Moderate right atrial enlargement.  · Moderate aortic regurgitation.  · Mild-to-moderate mitral regurgitation.  · Mild tricuspid regurgitation.  · Normal central venous pressure (3 mmHg).  · The estimated PA systolic pressure is 34 mmHg.  · The ascending aorta is moderately dilated.  .  Hold diuretics and monitor clinical status closely. Monitor on telemetry. Patient is off CHF pathway.  Monitor strict Is&Os and daily weights.   Last BNP reviewed- and noted below   Recent Labs   Lab 02/23/23  1024   *   .          LAILA (iron deficiency anemia)  Secondary to angiodysplasia in small intestine   S/p 2 units PRBC transfusion on 02/23  Continue iron supplement  Monitor H/H      Angiodysplasia of small intestine  See plan for gastrointestinal hemorrhage      Hypertension associated with diabetes  Normotensive  BP meds held due to hemorrhagic shock  Resume home meds as BP tolerates    Hyperlipidemia associated with type 2 diabetes mellitus  Continue Statin    Coronary atherosclerosis  ASA held due to recent bleed, will resume  Continue Imdur      Atrial fibrillation  Patient with Permanent atrial fibrillation which is controlled currently. Patient is currently in atrial fibrillation.[id number] Score: 4.  Anticoagulation indicated. Anticoagulation done with Apixaban.    After discussion with Cardiology, family agreeable to discontinuing Coumadin and switching patient anticoagulation to Eliquis, will start this evening          VTE Risk Mitigation (From admission, onward)         Ordered     apixaban tablet 2.5 mg  2 times daily         02/26/23 1413     Reason for No Pharmacological VTE Prophylaxis  Once        Question:  Reasons:  Answer:  Active Bleeding    02/23/23 1135     IP VTE HIGH RISK PATIENT  Once         02/23/23 1135     Place sequential compression device  Until  discontinued         02/23/23 1135                Discharge Planning   RACHNA:      Code Status: Full Code   Is the patient medically ready for discharge?:     Reason for patient still in hospital (select all that apply): Patient trending condition and Pending disposition  Discharge Plan A: [facility]            Jennifer Tyler DO  Department of Hospital Medicine   [facility] - Intensive Care ([facility])

## 2023-02-26 NOTE — ASSESSMENT & PLAN NOTE
Patient with Permanent atrial fibrillation which is controlled currently. Patient is currently in atrial fibrillation.[id number] Score: 4.  Anticoagulation indicated. Anticoagulation done with Apixaban.    After discussion with Cardiology, family agreeable to discontinuing Coumadin and switching patient anticoagulation to Eliquis, will start this evening

## 2023-02-26 NOTE — ASSESSMENT & PLAN NOTE
Confusion noted on admission suspected to be related to hypotension and severe anemia  Patient developed increased confusion, agitation combativeness on the night of 2/23 this is stating initiation of Precedex in restrains.  Able to be weaned off Precedex on 02/24  Per family patient has been having symptoms of suspected dementia (irritability, intermittent confusion, repeating things, memory loss) for approximately 2 years  Plan ambulatory referral to Neurology on discharge for further evaluation  Delirium precautions

## 2023-02-26 NOTE — ASSESSMENT & PLAN NOTE
Family reports progressive decline for the past few months, patient with decreasing mobility and physical activity  PT/OT simran

## 2023-02-26 NOTE — NURSING
Pt AAOx 2. GCS 11. Afib on monitor. Room air. Arshad. Accuchecks Q6; no coverage. Family at bedside throughout shift.    Pt resting comfortably in bed with side rails up x3; locked and lowered with alarm set. Call light in place. Advised pt to call out if anything is needed. Pt verbalized understanding.

## 2023-02-26 NOTE — ASSESSMENT & PLAN NOTE
Patient with acute kidney injury likely due to IVVD/dehydration secondary to hemorrhage SHAYLA is currently improving. Labs reviewed- Renal function/electrolytes with Estimated Creatinine Clearance: 40.7 mL/min (A) (based on SCr of 1.5 mg/dL (H)). according to latest data. Monitor urine output and serial BMP and adjust therapy as needed. Avoid nephrotoxins and renally dose meds for GFR listed above.

## 2023-02-26 NOTE — PT/OT/SLP EVAL
Speech Language Pathology Evaluation  Bedside Swallow    Patient Name:  Anthony Biggs Jr.   MRN:  [id number]  Admitting Diagnosis: Gastrointestinal hemorrhage with melena    Recommendations:                 General Recommendations:  Dysphagia therapy  Diet recommendations:  Minced & Moist Diet - IDDSI Level 5, Thin liquids - IDDSI Level 0 (no straws)   Aspiration Precautions: 1 bite/sip at a time, Avoid talking while eating, Meds whole buried in puree, Monitor for s/s of aspiration, No straws, Remain upright 30 minutes post meal, Small bites/sips, and Standard aspiration precautions   General Precautions: Standard, aspiration  Communication strategies:  provide increased time to answer    History:     Past Medical History:   Diagnosis Date    Abnormal CXR     SHAYLA (acute kidney injury) 2/23/2023    Angina pectoris 8/28/2017    Angiodysplasia of small intestine     Atrial fibrillation     Chronic diastolic heart failure 11/8/2022    Chronic upper GI bleeding     due to angiodysplasia in proximal small intestine    Coronary atherosclerosis of unspecified type of vessel, native or graft     COVID 6/26/2022    Depression     Diabetes mellitus without complication     Emphysema of lung     History of prostate cancer 2007    prostatectomy    Hyperlipidemia associated with type 2 diabetes mellitus     Hypertension associated with diabetes     LAILA (iron deficiency anemia) 09/20/2021    due to angiodysplasia in small intestine    Intention tremor     Major depressive disorder, recurrent, moderate 4/12/2021    Moderate episode of recurrent major depressive disorder 4/1/2019    Morbid (severe) obesity due to excess calories 4/1/2019    SHORTY (obstructive sleep apnea)     Prostate cancer     Trouble in sleeping     Urinary incontinence        Past Surgical History:   Procedure Laterality Date    ABDOMINAL HERNIA REPAIR      APPENDECTOMY      bladder sx      CARDIAC CATHETERIZATION      CATARACT EXTRACTION W/  INTRAOCULAR LENS IMPLANT   Restor OU    COLONOSCOPY N/A 5/16/2017    Procedure: COLONOSCOPY;  Surgeon: Alfredo Naidu MD;  Location: [facility];  Service: Endoscopy;  Laterality: N/A;    ESOPHAGOGASTRODUODENOSCOPY N/A 10/29/2021    Procedure: SBE (Push Enteroscopy) with Dr. Wayne;  Surgeon: Arlette Wayne MD;  Location: [facility];  Service: Endoscopy;  Laterality: N/A;  Plavix held indefinitely as of 10-20-21. Must also hold Coumadin 5 days prior. Ok to continue to use ASA.    ESOPHAGOGASTRODUODENOSCOPY N/A 2/23/2023    Procedure: EGD (ESOPHAGOGASTRODUODENOSCOPY);  Surgeon: Opal Wright MD;  Location: [facility];  Service: Endoscopy;  Laterality: N/A;    inguinal hernia      INTRALUMINAL GASTROINTESTINAL TRACT IMAGING VIA CAPSULE N/A 10/4/2021    Procedure: IMAGING PROCEDURE, GI TRACT, INTRALUMINAL, VIA CAPSULE;  Surgeon: Joaquin Stack RN;  Location: [facility];  Service: Endoscopy;  Laterality: N/A;    LEFT HEART CATHETERIZATION Left 7/20/2021    Procedure: CATHETERIZATION, HEART, LEFT;  Surgeon: Darryl Griffith MD;  Location: [facility] CATH LAB;  Service: Cardiology;  Laterality: Left;    lung sx      PERCUTANEOUS TRANSLUMINAL BALLOON ANGIOPLASTY OF CORONARY ARTERY  7/20/2021    Procedure: Angioplasty-coronary;  Surgeon: Darryl Griffith MD;  Location: [facility] CATH LAB;  Service: Cardiology;;    PROSTATE SURGERY         Subjective     Patient alert, cooperative, and seen at bedside with family. He denied any difficulty eating or drinking.  Patient goals: go home     Pain/Comfort:  Pain Rating 1: 0/10  Pain Rating Post-Intervention 1: 0/10    Respiratory Status: Room air    Objective:     Oral Musculature Evaluation  Oral Musculature: general weakness  Dentition: present and adequate  Mucosal Quality: coated tongue    Bedside Swallow Eval:   Consistencies Assessed:  Thin liquids via spoon, cup  Puree smooth  Soft solids betty cracker      Oral Phase:   Excess chewing  Oral residue  Slow oral transit time    Pharyngeal Phase:    decreased hyolaryngeal excursion to palpation  throat clearing  Delayed pharyngeal swallow    Compensatory Strategies  Education on dysphagia strategies and aspiration precautions    Treatment: Bedside swallowing evaluation complete. Patient with generalized weakness of oral motor musculature upon OME. Pleasantly confused. Decrease hyolaryngeal excursion and delayed pharyngeal swallow noted upon manual palpitation of volitional swallow. Volitional cough present. Observed po intake of thin liquids via spoon, thin liquids via cup, smooth puree, and soft solids. Patient with immediate throat clearing following trials 1/5 of thin liquids via spoon. He exhibited extended oral prep, impaired a-p transit, and oral residue requiring cues for liquid wash to clear. Belching noted after intake. Patient is at an increase risk of aspiration due to confusion and oral motor weakness. Recommend Minced & Moist Diet - IDDSI Level 5 with no straws and strict aspiration.    Assessment:     Anthony Biggs JrLester is a 82 y.o. male with an SLP diagnosis of Dysphagia.  He presents with oropharyngeal dysphagia characterized by extended oral prep, impaired a-p transit, oral residue, delayed pharyngeal swallow, and decrease hyolaryngeal elevation. Patient is at an increase risk of aspiration due to confusion and oral motor weakness. Recommend Minced & Moist Diet - IDDSI Level 5 with no straws and strict aspiration.    Goals:   Multidisciplinary Problems       SLP Goals          Problem: SLP    Goal Priority Disciplines Outcome   SLP Goal     SLP    Description: TPW tolerate current diet without any overt s/s of aspiration  TPW tolerate trials for diet upgrade without any overt s/s of aspiration                       Plan:     Patient to be seen:  2 x/week   Plan of Care expires:     Plan of Care reviewed with:  patient, spouse, family   SLP Follow-Up:  Yes       Discharge recommendations:  home with home health       Time Tracking:     SLP  Treatment Date:   03/05/23  Speech Start Time:  1108  Speech Stop Time:  1125     Speech Total Time (min):  17 min    Billable Minutes: Eval Swallow and Oral Function 15    02/26/2023

## 2023-02-26 NOTE — ASSESSMENT & PLAN NOTE
Chronic on coumadin previous gi bleed presents with gi bleed. Hypovolemic shock    2/24/23  Rate currently controlled  Cont hold Coumadin    2/25/2023  Resume anticoagulation with close monitoring    2/26/2023   Discussed with wife advantages of noac vs coumadin and will start eliquis 2.5 mg po bid and obtain antixa level in 10 days.

## 2023-02-26 NOTE — PT/OT/SLP EVAL
Physical Therapy Evaluation    Patient Name:  Anthony Biggs Jr.   MRN:  [id number]    Recommendations:     Discharge Recommendations: nursing facility, skilled   Discharge Equipment Recommendations: walker, rolling, bedside commode, shower chair   Barriers to discharge: Decreased caregiver support    Assessment:     Anthony Biggs Jr. is a 82 y.o. male admitted with a medical diagnosis of Gastrointestinal hemorrhage with melena.  He presents with the following impairments/functional limitations: impaired endurance, impaired functional mobility, gait instability, decreased lower extremity function, impaired cardiopulmonary response to activity Will benefit from acute care PT.    Rehab Prognosis: Good; patient would benefit from acute skilled PT services to address these deficits and reach maximum level of function.    Recent Surgery: Procedure(s) (LRB):  EGD (ESOPHAGOGASTRODUODENOSCOPY) (N/A) 3 Days Post-Op    Plan:     During this hospitalization, patient to be seen 3 x/week to address the identified rehab impairments via gait training, therapeutic activities, therapeutic exercises and progress toward the following goals:    Plan of Care Expires:  03/12/23    Subjective     Chief Complaint: SOB  Patient/Family Comments/goals: improve mobility  Pain/Comfort:  Pain Rating Post-Intervention 1: 0/10    Patients cultural, spiritual, Moravian conflicts given the current situation:      Living Environment:  Pt lives with wife in one story house. Was Ind with mobility and self care. Does not drive  Prior to admission, patients level of function was IND.  Equipment used at home: none.  DME owned (not currently used): none.  Upon discharge, patient will have assistance from facility/wife.    Objective:     Communicated with Darrel and nurse Cardona  prior to session.  Patient found supine with    upon PT entry to room.    General Precautions: Standard,    Orthopedic Precautions:    Braces:    Respiratory Status: Room  air    Exams:  RLE ROM: WFL  RLE Strength: Deficits: 4/5  LLE ROM: WFL  LLE Strength: Deficits: 4/5    Functional Mobility:  Bed Mobility:     Supine to Sit: maximal assistance  Transfers:     Sit to Stand:  maximal assistance with rolling walker  Gait: NT      AM-PAC 6 CLICK MOBILITY  Total Score:9       Treatment & Education:  During bed mobility and attempt to stand 02 dropped to 82% with wheezing noted. BP dropped 106/56. Nursing notified. Did not attempt to get to chair due to SOB.     Patient left HOB elevated with all lines intact, call button in reach, nursing notified, and nurse and wife present.    GOALS:   Multidisciplinary Problems       Physical Therapy Goals          Problem: Physical Therapy    Goal Priority Disciplines Outcome Goal Variances Interventions   Physical Therapy Goal     PT, PT/OT Ongoing, Progressing     Description: PT eval complete. The following goals will be met in 14 days  1. Pt IND with bed mobility  2. Pt transfer bed to chair with RW and min A  3. Pt amb 50 ft with RW and min A                       History:     Past Medical History:   Diagnosis Date    Abnormal CXR     SHAYLA (acute kidney injury) 2/23/2023    Angina pectoris 8/28/2017    Angiodysplasia of small intestine     Atrial fibrillation     Chronic diastolic heart failure 11/8/2022    Chronic upper GI bleeding     due to angiodysplasia in proximal small intestine    Coronary atherosclerosis of unspecified type of vessel, native or graft     COVID 6/26/2022    Depression     Diabetes mellitus without complication     Emphysema of lung     History of prostate cancer 2007    prostatectomy    Hyperlipidemia associated with type 2 diabetes mellitus     Hypertension associated with diabetes     LAILA (iron deficiency anemia) 09/20/2021    due to angiodysplasia in small intestine    Intention tremor     Major depressive disorder, recurrent, moderate 4/12/2021    Moderate episode of recurrent major depressive disorder 4/1/2019     Morbid (severe) obesity due to excess calories 4/1/2019    SHORTY (obstructive sleep apnea)     Prostate cancer     Trouble in sleeping     Urinary incontinence        Past Surgical History:   Procedure Laterality Date    ABDOMINAL HERNIA REPAIR      APPENDECTOMY      bladder sx      CARDIAC CATHETERIZATION      CATARACT EXTRACTION W/  INTRAOCULAR LENS IMPLANT  Restor OU    COLONOSCOPY N/A 5/16/2017    Procedure: COLONOSCOPY;  Surgeon: Alfredo Naidu MD;  Location: [facility];  Service: Endoscopy;  Laterality: N/A;    ESOPHAGOGASTRODUODENOSCOPY N/A 10/29/2021    Procedure: SBE (Push Enteroscopy) with Dr. Wayne;  Surgeon: Arlette Wayne MD;  Location: [facility];  Service: Endoscopy;  Laterality: N/A;  Plavix held indefinitely as of 10-20-21. Must also hold Coumadin 5 days prior. Ok to continue to use ASA.    ESOPHAGOGASTRODUODENOSCOPY N/A 2/23/2023    Procedure: EGD (ESOPHAGOGASTRODUODENOSCOPY);  Surgeon: Opal Wright MD;  Location: [facility];  Service: Endoscopy;  Laterality: N/A;    inguinal hernia      INTRALUMINAL GASTROINTESTINAL TRACT IMAGING VIA CAPSULE N/A 10/4/2021    Procedure: IMAGING PROCEDURE, GI TRACT, INTRALUMINAL, VIA CAPSULE;  Surgeon: Joaquin Stack RN;  Location: [facility];  Service: Endoscopy;  Laterality: N/A;    LEFT HEART CATHETERIZATION Left 7/20/2021    Procedure: CATHETERIZATION, HEART, LEFT;  Surgeon: Darryl Griffith MD;  Location: [facility] CATH LAB;  Service: Cardiology;  Laterality: Left;    lung sx      PERCUTANEOUS TRANSLUMINAL BALLOON ANGIOPLASTY OF CORONARY ARTERY  7/20/2021    Procedure: Angioplasty-coronary;  Surgeon: Darryl Griffith MD;  Location: [facility] CATH LAB;  Service: Cardiology;;    PROSTATE SURGERY         Time Tracking:     PT Received On: 02/26/23  PT Start Time: 0830     PT Stop Time: 0855  PT Total Time (min): 25 min     Billable Minutes: Evaluation 15 and Therapeutic Activity 10      02/26/2023

## 2023-02-26 NOTE — SUBJECTIVE & OBJECTIVE
Interval History: back to normal with his mental status.    Review of Systems   Constitutional: Negative for malaise/fatigue.   Eyes:  Negative for blurred vision.   Cardiovascular:  Negative for chest pain, claudication, cyanosis, dyspnea on exertion, irregular heartbeat, leg swelling, near-syncope, orthopnea, palpitations and paroxysmal nocturnal dyspnea.   Respiratory:  Negative for cough, hemoptysis and shortness of breath.    Hematologic/Lymphatic: Negative for bleeding problem. Does not bruise/bleed easily.   Skin:  Negative for dry skin and itching.   Musculoskeletal:  Negative for falls, muscle weakness and myalgias.   Gastrointestinal:  Negative for abdominal pain, diarrhea, heartburn, hematemesis, hematochezia and melena.   Genitourinary:  Negative for flank pain and hematuria.   Neurological:  Negative for dizziness, focal weakness, headaches, light-headedness, numbness, paresthesias, seizures and weakness.   Psychiatric/Behavioral:  Negative for altered mental status and memory loss. The patient is not nervous/anxious.    Allergic/Immunologic: Negative for hives.   Objective:     Vital Signs (Most Recent):  Temp: 98.4 °F (36.9 °C) (02/26/23 1200)  Pulse: 105 (02/26/23 1200)  Resp: (!) 37 (02/26/23 1200)  BP: 110/75 (02/26/23 1200)  SpO2: 96 % (02/26/23 1200)   Vital Signs (24h Range):  Temp:  [97.5 °F (36.4 °C)-98.4 °F (36.9 °C)] 98.4 °F (36.9 °C)  Pulse:  [] 105  Resp:  [22-42] 37  SpO2:  [88 %-96 %] 96 %  BP: (103-138)/(57-88) 110/75     Weight: 84 kg (185 lb 3 oz)  Body mass index is 29 kg/m².     SpO2: 96 %         Intake/Output Summary (Last 24 hours) at 2/26/2023 1610  Last data filed at 2/26/2023 1200  Gross per 24 hour   Intake --   Output 476 ml   Net -476 ml       Lines/Drains/Airways       Peripheral Intravenous Line  Duration                  Peripheral IV - Single Lumen 02/23/23 1024 20 G Anterior;Distal;Left Upper Arm 3 days         Peripheral IV - Single Lumen 02/23/23 1238 18 G  Distal;Left;Posterior Forearm 3 days                    Physical Exam  Vitals and nursing note reviewed.   Constitutional:       General: He is not in acute distress.     Appearance: He is well-developed. He is not diaphoretic.   HENT:      Head: Normocephalic and atraumatic.   Eyes:      General:         Right eye: No discharge.         Left eye: No discharge.      Pupils: Pupils are equal, round, and reactive to light.   Neck:      Thyroid: No thyromegaly.      Vascular: No JVD.   Cardiovascular:      Rate and Rhythm: Normal rate. Rhythm irregular.      Pulses: Normal pulses and intact distal pulses.      Heart sounds: Normal heart sounds. No murmur heard.    No friction rub. No gallop.   Pulmonary:      Effort: Pulmonary effort is normal. No respiratory distress.      Breath sounds: Normal breath sounds. No wheezing or rales.   Chest:      Chest wall: No tenderness.   Abdominal:      General: Bowel sounds are normal. There is no distension.      Palpations: Abdomen is soft.      Tenderness: There is no abdominal tenderness.   Musculoskeletal:         General: Normal range of motion.      Cervical back: Neck supple.      Right lower leg: No edema.      Left lower leg: No edema.   Skin:     General: Skin is warm and dry.      Findings: No erythema or rash.   Neurological:      Mental Status: He is alert and oriented to person, place, and time.      Cranial Nerves: No cranial nerve deficit.   Psychiatric:         Behavior: Behavior normal.         Judgment: Judgment normal.       Significant Labs:   Recent Lab Results  (Last 5 results in the past 24 hours)        02/26/23  1216   02/26/23  0638   02/26/23  0314   02/26/23  0030   02/25/23  1741        Albumin     2.9           Alkaline Phosphatase     49           ALT     19           Anion Gap     11           AST     44           Baso #     0.01           Basophil %     0.1           BILIRUBIN TOTAL     0.9  Comment: For infants and newborns, interpretation of  results should be based  on gestational age, weight and in agreement with clinical  observations.    Premature Infant recommended reference ranges:  Up to 24 hours.............<8.0 mg/dL  Up to 48 hours............<12.0 mg/dL  3-5 days..................<15.0 mg/dL  6-29 days.................<15.0 mg/dL             BUN     45           Calcium     9.4           Chloride     106           CO2     27           Creatinine     1.5           Differential Method     Automated           eGFR     47           Eos #     0.2           Eosinophil %     3.6           Glucose     98           Gran # (ANC)     4.5           Gran %     66.7           Hematocrit     30.7           Hemoglobin     10.0           Immature Grans (Abs)     0.02  Comment: Mild elevation in immature granulocytes is non specific and   can be seen in a variety of conditions including stress response,   acute inflammation, trauma and pregnancy. Correlation with other   laboratory and clinical findings is essential.             Immature Granulocytes     0.3           Lymph #     1.5           Lymph %     22.1           Magnesium     1.9           MCH     29.4           MCHC     32.6           MCV     90           Mono #     0.5           Mono %     7.2           MPV     9.9           nRBC     0           Platelets     223           POCT Glucose 145   118     98   128       Potassium     3.3           PROTEIN TOTAL     6.5           RBC     3.40           RDW     16.7           Sodium     144           WBC     6.69                                  Significant Imaging: X-Ray: CXR: X-Ray Chest 1 View (CXR): No results found for this visit on 02/23/23.

## 2023-02-26 NOTE — ASSESSMENT & PLAN NOTE
Patient initially presented with supratherapeutic INR greater than 10 and melena on the RRT   Received 2.5 L IV fluid in the ED, 2 units PRBC, PCC and vitamin K  GI consult and patient underwent urgent EGD on 02/23 with findings of nonbleeding duodenal ulcer and into 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 treated with argon plasma coagulation, no further of ongoing bleeding after treatment  H/H stable  Continue Protonix and Carafate  Follow up outpatient with GI

## 2023-02-26 NOTE — CONSULTS
[facility] - Intensive Care ([facility])  [facility] Medicine  Consult Note    Patient Name: Anthony Biggs Jr.  MRN: [id number]  Admission Date: 2/23/2023  Hospital Length of Stay: 2 days  Attending Physician: Jennifer Tyler DO   Primary Care Provider: Giuliano Moran MD           Patient information was obtained from patient, spouse/SO, ER records and critical care team.     Consults  Subjective:     Principal Problem: Gastrointestinal hemorrhage with melena    Chief Complaint:   Chief Complaint   Patient presents with    Fatigue     Pt. Has been feeling weak and fatigued. Coumadin levels were high yesterday.        HPI: Anthony Biggs Jr. is a 82 y.o. male with Angiodysplasia of small intestine, Atrial fibrillation on Coumadin, Chronic diastolic heart failure, Chronic upper GI bleeding, CAD, Depression, Diabetes mellitus, HTN, HLD, Emphysema of lung, who initially presented to ED on 2/23/2023 with complaints of weakness and altered mental status.  Spouse reported that patient had decreased appetite and progressive weakness prior to ED presentation.  Additionally the previous day patient was found to have elevated INR of 8.2 and had been instructed to hold Coumadin.  In the ED patient was found to be hypotensive with hemoglobin of 5.8, INR > 10, melena on HERMILO.  Patient was initiated on Kcentra, and transfused 2 units of PRBC.  Given persistent hypotension critical care medicine was consulted, patient admitted to ICU.  GI consulted, patient underwent urgent bedside EGD which showed nonbleeding duodenal ulcer and AVMs        Past Medical History:   Diagnosis Date    Abnormal CXR     SHAYLA (acute kidney injury) 2/23/2023    Angina pectoris 8/28/2017    Angiodysplasia of small intestine     Atrial fibrillation     Chronic diastolic heart failure 11/8/2022    Chronic upper GI bleeding     due to angiodysplasia in proximal small intestine    Coronary atherosclerosis of unspecified type of vessel, native or graft     COVID  6/26/2022    Depression     Diabetes mellitus without complication     Emphysema of lung     History of prostate cancer 2007    prostatectomy    Hyperlipidemia associated with type 2 diabetes mellitus     Hypertension associated with diabetes     LAILA (iron deficiency anemia) 09/20/2021    due to angiodysplasia in small intestine    Intention tremor     Major depressive disorder, recurrent, moderate 4/12/2021    Moderate episode of recurrent major depressive disorder 4/1/2019    Morbid (severe) obesity due to excess calories 4/1/2019    SHORYT (obstructive sleep apnea)     Prostate cancer     Trouble in sleeping     Urinary incontinence        Past Surgical History:   Procedure Laterality Date    ABDOMINAL HERNIA REPAIR      APPENDECTOMY      bladder sx      CARDIAC CATHETERIZATION      CATARACT EXTRACTION W/  INTRAOCULAR LENS IMPLANT  Restor OU    COLONOSCOPY N/A 5/16/2017    Procedure: COLONOSCOPY;  Surgeon: Alfredo Naidu MD;  Location: [facility];  Service: Endoscopy;  Laterality: N/A;    ESOPHAGOGASTRODUODENOSCOPY N/A 10/29/2021    Procedure: SBE (Push Enteroscopy) with Dr. Wayne;  Surgeon: Arlette Wayne MD;  Location: [facility];  Service: Endoscopy;  Laterality: N/A;  Plavix held indefinitely as of 10-20-21. Must also hold Coumadin 5 days prior. Ok to continue to use ASA.    ESOPHAGOGASTRODUODENOSCOPY N/A 2/23/2023    Procedure: EGD (ESOPHAGOGASTRODUODENOSCOPY);  Surgeon: Opal Wright MD;  Location: [facility];  Service: Endoscopy;  Laterality: N/A;    inguinal hernia      INTRALUMINAL GASTROINTESTINAL TRACT IMAGING VIA CAPSULE N/A 10/4/2021    Procedure: IMAGING PROCEDURE, GI TRACT, INTRALUMINAL, VIA CAPSULE;  Surgeon: Joaquin Stack RN;  Location: [facility];  Service: Endoscopy;  Laterality: N/A;    LEFT HEART CATHETERIZATION Left 7/20/2021    Procedure: CATHETERIZATION, HEART, LEFT;  Surgeon: Darryl Griffith MD;  Location: [facility] CATH LAB;  Service: Cardiology;   Laterality: Left;    lung sx      PERCUTANEOUS TRANSLUMINAL BALLOON ANGIOPLASTY OF CORONARY ARTERY  7/20/2021    Procedure: Angioplasty-coronary;  Surgeon: Darryl Griffith MD;  Location: [facility] [place] CATH LAB;  Service: Cardiology;;    PROSTATE SURGERY         Review of patient's allergies indicates:  No Known Allergies    No current facility-administered medications on file prior to encounter.     Current Outpatient Medications on File Prior to Encounter   Medication Sig    allopurinoL (ZYLOPRIM) 100 MG tablet TAKE ONE TABLET BY MOUTH DAILY    amLODIPine (NORVASC) 5 MG tablet TAKE 1 TABLET (5 MG TOTAL) BY MOUTH ONCE DAILY.    aspirin (ECOTRIN) 81 MG EC tablet Take 81 mg by mouth once daily.    ferrous sulfate 324 mg (65 mg iron) TbEC TAKE ONE TABLET BY MOUTH TWICE DAILY    furosemide (LASIX) 40 MG tablet Take 2 tablets (80 mg total) by mouth 2 (two) times daily.    ibuprofen (ADVIL,MOTRIN) 200 MG tablet Take 200 mg by mouth every evening.    isosorbide mononitrate (IMDUR) 30 MG 24 hr tablet Take 1 tablet (30 mg total) by mouth once daily.    losartan (COZAAR) 50 MG tablet TAKE ONE TABLET BY MOUTH TWICE DAILY    melatonin 3 mg Tab Take 1 tablet by mouth nightly.    metFORMIN (GLUCOPHAGE) 500 MG tablet TAKE ONE TABLET BY MOUTH TWICE A DAY WITH MEALS    metOLazone (ZAROXOLYN) 5 MG tablet Take 1 tablet (5 mg total) by mouth once daily.    nystatin-triamcinolone (MYCOLOG II) cream Apply topically 4 (four) times daily.    potassium chloride SA (K-DUR,KLOR-CON M) 10 MEQ tablet Take 1 tablet (10 mEq total) by mouth once daily.    torsemide (DEMADEX) 10 MG Tab Take 10 mg by mouth once daily.    warfarin (COUMADIN) 5 MG tablet TAKE 1 TABLET (5 MG TOTAL) BY MOUTH DAILY. OR AS DIRECTED BY COUMADIN CLINIC    atorvastatin (LIPITOR) 40 MG tablet Take 1 tablet (40 mg total) by mouth once daily. for 90 doses     Family History       Problem Relation (Age of Onset)    Cancer Father    Diabetes Sister    Heart disease  Mother, Sister    Macular degeneration Sister          Tobacco Use    Smoking status: Former     Packs/day: 0.50     Years: 40.00     Pack years: 20.00     Types: Cigarettes     Start date: 1962     Quit date:      Years since quittin.1    Smokeless tobacco: Never   Substance and Sexual Activity    Alcohol use: Yes     Alcohol/week: 7.0 standard drinks     Types: 7 Shots of liquor per week    Drug use: No    Sexual activity: Not Currently     Review of Systems   Constitutional:  Negative for chills and fever.   Respiratory:  Negative for cough, shortness of breath and wheezing.    Cardiovascular:  Negative for chest pain and palpitations.   Gastrointestinal:  Negative for abdominal pain, constipation, diarrhea, nausea and vomiting.   Neurological:  Negative for dizziness and headaches.   All other systems reviewed and are negative.  Objective:     Vital Signs (Most Recent):  Temp: 97.6 °F (36.4 °C) (23 1501)  Pulse: 86 (23 1901)  Resp: (!) 22 (23 190)  BP: 115/73 (23 190)  SpO2: (!) 88 % (23 190)   Vital Signs (24h Range):  Temp:  [97.4 °F (36.3 °C)-98.2 °F (36.8 °C)] 97.6 °F (36.4 °C)  Pulse:  [] 86  Resp:  [20-52] 22  SpO2:  [88 %-96 %] 88 %  BP: ()/(58-85) 115/73     Weight: 84 kg (185 lb 3 oz)  Body mass index is 29 kg/m².    Physical Exam  Vitals and nursing note reviewed. Exam conducted with a chaperone present.   Constitutional:       General: He is not in acute distress.     Comments: Drowsy, however easily awakens.  Frail-appearing   HENT:      Head: Normocephalic and atraumatic.      Right Ear: External ear normal.      Left Ear: External ear normal.      Nose: Nose normal.      Mouth/Throat:      Mouth: Mucous membranes are moist.   Eyes:      Extraocular Movements: Extraocular movements intact.      Pupils: Pupils are equal, round, and reactive to light.   Cardiovascular:      Rate and Rhythm: Normal rate and regular rhythm.   Pulmonary:       Effort: Pulmonary effort is normal. No respiratory distress.      Breath sounds: No wheezing.   Abdominal:      General: Bowel sounds are normal.      Palpations: Abdomen is soft.      Tenderness: There is no abdominal tenderness. There is no guarding or rebound.   Musculoskeletal:      Cervical back: Neck supple.      Right lower leg: No edema.      Left lower leg: No edema.   Skin:     General: Skin is warm and dry.   Neurological:      Mental Status: He is oriented to person, place, and time. Mental status is at baseline.   Psychiatric:         Behavior: Behavior is cooperative.       Significant Labs: All pertinent labs within the past 24 hours have been reviewed.  CBC:   Recent Labs   Lab 02/24/23  0016 02/24/23  0503 02/25/23  0520   WBC  --  6.76 9.00   HGB 9.6* 9.8* 10.8*   HCT 28.8* 29.4* 32.7*   PLT  --  198 228     CMP:   Recent Labs   Lab 02/24/23  0503 02/25/23  0520    142   K 4.2 3.5    104   CO2 17* 25   * 130*   BUN 76* 46*   CREATININE 2.1* 1.6*   CALCIUM 9.1 9.4   PROT 6.6 6.7   ALBUMIN 3.4* 3.2*   BILITOT 1.2* 1.3*   ALKPHOS 58 55   AST 33 45*   ALT 20 18   ANIONGAP 14 13       Significant Imaging: I have reviewed all pertinent imaging results/findings within the past 24 hours.    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  Able to be weaned off Precedex on 02/24  Per family patient has been having symptoms of suspected dementia (irritability, intermittent confusion, repeating things, memory loss) for approximately 2 years  Plan ambulatory referral to Neurology on discharge for further evaluation  Delirium precautions    SHAYLA (acute kidney injury)  Patient with acute kidney injury likely due to IVVD/dehydration secondary to hemorrhage SHAYLA is currently improving. Labs reviewed- Renal function/electrolytes with Estimated Creatinine Clearance: 38.2 mL/min (A) (based on SCr of 1.6 mg/dL (H)). according to latest data. Monitor urine output and serial BMP and adjust therapy as needed. Avoid nephrotoxins and renally dose meds for GFR listed above.     Supratherapeutic INR  INR elevated at 8.2 on 2/22 day prior to admission  INR >10 on admission  Poor oral intake prior to admission likely contributing  S/p PCC and vitamin K  Cleared by GI to resume Coumadin although this may not be the best anticoagulation option for patient    Hemorrhagic shock  Secondary to gastrointestinal hemorrhage   Resolved with IV fluid, PRBC      Chronic diastolic heart failure  Patient is identified as having Diastolic (HFpEF) heart failure that is Chronic. CHF is currently controlled. Latest ECHO performed and demonstrates- Results for orders placed during the hospital encounter of 11/11/22    Echo    Interpretation Summary  · The left ventricle is normal in size with concentric remodeling and normal systolic function.  · Severe left atrial enlargement.  · The estimated ejection fraction is 60%.  · Atrial fibrillation observed.  · Normal right ventricular size with normal right ventricular systolic function.  · Moderate right atrial enlargement.  · Moderate aortic regurgitation.  ·  Mild-to-moderate mitral regurgitation.  · Mild tricuspid regurgitation.  · Normal central venous pressure (3 mmHg).  · The estimated PA systolic pressure is 34 mmHg.  · The ascending aorta is moderately dilated.  .  Hold diuretics and monitor clinical status closely. Monitor on telemetry. Patient is off CHF pathway.  Monitor strict Is&Os and daily weights.   Last BNP reviewed- and noted below   Recent Labs   Lab 02/23/23  1024   *   .          LAILA (iron deficiency anemia)  Secondary to angiodysplasia in small intestine   S/p 2 units PRBC transfusion on 02/23  Continue iron supplement      Angiodysplasia of small intestine  See plan for gastrointestinal hemorrhage      Hypertension associated with diabetes  Normotensive  BP meds held due to hemorrhagic shock  Resume home meds as BP tolerates    Hyperlipidemia associated with type 2 diabetes mellitus  Continue Statin    Coronary atherosclerosis  ASA held due to recent bleed  Continue Imdur      Atrial fibrillation  Patient with Permanent atrial fibrillation which is controlled currently. Patient is currently in atrial fibrillation.[id number] Score: 4.  Anticoagulation indicated. Anticoagulation done with Coumadin which has been held due to hemorrhagic shock.    Plan to discuss with cardiology regarding possibly changing anticoagulation medication          VTE Risk Mitigation (From admission, onward)         Ordered     Reason for No Pharmacological VTE Prophylaxis  Once        Question:  Reasons:  Answer:  Active Bleeding    02/23/23 1135     IP VTE HIGH RISK PATIENT  Once         02/23/23 1135     Place sequential compression device  Until discontinued         02/23/23 1135                Critical care time spent on the evaluation and treatment of severe organ dysfunction, review of pertinent labs and imaging studies, discussions with consulting providers and discussions with patient/family: 45 minutes.    Thank you for your consult. I will follow-up with  patient. Please contact us if you have any additional questions.    Jennifer Tyler DO  Department of Hospital Medicine   '[place] - Intensive Care ([facility])

## 2023-02-26 NOTE — ASSESSMENT & PLAN NOTE
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

## 2023-02-26 NOTE — ASSESSMENT & PLAN NOTE
INR elevated at 8.2 on 2/22 day prior to admission  INR >10 on admission  Poor oral intake prior to admission likely contributing  S/p PCC and vitamin K  Cleared by GI to resume anticoagulation.    However Coumadin is not the best choice for patient for anticoagulation, Eliquis to be started instead

## 2023-02-26 NOTE — ASSESSMENT & PLAN NOTE
Patient is identified as having Diastolic (HFpEF) heart failure that is Chronic. CHF is currently controlled. Latest ECHO performed and demonstrates- Results for orders placed during the hospital encounter of 11/11/22    Echo    Interpretation Summary  · The left ventricle is normal in size with concentric remodeling and normal systolic function.  · Severe left atrial enlargement.  · The estimated ejection fraction is 60%.  · Atrial fibrillation observed.  · Normal right ventricular size with normal right ventricular systolic function.  · Moderate right atrial enlargement.  · Moderate aortic regurgitation.  · Mild-to-moderate mitral regurgitation.  · Mild tricuspid regurgitation.  · Normal central venous pressure (3 mmHg).  · The estimated PA systolic pressure is 34 mmHg.  · The ascending aorta is moderately dilated.  .  Hold diuretics and monitor clinical status closely. Monitor on telemetry. Patient is off CHF pathway.  Monitor strict Is&Os and daily weights.   Last BNP reviewed- and noted below   Recent Labs   Lab 02/23/23  1024   *   .

## 2023-02-26 NOTE — ASSESSMENT & PLAN NOTE
Patient with Permanent atrial fibrillation which is controlled currently. Patient is currently in atrial fibrillation.[id number] Score: 4.  Anticoagulation indicated. Anticoagulation done with Coumadin which has been held due to hemorrhagic shock.    Plan to discuss with cardiology regarding possibly changing anticoagulation medication

## 2023-02-26 NOTE — ASSESSMENT & PLAN NOTE
Secondary to angiodysplasia in small intestine   S/p 2 units PRBC transfusion on 02/23  Continue iron supplement  Monitor H/H

## 2023-02-27 PROBLEM — G93.41 ENCEPHALOPATHY, METABOLIC: Status: RESOLVED | Noted: 2023-02-24 | Resolved: 2023-02-27

## 2023-02-27 PROBLEM — R79.1 SUPRATHERAPEUTIC INR: Status: RESOLVED | Noted: 2023-02-23 | Resolved: 2023-02-27

## 2023-02-27 PROBLEM — R57.8 HEMORRHAGIC SHOCK: Status: RESOLVED | Noted: 2023-02-23 | Resolved: 2023-02-27

## 2023-02-27 LAB
ALBUMIN SERPL BCP-MCNC: 2.8 G/DL (ref 3.5–5.2)
ALP SERPL-CCNC: 48 U/L (ref 55–135)
ALT SERPL W/O P-5'-P-CCNC: 19 U/L (ref 10–44)
ANION GAP SERPL CALC-SCNC: 15 MMOL/L (ref 8–16)
AST SERPL-CCNC: 38 U/L (ref 10–40)
BASOPHILS # BLD AUTO: 0.01 K/UL (ref 0–0.2)
BASOPHILS NFR BLD: 0.1 % (ref 0–1.9)
BILIRUB SERPL-MCNC: 0.8 MG/DL (ref 0.1–1)
BUN SERPL-MCNC: 41 MG/DL (ref 8–23)
CALCIUM SERPL-MCNC: 8.9 MG/DL (ref 8.7–10.5)
CHLORIDE SERPL-SCNC: 102 MMOL/L (ref 95–110)
CO2 SERPL-SCNC: 23 MMOL/L (ref 23–29)
CREAT SERPL-MCNC: 1.6 MG/DL (ref 0.5–1.4)
DIFFERENTIAL METHOD: ABNORMAL
EOSINOPHIL # BLD AUTO: 0.3 K/UL (ref 0–0.5)
EOSINOPHIL NFR BLD: 4.6 % (ref 0–8)
ERYTHROCYTE [DISTWIDTH] IN BLOOD BY AUTOMATED COUNT: 16.2 % (ref 11.5–14.5)
EST. GFR  (NO RACE VARIABLE): 43 ML/MIN/1.73 M^2
GLUCOSE SERPL-MCNC: 95 MG/DL (ref 70–110)
HCT VFR BLD AUTO: 29.9 % (ref 40–54)
HGB BLD-MCNC: 9.9 G/DL (ref 14–18)
IMM GRANULOCYTES # BLD AUTO: 0.03 K/UL (ref 0–0.04)
IMM GRANULOCYTES NFR BLD AUTO: 0.4 % (ref 0–0.5)
LYMPHOCYTES # BLD AUTO: 1.7 K/UL (ref 1–4.8)
LYMPHOCYTES NFR BLD: 23.7 % (ref 18–48)
MAGNESIUM SERPL-MCNC: 1.7 MG/DL (ref 1.6–2.6)
MCH RBC QN AUTO: 29.8 PG (ref 27–31)
MCHC RBC AUTO-ENTMCNC: 33.1 G/DL (ref 32–36)
MCV RBC AUTO: 90 FL (ref 82–98)
MONOCYTES # BLD AUTO: 0.5 K/UL (ref 0.3–1)
MONOCYTES NFR BLD: 6.5 % (ref 4–15)
NEUTROPHILS # BLD AUTO: 4.7 K/UL (ref 1.8–7.7)
NEUTROPHILS NFR BLD: 64.7 % (ref 38–73)
NRBC BLD-RTO: 0 /100 WBC
PLATELET # BLD AUTO: 240 K/UL (ref 150–450)
PMV BLD AUTO: 9.5 FL (ref 9.2–12.9)
POCT GLUCOSE: 132 MG/DL (ref 70–110)
POCT GLUCOSE: 96 MG/DL (ref 70–110)
POCT GLUCOSE: 98 MG/DL (ref 70–110)
POTASSIUM SERPL-SCNC: 3.1 MMOL/L (ref 3.5–5.1)
PROT SERPL-MCNC: 6.2 G/DL (ref 6–8.4)
RBC # BLD AUTO: 3.32 M/UL (ref 4.6–6.2)
SODIUM SERPL-SCNC: 140 MMOL/L (ref 136–145)
WBC # BLD AUTO: 7.21 K/UL (ref 3.9–12.7)

## 2023-02-27 PROCEDURE — 83735 ASSAY OF MAGNESIUM: CPT | Mod: HCNC | Performed by: NURSE PRACTITIONER

## 2023-02-27 PROCEDURE — 97530 THERAPEUTIC ACTIVITIES: CPT | Mod: HCNC

## 2023-02-27 PROCEDURE — 25000003 PHARM REV CODE 250: Mod: HCNC | Performed by: NURSE PRACTITIONER

## 2023-02-27 PROCEDURE — 63600175 PHARM REV CODE 636 W HCPCS: Mod: HCNC | Performed by: NURSE PRACTITIONER

## 2023-02-27 PROCEDURE — 92526 ORAL FUNCTION THERAPY: CPT | Mod: HCNC

## 2023-02-27 PROCEDURE — C9113 INJ PANTOPRAZOLE SODIUM, VIA: HCPCS | Mod: HCNC | Performed by: NURSE PRACTITIONER

## 2023-02-27 PROCEDURE — 80053 COMPREHEN METABOLIC PANEL: CPT | Mod: HCNC | Performed by: NURSE PRACTITIONER

## 2023-02-27 PROCEDURE — 25000003 PHARM REV CODE 250: Mod: HCNC | Performed by: INTERNAL MEDICINE

## 2023-02-27 PROCEDURE — 94761 N-INVAS EAR/PLS OXIMETRY MLT: CPT | Mod: HCNC

## 2023-02-27 PROCEDURE — 97116 GAIT TRAINING THERAPY: CPT | Mod: HCNC

## 2023-02-27 PROCEDURE — 85025 COMPLETE CBC W/AUTO DIFF WBC: CPT | Mod: HCNC | Performed by: NURSE PRACTITIONER

## 2023-02-27 PROCEDURE — 97535 SELF CARE MNGMENT TRAINING: CPT | Mod: HCNC

## 2023-02-27 PROCEDURE — 25000003 PHARM REV CODE 250: Mod: HCNC | Performed by: HOSPITALIST

## 2023-02-27 PROCEDURE — 36415 COLL VENOUS BLD VENIPUNCTURE: CPT | Mod: HCNC | Performed by: NURSE PRACTITIONER

## 2023-02-27 PROCEDURE — 11000001 HC ACUTE MED/SURG PRIVATE ROOM: Mod: HCNC

## 2023-02-27 RX ORDER — POTASSIUM CHLORIDE 20 MEQ/1
20 TABLET, EXTENDED RELEASE ORAL 2 TIMES DAILY
Status: COMPLETED | OUTPATIENT
Start: 2023-02-27 | End: 2023-02-28

## 2023-02-27 RX ORDER — PANTOPRAZOLE SODIUM 40 MG/1
40 TABLET, DELAYED RELEASE ORAL DAILY
Status: DISCONTINUED | OUTPATIENT
Start: 2023-02-28 | End: 2023-03-03 | Stop reason: HOSPADM

## 2023-02-27 RX ADMIN — APIXABAN 2.5 MG: 2.5 TABLET, FILM COATED ORAL at 08:02

## 2023-02-27 RX ADMIN — QUETIAPINE FUMARATE 12.5 MG: 25 TABLET ORAL at 08:02

## 2023-02-27 RX ADMIN — PANTOPRAZOLE SODIUM 40 MG: 40 INJECTION, POWDER, FOR SOLUTION INTRAVENOUS at 08:02

## 2023-02-27 RX ADMIN — LIDOCAINE 1 PATCH: 50 PATCH CUTANEOUS at 10:02

## 2023-02-27 RX ADMIN — SUCRALFATE 1 G: 1 SUSPENSION ORAL at 08:02

## 2023-02-27 RX ADMIN — ISOSORBIDE MONONITRATE 30 MG: 30 TABLET, EXTENDED RELEASE ORAL at 08:02

## 2023-02-27 RX ADMIN — MUPIROCIN: 20 OINTMENT TOPICAL at 08:02

## 2023-02-27 RX ADMIN — SENNOSIDES AND DOCUSATE SODIUM 2 TABLET: 50; 8.6 TABLET ORAL at 08:02

## 2023-02-27 RX ADMIN — POTASSIUM CHLORIDE 20 MEQ: 1500 TABLET, EXTENDED RELEASE ORAL at 11:02

## 2023-02-27 RX ADMIN — METOPROLOL TARTRATE 12.5 MG: 25 TABLET, FILM COATED ORAL at 08:02

## 2023-02-27 RX ADMIN — POTASSIUM CHLORIDE 20 MEQ: 1500 TABLET, EXTENDED RELEASE ORAL at 08:02

## 2023-02-27 RX ADMIN — MELATONIN TAB 3 MG 3 MG: 3 TAB at 08:02

## 2023-02-27 RX ADMIN — FERROUS SULFATE TAB 325 MG (65 MG ELEMENTAL FE) 1 EACH: 325 (65 FE) TAB at 08:02

## 2023-02-27 RX ADMIN — ALLOPURINOL 100 MG: 100 TABLET ORAL at 08:02

## 2023-02-27 RX ADMIN — ATORVASTATIN CALCIUM 40 MG: 40 TABLET, FILM COATED ORAL at 08:02

## 2023-02-27 NOTE — PT/OT/SLP EVAL
Occupational Therapy   Evaluation    Name: Anthony Biggs Jr.  MRN: [id number]  Admitting Diagnosis: Gastrointestinal hemorrhage with melena  Recent Surgery: Procedure(s) (LRB):  EGD (ESOPHAGOGASTRODUODENOSCOPY) (N/A) 4 Days Post-Op    Recommendations:     Discharge Recommendations: nursing facility, skilled  Discharge Equipment Recommendations:  walker, rolling  Barriers to discharge:       Assessment:     Anthony Biggs Jr. is a 82 y.o. male with a medical diagnosis of Gastrointestinal hemorrhage with melena.  He presents with DEBILITY AND GENERALIZED WEAKNESS. Performance deficits affecting function: weakness, impaired functional mobility, decreased safety awareness, impaired endurance, gait instability, impaired balance, impaired self care skills, decreased lower extremity function.      Rehab Prognosis: Fair; patient would benefit from acute skilled OT services to address these deficits and reach maximum level of function.       Plan:     Patient to be seen 2 x/week to address the above listed problems via self-care/home management, therapeutic activities, therapeutic exercises  Plan of Care Expires:    Plan of Care Reviewed with:      Subjective     Chief Complaint: DEBILITY AND GENERALIZED WEAKNESS Patient/Family Comments/goals:     Occupational Profile:  Living Environment: LIVES WITH SPOUSE IN  1 STORY  Previous level of function: (I) WITH ADL'S AND MOBILITY HOLDING ON FURNITURE  Roles and Routines: OCCUPATIONAL THERAPY  Equipment Used at Home: none  Assistance upon Discharge:     Pain/Comfort:       Patients cultural, spiritual, Congregation conflicts given the current situation:      Objective:     Communicated with: NOTES AND Epic CHART REVIEW prior to session.  Patient found HOB elevated with peripheral IV, pulse ox (continuous), telemetry, blood pressure cuff upon OT entry to room.    General Precautions: Standard, fall  Orthopedic Precautions: N/A  Braces: N/A  Occupational Performance:    Bed Mobility:     Patient completed Rolling/Turning to Right with moderate assistance  Patient completed Scooting/Bridging with moderate assistance  Patient completed Supine to Sit with moderate assistance  Activities of Daily Living:  Upper Body Dressing: moderate assistance .  Lower Body Dressing: maximal assistance .    Cognitive/Visual Perceptual:  Cognitive/Psychosocial Skills:     -       Oriented to: Person, Place, Time, and Situation   -       Follows Commands/attention:Follows multistep  commands  -       Communication: clear/fluent  -       Memory: No Deficits noted  -       Safety awareness/insight to disability: impaired     Physical Exam:  Upper Extremity Range of Motion:     -       Right Upper Extremity: WFL  -       Left Upper Extremity: WFL  Upper Extremity Strength:    -       Right Upper Extremity: MMT: 3/5 grossly  -       Left Upper Extremity: mmt: 3/5 grossly   Strength:    -       Right Upper Extremity: mmt: 3/5 grossly  -       Left Upper Extremity: Deficits: mmt 3/5 GROSSLY    AMPAC 6 Click ADL:  AMPAC Total Score:      Treatment & Education:  Patient educated on role of OT in acute setting and benefits of participation. Educated on techniques to use to increase independence and decrease fall risk with functional transfers. Educated on importance of OOB activity and calling for A to transfer back to bed. Encouraged completion of B UE AROM therex throughout the day to tolerance to increase functional strength and activity tolerance. Patient stated understanding and in agreement with POC.      Patient left HOB elevated with all lines intact PER NURSE AND DECREASE B /P     GOALS:   Multidisciplinary Problems       Occupational Therapy Goals          Problem: Occupational Therapy    Goal Priority Disciplines Outcome Interventions   Occupational Therapy Goal     OT, PT/OT     Description: O.T. GOALS TO BE MET BY 3-14-23  SBA WITH UE DRESSING  SBA WITH TOILET ING    SBA WITH TOILET TRANSFERS WITH AE  PT WILL  TOLERATE 1 SET X 15 REPR B UE ROM EXERCISE                       History:     Past Medical History:   Diagnosis Date    Abnormal CXR     SHAYLA (acute kidney injury) 2/23/2023    Angina pectoris 8/28/2017    Angiodysplasia of small intestine     Atrial fibrillation     Chronic diastolic heart failure 11/8/2022    Chronic upper GI bleeding     due to angiodysplasia in proximal small intestine    Coronary atherosclerosis of unspecified type of vessel, native or graft     COVID 6/26/2022    Depression     Diabetes mellitus without complication     Emphysema of lung     History of prostate cancer 2007    prostatectomy    Hyperlipidemia associated with type 2 diabetes mellitus     Hypertension associated with diabetes     LAILA (iron deficiency anemia) 09/20/2021    due to angiodysplasia in small intestine    Intention tremor     Major depressive disorder, recurrent, moderate 4/12/2021    Moderate episode of recurrent major depressive disorder 4/1/2019    Morbid (severe) obesity due to excess calories 4/1/2019    SHORTY (obstructive sleep apnea)     Prostate cancer     Trouble in sleeping     Urinary incontinence          Past Surgical History:   Procedure Laterality Date    ABDOMINAL HERNIA REPAIR      APPENDECTOMY      bladder sx      CARDIAC CATHETERIZATION      CATARACT EXTRACTION W/  INTRAOCULAR LENS IMPLANT  Restor OU    COLONOSCOPY N/A 5/16/2017    Procedure: COLONOSCOPY;  Surgeon: Alfredo Naidu MD;  Location: [facility];  Service: Endoscopy;  Laterality: N/A;    ESOPHAGOGASTRODUODENOSCOPY N/A 10/29/2021    Procedure: SBE (Push Enteroscopy) with Dr. Wayne;  Surgeon: Arlette Wayne MD;  Location: [facility];  Service: Endoscopy;  Laterality: N/A;  Plavix held indefinitely as of 10-20-21. Must also hold Coumadin 5 days prior. Ok to continue to use ASA.    ESOPHAGOGASTRODUODENOSCOPY N/A 2/23/2023    Procedure: EGD (ESOPHAGOGASTRODUODENOSCOPY);  Surgeon: Opal Wright MD;  Location: [facility];  Service:  Endoscopy;  Laterality: N/A;    inguinal hernia      INTRALUMINAL GASTROINTESTINAL TRACT IMAGING VIA CAPSULE N/A 10/4/2021    Procedure: IMAGING PROCEDURE, GI TRACT, INTRALUMINAL, VIA CAPSULE;  Surgeon: Joaquin Stack RN;  Location: [facility];  Service: Endoscopy;  Laterality: N/A;    LEFT HEART CATHETERIZATION Left 7/20/2021    Procedure: CATHETERIZATION, HEART, LEFT;  Surgeon: Darryl Griffith MD;  Location: [facility] CATH LAB;  Service: Cardiology;  Laterality: Left;    lung sx      PERCUTANEOUS TRANSLUMINAL BALLOON ANGIOPLASTY OF CORONARY ARTERY  7/20/2021    Procedure: Angioplasty-coronary;  Surgeon: Darryl Griffith MD;  Location: [facility] CATH LAB;  Service: Cardiology;;    PROSTATE SURGERY         Time Tracking:     OT Date of Treatment: 02/26/23  OT Start Time: 1055  OT Stop Time: 1120  OT Total Time (min): 25 min    Billable Minutes:Evaluation 15 MINUTES  Therapeutic Activity 10 MINUTES    2/26/23

## 2023-02-27 NOTE — PLAN OF CARE
Patient doing well, VSS, no c/o SOB or discomfort. Spouse at bedside, update given, POC discussed.   Sigrid Tee  (RN)  2020 16:34:14

## 2023-02-27 NOTE — PLAN OF CARE
CONTINUE WITH POC TO INCREASE FUNCTIONAL TRANSFERS, INCREASE ADL'S  SKILLSAS WELL AS INCREASE B UE STRENGTH/ENDURANCE. RECOMMEND: SNF

## 2023-02-27 NOTE — PT/OT/SLP PROGRESS
Physical Therapy Treatment    Patient Name:  Anthony Biggs Jr.   MRN:  [id number]    Recommendations:     Discharge Recommendations:  (SNF vs HHPT with 24/7 care)  Discharge Equipment Recommendations: walker, rolling, bedside commode, shower chair  Barriers to discharge: Decreased caregiver support    Assessment:     Anthony Biggs Jr. is a 82 y.o. male admitted with a medical diagnosis of Gastrointestinal hemorrhage with melena.  He presents with the following impairments/functional limitations: weakness, impaired endurance, impaired sensation, impaired functional mobility, gait instability, impaired balance, pain, impaired cardiopulmonary response to activity, decreased safety awareness, decreased lower extremity function, decreased coordination.    Rehab Prognosis: Good; patient would benefit from acute skilled PT services to address these deficits and reach maximum level of function.    Recent Surgery: Procedure(s) (LRB):  EGD (ESOPHAGOGASTRODUODENOSCOPY) (N/A) 4 Days Post-Op    Plan:     During this hospitalization, patient to be seen 3 x/week to address the identified rehab impairments via gait training, therapeutic activities, therapeutic exercises and progress toward the following goals:    Plan of Care Expires:  03/12/23    Subjective     Chief Complaint: Pt is ready to go home.  Patient/Family Comments/goals:  Pain/Comfort:  Pain Rating 1: 5/10 (when standing/during gait)  Location - Side 1: Bilateral  Location - Orientation 1:  (bottom)  Location 1: foot  Pain Addressed 1: Reposition, Distraction, Cessation of Activity      Objective:     Communicated with nurse prior to session.  Patient found supine with peripheral IV, pulse ox (continuous), telemetry, blood pressure cuff upon PT entry to room.     General Precautions: Standard, fall  Orthopedic Precautions: N/A  Braces: N/A  Respiratory Status: Room air     Functional Mobility:  Bed Mobility:     Rolling Right: minimum assistance  Scooting: minimum  assistance  Supine to Sit: minimum assistance  Transfers:     Sit to Stand:  contact guard assistance with rolling walker  Bed to Chair: contact guard assistance with  rolling walker  using  Step Transfer  Gait: Pt ambulated 60ft CGA using RW, verbal cuing to maintain upright posture, increased pain on bottoms of feet when ambulating  Balance: Demonstrated fair sitting balance, fair dynamic balance during gait  Blood pressure:  Initial supine: 130/84  Initial sittin/65  Sittin/70  Standin/73  Sitting after gait: 106/72  No c/o dizziness or SOB.    AM-PAC 6 CLICK MOBILITY  Turning over in bed (including adjusting bedclothes, sheets and blankets)?: 3  Sitting down on and standing up from a chair with arms (e.g., wheelchair, bedside commode, etc.): 3  Moving from lying on back to sitting on the side of the bed?: 3  Moving to and from a bed to a chair (including a wheelchair)?: 3  Need to walk in hospital room?: 3  Climbing 3-5 steps with a railing?: 1 (NT)  Basic Mobility Total Score: 16     Treatment & Education:  Pt tolerated interventions well. Completed seated marches, LAQ, ankle pumps x10 ea. Reviewed importance of OOB activities and HEP (hip flex, LAQ, ham curls, ankle pumps) in order to maintain/regain strength. Reviewed proper use of RW  for safety and to reduce  risk of falling. Reviewed increased risk of falling due to weakness, instructed to utilize call bell for assistance for all transfers. Pt agreeable to all requests.      Patient left up in chair with all lines intact, call button in reach, chair alarm on, and family present.    GOALS:   Multidisciplinary Problems       Physical Therapy Goals          Problem: Physical Therapy    Goal Priority Disciplines Outcome Goal Variances Interventions   Physical Therapy Goal     PT, PT/OT Ongoing, Progressing     Description: PT eval complete. The following goals will be met in 14 days  1. Pt IND with bed mobility  2. Pt transfer bed to chair  with RW and MOD I  3. Pt amb 150 ft with RW and MOD I                     Time Tracking:     PT Received On: 02/27/23  PT Start Time: 1020     PT Stop Time: 1045  PT Total Time (min): 25 min     Billable Minutes: Gait Training 10min and Therapeutic Activity 15min    Treatment Type: Treatment  PT/PTA: PT     PTA Visit Number: 0     02/27/2023

## 2023-02-27 NOTE — CONSULTS
Discussed SNF recommendations with spouse and patient at bedside. Patient is adamant about going home. Lift left at bedside. [facility] per spouse approval. Will follow up with bed availability.

## 2023-02-27 NOTE — PLAN OF CARE
Pt tolerated interventions well. Required MIN A for bed mobility, CGA for STS and  transfer, ambulated 60ft CGA using RW. Demonstrated significant improvement since last treatment. Recommending SNF vs HHPT with 24/7 assistance upon d/c.

## 2023-02-27 NOTE — PT/OT/SLP PROGRESS
Speech Language Pathology Treatment    Patient Name:  Anthony Biggs Jr.   MRN:  [id number]  Admitting Diagnosis: Gastrointestinal hemorrhage with melena    Recommendations:                 General Recommendations:  Follow-up not indicated  Diet recommendations:  Easy to Chew Diet - IDDSI Level 7, Regular Diet - IDDSI Level 7, Liquid Diet Level: Thin liquids - IDDSI Level 0   Aspiration Precautions: Frequent oral care and Standard aspiration precautions   General Precautions: Standard, aspiration  Communication strategies:  none    Subjective     Pt seen bedside for ST.  Pt sitting upright in chair at bedside.  Pt's wife present.  No c/o pain.  Pt ready to go home.  Patient goals: To go home    Pain/Comfort:  Pain Rating 1: 0/10  Pain Rating Post-Intervention 1: 0/10  Pain Rating 2: 0/10  Pain Rating Post-Intervention 2: 0/10    Respiratory Status: Room air    Objective:     Has the patient been evaluated by SLP for swallowing?   Yes  Keep patient NPO? No   Current Respiratory Status: room air    Tx feeds of thin liquids by cup/straw, pureed solids via tsp and regular solids.  OM WFL and no overt s/s of dysphagia present during session.      Pt/family educated on advancement to regular consistency diet (IDDSI 7)/thin liquids (IDDSI 0) and role cognition plays in swallow function. Oral care also recommended atleast twice daily.    Cognitive decline noted--See CT head.  CT HEAD WITHOUT CONTRAST     CLINICAL HISTORY:  AMS with supra therapeutic INR;     COMPARISON:  None.     FINDINGS:  Intracranial compartment:     Generalized cerebral volume loss and moderate chronic microvascular ischemic changes.  No parenchymal mass, hemorrhage, edema or major vascular distribution infarct. No evidence of hydrocephalus.     No extra-axial blood or fluid collections.     Skull/extracranial contents (limited evaluation):     No fracture. Mastoid air cells and paranasal sinuses are essentially clear.     Impression:     Examination  moderately degraded, as above.     No evidence of acute intracranial pathology.  Chronic findings as above.        Electronically signed by: Sonido Lombardo  Date:                                            02/24/2023  Time:                                           10:22           Exam Ended: 02/24/23 09:59 Last Resulted: 02/24/23 10:22             Assessment:     Naylor DEJA Biggs Jr. is a 82 y.o. male with an SLP diagnosis of suspected Dysphagia in the setting of GI hemorrhage/shock and metabolic encephalopathy with cognitive decline.  He presents with improved mentation and recommended to advance to IDDSI 7 (regular solids) with IDDSI 0 (thin liquids), following aspiration precautions and aggressive oral care/hygiene practices.  No further acute SLP intervention indicated at this time.  MD to reconsult if needed.    Goals:   Multidisciplinary Problems       SLP Goals       Not on file              Multidisciplinary Problems (Resolved)          Problem: SLP    Goal Priority Disciplines Outcome   SLP Goal   (Resolved)     SLP Met   Description: TPW tolerate current diet without any overt s/s of aspiration  TPW tolerate trials for diet upgrade without any overt s/s of aspiration                       Plan:     Plan of Care expires: Goals MET--D/C ST  Plan of Care reviewed with:  patient, spouse   SLP Follow-Up:  No       Discharge recommendations:   (No further SLP recommended.)   Barriers to Discharge:  None    Time Tracking:     SLP Treatment Date:   02/27/23  Speech Start Time:  1100  Speech Stop Time:  1130     Speech Total Time (min):  30 min    Billable Minutes: Treatment Swallowing Dysfunction 15 minutes and Self Care/Home Management Training 15 minutes    02/27/2023

## 2023-02-28 LAB
ALBUMIN SERPL BCP-MCNC: 2.9 G/DL (ref 3.5–5.2)
ALP SERPL-CCNC: 51 U/L (ref 55–135)
ALT SERPL W/O P-5'-P-CCNC: 21 U/L (ref 10–44)
ANION GAP SERPL CALC-SCNC: 15 MMOL/L (ref 8–16)
AST SERPL-CCNC: 36 U/L (ref 10–40)
BACTERIA BLD CULT: NORMAL
BASOPHILS # BLD AUTO: 0.01 K/UL (ref 0–0.2)
BASOPHILS NFR BLD: 0.2 % (ref 0–1.9)
BILIRUB SERPL-MCNC: 0.8 MG/DL (ref 0.1–1)
BUN SERPL-MCNC: 33 MG/DL (ref 8–23)
CALCIUM SERPL-MCNC: 8.7 MG/DL (ref 8.7–10.5)
CHLORIDE SERPL-SCNC: 101 MMOL/L (ref 95–110)
CO2 SERPL-SCNC: 22 MMOL/L (ref 23–29)
CREAT SERPL-MCNC: 1.4 MG/DL (ref 0.5–1.4)
DIFFERENTIAL METHOD: ABNORMAL
EOSINOPHIL # BLD AUTO: 0.3 K/UL (ref 0–0.5)
EOSINOPHIL NFR BLD: 3.8 % (ref 0–8)
ERYTHROCYTE [DISTWIDTH] IN BLOOD BY AUTOMATED COUNT: 15.8 % (ref 11.5–14.5)
EST. GFR  (NO RACE VARIABLE): 51 ML/MIN/1.73 M^2
GLUCOSE SERPL-MCNC: 112 MG/DL (ref 70–110)
HCT VFR BLD AUTO: 30.7 % (ref 40–54)
HGB BLD-MCNC: 10.1 G/DL (ref 14–18)
IMM GRANULOCYTES # BLD AUTO: 0.03 K/UL (ref 0–0.04)
IMM GRANULOCYTES NFR BLD AUTO: 0.5 % (ref 0–0.5)
LYMPHOCYTES # BLD AUTO: 1.3 K/UL (ref 1–4.8)
LYMPHOCYTES NFR BLD: 19.5 % (ref 18–48)
MAGNESIUM SERPL-MCNC: 1.7 MG/DL (ref 1.6–2.6)
MCH RBC QN AUTO: 29.4 PG (ref 27–31)
MCHC RBC AUTO-ENTMCNC: 32.9 G/DL (ref 32–36)
MCV RBC AUTO: 90 FL (ref 82–98)
MONOCYTES # BLD AUTO: 0.5 K/UL (ref 0.3–1)
MONOCYTES NFR BLD: 8 % (ref 4–15)
NEUTROPHILS # BLD AUTO: 4.5 K/UL (ref 1.8–7.7)
NEUTROPHILS NFR BLD: 68 % (ref 38–73)
NRBC BLD-RTO: 0 /100 WBC
PLATELET # BLD AUTO: 269 K/UL (ref 150–450)
PMV BLD AUTO: 9.7 FL (ref 9.2–12.9)
POCT GLUCOSE: 118 MG/DL (ref 70–110)
POCT GLUCOSE: 122 MG/DL (ref 70–110)
POCT GLUCOSE: 123 MG/DL (ref 70–110)
POCT GLUCOSE: 148 MG/DL (ref 70–110)
POCT GLUCOSE: 187 MG/DL (ref 70–110)
POTASSIUM SERPL-SCNC: 3.5 MMOL/L (ref 3.5–5.1)
PROT SERPL-MCNC: 6.3 G/DL (ref 6–8.4)
RBC # BLD AUTO: 3.43 M/UL (ref 4.6–6.2)
SODIUM SERPL-SCNC: 138 MMOL/L (ref 136–145)
WBC # BLD AUTO: 6.62 K/UL (ref 3.9–12.7)

## 2023-02-28 PROCEDURE — 94761 N-INVAS EAR/PLS OXIMETRY MLT: CPT | Mod: HCNC

## 2023-02-28 PROCEDURE — 25000003 PHARM REV CODE 250: Mod: HCNC | Performed by: NURSE PRACTITIONER

## 2023-02-28 PROCEDURE — 97530 THERAPEUTIC ACTIVITIES: CPT | Mod: HCNC

## 2023-02-28 PROCEDURE — 85025 COMPLETE CBC W/AUTO DIFF WBC: CPT | Mod: HCNC | Performed by: NURSE PRACTITIONER

## 2023-02-28 PROCEDURE — 83735 ASSAY OF MAGNESIUM: CPT | Mod: HCNC | Performed by: HOSPITALIST

## 2023-02-28 PROCEDURE — 80053 COMPREHEN METABOLIC PANEL: CPT | Mod: HCNC | Performed by: NURSE PRACTITIONER

## 2023-02-28 PROCEDURE — 97110 THERAPEUTIC EXERCISES: CPT | Mod: HCNC

## 2023-02-28 PROCEDURE — 25000003 PHARM REV CODE 250: Mod: HCNC | Performed by: HOSPITALIST

## 2023-02-28 PROCEDURE — 25000003 PHARM REV CODE 250: Mod: HCNC | Performed by: INTERNAL MEDICINE

## 2023-02-28 PROCEDURE — 11000001 HC ACUTE MED/SURG PRIVATE ROOM: Mod: HCNC

## 2023-02-28 PROCEDURE — 36415 COLL VENOUS BLD VENIPUNCTURE: CPT | Mod: HCNC | Performed by: NURSE PRACTITIONER

## 2023-02-28 PROCEDURE — 97116 GAIT TRAINING THERAPY: CPT | Mod: HCNC

## 2023-02-28 RX ORDER — LANOLIN ALCOHOL/MO/W.PET/CERES
400 CREAM (GRAM) TOPICAL 2 TIMES DAILY
Status: COMPLETED | OUTPATIENT
Start: 2023-02-28 | End: 2023-02-28

## 2023-02-28 RX ADMIN — QUETIAPINE FUMARATE 12.5 MG: 25 TABLET ORAL at 09:02

## 2023-02-28 RX ADMIN — MAGNESIUM OXIDE TAB 400 MG (241.3 MG ELEMENTAL MG) 400 MG: 400 (241.3 MG) TAB at 09:02

## 2023-02-28 RX ADMIN — APIXABAN 2.5 MG: 2.5 TABLET, FILM COATED ORAL at 09:02

## 2023-02-28 RX ADMIN — HYDROCODONE BITARTRATE AND ACETAMINOPHEN 1 TABLET: 5; 325 TABLET ORAL at 07:02

## 2023-02-28 RX ADMIN — MAGNESIUM OXIDE TAB 400 MG (241.3 MG ELEMENTAL MG) 400 MG: 400 (241.3 MG) TAB at 03:02

## 2023-02-28 RX ADMIN — MUPIROCIN: 20 OINTMENT TOPICAL at 09:02

## 2023-02-28 RX ADMIN — HYDROCODONE BITARTRATE AND ACETAMINOPHEN 1 TABLET: 5; 325 TABLET ORAL at 09:02

## 2023-02-28 RX ADMIN — METOPROLOL TARTRATE 12.5 MG: 25 TABLET, FILM COATED ORAL at 09:02

## 2023-02-28 RX ADMIN — ATORVASTATIN CALCIUM 40 MG: 40 TABLET, FILM COATED ORAL at 09:02

## 2023-02-28 RX ADMIN — PANTOPRAZOLE SODIUM 40 MG: 40 TABLET, DELAYED RELEASE ORAL at 09:02

## 2023-02-28 RX ADMIN — LIDOCAINE 1 PATCH: 50 PATCH CUTANEOUS at 09:02

## 2023-02-28 RX ADMIN — FERROUS SULFATE TAB 325 MG (65 MG ELEMENTAL FE) 1 EACH: 325 (65 FE) TAB at 09:02

## 2023-02-28 RX ADMIN — SUCRALFATE 1 G: 1 SUSPENSION ORAL at 09:02

## 2023-02-28 RX ADMIN — MELATONIN TAB 3 MG 3 MG: 3 TAB at 09:02

## 2023-02-28 RX ADMIN — POTASSIUM CHLORIDE 20 MEQ: 1500 TABLET, EXTENDED RELEASE ORAL at 09:02

## 2023-02-28 RX ADMIN — ISOSORBIDE MONONITRATE 30 MG: 30 TABLET, EXTENDED RELEASE ORAL at 09:02

## 2023-02-28 RX ADMIN — HYDROCODONE BITARTRATE AND ACETAMINOPHEN 1 TABLET: 5; 325 TABLET ORAL at 12:02

## 2023-02-28 RX ADMIN — ALLOPURINOL 100 MG: 100 TABLET ORAL at 09:02

## 2023-02-28 NOTE — PLAN OF CARE
Problem: Adult Inpatient Plan of Care  Goal: Plan of Care Review  Outcome: Ongoing, Not Progressing  Pt awake and oriented to self; disoriented to place, time, and situation. Afebrile. Afib on monitor. BP stable. Pt frequently attempts to get OOB without calling for assistance; pt up to toilet multiple times throughout night with max 2 person assist. Pt educated on importance of calling for help due to increased weakness. PIV saline locked. Pt assisted with repositioning in bed. POC reviewed with pt, needs reinforcement. Will continue with current POC and update as needed.

## 2023-02-28 NOTE — ASSESSMENT & PLAN NOTE
Patient with acute kidney injury likely due to IVVD/dehydration secondary to hemorrhage SHAYLA is currently improving. Labs reviewed- Renal function/electrolytes with Estimated Creatinine Clearance: 38.6 mL/min (A) (based on SCr of 1.6 mg/dL (H)). according to latest data. Monitor urine output and serial BMP and adjust therapy as needed. Avoid nephrotoxins and renally dose meds for GFR listed above.

## 2023-02-28 NOTE — ASSESSMENT & PLAN NOTE
Family reports progressive decline for the past few months, patient with decreasing mobility and physical activity  Per family patient has been having symptoms of suspected dementia (irritability, intermittent confusion, repeating things, memory loss) for approximately 2 years  Plan ambulatory referral to Neurology on discharge for further evaluation  Delirium precautions  PT/OT recommends SNF placement.  Social work has been consulted  SLP recommends Minced & Moist Diet - IDDSI Level 5, Thin liquids - IDDSI Level 0 (no straws), aspiration precaution

## 2023-02-28 NOTE — PROGRESS NOTES
[facility] - Intensive Care ([facility])  [facility] Medicine  Progress Note    Patient Name: Anthony Biggs Jr.  MRN: [id number]  Patient Class: IP- Inpatient   Admission Date: 2/23/2023  Length of Stay: 4 days  Attending Physician: Estuardo Eddy MD  Primary Care Provider: Giuliano Moran MD        Subjective:     Principal Problem:Gastrointestinal hemorrhage with melena        HPI:  Anthony Biggs Jr. is a 82 y.o. male with Angiodysplasia of small intestine, Atrial fibrillation on Coumadin, Chronic diastolic heart failure, Chronic upper GI bleeding, CAD, Depression, Diabetes mellitus, HTN, HLD, Emphysema of lung, who initially presented to ED on 2/23/2023 with complaints of weakness and altered mental status.  Spouse reported that patient had decreased appetite and progressive weakness prior to ED presentation.  Additionally the previous day patient was found to have elevated INR of 8.2 and had been instructed to hold Coumadin.  In the ED patient was found to be hypotensive with hemoglobin of 5.8, INR > 10, melena on HERMILO.  Patient was initiated on Kcentra, and transfused 2 units of PRBC.  Given persistent hypotension critical care medicine was consulted, patient admitted to ICU.  GI consulted, patient underwent urgent bedside EGD which showed nonbleeding duodenal ulcer and AVMs        Overview/Hospital Course:  ICU course complicated by confusion and agitation requiring initiation of Precedex, which was able to be weaned off with patient transitioned to seroquel.  Spouse reports that patient has been declining over the past few months with increased confusion and weakness.  Patient stable for transfer of ICU to 02/25/2023.  Hospital medicine consulted to continue care.    Given hemorrhagic shock secondary to supratherapeutic INR, Coumadin is not the best anticoagulation choice for patient.  This was discussed with Cardiology and patient's family.  They are agreeable to starting patient on Eliquis instead.    PT/OT evaluation  ordered.  Recommended for SNF placement, Social work has been consulted    Due to concern for dysphagia, SLP was consulted.  Patient recommended for Minced & Moist Diet - IDDSI Level 5, Thin liquids - IDDSI Level 0 (no straws) and aspiration protocol      Interval History: NAEON. Patient denies new issues or complaints. Wife at bedside, updated.     Review of Systems   All other systems reviewed and are negative.  Objective:     Vital Signs (Most Recent):  Temp: 98.3 °F (36.8 °C) (02/27/23 1915)  Pulse: 95 (02/27/23 1933)  Resp: (!) 37 (02/27/23 1933)  BP: 130/77 (02/27/23 1955)  SpO2: 98 % (02/27/23 1933)   Vital Signs (24h Range):  Temp:  [97.9 °F (36.6 °C)-98.6 °F (37 °C)] 98.3 °F (36.8 °C)  Pulse:  [] 95  Resp:  [18-42] 37  SpO2:  [94 %-98 %] 98 %  BP: ()/(57-77) 130/77     Weight: 86.4 kg (190 lb 7.6 oz)  Body mass index is 29.83 kg/m².    Intake/Output Summary (Last 24 hours) at 2/27/2023 2218  Last data filed at 2/27/2023 1736  Gross per 24 hour   Intake 610 ml   Output --   Net 610 ml      Physical Exam  Constitutional:       General: He is not in acute distress.     Appearance: Normal appearance.   Cardiovascular:      Rate and Rhythm: Normal rate and regular rhythm.      Heart sounds: No murmur heard.  Pulmonary:      Effort: Pulmonary effort is normal. No respiratory distress.      Breath sounds: Normal breath sounds. No wheezing.   Abdominal:      General: There is no distension.      Palpations: Abdomen is soft.      Tenderness: There is no abdominal tenderness.   Neurological:      Mental Status: He is alert.       Significant Labs: All pertinent labs within the past 24 hours have been reviewed.  CBC:   Recent Labs   Lab 02/26/23 0314 02/27/23  0531   WBC 6.69 7.21   HGB 10.0* 9.9*   HCT 30.7* 29.9*    240     CMP:   Recent Labs   Lab 02/26/23 0314 02/27/23  0531    140   K 3.3* 3.1*    102   CO2 27 23   GLU 98 95   BUN 45* 41*   CREATININE 1.5* 1.6*   CALCIUM 9.4 8.9    PROT 6.5 6.2   ALBUMIN 2.9* 2.8*   BILITOT 0.9 0.8   ALKPHOS 49* 48*   AST 44* 38   ALT 19 19   ANIONGAP 11 15       Significant Imaging: I have reviewed all pertinent imaging results/findings within the past 24 hours.      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 Patient is currently in atrial fibrillation.[id number] Score: 4.  Anticoagulation indicated. Anticoagulation done with Apixaban.    After discussion with Cardiology, family agreeable to discontinuing Coumadin and switching patient anticoagulation to Eliquis, will start this evening        SHAYLA (acute kidney injury)  Patient with acute kidney injury likely due to IVVD/dehydration secondary to hemorrhage SHAYLA is currently improving. Labs reviewed- Renal function/electrolytes with Estimated Creatinine Clearance: 38.6 mL/min (A) (based on SCr of 1.6 mg/dL (H)). according to latest data. Monitor urine output and serial BMP and adjust therapy as needed. Avoid nephrotoxins and renally dose meds for GFR listed above.     Chronic diastolic heart failure  Patient is identified as having Diastolic (HFpEF) heart failure that is Chronic. CHF is currently controlled. Latest ECHO performed and demonstrates- Results for orders placed  during the hospital encounter of 11/11/22    Echo    Interpretation Summary  · The left ventricle is normal in size with concentric remodeling and normal systolic function.  · Severe left atrial enlargement.  · The estimated ejection fraction is 60%.  · Atrial fibrillation observed.  · Normal right ventricular size with normal right ventricular systolic function.  · Moderate right atrial enlargement.  · Moderate aortic regurgitation.  · Mild-to-moderate mitral regurgitation.  · Mild tricuspid regurgitation.  · Normal central venous pressure (3 mmHg).  · The estimated PA systolic pressure is 34 mmHg.  · The ascending aorta is moderately dilated.  .  Hold diuretics and monitor clinical status closely. Monitor on telemetry. Patient is off CHF pathway.  Monitor strict Is&Os and daily weights.   Last BNP reviewed- and noted below   Recent Labs   Lab 02/23/23  1024   *   .          Hypertension associated with diabetes  Normotensive  BP meds held due to hemorrhagic shock  Resume home meds as BP tolerates    LAILA (iron deficiency anemia)  Secondary to angiodysplasia in small intestine   S/p 2 units PRBC transfusion on 02/23  Continue iron supplement  Monitor H/H      Hyperlipidemia associated with type 2 diabetes mellitus  Continue Statin    Cognitive decline  Family reports progressive decline for the past few months, patient with decreasing mobility and physical activity  Per family patient has been having symptoms of suspected dementia (irritability, intermittent confusion, repeating things, memory loss) for approximately 2 years  Plan ambulatory referral to Neurology on discharge for further evaluation  Delirium precautions  PT/OT recommends SNF placement.  Social work has been consulted  SLP recommends Minced & Moist Diet - IDDSI Level 5, Thin liquids - IDDSI Level 0 (no straws), aspiration precaution        VTE Risk Mitigation (From admission, onward)         Ordered     apixaban tablet 2.5 mg  2 times daily          02/26/23 1413     Reason for No Pharmacological VTE Prophylaxis  Once        Question:  Reasons:  Answer:  Active Bleeding    02/23/23 1135     IP VTE HIGH RISK PATIENT  Once         02/23/23 1135     Place sequential compression device  Until discontinued         02/23/23 1135                Discharge Planning   RACHNA:      Code Status: Full Code   Is the patient medically ready for discharge?:     Reason for patient still in hospital (select all that apply): Patient trending condition, Treatment and Pending disposition  Discharge Plan A: Home Health          Estuardo Eddy MD  Department of Hospital Medicine   '[facility] - Intensive Care ([facility])

## 2023-02-28 NOTE — PLAN OF CARE
Discussed SNF placement with patient, daughter, and daughter in law at bedside. Patient is now agreeable to SNF at discharge. Daughters would like referral to be sent to [facility] as well. Referral sent. [facility] called in, [facility] faxed.

## 2023-02-28 NOTE — PLAN OF CARE
Pt AAO x1 to self  Afib on monitor  VSS, afebrile  PT/OT at bedside  Plan for pt to go to rehab. See case management note.  Pt transferred closer to nurses station this afternoon.   Safety precautions maintained. Bed alarm and chair alarm in place.  Family at bedside.

## 2023-02-28 NOTE — SUBJECTIVE & OBJECTIVE
Interval History: NAEON. Pateint sitting in chair, working with therapy today, family at bedside. Family reports patient with low back and hip pain, controlled with current PRN regimen. SNF placement pending.    Review of Systems   All other systems reviewed and are negative.  Objective:     Vital Signs (Most Recent):  Temp: 98.2 °F (36.8 °C) (02/28/23 0330)  Pulse: 94 (02/28/23 0741)  Resp: 20 (02/28/23 0741)  BP: 123/84 (02/28/23 0926)  SpO2: (!) 94 % (02/28/23 0741)   Vital Signs (24h Range):  Temp:  [97.9 °F (36.6 °C)-99.1 °F (37.3 °C)] 98.2 °F (36.8 °C)  Pulse:  [] 94  Resp:  [18-42] 20  SpO2:  [93 %-98 %] 94 %  BP: ()/(57-84) 123/84     Weight: 86.4 kg (190 lb 7.6 oz)  Body mass index is 29.83 kg/m².    Intake/Output Summary (Last 24 hours) at 2/28/2023 1019  Last data filed at 2/27/2023 1736  Gross per 24 hour   Intake 370 ml   Output --   Net 370 ml      Physical Exam  Constitutional:       General: He is not in acute distress.     Appearance: Normal appearance.   Cardiovascular:      Heart sounds: No murmur heard.  Pulmonary:      Effort: No respiratory distress.   Neurological:      Mental Status: He is alert.       Significant Labs: All pertinent labs within the past 24 hours have been reviewed.  CBC:   Recent Labs   Lab 02/27/23  0531 02/28/23 0422   WBC 7.21 6.62   HGB 9.9* 10.1*   HCT 29.9* 30.7*    269     CMP:   Recent Labs   Lab 02/27/23  0531 02/28/23 0423    138   K 3.1* 3.5    101   CO2 23 22*   GLU 95 112*   BUN 41* 33*   CREATININE 1.6* 1.4   CALCIUM 8.9 8.7   PROT 6.2 6.3   ALBUMIN 2.8* 2.9*   BILITOT 0.8 0.8   ALKPHOS 48* 51*   AST 38 36   ALT 19 21   ANIONGAP 15 15       Significant Imaging: I have reviewed all pertinent imaging results/findings within the past 24 hours.

## 2023-02-28 NOTE — PT/OT/SLP PROGRESS
Occupational Therapy      Patient Name:  [place] DJEA Biggs JrLester   MRN:  [id number]    S: PT REQ MAX ENCOURAGEMENT TO PARTICIPATE WITH THERAPY SESSION  O:  2/28/2023

## 2023-02-28 NOTE — SUBJECTIVE & OBJECTIVE
Interval History: NAEON. Patient denies new issues or complaints. Wife at bedside, updated.     Review of Systems   All other systems reviewed and are negative.  Objective:     Vital Signs (Most Recent):  Temp: 98.3 °F (36.8 °C) (02/27/23 1915)  Pulse: 95 (02/27/23 1933)  Resp: (!) 37 (02/27/23 1933)  BP: 130/77 (02/27/23 1955)  SpO2: 98 % (02/27/23 1933)   Vital Signs (24h Range):  Temp:  [97.9 °F (36.6 °C)-98.6 °F (37 °C)] 98.3 °F (36.8 °C)  Pulse:  [] 95  Resp:  [18-42] 37  SpO2:  [94 %-98 %] 98 %  BP: ()/(57-77) 130/77     Weight: 86.4 kg (190 lb 7.6 oz)  Body mass index is 29.83 kg/m².    Intake/Output Summary (Last 24 hours) at 2/27/2023 2218  Last data filed at 2/27/2023 1736  Gross per 24 hour   Intake 610 ml   Output --   Net 610 ml      Physical Exam  Constitutional:       General: He is not in acute distress.     Appearance: Normal appearance.   Cardiovascular:      Rate and Rhythm: Normal rate and regular rhythm.      Heart sounds: No murmur heard.  Pulmonary:      Effort: Pulmonary effort is normal. No respiratory distress.      Breath sounds: Normal breath sounds. No wheezing.   Abdominal:      General: There is no distension.      Palpations: Abdomen is soft.      Tenderness: There is no abdominal tenderness.   Neurological:      Mental Status: He is alert.       Significant Labs: All pertinent labs within the past 24 hours have been reviewed.  CBC:   Recent Labs   Lab 02/26/23 0314 02/27/23  0531   WBC 6.69 7.21   HGB 10.0* 9.9*   HCT 30.7* 29.9*    240     CMP:   Recent Labs   Lab 02/26/23 0314 02/27/23  0531    140   K 3.3* 3.1*    102   CO2 27 23   GLU 98 95   BUN 45* 41*   CREATININE 1.5* 1.6*   CALCIUM 9.4 8.9   PROT 6.5 6.2   ALBUMIN 2.9* 2.8*   BILITOT 0.9 0.8   ALKPHOS 49* 48*   AST 44* 38   ALT 19 19   ANIONGAP 11 15       Significant Imaging: I have reviewed all pertinent imaging results/findings within the past 24 hours.

## 2023-02-28 NOTE — PROGRESS NOTES
[facility] - Intensive Care ([facility])  [facility] Medicine  Progress Note    Patient Name: Anthony Biggs Jr.  MRN: [id number]  Patient Class: IP- Inpatient   Admission Date: 2/23/2023  Length of Stay: 5 days  Attending Physician: Estuardo Eddy MD  Primary Care Provider: Giuliano Moran MD        Subjective:     Principal Problem:Gastrointestinal hemorrhage with melena        HPI:  Anthony Biggs Jr. is a 82 y.o. male with Angiodysplasia of small intestine, Atrial fibrillation on Coumadin, Chronic diastolic heart failure, Chronic upper GI bleeding, CAD, Depression, Diabetes mellitus, HTN, HLD, Emphysema of lung, who initially presented to ED on 2/23/2023 with complaints of weakness and altered mental status.  Spouse reported that patient had decreased appetite and progressive weakness prior to ED presentation.  Additionally the previous day patient was found to have elevated INR of 8.2 and had been instructed to hold Coumadin.  In the ED patient was found to be hypotensive with hemoglobin of 5.8, INR > 10, melena on HERMILO.  Patient was initiated on Kcentra, and transfused 2 units of PRBC.  Given persistent hypotension critical care medicine was consulted, patient admitted to ICU.  GI consulted, patient underwent urgent bedside EGD which showed nonbleeding duodenal ulcer and AVMs      Overview/Hospital Course:  ICU course complicated by confusion and agitation requiring initiation of Precedex, which was able to be weaned off with patient transitioned to seroquel.  Spouse reports that patient has been declining over the past few months with increased confusion and weakness.  Patient stable for transfer of ICU to 02/25/2023.  Hospital medicine consulted to continue care.    Given hemorrhagic shock secondary to supratherapeutic INR, Coumadin is not the best anticoagulation choice for patient.  This was discussed with Cardiology and patient's family.  They are agreeable to starting patient on Eliquis instead.    PT/OT evaluation  ordered.  Recommended for SNF placement, Social work has been consulted    Due to concern for dysphagia, SLP was consulted.  Patient recommended for Minced & Moist Diet - IDDSI Level 5, Thin liquids - IDDSI Level 0 (no straws) and aspiration protocol      Interval History: NAEON. Pateint sitting in chair, working with therapy today, family at bedside. Family reports patient with low back and hip pain, controlled with current PRN regimen. SNF placement pending.    Review of Systems   All other systems reviewed and are negative.  Objective:     Vital Signs (Most Recent):  Temp: 98.2 °F (36.8 °C) (02/28/23 0330)  Pulse: 94 (02/28/23 0741)  Resp: 20 (02/28/23 0741)  BP: 123/84 (02/28/23 0926)  SpO2: (!) 94 % (02/28/23 0741)   Vital Signs (24h Range):  Temp:  [97.9 °F (36.6 °C)-99.1 °F (37.3 °C)] 98.2 °F (36.8 °C)  Pulse:  [] 94  Resp:  [18-42] 20  SpO2:  [93 %-98 %] 94 %  BP: ()/(57-84) 123/84     Weight: 86.4 kg (190 lb 7.6 oz)  Body mass index is 29.83 kg/m².    Intake/Output Summary (Last 24 hours) at 2/28/2023 1019  Last data filed at 2/27/2023 1736  Gross per 24 hour   Intake 370 ml   Output --   Net 370 ml      Physical Exam  Constitutional:       General: He is not in acute distress.     Appearance: Normal appearance.   Cardiovascular:      Heart sounds: No murmur heard.  Pulmonary:      Effort: No respiratory distress.   Neurological:      Mental Status: He is alert.       Significant Labs: All pertinent labs within the past 24 hours have been reviewed.  CBC:   Recent Labs   Lab 02/27/23  0531 02/28/23 0422   WBC 7.21 6.62   HGB 9.9* 10.1*   HCT 29.9* 30.7*    269     CMP:   Recent Labs   Lab 02/27/23  0531 02/28/23 0423    138   K 3.1* 3.5    101   CO2 23 22*   GLU 95 112*   BUN 41* 33*   CREATININE 1.6* 1.4   CALCIUM 8.9 8.7   PROT 6.2 6.3   ALBUMIN 2.8* 2.9*   BILITOT 0.8 0.8   ALKPHOS 48* 51*   AST 38 36   ALT 19 21   ANIONGAP 15 15       Significant Imaging: I have reviewed  all pertinent imaging results/findings within the past 24 hours.      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 Patient is currently in atrial fibrillation.[id number] Score: 4.  Anticoagulation indicated. Anticoagulation done with Apixaban.    After discussion with Cardiology, family agreeable to discontinuing Coumadin and switching patient anticoagulation to Eliquis    SHAYLA (acute kidney injury)  Patient with acute kidney injury likely due to IVVD/dehydration secondary to hemorrhage SHAYLA is currently improving. Labs reviewed- Renal function/electrolytes with Estimated Creatinine Clearance: 44.2 mL/min (based on SCr of 1.4 mg/dL). according to latest data. Monitor urine output and serial BMP and adjust therapy as needed. Avoid nephrotoxins and renally dose meds for GFR listed above.    Chronic diastolic heart failure  Patient is identified as having Diastolic (HFpEF) heart failure that is Chronic. CHF is currently controlled. Latest ECHO performed and demonstrates- Results for orders placed during the hospital encounter of 11/11/22    Echo    Interpretation Summary  · The left ventricle is normal in size with concentric remodeling and normal systolic function.  · Severe left atrial  enlargement.  · The estimated ejection fraction is 60%.  · Atrial fibrillation observed.  · Normal right ventricular size with normal right ventricular systolic function.  · Moderate right atrial enlargement.  · Moderate aortic regurgitation.  · Mild-to-moderate mitral regurgitation.  · Mild tricuspid regurgitation.  · Normal central venous pressure (3 mmHg).  · The estimated PA systolic pressure is 34 mmHg.  · The ascending aorta is moderately dilated.  .  Hold diuretics and monitor clinical status closely. Monitor on telemetry. Patient is off CHF pathway.  Monitor strict Is&Os and daily weights.   Last BNP reviewed- and noted below   Recent Labs   Lab 02/23/23  1024   *   .      Hypertension associated with diabetes  Normotensive  BP meds held due to hemorrhagic shock  Resume home meds as BP tolerates    LAILA (iron deficiency anemia)  Secondary to angiodysplasia in small intestine   S/p 2 units PRBC transfusion on 02/23  Continue iron supplement  Monitor H/H      Hyperlipidemia associated with type 2 diabetes mellitus  Continue statin    Cognitive decline  Family reports progressive decline for the past few months, patient with decreasing mobility and physical activity  Per family patient has been having symptoms of suspected dementia (irritability, intermittent confusion, repeating things, memory loss) for approximately 2 years  Plan ambulatory referral to Neurology on discharge for further evaluation  Delirium precautions  PT/OT recommends SNF placement.  Social work has been consulted  SLP recommends Minced & Moist Diet - IDDSI Level 5, Thin liquids - IDDSI Level 0 (no straws), aspiration precaution        VTE Risk Mitigation (From admission, onward)         Ordered     apixaban tablet 2.5 mg  2 times daily         02/26/23 1413     Reason for No Pharmacological VTE Prophylaxis  Once        Question:  Reasons:  Answer:  Active Bleeding    02/23/23 1135     IP VTE HIGH RISK PATIENT  Once         02/23/23  1135     Place sequential compression device  Until discontinued         02/23/23 1135                Discharge Planning   RACHNA:      Code Status: Full Code   Is the patient medically ready for discharge?:     Reason for patient still in hospital (select all that apply): Pending disposition          Estuardo Eddy MD  Department of Hospital Medicine   [facility] - Intensive Care ([facility])

## 2023-02-28 NOTE — PLAN OF CARE
Pt tolerated interventions fair. Required MOD A for bed mobility, MIN A for STS, ambulated 80ft MIN A using RW. Recommending SNF upon d/c.

## 2023-02-28 NOTE — PHYSICIAN QUERY
PT Name: Anthony Biggs Jr.  MR #: [id number]     DOCUMENTATION CLARIFICATION      CDS Nani Sosa RN, BSN        Contact Information:    [email or web address]          This form is a permanent document in the medical record.    Query Date: February 28, 2023    By submitting this query, we are merely seeking further clarification of documentation to reflect the severity of illness of your patient. Please utilize your independent clinical judgment when addressing the question(s) below.     The Medical Record contains the following:   Indicators   Supporting Clinical Findings Location in Medical Record   X   Gastrointestinal Ulcer Documented Gastrointestinal hemorrhage with melena  Duodenal ulcer is likely source of bleeding in the setting of a supratherapeutic inr.  AVMs treated during endoscopy.  2/25 GI PN    X   EGD/Colonscopy Findings Impression:              - No gross lesions in the entire esophagus.   - No gross lesions in the entire stomach.   - Non-bleeding duodenal ulcer with no stigmata of bleeding.   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 Treated with argon plasma coagulation (APC).  2/23 EGD     Pathology Findings      Radiology Findings      Treatment/Medication     X   Other 5.8/25.1 --> 8.4/25.1 --> 9.8/29.4 --> 10.8/32.7         2 nits pRBC transfused     81yo male with PMHx including Afib on Coumadin who reported to the ED this morning after being sent by his GP for elevated INR. Wife is bedside. She reports that he had his INR checked yesterday and they received a call last night saying his INR was 8. Upon arrival to the ED, INR >10 with melena on rectal exam. 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 These were treated.  2/23 --> 2/25 H&H labs         2/23 GI H&P             Please further specify the acuity of the    duodenal   ulcer:    [  X] Acute     [   ] Acute on chronic      [   ] Chronic     [   ] Unspecified     [   ] Other (please specify):  ___________     [   ] Clinically Undetermined           Please document in your progress notes daily for the duration of treatment until resolved, and include in your discharge summary.  Form No. [id number]

## 2023-02-28 NOTE — ASSESSMENT & PLAN NOTE
Patient with Permanent atrial fibrillation which is controlled currently. Patient is currently in atrial fibrillation.[id number] Score: 4.  Anticoagulation indicated. Anticoagulation done with Apixaban.    After discussion with Cardiology, family agreeable to discontinuing Coumadin and switching patient anticoagulation to Eliquis

## 2023-02-28 NOTE — PHYSICIAN QUERY
PT Name: Anthony Biggs Jr.  MR #: [id number]    DOCUMENTATION CLARIFICATION      CDS Nani Sosa RN, BSN        Contact Information:    [email or web address]            This form is a permanent document in the medical record.     Query Date: February 28, 2023    By submitting this query, we are merely seeking further clarification of documentation. Please utilize your independent clinical judgment when addressing the question(s) below.    The Medical Record contains the following:   Indicators   Supporting Clinical Findings Location in Medical Record   X   Hypertension associated with diabetes documented Hypertension associated with diabetes 2/25 HM consult    X   Chronic condition(s) 82 y.o. male with Angiodysplasia of small intestine, Atrial fibrillation on Coumadin, Chronic diastolic heart failure, Chronic upper GI bleeding, CAD, Depression, Diabetes mellitus, HTN, HLD, Emphysema of lung, who initially presented to ED on 2/23/2023 with complaints of weakness and altered mental status.     In the ED patient was found to be hypotensive with hemoglobin of 5.8, INR > 10, melena on HERMILO.  Patient was initiated on Kcentra, and transfused 2 units of PRBC.  Given persistent hypotension critical care medicine was consulted, patient admitted to ICU.  GI consulted, patient underwent urgent bedside EGD which showed nonbleeding duodenal ulcer and AVMs    2/25 HM consult    X   Vital signs BP: ()/(58-85) 115/73 2/25 HM consult    X   Treatment/Medication Normotensive  BP meds held due to hemorrhagic shock  Resume home meds as BP tolerates    2/25 HM consult     Other       Provider, please clarify the relationship between the Hypertension and Diabetes Mellitus    [   ] Hypertension is a manifestation of Diabetes Mellitus (Secondary Hypertension)     [  X]  Hypertension is not a manifestation of Diabetes Mellitus (Essential Hypertension)     [   ] Other Clarification (please specify): ____________     [  ] Clinically  Undetermined       Please document in your progress notes daily for the duration of treatment, until resolved, and include in your discharge summary.

## 2023-02-28 NOTE — PT/OT/SLP PROGRESS
Physical Therapy Treatment    Patient Name:  Anthony Biggs Jr.   MRN:  [id number]    Recommendations:     Discharge Recommendations: nursing facility, skilled  Discharge Equipment Recommendations:  (TBD at next level of care)  Barriers to discharge: Decreased caregiver support    Assessment:     Anthony Biggs Jr. is a 82 y.o. male admitted with a medical diagnosis of Gastrointestinal hemorrhage with melena.  He presents with the following impairments/functional limitations: weakness, impaired endurance, impaired functional mobility, gait instability, impaired balance, pain, impaired cardiopulmonary response to activity, decreased safety awareness, decreased lower extremity function, decreased coordination.    Rehab Prognosis: Fair; patient would benefit from acute skilled PT services to address these deficits and reach maximum level of function.    Recent Surgery: Procedure(s) (LRB):  EGD (ESOPHAGOGASTRODUODENOSCOPY) (N/A) 5 Days Post-Op    Plan:     During this hospitalization, patient to be seen 3 x/week to address the identified rehab impairments via gait training, therapeutic activities, therapeutic exercises and progress toward the following goals:    Plan of Care Expires:  03/12/23    Subjective     Chief Complaint: Pt is experiencing severe back pain today.  Patient/Family Comments/goals:  Pain/Comfort:  Pain Rating 1: 10/10  Location - Side 1: Bilateral  Location - Orientation 1: generalized  Location 1: back  Pain Addressed 1: Reposition, Distraction  Pain Rating 2: 10/10  Location - Side 2: Right  Location - Orientation 2: generalized  Location 2: hip  Pain Addressed 2: Reposition, Distraction    Objective:     Communicated with nurse prior to session.  Patient found supine with peripheral IV, pulse ox (continuous), telemetry, blood pressure cuff upon PT entry to room.     General Precautions: Standard, fall  Orthopedic Precautions: N/A  Braces: N/A  Respiratory Status: Room air     Functional Mobility:  Bed  Mobility:     Rolling Left:  moderate assistance  Scooting: moderate assistance  Supine to Sit: moderate assistance  Transfers:     Sit to Stand:  minimum assistance with rolling walker  Bed to Chair: minimum assistance with  rolling walker  using  Step Transfer  Gait: Ambulated 80ft MIN A using RW, fatigued quickly, verbal cuing for proper use of RW  Balance: Demonstrated fair sitting balance, fair dynamic balance during gait  No c/o dizziness or SOB  Back pain improved with movement  Demonstrated increased confusion  Blood Pressure  Initial supine: 132/82  Initial standin/64  Standin/73  Sitting after gait: 123/84    AM-PAC 6 CLICK MOBILITY  Turning over in bed (including adjusting bedclothes, sheets and blankets)?: 2  Sitting down on and standing up from a chair with arms (e.g., wheelchair, bedside commode, etc.): 3  Moving from lying on back to sitting on the side of the bed?: 2  Moving to and from a bed to a chair (including a wheelchair)?: 3  Need to walk in hospital room?: 3  Climbing 3-5 steps with a railing?: 1  Basic Mobility Total Score: 14     Treatment & Education:  Pt tolerated interventions fair. Reviewed importance of OOB activities and HEP (hip flex, LAQ, ham curls, ankle pumps) in order to maintain/regain strength. Reviewed proper use of RW for safety and to reduce risk of falling. Reviewed increased risk of falling due to weakness, instructed to utilize call bell for assistance for all transfers. Pt agreeable to all requests.    Patient left up in chair with all lines intact, call button in reach, chair alarm on, and daughters present..    GOALS:   Multidisciplinary Problems       Physical Therapy Goals          Problem: Physical Therapy    Goal Priority Disciplines Outcome Goal Variances Interventions   Physical Therapy Goal     PT, PT/OT Ongoing, Progressing     Description: PT eval complete. The following goals will be met in 14 days  1. Pt MIN A with bed mobility  2. Pt transfer  bed to chair with RW and CGA  3. Pt amb 150 ft with RW and MIN A                     Time Tracking:     PT Received On: 02/28/23  PT Start Time: 0855     PT Stop Time: 0920  PT Total Time (min): 25 min     Billable Minutes: Gait Training 15min and Therapeutic Activity 10min    Treatment Type: Treatment  PT/PTA: PT     PTA Visit Number: 0     02/28/2023

## 2023-02-28 NOTE — ASSESSMENT & PLAN NOTE
Patient with acute kidney injury likely due to IVVD/dehydration secondary to hemorrhage SHAYLA is currently improving. Labs reviewed- Renal function/electrolytes with Estimated Creatinine Clearance: 44.2 mL/min (based on SCr of 1.4 mg/dL). according to latest data. Monitor urine output and serial BMP and adjust therapy as needed. Avoid nephrotoxins and renally dose meds for GFR listed above.

## 2023-03-01 LAB
ALBUMIN SERPL BCP-MCNC: 3.2 G/DL (ref 3.5–5.2)
ALP SERPL-CCNC: 56 U/L (ref 55–135)
ALT SERPL W/O P-5'-P-CCNC: 31 U/L (ref 10–44)
ANION GAP SERPL CALC-SCNC: 13 MMOL/L (ref 8–16)
AST SERPL-CCNC: 46 U/L (ref 10–40)
BACTERIA BLD CULT: NORMAL
BASOPHILS # BLD AUTO: 0.01 K/UL (ref 0–0.2)
BASOPHILS NFR BLD: 0.2 % (ref 0–1.9)
BILIRUB SERPL-MCNC: 0.9 MG/DL (ref 0.1–1)
BUN SERPL-MCNC: 34 MG/DL (ref 8–23)
CALCIUM SERPL-MCNC: 9.2 MG/DL (ref 8.7–10.5)
CHLORIDE SERPL-SCNC: 102 MMOL/L (ref 95–110)
CO2 SERPL-SCNC: 23 MMOL/L (ref 23–29)
CREAT SERPL-MCNC: 1.5 MG/DL (ref 0.5–1.4)
DIFFERENTIAL METHOD: ABNORMAL
EOSINOPHIL # BLD AUTO: 0.2 K/UL (ref 0–0.5)
EOSINOPHIL NFR BLD: 3.3 % (ref 0–8)
ERYTHROCYTE [DISTWIDTH] IN BLOOD BY AUTOMATED COUNT: 15.8 % (ref 11.5–14.5)
EST. GFR  (NO RACE VARIABLE): 47 ML/MIN/1.73 M^2
GLUCOSE SERPL-MCNC: 110 MG/DL (ref 70–110)
HCT VFR BLD AUTO: 32.1 % (ref 40–54)
HGB BLD-MCNC: 10.6 G/DL (ref 14–18)
IMM GRANULOCYTES # BLD AUTO: 0.04 K/UL (ref 0–0.04)
IMM GRANULOCYTES NFR BLD AUTO: 0.6 % (ref 0–0.5)
LYMPHOCYTES # BLD AUTO: 1.3 K/UL (ref 1–4.8)
LYMPHOCYTES NFR BLD: 20.6 % (ref 18–48)
MAGNESIUM SERPL-MCNC: 1.9 MG/DL (ref 1.6–2.6)
MCH RBC QN AUTO: 29.9 PG (ref 27–31)
MCHC RBC AUTO-ENTMCNC: 33 G/DL (ref 32–36)
MCV RBC AUTO: 90 FL (ref 82–98)
MONOCYTES # BLD AUTO: 0.5 K/UL (ref 0.3–1)
MONOCYTES NFR BLD: 8 % (ref 4–15)
NEUTROPHILS # BLD AUTO: 4.2 K/UL (ref 1.8–7.7)
NEUTROPHILS NFR BLD: 67.3 % (ref 38–73)
NRBC BLD-RTO: 0 /100 WBC
PLATELET # BLD AUTO: 291 K/UL (ref 150–450)
PMV BLD AUTO: 9.6 FL (ref 9.2–12.9)
POCT GLUCOSE: 118 MG/DL (ref 70–110)
POCT GLUCOSE: 130 MG/DL (ref 70–110)
POCT GLUCOSE: 160 MG/DL (ref 70–110)
POCT GLUCOSE: 194 MG/DL (ref 70–110)
POTASSIUM SERPL-SCNC: 3.8 MMOL/L (ref 3.5–5.1)
PROT SERPL-MCNC: 7.1 G/DL (ref 6–8.4)
RBC # BLD AUTO: 3.55 M/UL (ref 4.6–6.2)
SODIUM SERPL-SCNC: 138 MMOL/L (ref 136–145)
WBC # BLD AUTO: 6.27 K/UL (ref 3.9–12.7)

## 2023-03-01 PROCEDURE — 83735 ASSAY OF MAGNESIUM: CPT | Mod: HCNC | Performed by: HOSPITALIST

## 2023-03-01 PROCEDURE — 25000003 PHARM REV CODE 250: Mod: HCNC | Performed by: NURSE PRACTITIONER

## 2023-03-01 PROCEDURE — 36415 COLL VENOUS BLD VENIPUNCTURE: CPT | Mod: HCNC | Performed by: NURSE PRACTITIONER

## 2023-03-01 PROCEDURE — 97116 GAIT TRAINING THERAPY: CPT | Mod: HCNC

## 2023-03-01 PROCEDURE — 97530 THERAPEUTIC ACTIVITIES: CPT | Mod: HCNC

## 2023-03-01 PROCEDURE — 11000001 HC ACUTE MED/SURG PRIVATE ROOM: Mod: HCNC

## 2023-03-01 PROCEDURE — 25000003 PHARM REV CODE 250: Mod: HCNC | Performed by: INTERNAL MEDICINE

## 2023-03-01 PROCEDURE — 80053 COMPREHEN METABOLIC PANEL: CPT | Mod: HCNC | Performed by: NURSE PRACTITIONER

## 2023-03-01 PROCEDURE — 85025 COMPLETE CBC W/AUTO DIFF WBC: CPT | Mod: HCNC | Performed by: NURSE PRACTITIONER

## 2023-03-01 PROCEDURE — 25000003 PHARM REV CODE 250: Mod: HCNC | Performed by: HOSPITALIST

## 2023-03-01 RX ORDER — SODIUM CHLORIDE 9 MG/ML
INJECTION, SOLUTION INTRAVENOUS CONTINUOUS
Status: DISCONTINUED | OUTPATIENT
Start: 2023-03-01 | End: 2023-03-02

## 2023-03-01 RX ADMIN — SENNOSIDES AND DOCUSATE SODIUM 2 TABLET: 50; 8.6 TABLET ORAL at 09:03

## 2023-03-01 RX ADMIN — METOPROLOL TARTRATE 12.5 MG: 25 TABLET, FILM COATED ORAL at 09:03

## 2023-03-01 RX ADMIN — ALLOPURINOL 100 MG: 100 TABLET ORAL at 09:03

## 2023-03-01 RX ADMIN — ATORVASTATIN CALCIUM 40 MG: 40 TABLET, FILM COATED ORAL at 09:03

## 2023-03-01 RX ADMIN — SODIUM CHLORIDE: 9 INJECTION, SOLUTION INTRAVENOUS at 10:03

## 2023-03-01 RX ADMIN — FERROUS SULFATE TAB 325 MG (65 MG ELEMENTAL FE) 1 EACH: 325 (65 FE) TAB at 09:03

## 2023-03-01 RX ADMIN — MELATONIN TAB 3 MG 3 MG: 3 TAB at 09:03

## 2023-03-01 RX ADMIN — QUETIAPINE FUMARATE 12.5 MG: 25 TABLET ORAL at 09:03

## 2023-03-01 RX ADMIN — APIXABAN 2.5 MG: 2.5 TABLET, FILM COATED ORAL at 09:03

## 2023-03-01 RX ADMIN — PANTOPRAZOLE SODIUM 40 MG: 40 TABLET, DELAYED RELEASE ORAL at 09:03

## 2023-03-01 RX ADMIN — LIDOCAINE 1 PATCH: 50 PATCH CUTANEOUS at 10:03

## 2023-03-01 RX ADMIN — HYDROCODONE BITARTRATE AND ACETAMINOPHEN 1 TABLET: 5; 325 TABLET ORAL at 02:03

## 2023-03-01 RX ADMIN — SUCRALFATE 1 G: 1 SUSPENSION ORAL at 09:03

## 2023-03-01 NOTE — SUBJECTIVE & OBJECTIVE
Interval History: NAEON. Pateint sitting in chair, no new issues. SNF placement pending.    Review of Systems   All other systems reviewed and are negative.  Objective:     Vital Signs (Most Recent):  Temp: 98.4 °F (36.9 °C) (03/01/23 1200)  Pulse: 93 (03/01/23 1200)  Resp: (!) 22 (03/01/23 1200)  BP: 124/62 (03/01/23 1200)  SpO2: 97 % (03/01/23 1200)   Vital Signs (24h Range):  Temp:  [98 °F (36.7 °C)-98.5 °F (36.9 °C)] 98.4 °F (36.9 °C)  Pulse:  [] 93  Resp:  [22-33] 22  SpO2:  [94 %-100 %] 97 %  BP: ()/(52-78) 124/62     Weight: 86.4 kg (190 lb 7.6 oz)  Body mass index is 29.83 kg/m².    Intake/Output Summary (Last 24 hours) at 3/1/2023 1522  Last data filed at 3/1/2023 0548  Gross per 24 hour   Intake --   Output 675 ml   Net -675 ml      Physical Exam  Constitutional:       General: He is not in acute distress.     Appearance: Normal appearance.   Cardiovascular:      Rate and Rhythm: Normal rate and regular rhythm.      Heart sounds: No murmur heard.  Pulmonary:      Effort: Pulmonary effort is normal. No respiratory distress.      Breath sounds: Normal breath sounds. No wheezing.   Abdominal:      General: There is no distension.      Palpations: Abdomen is soft.      Tenderness: There is no abdominal tenderness.   Neurological:      Mental Status: He is alert.       Significant Labs: All pertinent labs within the past 24 hours have been reviewed.  CBC:   Recent Labs   Lab 02/28/23 0422 03/01/23  0512   WBC 6.62 6.27   HGB 10.1* 10.6*   HCT 30.7* 32.1*    291     CMP:   Recent Labs   Lab 02/28/23 0423 03/01/23  0512    138   K 3.5 3.8    102   CO2 22* 23   * 110   BUN 33* 34*   CREATININE 1.4 1.5*   CALCIUM 8.7 9.2   PROT 6.3 7.1   ALBUMIN 2.9* 3.2*   BILITOT 0.8 0.9   ALKPHOS 51* 56   AST 36 46*   ALT 21 31   ANIONGAP 15 13       Significant Imaging: I have reviewed all pertinent imaging results/findings within the past 24 hours.

## 2023-03-01 NOTE — PLAN OF CARE
Pt up in chair this am. Pt w/ unsteady gait, assist of 2 to chair. Pt did sleep from [id number]. Pt woke when had to urinate, pt confused, upset at staff, thought he was at home and tried grabbing at staff. Did get pt to use urinal and gave pain meds for pt's back pain. Pt did eventually calm down and then got up to chair. Chair alarm on, pt calmer, however, still confused. Will continue to monitor.

## 2023-03-01 NOTE — PLAN OF CARE
[facility] clinically accepted and submitted for authorization. Advised spouse, who will tour facility later today.

## 2023-03-01 NOTE — PLAN OF CARE
Pt tolerated interventions fair. Required MOD A for sit to stand, unable to progress treatment due to drop in BP and pt fatigue. Recommending SNF upon d/c.

## 2023-03-01 NOTE — ASSESSMENT & PLAN NOTE
Patient with Permanent atrial fibrillation which is controlled currently. Patient is currently in atrial fibrillation.[id number] Score: 4.  Anticoagulation indicated. Anticoagulation done with Apixaban.    After discussion with Cardiology, family agreeable to discontinuing Coumadin and switching patient anticoagulation to Eliquis

## 2023-03-01 NOTE — PROGRESS NOTES
[facility] - Intensive Care ([facility])  [facility]  Progress Note    Patient Name: Anthony Biggs Jr.  MRN: [id number]  Patient Class: IP- Inpatient   Admission Date: 2/23/2023  Length of Stay: 6 days  Attending Physician: Estuardo Eddy MD  Primary Care Provider: Giuliano Moran MD        Subjective:     Principal Problem:Gastrointestinal hemorrhage with melena        HPI:  Anthony Biggs Jr. is a 82 y.o. male with Angiodysplasia of small intestine, Atrial fibrillation on Coumadin, Chronic diastolic heart failure, Chronic upper GI bleeding, CAD, Depression, Diabetes mellitus, HTN, HLD, Emphysema of lung, who initially presented to ED on 2/23/2023 with complaints of weakness and altered mental status.  Spouse reported that patient had decreased appetite and progressive weakness prior to ED presentation.  Additionally the previous day patient was found to have elevated INR of 8.2 and had been instructed to hold Coumadin.  In the ED patient was found to be hypotensive with hemoglobin of 5.8, INR > 10, melena on HERMILO.  Patient was initiated on Kcentra, and transfused 2 units of PRBC.  Given persistent hypotension critical care medicine was consulted, patient admitted to ICU.  GI consulted, patient underwent urgent bedside EGD which showed nonbleeding duodenal ulcer and AVMs      Overview/Hospital Course:  ICU course complicated by confusion and agitation requiring initiation of Precedex, which was able to be weaned off with patient transitioned to seroquel.  Spouse reports that patient has been declining over the past few months with increased confusion and weakness.  Patient stable for transfer of ICU to 02/25/2023.  Hospital medicine consulted to continue care.    Given hemorrhagic shock secondary to supratherapeutic INR, Coumadin is not the best anticoagulation choice for patient.  This was discussed with Cardiology and patient's family.  They are agreeable to starting patient on Eliquis instead.    PT/OT evaluation  ordered.  Recommended for SNF placement, Social work has been consulted    Due to concern for dysphagia, SLP was consulted.  Patient recommended for Minced & Moist Diet - IDDSI Level 5, Thin liquids - IDDSI Level 0 (no straws) and aspiration protocol      Interval History: NAEON. Pateint sitting in chair, no new issues. SNF placement pending.    Review of Systems   All other systems reviewed and are negative.  Objective:     Vital Signs (Most Recent):  Temp: 98.4 °F (36.9 °C) (03/01/23 1200)  Pulse: 93 (03/01/23 1200)  Resp: (!) 22 (03/01/23 1200)  BP: 124/62 (03/01/23 1200)  SpO2: 97 % (03/01/23 1200)   Vital Signs (24h Range):  Temp:  [98 °F (36.7 °C)-98.5 °F (36.9 °C)] 98.4 °F (36.9 °C)  Pulse:  [] 93  Resp:  [22-33] 22  SpO2:  [94 %-100 %] 97 %  BP: ()/(52-78) 124/62     Weight: 86.4 kg (190 lb 7.6 oz)  Body mass index is 29.83 kg/m².    Intake/Output Summary (Last 24 hours) at 3/1/2023 1522  Last data filed at 3/1/2023 0548  Gross per 24 hour   Intake --   Output 675 ml   Net -675 ml      Physical Exam  Constitutional:       General: He is not in acute distress.     Appearance: Normal appearance.   Cardiovascular:      Rate and Rhythm: Normal rate and regular rhythm.      Heart sounds: No murmur heard.  Pulmonary:      Effort: Pulmonary effort is normal. No respiratory distress.      Breath sounds: Normal breath sounds. No wheezing.   Abdominal:      General: There is no distension.      Palpations: Abdomen is soft.      Tenderness: There is no abdominal tenderness.   Neurological:      Mental Status: He is alert.       Significant Labs: All pertinent labs within the past 24 hours have been reviewed.  CBC:   Recent Labs   Lab 02/28/23 0422 03/01/23 0512   WBC 6.62 6.27   HGB 10.1* 10.6*   HCT 30.7* 32.1*    291     CMP:   Recent Labs   Lab 02/28/23  0423 03/01/23  0512    138   K 3.5 3.8    102   CO2 22* 23   * 110   BUN 33* 34*   CREATININE 1.4 1.5*   CALCIUM 8.7 9.2    PROT 6.3 7.1   ALBUMIN 2.9* 3.2*   BILITOT 0.8 0.9   ALKPHOS 51* 56   AST 36 46*   ALT 21 31   ANIONGAP 15 13       Significant Imaging: I have reviewed all pertinent imaging results/findings within the past 24 hours.      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 Patient is currently in atrial fibrillation.[id number] Score: 4.  Anticoagulation indicated. Anticoagulation done with Apixaban.    After discussion with Cardiology, family agreeable to discontinuing Coumadin and switching patient anticoagulation to Eliquis    SHAYLA (acute kidney injury)  Patient with acute kidney injury likely due to IVVD/dehydration secondary to hemorrhage SHAYLA is currently worsening. Labs reviewed- Renal function/electrolytes with Estimated Creatinine Clearance: 41.2 mL/min (A) (based on SCr of 1.5 mg/dL (H)). according to latest data. Monitor urine output and serial BMP and adjust therapy as needed. Avoid nephrotoxins and renally dose meds for GFR listed above.    +orthostatics noted  IV fluids today  Strict I/O's  Monitor for overload    Chronic diastolic heart failure  Patient is identified as having Diastolic (HFpEF) heart failure that is Chronic. CHF is currently  controlled. Latest ECHO performed and demonstrates- Results for orders placed during the hospital encounter of 11/11/22    Echo    Interpretation Summary  · The left ventricle is normal in size with concentric remodeling and normal systolic function.  · Severe left atrial enlargement.  · The estimated ejection fraction is 60%.  · Atrial fibrillation observed.  · Normal right ventricular size with normal right ventricular systolic function.  · Moderate right atrial enlargement.  · Moderate aortic regurgitation.  · Mild-to-moderate mitral regurgitation.  · Mild tricuspid regurgitation.  · Normal central venous pressure (3 mmHg).  · The estimated PA systolic pressure is 34 mmHg.  · The ascending aorta is moderately dilated.  .  Hold diuretics and monitor clinical status closely. Monitor on telemetry. Patient is off CHF pathway.  Monitor strict Is&Os and daily weights.   Last BNP reviewed- and noted below   Recent Labs   Lab 02/23/23  1024   *   .      Hypertension associated with diabetes  Normotensive  BP meds held due to hemorrhagic shock  Resume home meds as BP tolerates    LAILA (iron deficiency anemia)  Secondary to angiodysplasia in small intestine   S/p 2 units PRBC transfusion on 02/23  Continue iron supplement  Monitor H/H      Hyperlipidemia associated with type 2 diabetes mellitus  Continue statin    Cognitive decline  Family reports progressive decline for the past few months, patient with decreasing mobility and physical activity  Per family patient has been having symptoms of suspected dementia (irritability, intermittent confusion, repeating things, memory loss) for approximately 2 years  Plan ambulatory referral to Neurology on discharge for further evaluation  Delirium precautions  PT/OT recommends SNF placement.  Social work has been consulted  SLP recommends Minced & Moist Diet - IDDSI Level 5, Thin liquids - IDDSI Level 0 (no straws), aspiration precaution        VTE Risk Mitigation (From  admission, onward)         Ordered     apixaban tablet 2.5 mg  2 times daily         02/26/23 1413     Reason for No Pharmacological VTE Prophylaxis  Once        Question:  Reasons:  Answer:  Active Bleeding    02/23/23 1135     IP VTE HIGH RISK PATIENT  Once         02/23/23 1135     Place sequential compression device  Until discontinued         02/23/23 1135                Discharge Planning   RACHNA:      Code Status: Full Code   Is the patient medically ready for discharge?:     Reason for patient still in hospital (select all that apply): Patient trending condition and Pending disposition  Discharge Plan A: Skilled Nursing Facility          Estuardo Eddy MD  Department of Hospital Medicine   [facility] - Intensive Care ([facility])

## 2023-03-01 NOTE — ASSESSMENT & PLAN NOTE
Patient with acute kidney injury likely due to IVVD/dehydration secondary to hemorrhage SHAYLA is currently worsening. Labs reviewed- Renal function/electrolytes with Estimated Creatinine Clearance: 41.2 mL/min (A) (based on SCr of 1.5 mg/dL (H)). according to latest data. Monitor urine output and serial BMP and adjust therapy as needed. Avoid nephrotoxins and renally dose meds for GFR listed above.    +orthostatics noted  IV fluids today  Strict I/O's  Monitor for overload

## 2023-03-01 NOTE — PLAN OF CARE
03/01/23 0834   Post-Acute Status   Post-Acute Authorization Placement   Post-Acute Placement Status Pending payor review/awaiting authorization (if required)   Hospital Resources/Appts/Education Provided Post-Acute resouces added to AVS   Discharge Plan   Discharge Plan A Skilled Nursing Facility     Spoke with patient's spouse. She would like [facility]. Contacted liaison. Facility accepted in [facility]. They have a meeting 9am to confirm bed availability. If bed is open, they will submit for authorization.

## 2023-03-01 NOTE — PT/OT/SLP PROGRESS
"Occupational Therapy  Treatment    [place] DEJA Biggs Jr.   MRN: [id number]   Admitting Diagnosis: Gastrointestinal hemorrhage with melena    OT Date of Treatment: 02/27/23   OT Start Time: 1034  OT Stop Time: 1059  OT Total Time (min): 25 min    Billable Minutes:  Therapeutic Activity 25 minutes    OT/GEO: OT          General Precautions: Standard, fall  Orthopedic Precautions: N/A  Braces: N/A  Respiratory Status: Room air         Subjective:\  Communicated with  and epic  chart revirew prior to session.  nurse     Objective:        Functional Mobility:  Bed Mobility:   Mod a with rolling l<> r  Mod a wih scooting  forward seated eob  Mod a with supine<sit transfers     Balance:   Static Sit: POOR: Needs MODERATE assist to maintain  Dynamic Sit: 0: N/A  Therapeutic Activities and Exercises:  Patient educated on role of OT in acute setting and benefits of participation. Educated on techniques to use to increase independence and decrease fall risk with functional transfers. Educated on importance of OOB activity and calling for A to transfer back to bed, however, pt adamantly refused . Pt/ daughter. educated on hep. Pt received I set x  10 reps b ue aarom ( shoulder flex/ rotation, elbow fle chest press and hand/ digits).  Pt / daughter encouraged completion of B UE AROM therex throughout the day to tolerance to increase functional strength and activity tolerance. Patient / daughter verbalized understanding and in agreement with POC.      AM-PAC 6 CLICK ADL   How much help from another person does this patient currently need?   1 = Unable, Total/Dependent Assistance  2 = A lot, Maximum/Moderate Assistance  3 = A little, Minimum/Contact Guard/Supervision  4 = None, Modified [place]/Independent          AM-PAC Raw Score CMS "G-Code Modifier Level of Impairment Assistance   6 % Total / Unable   7 - 8 CM 80 - 100% Maximal Assist   9-13 CL 60 - 80% Moderate Assist   14 - 19 CK 40 - 60% Moderate Assist   20 - 22 CJ 20 " - 40% Minimal Assist   23 CI 1-20% SBA / CGA   24 CH 0% Independent/ Mod I       Patient left HOB elevated with all lines intact, call button in reach, bed alarm on, nurse notified, and daughter present    ASSESSMENT:  [place] DEJA Biggs Jr. is a 82 y.o. male with a medical diagnosis of Gastrointestinal hemorrhage with melena and presents with debility and  generalized weakness.    Rehab identified problem list/impairments:  weakness, impaired functional mobility, decreased safety awareness, impaired endurance, gait instability, impaired balance, impaired self care skills, visual deficits, decreased lower extremity function    Rehab potential is fair.    Activity tolerance: Fair    Discharge recommendations: nursing facility, skilled   Barriers to discharge:      Equipment recommendations: walker, rolling, bedside commode, shower chair    GOALS:   Multidisciplinary Problems       Occupational Therapy Goals          Problem: Occupational Therapy    Goal Priority Disciplines Outcome Interventions   Occupational Therapy Goal     OT, PT/OT Ongoing, Progressing    Description: O.T. GOALS TO BE MET BY 3-14-23  SBA WITH UE DRESSING  SBA WITH TOILET ING    SBA WITH TOILET TRANSFERS WITH AE  PT WILL TOLERATE 1 SET X 15 REPR B UE ROM EXERCISE                       Plan:  Patient to be seen 2 x/week to address the above listed problems via self-care/home management, therapeutic activities, therapeutic exercises  Plan of Care expires:    Plan of Care reviewed with: patient         02/28/2023

## 2023-03-01 NOTE — PT/OT/SLP PROGRESS
Physical Therapy Treatment    Patient Name:  Anthony Biggs Jr.   MRN:  [id number]    Recommendations:     Discharge Recommendations: nursing facility, skilled  Discharge Equipment Recommendations:  (TBD at next level of care)  Barriers to discharge: Decreased caregiver support    Assessment:     Anthony Biggs Jr. is a 82 y.o. male admitted with a medical diagnosis of Gastrointestinal hemorrhage with melena.  He presents with the following impairments/functional limitations: weakness, impaired endurance, impaired functional mobility, gait instability, impaired balance, decreased lower extremity function, decreased safety awareness, pain, impaired cardiopulmonary response to activity, decreased coordination.    Rehab Prognosis: Good; patient would benefit from acute skilled PT services to address these deficits and reach maximum level of function.    Recent Surgery: Procedure(s) (LRB):  EGD (ESOPHAGOGASTRODUODENOSCOPY) (N/A) 6 Days Post-Op    Plan:     During this hospitalization, patient to be seen 3 x/week to address the identified rehab impairments via gait training, therapeutic activities, therapeutic exercises and progress toward the following goals:    Plan of Care Expires:  03/12/23    Subjective     Chief Complaint: Pt reports LBP and R LE pain, pt is fatigued  Patient/Family Comments/goals:  Pain/Comfort:  Pain Rating 1: 10/10  Location - Side 1: Bilateral  Location - Orientation 1: generalized  Location 1: back  Pain Addressed 1: Reposition, Distraction  Pain Rating 2: 10/10  Location - Side 2: Right  Location - Orientation 2: generalized  Location 2: hip  Pain Addressed 2: Reposition, Distraction    Objective:     Communicated with nurse [place] prior to session.  Patient found up in chair with peripheral IV, pulse ox (continuous), telemetry, blood pressure cuff, chair check upon PT entry to room.     General Precautions: Standard, fall  Orthopedic Precautions: N/A  Braces: N/A  Respiratory Status: Room air      Functional Mobility:  Bed Mobility:     Scooting: moderate assistance and of 2 persons  Transfers:     Sit to Stand:  moderate assistance and of 2 with rolling walker  Stand to Sit: moderate assistance and of 2 persons with RW  Gait: Pt able to march in place, MOD A, fatigued quickly, increased R hip pain, constant verbal cuing to keep eyes open. Unable to progress gait due to drop in BP and increased pt fatigue  Balance: Pt demonstrated fair sitting balance, poor- standing dynamic balance  Blood Pressure  Sittin/64  Sittin/64  Initial standing trial 1: 90/52  Standing: unable to obtain reading, pt demonstrated increased fatigue, LE buckling  Sittin/63  Initial standing trial 2: 110/55  Nurse notified    AM-PAC 6 CLICK MOBILITY  Turning over in bed (including adjusting bedclothes, sheets and blankets)?: 1 (NT)  Sitting down on and standing up from a chair with arms (e.g., wheelchair, bedside commode, etc.): 2  Moving from lying on back to sitting on the side of the bed?: 1 (NT)  Moving to and from a bed to a chair (including a wheelchair)?: 1 (NT)  Need to walk in hospital room?: 1 (NT)  Climbing 3-5 steps with a railing?: 1 (NT)  Basic Mobility Total Score: 7     Treatment & Education:  Pt tolerated interventions fair. Pt remains confused, fatigued, required constant verbal cuing to keep eyes open. Reviewed importance of OOB activities and HEP (hip flex, LAQ, ham curls, ankle pumps) in order to maintain/regain strength. Reviewed proper use of RW for safety and to reduce risk of falling. Reviewed increased risk of falling due to weakness, instructed to utilize call bell for assistance for all transfers. Pt not receptive to education at this time.    Patient left  reclined in chair  with all lines intact, call button in reach, chair alarm on, nurse notified, and wife present.    GOALS:   Multidisciplinary Problems       Physical Therapy Goals          Problem: Physical Therapy    Goal Priority  Disciplines Outcome Goal Variances Interventions   Physical Therapy Goal     PT, PT/OT Ongoing, Progressing     Description: PT eval complete. The following goals will be met in 14 days  1. Pt MIN A with bed mobility  2. Pt transfer bed to chair with RW and CGA  3. Pt amb 150 ft with RW and MIN A                     Time Tracking:     PT Received On: 03/01/23  PT Start Time: 1035     PT Stop Time: 1100  PT Total Time (min): 25 min     Billable Minutes: Therapeutic Activity 25min    Treatment Type: Treatment  PT/PTA: PT     PTA Visit Number: 0     03/01/2023

## 2023-03-02 PROBLEM — N17.9 AKI (ACUTE KIDNEY INJURY): Status: RESOLVED | Noted: 2023-02-23 | Resolved: 2023-03-02

## 2023-03-02 PROBLEM — K92.1 GASTROINTESTINAL HEMORRHAGE WITH MELENA: Status: RESOLVED | Noted: 2023-02-23 | Resolved: 2023-03-02

## 2023-03-02 LAB
ALBUMIN SERPL BCP-MCNC: 3 G/DL (ref 3.5–5.2)
ALP SERPL-CCNC: 51 U/L (ref 55–135)
ALT SERPL W/O P-5'-P-CCNC: 48 U/L (ref 10–44)
ANION GAP SERPL CALC-SCNC: 10 MMOL/L (ref 8–16)
AST SERPL-CCNC: 57 U/L (ref 10–40)
BASOPHILS # BLD AUTO: 0.02 K/UL (ref 0–0.2)
BASOPHILS NFR BLD: 0.3 % (ref 0–1.9)
BILIRUB SERPL-MCNC: 0.6 MG/DL (ref 0.1–1)
BUN SERPL-MCNC: 30 MG/DL (ref 8–23)
CALCIUM SERPL-MCNC: 8.5 MG/DL (ref 8.7–10.5)
CHLORIDE SERPL-SCNC: 105 MMOL/L (ref 95–110)
CO2 SERPL-SCNC: 19 MMOL/L (ref 23–29)
CREAT SERPL-MCNC: 1.3 MG/DL (ref 0.5–1.4)
DIFFERENTIAL METHOD: ABNORMAL
EOSINOPHIL # BLD AUTO: 0.2 K/UL (ref 0–0.5)
EOSINOPHIL NFR BLD: 3.3 % (ref 0–8)
ERYTHROCYTE [DISTWIDTH] IN BLOOD BY AUTOMATED COUNT: 15.8 % (ref 11.5–14.5)
EST. GFR  (NO RACE VARIABLE): 56 ML/MIN/1.73 M^2
GLUCOSE SERPL-MCNC: 110 MG/DL (ref 70–110)
HCT VFR BLD AUTO: 30.3 % (ref 40–54)
HGB BLD-MCNC: 9.7 G/DL (ref 14–18)
IMM GRANULOCYTES # BLD AUTO: 0.04 K/UL (ref 0–0.04)
IMM GRANULOCYTES NFR BLD AUTO: 0.7 % (ref 0–0.5)
LYMPHOCYTES # BLD AUTO: 1.3 K/UL (ref 1–4.8)
LYMPHOCYTES NFR BLD: 21 % (ref 18–48)
MAGNESIUM SERPL-MCNC: 1.8 MG/DL (ref 1.6–2.6)
MCH RBC QN AUTO: 29.8 PG (ref 27–31)
MCHC RBC AUTO-ENTMCNC: 32 G/DL (ref 32–36)
MCV RBC AUTO: 93 FL (ref 82–98)
MONOCYTES # BLD AUTO: 0.6 K/UL (ref 0.3–1)
MONOCYTES NFR BLD: 9.5 % (ref 4–15)
NEUTROPHILS # BLD AUTO: 3.9 K/UL (ref 1.8–7.7)
NEUTROPHILS NFR BLD: 65.2 % (ref 38–73)
NRBC BLD-RTO: 0 /100 WBC
PLATELET # BLD AUTO: 259 K/UL (ref 150–450)
PMV BLD AUTO: 9.6 FL (ref 9.2–12.9)
POCT GLUCOSE: 106 MG/DL (ref 70–110)
POCT GLUCOSE: 112 MG/DL (ref 70–110)
POCT GLUCOSE: 115 MG/DL (ref 70–110)
POCT GLUCOSE: 189 MG/DL (ref 70–110)
POTASSIUM SERPL-SCNC: 4 MMOL/L (ref 3.5–5.1)
PROT SERPL-MCNC: 6.6 G/DL (ref 6–8.4)
RBC # BLD AUTO: 3.26 M/UL (ref 4.6–6.2)
SODIUM SERPL-SCNC: 134 MMOL/L (ref 136–145)
WBC # BLD AUTO: 5.99 K/UL (ref 3.9–12.7)

## 2023-03-02 PROCEDURE — 83735 ASSAY OF MAGNESIUM: CPT | Mod: HCNC | Performed by: HOSPITALIST

## 2023-03-02 PROCEDURE — 25000003 PHARM REV CODE 250: Mod: HCNC | Performed by: HOSPITALIST

## 2023-03-02 PROCEDURE — 25000003 PHARM REV CODE 250: Mod: HCNC | Performed by: INTERNAL MEDICINE

## 2023-03-02 PROCEDURE — 80053 COMPREHEN METABOLIC PANEL: CPT | Mod: HCNC | Performed by: NURSE PRACTITIONER

## 2023-03-02 PROCEDURE — 11000001 HC ACUTE MED/SURG PRIVATE ROOM: Mod: HCNC

## 2023-03-02 PROCEDURE — 99498 ADVNCD CARE PLAN ADDL 30 MIN: CPT | Mod: ,,, | Performed by: NURSE PRACTITIONER

## 2023-03-02 PROCEDURE — 99223 1ST HOSP IP/OBS HIGH 75: CPT | Mod: ,,, | Performed by: NURSE PRACTITIONER

## 2023-03-02 PROCEDURE — 25000003 PHARM REV CODE 250: Mod: HCNC | Performed by: NURSE PRACTITIONER

## 2023-03-02 PROCEDURE — 97116 GAIT TRAINING THERAPY: CPT | Mod: HCNC

## 2023-03-02 PROCEDURE — 36415 COLL VENOUS BLD VENIPUNCTURE: CPT | Mod: HCNC | Performed by: NURSE PRACTITIONER

## 2023-03-02 PROCEDURE — 99498 PR ADVNCD CARE PLAN ADDL 30 MIN: ICD-10-PCS | Mod: ,,, | Performed by: NURSE PRACTITIONER

## 2023-03-02 PROCEDURE — 97110 THERAPEUTIC EXERCISES: CPT | Mod: HCNC

## 2023-03-02 PROCEDURE — 99223 PR INITIAL HOSPITAL CARE,LEVL III: ICD-10-PCS | Mod: ,,, | Performed by: NURSE PRACTITIONER

## 2023-03-02 PROCEDURE — 97530 THERAPEUTIC ACTIVITIES: CPT | Mod: HCNC

## 2023-03-02 PROCEDURE — 85025 COMPLETE CBC W/AUTO DIFF WBC: CPT | Mod: HCNC | Performed by: NURSE PRACTITIONER

## 2023-03-02 PROCEDURE — 99497 ADVNCD CARE PLAN 30 MIN: CPT | Mod: 25,,, | Performed by: NURSE PRACTITIONER

## 2023-03-02 PROCEDURE — 99497 PR ADVNCD CARE PLAN 30 MIN: ICD-10-PCS | Mod: 25,,, | Performed by: NURSE PRACTITIONER

## 2023-03-02 RX ADMIN — METOPROLOL TARTRATE 12.5 MG: 25 TABLET, FILM COATED ORAL at 08:03

## 2023-03-02 RX ADMIN — APIXABAN 2.5 MG: 2.5 TABLET, FILM COATED ORAL at 08:03

## 2023-03-02 RX ADMIN — PANTOPRAZOLE SODIUM 40 MG: 40 TABLET, DELAYED RELEASE ORAL at 08:03

## 2023-03-02 RX ADMIN — QUETIAPINE FUMARATE 12.5 MG: 25 TABLET ORAL at 08:03

## 2023-03-02 RX ADMIN — ISOSORBIDE MONONITRATE 30 MG: 30 TABLET, EXTENDED RELEASE ORAL at 08:03

## 2023-03-02 RX ADMIN — MELATONIN TAB 3 MG 3 MG: 3 TAB at 09:03

## 2023-03-02 RX ADMIN — SENNOSIDES AND DOCUSATE SODIUM 2 TABLET: 50; 8.6 TABLET ORAL at 08:03

## 2023-03-02 RX ADMIN — LIDOCAINE 1 PATCH: 50 PATCH CUTANEOUS at 10:03

## 2023-03-02 RX ADMIN — ALLOPURINOL 100 MG: 100 TABLET ORAL at 08:03

## 2023-03-02 RX ADMIN — ATORVASTATIN CALCIUM 40 MG: 40 TABLET, FILM COATED ORAL at 08:03

## 2023-03-02 RX ADMIN — SUCRALFATE 1 G: 1 SUSPENSION ORAL at 08:03

## 2023-03-02 RX ADMIN — METOPROLOL TARTRATE 12.5 MG: 25 TABLET, FILM COATED ORAL at 09:03

## 2023-03-02 RX ADMIN — FERROUS SULFATE TAB 325 MG (65 MG ELEMENTAL FE) 1 EACH: 325 (65 FE) TAB at 08:03

## 2023-03-02 NOTE — CONSULTS
Advance Care Planning    Consult Note  Palliative Medicine      Consult Requested By: Estuardo Eddy MD  Reason for Consult: Goals of care and Advance care planning    SUBJECTIVE:     History of Present Illness:  Comfort DEJA Biggs Jr. is a 82 y.o. male with Angiodysplasia of small intestine, Atrial fibrillation on Coumadin, Chronic diastolic heart failure, Chronic upper GI bleeding, CAD, Depression, Diabetes mellitus, HTN, HLD, Emphysema of lung, who initially presented to ED on 2/23/2023 with complaints of weakness and altered mental status.  Spouse reported that patient had decreased appetite and progressive weakness prior to ED presentation.  Additionally the previous day patient was found to have elevated INR of 8.2 and had been instructed to hold Coumadin.  In the ED patient was found to be hypotensive with hemoglobin of 5.8, INR > 10, melena on HERMILO.  Patient was initiated on Kcentra, and transfused 2 units of PRBC.  Given persistent hypotension critical care medicine was consulted, patient admitted to ICU.  GI consulted, patient underwent urgent bedside EGD which showed nonbleeding duodenal ulcer and AVMs.  Overview/Hospital Course:  ICU course complicated by confusion and agitation requiring initiation of Precedex, which was able to be weaned off with patient transitioned to seroquel.  Spouse reports that patient has been declining over the past few months with increased confusion and weakness.  Patient stable for transfer of ICU to 02/25/2023.  Hospital medicine was consulted to continue care.Given hemorrhagic shock secondary to supratherapeutic INR, Coumadin is not the best anticoagulation choice for patient and options were discussed with family and patient was started on eliquis.PT/OT following and patient with little progress and ability to work with them limited due to hypotension, AF w/ increase in HR when he works with PT, and fatigue. Also has to be cued to open eyes and still with confusion. Due to  concern for dysphagia, SLP was consulted and recommended Minced & Moist Diet,  Thin liquids, and no straws. PM consulted for goals of care and advance care planning.     Upon examination, patient up in the chair in no acute distress. Patient with confusion but oriented to self. Family meeting occurred with patient's spouse, two adult children Luis and Jeana, and Luis's wife. We discussed patient's current condition and health history. Family also reports progressive cognitive and functional decline over the past two years and notes fast, progressive decline recently. They also reported decrease in appetite and po intake.They noted that have not seen significant improvement functional wise when patient works with PT/OT noting he can't tolerate for long periods due to increase in heart rate, drop in blood pressure, fatigue, and confusion in which PT has to cue patient to keep eyes open at times. In this setting we further discussed goals of care. In which at this time the goal is to d/c to SNF at [place] and improve functional status with plans to go back home with spouse. Spouse noted that at this time she knows that she can not care for him at home alone. We also talked about hoping for the best and planning for the worse and if patient does not improve or progress to goals. Family asked about hospice in which we did discuss. They noted if patient does not improve or get back to his base line and if his current status is his new base line then their goals will be in alignment with hospice philosophy. They noted if while at SNF patient does not progress they would transition to comfort focused treatment, [facility] discussed due to close proximity on inpatient unit to their home. Will provide with pamphlet if they need to contact them. Patient's daughter in law then asked questions about code status. We discussed code status: full code vs DNR/I along with risks, benefits, and alternatives. Family noted  that they would discuss more amongst themselves about code status as well and if anything changes they would make PM team aware.       Past Medical History:   Diagnosis Date    Abnormal CXR     SHAYLA (acute kidney injury) 2/23/2023    Angina pectoris 8/28/2017    Angiodysplasia of small intestine     Atrial fibrillation     Chronic diastolic heart failure 11/8/2022    Chronic upper GI bleeding     due to angiodysplasia in proximal small intestine    Coronary atherosclerosis of unspecified type of vessel, native or graft     COVID 6/26/2022    Depression     Diabetes mellitus without complication     Emphysema of lung     History of prostate cancer 2007    prostatectomy    Hyperlipidemia associated with type 2 diabetes mellitus     Hypertension associated with diabetes     LAILA (iron deficiency anemia) 09/20/2021    due to angiodysplasia in small intestine    Intention tremor     Major depressive disorder, recurrent, moderate 4/12/2021    Moderate episode of recurrent major depressive disorder 4/1/2019    Morbid (severe) obesity due to excess calories 4/1/2019    SHORTY (obstructive sleep apnea)     Prostate cancer     Trouble in sleeping     Urinary incontinence      Past Surgical History:   Procedure Laterality Date    ABDOMINAL HERNIA REPAIR      APPENDECTOMY      bladder sx      CARDIAC CATHETERIZATION      CATARACT EXTRACTION W/  INTRAOCULAR LENS IMPLANT  Restor OU    COLONOSCOPY N/A 5/16/2017    Procedure: COLONOSCOPY;  Surgeon: Alfredo Naidu MD;  Location: [facility];  Service: Endoscopy;  Laterality: N/A;    ESOPHAGOGASTRODUODENOSCOPY N/A 10/29/2021    Procedure: SBE (Push Enteroscopy) with Dr. Wayne;  Surgeon: Arlette Wayne MD;  Location: [facility];  Service: Endoscopy;  Laterality: N/A;  Plavix held indefinitely as of 10-20-21. Must also hold Coumadin 5 days prior. Ok to continue to use ASA.    ESOPHAGOGASTRODUODENOSCOPY N/A 2/23/2023    Procedure: EGD (ESOPHAGOGASTRODUODENOSCOPY);  Surgeon:  Opal Wright MD;  Location: [facility] ENDO;  Service: Endoscopy;  Laterality: N/A;    inguinal hernia      INTRALUMINAL GASTROINTESTINAL TRACT IMAGING VIA CAPSULE N/A 10/4/2021    Procedure: IMAGING PROCEDURE, GI TRACT, INTRALUMINAL, VIA CAPSULE;  Surgeon: Joaquin Stack RN;  Location: [facility] ENDO;  Service: Endoscopy;  Laterality: N/A;    LEFT HEART CATHETERIZATION Left 2021    Procedure: CATHETERIZATION, HEART, LEFT;  Surgeon: Darryl Griffith MD;  Location: [facility] CATH LAB;  Service: Cardiology;  Laterality: Left;    lung sx      PERCUTANEOUS TRANSLUMINAL BALLOON ANGIOPLASTY OF CORONARY ARTERY  2021    Procedure: Angioplasty-coronary;  Surgeon: Darryl Griffith MD;  Location: [facility] CATH LAB;  Service: Cardiology;;    PROSTATE SURGERY       Family History   Problem Relation Age of Onset    Heart disease Mother     Cancer Father         lung    Macular degeneration Sister     Diabetes Sister     Heart disease Sister     Strabismus Neg Hx     Retinal detachment Neg Hx     Glaucoma Neg Hx     Blindness Neg Hx     Amblyopia Neg Hx        Social History     Socioeconomic History    Marital status:     Highest education level: Some college, no degree   Tobacco Use    Smoking status: Former     Packs/day: 0.50     Years: 40.00     Pack years: 20.00     Types: Cigarettes     Start date: 1962     Quit date:      Years since quittin.1    Smokeless tobacco: Never   Substance and Sexual Activity    Alcohol use: Yes     Alcohol/week: 7.0 standard drinks     Types: 7 Shots of liquor per week    Drug use: No    Sexual activity: Not Currently      Review of patient's allergies indicates:  No Known Allergies    Medications:    Current Facility-Administered Medications:     0.9%  NaCl infusion (for blood administration), , Intravenous, Q24H PRN, Jessica Santos MD    allopurinoL tablet 100 mg, 100 mg, Oral, Daily, Nuno Gambino NP, 100 mg at 23 0853    apixaban tablet 2.5 mg, 2.5 mg, Oral,  BID, Jennifer Tyler DO, 2.5 mg at 03/02/23 0853    atorvastatin tablet 40 mg, 40 mg, Oral, Daily, Nuno Gambino NP, 40 mg at 03/02/23 0853    dextrose 10% bolus 125 mL 125 mL, 12.5 g, Intravenous, PRN, Nuno Gambino NP    dextrose 10% bolus 250 mL 250 mL, 25 g, Intravenous, PRN, Nuno Gambino NP    ferrous sulfate tablet 1 each, 1 tablet, Oral, Daily, Nuno Gambino NP, 1 each at 03/02/23 0853    glucagon (human recombinant) injection 1 mg, 1 mg, Intramuscular, PRN, Nuno Gambino NP    HYDROcodone-acetaminophen 5-325 mg per tablet 1 tablet, 1 tablet, Oral, Q6H PRN, Sung Stephenson MD, 1 tablet at 03/01/23 0241    hydrocortisone 2.5 % rectal cream, , Rectal, TID PRN, Sung Stephenson MD, Given at 02/23/23 1719    insulin aspart U-100 pen 0-5 Units, 0-5 Units, Subcutaneous, Q6H PRN, Nuno Gambino NP    isosorbide mononitrate 24 hr tablet 30 mg, 30 mg, Oral, Daily, Nuno Gambino NP, 30 mg at 03/02/23 0853    LIDOcaine 5 % patch 1 patch, 1 patch, Transdermal, Q24H, TYRA Price, 1 patch at 03/01/23 2228    melatonin tablet 3 mg, 3 mg, Oral, Nightly, Nuno Gambino, TIFFANIE, 3 mg at 03/01/23 2120    metoprolol injection 5 mg, 5 mg, Intravenous, Q5 Min PRN, Nuno Gambino NP    metoprolol tartrate (LOPRESSOR) split tablet 12.5 mg, 12.5 mg, Oral, BID, Nuno Gambino NP, 12.5 mg at 03/02/23 0853    ondansetron injection 4 mg, 4 mg, Intravenous, Q8H PRN, Nuno Gambino NP    pantoprazole EC tablet 40 mg, 40 mg, Oral, Daily, Estuardo Eddy MD, 40 mg at 03/02/23 0853    prochlorperazine injection Soln 5 mg, 5 mg, Intravenous, Q6H PRN, Nuno Gambino, NP    quetiapine split tablet 12.5 mg, 12.5 mg, Oral, QHS, Jennifer Tyler, DO, 12.5 mg at 03/01/23 2120    senna-docusate 8.6-50 mg per tablet 2 tablet, 2 tablet, Oral, Daily, Sung Stephenson MD, 2 tablet at 03/02/23 0853    sodium chloride 0.9% flush 10 mL, 10 mL, Intravenous, PRN, Nuno Gambino, TIFFANIE     sucralfate 100 mg/mL suspension 1 g, 1 g, Oral, Daily, Nuno Gambino NP, 1 g at 03/02/23 0853    traZODone tablet 50 mg, 50 mg, Oral, Nightly PRN, Sung Stephenson MD, 50 mg at 02/23/23 1958    ROS:  Review of Systems   Reason unable to perform ROS: Provided by family, limited as patient with confusion.   Constitutional:  Positive for activity change, appetite change and fatigue.   Neurological:  Positive for weakness.        Generalized weakness, noting he ambulates short distances at home and holds on to furniture to do so   Psychiatric/Behavioral:  Positive for confusion.      OBJECTIVE:     Physical Exam:  Vitals: Temp: 98.3 °F (36.8 °C) (03/02/23 0715)  Pulse: 95 (03/02/23 1023)  Resp: (!) 24 (03/02/23 1023)  BP: (!) 89/59 (03/02/23 1023)  SpO2: 97 % (03/02/23 1023)    Physical Exam  Vitals and nursing note reviewed.   Constitutional:       General: He is not in acute distress.  HENT:      Head: Normocephalic.      Nose: Nose normal.      Mouth/Throat:      Mouth: Mucous membranes are dry.   Eyes:      General:         Right eye: No discharge.         Left eye: No discharge.      Pupils: Pupils are equal, round, and reactive to light.   Cardiovascular:      Rate and Rhythm: Normal rate.   Pulmonary:      Effort: Pulmonary effort is normal. No respiratory distress.   Abdominal:      Palpations: Abdomen is soft.   Musculoskeletal:      Cervical back: Normal range of motion.      Right lower leg: Edema present.      Left lower leg: Edema present.      Comments: Generalized weakness, working with PT utilizing walker and 2 person assist.    Skin:     General: Skin is warm.   Neurological:      Mental Status: He is alert.      Comments: Oriented to self, with confusion   Psychiatric:         Mood and Affect: Mood normal.         Review of Symptoms      Symptom Assessment (ESAS 0-10 Scale)  Pain:  0  Dyspnea:  0  Anxiety:  0  Nausea:  0  Depression:  0  Anorexia:  0  Fatigue:  0  Insomnia:  0  Restlessness:   0  Agitation:  0         Performance Status:  50    Living Arrangements:  Lives with spouse    Psychosocial/Cultural:   See Palliative Psychosocial Note: No  **Primary  to Follow**  Palliative Care  Consult: No    Advance Directives:     Decision Making:  Family answered questions and Patient unable to communicate due to disease severity/cognitive impairment  Goals of Care: The family endorses that what is most important right now is to focus on remaining as independent as possible and improvement in condition but with limits to invasive therapies    Accordingly, we have decided that the best plan to meet the patient's goals includes continuing with treatment. Try SNF and if patient does not progress or improve then plans to transition to comfort focused treatment and enroll with hospice.     Labs:  WBC   Date Value Ref Range Status   03/02/2023 5.99 3.90 - 12.70 K/uL Final       Hemoglobin   Date Value Ref Range Status   03/02/2023 9.7 (L) 14.0 - 18.0 g/dL Final       Hematocrit   Date Value Ref Range Status   03/02/2023 30.3 (L) 40.0 - 54.0 % Final       MCV   Date Value Ref Range Status   03/02/2023 93 82 - 98 fL Final       Platelets   Date Value Ref Range Status   03/02/2023 259 150 - 450 K/uL Final       BMP  Lab Results   Component Value Date     (L) 03/02/2023    K 4.0 03/02/2023     03/02/2023    CO2 19 (L) 03/02/2023    BUN 30 (H) 03/02/2023    CREATININE 1.3 03/02/2023    CALCIUM 8.5 (L) 03/02/2023    ANIONGAP 10 03/02/2023    EGFRNORACEVR 56 (A) 03/02/2023       Lab Results   Component Value Date    AST 57 (H) 03/02/2023    ALKPHOS 51 (L) 03/02/2023    BILITOT 0.6 03/02/2023       Albumin   Date Value Ref Range Status   03/02/2023 3.0 (L) 3.5 - 5.2 g/dL Final       Radiology:I have reviewed all pertinent imaging results/findings within the past 24 hours.      ASSESSMENT   [place] DEJA Kalyan Rodgers is a 82 y.o. male with Angiodysplasia of small intestine, Atrial fibrillation  on Coumadin, Chronic diastolic heart failure, Chronic upper GI bleeding, CAD, Depression, Diabetes mellitus, HTN, HLD, Emphysema of lung, who initially presented to ED on 2/23/2023 with complaints of weakness and altered mental status.  Spouse reported that patient had decreased appetite and progressive weakness prior to ED presentation.  Additionally the previous day patient was found to have elevated INR of 8.2 and had been instructed to hold Coumadin.  In the ED patient was found to be hypotensive with hemoglobin of 5.8, INR > 10, melena on HERMILO.  Patient was initiated on Kcentra, and transfused 2 units of PRBC.  Given persistent hypotension critical care medicine was consulted, patient admitted to ICU.  GI consulted, patient underwent urgent bedside EGD which showed nonbleeding duodenal ulcer and AVMs.    PLAN   **Encounter for Palliative Care  -Code status: Full code. Family discussing amongst themselves and if they have any changes will update PM team.   -HCPOA: Surrogate decision maker is spouse and two adult children involved in decision making.   -GOC: D/c to SNF at [place] and improve functional status with plans to go back home with spouse. If patient does not progress or is not able to tolerate working with PT to improve functional status, will transition focus to comfort and QOL and enroll with hospice.   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   -PT/OT recommends SNF placement plans to d/c to [place]   -SLP recommends Minced & Moist Diet - IDDSI Level 5, Thin liquids - IDDSI Level 0 (no straws), aspiration precaution  -GOC: D/c to SNF at [place] and improve functional status with plans to go back home with spouse. If patient does not progress or is not able to tolerate working with PT to improve functional status, will transition focus to comfort and QOL and enroll with hospice.     Discussed case and visit details with Dr. Eddy     Thank you for allowing Palliative Medicine to be involved in the care of Anthony Biggs Jr..       Medical decision making: management of more than one chronic illness in exacerbation or progression of disease    Plan required increased review of medication choice, interaction, dosing, frequency, and route due to patient complexity. Patient complexity increased by: advanced age, altered mentation, at risk for delirium, and poor historian    60 min ACP time spent discussing: code status, assessed patient specific goals and addressed the best way to achieve them, coordination of care and emotional support, formulating and communicating prognosis, exploring burden/ benefit of various approaches of treatment, inquired about existing or willingness to complete advance directive documents.    Melissa Terrazas NP  Palliative Medicine

## 2023-03-02 NOTE — PLAN OF CARE
VSS this shift. Pt refusing to move from chair to bed; or to reposition. Educated on importance of repositioning, and risk of worsening sacral wound. Pt reporting right hip pain with movement, lidocaine patch applied to site. NS gtt continued. POC reviewed with pt. Chair alarm activated, call light within reach.

## 2023-03-02 NOTE — ASSESSMENT & PLAN NOTE
Patient is identified as having Diastolic (HFpEF) heart failure that is Chronic. CHF is currently controlled. Latest ECHO performed and demonstrates- Results for orders placed during the hospital encounter of 11/11/22    Echo    Interpretation Summary  · The left ventricle is normal in size with concentric remodeling and normal systolic function.  · Severe left atrial enlargement.  · The estimated ejection fraction is 60%.  · Atrial fibrillation observed.  · Normal right ventricular size with normal right ventricular systolic function.  · Moderate right atrial enlargement.  · Moderate aortic regurgitation.  · Mild-to-moderate mitral regurgitation.  · Mild tricuspid regurgitation.  · Normal central venous pressure (3 mmHg).  · The estimated PA systolic pressure is 34 mmHg.  · The ascending aorta is moderately dilated.  .  Hold diuretics and monitor clinical status closely. Monitor on telemetry. Patient is off CHF pathway.  Monitor strict Is&Os and daily weights.   Last BNP reviewed- and noted below   No results for input(s): BNP, BNPTRIAGEBLO in the last 168 hours..

## 2023-03-02 NOTE — PROGRESS NOTES
Advance Care Planning   [facility] - Intensive Care ([facility])  Palliative Care       Patient Name: Anthony Biggs Jr.  MRN: [id number]  Admission Date: 2/23/2023  Hospital Length of Stay: 7 days  Code Status: Full Code   Attending Provider: Estuardo Eddy MD  Palliative Care Provider: Melissa Terrazas NP  Primary Care Physician: Giuliano Moran MD  Principal Problem:Gastrointestinal hemorrhage with melena    Met with pt and his cousin at bedside to drop off resources discussed with family by PM NP. Provided brochure for sitter and hospice agencies for family to review as options after SNF.      EULALIA Altamirano, LCSW-BACS  Palliative Medicine  [phone]

## 2023-03-02 NOTE — PLAN OF CARE
Met  with pt's spouse at bedside. A copy of IMM was given and appeal process was explained. All questions were answered.

## 2023-03-02 NOTE — ASSESSMENT & PLAN NOTE
Patient with acute kidney injury likely due to IVVD/dehydration secondary to hemorrhage SHAYLA is currently worsening. Labs reviewed- Renal function/electrolytes with Estimated Creatinine Clearance: 47.6 mL/min (based on SCr of 1.3 mg/dL). according to latest data. Monitor urine output and serial BMP and adjust therapy as needed. Avoid nephrotoxins and renally dose meds for GFR listed above.    +orthostatics noted (3/1/23)  Repeat orthostatics today improved with gentle course of IV fluids  Strict I/O's, monitor for overload

## 2023-03-02 NOTE — SUBJECTIVE & OBJECTIVE
Interval History: NAEON. Patient remains in ICU, sitting in chair, pleasant, deneis any complaints. Palliative Care was consulted per family request today, family meeting held this afternoon. Plan for discharge to SNF in AM.    Review of Systems   All other systems reviewed and are negative.  Objective:     Vital Signs (Most Recent):  Temp: 98.4 °F (36.9 °C) (03/02/23 1115)  Pulse: 96 (03/02/23 1200)  Resp: (!) 26 (03/02/23 1200)  BP: (!) 114/58 (03/02/23 1200)  SpO2: 96 % (03/02/23 1200)   Vital Signs (24h Range):  Temp:  [98.2 °F (36.8 °C)-98.7 °F (37.1 °C)] 98.4 °F (36.9 °C)  Pulse:  [] 96  Resp:  [22-41] 26  SpO2:  [95 %-100 %] 96 %  BP: ()/(53-77) 114/58     Weight: 86.4 kg (190 lb 7.6 oz)  Body mass index is 29.83 kg/m².    Intake/Output Summary (Last 24 hours) at 3/2/2023 1443  Last data filed at 3/2/2023 0900  Gross per 24 hour   Intake 1677.57 ml   Output 200 ml   Net 1477.57 ml      Physical Exam  Constitutional:       General: He is not in acute distress.     Appearance: Normal appearance.   Cardiovascular:      Rate and Rhythm: Normal rate and regular rhythm.      Heart sounds: No murmur heard.  Pulmonary:      Effort: Pulmonary effort is normal. No respiratory distress.      Breath sounds: Normal breath sounds. No wheezing.   Abdominal:      General: There is no distension.      Palpations: Abdomen is soft.      Tenderness: There is no abdominal tenderness.   Neurological:      Mental Status: He is alert.       Significant Labs: All pertinent labs within the past 24 hours have been reviewed.  CBC:   Recent Labs   Lab 03/01/23  0512 03/02/23  0400   WBC 6.27 5.99   HGB 10.6* 9.7*   HCT 32.1* 30.3*    259     CMP:   Recent Labs   Lab 03/01/23  0512 03/02/23  0400    134*   K 3.8 4.0    105   CO2 23 19*    110   BUN 34* 30*   CREATININE 1.5* 1.3   CALCIUM 9.2 8.5*   PROT 7.1 6.6   ALBUMIN 3.2* 3.0*   BILITOT 0.9 0.6   ALKPHOS 56 51*   AST 46* 57*   ALT 31 48*    ANIONGAP 13 10       Significant Imaging: I have reviewed all pertinent imaging results/findings within the past 24 hours.

## 2023-03-02 NOTE — PLAN OF CARE
Pt tolerated interventions fair. Required MIN A for scooting and STS, ambulated 20ft MIN A. Interventions  not further progressed due to decrease in BP with position changes. Recommending SNF upon d/c.

## 2023-03-02 NOTE — PT/OT/SLP PROGRESS
Occupational Therapy  Treatment    [place] DEJA Biggs Jr.   MRN: [id number]   Admitting Diagnosis: Gastrointestinal hemorrhage with melena    OT Date of Treatment: 03/02/23   OT Start Time: 1015  OT Stop Time: 1045  OT Total Time (min): 30 min    Billable Minutes:  Therapeutic Activity 30 minutes    OT/GEO: OT          General Precautions: Standard, fall  Orthopedic Precautions: N/A  Braces: N/A  Respiratory Status: Room air         Subjective:  Communicated with nurse and epic chart review prior to session.    Pain/Comfort  Pain Rating 1: 0/10  Location 1: back    Objective:  Patient found with: telemetry, peripheral IV     Functional Mobility:  Transfers:   Min a with ue dressing    Functional Ambulation:   Pt ambulated 20 feet with min a with rolling walker    Activities of Daily Living:   UE  min a with [facility]  Balance:   Static Sit: FAIR+: Able to take MINIMAL challenges from all directions  Dynamic Sit: FAIR+: Maintains balance through MINIMAL excursions of active trunk motion  Static Stand: POOR+: Needs MINIMAL assist to maintain  Dynamic stand: POOR+: Needs MIN (minimal ) assist during gait    Therapeutic Activities and Exercises:  Patient educated on role of OT in acute setting and benefits of participation. Educated on techniques to use to increase independence and decrease fall risk with functional transfers. Educated on importance of OOB activity and calling for A to transfer back to bed. Patient stated understanding and in agreement with POC.      AM-PAC 6 CLICK ADL   How much help from another person does this patient currently need?   1 = Unable, Total/Dependent Assistance  2 = A lot, Maximum/Moderate Assistance  3 = A little, Minimum/Contact Guard/Supervision  4 = None, Modified [place]/Independent    Putting on and taking off regular lower body clothing? : 2  Bathing (including washing, rinsing, drying)?: 2  Toileting, which includes using toilet, bedpan, or urinal? : 2  Putting on and  "taking off regular upper body clothing?: 2  Taking care of personal grooming such as brushing teeth?: 4  Eating meals?: 4  Daily Activity Total Score: 16     AM-PAC Raw Score CMS "G-Code Modifier Level of Impairment Assistance   6 % Total / Unable   7 - 8 CM 80 - 100% Maximal Assist   9-13 CL 60 - 80% Moderate Assist   14 - 19 CK 40 - 60% Moderate Assist   20 - 22 CJ 20 - 40% Minimal Assist   23 CI 1-20% SBA / CGA   24 CH 0% Independent/ Mod I       Patient left up in chair with all lines intact, call button in reach, nurse  notified, and spouse  present    ASSESSMENT:  [place] DEJA Biggs Jr. is a 82 y.o. male with a medical diagnosis of Gastrointestinal hemorrhage with melena and presents with debility and generalized weakness    Rehab identified problem list/impairments:  weakness, impaired balance, impaired endurance, decreased safety awareness, impaired self care skills, impaired functional mobility, decreased upper extremity function, decreased ROM, gait instability    Rehab potential is good.    Activity tolerance: Good    Discharge recommendations: nursing facility, skilled   Barriers to discharge:      Equipment recommendations:  (tbd)    GOALS:   Multidisciplinary Problems       Occupational Therapy Goals          Problem: Occupational Therapy    Goal Priority Disciplines Outcome Interventions   Occupational Therapy Goal     OT, PT/OT Ongoing, Progressing    Description: O.T. GOALS TO BE MET BY 3-14-23  SBA WITH UE DRESSING  SBA WITH TOILET ING    SBA WITH TOILET TRANSFERS WITH AE  PT WILL TOLERATE 1 SET X 15 REPR B UE ROM EXERCISE                       Plan:  Patient to be seen 2 x/week to address the above listed problems via self-care/home management, therapeutic activities, therapeutic exercises  Plan of Care expires:    Plan of Care reviewed with: patient         03/02/2023  "

## 2023-03-02 NOTE — ASSESSMENT & PLAN NOTE
Patient with Permanent atrial fibrillation which is controlled currently. Patient is currently in atrial fibrillation.[id number] Score: 4.  Anticoagulation indicated. Anticoagulation done with Apixaban.    After discussion with Cardiology, family agreeable to discontinuing Coumadin and switching patient anticoagulation to Eliquis

## 2023-03-02 NOTE — PLAN OF CARE
Plan of care reviewed with patient, pt verbalized understanding.  Pt remains free from falls this shift, fall precautions in place.  Wound care following patient.  AAOx4 with intermittent confusion, VSS, NAD noted at this time.  Pt remained afebrile.  Afib on the tele monitor.  Blood glucose monitoring q6 hours.  NS @ 75mL/hr.  Pt currently resting comfortably in chair, pt also refused to get back in bed. Pt and daughter educated on importance of turning and repositioning off of present wound. Pt and daughter verbalized understanding. Pillow has been rotated throughout shift under hips in chair.   Hourly rounding complete.  Family remains at the bedside.  Will continue to monitor.

## 2023-03-02 NOTE — PROGRESS NOTES
[facility] - Intensive Care ([facility])  [facility]  Progress Note    Patient Name: Anthony Biggs Jr.  MRN: [id number]  Patient Class: IP- Inpatient   Admission Date: 2/23/2023  Length of Stay: 7 days  Attending Physician: Estuardo Eddy MD  Primary Care Provider: Giuliano Moran MD        Subjective:     Principal Problem:Gastrointestinal hemorrhage with melena        HPI:  Anthony Biggs Jr. is a 82 y.o. male with Angiodysplasia of small intestine, Atrial fibrillation on Coumadin, Chronic diastolic heart failure, Chronic upper GI bleeding, CAD, Depression, Diabetes mellitus, HTN, HLD, Emphysema of lung, who initially presented to ED on 2/23/2023 with complaints of weakness and altered mental status.  Spouse reported that patient had decreased appetite and progressive weakness prior to ED presentation.  Additionally the previous day patient was found to have elevated INR of 8.2 and had been instructed to hold Coumadin.  In the ED patient was found to be hypotensive with hemoglobin of 5.8, INR > 10, melena on HERMILO.  Patient was initiated on Kcentra, and transfused 2 units of PRBC.  Given persistent hypotension critical care medicine was consulted, patient admitted to ICU.  GI consulted, patient underwent urgent bedside EGD which showed nonbleeding duodenal ulcer and AVMs      Overview/Hospital Course:  ICU course complicated by confusion and agitation requiring initiation of Precedex, which was able to be weaned off with patient transitioned to seroquel.  Spouse reports that patient has been declining over the past few months with increased confusion and weakness.  Patient stable for transfer of ICU to 02/25/2023.  Hospital medicine consulted to continue care.    Given hemorrhagic shock secondary to supratherapeutic INR, Coumadin is not the best anticoagulation choice for patient.  This was discussed with Cardiology and patient's family.  They are agreeable to starting patient on Eliquis instead.    PT/OT evaluation  ordered.  Recommended for SNF placement, Social work has been consulted    Due to concern for dysphagia, SLP was consulted.  Patient recommended for Minced & Moist Diet - IDDSI Level 5, Thin liquids - IDDSI Level 0 (no straws) and aspiration protocol      Interval History: NAEON. Patient remains in ICU, sitting in chair, pleasant, deneis any complaints. Palliative Care was consulted per family request today, family meeting held this afternoon. Plan for discharge to SNF in AM.    Review of Systems   All other systems reviewed and are negative.  Objective:     Vital Signs (Most Recent):  Temp: 98.4 °F (36.9 °C) (03/02/23 1115)  Pulse: 96 (03/02/23 1200)  Resp: (!) 26 (03/02/23 1200)  BP: (!) 114/58 (03/02/23 1200)  SpO2: 96 % (03/02/23 1200)   Vital Signs (24h Range):  Temp:  [98.2 °F (36.8 °C)-98.7 °F (37.1 °C)] 98.4 °F (36.9 °C)  Pulse:  [] 96  Resp:  [22-41] 26  SpO2:  [95 %-100 %] 96 %  BP: ()/(53-77) 114/58     Weight: 86.4 kg (190 lb 7.6 oz)  Body mass index is 29.83 kg/m².    Intake/Output Summary (Last 24 hours) at 3/2/2023 1443  Last data filed at 3/2/2023 0900  Gross per 24 hour   Intake 1677.57 ml   Output 200 ml   Net 1477.57 ml      Physical Exam  Constitutional:       General: He is not in acute distress.     Appearance: Normal appearance.   Cardiovascular:      Rate and Rhythm: Normal rate and regular rhythm.      Heart sounds: No murmur heard.  Pulmonary:      Effort: Pulmonary effort is normal. No respiratory distress.      Breath sounds: Normal breath sounds. No wheezing.   Abdominal:      General: There is no distension.      Palpations: Abdomen is soft.      Tenderness: There is no abdominal tenderness.   Neurological:      Mental Status: He is alert.       Significant Labs: All pertinent labs within the past 24 hours have been reviewed.  CBC:   Recent Labs   Lab 03/01/23  0512 03/02/23  0400   WBC 6.27 5.99   HGB 10.6* 9.7*   HCT 32.1* 30.3*    259     CMP:   Recent Labs   Lab  03/01/23  0512 03/02/23  0400    134*   K 3.8 4.0    105   CO2 23 19*    110   BUN 34* 30*   CREATININE 1.5* 1.3   CALCIUM 9.2 8.5*   PROT 7.1 6.6   ALBUMIN 3.2* 3.0*   BILITOT 0.9 0.6   ALKPHOS 56 51*   AST 46* 57*   ALT 31 48*   ANIONGAP 13 10       Significant Imaging: I have reviewed all pertinent imaging results/findings within the past 24 hours.      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 Patient is currently in atrial fibrillation.[id number] Score: 4.  Anticoagulation indicated. Anticoagulation done with Apixaban.    After discussion with Cardiology, family agreeable to discontinuing Coumadin and switching patient anticoagulation to Eliquis    SHAYLA (acute kidney injury)  Patient with acute kidney injury likely due to IVVD/dehydration secondary to hemorrhage SHAYLA is currently worsening. Labs reviewed- Renal function/electrolytes with Estimated Creatinine Clearance: 47.6 mL/min (based on SCr of 1.3 mg/dL). according to latest data. Monitor urine output and serial BMP and adjust therapy as needed. Avoid nephrotoxins and renally dose meds for GFR listed above.    +orthostatics noted (3/1/23)  Repeat orthostatics today improved with  gentle course of IV fluids  Strict I/O's, monitor for overload    Chronic diastolic heart failure  Patient is identified as having Diastolic (HFpEF) heart failure that is Chronic. CHF is currently controlled. Latest ECHO performed and demonstrates- Results for orders placed during the hospital encounter of 11/11/22    Echo    Interpretation Summary  · The left ventricle is normal in size with concentric remodeling and normal systolic function.  · Severe left atrial enlargement.  · The estimated ejection fraction is 60%.  · Atrial fibrillation observed.  · Normal right ventricular size with normal right ventricular systolic function.  · Moderate right atrial enlargement.  · Moderate aortic regurgitation.  · Mild-to-moderate mitral regurgitation.  · Mild tricuspid regurgitation.  · Normal central venous pressure (3 mmHg).  · The estimated PA systolic pressure is 34 mmHg.  · The ascending aorta is moderately dilated.  .  Hold diuretics and monitor clinical status closely. Monitor on telemetry. Patient is off CHF pathway.  Monitor strict Is&Os and daily weights.   Last BNP reviewed- and noted below   No results for input(s): BNP, BNPTRIAGEBLO in the last 168 hours..      Hypertension associated with diabetes  Normotensive  BP meds held due to hemorrhagic shock  Resume home meds as BP tolerates    LAILA (iron deficiency anemia)  Secondary to angiodysplasia in small intestine   S/p 2 units PRBC transfusion on 02/23  Continue iron supplement  Monitor H/H      Hyperlipidemia associated with type 2 diabetes mellitus  Continue statin    Cognitive decline  Family reports progressive decline for the past few months, patient with decreasing mobility and physical activity  Per family patient has been having symptoms of suspected dementia (irritability, intermittent confusion, repeating things, memory loss) for approximately 2 years  Plan ambulatory referral to Neurology on discharge for further evaluation  Delirium  precautions  PT/OT recommends SNF placement.  Social work has been consulted  SLP recommends Minced & Moist Diet - IDDSI Level 5, Thin liquids - IDDSI Level 0 (no straws), aspiration precaution        VTE Risk Mitigation (From admission, onward)           Ordered     apixaban tablet 2.5 mg  2 times daily         02/26/23 1413     Reason for No Pharmacological VTE Prophylaxis  Once        Question:  Reasons:  Answer:  Active Bleeding    02/23/23 1135     IP VTE HIGH RISK PATIENT  Once         02/23/23 1135     Place sequential compression device  Until discontinued         02/23/23 1135                    Discharge Planning   RACHNA:      Code Status: Full Code   Is the patient medically ready for discharge?:     Reason for patient still in hospital (select all that apply): Patient trending condition and Pending disposition  Discharge Plan A: Skilled Nursing Facility        Estuardo Eddy MD  Department of Hospital Medicine   [facility]ly [facility], later [facility] - Intensive Care ([facility])

## 2023-03-02 NOTE — PT/OT/SLP PROGRESS
Physical Therapy Treatment    Patient Name:  Anthony Biggs Jr.   MRN:  [id number]    Recommendations:     Discharge Recommendations: nursing facility, skilled  Discharge Equipment Recommendations:  (TBD at next level of care)  Barriers to discharge: Decreased caregiver support    Assessment:     Anthony Biggs Jr. is a 82 y.o. male admitted with a medical diagnosis of Gastrointestinal hemorrhage with melena.  He presents with the following impairments/functional limitations: weakness, impaired endurance, impaired functional mobility, gait instability, impaired balance, pain, decreased safety awareness, decreased lower extremity function, impaired cardiopulmonary response to activity, impaired coordination.    Rehab Prognosis: Fair; patient would benefit from acute skilled PT services to address these deficits and reach maximum level of function.    Recent Surgery: Procedure(s) (LRB):  EGD (ESOPHAGOGASTRODUODENOSCOPY) (N/A) 7 Days Post-Op    Plan:     During this hospitalization, patient to be seen 3 x/week to address the identified rehab impairments via gait training, therapeutic activities, therapeutic exercises and progress toward the following goals:    Plan of Care Expires:  03/12/23    Subjective     Chief Complaint: Pt back is still bothering him.  Patient/Family Comments/goals:   Pain/Comfort:  Pain Rating 1: 7/10  Location - Side 1: Bilateral  Location - Orientation 1: generalized  Location 1: back  Pain Addressed 1: Reposition, Distraction  Pain Rating 2: 5/10  Location - Side 2: Right  Location - Orientation 2: generalized  Location 2: hip  Pain Addressed 2: Reposition, Distraction    Objective:     Communicated with nurse prior to session.  Patient found up in chair with peripheral IV, pulse ox (continuous), telemetry, blood pressure cuff, PureWick upon PT entry to room.     General Precautions: Standard, fall  Orthopedic Precautions: N/A  Braces: N/A  Respiratory Status: Room air     Functional Mobility:  Bed  Mobility:     Scooting: minimum assistance  Transfers:     Sit to Stand:  minimum assistance with rolling walker, pt took 2 attempts to stand, verbal cuing to look forward and open eyes  Gait: Ambulated 20ft MIN A using RW, unsteady on feet  Balance: Demonstrated fair sitting balance, poor dynamic balance during gait  Blood Pressure  Initial sittin/70  Initial standin/59 (intermittent marching completed in standing)  Standin/53  Standin/67  Initial sitting after gait: 89/59  Sittin/79  Nurse notified about changes in BP  No change in s/s reported by patient throughout treatment     AM-PAC 6 CLICK MOBILITY  Turning over in bed (including adjusting bedclothes, sheets and blankets)?: 1 (NT)  Sitting down on and standing up from a chair with arms (e.g., wheelchair, bedside commode, etc.): 3  Moving from lying on back to sitting on the side of the bed?:  (NT)  Moving to and from a bed to a chair (including a wheelchair)?: 3  Need to walk in hospital room?: 3  Climbing 3-5 steps with a railing?: 1     Treatment & Education:  Pt tolerated interventions well. Completed standing marches. Pt remains mildly confused. Educated on activity pacing. Reviewed importance of OOB activities and HEP (hip flex, LAQ, ham curls, ankle pumps) in order to maintain/regain strength. Reviewed proper use of RW for safety and to reduce risk of falling. Reviewed increased risk of falling due to weakness, instructed to utilize call bell for assistance for all transfers. Pt agreeable to all requests.    Patient left up in chair with all lines intact, call button in reach, and wife present.    GOALS:   Multidisciplinary Problems       Physical Therapy Goals          Problem: Physical Therapy    Goal Priority Disciplines Outcome Goal Variances Interventions   Physical Therapy Goal     PT, PT/OT Ongoing, Progressing     Description: PT eval complete. The following goals will be met in 14 days  1. Pt MIN A with bed  mobility  2. Pt transfer bed to chair with RW and CGA  3. Pt amb 150 ft with RW and MIN A                     Time Tracking:     PT Received On: 03/02/23  PT Start Time: 1000     PT Stop Time: 1025  PT Total Time (min): 25 min     Billable Minutes: Gait Training 10min and Therapeutic Activity 15min    Treatment Type: Treatment  PT/PTA: PT     PTA Visit Number: 0     03/02/2023

## 2023-03-03 ENCOUNTER — ANTI-COAG VISIT (OUTPATIENT)
Dept: CARDIOLOGY | Facility: CLINIC | Age: 83
End: 2023-03-03
Payer: MEDICARE

## 2023-03-03 VITALS
OXYGEN SATURATION: 99 % | HEIGHT: 67 IN | DIASTOLIC BLOOD PRESSURE: 63 MMHG | WEIGHT: 190.5 LBS | TEMPERATURE: 98 F | HEART RATE: 76 BPM | SYSTOLIC BLOOD PRESSURE: 118 MMHG | BODY MASS INDEX: 29.9 KG/M2 | RESPIRATION RATE: 29 BRPM

## 2023-03-03 PROBLEM — K92.1 GASTROINTESTINAL HEMORRHAGE WITH MELENA: Status: RESOLVED | Noted: 2023-02-23 | Resolved: 2023-03-03

## 2023-03-03 PROBLEM — N17.9 AKI (ACUTE KIDNEY INJURY): Status: RESOLVED | Noted: 2023-02-23 | Resolved: 2023-03-03

## 2023-03-03 LAB
ALBUMIN SERPL BCP-MCNC: 2.6 G/DL (ref 3.5–5.2)
ALP SERPL-CCNC: 53 U/L (ref 55–135)
ALT SERPL W/O P-5'-P-CCNC: 41 U/L (ref 10–44)
ANION GAP SERPL CALC-SCNC: 11 MMOL/L (ref 8–16)
AST SERPL-CCNC: 50 U/L (ref 10–40)
BASOPHILS # BLD AUTO: 0.01 K/UL (ref 0–0.2)
BASOPHILS NFR BLD: 0.2 % (ref 0–1.9)
BILIRUB SERPL-MCNC: 0.5 MG/DL (ref 0.1–1)
BUN SERPL-MCNC: 25 MG/DL (ref 8–23)
CALCIUM SERPL-MCNC: 8.3 MG/DL (ref 8.7–10.5)
CHLORIDE SERPL-SCNC: 105 MMOL/L (ref 95–110)
CO2 SERPL-SCNC: 19 MMOL/L (ref 23–29)
CREAT SERPL-MCNC: 1.1 MG/DL (ref 0.5–1.4)
DIFFERENTIAL METHOD: ABNORMAL
EOSINOPHIL # BLD AUTO: 0.2 K/UL (ref 0–0.5)
EOSINOPHIL NFR BLD: 4.6 % (ref 0–8)
ERYTHROCYTE [DISTWIDTH] IN BLOOD BY AUTOMATED COUNT: 15.4 % (ref 11.5–14.5)
EST. GFR  (NO RACE VARIABLE): >60 ML/MIN/1.73 M^2
GLUCOSE SERPL-MCNC: 99 MG/DL (ref 70–110)
HCT VFR BLD AUTO: 27.9 % (ref 40–54)
HGB BLD-MCNC: 9.1 G/DL (ref 14–18)
IMM GRANULOCYTES # BLD AUTO: 0.05 K/UL (ref 0–0.04)
IMM GRANULOCYTES NFR BLD AUTO: 1.1 % (ref 0–0.5)
LYMPHOCYTES # BLD AUTO: 1.1 K/UL (ref 1–4.8)
LYMPHOCYTES NFR BLD: 23.9 % (ref 18–48)
MAGNESIUM SERPL-MCNC: 1.7 MG/DL (ref 1.6–2.6)
MCH RBC QN AUTO: 29.2 PG (ref 27–31)
MCHC RBC AUTO-ENTMCNC: 32.6 G/DL (ref 32–36)
MCV RBC AUTO: 89 FL (ref 82–98)
MONOCYTES # BLD AUTO: 0.5 K/UL (ref 0.3–1)
MONOCYTES NFR BLD: 11 % (ref 4–15)
NEUTROPHILS # BLD AUTO: 2.7 K/UL (ref 1.8–7.7)
NEUTROPHILS NFR BLD: 59.2 % (ref 38–73)
NRBC BLD-RTO: 0 /100 WBC
PLATELET # BLD AUTO: 269 K/UL (ref 150–450)
PMV BLD AUTO: 9.7 FL (ref 9.2–12.9)
POCT GLUCOSE: 106 MG/DL (ref 70–110)
POCT GLUCOSE: 114 MG/DL (ref 70–110)
POCT GLUCOSE: 117 MG/DL (ref 70–110)
POTASSIUM SERPL-SCNC: 3.4 MMOL/L (ref 3.5–5.1)
PROT SERPL-MCNC: 5.8 G/DL (ref 6–8.4)
RBC # BLD AUTO: 3.12 M/UL (ref 4.6–6.2)
SODIUM SERPL-SCNC: 135 MMOL/L (ref 136–145)
WBC # BLD AUTO: 4.56 K/UL (ref 3.9–12.7)

## 2023-03-03 PROCEDURE — 25000003 PHARM REV CODE 250: Mod: HCNC | Performed by: HOSPITALIST

## 2023-03-03 PROCEDURE — 99233 PR SUBSEQUENT HOSPITAL CARE,LEVL III: ICD-10-PCS | Mod: ,,, | Performed by: NURSE PRACTITIONER

## 2023-03-03 PROCEDURE — 36415 COLL VENOUS BLD VENIPUNCTURE: CPT | Mod: HCNC | Performed by: NURSE PRACTITIONER

## 2023-03-03 PROCEDURE — 97116 GAIT TRAINING THERAPY: CPT | Mod: HCNC

## 2023-03-03 PROCEDURE — 80053 COMPREHEN METABOLIC PANEL: CPT | Mod: HCNC | Performed by: NURSE PRACTITIONER

## 2023-03-03 PROCEDURE — 85025 COMPLETE CBC W/AUTO DIFF WBC: CPT | Mod: HCNC | Performed by: NURSE PRACTITIONER

## 2023-03-03 PROCEDURE — 83735 ASSAY OF MAGNESIUM: CPT | Mod: HCNC | Performed by: HOSPITALIST

## 2023-03-03 PROCEDURE — 99497 ADVNCD CARE PLAN 30 MIN: CPT | Mod: ,,, | Performed by: NURSE PRACTITIONER

## 2023-03-03 PROCEDURE — 99497 PR ADVNCD CARE PLAN 30 MIN: ICD-10-PCS | Mod: ,,, | Performed by: NURSE PRACTITIONER

## 2023-03-03 PROCEDURE — 25000003 PHARM REV CODE 250: Mod: HCNC | Performed by: INTERNAL MEDICINE

## 2023-03-03 PROCEDURE — 99233 SBSQ HOSP IP/OBS HIGH 50: CPT | Mod: ,,, | Performed by: NURSE PRACTITIONER

## 2023-03-03 PROCEDURE — 25000003 PHARM REV CODE 250: Mod: HCNC | Performed by: NURSE PRACTITIONER

## 2023-03-03 PROCEDURE — 97530 THERAPEUTIC ACTIVITIES: CPT | Mod: HCNC

## 2023-03-03 RX ORDER — TRAZODONE HYDROCHLORIDE 50 MG/1
50 TABLET ORAL NIGHTLY PRN
Start: 2023-03-03 | End: 2023-07-07

## 2023-03-03 RX ORDER — QUETIAPINE FUMARATE 25 MG/1
25 TABLET, FILM COATED ORAL NIGHTLY
Qty: 30 TABLET | Refills: 0
Start: 2023-03-03 | End: 2023-03-03 | Stop reason: HOSPADM

## 2023-03-03 RX ORDER — PANTOPRAZOLE SODIUM 40 MG/1
40 TABLET, DELAYED RELEASE ORAL DAILY
Qty: 30 TABLET | Refills: 1
Start: 2023-03-03 | End: 2023-04-11 | Stop reason: SDUPTHER

## 2023-03-03 RX ORDER — SUCRALFATE 1 G/10ML
1 SUSPENSION ORAL DAILY
Start: 2023-03-04

## 2023-03-03 RX ORDER — METOPROLOL TARTRATE 25 MG/1
12.5 TABLET, FILM COATED ORAL 2 TIMES DAILY
Qty: 30 TABLET | Refills: 1
Start: 2023-03-03 | End: 2023-04-11 | Stop reason: SDUPTHER

## 2023-03-03 RX ADMIN — METOPROLOL TARTRATE 12.5 MG: 25 TABLET, FILM COATED ORAL at 08:03

## 2023-03-03 RX ADMIN — ISOSORBIDE MONONITRATE 30 MG: 30 TABLET, EXTENDED RELEASE ORAL at 08:03

## 2023-03-03 RX ADMIN — PANTOPRAZOLE SODIUM 40 MG: 40 TABLET, DELAYED RELEASE ORAL at 08:03

## 2023-03-03 RX ADMIN — ATORVASTATIN CALCIUM 40 MG: 40 TABLET, FILM COATED ORAL at 08:03

## 2023-03-03 RX ADMIN — FERROUS SULFATE TAB 325 MG (65 MG ELEMENTAL FE) 1 EACH: 325 (65 FE) TAB at 08:03

## 2023-03-03 RX ADMIN — SUCRALFATE 1 G: 1 SUSPENSION ORAL at 08:03

## 2023-03-03 RX ADMIN — APIXABAN 2.5 MG: 2.5 TABLET, FILM COATED ORAL at 08:03

## 2023-03-03 RX ADMIN — SENNOSIDES AND DOCUSATE SODIUM 2 TABLET: 50; 8.6 TABLET ORAL at 08:03

## 2023-03-03 RX ADMIN — ALLOPURINOL 100 MG: 100 TABLET ORAL at 08:03

## 2023-03-03 NOTE — PLAN OF CARE
Pt tolerated interventions well. Required CGA for sup>sit and sit>stand, MIN A for sit>sup, ambulated 30ft CGA. BP remained stable throughout treatment. Recommending SNF upon d/c.

## 2023-03-03 NOTE — PROGRESS NOTES
Advance Care Planning    Progress Note  Palliative Medicine      Consult Requested By: Estuardo Eddy MD  Reason for Consult: Goals of care and Advance care planning    SUBJECTIVE:     History of Present Illness:  Upon examination, patient in bed in no acute distress. He did have a fall overnight. Wife, son, and daughter in law at bedside. Patient being discharged to SNF at [facility] today. Family with hopes that patient will improve his functional status and overall improve cognitively as well with hopes of getting him back home at his [place]. If this plan does not work will likely transition to comfort focused treatment but they care continuing to discuss their options. Patient's family elected to change patient's code status to DNR/I reflected in the chart. They report LaPOST was completed at [facility] will follow up on this.       Past Medical History:   Diagnosis Date    Abnormal CXR     SHAYLA (acute kidney injury) 2/23/2023    Angina pectoris 8/28/2017    Angiodysplasia of small intestine     Atrial fibrillation     Chronic diastolic heart failure 11/8/2022    Chronic upper GI bleeding     due to angiodysplasia in proximal small intestine    Coronary atherosclerosis of unspecified type of vessel, native or graft     COVID 6/26/2022    Depression     Diabetes mellitus without complication     Emphysema of lung     History of prostate cancer 2007    prostatectomy    Hyperlipidemia associated with type 2 diabetes mellitus     Hypertension associated with diabetes     LAILA (iron deficiency anemia) 09/20/2021    due to angiodysplasia in small intestine    Intention tremor     Major depressive disorder, recurrent, moderate 4/12/2021    Moderate episode of recurrent major depressive disorder 4/1/2019    Morbid (severe) obesity due to excess calories 4/1/2019    SHORTY (obstructive sleep apnea)     Prostate cancer     Trouble in sleeping     Urinary incontinence      Past Surgical History:   Procedure Laterality  Date    ABDOMINAL HERNIA REPAIR      APPENDECTOMY      bladder sx      CARDIAC CATHETERIZATION      CATARACT EXTRACTION W/  INTRAOCULAR LENS IMPLANT  Restor OU    COLONOSCOPY N/A 5/16/2017    Procedure: COLONOSCOPY;  Surgeon: Alfredo Naidu MD;  Location: [facility];  Service: Endoscopy;  Laterality: N/A;    ESOPHAGOGASTRODUODENOSCOPY N/A 10/29/2021    Procedure: SBE (Push Enteroscopy) with Dr. Wayne;  Surgeon: Arlette Wayne MD;  Location: [facility];  Service: Endoscopy;  Laterality: N/A;  Plavix held indefinitely as of 10-20-21. Must also hold Coumadin 5 days prior. Ok to continue to use ASA.    ESOPHAGOGASTRODUODENOSCOPY N/A 2/23/2023    Procedure: EGD (ESOPHAGOGASTRODUODENOSCOPY);  Surgeon: Opal Wright MD;  Location: [facility];  Service: Endoscopy;  Laterality: N/A;    inguinal hernia      INTRALUMINAL GASTROINTESTINAL TRACT IMAGING VIA CAPSULE N/A 10/4/2021    Procedure: IMAGING PROCEDURE, GI TRACT, INTRALUMINAL, VIA CAPSULE;  Surgeon: Joaquin Stack RN;  Location: [facility];  Service: Endoscopy;  Laterality: N/A;    LEFT HEART CATHETERIZATION Left 7/20/2021    Procedure: CATHETERIZATION, HEART, LEFT;  Surgeon: Darryl Griffith MD;  Location: [facility] CATH LAB;  Service: Cardiology;  Laterality: Left;    lung sx      PERCUTANEOUS TRANSLUMINAL BALLOON ANGIOPLASTY OF CORONARY ARTERY  7/20/2021    Procedure: Angioplasty-coronary;  Surgeon: Darryl Griffith MD;  Location: [facility] CATH LAB;  Service: Cardiology;;    PROSTATE SURGERY       Family History   Problem Relation Age of Onset    Heart disease Mother     Cancer Father         lung    Macular degeneration Sister     Diabetes Sister     Heart disease Sister     Strabismus Neg Hx     Retinal detachment Neg Hx     Glaucoma Neg Hx     Blindness Neg Hx     Amblyopia Neg Hx        Social History     Socioeconomic History    Marital status:     Highest education level: Some college, no degree   Tobacco Use    Smoking status: Former      Packs/day: 0.50     Years: 40.00     Pack years: 20.00     Types: Cigarettes     Start date: 1962     Quit date:      Years since quittin.1    Smokeless tobacco: Never   Substance and Sexual Activity    Alcohol use: Yes     Alcohol/week: 7.0 standard drinks     Types: 7 Shots of liquor per week    Drug use: No    Sexual activity: Not Currently      Review of patient's allergies indicates:  No Known Allergies    Medications:    Current Facility-Administered Medications:     0.9%  NaCl infusion (for blood administration), , Intravenous, Q24H PRN, Jessica Santos MD    allopurinoL tablet 100 mg, 100 mg, Oral, Daily, Nuno Gambino NP, 100 mg at 23 0845    apixaban tablet 2.5 mg, 2.5 mg, Oral, BID, Jennifer Taarea, DO, 2.5 mg at 23 0844    atorvastatin tablet 40 mg, 40 mg, Oral, Daily, Nuno Gambino NP, 40 mg at 23 0844    dextrose 10% bolus 125 mL 125 mL, 12.5 g, Intravenous, PRN, Nuno Gambino NP    dextrose 10% bolus 250 mL 250 mL, 25 g, Intravenous, PRN, Nuno Gambino NP    ferrous sulfate tablet 1 each, 1 tablet, Oral, Daily, Nuno Gmabino NP, 1 each at 23 0845    glucagon (human recombinant) injection 1 mg, 1 mg, Intramuscular, PRN, Nuno Gambino NP    HYDROcodone-acetaminophen 5-325 mg per tablet 1 tablet, 1 tablet, Oral, Q6H PRN, Sung Stephenson MD, 1 tablet at 23 0241    hydrocortisone 2.5 % rectal cream, , Rectal, TID PRN, Sung Stephenson MD, Given at 23 1719    insulin aspart U-100 pen 0-5 Units, 0-5 Units, Subcutaneous, Q6H PRN, Nuno Gambino NP    isosorbide mononitrate 24 hr tablet 30 mg, 30 mg, Oral, Daily, Nuno Gambino NP, 30 mg at 23 0844    LIDOcaine 5 % patch 1 patch, 1 patch, Transdermal, Q24H, Sarah Stack, ACNP-BC, 1 patch at 23 2203    melatonin tablet 3 mg, 3 mg, Oral, Nightly, Nuno Gambino, TIFFANIE, 3 mg at 23 2100    metoprolol injection 5 mg, 5 mg, Intravenous, Q5 Min PRN,  Nuno Gambino NP    metoprolol tartrate (LOPRESSOR) split tablet 12.5 mg, 12.5 mg, Oral, BID, Nuno Gambino NP, 12.5 mg at 03/03/23 0845    ondansetron injection 4 mg, 4 mg, Intravenous, Q8H PRN, Nuno Gambino NP    pantoprazole EC tablet 40 mg, 40 mg, Oral, Daily, Estuardo Eddy MD, 40 mg at 03/03/23 0845    prochlorperazine injection Soln 5 mg, 5 mg, Intravenous, Q6H PRN, Nuno Gambino NP    quetiapine split tablet 12.5 mg, 12.5 mg, Oral, QHS, Jennifer [place] DO, 12.5 mg at 03/02/23 2058    senna-docusate 8.6-50 mg per tablet 2 tablet, 2 tablet, Oral, Daily, Sung Stephenson MD, 2 tablet at 03/03/23 0845    sodium chloride 0.9% flush 10 mL, 10 mL, Intravenous, PRN, Nuno Gambino NP    sucralfate 100 mg/mL suspension 1 g, 1 g, Oral, Daily, Nuno Gambino NP, 1 g at 03/03/23 0845    traZODone tablet 50 mg, 50 mg, Oral, Nightly PRN, Sung Stephenson MD, 50 mg at 02/23/23 1958    ROS:  Review of Systems   Reason unable to perform ROS: Provided by family, limited as patient with confusion.   Constitutional:  Positive for activity change, appetite change and fatigue.   Neurological:  Positive for weakness.        Generalized weakness, noting he ambulates short distances at home and holds on to furniture to do so   Psychiatric/Behavioral:  Positive for confusion.      OBJECTIVE:     Physical Exam:  Vitals: Temp: 98.2 °F (36.8 °C) (03/03/23 0715)  Pulse: 88 (03/03/23 0900)  Resp: (!) 28 (03/03/23 0900)  BP: (!) 92/52 (03/03/23 0800)  SpO2: 100 % (03/03/23 0900)    Physical Exam  Vitals and nursing note reviewed.   Constitutional:       General: He is not in acute distress.  HENT:      Head: Normocephalic.      Nose: Nose normal.      Mouth/Throat:      Mouth: Mucous membranes are dry.   Eyes:      General:         Right eye: No discharge.         Left eye: No discharge.      Pupils: Pupils are equal, round, and reactive to light.   Cardiovascular:      Rate and Rhythm: Normal rate.    Pulmonary:      Effort: Pulmonary effort is normal. No respiratory distress.   Abdominal:      Palpations: Abdomen is soft.   Musculoskeletal:      Cervical back: Normal range of motion.      Right lower leg: Edema present.      Left lower leg: Edema present.      Comments: Generalized weakness, working with PT utilizing walker and 2 person assist.    Skin:     General: Skin is warm.   Neurological:      Mental Status: He is alert.      Comments: Oriented to self, with confusion   Psychiatric:         Mood and Affect: Mood normal.         Review of Symptoms      Symptom Assessment (ESAS 0-10 Scale)  Pain:  0  Dyspnea:  0  Anxiety:  0  Nausea:  0  Depression:  0  Anorexia:  0  Fatigue:  0  Insomnia:  0  Restlessness:  0  Agitation:  0         Performance Status:  50    Living Arrangements:  Lives with spouse    Psychosocial/Cultural:   See Palliative Psychosocial Note: No  **Primary  to Follow**  Palliative Care  Consult: No    Advance Directives:   Do Not Resuscitate Status: Yes      Decision Making:  Family answered questions and Patient unable to communicate due to disease severity/cognitive impairment  Goals of Care: What is most important right now is to focus on remaining as independent as possible, improvement in condition but with limits to invasive therapies. Accordingly, we have decided that the best plan to meet the patient's goals includes continuing with treatment.    Labs:  WBC   Date Value Ref Range Status   03/03/2023 4.56 3.90 - 12.70 K/uL Final       Hemoglobin   Date Value Ref Range Status   03/03/2023 9.1 (L) 14.0 - 18.0 g/dL Final       Hematocrit   Date Value Ref Range Status   03/03/2023 27.9 (L) 40.0 - 54.0 % Final       MCV   Date Value Ref Range Status   03/03/2023 89 82 - 98 fL Final       Platelets   Date Value Ref Range Status   03/03/2023 269 150 - 450 K/uL Final       BMP  Lab Results   Component Value Date     (L) 03/03/2023    K 3.4 (L) 03/03/2023      03/03/2023    CO2 19 (L) 03/03/2023    BUN 25 (H) 03/03/2023    CREATININE 1.1 03/03/2023    CALCIUM 8.3 (L) 03/03/2023    ANIONGAP 11 03/03/2023    EGFRNORACEVR >60 03/03/2023       Lab Results   Component Value Date    AST 50 (H) 03/03/2023    ALKPHOS 53 (L) 03/03/2023    BILITOT 0.5 03/03/2023       Albumin   Date Value Ref Range Status   03/03/2023 2.6 (L) 3.5 - 5.2 g/dL Final       Radiology:I have reviewed all pertinent imaging results/findings within the past 24 hours.      ASSESSMENT   Anthony Biggs Jr. is a 82 y.o. male with Angiodysplasia of small intestine, Atrial fibrillation on Coumadin, Chronic diastolic heart failure, Chronic upper GI bleeding, CAD, Depression, Diabetes mellitus, HTN, HLD, Emphysema of lung, who initially presented to ED on 2/23/2023 with complaints of weakness and altered mental status.  Spouse reported that patient had decreased appetite and progressive weakness prior to ED presentation.  Additionally the previous day patient was found to have elevated INR of 8.2 and had been instructed to hold Coumadin.  In the ED patient was found to be hypotensive with hemoglobin of 5.8, INR > 10, melena on HERMILO.  Patient was initiated on Kcentra, and transfused 2 units of PRBC.  Given persistent hypotension critical care medicine was consulted, patient admitted to ICU.  GI consulted, patient underwent urgent bedside EGD which showed nonbleeding duodenal ulcer and AVMs.    PLAN   **Encounter for Palliative Care  -Code status: DNR/I, reflected in the chart. Will follow up and see if LaPOST needs to be completed prior to discharge.    -HCPOA: Surrogate decision maker is spouse and two adult children involved in decision making.   -GOC: D/c to SNF at [facility] and improve functional status with plans to go back home with spouse. If patient does not progress or is not able to tolerate working with PT to improve functional status, will transition focus to comfort and QOL and enroll with  hospice.   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   -PT/OT recommends SNF placement plans to d/c to [facility]   -SLP recommends Minced & Moist Diet - IDDSI Level 5, Thin liquids - IDDSI Level 0 (no straws), aspiration precaution  -GOC: D/c to SNF at [facility] and improve functional status with plans to go back home with spouse. If patient does not progress or is not able to tolerate working with PT to improve functional status, will transition focus to comfort and QOL and enroll with hospice.     Discussed case and visit details with Dr. Eddy     Thank you for allowing Palliative Medicine to be involved in the care of Anthony Biggs Jr..       Medical decision making: management of more than one chronic illness in exacerbation or progression of disease    Plan required increased review of medication choice, interaction, dosing, frequency, and route due to patient complexity. Patient complexity increased by: advanced age, altered mentation, at risk for delirium, and poor historian    35 min ACP time spent discussing: code status, assessed patient specific goals and addressed the best way to achieve them, coordination of care and emotional support, formulating and communicating prognosis, exploring burden/  benefit of various approaches of treatment, inquired about existing or willingness to complete advance directive documents.    Melissa Terrazas NP  Palliative Medicine

## 2023-03-03 NOTE — PLAN OF CARE
[facility] - Intensive Care (Hospital)  Discharge Final Note    Primary Care Provider: Giuliano Moran MD    Expected Discharge Date: 3/3/2023    Final Discharge Note (most recent)       Final Note - 03/03/23 1142          Final Note    Assessment Type Final Discharge Note     Anticipated Discharge Disposition Skilled Nursing Facility     Hospital Resources/Appts/Education Provided Post-Acute resouces added to AVS        Post-Acute Status    Post-Acute Authorization Placement     Post-Acute Placement Status Set-up Complete/Auth obtained     Discharge Delays None known at this time                     Important Message from Medicare  Important Message from Medicare regarding Discharge Appeal Rights: Given to patient/caregiver, Explained to patient/caregiver, Signed/date by patient/caregiver     Date IMM was signed: 03/02/23  Time IMM was signed: 1023    Contact Info       [facility]   Specialty: Skilled Nursing Facility    [address]  [place] [address]   Phone: [phone]       Next Steps: Follow up          DC disposition: [facility]   Family notified: at bedside   Transportation: Facility transport,  around noon   Nurse provided with phone number for report.   All DC info uploaded into [facility].     BETSY Caro

## 2023-03-03 NOTE — PLAN OF CARE
VSS this shift. Pt disoriented to time and situation at beginning of shift. Pt in recliner, refused to reposition or move to bed, chair alarm on. Pt had a fall,  fall protocol followed. Pt now in bed, alarms active. POC reviewed with pt. Call light within reach.

## 2023-03-03 NOTE — DISCHARGE SUMMARY
[facility] - Intensive Care ([facility])  [facility] Medicine  Discharge Summary      Patient Name: Anthony Biggs Jr.  MRN: [id number]  SHAE: [id number]  Patient Class: IP- Inpatient  Admission Date: 2/23/2023  Hospital Length of Stay: 8 days  Discharge Date and Time:  03/03/2023 9:58 AM  Attending Physician: Estuardo Eddy MD   Discharging Provider: Estuardo Eddy MD  Primary Care Provider: Giuliano Moran MD    Primary Care Team: [facility] MEDICINE C    HPI:   Anthony Biggs Jr. is a 82 y.o. male with Angiodysplasia of small intestine, Atrial fibrillation on Coumadin, Chronic diastolic heart failure, Chronic upper GI bleeding, CAD, Depression, Diabetes mellitus, HTN, HLD, Emphysema of lung, who initially presented to ED on 2/23/2023 with complaints of weakness and altered mental status.  Spouse reported that patient had decreased appetite and progressive weakness prior to ED presentation.  Additionally the previous day patient was found to have elevated INR of 8.2 and had been instructed to hold Coumadin.  In the ED patient was found to be hypotensive with hemoglobin of 5.8, INR > 10, melena on HERMILO.  Patient was initiated on Kcentra, and transfused 2 units of PRBC.  Given persistent hypotension critical care medicine was consulted, patient admitted to ICU.  GI consulted, patient underwent urgent bedside EGD which showed nonbleeding duodenal ulcer and AVMs      Procedure(s) (LRB):  EGD (ESOPHAGOGASTRODUODENOSCOPY) (N/A)      Hospital Course:   ICU course complicated by confusion and agitation requiring initiation of Precedex, which was able to be weaned off with patient transitioned to seroquel.  Spouse reports that patient has been declining over the past few months with increased confusion and weakness.  Patient stable for transfer of ICU to 02/25/2023.  Hospital medicine consulted to continue care.    Given hemorrhagic shock secondary to supratherapeutic INR, Coumadin is not the best anticoagulation choice for patient.  This  was discussed with Cardiology and patient's family.  They are agreeable to starting patient on Eliquis instead.    PT/OT evaluation ordered.  Recommended for SNF placement, Social work has been consulted    Due to concern for dysphagia, SLP was consulted.  Patient recommended for Minced & Moist Diet - IDDSI Level 5, Thin liquids - IDDSI Level 0 (no straws) and aspiration protocol       Goals of Care Treatment Preferences:  Code Status: DNR          What is most important right now is to focus on remaining as independent as possible, improvement in condition but with limits to invasive therapies.  Accordingly, we have decided that the best plan to meet the patient's goals includes continuing with treatment.      Consults:   Consults (From admission, onward)          Status Ordering Provider     Inpatient consult to Palliative Care  Once        Provider:  Melissa Terrazas NP    Completed [place] QUINN     Inpatient consult to Social Work  Once        Provider:  (Not yet assigned)    Completed BUTCH ARELLANO     Inpatient consult to Hospitalist  Once        Provider:  (Not yet assigned)    Acknowledged EMILY LYNN     Inpatient consult to Cardiology  Once        Provider:  Norma Alba MD    Completed JANELL GREEN     Inpatient consult to Gastroenterology  Once        Provider:  Opal Wright MD    Completed JANELL GREEN            Neuro  Cognitive decline  Family reports progressive decline for the past few months, patient with decreasing mobility and physical activity  Per family patient has been having symptoms of suspected dementia (irritability, intermittent confusion, repeating things, memory loss) for approximately 2 years  Plan ambulatory referral to Neurology on discharge for further evaluation  Delirium precautions  PT/OT recommends SNF placement.  Social work has been consulted  SLP recommends Minced & Moist Diet - IDDSI Level 5, Thin liquids - IDDSI Level 0 (no straws), aspiration  precaution    Encephalopathy, metabolic-resolved as of 2/27/2023  Confusion noted on admission suspected to be related to hypotension and severe anemia  Patient developed increased confusion, agitation combativeness on the night of 2/23 this is stating initiation of Precedex in restrains.  Able to be weaned off Precedex on 02/24  Per family patient has been having symptoms of suspected dementia (irritability, intermittent confusion, repeating things, memory loss) for approximately 2 years  Plan ambulatory referral to Neurology on discharge for further evaluation  Delirium precautions    Cardiac/Vascular  Hyperlipidemia associated with type 2 diabetes mellitus  Continue statin    Hypertension associated with diabetes  Normotensive  BP meds held due to hemorrhagic shock  Resume home meds as BP tolerates    Chronic diastolic heart failure  Patient is identified as having Diastolic (HFpEF) heart failure that is Chronic. CHF is currently controlled. Latest ECHO performed and demonstrates- Results for orders placed during the hospital encounter of 11/11/22    Echo    Interpretation Summary  · The left ventricle is normal in size with concentric remodeling and normal systolic function.  · Severe left atrial enlargement.  · The estimated ejection fraction is 60%.  · Atrial fibrillation observed.  · Normal right ventricular size with normal right ventricular systolic function.  · Moderate right atrial enlargement.  · Moderate aortic regurgitation.  · Mild-to-moderate mitral regurgitation.  · Mild tricuspid regurgitation.  · Normal central venous pressure (3 mmHg).  · The estimated PA systolic pressure is 34 mmHg.  · The ascending aorta is moderately dilated.  .  Hold diuretics and monitor clinical status closely. Monitor on telemetry. Patient is off CHF pathway.  Monitor strict Is&Os and daily weights.   Last BNP reviewed- and noted below   No results for input(s): BNP, BNPTRIAGEBLO in the last 168 hours..      Coronary  atherosclerosis  ASA held due to recent bleed, will resume  Continue Imdur    Atrial fibrillation  Patient with Permanent atrial fibrillation which is controlled currently. Patient is currently in atrial fibrillation.[id number] Score: 4.  Anticoagulation indicated. Anticoagulation done with Apixaban.    After discussion with Cardiology, family agreeable to discontinuing Coumadin and switching patient anticoagulation to Eliquis    Hemorrhagic shock-resolved as of 2/27/2023  Secondary to gastrointestinal hemorrhage   Resolved with IV fluid, PRBC    Renal/  SHAYLA (acute kidney injury)-resolved as of 3/3/2023  Patient with acute kidney injury likely due to IVVD/dehydration secondary to hemorrhage SHAYLA is currently worsening. Labs reviewed- Renal function/electrolytes with Estimated Creatinine Clearance: 47.6 mL/min (based on SCr of 1.3 mg/dL). according to latest data. Monitor urine output and serial BMP and adjust therapy as needed. Avoid nephrotoxins and renally dose meds for GFR listed above.    +orthostatics noted (3/1/23)  Repeat orthostatics today improved with gentle course of IV fluids  Strict I/O's, monitor for overload    Oncology  LAILA (iron deficiency anemia)  Secondary to angiodysplasia in small intestine   S/p 2 units PRBC transfusion on 02/23  Continue iron supplement  Monitor H/H    GI  * Gastrointestinal hemorrhage with melena-resolved as of 3/3/2023  Patient initially presented with supratherapeutic INR greater than 10 and melena on the RRT   Received 2.5 L IV fluid in the ED, 2 units PRBC, PCC and vitamin K  GI consult and patient underwent urgent EGD on 02/23 with findings of nonbleeding duodenal ulcer and into 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    However Coumadin is not the best choice for patient for anticoagulation, Eliquis to be started instead      Final Active Diagnoses:    Diagnosis Date Noted POA    Angiodysplasia of small intestine [K55.20]  Yes    Chronic anticoagulation [Z79.01] 08/07/2017 Not Applicable    Atrial fibrillation [I48.91]  Yes    Coronary atherosclerosis [I25.10]  Yes    Chronic diastolic heart failure [I50.32] 11/08/2022 Yes    Hypertension associated with diabetes [E11.59, I15.2]  Yes    LAILA (iron deficiency anemia) [D50.9] 09/20/2021 Yes    Hyperlipidemia associated with type 2 diabetes mellitus [E11.69, E78.5]  Yes    Cognitive decline [R41.89] 02/25/2023 Yes      Problems Resolved During this Admission:    Diagnosis Date Noted Date Resolved POA    PRINCIPAL PROBLEM:  Gastrointestinal hemorrhage with melena [K92.1] 02/23/2023 03/03/2023 Yes    SHAYLA (acute kidney injury) [N17.9] 02/23/2023 03/03/2023 Yes    Encephalopathy, metabolic [G93.41] 02/24/2023 02/27/2023 Yes    Hemorrhagic shock [R57.8] 02/23/2023 02/27/2023 Yes    Supratherapeutic INR [R79.1] 02/23/2023 02/27/2023 Yes       Discharged Condition: stable    Disposition: Skilled Nursing Facility    Follow Up:    Patient Instructions:      Ambulatory referral/consult to Outpatient Case Management   Referral Priority: Routine Referral Type: Consultation   Referral Reason: Specialty Services Required   Number of Visits Requested: 1     Diet Dysphagia Mechanical Soft       Significant Diagnostic Studies: Labs:   BMP:   Recent Labs   Lab 03/02/23  0400 03/03/23  0409    99   * 135*   K 4.0 3.4*    105   CO2 19* 19*   BUN 30* 25*   CREATININE 1.3 1.1   CALCIUM 8.5* 8.3*   MG 1.8 1.7    and CMP    Recent Labs   Lab 03/02/23  0400 03/03/23  0409   * 135*   K 4.0 3.4*    105   CO2 19* 19*    99   BUN 30* 25*   CREATININE 1.3 1.1   CALCIUM 8.5* 8.3*   PROT 6.6 5.8*   ALBUMIN 3.0* 2.6*   BILITOT 0.6 0.5   ALKPHOS 51* 53*   AST 57* 50*   ALT 48* 41   ANIONGAP 10 11       Pending Diagnostic Studies:       Procedure Component Value Units Date/Time    HCV Virus Hold Specimen [[id number]] Collected: 02/23/23 1024    Order Status: Sent Lab Status: In process Updated: 02/23/23 1025    Specimen: Blood     Lactic acid, plasma #2 [[id number]] Collected: 02/23/23 1024    Order Status: Sent Lab Status: In process Updated: 02/23/23 1025    Specimen: Blood            Medications:  Reconciled Home Medications:      Medication List        START taking these medications      apixaban 2.5 mg Tab  Commonly known as: ELIQUIS  Take 1 tablet (2.5 mg total) by mouth 2 (two) times daily.     metoprolol tartrate 25 MG tablet  Commonly known as: LOPRESSOR  Take 0.5 tablets (12.5 mg total) by mouth 2 (two) times daily.     pantoprazole 40 MG tablet  Commonly known as: PROTONIX  Take 1 tablet (40 mg total) by mouth once daily.     sucralfate 100 mg/mL suspension  Commonly known as: CARAFATE  Take 10 mLs (1 g total) by mouth once daily.  Start taking on: March 4, 2023     traZODone 50 MG tablet  Commonly known as: DESYREL  Take 1 tablet (50 mg total) by mouth nightly as needed for Insomnia.            CONTINUE taking these medications      allopurinoL 100 MG tablet  Commonly known as: ZYLOPRIM  TAKE ONE TABLET BY MOUTH DAILY     atorvastatin 40 MG tablet  Commonly known as: LIPITOR  Take 1 tablet (40 mg total) by mouth once daily. for 90 doses     ferrous sulfate 324 mg (65 mg iron) Tbec  TAKE ONE TABLET BY MOUTH TWICE DAILY     isosorbide mononitrate 30 MG 24 hr tablet  Commonly known as: IMDUR  Take 1 tablet (30 mg total) by mouth once daily.     melatonin 3 mg tablet  Commonly known as: MELATIN  Take 1 tablet by mouth  nightly.     metFORMIN 500 MG tablet  Commonly known as: GLUCOPHAGE  TAKE ONE TABLET BY MOUTH TWICE A DAY WITH MEALS     nystatin-triamcinolone cream  Commonly known as: MYCOLOG II  Apply topically 4 (four) times daily.            STOP taking these medications      amLODIPine 5 MG tablet  Commonly known as: NORVASC     aspirin 81 MG EC tablet  Commonly known as: ECOTRIN     furosemide 40 MG tablet  Commonly known as: LASIX     ibuprofen 200 MG tablet  Commonly known as: ADVIL,MOTRIN     losartan 50 MG tablet  Commonly known as: COZAAR     metOLazone 5 MG tablet  Commonly known as: ZAROXOLYN     potassium chloride SA 10 MEQ tablet  Commonly known as: K-DUR,KLOR-CON M     torsemide 10 MG Tab  Commonly known as: DEMADEX     warfarin 5 MG tablet  Commonly known as: COUMADIN              Indwelling Lines/Drains at time of discharge:   Lines/Drains/Airways       None                   Time spent on the discharge of patient: > 30 minutes    Estuardo Eddy MD  Department of Hospital Medicine  [facility] - Intensive Care ([facility])

## 2023-03-03 NOTE — PLAN OF CARE
03/03/23 0927   Post-Acute Status   Post-Acute Authorization Placement   Post-Acute Placement Status Set-up Complete/Auth obtained   Discharge Delays None known at this time   Discharge Plan   Discharge Plan A Skilled Nursing Facility     Maverick Blake received authorization. Secure chat sent to provider for DC orders.

## 2023-03-03 NOTE — PLAN OF CARE
Remained in chair; refused to get in bed.  Poor appetite.  Wife at bedside.  Meeting with pallative today to discuss GOC.  Possible DC to SNF tomorrow.

## 2023-03-03 NOTE — PROGRESS NOTES
F/U visit with Mr. Biggs for ongoing wound care needs. He is awake and alert, denies pain. 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 Wound bed remains moist pink and yellow subcutaneous tissue, no slough, scant serous drainage. Cleansed with saline and thick layer zguard paste applied. Previously noted stage 3 PIs to right buttock and gluteal cleft have re-epithelialized and are no longer present. Recommend turn q2 hours, avoid diapers. Discussed recommendations with primary nurse and patient and family at bedside.

## 2023-03-03 NOTE — PT/OT/SLP PROGRESS
Physical Therapy Treatment    Patient Name:  Anthony Biggs Jr.   MRN:  [id number]    Recommendations:     Discharge Recommendations: nursing facility, skilled  Discharge Equipment Recommendations:  (TBD at next level of care)  Barriers to discharge: Decreased caregiver support    Assessment:     Anthony Biggs Jr. is a 82 y.o. male admitted with a medical diagnosis of Gastrointestinal hemorrhage with melena.  He presents with the following impairments/functional limitations: weakness, impaired endurance, impaired functional mobility, gait instability, impaired balance, pain, decreased safety awareness, impaired cardiopulmonary response to activity, decreased lower extremity function, impaired cognition.    Rehab Prognosis: Good; patient would benefit from acute skilled PT services to address these deficits and reach maximum level of function.    Recent Surgery: Procedure(s) (LRB):  EGD (ESOPHAGOGASTRODUODENOSCOPY) (N/A) 8 Days Post-Op    Plan:     During this hospitalization, patient to be seen 3 x/week to address the identified rehab impairments via gait training, therapeutic exercises, therapeutic activities and progress toward the following goals:    Plan of Care Expires:  03/12/23    Subjective     Chief Complaint: Pt is ready for therapy.  Patient/Family Comments/goals:  Pain/Comfort:  Pain Rating 1: 7/10  Location - Side 1: Bilateral  Location - Orientation 1: generalized  Location 1: back  Pain Addressed 1: Reposition, Distraction  Pain Rating 2: 5/10  Location - Side 2: Right  Location - Orientation 2: generalized  Location 2: hip  Pain Addressed 2: Reposition, Distraction      Objective:     Communicated with nurse prior to session.  Patient found supine with peripheral IV, pulse ox (continuous), telemetry, blood pressure cuff, bed alarm upon PT entry to room.     General Precautions: Standard, fall  Orthopedic Precautions: N/A  Braces: N/A  Respiratory Status: Room air     Functional Mobility:  Bed Mobility:      Rolling Left:  contact guard assistance  Scooting: contact guard assistance  Supine to Sit: contact guard assistance  Sit to Supine: minimum assistance  Transfers:     Sit to Stand:  contact guard assistance with rolling walker  Gait: Ambulated 30ft CGA, using RW, fatigued quickly, verbal cuing in order to maintain upright posture  Balance: Balance improved, good sitting balance, fair dynamic balance during gait  Blood Pressure  Initial supine: 106/68  Sittin/63  Standin/67  Supine after gait: 118/67    AM-PAC 6 CLICK MOBILITY  Turning over in bed (including adjusting bedclothes, sheets and blankets)?: 3  Sitting down on and standing up from a chair with arms (e.g., wheelchair, bedside commode, etc.): 3  Moving from lying on back to sitting on the side of the bed?: 3  Moving to and from a bed to a chair (including a wheelchair)?: 3  Need to walk in hospital room?: 3  Climbing 3-5 steps with a railing?: 1  Basic Mobility Total Score: 16       Treatment & Education:  Pt tolerated interventions well. Showed improvement in balance, functional mobility, and cognition. Reviewed importance of OOB activities and HEP (hip flex, LAQ, ham curls, ankle pumps) in order to maintain/regain strength. Reviewed proper use of RW for safety and to reduce risk of falling. Reviewed increased risk of falling due to weakness, instructed to utilize call bell for assistance for all transfers. Pt agreeable to all requests.    Patient left supine with all lines intact, call button in reach, bed alarm on, and wife present.    GOALS:   Multidisciplinary Problems       Physical Therapy Goals          Problem: Physical Therapy    Goal Priority Disciplines Outcome Goal Variances Interventions   Physical Therapy Goal     PT, PT/OT Ongoing, Progressing     Description: PT eval complete. The following goals will be met in 14 days  1. Pt MIN A with bed mobility  2. Pt transfer bed to chair with RW and CGA  3. Pt amb 150 ft with RW and MIN  A                     Time Tracking:     PT Received On: 03/03/23  PT Start Time: 1055     PT Stop Time: 1120  PT Total Time (min): 25 min     Billable Minutes: Gait Training 15min and Therapeutic Activity 10min    Treatment Type: Treatment  PT/PTA: PT     PTA Visit Number: 0     03/03/2023

## 2023-03-03 NOTE — NURSING
Transport arrived from [place] to transport pt to facility. Family at bedside and aware of transfer. Pt assisted in dressing, IV removed, heart monitoring d/c. Pt in no acute distress. AVS provided to spouse.

## 2023-03-03 NOTE — ASSESSMENT & PLAN NOTE
Patient with Permanent atrial fibrillation which is controlled currently. Patient is currently in atrial fibrillation.[id number] Score: 4.  Anticoagulation indicated. Anticoagulation done with Apixaban.    After discussion with Cardiology, family agreeable to discontinuing Coumadin and switching patient anticoagulation to Eliquis

## 2023-03-08 ENCOUNTER — OUTPATIENT CASE MANAGEMENT (OUTPATIENT)
Dept: ADMINISTRATIVE | Facility: OTHER | Age: 83
End: 2023-03-08
Payer: MEDICARE

## 2023-03-08 NOTE — PROGRESS NOTES
SW received a referral on the above patient. Per chart review patient discharged to [facility] on 03/03/2023. Patient doesn't meet criteria for OPCM. SW will close case as needs are being met by others.

## 2023-03-13 ENCOUNTER — OFFICE VISIT (OUTPATIENT)
Dept: CARDIOLOGY | Facility: CLINIC | Age: 83
End: 2023-03-13
Payer: MEDICARE

## 2023-03-13 VITALS
OXYGEN SATURATION: 97 % | WEIGHT: 198.44 LBS | BODY MASS INDEX: 31.08 KG/M2 | SYSTOLIC BLOOD PRESSURE: 116 MMHG | HEART RATE: 78 BPM | DIASTOLIC BLOOD PRESSURE: 72 MMHG

## 2023-03-13 DIAGNOSIS — Z79.01 CHRONIC ANTICOAGULATION: ICD-10-CM

## 2023-03-13 DIAGNOSIS — I15.2 HYPERTENSION ASSOCIATED WITH DIABETES: ICD-10-CM

## 2023-03-13 DIAGNOSIS — I25.10 ATHEROSCLEROSIS OF NATIVE CORONARY ARTERY OF NATIVE HEART WITHOUT ANGINA PECTORIS: ICD-10-CM

## 2023-03-13 DIAGNOSIS — E78.5 HYPERLIPIDEMIA ASSOCIATED WITH TYPE 2 DIABETES MELLITUS: ICD-10-CM

## 2023-03-13 DIAGNOSIS — E11.69 HYPERLIPIDEMIA ASSOCIATED WITH TYPE 2 DIABETES MELLITUS: ICD-10-CM

## 2023-03-13 DIAGNOSIS — K92.2 CHRONIC UPPER GI BLEEDING: ICD-10-CM

## 2023-03-13 DIAGNOSIS — I48.0 PAROXYSMAL ATRIAL FIBRILLATION: Primary | ICD-10-CM

## 2023-03-13 DIAGNOSIS — I70.0 ATHEROSCLEROSIS OF AORTA: ICD-10-CM

## 2023-03-13 DIAGNOSIS — G47.33 OSA (OBSTRUCTIVE SLEEP APNEA): ICD-10-CM

## 2023-03-13 DIAGNOSIS — E11.59 HYPERTENSION ASSOCIATED WITH DIABETES: ICD-10-CM

## 2023-03-13 PROCEDURE — 1159F PR MEDICATION LIST DOCUMENTED IN MEDICAL RECORD: ICD-10-PCS | Mod: HCNC,CPTII,S$GLB, | Performed by: INTERNAL MEDICINE

## 2023-03-13 PROCEDURE — 1126F AMNT PAIN NOTED NONE PRSNT: CPT | Mod: HCNC,CPTII,S$GLB, | Performed by: INTERNAL MEDICINE

## 2023-03-13 PROCEDURE — 3074F PR MOST RECENT SYSTOLIC BLOOD PRESSURE < 130 MM HG: ICD-10-PCS | Mod: HCNC,CPTII,S$GLB, | Performed by: INTERNAL MEDICINE

## 2023-03-13 PROCEDURE — 3288F PR FALLS RISK ASSESSMENT DOCUMENTED: ICD-10-PCS | Mod: HCNC,CPTII,S$GLB, | Performed by: INTERNAL MEDICINE

## 2023-03-13 PROCEDURE — 99999 PR PBB SHADOW E&M-EST. PATIENT-LVL III: CPT | Mod: PBBFAC,HCNC,, | Performed by: INTERNAL MEDICINE

## 2023-03-13 PROCEDURE — 1111F PR DISCHARGE MEDS RECONCILED W/ CURRENT OUTPATIENT MED LIST: ICD-10-PCS | Mod: HCNC,CPTII,S$GLB, | Performed by: INTERNAL MEDICINE

## 2023-03-13 PROCEDURE — 3074F SYST BP LT 130 MM HG: CPT | Mod: HCNC,CPTII,S$GLB, | Performed by: INTERNAL MEDICINE

## 2023-03-13 PROCEDURE — 1159F MED LIST DOCD IN RCRD: CPT | Mod: HCNC,CPTII,S$GLB, | Performed by: INTERNAL MEDICINE

## 2023-03-13 PROCEDURE — 99214 PR OFFICE/OUTPT VISIT, EST, LEVL IV, 30-39 MIN: ICD-10-PCS | Mod: HCNC,S$GLB,, | Performed by: INTERNAL MEDICINE

## 2023-03-13 PROCEDURE — 3072F PR LOW RISK FOR RETINOPATHY: ICD-10-PCS | Mod: HCNC,CPTII,S$GLB, | Performed by: INTERNAL MEDICINE

## 2023-03-13 PROCEDURE — 3078F PR MOST RECENT DIASTOLIC BLOOD PRESSURE < 80 MM HG: ICD-10-PCS | Mod: HCNC,CPTII,S$GLB, | Performed by: INTERNAL MEDICINE

## 2023-03-13 PROCEDURE — 3072F LOW RISK FOR RETINOPATHY: CPT | Mod: HCNC,CPTII,S$GLB, | Performed by: INTERNAL MEDICINE

## 2023-03-13 PROCEDURE — 1101F PT FALLS ASSESS-DOCD LE1/YR: CPT | Mod: HCNC,CPTII,S$GLB, | Performed by: INTERNAL MEDICINE

## 2023-03-13 PROCEDURE — 1160F RVW MEDS BY RX/DR IN RCRD: CPT | Mod: HCNC,CPTII,S$GLB, | Performed by: INTERNAL MEDICINE

## 2023-03-13 PROCEDURE — 1101F PR PT FALLS ASSESS DOC 0-1 FALLS W/OUT INJ PAST YR: ICD-10-PCS | Mod: HCNC,CPTII,S$GLB, | Performed by: INTERNAL MEDICINE

## 2023-03-13 PROCEDURE — 3288F FALL RISK ASSESSMENT DOCD: CPT | Mod: HCNC,CPTII,S$GLB, | Performed by: INTERNAL MEDICINE

## 2023-03-13 PROCEDURE — 3078F DIAST BP <80 MM HG: CPT | Mod: HCNC,CPTII,S$GLB, | Performed by: INTERNAL MEDICINE

## 2023-03-13 PROCEDURE — 1111F DSCHRG MED/CURRENT MED MERGE: CPT | Mod: HCNC,CPTII,S$GLB, | Performed by: INTERNAL MEDICINE

## 2023-03-13 PROCEDURE — 99999 PR PBB SHADOW E&M-EST. PATIENT-LVL III: ICD-10-PCS | Mod: PBBFAC,HCNC,, | Performed by: INTERNAL MEDICINE

## 2023-03-13 PROCEDURE — 1126F PR PAIN SEVERITY QUANTIFIED, NO PAIN PRESENT: ICD-10-PCS | Mod: HCNC,CPTII,S$GLB, | Performed by: INTERNAL MEDICINE

## 2023-03-13 PROCEDURE — 99214 OFFICE O/P EST MOD 30 MIN: CPT | Mod: HCNC,S$GLB,, | Performed by: INTERNAL MEDICINE

## 2023-03-13 PROCEDURE — 1160F PR REVIEW ALL MEDS BY PRESCRIBER/CLIN PHARMACIST DOCUMENTED: ICD-10-PCS | Mod: HCNC,CPTII,S$GLB, | Performed by: INTERNAL MEDICINE

## 2023-03-13 NOTE — PROGRESS NOTES
Subjective:   Patient ID:  Anthony Biggs Jr. is a 82 y.o. male who presents for follow-up of No chief complaint on file.  Pt s/p hospitlization for GI bleed with AVM, coumadin stopped.  Eliquis started.   Patient denies CP, angina or anginal equivalent.   Lasix did not help edema. Pt has lost 20 lbs.   Edema  This is a recurrent problem. The current episode started more than 1 month ago. The problem occurs constantly. The problem has been gradually worsening. Pertinent negatives include no chest pain.   Hyperlipidemia  This is a chronic problem. The current episode started more than 1 year ago. The problem is controlled. Pertinent negatives include no chest pain or shortness of breath. Current antihyperlipidemic treatment includes statins. The current treatment provides moderate improvement of lipids. There are no compliance problems.    Coronary Artery Disease  Presents for follow-up visit. Pertinent negatives include no chest pain, chest pressure, chest tightness, dizziness, leg swelling, muscle weakness, palpitations, shortness of breath or weight gain. The symptoms have been stable. Compliance with diet is good. Compliance with exercise is good. Compliance with medications is good.     Review of Systems   Constitutional: Negative. Negative for weight gain.   HENT: Negative.     Eyes: Negative.    Cardiovascular: Negative.  Negative for chest pain, leg swelling and palpitations.   Respiratory: Negative.  Negative for chest tightness and shortness of breath.    Endocrine: Negative.    Hematologic/Lymphatic: Negative.    Skin: Negative.    Musculoskeletal:  Negative for muscle weakness.   Gastrointestinal: Negative.    Genitourinary: Negative.    Neurological: Negative.  Negative for dizziness.   Psychiatric/Behavioral: Negative.     Allergic/Immunologic: Negative.    All other systems reviewed and are negative.  Family History   Problem Relation Age of Onset    Heart disease Mother     Cancer Father         lung     Macular degeneration Sister     Diabetes Sister     Heart disease Sister     Strabismus Neg Hx     Retinal detachment Neg Hx     Glaucoma Neg Hx     Blindness Neg Hx     Amblyopia Neg Hx      Past Medical History:   Diagnosis Date    Abnormal CXR     SHAYLA (acute kidney injury) 2023    Angina pectoris 2017    Angiodysplasia of small intestine     Atrial fibrillation     Chronic diastolic heart failure 2022    Chronic upper GI bleeding     due to angiodysplasia in proximal small intestine    Coronary atherosclerosis of unspecified type of vessel, native or graft     COVID 2022    Depression     Diabetes mellitus without complication     Emphysema of lung     History of prostate cancer 2007    prostatectomy    Hyperlipidemia associated with type 2 diabetes mellitus     Hypertension associated with diabetes     LAILA (iron deficiency anemia) 2021    due to angiodysplasia in small intestine    Intention tremor     Major depressive disorder, recurrent, moderate 2021    Moderate episode of recurrent major depressive disorder 2019    Morbid (severe) obesity due to excess calories 2019    SHORTY (obstructive sleep apnea)     Prostate cancer     Trouble in sleeping     Urinary incontinence      Social History     Socioeconomic History    Marital status:     Highest education level: Some college, no degree   Tobacco Use    Smoking status: Former     Packs/day: 0.50     Years: 40.00     Pack years: 20.00     Types: Cigarettes     Start date: 1962     Quit date:      Years since quittin.2    Smokeless tobacco: Never   Substance and Sexual Activity    Alcohol use: Yes     Alcohol/week: 7.0 standard drinks     Types: 7 Shots of liquor per week    Drug use: No    Sexual activity: Not Currently     Current Outpatient Medications on File Prior to Visit   Medication Sig Dispense Refill    allopurinoL (ZYLOPRIM) 100 MG tablet TAKE ONE TABLET BY MOUTH DAILY 90 tablet 0    apixaban  (ELIQUIS) 2.5 mg Tab Take 1 tablet (2.5 mg total) by mouth 2 (two) times daily. 60 tablet 1    ferrous sulfate 324 mg (65 mg iron) TbEC TAKE ONE TABLET BY MOUTH TWICE DAILY 60 tablet 5    isosorbide mononitrate (IMDUR) 30 MG 24 hr tablet Take 1 tablet (30 mg total) by mouth once daily. 90 tablet 3    melatonin 3 mg Tab Take 1 tablet by mouth nightly.      metFORMIN (GLUCOPHAGE) 500 MG tablet TAKE ONE TABLET BY MOUTH TWICE A DAY WITH MEALS 180 tablet 1    metoprolol tartrate (LOPRESSOR) 25 MG tablet Take 0.5 tablets (12.5 mg total) by mouth 2 (two) times daily. 30 tablet 1    nystatin-triamcinolone (MYCOLOG II) cream Apply topically 4 (four) times daily. 30 g 1    pantoprazole (PROTONIX) 40 MG tablet Take 1 tablet (40 mg total) by mouth once daily. 30 tablet 1    sucralfate (CARAFATE) 100 mg/mL suspension Take 10 mLs (1 g total) by mouth once daily.      traZODone (DESYREL) 50 MG tablet Take 1 tablet (50 mg total) by mouth nightly as needed for Insomnia.      atorvastatin (LIPITOR) 40 MG tablet Take 1 tablet (40 mg total) by mouth once daily. for 90 doses 90 tablet 3     No current facility-administered medications on file prior to visit.     Review of patient's allergies indicates:  No Known Allergies    Objective:     Physical Exam  Vitals and nursing note reviewed.   Constitutional:       Appearance: He is well-developed.   HENT:      Head: Normocephalic and atraumatic.   Eyes:      Conjunctiva/sclera: Conjunctivae normal.      Pupils: Pupils are equal, round, and reactive to light.   Cardiovascular:      Rate and Rhythm: Normal rate and regular rhythm.      Pulses: Intact distal pulses.      Heart sounds: Normal heart sounds.   Pulmonary:      Effort: Pulmonary effort is normal.      Breath sounds: Normal breath sounds.   Abdominal:      General: Bowel sounds are normal.      Palpations: Abdomen is soft.   Musculoskeletal:      Cervical back: Normal range of motion and neck supple.   Skin:     General: Skin is  warm and dry.   Neurological:      Mental Status: He is alert and oriented to person, place, and time.       Assessment:     1. Paroxysmal atrial fibrillation    2. Atherosclerosis of native coronary artery of native heart without angina pectoris    3. Hyperlipidemia associated with type 2 diabetes mellitus    4. Hypertension associated with diabetes    5. Atherosclerosis of aorta    6. Chronic anticoagulation    7. Chronic upper GI bleeding    8. SHORTY (obstructive sleep apnea)        Plan:     Paroxysmal atrial fibrillation    Atherosclerosis of native coronary artery of native heart without angina pectoris    Hyperlipidemia associated with type 2 diabetes mellitus    Hypertension associated with diabetes    Atherosclerosis of aorta    Chronic anticoagulation    Chronic upper GI bleeding    SHORTY (obstructive sleep apnea)      Stop lasix  Continue statin-hlp  Continue metoprolol, eliquis- afib  Continue imdur?

## 2023-03-23 ENCOUNTER — LAB VISIT (OUTPATIENT)
Dept: LAB | Facility: HOSPITAL | Age: 83
End: 2023-03-23
Attending: INTERNAL MEDICINE
Payer: MEDICARE

## 2023-03-23 DIAGNOSIS — E11.9 DIABETES MELLITUS WITHOUT COMPLICATION: ICD-10-CM

## 2023-03-23 LAB
ALBUMIN/CREAT UR: 15.9 UG/MG (ref 0–30)
CREAT UR-MCNC: 63 MG/DL (ref 23–375)
MICROALBUMIN UR DL<=1MG/L-MCNC: 10 UG/ML

## 2023-03-23 PROCEDURE — 82570 ASSAY OF URINE CREATININE: CPT | Mod: HCNC | Performed by: INTERNAL MEDICINE

## 2023-03-30 ENCOUNTER — OFFICE VISIT (OUTPATIENT)
Dept: INTERNAL MEDICINE | Facility: CLINIC | Age: 83
End: 2023-03-30
Payer: MEDICARE

## 2023-03-30 VITALS
HEIGHT: 67 IN | HEART RATE: 83 BPM | BODY MASS INDEX: 32.08 KG/M2 | DIASTOLIC BLOOD PRESSURE: 68 MMHG | WEIGHT: 204.38 LBS | SYSTOLIC BLOOD PRESSURE: 100 MMHG | OXYGEN SATURATION: 95 %

## 2023-03-30 DIAGNOSIS — G25.2 INTENTION TREMOR: ICD-10-CM

## 2023-03-30 DIAGNOSIS — I25.10 ATHEROSCLEROSIS OF NATIVE CORONARY ARTERY OF NATIVE HEART WITHOUT ANGINA PECTORIS: ICD-10-CM

## 2023-03-30 DIAGNOSIS — E27.8 ADRENAL NODULE: ICD-10-CM

## 2023-03-30 DIAGNOSIS — G47.33 OSA (OBSTRUCTIVE SLEEP APNEA): ICD-10-CM

## 2023-03-30 DIAGNOSIS — E11.69 HYPERLIPIDEMIA ASSOCIATED WITH TYPE 2 DIABETES MELLITUS: ICD-10-CM

## 2023-03-30 DIAGNOSIS — I48.0 PAROXYSMAL ATRIAL FIBRILLATION: ICD-10-CM

## 2023-03-30 DIAGNOSIS — E11.59 HYPERTENSION ASSOCIATED WITH DIABETES: ICD-10-CM

## 2023-03-30 DIAGNOSIS — K92.2 CHRONIC UPPER GI BLEEDING: ICD-10-CM

## 2023-03-30 DIAGNOSIS — E11.9 DIABETES MELLITUS WITHOUT COMPLICATION: Primary | ICD-10-CM

## 2023-03-30 DIAGNOSIS — D50.0 IRON DEFICIENCY ANEMIA DUE TO CHRONIC BLOOD LOSS: ICD-10-CM

## 2023-03-30 DIAGNOSIS — I15.2 HYPERTENSION ASSOCIATED WITH DIABETES: ICD-10-CM

## 2023-03-30 DIAGNOSIS — K55.20 ANGIODYSPLASIA OF SMALL INTESTINE: ICD-10-CM

## 2023-03-30 DIAGNOSIS — Z85.46 HISTORY OF PROSTATE CANCER: ICD-10-CM

## 2023-03-30 DIAGNOSIS — I70.0 ATHEROSCLEROSIS OF AORTA: ICD-10-CM

## 2023-03-30 DIAGNOSIS — E78.5 HYPERLIPIDEMIA ASSOCIATED WITH TYPE 2 DIABETES MELLITUS: ICD-10-CM

## 2023-03-30 DIAGNOSIS — Z79.01 CHRONIC ANTICOAGULATION: ICD-10-CM

## 2023-03-30 DIAGNOSIS — Z86.010 HISTORY OF COLON POLYPS: ICD-10-CM

## 2023-03-30 DIAGNOSIS — F33.1 MAJOR DEPRESSIVE DISORDER, RECURRENT, MODERATE: ICD-10-CM

## 2023-03-30 PROBLEM — F03.90 DEMENTIA: Status: ACTIVE | Noted: 2023-02-25

## 2023-03-30 PROCEDURE — 99214 OFFICE O/P EST MOD 30 MIN: CPT | Mod: HCNC,S$GLB,, | Performed by: INTERNAL MEDICINE

## 2023-03-30 PROCEDURE — 1111F DSCHRG MED/CURRENT MED MERGE: CPT | Mod: HCNC,CPTII,S$GLB, | Performed by: INTERNAL MEDICINE

## 2023-03-30 PROCEDURE — 3072F LOW RISK FOR RETINOPATHY: CPT | Mod: HCNC,CPTII,S$GLB, | Performed by: INTERNAL MEDICINE

## 2023-03-30 PROCEDURE — 1126F PR PAIN SEVERITY QUANTIFIED, NO PAIN PRESENT: ICD-10-PCS | Mod: HCNC,CPTII,S$GLB, | Performed by: INTERNAL MEDICINE

## 2023-03-30 PROCEDURE — 3074F SYST BP LT 130 MM HG: CPT | Mod: HCNC,CPTII,S$GLB, | Performed by: INTERNAL MEDICINE

## 2023-03-30 PROCEDURE — 1126F AMNT PAIN NOTED NONE PRSNT: CPT | Mod: HCNC,CPTII,S$GLB, | Performed by: INTERNAL MEDICINE

## 2023-03-30 PROCEDURE — 1111F PR DISCHARGE MEDS RECONCILED W/ CURRENT OUTPATIENT MED LIST: ICD-10-PCS | Mod: HCNC,CPTII,S$GLB, | Performed by: INTERNAL MEDICINE

## 2023-03-30 PROCEDURE — 99214 PR OFFICE/OUTPT VISIT, EST, LEVL IV, 30-39 MIN: ICD-10-PCS | Mod: HCNC,S$GLB,, | Performed by: INTERNAL MEDICINE

## 2023-03-30 PROCEDURE — 1159F PR MEDICATION LIST DOCUMENTED IN MEDICAL RECORD: ICD-10-PCS | Mod: HCNC,CPTII,S$GLB, | Performed by: INTERNAL MEDICINE

## 2023-03-30 PROCEDURE — 1101F PT FALLS ASSESS-DOCD LE1/YR: CPT | Mod: HCNC,CPTII,S$GLB, | Performed by: INTERNAL MEDICINE

## 2023-03-30 PROCEDURE — 3078F DIAST BP <80 MM HG: CPT | Mod: HCNC,CPTII,S$GLB, | Performed by: INTERNAL MEDICINE

## 2023-03-30 PROCEDURE — 1101F PR PT FALLS ASSESS DOC 0-1 FALLS W/OUT INJ PAST YR: ICD-10-PCS | Mod: HCNC,CPTII,S$GLB, | Performed by: INTERNAL MEDICINE

## 2023-03-30 PROCEDURE — 99999 PR PBB SHADOW E&M-EST. PATIENT-LVL IV: ICD-10-PCS | Mod: PBBFAC,HCNC,, | Performed by: INTERNAL MEDICINE

## 2023-03-30 PROCEDURE — 3288F PR FALLS RISK ASSESSMENT DOCUMENTED: ICD-10-PCS | Mod: HCNC,CPTII,S$GLB, | Performed by: INTERNAL MEDICINE

## 2023-03-30 PROCEDURE — 3074F PR MOST RECENT SYSTOLIC BLOOD PRESSURE < 130 MM HG: ICD-10-PCS | Mod: HCNC,CPTII,S$GLB, | Performed by: INTERNAL MEDICINE

## 2023-03-30 PROCEDURE — 3078F PR MOST RECENT DIASTOLIC BLOOD PRESSURE < 80 MM HG: ICD-10-PCS | Mod: HCNC,CPTII,S$GLB, | Performed by: INTERNAL MEDICINE

## 2023-03-30 PROCEDURE — 3288F FALL RISK ASSESSMENT DOCD: CPT | Mod: HCNC,CPTII,S$GLB, | Performed by: INTERNAL MEDICINE

## 2023-03-30 PROCEDURE — 1159F MED LIST DOCD IN RCRD: CPT | Mod: HCNC,CPTII,S$GLB, | Performed by: INTERNAL MEDICINE

## 2023-03-30 PROCEDURE — 99999 PR PBB SHADOW E&M-EST. PATIENT-LVL IV: CPT | Mod: PBBFAC,HCNC,, | Performed by: INTERNAL MEDICINE

## 2023-03-30 PROCEDURE — 3072F PR LOW RISK FOR RETINOPATHY: ICD-10-PCS | Mod: HCNC,CPTII,S$GLB, | Performed by: INTERNAL MEDICINE

## 2023-03-30 RX ORDER — METOLAZONE 5 MG/1
TABLET ORAL
COMMUNITY
Start: 2023-03-11 | End: 2023-12-12 | Stop reason: SDUPTHER

## 2023-03-30 RX ORDER — POTASSIUM CHLORIDE 750 MG/1
TABLET, EXTENDED RELEASE ORAL
COMMUNITY
Start: 2023-03-15 | End: 2023-11-22 | Stop reason: SDUPTHER

## 2023-03-30 RX ORDER — DONEPEZIL HYDROCHLORIDE 5 MG/1
5 TABLET, FILM COATED ORAL NIGHTLY
Qty: 90 TABLET | Refills: 3 | Status: SHIPPED | OUTPATIENT
Start: 2023-03-30 | End: 2023-07-07

## 2023-03-30 NOTE — PROGRESS NOTES
"HPI:  Patient is an 82-year-old man who comes today for hospital follow-up.  Patient was admitted to to GI bleeding.  I have reviewed all the hospital records.  He was found have a supratherapeutic INR 8.  He had emergent EGD done which reveal AVMs which were coagulated.  Patient has a long history of chronic GI bleed leading to AVMs.  He is was changed from taking Coumadin to Eliquis for his chronic AFib.  Since being at home he is had no evidence of any further bleeding.  He is taking iron tablets daily.  His most recent hemoglobin was up to 11.  The major concern really is is continued cognitive decline.  He clearly has significant dementia at this point.  Wife is with him here today who provides much of the history.  His memory cognitive decline is primarily short-term memory.  The wife gives numerous examples.  Patient denies any chest pains, shortness of breath or palpitations.  He is exercising on a regular basis.  Continues to have swelling in his feet but he admits he does not wear the support hose at all      Current meds have been verified and updated per the EMR  Exam:/68 (BP Location: Left arm)   Pulse 83   Ht 5' 7" (1.702 m)   Wt 92.7 kg (204 lb 5.9 oz)   SpO2 95%   BMI 32.01 kg/m²   Carotids 2+ equal without bruits  Chest clear  Cardiovascular irregular rate and rhythm with 2/6 systolic murmur  Extremities shows just 1 to 2+ bilateral lower extremity edema    Lab Results   Component Value Date    WBC 5.37 03/23/2023    HGB 11.0 (L) 03/23/2023    HCT 35.7 (L) 03/23/2023     03/23/2023    CHOL 109 (L) 03/23/2023    TRIG 68 03/23/2023    HDL 35 (L) 03/23/2023    ALT 28 03/23/2023    AST 32 03/23/2023     03/23/2023    K 4.4 03/23/2023    CL 96 03/23/2023    CREATININE 1.0 03/23/2023    BUN 12 03/23/2023    CO2 27 03/23/2023    TSH 1.324 03/23/2023    PSA <0.010 08/20/2013    INR 1.2 02/23/2023    HGBA1C 5.6 03/23/2023       Impression:  Status post UGI bleeding secondary to AVMs " and supratherapeutic INR, repeat hemoglobin up to 11.  Diabetes and hypertension lipids all very well controlled  Chronic AFib, I think he will do much better on Eliquis.  Dementia, Alzheimer's type, progressive.  In speaking with the wife and patient here today they both are in agreement to start medications  Patient Active Problem List   Diagnosis    Atrial fibrillation    Coronary atherosclerosis    History of prostate cancer    Refractive error    Family history of macular degeneration    Posterior capsular opacification    Pseudophakia of both eyes    History of colon polyps    Diverticulosis of large intestine without hemorrhage    Chronic anticoagulation    Pulmonary hypertension, mild    SHORTY (obstructive sleep apnea)    Diabetes mellitus without complication    Hyperlipidemia associated with type 2 diabetes mellitus    Hypertension associated with diabetes    Intention tremor    Atherosclerosis of aorta    Adrenal nodule    Lung nodules    Obesity (BMI 30.0-34.9)    Major depressive disorder, recurrent, moderate    Angiodysplasia of small intestine    Chronic upper GI bleeding    LAILA (iron deficiency anemia)    Dementia       Plan:  Orders Placed This Encounter    CBC Auto Differential    Basic Metabolic Panel    donepeziL (ARICEPT) 5 MG tablet     Patient was started on Aricept 5 mg today.  He will see me again in 3 months with above lab work.  We will titrate the Aricept and then eventually add on Namenda    This note is generated with speech recognition software and is subject to transcription error and sound alike phrases that may be missed by proofreading.

## 2023-04-11 DIAGNOSIS — E78.00 PURE HYPERCHOLESTEROLEMIA: ICD-10-CM

## 2023-04-11 RX ORDER — ALLOPURINOL 100 MG/1
100 TABLET ORAL DAILY
Qty: 90 TABLET | Refills: 3 | Status: SHIPPED | OUTPATIENT
Start: 2023-04-11 | End: 2023-11-22 | Stop reason: SDUPTHER

## 2023-04-11 RX ORDER — METOPROLOL TARTRATE 25 MG/1
12.5 TABLET, FILM COATED ORAL 2 TIMES DAILY
Qty: 90 TABLET | Refills: 3 | Status: SHIPPED | OUTPATIENT
Start: 2023-04-11 | End: 2023-07-07

## 2023-04-11 RX ORDER — ATORVASTATIN CALCIUM 40 MG/1
40 TABLET, FILM COATED ORAL DAILY
Qty: 90 TABLET | Refills: 3 | Status: SHIPPED | OUTPATIENT
Start: 2023-04-11 | End: 2023-11-22 | Stop reason: SDUPTHER

## 2023-04-11 RX ORDER — FERROUS SULFATE 324(65)MG
TABLET, DELAYED RELEASE (ENTERIC COATED) ORAL
Qty: 180 TABLET | Refills: 3 | Status: SHIPPED | OUTPATIENT
Start: 2023-04-11 | End: 2023-11-22 | Stop reason: SDUPTHER

## 2023-04-11 RX ORDER — PANTOPRAZOLE SODIUM 40 MG/1
40 TABLET, DELAYED RELEASE ORAL DAILY
Qty: 90 TABLET | Refills: 3 | Status: SHIPPED | OUTPATIENT
Start: 2023-04-11 | End: 2023-11-22 | Stop reason: SDUPTHER

## 2023-04-11 RX ORDER — ISOSORBIDE MONONITRATE 30 MG/1
30 TABLET, EXTENDED RELEASE ORAL DAILY
Qty: 90 TABLET | Refills: 3 | Status: SHIPPED | OUTPATIENT
Start: 2023-04-11 | End: 2023-11-22 | Stop reason: SDUPTHER

## 2023-04-11 RX ORDER — METFORMIN HYDROCHLORIDE 500 MG/1
500 TABLET ORAL 2 TIMES DAILY WITH MEALS
Qty: 180 TABLET | Refills: 3 | Status: SHIPPED | OUTPATIENT
Start: 2023-04-11 | End: 2023-11-22 | Stop reason: SDUPTHER

## 2023-04-11 NOTE — TELEPHONE ENCOUNTER
No new care gaps identified.  [facility] Embedded Care Gaps. Reference number: [id number]. 4/11/2023   2:43:44 PM CDT

## 2023-05-02 ENCOUNTER — TELEPHONE (OUTPATIENT)
Dept: INTERNAL MEDICINE | Facility: CLINIC | Age: 83
End: 2023-05-02
Payer: MEDICARE

## 2023-05-02 DIAGNOSIS — E78.00 PURE HYPERCHOLESTEROLEMIA: ICD-10-CM

## 2023-05-02 NOTE — TELEPHONE ENCOUNTER
Spoke with [facility], they have one years worth of both metformin and atorvastatin. Called # listed, no answer. LMOM requesting a return call for pt to contact   Pharmacy.

## 2023-05-02 NOTE — TELEPHONE ENCOUNTER
----- Message from Casey Nuñez sent at 5/2/2023 10:14 AM CDT -----  Contact: [facility]  Type:  RX Refill Request    Who Called: [place]  Refill or New Rx:Refill  RX Name and Strength:Metformin 500mg  How is the patient currently taking it? (ex. 1XDay):  Is this a 30 day or 90 day RX:  Preferred Pharmacy with phone number:  [id number] [place], [place] - [id number] [facility] [address]  Phone: [phone] Fax: [phone]  Local or Mail Order:Local  Ordering Provider:Dr. Moran  Would the patient rather a call back or a response via [facility]? Call  Best Call Back Number:[phone]  Additional Information:     Who Called: [place]  Refill or New Rx:Refill  RX Name and Strength:Lipitor 40mg  How is the patient currently taking it? (ex. 1XDay):1  Is this a 30 day or 90 day RX:90  Preferred Pharmacy with phone number:  [id number] [place], [place] - [id number] [facility] [address]  Phone: [phone] Fax: [phone]  Local or Mail Order:Local  Ordering Provider:Dr. Moran  Would the patient rather a call back or a response via [facility]? Call  Best Call Back Number:[phone]  Additional Information:

## 2023-05-14 NOTE — TELEPHONE ENCOUNTER
No care due was identified.  [facility] Embedded Care Due Messages. Reference number: [id number].   5/14/2023 3:34:29 PM CDT

## 2023-05-15 RX ORDER — LOSARTAN POTASSIUM 50 MG/1
TABLET ORAL
Qty: 180 TABLET | Refills: 3 | Status: SHIPPED | OUTPATIENT
Start: 2023-05-15 | End: 2023-05-17 | Stop reason: CLARIF

## 2023-05-15 NOTE — TELEPHONE ENCOUNTER
Refill Routing Note   Medication(s) are not appropriate for processing by [facility] for the following reason(s):      No active prescription written by PCP    [facility] action(s):  Defer None identified            Appointments  past 12m or future 3m with PCP    Date Provider   Last Visit   3/30/2023 Giuliano Moran MD   Next Visit   7/7/2023 Giuliano Moran MD   ED visits in past 90 days: 0        Note composed:9:43 PM 05/14/2023

## 2023-05-17 ENCOUNTER — TELEPHONE (OUTPATIENT)
Dept: INTERNAL MEDICINE | Facility: CLINIC | Age: 83
End: 2023-05-17
Payer: MEDICARE

## 2023-05-17 NOTE — TELEPHONE ENCOUNTER
Pt's wife states he no longer takes losartan and to take it out his chart/and she will call sola williamson to stop sending us the requests./ done

## 2023-05-17 NOTE — TELEPHONE ENCOUNTER
----- Message from Bebe Peter sent at 5/17/2023 10:33 AM CDT -----  Contact: Laurie/WIfe  Patient's wife is calling to speak with a nurse regarding a medication . Reports wanting to why Losartan was sent in . States patient isnt taking it anymore. Please give patient a call back at .[phone]

## 2023-06-05 ENCOUNTER — OFFICE VISIT (OUTPATIENT)
Dept: CARDIOLOGY | Facility: CLINIC | Age: 83
End: 2023-06-05
Payer: MEDICARE

## 2023-06-05 VITALS
OXYGEN SATURATION: 99 % | WEIGHT: 207 LBS | BODY MASS INDEX: 32.49 KG/M2 | DIASTOLIC BLOOD PRESSURE: 76 MMHG | HEART RATE: 86 BPM | SYSTOLIC BLOOD PRESSURE: 117 MMHG | RESPIRATION RATE: 16 BRPM | HEIGHT: 67 IN

## 2023-06-05 DIAGNOSIS — I25.10 ATHEROSCLEROSIS OF NATIVE CORONARY ARTERY OF NATIVE HEART WITHOUT ANGINA PECTORIS: ICD-10-CM

## 2023-06-05 DIAGNOSIS — E11.69 HYPERLIPIDEMIA ASSOCIATED WITH TYPE 2 DIABETES MELLITUS: ICD-10-CM

## 2023-06-05 DIAGNOSIS — I15.2 HYPERTENSION ASSOCIATED WITH DIABETES: ICD-10-CM

## 2023-06-05 DIAGNOSIS — G47.33 OSA (OBSTRUCTIVE SLEEP APNEA): ICD-10-CM

## 2023-06-05 DIAGNOSIS — I48.0 PAROXYSMAL ATRIAL FIBRILLATION: Primary | ICD-10-CM

## 2023-06-05 DIAGNOSIS — Z79.01 CHRONIC ANTICOAGULATION: ICD-10-CM

## 2023-06-05 DIAGNOSIS — E11.59 HYPERTENSION ASSOCIATED WITH DIABETES: ICD-10-CM

## 2023-06-05 DIAGNOSIS — E78.5 HYPERLIPIDEMIA ASSOCIATED WITH TYPE 2 DIABETES MELLITUS: ICD-10-CM

## 2023-06-05 DIAGNOSIS — E11.9 DIABETES MELLITUS WITHOUT COMPLICATION: ICD-10-CM

## 2023-06-05 PROCEDURE — 3078F PR MOST RECENT DIASTOLIC BLOOD PRESSURE < 80 MM HG: ICD-10-PCS | Mod: CPTII,S$GLB,, | Performed by: INTERNAL MEDICINE

## 2023-06-05 PROCEDURE — 1160F PR REVIEW ALL MEDS BY PRESCRIBER/CLIN PHARMACIST DOCUMENTED: ICD-10-PCS | Mod: CPTII,S$GLB,, | Performed by: INTERNAL MEDICINE

## 2023-06-05 PROCEDURE — 1159F MED LIST DOCD IN RCRD: CPT | Mod: CPTII,S$GLB,, | Performed by: INTERNAL MEDICINE

## 2023-06-05 PROCEDURE — 1159F PR MEDICATION LIST DOCUMENTED IN MEDICAL RECORD: ICD-10-PCS | Mod: CPTII,S$GLB,, | Performed by: INTERNAL MEDICINE

## 2023-06-05 PROCEDURE — 1160F RVW MEDS BY RX/DR IN RCRD: CPT | Mod: CPTII,S$GLB,, | Performed by: INTERNAL MEDICINE

## 2023-06-05 PROCEDURE — 3072F PR LOW RISK FOR RETINOPATHY: ICD-10-PCS | Mod: CPTII,S$GLB,, | Performed by: INTERNAL MEDICINE

## 2023-06-05 PROCEDURE — 99214 PR OFFICE/OUTPT VISIT, EST, LEVL IV, 30-39 MIN: ICD-10-PCS | Mod: S$GLB,,, | Performed by: INTERNAL MEDICINE

## 2023-06-05 PROCEDURE — 99999 PR PBB SHADOW E&M-EST. PATIENT-LVL IV: ICD-10-PCS | Mod: PBBFAC,,, | Performed by: INTERNAL MEDICINE

## 2023-06-05 PROCEDURE — 3078F DIAST BP <80 MM HG: CPT | Mod: CPTII,S$GLB,, | Performed by: INTERNAL MEDICINE

## 2023-06-05 PROCEDURE — 3074F SYST BP LT 130 MM HG: CPT | Mod: CPTII,S$GLB,, | Performed by: INTERNAL MEDICINE

## 2023-06-05 PROCEDURE — 99999 PR PBB SHADOW E&M-EST. PATIENT-LVL IV: CPT | Mod: PBBFAC,,, | Performed by: INTERNAL MEDICINE

## 2023-06-05 PROCEDURE — 3072F LOW RISK FOR RETINOPATHY: CPT | Mod: CPTII,S$GLB,, | Performed by: INTERNAL MEDICINE

## 2023-06-05 PROCEDURE — 99214 OFFICE O/P EST MOD 30 MIN: CPT | Mod: S$GLB,,, | Performed by: INTERNAL MEDICINE

## 2023-06-05 PROCEDURE — 3074F PR MOST RECENT SYSTOLIC BLOOD PRESSURE < 130 MM HG: ICD-10-PCS | Mod: CPTII,S$GLB,, | Performed by: INTERNAL MEDICINE

## 2023-06-05 NOTE — PROGRESS NOTES
Subjective:   Patient ID:  Anthony Biggs Jr. is a 82 y.o. male who presents for follow-up of Atrial Fibrillation  Patient denies CP, angina or anginal equivalent.     Edema  This is a recurrent problem. The current episode started more than 1 month ago. The problem occurs constantly. The problem has been gradually worsening. Pertinent negatives include no chest pain.   Hyperlipidemia  This is a chronic problem. The current episode started more than 1 year ago. The problem is controlled. Pertinent negatives include no chest pain or shortness of breath. Current antihyperlipidemic treatment includes statins. The current treatment provides moderate improvement of lipids. There are no compliance problems.    Coronary Artery Disease  Presents for follow-up visit. Pertinent negatives include no chest pain, chest pressure, chest tightness, dizziness, leg swelling, muscle weakness, palpitations, shortness of breath or weight gain. The symptoms have been stable. Compliance with diet is good. Compliance with exercise is good. Compliance with medications is good.     Review of Systems   Constitutional: Negative. Negative for weight gain.   HENT: Negative.     Eyes: Negative.    Cardiovascular: Negative.  Negative for chest pain, leg swelling and palpitations.   Respiratory: Negative.  Negative for chest tightness and shortness of breath.    Endocrine: Negative.    Hematologic/Lymphatic: Negative.    Skin: Negative.    Musculoskeletal:  Negative for muscle weakness.   Gastrointestinal: Negative.    Genitourinary: Negative.    Neurological: Negative.  Negative for dizziness.   Psychiatric/Behavioral: Negative.     Allergic/Immunologic: Negative.    All other systems reviewed and are negative.  Family History   Problem Relation Age of Onset    Heart disease Mother     Cancer Father         lung    Macular degeneration Sister     Diabetes Sister     Heart disease Sister     Strabismus Neg Hx     Retinal detachment Neg Hx      Glaucoma Neg Hx     Blindness Neg Hx     Amblyopia Neg Hx      Past Medical History:   Diagnosis Date    Abnormal CXR     SHAYLA (acute kidney injury) 2023    Angina pectoris 2017    Angiodysplasia of small intestine     Atrial fibrillation     Chronic upper GI bleeding     due to angiodysplasia in proximal small intestine    Coronary atherosclerosis of unspecified type of vessel, native or graft     COVID 2022    Dementia     Depression     Diabetes mellitus without complication     Emphysema of lung     History of prostate cancer 2007    prostatectomy    Hyperlipidemia associated with type 2 diabetes mellitus     Hypertension associated with diabetes     LAILA (iron deficiency anemia) 2021    due to angiodysplasia in small intestine    Intention tremor     Major depressive disorder, recurrent, moderate 2021    Moderate episode of recurrent major depressive disorder 2019    SHORTY (obstructive sleep apnea)     Trouble in sleeping     Urinary incontinence      Social History     Socioeconomic History    Marital status:     Highest education level: Some college, no degree   Tobacco Use    Smoking status: Former     Packs/day: 0.50     Years: 40.00     Pack years: 20.00     Types: Cigarettes     Start date: 1962     Quit date:      Years since quittin.4    Smokeless tobacco: Never   Substance and Sexual Activity    Alcohol use: Yes     Alcohol/week: 7.0 standard drinks     Types: 7 Shots of liquor per week    Drug use: No    Sexual activity: Not Currently     Current Outpatient Medications on File Prior to Visit   Medication Sig Dispense Refill    allopurinoL (ZYLOPRIM) 100 MG tablet Take 1 tablet (100 mg total) by mouth once daily. 90 tablet 3    apixaban (ELIQUIS) 2.5 mg Tab Take 1 tablet (2.5 mg total) by mouth 2 (two) times daily. 180 tablet 3    atorvastatin (LIPITOR) 40 MG tablet Take 1 tablet (40 mg total) by mouth once daily. for 90 doses 90 tablet 3    donepeziL  (ARICEPT) 5 MG tablet Take 1 tablet (5 mg total) by mouth every evening. 90 tablet 3    ferrous sulfate 324 mg (65 mg iron) TbEC TAKE ONE TABLET BY MOUTH TWICE DAILY 180 tablet 3    isosorbide mononitrate (IMDUR) 30 MG 24 hr tablet Take 1 tablet (30 mg total) by mouth once daily. 90 tablet 3    melatonin 3 mg Tab Take 1 tablet by mouth nightly.      metFORMIN (GLUCOPHAGE) 500 MG tablet Take 1 tablet (500 mg total) by mouth 2 (two) times daily with meals. 180 tablet 3    metOLazone (ZAROXOLYN) 5 MG tablet       metoprolol tartrate (LOPRESSOR) 25 MG tablet Take 0.5 tablets (12.5 mg total) by mouth 2 (two) times daily. 90 tablet 3    nystatin-triamcinolone (MYCOLOG II) cream Apply topically 4 (four) times daily. 30 g 1    pantoprazole (PROTONIX) 40 MG tablet Take 1 tablet (40 mg total) by mouth once daily. 90 tablet 3    potassium chloride SA (K-DUR,KLOR-CON M) 10 MEQ tablet       sucralfate (CARAFATE) 100 mg/mL suspension Take 10 mLs (1 g total) by mouth once daily.      traZODone (DESYREL) 50 MG tablet Take 1 tablet (50 mg total) by mouth nightly as needed for Insomnia.       No current facility-administered medications on file prior to visit.     Review of patient's allergies indicates:  No Known Allergies    Objective:     Physical Exam  Vitals and nursing note reviewed.   Constitutional:       Appearance: He is well-developed.   HENT:      Head: Normocephalic and atraumatic.   Eyes:      Conjunctiva/sclera: Conjunctivae normal.      Pupils: Pupils are equal, round, and reactive to light.   Cardiovascular:      Rate and Rhythm: Normal rate and regular rhythm.      Pulses: Intact distal pulses.      Heart sounds: Normal heart sounds.   Pulmonary:      Effort: Pulmonary effort is normal.      Breath sounds: Normal breath sounds.   Abdominal:      General: Bowel sounds are normal.      Palpations: Abdomen is soft.   Musculoskeletal:      Cervical back: Normal range of motion and neck supple.   Skin:     General: Skin  is warm and dry.   Neurological:      Mental Status: He is alert and oriented to person, place, and time.       Assessment:     1. Paroxysmal atrial fibrillation    2. Atherosclerosis of native coronary artery of native heart without angina pectoris    3. Hyperlipidemia associated with type 2 diabetes mellitus    4. Hypertension associated with diabetes    5. Chronic anticoagulation    6. Diabetes mellitus without complication    7. SHORTY (obstructive sleep apnea)        Plan:     Paroxysmal atrial fibrillation    Atherosclerosis of native coronary artery of native heart without angina pectoris    Hyperlipidemia associated with type 2 diabetes mellitus    Hypertension associated with diabetes    Chronic anticoagulation    Diabetes mellitus without complication    SHORTY (obstructive sleep apnea)      Continue lasix-edema  Continue statin-hlp  Continue metoprolol, eliquis- afib  Continue imdur- CAD

## 2023-06-26 ENCOUNTER — PES CALL (OUTPATIENT)
Dept: ADMINISTRATIVE | Facility: CLINIC | Age: 83
End: 2023-06-26
Payer: MEDICARE

## 2023-06-30 ENCOUNTER — LAB VISIT (OUTPATIENT)
Dept: LAB | Facility: HOSPITAL | Age: 83
End: 2023-06-30
Attending: INTERNAL MEDICINE
Payer: MEDICARE

## 2023-06-30 DIAGNOSIS — E11.9 DIABETES MELLITUS WITHOUT COMPLICATION: ICD-10-CM

## 2023-06-30 LAB
ANION GAP SERPL CALC-SCNC: 11 MMOL/L (ref 8–16)
BASOPHILS # BLD AUTO: 0.02 K/UL (ref 0–0.2)
BASOPHILS NFR BLD: 0.2 % (ref 0–1.9)
BUN SERPL-MCNC: 20 MG/DL (ref 8–23)
CALCIUM SERPL-MCNC: 10.2 MG/DL (ref 8.7–10.5)
CHLORIDE SERPL-SCNC: 98 MMOL/L (ref 95–110)
CO2 SERPL-SCNC: 29 MMOL/L (ref 23–29)
CREAT SERPL-MCNC: 1.1 MG/DL (ref 0.5–1.4)
DIFFERENTIAL METHOD: ABNORMAL
EOSINOPHIL # BLD AUTO: 0.2 K/UL (ref 0–0.5)
EOSINOPHIL NFR BLD: 2.3 % (ref 0–8)
ERYTHROCYTE [DISTWIDTH] IN BLOOD BY AUTOMATED COUNT: 13.2 % (ref 11.5–14.5)
EST. GFR  (NO RACE VARIABLE): >60 ML/MIN/1.73 M^2
GLUCOSE SERPL-MCNC: 104 MG/DL (ref 70–110)
HCT VFR BLD AUTO: 39.9 % (ref 40–54)
HGB BLD-MCNC: 12.8 G/DL (ref 14–18)
IMM GRANULOCYTES # BLD AUTO: 0.04 K/UL (ref 0–0.04)
IMM GRANULOCYTES NFR BLD AUTO: 0.4 % (ref 0–0.5)
LYMPHOCYTES # BLD AUTO: 2.7 K/UL (ref 1–4.8)
LYMPHOCYTES NFR BLD: 26.9 % (ref 18–48)
MCH RBC QN AUTO: 28.9 PG (ref 27–31)
MCHC RBC AUTO-ENTMCNC: 32.1 G/DL (ref 32–36)
MCV RBC AUTO: 90 FL (ref 82–98)
MONOCYTES # BLD AUTO: 1.1 K/UL (ref 0.3–1)
MONOCYTES NFR BLD: 10.8 % (ref 4–15)
NEUTROPHILS # BLD AUTO: 6 K/UL (ref 1.8–7.7)
NEUTROPHILS NFR BLD: 59.4 % (ref 38–73)
NRBC BLD-RTO: 0 /100 WBC
PLATELET # BLD AUTO: 324 K/UL (ref 150–450)
PMV BLD AUTO: 10.5 FL (ref 9.2–12.9)
POTASSIUM SERPL-SCNC: 3.6 MMOL/L (ref 3.5–5.1)
RBC # BLD AUTO: 4.43 M/UL (ref 4.6–6.2)
SODIUM SERPL-SCNC: 138 MMOL/L (ref 136–145)
WBC # BLD AUTO: 10.13 K/UL (ref 3.9–12.7)

## 2023-06-30 PROCEDURE — 80048 BASIC METABOLIC PNL TOTAL CA: CPT | Mod: HCNC | Performed by: INTERNAL MEDICINE

## 2023-06-30 PROCEDURE — 36415 COLL VENOUS BLD VENIPUNCTURE: CPT | Mod: HCNC,PO | Performed by: INTERNAL MEDICINE

## 2023-06-30 PROCEDURE — 85025 COMPLETE CBC W/AUTO DIFF WBC: CPT | Mod: HCNC | Performed by: INTERNAL MEDICINE

## 2023-07-07 ENCOUNTER — OFFICE VISIT (OUTPATIENT)
Dept: INTERNAL MEDICINE | Facility: CLINIC | Age: 83
End: 2023-07-07
Payer: MEDICARE

## 2023-07-07 VITALS
HEIGHT: 67 IN | OXYGEN SATURATION: 96 % | HEART RATE: 88 BPM | DIASTOLIC BLOOD PRESSURE: 80 MMHG | SYSTOLIC BLOOD PRESSURE: 112 MMHG | WEIGHT: 199.94 LBS | BODY MASS INDEX: 31.38 KG/M2

## 2023-07-07 DIAGNOSIS — Z00.00 ROUTINE GENERAL MEDICAL EXAMINATION AT A HEALTH CARE FACILITY: Primary | ICD-10-CM

## 2023-07-07 DIAGNOSIS — E11.59 HYPERTENSION ASSOCIATED WITH DIABETES: ICD-10-CM

## 2023-07-07 DIAGNOSIS — K55.20 ANGIODYSPLASIA OF SMALL INTESTINE: ICD-10-CM

## 2023-07-07 DIAGNOSIS — E11.69 HYPERLIPIDEMIA ASSOCIATED WITH TYPE 2 DIABETES MELLITUS: ICD-10-CM

## 2023-07-07 DIAGNOSIS — Z79.01 CHRONIC ANTICOAGULATION: ICD-10-CM

## 2023-07-07 DIAGNOSIS — I15.2 HYPERTENSION ASSOCIATED WITH DIABETES: ICD-10-CM

## 2023-07-07 DIAGNOSIS — I48.0 PAROXYSMAL ATRIAL FIBRILLATION: ICD-10-CM

## 2023-07-07 DIAGNOSIS — F03.90 DEMENTIA, UNSPECIFIED DEMENTIA SEVERITY, UNSPECIFIED DEMENTIA TYPE, UNSPECIFIED WHETHER BEHAVIORAL, PSYCHOTIC, OR MOOD DISTURBANCE OR ANXIETY: ICD-10-CM

## 2023-07-07 DIAGNOSIS — G47.33 OSA (OBSTRUCTIVE SLEEP APNEA): ICD-10-CM

## 2023-07-07 DIAGNOSIS — D50.0 IRON DEFICIENCY ANEMIA DUE TO CHRONIC BLOOD LOSS: ICD-10-CM

## 2023-07-07 DIAGNOSIS — E78.5 HYPERLIPIDEMIA ASSOCIATED WITH TYPE 2 DIABETES MELLITUS: ICD-10-CM

## 2023-07-07 DIAGNOSIS — I25.10 ATHEROSCLEROSIS OF NATIVE CORONARY ARTERY OF NATIVE HEART WITHOUT ANGINA PECTORIS: ICD-10-CM

## 2023-07-07 DIAGNOSIS — E11.9 DIABETES MELLITUS WITHOUT COMPLICATION: ICD-10-CM

## 2023-07-07 DIAGNOSIS — Z85.46 HISTORY OF PROSTATE CANCER: ICD-10-CM

## 2023-07-07 DIAGNOSIS — K92.2 CHRONIC UPPER GI BLEEDING: ICD-10-CM

## 2023-07-07 DIAGNOSIS — I70.0 ATHEROSCLEROSIS OF AORTA: ICD-10-CM

## 2023-07-07 PROCEDURE — 3079F DIAST BP 80-89 MM HG: CPT | Mod: HCNC,CPTII,S$GLB, | Performed by: INTERNAL MEDICINE

## 2023-07-07 PROCEDURE — 3079F PR MOST RECENT DIASTOLIC BLOOD PRESSURE 80-89 MM HG: ICD-10-PCS | Mod: HCNC,CPTII,S$GLB, | Performed by: INTERNAL MEDICINE

## 2023-07-07 PROCEDURE — 99397 PER PM REEVAL EST PAT 65+ YR: CPT | Mod: HCNC,S$GLB,, | Performed by: INTERNAL MEDICINE

## 2023-07-07 PROCEDURE — 99999 PR PBB SHADOW E&M-EST. PATIENT-LVL III: CPT | Mod: PBBFAC,HCNC,, | Performed by: INTERNAL MEDICINE

## 2023-07-07 PROCEDURE — 99999 PR PBB SHADOW E&M-EST. PATIENT-LVL III: ICD-10-PCS | Mod: PBBFAC,HCNC,, | Performed by: INTERNAL MEDICINE

## 2023-07-07 PROCEDURE — 99397 PR PREVENTIVE VISIT,EST,65 & OVER: ICD-10-PCS | Mod: HCNC,S$GLB,, | Performed by: INTERNAL MEDICINE

## 2023-07-07 PROCEDURE — 1159F PR MEDICATION LIST DOCUMENTED IN MEDICAL RECORD: ICD-10-PCS | Mod: HCNC,CPTII,S$GLB, | Performed by: INTERNAL MEDICINE

## 2023-07-07 PROCEDURE — 1126F AMNT PAIN NOTED NONE PRSNT: CPT | Mod: HCNC,CPTII,S$GLB, | Performed by: INTERNAL MEDICINE

## 2023-07-07 PROCEDURE — 1101F PT FALLS ASSESS-DOCD LE1/YR: CPT | Mod: HCNC,CPTII,S$GLB, | Performed by: INTERNAL MEDICINE

## 2023-07-07 PROCEDURE — 3072F PR LOW RISK FOR RETINOPATHY: ICD-10-PCS | Mod: HCNC,CPTII,S$GLB, | Performed by: INTERNAL MEDICINE

## 2023-07-07 PROCEDURE — 3074F PR MOST RECENT SYSTOLIC BLOOD PRESSURE < 130 MM HG: ICD-10-PCS | Mod: HCNC,CPTII,S$GLB, | Performed by: INTERNAL MEDICINE

## 2023-07-07 PROCEDURE — 1101F PR PT FALLS ASSESS DOC 0-1 FALLS W/OUT INJ PAST YR: ICD-10-PCS | Mod: HCNC,CPTII,S$GLB, | Performed by: INTERNAL MEDICINE

## 2023-07-07 PROCEDURE — 3288F FALL RISK ASSESSMENT DOCD: CPT | Mod: HCNC,CPTII,S$GLB, | Performed by: INTERNAL MEDICINE

## 2023-07-07 PROCEDURE — 1159F MED LIST DOCD IN RCRD: CPT | Mod: HCNC,CPTII,S$GLB, | Performed by: INTERNAL MEDICINE

## 2023-07-07 PROCEDURE — 3288F PR FALLS RISK ASSESSMENT DOCUMENTED: ICD-10-PCS | Mod: HCNC,CPTII,S$GLB, | Performed by: INTERNAL MEDICINE

## 2023-07-07 PROCEDURE — 3074F SYST BP LT 130 MM HG: CPT | Mod: HCNC,CPTII,S$GLB, | Performed by: INTERNAL MEDICINE

## 2023-07-07 PROCEDURE — 3072F LOW RISK FOR RETINOPATHY: CPT | Mod: HCNC,CPTII,S$GLB, | Performed by: INTERNAL MEDICINE

## 2023-07-07 PROCEDURE — 1126F PR PAIN SEVERITY QUANTIFIED, NO PAIN PRESENT: ICD-10-PCS | Mod: HCNC,CPTII,S$GLB, | Performed by: INTERNAL MEDICINE

## 2023-07-07 RX ORDER — METOPROLOL SUCCINATE 25 MG/1
25 TABLET, EXTENDED RELEASE ORAL DAILY
Qty: 90 TABLET | Refills: 3 | Status: SHIPPED | OUTPATIENT
Start: 2023-07-07 | End: 2023-11-22 | Stop reason: SDUPTHER

## 2023-07-07 RX ORDER — DONEPEZIL HYDROCHLORIDE 10 MG/1
10 TABLET, FILM COATED ORAL NIGHTLY
Qty: 90 TABLET | Refills: 3 | Status: SHIPPED | OUTPATIENT
Start: 2023-07-07 | End: 2023-11-22 | Stop reason: SDUPTHER

## 2023-07-07 NOTE — PROGRESS NOTES
HPI:  Patient is 82-year-old man who comes today for follow-up of diabetes, hypertension, lipids, atrial fibrillation, chronic GI bleeding, and Alzheimer's disease.  There has been no further recurrence of bleeding.  Patient has tolerated the Aricept that he was started on 3 months ago.  There been no other new problems or complaints.  He denies any chest pains.    Current MEDS: medcard review, verified and update  Allergies: Per the electronic medical record    Past Medical History:   Diagnosis Date    Abnormal CXR     SHAYLA (acute kidney injury) 02/23/2023    Angina pectoris 08/28/2017    Angiodysplasia of small intestine     Atrial fibrillation     Chronic upper GI bleeding     due to angiodysplasia in proximal small intestine    Coronary atherosclerosis of unspecified type of vessel, native or graft     COVID 06/26/2022    Dementia     Depression     Diabetes mellitus without complication     Emphysema of lung     History of prostate cancer 2007    prostatectomy    Hyperlipidemia associated with type 2 diabetes mellitus     Hypertension associated with diabetes     LAILA (iron deficiency anemia) 09/20/2021    due to angiodysplasia in small intestine    Intention tremor     Major depressive disorder, recurrent, moderate 04/12/2021    Moderate episode of recurrent major depressive disorder 04/01/2019    SHORTY (obstructive sleep apnea)     Trouble in sleeping     Urinary incontinence        Past Surgical History:   Procedure Laterality Date    ABDOMINAL HERNIA REPAIR      APPENDECTOMY      bladder sx      CARDIAC CATHETERIZATION      CATARACT EXTRACTION W/  INTRAOCULAR LENS IMPLANT  Restor OU    COLONOSCOPY N/A 5/16/2017    Procedure: COLONOSCOPY;  Surgeon: Alfredo Naidu MD;  Location: [facility];  Service: Endoscopy;  Laterality: N/A;    ESOPHAGOGASTRODUODENOSCOPY N/A 10/29/2021    Procedure: SBE (Push Enteroscopy) with Dr. Wayne;  Surgeon: Arlette Wayne MD;  Location: [facility];  Service: Endoscopy;   "Laterality: N/A;  Plavix held indefinitely as of 10-20-21. Must also hold Coumadin 5 days prior. Ok to continue to use ASA.    ESOPHAGOGASTRODUODENOSCOPY N/A 2/23/2023    Procedure: EGD (ESOPHAGOGASTRODUODENOSCOPY);  Surgeon: Opal Wright MD;  Location: [facility] ENDO;  Service: Endoscopy;  Laterality: N/A;    inguinal hernia      INTRALUMINAL GASTROINTESTINAL TRACT IMAGING VIA CAPSULE N/A 10/4/2021    Procedure: IMAGING PROCEDURE, GI TRACT, INTRALUMINAL, VIA CAPSULE;  Surgeon: Joaquin Stack RN;  Location: [facility] ENDO;  Service: Endoscopy;  Laterality: N/A;    LEFT HEART CATHETERIZATION Left 7/20/2021    Procedure: CATHETERIZATION, HEART, LEFT;  Surgeon: Darryl Griffith MD;  Location: [facility] CATH LAB;  Service: Cardiology;  Laterality: Left;    lung sx      PERCUTANEOUS TRANSLUMINAL BALLOON ANGIOPLASTY OF CORONARY ARTERY  7/20/2021    Procedure: Angioplasty-coronary;  Surgeon: Darryl Griffith MD;  Location: [facility] CATH LAB;  Service: Cardiology;;    PROSTATE SURGERY         SHx: per the electronic medical record    FHx: recorded in the electronic medical record    ROS:    denies any chest pains or shortness of breath. Denies any nausea, vomiting or diarrhea. Denies any fever, chills or sweats. Denies any change in weight, voice, stool, skin or hair. Denies any dysuria, dyspepsia or dysphagia. Denies any change in vision, hearing or headaches. Denies any swollen lymph nodes or loss of memory.    PE:  /80 (BP Location: Left arm)   Pulse 88   Ht 5' 7" (1.702 m)   Wt 90.7 kg (199 lb 15.3 oz)   SpO2 96%   BMI 31.32 kg/m²   Gen: Well-developed, well-nourished, male, in no acute distress, oriented x3  HEENT: neck is supple, no adenopathy, carotids 2+ equal without bruits, thyroid exam normal size without nodules.  CHEST: clear to auscultation and percussion  CVS: irregular rate and rhythm without significant murmur, gallop, or rubs  ABD: soft, benign, no rebound no guarding, no distention.  Bowel sounds are " normal.     nontender.  No palpable masses.  No organomegaly and no audible bruits.  RECTAL:  Deferred.  EXT: no clubbing, cyanosis, or edema  LYMPH: no cervical, inguinal, or axillary adenopathy  FEET: no loss of sensation.  No ulcers or pressure sores.  Monofilament testing normal  NEURO: gait normal.  Cranial nerves II- XII intact. No nystagmus.  Speech normal.   Gross motor and sensory unremarkable.    Lab Results   Component Value Date    WBC 10.13 06/30/2023    HGB 12.8 (L) 06/30/2023    HCT 39.9 (L) 06/30/2023     06/30/2023    CHOL 109 (L) 03/23/2023    TRIG 68 03/23/2023    HDL 35 (L) 03/23/2023    ALT 28 03/23/2023    AST 32 03/23/2023     06/30/2023    K 3.6 06/30/2023    CL 98 06/30/2023    CREATININE 1.1 06/30/2023    BUN 20 06/30/2023    CO2 29 06/30/2023    TSH 1.324 03/23/2023    PSA <0.010 08/20/2013    INR 1.2 02/23/2023    HGBA1C 5.6 03/23/2023       Impression:  Diabetes, hypertension, lipids all well controlled  AFib and coronary disease, well controlled  No further evidence of GI bleeding  Dementia, stable  Patient Active Problem List   Diagnosis    Atrial fibrillation    Coronary atherosclerosis    History of prostate cancer    Refractive error    Family history of macular degeneration    Posterior capsular opacification    Pseudophakia of both eyes    History of colon polyps    Diverticulosis of large intestine without hemorrhage    Chronic anticoagulation    Pulmonary hypertension, mild    SHORTY (obstructive sleep apnea)    Diabetes mellitus without complication    Hyperlipidemia associated with type 2 diabetes mellitus    Hypertension associated with diabetes    Intention tremor    Atherosclerosis of aorta    Adrenal nodule    Lung nodules    Obesity (BMI 30.0-34.9)    Major depressive disorder, recurrent, moderate    Angiodysplasia of small intestine    Chronic upper GI bleeding    LAILA (iron deficiency anemia)    Dementia       Plan:   Orders Placed This Encounter    Hemoglobin  A1C    Comprehensive Metabolic Panel    Lipid Panel    TSH    Microalbumin/Creatinine Ratio, Urine    CBC Auto Differential    metoprolol succinate (TOPROL-XL) 25 MG 24 hr tablet    donepeziL (ARICEPT) 10 MG tablet   We will increase the Aricept to 10 mg today.  Patient will be seen again in 6 months with above lab work.    This note is generated with speech recognition software and is subject to transcription error and sound alike phrases that may be missed by proofreading.

## 2023-09-13 ENCOUNTER — OFFICE VISIT (OUTPATIENT)
Dept: OTOLARYNGOLOGY | Facility: CLINIC | Age: 83
End: 2023-09-13
Payer: MEDICARE

## 2023-09-13 VITALS — BODY MASS INDEX: 32.87 KG/M2 | HEIGHT: 67 IN | WEIGHT: 209.44 LBS | TEMPERATURE: 98 F

## 2023-09-13 DIAGNOSIS — H61.23 BILATERAL IMPACTED CERUMEN: Primary | ICD-10-CM

## 2023-09-13 PROCEDURE — 99999 PR PBB SHADOW E&M-EST. PATIENT-LVL III: CPT | Mod: PBBFAC,HCNC,, | Performed by: PHYSICIAN ASSISTANT

## 2023-09-13 PROCEDURE — 99499 NO LOS: ICD-10-PCS | Mod: HCNC,S$GLB,, | Performed by: PHYSICIAN ASSISTANT

## 2023-09-13 PROCEDURE — 99999 PR PBB SHADOW E&M-EST. PATIENT-LVL III: ICD-10-PCS | Mod: PBBFAC,HCNC,, | Performed by: PHYSICIAN ASSISTANT

## 2023-09-13 PROCEDURE — 69210 REMOVE IMPACTED EAR WAX UNI: CPT | Mod: HCNC,S$GLB,, | Performed by: PHYSICIAN ASSISTANT

## 2023-09-13 PROCEDURE — 69210 PR REMOVAL IMPACTED CERUMEN REQUIRING INSTRUMENTATION, UNILATERAL: ICD-10-PCS | Mod: HCNC,S$GLB,, | Performed by: PHYSICIAN ASSISTANT

## 2023-09-13 PROCEDURE — 99499 UNLISTED E&M SERVICE: CPT | Mod: HCNC,S$GLB,, | Performed by: PHYSICIAN ASSISTANT

## 2023-09-13 NOTE — PROGRESS NOTES
"Subjective:   Patient ID: Anthony Biggs Jr. is a 83 y.o. male.    Chief Complaint: Cerumen Impaction (Ear cleaning)    Patient is here to see me today for evaluation of a possible wax impaction in bilateral ears.  He has complaints of hearing loss in the affected ears, but denies pain or drainage.  This has been an issue in the past.  The patient has not been using any sort of ear drop to soften the wax.       Review of patient's allergies indicates:  No Known Allergies        Review of Systems   HENT:  Positive for hearing loss.          Objective:   Temp 97.7 °F (36.5 °C) (Temporal)   Ht 5' 7" (1.702 m)   Wt 95 kg (209 lb 7 oz)   BMI 32.80 kg/m²     Physical Exam  HENT:      Right Ear: There is impacted cerumen.      Left Ear: There is impacted cerumen.        Procedure Note    CHIEF COMPLAINT:  Cerumen Impaction    Description:  The patient was seated in an exam chair.  An ear speculum was placed in the right EAC and was examined under the microscope.  Suction and/or loop curettes were used to remove a large cerumen impaction.  The tympanic membrane was visualized and was normal in appearance.  The procedure was repeated on the left side in a similar fashion.  The TM was intact and normal on this side as well.  The patient tolerated the procedure well.     Assessment:     1. Bilateral impacted cerumen        Plan:     Bilateral impacted cerumen       Cerumen impaction:  Removed today without difficulty.  I would recommend the use of a wax softening drop, either over the counter Debrox or mineral oil, on a weekly basis.  I also instructed the patient to avoid Qtips.     "

## 2023-09-22 NOTE — TELEPHONE ENCOUNTER
No new care gaps identified.  [facility] Embedded Care Gaps. Reference number: [id number]. 12/05/2022   10:12:27 AM CST   no

## 2023-10-16 ENCOUNTER — OFFICE VISIT (OUTPATIENT)
Dept: HOME HEALTH SERVICES | Facility: CLINIC | Age: 83
End: 2023-10-16
Payer: MEDICARE

## 2023-10-16 VITALS
WEIGHT: 209 LBS | BODY MASS INDEX: 32.8 KG/M2 | OXYGEN SATURATION: 97 % | SYSTOLIC BLOOD PRESSURE: 102 MMHG | HEART RATE: 87 BPM | HEIGHT: 67 IN | TEMPERATURE: 99 F | DIASTOLIC BLOOD PRESSURE: 65 MMHG | RESPIRATION RATE: 18 BRPM

## 2023-10-16 DIAGNOSIS — G47.33 OSA (OBSTRUCTIVE SLEEP APNEA): ICD-10-CM

## 2023-10-16 DIAGNOSIS — R91.8 LUNG NODULES: ICD-10-CM

## 2023-10-16 DIAGNOSIS — Z79.01 CHRONIC ANTICOAGULATION: ICD-10-CM

## 2023-10-16 DIAGNOSIS — F33.1 MAJOR DEPRESSIVE DISORDER, RECURRENT, MODERATE: ICD-10-CM

## 2023-10-16 DIAGNOSIS — G25.2 INTENTION TREMOR: Primary | ICD-10-CM

## 2023-10-16 DIAGNOSIS — E11.59 HYPERTENSION ASSOCIATED WITH DIABETES: ICD-10-CM

## 2023-10-16 DIAGNOSIS — E78.5 HYPERLIPIDEMIA ASSOCIATED WITH TYPE 2 DIABETES MELLITUS: ICD-10-CM

## 2023-10-16 DIAGNOSIS — Z96.1 PSEUDOPHAKIA OF BOTH EYES: ICD-10-CM

## 2023-10-16 DIAGNOSIS — E11.69 HYPERLIPIDEMIA ASSOCIATED WITH TYPE 2 DIABETES MELLITUS: ICD-10-CM

## 2023-10-16 DIAGNOSIS — I27.20 PULMONARY HYPERTENSION, MILD: ICD-10-CM

## 2023-10-16 DIAGNOSIS — Z85.46 HISTORY OF PROSTATE CANCER: ICD-10-CM

## 2023-10-16 DIAGNOSIS — Z00.00 ENCOUNTER FOR MEDICARE ANNUAL WELLNESS EXAM: ICD-10-CM

## 2023-10-16 DIAGNOSIS — F03.90 DEMENTIA, UNSPECIFIED DEMENTIA SEVERITY, UNSPECIFIED DEMENTIA TYPE, UNSPECIFIED WHETHER BEHAVIORAL, PSYCHOTIC, OR MOOD DISTURBANCE OR ANXIETY: ICD-10-CM

## 2023-10-16 DIAGNOSIS — H26.499 POSTERIOR CAPSULAR OPACIFICATION, UNSPECIFIED LATERALITY: ICD-10-CM

## 2023-10-16 DIAGNOSIS — E66.9 OBESITY (BMI 30.0-34.9): ICD-10-CM

## 2023-10-16 DIAGNOSIS — E27.8 ADRENAL NODULE: ICD-10-CM

## 2023-10-16 DIAGNOSIS — K92.2 CHRONIC UPPER GI BLEEDING: ICD-10-CM

## 2023-10-16 DIAGNOSIS — I25.10 ATHEROSCLEROSIS OF NATIVE CORONARY ARTERY OF NATIVE HEART WITHOUT ANGINA PECTORIS: ICD-10-CM

## 2023-10-16 DIAGNOSIS — N39.498 OTHER URINARY INCONTINENCE: ICD-10-CM

## 2023-10-16 DIAGNOSIS — I15.2 HYPERTENSION ASSOCIATED WITH DIABETES: ICD-10-CM

## 2023-10-16 DIAGNOSIS — E11.9 DIABETES MELLITUS WITHOUT COMPLICATION: ICD-10-CM

## 2023-10-16 DIAGNOSIS — K55.20 ANGIODYSPLASIA OF SMALL INTESTINE: ICD-10-CM

## 2023-10-16 DIAGNOSIS — I70.0 ATHEROSCLEROSIS OF AORTA: ICD-10-CM

## 2023-10-16 DIAGNOSIS — I48.0 PAROXYSMAL ATRIAL FIBRILLATION: ICD-10-CM

## 2023-10-16 DIAGNOSIS — H52.7 REFRACTIVE ERROR: ICD-10-CM

## 2023-10-16 DIAGNOSIS — D50.0 IRON DEFICIENCY ANEMIA DUE TO CHRONIC BLOOD LOSS: ICD-10-CM

## 2023-10-16 PROBLEM — Z86.010 HISTORY OF COLON POLYPS: Status: RESOLVED | Noted: 2017-05-15 | Resolved: 2023-10-16

## 2023-10-16 PROBLEM — K57.30 DIVERTICULOSIS OF LARGE INTESTINE WITHOUT HEMORRHAGE: Chronic | Status: RESOLVED | Noted: 2017-05-16 | Resolved: 2023-10-16

## 2023-10-16 PROBLEM — Z86.0100 HISTORY OF COLON POLYPS: Status: RESOLVED | Noted: 2017-05-15 | Resolved: 2023-10-16

## 2023-10-16 PROCEDURE — 3072F PR LOW RISK FOR RETINOPATHY: ICD-10-PCS | Mod: CPTII,S$GLB,,

## 2023-10-16 PROCEDURE — 3078F PR MOST RECENT DIASTOLIC BLOOD PRESSURE < 80 MM HG: ICD-10-PCS | Mod: CPTII,S$GLB,,

## 2023-10-16 PROCEDURE — 3078F DIAST BP <80 MM HG: CPT | Mod: CPTII,S$GLB,,

## 2023-10-16 PROCEDURE — 1159F MED LIST DOCD IN RCRD: CPT | Mod: CPTII,S$GLB,,

## 2023-10-16 PROCEDURE — 3074F SYST BP LT 130 MM HG: CPT | Mod: CPTII,S$GLB,,

## 2023-10-16 PROCEDURE — 3288F PR FALLS RISK ASSESSMENT DOCUMENTED: ICD-10-PCS | Mod: CPTII,S$GLB,,

## 2023-10-16 PROCEDURE — 3288F FALL RISK ASSESSMENT DOCD: CPT | Mod: CPTII,S$GLB,,

## 2023-10-16 PROCEDURE — 1101F PR PT FALLS ASSESS DOC 0-1 FALLS W/OUT INJ PAST YR: ICD-10-PCS | Mod: CPTII,S$GLB,,

## 2023-10-16 PROCEDURE — 3074F PR MOST RECENT SYSTOLIC BLOOD PRESSURE < 130 MM HG: ICD-10-PCS | Mod: CPTII,S$GLB,,

## 2023-10-16 PROCEDURE — 3072F LOW RISK FOR RETINOPATHY: CPT | Mod: CPTII,S$GLB,,

## 2023-10-16 PROCEDURE — 1170F FXNL STATUS ASSESSED: CPT | Mod: CPTII,S$GLB,,

## 2023-10-16 PROCEDURE — 1160F PR REVIEW ALL MEDS BY PRESCRIBER/CLIN PHARMACIST DOCUMENTED: ICD-10-PCS | Mod: CPTII,S$GLB,,

## 2023-10-16 PROCEDURE — 1170F PR FUNCTIONAL STATUS ASSESSED: ICD-10-PCS | Mod: CPTII,S$GLB,,

## 2023-10-16 PROCEDURE — G0439 PR MEDICARE ANNUAL WELLNESS SUBSEQUENT VISIT: ICD-10-PCS | Mod: S$GLB,,,

## 2023-10-16 PROCEDURE — 1159F PR MEDICATION LIST DOCUMENTED IN MEDICAL RECORD: ICD-10-PCS | Mod: CPTII,S$GLB,,

## 2023-10-16 PROCEDURE — 1126F PR PAIN SEVERITY QUANTIFIED, NO PAIN PRESENT: ICD-10-PCS | Mod: CPTII,S$GLB,,

## 2023-10-16 PROCEDURE — 1126F AMNT PAIN NOTED NONE PRSNT: CPT | Mod: CPTII,S$GLB,,

## 2023-10-16 PROCEDURE — G0439 PPPS, SUBSEQ VISIT: HCPCS | Mod: S$GLB,,,

## 2023-10-16 PROCEDURE — 1160F RVW MEDS BY RX/DR IN RCRD: CPT | Mod: CPTII,S$GLB,,

## 2023-10-16 PROCEDURE — 1101F PT FALLS ASSESS-DOCD LE1/YR: CPT | Mod: CPTII,S$GLB,,

## 2023-10-16 NOTE — PROGRESS NOTES
"  Anthony Biggs presented for a  Medicare AWV and comprehensive Health Risk Assessment today. The following components were reviewed and updated:    Medical history  Family History  Social history  Allergies and Current Medications  Health Risk Assessment  Health Maintenance  Care Team         ** See Completed Assessments for Annual Wellness Visit within the encounter summary.**         The following assessments were completed:  Living Situation  CAGE  Depression Screening  Timed Get Up and Go  Whisper Test  Cognitive Function Screening      Nutrition Screening  ADL Screening  PAQ Screening        Vitals:    10/16/23 1253   BP: 102/65   Pulse: 87   Resp: 18   Temp: 98.5 °F (36.9 °C)   SpO2: 97%   Weight: 94.8 kg (209 lb)   Height: 5' 7" (1.702 m)     Body mass index is 32.73 kg/m².  Physical Exam  Vitals reviewed.   Constitutional:       General: He is not in acute distress.     Appearance: Normal appearance. He is well-developed. He is obese.   HENT:      Head: Normocephalic and atraumatic.      Nose: Nose normal.      Mouth/Throat:      Mouth: Mucous membranes are dry.      Pharynx: Oropharynx is clear.   Eyes:      Pupils: Pupils are equal, round, and reactive to light.   Cardiovascular:      Rate and Rhythm: Normal rate and regular rhythm.      Pulses: Normal pulses.      Heart sounds: Normal heart sounds.   Pulmonary:      Effort: Pulmonary effort is normal.      Breath sounds: Normal breath sounds.   Abdominal:      General: Bowel sounds are normal.      Palpations: Abdomen is soft.   Musculoskeletal:         General: Normal range of motion.      Cervical back: Normal range of motion and neck supple.   Skin:     General: Skin is warm and dry.   Neurological:      General: No focal deficit present.      Mental Status: He is alert. Mental status is at baseline.      Motor: Weakness present.   Psychiatric:         Mood and Affect: Mood normal.         Behavior: Behavior normal.         Thought Content: Thought " content normal.         Judgment: Judgment normal.           Patient medication list reviewed, patient is not taking prescription opioids. Patient is not using additional opioids than prescribed. Patient is at low risk of substance abuse based on this opioid use history.   Current Outpatient Medications on File Prior to Visit   Medication Sig Dispense Refill    allopurinoL (ZYLOPRIM) 100 MG tablet Take 1 tablet (100 mg total) by mouth once daily. 90 tablet 3    apixaban (ELIQUIS) 2.5 mg Tab Take 1 tablet (2.5 mg total) by mouth 2 (two) times daily. 180 tablet 3    donepeziL (ARICEPT) 10 MG tablet Take 1 tablet (10 mg total) by mouth every evening. 90 tablet 3    ferrous sulfate 324 mg (65 mg iron) TbEC TAKE ONE TABLET BY MOUTH TWICE DAILY 180 tablet 3    isosorbide mononitrate (IMDUR) 30 MG 24 hr tablet Take 1 tablet (30 mg total) by mouth once daily. 90 tablet 3    melatonin 3 mg Tab Take 1 tablet by mouth nightly.      metFORMIN (GLUCOPHAGE) 500 MG tablet Take 1 tablet (500 mg total) by mouth 2 (two) times daily with meals. 180 tablet 3    metOLazone (ZAROXOLYN) 5 MG tablet       metoprolol succinate (TOPROL-XL) 25 MG 24 hr tablet Take 1 tablet (25 mg total) by mouth once daily. 90 tablet 3    nystatin-triamcinolone (MYCOLOG II) cream Apply topically 4 (four) times daily. 30 g 1    potassium chloride SA (K-DUR,KLOR-CON M) 10 MEQ tablet       atorvastatin (LIPITOR) 40 MG tablet Take 1 tablet (40 mg total) by mouth once daily. for 90 doses 90 tablet 3    pantoprazole (PROTONIX) 40 MG tablet Take 1 tablet (40 mg total) by mouth once daily. (Patient not taking: Reported on 10/16/2023) 90 tablet 3    sucralfate (CARAFATE) 100 mg/mL suspension Take 10 mLs (1 g total) by mouth once daily. (Patient not taking: Reported on 10/16/2023)       No current facility-administered medications on file prior to visit.        Diagnoses and health risks identified today and associated recommendations/orders:    1. Encounter for  Medicare annual wellness exam  AWV complete.   - Ambulatory Referral/Consult to Enhanced Annual Wellness Visit (eAWV)    2. Intention tremor  Stable.  Continue current management.  Follow up with PCP.     3. Dementia, unspecified dementia severity, unspecified dementia type, unspecified whether behavioral, psychotic, or mood disturbance or anxiety  Stable.  Continue current management.  Follow up with PCP.     4. Major depressive disorder, recurrent, moderate  Stable.  Continue current management.  Follow up with PCP.     5. Pseudophakia of both eyes  Stable.  Continue current management.  Follow up with PCP.     6. Refractive error  Stable.  Continue current management.  Follow up with PCP.     7. Posterior capsular opacification, unspecified laterality  Stable.  Continue current management.  Follow up with PCP.     8. Lung nodules  Stable.  Continue current management.  Follow up with PCP.     9. Paroxysmal atrial fibrillation  Stable.  Continue current management.  Follow up with PCP.     10. Atherosclerosis of native coronary artery of native heart without angina pectoris  Stable.  Continue current management.  Follow up with PCP.     11. Pulmonary hypertension, mild  Stable.  Continue current management.  Follow up with PCP.     12. Hyperlipidemia associated with type 2 diabetes mellitus  Stable.  Continue current management.  Follow up with PCP.     13. Hypertension associated with diabetes  Stable.  Continue current management.  Follow up with PCP.     14. Atherosclerosis of aorta  Stable.  Continue current management.  Follow up with PCP.     15. Chronic anticoagulation  Stable.  Continue current management.  Follow up with PCP.     16. History of prostate cancer  Stable.  Continue current management.  Follow up with PCP.     17. Iron deficiency anemia due to chronic blood loss  Stable.  Continue current management.  Follow up with PCP.     18. Diabetes mellitus without complication  Stable.  Continue  current management.  Follow up with PCP.     19. Adrenal nodule  Stable.  Continue current management.  Follow up with PCP.     20. Obesity (BMI 30.0-34.9)  Weight loss. Recommend diet and exercise to lose weight. Follow up with your PCP as planned to discuss adjustments to your treatment plan.      21. Angiodysplasia of small intestine  Stable.  Continue current management.  Follow up with PCP.     22. Chronic upper GI bleeding  Stable.  Continue current management.  Follow up with PCP.     23. SHORTY (obstructive sleep apnea)  Stable.  Continue current management.  Follow up with PCP.       Provided [place] with a 5-10 year written screening schedule and personal prevention plan. Recommendations were developed using the USPSTF age appropriate recommendations. Education, counseling, and referrals were provided as needed. After Visit Summary printed and given to patient which includes a list of additional screenings\tests needed.    No follow-ups on file.    Julio Cesar Rothman NP

## 2023-10-17 ENCOUNTER — PES CALL (OUTPATIENT)
Dept: ADMINISTRATIVE | Facility: OTHER | Age: 83
End: 2023-10-17
Payer: MEDICARE

## 2023-10-20 ENCOUNTER — IMMUNIZATION (OUTPATIENT)
Dept: INTERNAL MEDICINE | Facility: CLINIC | Age: 83
End: 2023-10-20
Payer: MEDICARE

## 2023-10-20 PROCEDURE — G0008 FLU VACCINE - QUADRIVALENT - ADJUVANTED: ICD-10-PCS | Mod: HCNC,S$GLB,, | Performed by: INTERNAL MEDICINE

## 2023-10-20 PROCEDURE — 90694 FLU VACCINE - QUADRIVALENT - ADJUVANTED: ICD-10-PCS | Mod: HCNC,S$GLB,, | Performed by: INTERNAL MEDICINE

## 2023-10-20 PROCEDURE — G0008 ADMIN INFLUENZA VIRUS VAC: HCPCS | Mod: HCNC,S$GLB,, | Performed by: INTERNAL MEDICINE

## 2023-10-20 PROCEDURE — 90694 VACC AIIV4 NO PRSRV 0.5ML IM: CPT | Mod: HCNC,S$GLB,, | Performed by: INTERNAL MEDICINE

## 2023-11-22 DIAGNOSIS — E78.00 PURE HYPERCHOLESTEROLEMIA: ICD-10-CM

## 2023-11-22 RX ORDER — PANTOPRAZOLE SODIUM 40 MG/1
40 TABLET, DELAYED RELEASE ORAL DAILY
Qty: 90 TABLET | Refills: 3 | Status: SHIPPED | OUTPATIENT
Start: 2023-11-22 | End: 2024-11-21

## 2023-11-22 RX ORDER — DONEPEZIL HYDROCHLORIDE 10 MG/1
10 TABLET, FILM COATED ORAL NIGHTLY
Qty: 90 TABLET | Refills: 3 | Status: SHIPPED | OUTPATIENT
Start: 2023-11-22 | End: 2024-11-21

## 2023-11-22 RX ORDER — POTASSIUM CHLORIDE 750 MG/1
10 TABLET, EXTENDED RELEASE ORAL DAILY
Qty: 90 TABLET | Refills: 3 | Status: SHIPPED | OUTPATIENT
Start: 2023-11-22

## 2023-11-22 RX ORDER — ISOSORBIDE MONONITRATE 30 MG/1
30 TABLET, EXTENDED RELEASE ORAL DAILY
Qty: 90 TABLET | Refills: 3 | Status: SHIPPED | OUTPATIENT
Start: 2023-11-22 | End: 2024-11-21

## 2023-11-22 RX ORDER — ATORVASTATIN CALCIUM 40 MG/1
40 TABLET, FILM COATED ORAL DAILY
Qty: 90 TABLET | Refills: 3 | Status: SHIPPED | OUTPATIENT
Start: 2023-11-22 | End: 2024-11-16

## 2023-11-22 RX ORDER — METFORMIN HYDROCHLORIDE 500 MG/1
500 TABLET ORAL 2 TIMES DAILY WITH MEALS
Qty: 180 TABLET | Refills: 3 | Status: SHIPPED | OUTPATIENT
Start: 2023-11-22

## 2023-11-22 RX ORDER — ALLOPURINOL 100 MG/1
100 TABLET ORAL DAILY
Qty: 90 TABLET | Refills: 3 | Status: SHIPPED | OUTPATIENT
Start: 2023-11-22

## 2023-11-22 RX ORDER — FERROUS SULFATE 324(65)MG
TABLET, DELAYED RELEASE (ENTERIC COATED) ORAL
Qty: 180 TABLET | Refills: 3 | Status: SHIPPED | OUTPATIENT
Start: 2023-11-22

## 2023-11-22 RX ORDER — METOPROLOL SUCCINATE 25 MG/1
25 TABLET, EXTENDED RELEASE ORAL DAILY
Qty: 90 TABLET | Refills: 3 | Status: SHIPPED | OUTPATIENT
Start: 2023-11-22 | End: 2024-11-21

## 2023-11-22 NOTE — TELEPHONE ENCOUNTER
----- Message from Konrad Rosa sent at 11/22/2023 12:39 PM CST -----  Contact: Laurie/Wife  Pt wife is calling in regards to having all of pt prescription sent over to [facility] off [address]. Please call back at [phone]                              Thanks  KT

## 2023-11-22 NOTE — TELEPHONE ENCOUNTER
Care Due:                  Date            Visit Type   Department     Provider  --------------------------------------------------------------------------------                                EP -                              PRIMARY      [facility] INTERNAL  Last Visit: 07-      CARE ([facility])   ASHOK Moran                              [facility]                              PRIMARY      [facility] INTERNAL  Next Visit: 01-      CARE ([facility])   ASHOK Moran                                                            Last  Test          Frequency    Reason                     Performed    Due Date  --------------------------------------------------------------------------------    HBA1C.......  6 months...  metFORMIN................  03- 09-    Uric Acid...  12 months..  allopurinoL..............  12- 12-    [facility] Embedded Care Due Messages. Reference number: [id number].   11/22/2023 1:36:27 PM CST

## 2023-11-22 NOTE — TELEPHONE ENCOUNTER
Pharmacy closed down, pt needs rx sent to new pharmacy. Please review and advise.     Requested Prescriptions     Pending Prescriptions Disp Refills    potassium chloride SA (K-DUR,KLOR-CON M) 10 MEQ tablet      pantoprazole (PROTONIX) 40 MG tablet 90 tablet 3     Sig: Take 1 tablet (40 mg total) by mouth once daily.    metoprolol succinate (TOPROL-XL) 25 MG 24 hr tablet 90 tablet 3     Sig: Take 1 tablet (25 mg total) by mouth once daily.    metFORMIN (GLUCOPHAGE) 500 MG tablet 180 tablet 3     Sig: Take 1 tablet (500 mg total) by mouth 2 (two) times daily with meals.    isosorbide mononitrate (IMDUR) 30 MG 24 hr tablet 90 tablet 3     Sig: Take 1 tablet (30 mg total) by mouth once daily.    ferrous sulfate 324 mg (65 mg iron) TbEC 180 tablet 3     Sig: TAKE ONE TABLET BY MOUTH TWICE DAILY    donepeziL (ARICEPT) 10 MG tablet 90 tablet 3     Sig: Take 1 tablet (10 mg total) by mouth every evening.    allopurinoL (ZYLOPRIM) 100 MG tablet 90 tablet 3     Sig: Take 1 tablet (100 mg total) by mouth once daily.    apixaban (ELIQUIS) 2.5 mg Tab 180 tablet 3     Sig: Take 1 tablet (2.5 mg total) by mouth 2 (two) times daily.    atorvastatin (LIPITOR) 40 MG tablet 90 tablet 3     Sig: Take 1 tablet (40 mg total) by mouth once daily. for 90 doses

## 2023-11-28 ENCOUNTER — OFFICE VISIT (OUTPATIENT)
Dept: OPHTHALMOLOGY | Facility: CLINIC | Age: 83
End: 2023-11-28
Payer: MEDICARE

## 2023-11-28 DIAGNOSIS — E11.9 DIABETES MELLITUS TYPE 2 WITHOUT RETINOPATHY: Primary | ICD-10-CM

## 2023-11-28 DIAGNOSIS — Z96.1 PSEUDOPHAKIA OF BOTH EYES: ICD-10-CM

## 2023-11-28 DIAGNOSIS — H35.3131 EARLY DRY STAGE NONEXUDATIVE AGE-RELATED MACULAR DEGENERATION OF BOTH EYES: ICD-10-CM

## 2023-11-28 PROCEDURE — 92134 POSTERIOR SEGMENT OCT RETINA (OCULAR COHERENCE TOMOGRAPHY)-BOTH EYES: ICD-10-PCS | Mod: HCNC,S$GLB,, | Performed by: OPHTHALMOLOGY

## 2023-11-28 PROCEDURE — 92134 CPTRZ OPH DX IMG PST SGM RTA: CPT | Mod: HCNC,S$GLB,, | Performed by: OPHTHALMOLOGY

## 2023-11-28 PROCEDURE — 99999 PR PBB SHADOW E&M-EST. PATIENT-LVL III: CPT | Mod: PBBFAC,HCNC,, | Performed by: OPHTHALMOLOGY

## 2023-11-28 PROCEDURE — 1159F PR MEDICATION LIST DOCUMENTED IN MEDICAL RECORD: ICD-10-PCS | Mod: HCNC,CPTII,S$GLB, | Performed by: OPHTHALMOLOGY

## 2023-11-28 PROCEDURE — 92014 COMPRE OPH EXAM EST PT 1/>: CPT | Mod: HCNC,S$GLB,, | Performed by: OPHTHALMOLOGY

## 2023-11-28 PROCEDURE — 1160F RVW MEDS BY RX/DR IN RCRD: CPT | Mod: HCNC,CPTII,S$GLB, | Performed by: OPHTHALMOLOGY

## 2023-11-28 PROCEDURE — 2023F DILAT RTA XM W/O RTNOPTHY: CPT | Mod: HCNC,CPTII,S$GLB, | Performed by: OPHTHALMOLOGY

## 2023-11-28 PROCEDURE — 92014 PR EYE EXAM, EST PATIENT,COMPREHESV: ICD-10-PCS | Mod: HCNC,S$GLB,, | Performed by: OPHTHALMOLOGY

## 2023-11-28 PROCEDURE — 2023F PR DILATED RETINAL EXAM W/O EVID OF RETINOPATHY: ICD-10-PCS | Mod: HCNC,CPTII,S$GLB, | Performed by: OPHTHALMOLOGY

## 2023-11-28 PROCEDURE — 1160F PR REVIEW ALL MEDS BY PRESCRIBER/CLIN PHARMACIST DOCUMENTED: ICD-10-PCS | Mod: HCNC,CPTII,S$GLB, | Performed by: OPHTHALMOLOGY

## 2023-11-28 PROCEDURE — 1159F MED LIST DOCD IN RCRD: CPT | Mod: HCNC,CPTII,S$GLB, | Performed by: OPHTHALMOLOGY

## 2023-11-28 PROCEDURE — 99999 PR PBB SHADOW E&M-EST. PATIENT-LVL III: ICD-10-PCS | Mod: PBBFAC,HCNC,, | Performed by: OPHTHALMOLOGY

## 2023-11-28 NOTE — PROGRESS NOTES
SUBJECTIVE  Ashley DEJA Biggs Jr. is 83 y.o. male  Uncorrected distance visual acuity was 20/50 -2 in the right eye and 20/50 -2 in the left eye.   Chief Complaint   Patient presents with    Diabetic Eye Exam     Pt here for annual exam. No pain or discomfort. VA stable.           HPI     Diabetic Eye Exam     Additional comments: Pt here for annual exam. No pain or discomfort. VA   stable.            Comments    1. Restor IOL OD 12/07 (no glasses at all)  2. Restor IOL OS 11/07  Yag OS 7/08  3. Mild CF OD  4. Mild AMD (states no family history)  5.DM x 2019             Last edited by Rocael Lopez MA on 11/28/2023  8:12 AM.         Assessment /Plan :  1. Diabetes mellitus type 2 without retinopathy No diabetic retinopathy at this time. Reviewed diabetic eye precautions including avoiding tobacco use,  Good glucose control, and importance of regular follow up.      2. Early dry stage nonexudative age-related macular degeneration of both eyes  -- Condition stable, no therapeutic change required. Monitoring routinely.     3. Pseudophakia of both eyes  -- Condition stable, no therapeutic change required. Monitoring routinely.       RTC in 1 year for dilation and MOCT or prn any changes

## 2023-12-12 NOTE — TELEPHONE ENCOUNTER
----- Message from Savannah Cardoza sent at 12/12/2023  1:21 PM CST -----  Contact: wife [phone]  Patients wife called in this afternoon requesting a refill on metOLazone (ZAROXOLYN) 5 MG tablet please send to the pharmacy below. Please call back [phone]. Thanks [facility]    Pharmacy [facility] on [address] [phone]

## 2023-12-14 RX ORDER — METOLAZONE 5 MG/1
5 TABLET ORAL DAILY
Qty: 30 TABLET | Refills: 11 | Status: SHIPPED | OUTPATIENT
Start: 2023-12-14 | End: 2024-12-13

## 2023-12-19 ENCOUNTER — OFFICE VISIT (OUTPATIENT)
Dept: URGENT CARE | Facility: CLINIC | Age: 83
End: 2023-12-19
Payer: MEDICARE

## 2023-12-19 VITALS
WEIGHT: 205.38 LBS | DIASTOLIC BLOOD PRESSURE: 62 MMHG | BODY MASS INDEX: 33.01 KG/M2 | HEIGHT: 66 IN | OXYGEN SATURATION: 96 % | HEART RATE: 72 BPM | SYSTOLIC BLOOD PRESSURE: 105 MMHG | RESPIRATION RATE: 17 BRPM | TEMPERATURE: 99 F

## 2023-12-19 DIAGNOSIS — J06.9 VIRAL URI WITH COUGH: Primary | ICD-10-CM

## 2023-12-19 DIAGNOSIS — R05.1 ACUTE COUGH: ICD-10-CM

## 2023-12-19 LAB
CTP QC/QA: YES
RSV RAPID ANTIGEN: NEGATIVE

## 2023-12-19 PROCEDURE — 87807 POCT RESPIRATORY SYNCYTIAL VIRUS: ICD-10-PCS | Mod: QW,S$GLB,, | Performed by: NURSE PRACTITIONER

## 2023-12-19 PROCEDURE — 99213 OFFICE O/P EST LOW 20 MIN: CPT | Mod: S$GLB,,, | Performed by: NURSE PRACTITIONER

## 2023-12-19 PROCEDURE — 99213 PR OFFICE/OUTPT VISIT, EST, LEVL III, 20-29 MIN: ICD-10-PCS | Mod: S$GLB,,, | Performed by: NURSE PRACTITIONER

## 2023-12-19 PROCEDURE — 87807 RSV ASSAY W/OPTIC: CPT | Mod: QW,S$GLB,, | Performed by: NURSE PRACTITIONER

## 2023-12-19 RX ORDER — BENZONATATE 100 MG/1
100 CAPSULE ORAL 3 TIMES DAILY PRN
Qty: 30 CAPSULE | Refills: 0 | Status: SHIPPED | OUTPATIENT
Start: 2023-12-19 | End: 2023-12-29

## 2023-12-19 NOTE — PATIENT INSTRUCTIONS
"Cough   If your condition worsens or fails to improve we recommend that you receive another evaluation at the ER immediately or contact your PCP to discuss your concerns or return here. You must understand that you've received an urgent care treatment only and that you may be released before all your medical problems are known or treated. You the patient will arrange for follouwp care as instructed. .  Rest and fluids are important  Can use honey with kuldeep to soothe your throat  Take prescription cough meds (pills) as prescribed; t-  Flonase (fluticasone) is a nasal spray which is available over the counter and may help with your symptoms.   -  If you have hypertension avoid using the "D" which is the decongestant.  Instead you can use Coricidin HBP for cold and cough symptoms.    -  If you just have drainage you can take plain Zyrtec, Claritin or Allegra   -  Tylenol or ibuprofen can also be used as directed for pain unless you have an allergy to them or medical condition such as stomach ulcers, kidney or liver disease or blood thinners etc for which you should not be taking these type of medications.   Please follow up with your primary care doctor or specialist in the next 48-72hrs as needed and if no improvement  If you  smoke, please stop smoking.       Please arrange follow up with your primary medical clinic as soon as possible. You must understand that you've received an Urgent Care treatment only and that you may be released before all of your medical problems are known or treated. You, the patient, will arrange for follow up as instructed. If your symptoms worsen or fail to improve you should go to the Emergency Room.    "

## 2023-12-19 NOTE — PROGRESS NOTES
"Subjective:      Patient ID: Anthony Biggs Jr. is a 83 y.o. male.    Vitals:  height is 5' 5.59" (1.666 m) and weight is 93.2 kg (205 lb 5.7 oz). His temperature is 98.5 °F (36.9 °C). His blood pressure is 105/62 and his pulse is 72. His respiration is 17 and oxygen saturation is 96%.     Chief Complaint: Cough    Anthony Biggs Jr. Is a 83 year old male whom  presents today for an evaluation of a cough for 3 days. Patients spouse states that his cough exacerbated on yesterday evening and is now productive of clear sputum. Patient denies any fever, SOB or lower extremity edema. NO known exposure to sick contacts.     Cough  This is a new problem. The current episode started in the past 7 days. The problem has been unchanged. The problem occurs constantly. The cough is Non-productive. Associated symptoms include nasal congestion. Pertinent negatives include no chest pain, chills, ear congestion, ear pain, fever, headaches, heartburn, hemoptysis, myalgias, postnasal drip, rash, rhinorrhea, sore throat, shortness of breath, sweats, weight loss or wheezing. Nothing aggravates the symptoms. He has tried nothing for the symptoms. The treatment provided no relief. There is no history of asthma, bronchiectasis, bronchitis, COPD, emphysema, environmental allergies or pneumonia.       Constitution: Negative for chills and fever.   HENT:  Negative for ear pain, postnasal drip and sore throat.    Cardiovascular:  Negative for chest pain.   Respiratory:  Positive for cough. Negative for bloody sputum, shortness of breath and wheezing.    Gastrointestinal:  Negative for heartburn.   Musculoskeletal:  Negative for muscle ache.   Skin:  Negative for rash.   Allergic/Immunologic: Negative for environmental allergies.   Neurological:  Negative for headaches.      Objective:     Physical Exam   Constitutional: He is oriented to person, place, and time. He appears well-developed. He is cooperative.  Non-toxic appearance. He does not " appear ill. No distress.   HENT:   Head: Normocephalic and atraumatic.   Ears:   Right Ear: Hearing, tympanic membrane, external ear and ear canal normal.   Left Ear: Hearing, tympanic membrane, external ear and ear canal normal.   Nose: Nose normal. No mucosal edema, rhinorrhea or nasal deformity. No epistaxis. Right sinus exhibits no maxillary sinus tenderness and no frontal sinus tenderness. Left sinus exhibits no maxillary sinus tenderness and no frontal sinus tenderness.   Mouth/Throat: Uvula is midline, oropharynx is clear and moist and mucous membranes are normal. No trismus in the jaw. Normal dentition. No uvula swelling. No oropharyngeal exudate, posterior oropharyngeal edema or posterior oropharyngeal erythema.   Eyes: Conjunctivae and lids are normal. No scleral icterus.   Neck: Trachea normal and phonation normal. Neck supple. No edema present. No erythema present. No neck rigidity present.   Cardiovascular: Normal rate, regular rhythm, normal heart sounds and normal pulses.   Pulmonary/Chest: Effort normal and breath sounds normal. No respiratory distress. He has no decreased breath sounds. He has no rhonchi.   Abdominal: Normal appearance.   Musculoskeletal: Normal range of motion.         General: No deformity. Normal range of motion.   Neurological: He is alert and oriented to person, place, and time. He exhibits normal muscle tone. Coordination normal.   Skin: Skin is warm, dry, intact, not diaphoretic and not pale.   Psychiatric: His speech is normal and behavior is normal. Judgment and thought content normal.   Nursing note and vitals reviewed.    Results for orders placed or performed in visit on 12/19/23   POCT respiratory syncytial virus   Result Value Ref Range    RSV Rapid Ag Negative Negative     Acceptable Yes          Assessment:     1. Viral URI with cough    2. Acute cough        Plan:       Viral URI with cough  -     benzonatate (TESSALON) 100 MG capsule; Take 1 capsule  "(100 mg total) by mouth 3 (three) times daily as needed for Cough (Do not take more than 6 capsules in a 24 hour period.).  Dispense: 30 capsule; Refill: 0    Acute cough  -     POCT respiratory syncytial virus          Medical Decision Making:   Differential Diagnosis:   Bronchitis, COPD/Asthma exacerbation, Viral upper respiratory infection, Bacterial upper respiratory infection, Pneumonia, covid19, influenza,bacterial vs viral sinusitis.     Clinical Tests:   Lab Tests: Ordered and Reviewed       <> Summary of Lab: RSV negative  Urgent Care Management:  Previous encounters were independently reviewed. Discussed negative results with patient. Patient verbalized understanding. Educated patient on viral vs bacterial upper respiratory infection. Advised patient to begin OTC zyrtec with decongestant, flonase, normal saline nasal spray and OTC cough suppressants for symptom relief. Additional plan of care as outlined above.  If symptoms do not resolve, return to clinic for further evaluation. Patient vitals stable. Patient in no acute respiratory distress. Discussed discharge instructions with patient, he has no further questions at this time. Patient exits exam room in no distress.          Patient Instructions   Cough   If your condition worsens or fails to improve we recommend that you receive another evaluation at the ER immediately or contact your PCP to discuss your concerns or return here. You must understand that you've received an urgent care treatment only and that you may be released before all your medical problems are known or treated. You the patient will arrange for follouwp care as instructed. .  Rest and fluids are important  Can use honey with kuldeep to soothe your throat  Take prescription cough meds (pills) as prescribed; t-  Flonase (fluticasone) is a nasal spray which is available over the counter and may help with your symptoms.   -  If you have hypertension avoid using the "D" which is the " decongestant.  Instead you can use Coricidin HBP for cold and cough symptoms.    -  If you just have drainage you can take plain Zyrtec, Claritin or Allegra   -  Tylenol or ibuprofen can also be used as directed for pain unless you have an allergy to them or medical condition such as stomach ulcers, kidney or liver disease or blood thinners etc for which you should not be taking these type of medications.   Please follow up with your primary care doctor or specialist in the next 48-72hrs as needed and if no improvement  If you  smoke, please stop smoking.       Please arrange follow up with your primary medical clinic as soon as possible. You must understand that you've received an Urgent Care treatment only and that you may be released before all of your medical problems are known or treated. You, the patient, will arrange for follow up as instructed. If your symptoms worsen or fail to improve you should go to the Emergency Room.

## 2023-12-27 ENCOUNTER — TELEPHONE (OUTPATIENT)
Dept: INTERNAL MEDICINE | Facility: CLINIC | Age: 83
End: 2023-12-27
Payer: MEDICARE

## 2023-12-27 RX ORDER — PROMETHAZINE HYDROCHLORIDE AND DEXTROMETHORPHAN HYDROBROMIDE 6.25; 15 MG/5ML; MG/5ML
5 SYRUP ORAL EVERY 4 HOURS PRN
Qty: 240 ML | Refills: 1 | Status: SHIPPED | OUTPATIENT
Start: 2023-12-27 | End: 2024-01-06

## 2023-12-27 NOTE — TELEPHONE ENCOUNTER
I spoke with spouse Laurie.  Patient was tested positive for RSV 1 week before Gabe.  He still has a consistent cough, no fever, dry cough-clear mucus.  He is still taking the tessalon pearles.  Requesting a cough syrup to help with the cough.    Please review and advise.  [facility]   Dustin Kee

## 2023-12-27 NOTE — TELEPHONE ENCOUNTER
----- Message from Laurie Bermeo sent at 12/27/2023  7:18 AM CST -----  Contact: pt's wife/Laurie Sullivan is calling in regard to the pt having a severe cough which persistent and tight.  The patient is taking benzonatate (TESSALON) 100 MG capsule (which was for his testing positive for RSV).  Laurie would like to have dr gutierrez in a cough medicine for the pt to the pharm.  If any questions call Laurie at [phone]  thanks/sanam        [address] [address]  Phone: [phone] Fax: [phone]

## 2023-12-27 NOTE — TELEPHONE ENCOUNTER
Patient notified rx has been sent to [facility]. Promethazine cough syrup

## 2024-01-03 ENCOUNTER — LAB VISIT (OUTPATIENT)
Dept: LAB | Facility: HOSPITAL | Age: 84
End: 2024-01-03
Attending: INTERNAL MEDICINE
Payer: MEDICARE

## 2024-01-03 ENCOUNTER — OFFICE VISIT (OUTPATIENT)
Dept: INTERNAL MEDICINE | Facility: CLINIC | Age: 84
End: 2024-01-03
Payer: MEDICARE

## 2024-01-03 VITALS
HEIGHT: 65 IN | HEART RATE: 95 BPM | BODY MASS INDEX: 33.21 KG/M2 | DIASTOLIC BLOOD PRESSURE: 80 MMHG | SYSTOLIC BLOOD PRESSURE: 110 MMHG | OXYGEN SATURATION: 95 % | WEIGHT: 199.31 LBS

## 2024-01-03 DIAGNOSIS — K55.20 ANGIODYSPLASIA OF SMALL INTESTINE: ICD-10-CM

## 2024-01-03 DIAGNOSIS — D50.0 IRON DEFICIENCY ANEMIA DUE TO CHRONIC BLOOD LOSS: Primary | ICD-10-CM

## 2024-01-03 DIAGNOSIS — K92.2 CHRONIC UPPER GI BLEEDING: ICD-10-CM

## 2024-01-03 DIAGNOSIS — E11.9 DIABETES MELLITUS WITHOUT COMPLICATION: ICD-10-CM

## 2024-01-03 LAB
ALBUMIN SERPL BCP-MCNC: 3 G/DL (ref 3.5–5.2)
ALP SERPL-CCNC: 79 U/L (ref 55–135)
ALT SERPL W/O P-5'-P-CCNC: 43 U/L (ref 10–44)
ANION GAP SERPL CALC-SCNC: 14 MMOL/L (ref 8–16)
AST SERPL-CCNC: 51 U/L (ref 10–40)
BASOPHILS # BLD AUTO: 0.02 K/UL (ref 0–0.2)
BASOPHILS NFR BLD: 0.2 % (ref 0–1.9)
BILIRUB SERPL-MCNC: 0.8 MG/DL (ref 0.1–1)
BUN SERPL-MCNC: 29 MG/DL (ref 8–23)
CALCIUM SERPL-MCNC: 9.4 MG/DL (ref 8.7–10.5)
CHLORIDE SERPL-SCNC: 93 MMOL/L (ref 95–110)
CHOLEST SERPL-MCNC: 107 MG/DL (ref 120–199)
CHOLEST/HDLC SERPL: 3.5 {RATIO} (ref 2–5)
CO2 SERPL-SCNC: 30 MMOL/L (ref 23–29)
CREAT SERPL-MCNC: 1.5 MG/DL (ref 0.5–1.4)
DIFFERENTIAL METHOD BLD: ABNORMAL
EOSINOPHIL # BLD AUTO: 0.2 K/UL (ref 0–0.5)
EOSINOPHIL NFR BLD: 2.8 % (ref 0–8)
ERYTHROCYTE [DISTWIDTH] IN BLOOD BY AUTOMATED COUNT: 13.4 % (ref 11.5–14.5)
EST. GFR  (NO RACE VARIABLE): 46.5 ML/MIN/1.73 M^2
ESTIMATED AVG GLUCOSE: 154 MG/DL (ref 68–131)
GLUCOSE SERPL-MCNC: 139 MG/DL (ref 70–110)
HBA1C MFR BLD: 7 % (ref 4–5.6)
HCT VFR BLD AUTO: 33.3 % (ref 40–54)
HDLC SERPL-MCNC: 31 MG/DL (ref 40–75)
HDLC SERPL: 29 % (ref 20–50)
HGB BLD-MCNC: 11 G/DL (ref 14–18)
IMM GRANULOCYTES # BLD AUTO: 0.05 K/UL (ref 0–0.04)
IMM GRANULOCYTES NFR BLD AUTO: 0.6 % (ref 0–0.5)
LDLC SERPL CALC-MCNC: 57.8 MG/DL (ref 63–159)
LYMPHOCYTES # BLD AUTO: 1.5 K/UL (ref 1–4.8)
LYMPHOCYTES NFR BLD: 18 % (ref 18–48)
MCH RBC QN AUTO: 27.8 PG (ref 27–31)
MCHC RBC AUTO-ENTMCNC: 33 G/DL (ref 32–36)
MCV RBC AUTO: 84 FL (ref 82–98)
MONOCYTES # BLD AUTO: 0.8 K/UL (ref 0.3–1)
MONOCYTES NFR BLD: 9.1 % (ref 4–15)
NEUTROPHILS # BLD AUTO: 5.7 K/UL (ref 1.8–7.7)
NEUTROPHILS NFR BLD: 69.3 % (ref 38–73)
NONHDLC SERPL-MCNC: 76 MG/DL
NRBC BLD-RTO: 0 /100 WBC
PLATELET # BLD AUTO: 343 K/UL (ref 150–450)
PMV BLD AUTO: 9.7 FL (ref 9.2–12.9)
POTASSIUM SERPL-SCNC: 3.6 MMOL/L (ref 3.5–5.1)
PROT SERPL-MCNC: 7.4 G/DL (ref 6–8.4)
RBC # BLD AUTO: 3.95 M/UL (ref 4.6–6.2)
SODIUM SERPL-SCNC: 137 MMOL/L (ref 136–145)
TRIGL SERPL-MCNC: 91 MG/DL (ref 30–150)
TSH SERPL DL<=0.005 MIU/L-ACNC: 1.14 UIU/ML (ref 0.4–4)
WBC # BLD AUTO: 8.22 K/UL (ref 3.9–12.7)

## 2024-01-03 PROCEDURE — 99213 OFFICE O/P EST LOW 20 MIN: CPT | Mod: HCNC,S$GLB,, | Performed by: INTERNAL MEDICINE

## 2024-01-03 PROCEDURE — 3079F DIAST BP 80-89 MM HG: CPT | Mod: HCNC,CPTII,S$GLB, | Performed by: INTERNAL MEDICINE

## 2024-01-03 PROCEDURE — 3288F FALL RISK ASSESSMENT DOCD: CPT | Mod: HCNC,CPTII,S$GLB, | Performed by: INTERNAL MEDICINE

## 2024-01-03 PROCEDURE — 85025 COMPLETE CBC W/AUTO DIFF WBC: CPT | Mod: HCNC | Performed by: INTERNAL MEDICINE

## 2024-01-03 PROCEDURE — 99999 PR PBB SHADOW E&M-EST. PATIENT-LVL III: CPT | Mod: PBBFAC,HCNC,, | Performed by: INTERNAL MEDICINE

## 2024-01-03 PROCEDURE — 1126F AMNT PAIN NOTED NONE PRSNT: CPT | Mod: HCNC,CPTII,S$GLB, | Performed by: INTERNAL MEDICINE

## 2024-01-03 PROCEDURE — 83036 HEMOGLOBIN GLYCOSYLATED A1C: CPT | Mod: HCNC | Performed by: INTERNAL MEDICINE

## 2024-01-03 PROCEDURE — 3074F SYST BP LT 130 MM HG: CPT | Mod: HCNC,CPTII,S$GLB, | Performed by: INTERNAL MEDICINE

## 2024-01-03 PROCEDURE — 1160F RVW MEDS BY RX/DR IN RCRD: CPT | Mod: HCNC,CPTII,S$GLB, | Performed by: INTERNAL MEDICINE

## 2024-01-03 PROCEDURE — 1101F PT FALLS ASSESS-DOCD LE1/YR: CPT | Mod: HCNC,CPTII,S$GLB, | Performed by: INTERNAL MEDICINE

## 2024-01-03 PROCEDURE — 1159F MED LIST DOCD IN RCRD: CPT | Mod: HCNC,CPTII,S$GLB, | Performed by: INTERNAL MEDICINE

## 2024-01-03 PROCEDURE — 84443 ASSAY THYROID STIM HORMONE: CPT | Mod: HCNC | Performed by: INTERNAL MEDICINE

## 2024-01-03 PROCEDURE — 36415 COLL VENOUS BLD VENIPUNCTURE: CPT | Mod: HCNC,PO | Performed by: INTERNAL MEDICINE

## 2024-01-03 PROCEDURE — 80061 LIPID PANEL: CPT | Mod: HCNC | Performed by: INTERNAL MEDICINE

## 2024-01-03 PROCEDURE — 3072F LOW RISK FOR RETINOPATHY: CPT | Mod: HCNC,CPTII,S$GLB, | Performed by: INTERNAL MEDICINE

## 2024-01-03 PROCEDURE — 80053 COMPREHEN METABOLIC PANEL: CPT | Mod: HCNC | Performed by: INTERNAL MEDICINE

## 2024-01-03 NOTE — PROGRESS NOTES
"HPI:  Patient is an 83-year-old gentleman who comes in today accompanied by his wife because she is concerned that she noticed that his stools or black.  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  He takes daily iron supplementation in order to keep his iron levels normal in blood counts normal range.  His last H&H in June was 12.8 and 39.9.  She states that his stools have not changed she is aware of but she just noted that they were black.  She does know that iron makes people stools black.    Current meds have been verified and updated per the EMR  Exam:/80 (BP Location: Right arm)   Pulse 95   Ht 5' 5" (1.651 m)   Wt 90.4 kg (199 lb 4.7 oz)   SpO2 95%   BMI 33.16 kg/m²   Exam deferred    Lab Results   Component Value Date    WBC 10.13 06/30/2023    HGB 12.8 (L) 06/30/2023    HCT 39.9 (L) 06/30/2023     06/30/2023    CHOL 109 (L) 03/23/2023    TRIG 68 03/23/2023    HDL 35 (L) 03/23/2023    ALT 28 03/23/2023    AST 32 03/23/2023     06/30/2023    K 3.6 06/30/2023    CL 98 06/30/2023    CREATININE 1.1 06/30/2023    BUN 20 06/30/2023    CO2 29 06/30/2023    TSH 1.324 03/23/2023    PSA <0.010 08/20/2013    INR 1.2 02/23/2023    HGBA1C 5.6 03/23/2023    PSADIAG <0.01 03/23/2023     (H) 02/23/2023    URICACID 6.3 12/10/2021    SEDRATE 32 (H) 12/10/2021    CRP 10.0 (H) 12/10/2021       Impression:  Chronic GI bleeding secondary to angiodysplasia of small intestine, patient on oral iron therapy  Patient Active Problem List   Diagnosis    Atrial fibrillation    Coronary atherosclerosis    History of prostate cancer    Refractive error    Posterior capsular opacification    Pseudophakia of both eyes    Chronic anticoagulation    Pulmonary hypertension, mild    SHORTY (obstructive sleep apnea)    Diabetes mellitus without complication    Hyperlipidemia associated with type 2 diabetes mellitus    Hypertension associated with diabetes    " Intention tremor    Atherosclerosis of aorta    Adrenal nodule    Lung nodules    Obesity (BMI 30.0-34.9)    Major depressive disorder, recurrent, moderate    Angiodysplasia of small intestine    Chronic upper GI bleeding    LAILA (iron deficiency anemia)    Dementia       Plan:     Lab work done today and he will see me next week.  Unless there is a change in his blood counts I would not recommend doing anything    This note is generated with speech recognition software and is subject to transcription error and sound alike phrases that may be missed by proofreading.

## 2024-01-08 ENCOUNTER — OFFICE VISIT (OUTPATIENT)
Dept: INTERNAL MEDICINE | Facility: CLINIC | Age: 84
End: 2024-01-08
Payer: MEDICARE

## 2024-01-08 VITALS
WEIGHT: 201.94 LBS | DIASTOLIC BLOOD PRESSURE: 80 MMHG | SYSTOLIC BLOOD PRESSURE: 104 MMHG | OXYGEN SATURATION: 96 % | HEART RATE: 83 BPM | HEIGHT: 65 IN | BODY MASS INDEX: 33.65 KG/M2

## 2024-01-08 DIAGNOSIS — E11.59 HYPERTENSION ASSOCIATED WITH DIABETES: Primary | ICD-10-CM

## 2024-01-08 DIAGNOSIS — Z85.46 HISTORY OF PROSTATE CANCER: ICD-10-CM

## 2024-01-08 DIAGNOSIS — D50.0 IRON DEFICIENCY ANEMIA DUE TO CHRONIC BLOOD LOSS: ICD-10-CM

## 2024-01-08 DIAGNOSIS — I70.0 ATHEROSCLEROSIS OF AORTA: ICD-10-CM

## 2024-01-08 DIAGNOSIS — E11.69 HYPERLIPIDEMIA ASSOCIATED WITH TYPE 2 DIABETES MELLITUS: ICD-10-CM

## 2024-01-08 DIAGNOSIS — Z79.01 CHRONIC ANTICOAGULATION: ICD-10-CM

## 2024-01-08 DIAGNOSIS — F33.1 MAJOR DEPRESSIVE DISORDER, RECURRENT, MODERATE: ICD-10-CM

## 2024-01-08 DIAGNOSIS — K55.20 ANGIODYSPLASIA OF SMALL INTESTINE: ICD-10-CM

## 2024-01-08 DIAGNOSIS — R32 URINARY INCONTINENCE, UNSPECIFIED TYPE: ICD-10-CM

## 2024-01-08 DIAGNOSIS — I25.10 ATHEROSCLEROSIS OF NATIVE CORONARY ARTERY OF NATIVE HEART WITHOUT ANGINA PECTORIS: ICD-10-CM

## 2024-01-08 DIAGNOSIS — I15.2 HYPERTENSION ASSOCIATED WITH DIABETES: Primary | ICD-10-CM

## 2024-01-08 DIAGNOSIS — G47.33 OSA (OBSTRUCTIVE SLEEP APNEA): ICD-10-CM

## 2024-01-08 DIAGNOSIS — E78.5 HYPERLIPIDEMIA ASSOCIATED WITH TYPE 2 DIABETES MELLITUS: ICD-10-CM

## 2024-01-08 DIAGNOSIS — F03.90 DEMENTIA, UNSPECIFIED DEMENTIA SEVERITY, UNSPECIFIED DEMENTIA TYPE, UNSPECIFIED WHETHER BEHAVIORAL, PSYCHOTIC, OR MOOD DISTURBANCE OR ANXIETY: ICD-10-CM

## 2024-01-08 DIAGNOSIS — K92.2 CHRONIC UPPER GI BLEEDING: ICD-10-CM

## 2024-01-08 DIAGNOSIS — E11.9 DIABETES MELLITUS WITHOUT COMPLICATION: ICD-10-CM

## 2024-01-08 DIAGNOSIS — E66.9 OBESITY (BMI 30.0-34.9): ICD-10-CM

## 2024-01-08 DIAGNOSIS — I48.0 PAROXYSMAL ATRIAL FIBRILLATION: ICD-10-CM

## 2024-01-08 PROCEDURE — 3079F DIAST BP 80-89 MM HG: CPT | Mod: HCNC,CPTII,S$GLB, | Performed by: INTERNAL MEDICINE

## 2024-01-08 PROCEDURE — 3072F LOW RISK FOR RETINOPATHY: CPT | Mod: HCNC,CPTII,S$GLB, | Performed by: INTERNAL MEDICINE

## 2024-01-08 PROCEDURE — 99214 OFFICE O/P EST MOD 30 MIN: CPT | Mod: HCNC,S$GLB,, | Performed by: INTERNAL MEDICINE

## 2024-01-08 PROCEDURE — 1159F MED LIST DOCD IN RCRD: CPT | Mod: HCNC,CPTII,S$GLB, | Performed by: INTERNAL MEDICINE

## 2024-01-08 PROCEDURE — 3288F FALL RISK ASSESSMENT DOCD: CPT | Mod: HCNC,CPTII,S$GLB, | Performed by: INTERNAL MEDICINE

## 2024-01-08 PROCEDURE — 99999 PR PBB SHADOW E&M-EST. PATIENT-LVL III: CPT | Mod: PBBFAC,HCNC,, | Performed by: INTERNAL MEDICINE

## 2024-01-08 PROCEDURE — 3074F SYST BP LT 130 MM HG: CPT | Mod: HCNC,CPTII,S$GLB, | Performed by: INTERNAL MEDICINE

## 2024-01-08 PROCEDURE — 1126F AMNT PAIN NOTED NONE PRSNT: CPT | Mod: HCNC,CPTII,S$GLB, | Performed by: INTERNAL MEDICINE

## 2024-01-08 PROCEDURE — 1101F PT FALLS ASSESS-DOCD LE1/YR: CPT | Mod: HCNC,CPTII,S$GLB, | Performed by: INTERNAL MEDICINE

## 2024-01-08 NOTE — PROGRESS NOTES
"HPI:  Patient is 83-year-old gentleman who comes in today for follow-up of diabetes, hypertension, lipids, chronic kidney disease, chronic GI bleed secondary to small bowel telangiectasias, dementia, AFib and coronary disease.  Patient is accompanied by his wife who provides all the history.  Patient's dementia is definitely progressing.  He is less interactive.  He has lost his appetite.  He does not eat very healthy.  He just just nix and picks at his food.  There has been no complaints of chest pains or shortness a breath.  he has had no problems with hypoglycemia    Current meds have been verified and updated per the EMR  Exam:/80 (BP Location: Right arm)   Pulse 83   Ht 5' 5" (1.651 m)   Wt 91.6 kg (201 lb 15.1 oz)   SpO2 96%   BMI 33.60 kg/m²   Carotids 2+ equal without bruits  Chest clear  Cardiovascular irregular rate and rhythm without any significant murmur gallop or rub    Lab Results   Component Value Date    WBC 8.22 01/03/2024    HGB 11.0 (L) 01/03/2024    HCT 33.3 (L) 01/03/2024     01/03/2024    CHOL 107 (L) 01/03/2024    TRIG 91 01/03/2024    HDL 31 (L) 01/03/2024    ALT 43 01/03/2024    AST 51 (H) 01/03/2024     01/03/2024    K 3.6 01/03/2024    CL 93 (L) 01/03/2024    CREATININE 1.5 (H) 01/03/2024    BUN 29 (H) 01/03/2024    CO2 30 (H) 01/03/2024    TSH 1.135 01/03/2024    PSA <0.010 08/20/2013    INR 1.2 02/23/2023    HGBA1C 7.0 (H) 01/03/2024    PSADIAG <0.01 03/23/2023     (H) 02/23/2023    URICACID 6.3 12/10/2021    SEDRATE 32 (H) 12/10/2021    CRP 10.0 (H) 12/10/2021       Impression:  Diabetes, not as well controlled had been in the past  Hypertension and hyperlipidemia, both well controlled on current therapy  Atrial fibrillation, unchanged, on anticoagulation  Coronary artery disease, asymptomatic  Chronic iron-deficiency anemia secondary to chronic GI bleeding from angiodysplasias of the small intestine  Dementia, progressive, Alzheimer's type  Patient " Active Problem List   Diagnosis    Atrial fibrillation    Coronary atherosclerosis    History of prostate cancer    Refractive error    Posterior capsular opacification    Pseudophakia of both eyes    History of colon polyps    Chronic anticoagulation    Pulmonary hypertension, mild    SHORTY (obstructive sleep apnea)    Diabetes mellitus without complication    Hyperlipidemia associated with type 2 diabetes mellitus    Hypertension associated with diabetes    Intention tremor    Atherosclerosis of aorta    Adrenal nodule    Lung nodules    Obesity (BMI 30.0-34.9)    Major depressive disorder, recurrent, moderate    Angiodysplasia of small intestine    Chronic upper GI bleeding    LAILA (iron deficiency anemia)    Dementia    Urinary incontinence       Plan:  Orders Placed This Encounter    Hemoglobin A1C    CBC Auto Differential    Comprehensive Metabolic Panel    Ferritin    Iron and TIBC    empagliflozin (JARDIANCE) 10 mg tablet     Patient will continue with his current meds.  He was started on Jardiance 10 mg once a day as well.  He will see me back in 3 months with above lab work.    This note is generated with speech recognition software and is subject to transcription error and sound alike phrases that may be missed by proofreading.

## 2024-01-19 ENCOUNTER — PATIENT MESSAGE (OUTPATIENT)
Dept: INTERNAL MEDICINE | Facility: CLINIC | Age: 84
End: 2024-01-19
Payer: MEDICARE

## 2024-02-12 ENCOUNTER — TELEPHONE (OUTPATIENT)
Dept: INTERNAL MEDICINE | Facility: CLINIC | Age: 84
End: 2024-02-12
Payer: MEDICARE

## 2024-02-12 NOTE — TELEPHONE ENCOUNTER
Spoke to pt's wife who states that the Eliquis Rx is too expensive. I informed pt that we could try sending the Rx to [facility] to see if they can get the medicine cheaper for them. Pt's wife agreed to plan and will call back with any further questions or concerns.    ----- Message from Carri Moffett sent at 2/12/2024  7:36 AM CST -----  Regarding: Medical Advice  Contact: Mrs. Biggs  Type:  Needs Medical Advice    Who Called: Mrs. Biggs   Symptoms (please be specific):    How long has patient had these symptoms:    Pharmacy name and phone #:      [facility] [id number] [address] [facility] [address]  Phone: [phone] Fax: [phone]     Would the patient rather a call back or a response via [facility]? call  Best Call Back Number: [phone]  Additional Information:  Mrs. Biggs need financial help with RX: Eloquist medication by coupon or please suggest options

## 2024-02-12 NOTE — TELEPHONE ENCOUNTER
----- Message from Adriana Amador sent at 2/12/2024 12:45 PM CST -----  Contact: Marino  Pt wife is calling in regards to getting same day appt due to pt having issues with bowels and hemorrhoids. Marino can be reached at [phone].        Thanks  [facility]

## 2024-02-14 ENCOUNTER — OFFICE VISIT (OUTPATIENT)
Dept: INTERNAL MEDICINE | Facility: CLINIC | Age: 84
End: 2024-02-14
Payer: MEDICARE

## 2024-02-14 VITALS
BODY MASS INDEX: 32.51 KG/M2 | SYSTOLIC BLOOD PRESSURE: 100 MMHG | HEART RATE: 73 BPM | HEIGHT: 65 IN | DIASTOLIC BLOOD PRESSURE: 70 MMHG | OXYGEN SATURATION: 97 % | WEIGHT: 195.13 LBS

## 2024-02-14 DIAGNOSIS — K59.00 CONSTIPATION, UNSPECIFIED CONSTIPATION TYPE: Primary | ICD-10-CM

## 2024-02-14 PROCEDURE — 3078F DIAST BP <80 MM HG: CPT | Mod: HCNC,CPTII,S$GLB, | Performed by: INTERNAL MEDICINE

## 2024-02-14 PROCEDURE — 1160F RVW MEDS BY RX/DR IN RCRD: CPT | Mod: HCNC,CPTII,S$GLB, | Performed by: INTERNAL MEDICINE

## 2024-02-14 PROCEDURE — 99213 OFFICE O/P EST LOW 20 MIN: CPT | Mod: HCNC,S$GLB,, | Performed by: INTERNAL MEDICINE

## 2024-02-14 PROCEDURE — 1159F MED LIST DOCD IN RCRD: CPT | Mod: HCNC,CPTII,S$GLB, | Performed by: INTERNAL MEDICINE

## 2024-02-14 PROCEDURE — 3072F LOW RISK FOR RETINOPATHY: CPT | Mod: HCNC,CPTII,S$GLB, | Performed by: INTERNAL MEDICINE

## 2024-02-14 PROCEDURE — 99999 PR PBB SHADOW E&M-EST. PATIENT-LVL III: CPT | Mod: PBBFAC,HCNC,, | Performed by: INTERNAL MEDICINE

## 2024-02-14 PROCEDURE — 1101F PT FALLS ASSESS-DOCD LE1/YR: CPT | Mod: HCNC,CPTII,S$GLB, | Performed by: INTERNAL MEDICINE

## 2024-02-14 PROCEDURE — 3288F FALL RISK ASSESSMENT DOCD: CPT | Mod: HCNC,CPTII,S$GLB, | Performed by: INTERNAL MEDICINE

## 2024-02-14 PROCEDURE — 3074F SYST BP LT 130 MM HG: CPT | Mod: HCNC,CPTII,S$GLB, | Performed by: INTERNAL MEDICINE

## 2024-02-14 PROCEDURE — 1126F AMNT PAIN NOTED NONE PRSNT: CPT | Mod: HCNC,CPTII,S$GLB, | Performed by: INTERNAL MEDICINE

## 2024-02-14 NOTE — PROGRESS NOTES
"HPI:  Patient is 83-year-old gentleman who comes in today accompanied by his wife.  Wife states he has been having trouble having a bowel movement.  He has been extremely constipated.  The wife is tried to give him various medications to help but he has been resistant and reluctant to take anything.    Current meds have been verified and updated per the EMR  Exam:/70 (BP Location: Right arm)   Pulse 73   Ht 5' 5" (1.651 m)   Wt 88.5 kg (195 lb 1.7 oz)   SpO2 97%   BMI 32.47 kg/m²   On digital rectal exam he has very hard stool just a cm inside the rectum.  He has a small external hemorrhoid on the right side about the 3 o'clock position.  Nonthrombosed and no pain on palpation of the hemorrhoid.    Lab Results   Component Value Date    WBC 8.22 01/03/2024    HGB 11.0 (L) 01/03/2024    HCT 33.3 (L) 01/03/2024     01/03/2024    CHOL 107 (L) 01/03/2024    TRIG 91 01/03/2024    HDL 31 (L) 01/03/2024    ALT 43 01/03/2024    AST 51 (H) 01/03/2024     01/03/2024    K 3.6 01/03/2024    CL 93 (L) 01/03/2024    CREATININE 1.5 (H) 01/03/2024    BUN 29 (H) 01/03/2024    CO2 30 (H) 01/03/2024    TSH 1.135 01/03/2024    PSA <0.010 08/20/2013    INR 1.2 02/23/2023    HGBA1C 7.0 (H) 01/03/2024    PSADIAG <0.01 03/23/2023     (H) 02/23/2023    URICACID 6.3 12/10/2021    SEDRATE 32 (H) 12/10/2021    CRP 10.0 (H) 12/10/2021       Impression:  Constipation  Patient Active Problem List   Diagnosis    Atrial fibrillation    Coronary atherosclerosis    History of prostate cancer    Refractive error    Posterior capsular opacification    Pseudophakia of both eyes    History of colon polyps    Chronic anticoagulation    Pulmonary hypertension, mild    SHORTY (obstructive sleep apnea)    Diabetes mellitus without complication    Hyperlipidemia associated with type 2 diabetes mellitus    Hypertension associated with diabetes    Intention tremor    Atherosclerosis of aorta    Adrenal nodule    Lung nodules    " Obesity (BMI 30.0-34.9)    Major depressive disorder, recurrent, moderate    Angiodysplasia of small intestine    Chronic upper GI bleeding    LAILA (iron deficiency anemia)    Dementia    Urinary incontinence       Plan:     The wife needs to give him Fleet's enemas every 1-2 hours until the obstruction has been removed.  He needs to start on daily fiber substitute as well as stool softeners and use MiraLax p.r.n.    This note is generated with speech recognition software and is subject to transcription error and sound alike phrases that may be missed by proofreading.

## 2024-02-15 ENCOUNTER — HOSPITAL ENCOUNTER (EMERGENCY)
Facility: HOSPITAL | Age: 84
Discharge: HOME | End: 2024-02-15
Attending: EMERGENCY MEDICINE
Payer: MEDICARE

## 2024-02-15 ENCOUNTER — TELEPHONE (OUTPATIENT)
Dept: CARDIOLOGY | Facility: CLINIC | Age: 84
End: 2024-02-15
Payer: MEDICARE

## 2024-02-15 VITALS
SYSTOLIC BLOOD PRESSURE: 128 MMHG | HEART RATE: 91 BPM | DIASTOLIC BLOOD PRESSURE: 71 MMHG | RESPIRATION RATE: 18 BRPM | TEMPERATURE: 98 F | OXYGEN SATURATION: 96 %

## 2024-02-15 DIAGNOSIS — K59.00 CONSTIPATION, UNSPECIFIED CONSTIPATION TYPE: ICD-10-CM

## 2024-02-15 DIAGNOSIS — K56.41 FECAL IMPACTION IN RECTUM: Primary | ICD-10-CM

## 2024-02-15 LAB
ALBUMIN SERPL BCP-MCNC: 3.9 G/DL (ref 3.5–5.2)
ALP SERPL-CCNC: 97 U/L (ref 55–135)
ALT SERPL W/O P-5'-P-CCNC: 30 U/L (ref 10–44)
ANION GAP SERPL CALC-SCNC: 15 MMOL/L (ref 8–16)
AST SERPL-CCNC: 34 U/L (ref 10–40)
BASOPHILS # BLD AUTO: 0.02 K/UL (ref 0–0.2)
BASOPHILS NFR BLD: 0.2 % (ref 0–1.9)
BILIRUB SERPL-MCNC: 0.8 MG/DL (ref 0.1–1)
BUN SERPL-MCNC: 29 MG/DL (ref 8–23)
CALCIUM SERPL-MCNC: 10.2 MG/DL (ref 8.7–10.5)
CHLORIDE SERPL-SCNC: 92 MMOL/L (ref 95–110)
CO2 SERPL-SCNC: 27 MMOL/L (ref 23–29)
CREAT SERPL-MCNC: 1.6 MG/DL (ref 0.5–1.4)
DIFFERENTIAL METHOD BLD: ABNORMAL
EOSINOPHIL # BLD AUTO: 0.1 K/UL (ref 0–0.5)
EOSINOPHIL NFR BLD: 1.2 % (ref 0–8)
ERYTHROCYTE [DISTWIDTH] IN BLOOD BY AUTOMATED COUNT: 14.5 % (ref 11.5–14.5)
EST. GFR  (NO RACE VARIABLE): 43 ML/MIN/1.73 M^2
GLUCOSE SERPL-MCNC: 159 MG/DL (ref 70–110)
HCT VFR BLD AUTO: 41.7 % (ref 40–54)
HGB BLD-MCNC: 13.9 G/DL (ref 14–18)
IMM GRANULOCYTES # BLD AUTO: 0.04 K/UL (ref 0–0.04)
IMM GRANULOCYTES NFR BLD AUTO: 0.5 % (ref 0–0.5)
LIPASE SERPL-CCNC: 83 U/L (ref 4–60)
LYMPHOCYTES # BLD AUTO: 2.2 K/UL (ref 1–4.8)
LYMPHOCYTES NFR BLD: 25.5 % (ref 18–48)
MCH RBC QN AUTO: 28.5 PG (ref 27–31)
MCHC RBC AUTO-ENTMCNC: 33.3 G/DL (ref 32–36)
MCV RBC AUTO: 86 FL (ref 82–98)
MONOCYTES # BLD AUTO: 0.9 K/UL (ref 0.3–1)
MONOCYTES NFR BLD: 10.2 % (ref 4–15)
NEUTROPHILS # BLD AUTO: 5.3 K/UL (ref 1.8–7.7)
NEUTROPHILS NFR BLD: 62.4 % (ref 38–73)
NRBC BLD-RTO: 0 /100 WBC
PLATELET # BLD AUTO: 383 K/UL (ref 150–450)
PMV BLD AUTO: 9.2 FL (ref 9.2–12.9)
POTASSIUM SERPL-SCNC: 3 MMOL/L (ref 3.5–5.1)
PROT SERPL-MCNC: 8.2 G/DL (ref 6–8.4)
RBC # BLD AUTO: 4.88 M/UL (ref 4.6–6.2)
SODIUM SERPL-SCNC: 134 MMOL/L (ref 136–145)
WBC # BLD AUTO: 8.47 K/UL (ref 3.9–12.7)

## 2024-02-15 PROCEDURE — 63600175 PHARM REV CODE 636 W HCPCS: Mod: HCNC | Performed by: EMERGENCY MEDICINE

## 2024-02-15 PROCEDURE — 99284 EMERGENCY DEPT VISIT MOD MDM: CPT | Mod: 25,HCNC

## 2024-02-15 PROCEDURE — 96374 THER/PROPH/DIAG INJ IV PUSH: CPT | Mod: HCNC

## 2024-02-15 PROCEDURE — 85025 COMPLETE CBC W/AUTO DIFF WBC: CPT | Mod: HCNC | Performed by: NURSE PRACTITIONER

## 2024-02-15 PROCEDURE — 80053 COMPREHEN METABOLIC PANEL: CPT | Mod: HCNC | Performed by: NURSE PRACTITIONER

## 2024-02-15 PROCEDURE — 96361 HYDRATE IV INFUSION ADD-ON: CPT | Mod: HCNC

## 2024-02-15 PROCEDURE — 83690 ASSAY OF LIPASE: CPT | Mod: HCNC | Performed by: NURSE PRACTITIONER

## 2024-02-15 PROCEDURE — 25000003 PHARM REV CODE 250: Mod: HCNC | Performed by: EMERGENCY MEDICINE

## 2024-02-15 RX ORDER — POTASSIUM CHLORIDE 20 MEQ/1
40 TABLET, EXTENDED RELEASE ORAL
Status: COMPLETED | OUTPATIENT
Start: 2024-02-15 | End: 2024-02-15

## 2024-02-15 RX ORDER — SYRING-NEEDL,DISP,INSUL,0.3 ML 29 G X1/2"
296 SYRINGE, EMPTY DISPOSABLE MISCELLANEOUS
Status: COMPLETED | OUTPATIENT
Start: 2024-02-15 | End: 2024-02-15

## 2024-02-15 RX ORDER — PSEUDOEPHEDRINE/ACETAMINOPHEN 30MG-500MG
100 TABLET ORAL
Status: COMPLETED | OUTPATIENT
Start: 2024-02-15 | End: 2024-02-15

## 2024-02-15 RX ORDER — MIDAZOLAM HYDROCHLORIDE 5 MG/ML
2 INJECTION INTRAMUSCULAR; INTRAVENOUS
Status: COMPLETED | OUTPATIENT
Start: 2024-02-15 | End: 2024-02-15

## 2024-02-15 RX ADMIN — MIDAZOLAM 2 MG: 5 INJECTION INTRAMUSCULAR; INTRAVENOUS at 08:02

## 2024-02-15 RX ADMIN — MAGNESIUM CITRATE 296 ML: 1.75 LIQUID ORAL at 08:02

## 2024-02-15 RX ADMIN — SODIUM CHLORIDE 500 ML: 9 INJECTION, SOLUTION INTRAVENOUS at 08:02

## 2024-02-15 RX ADMIN — Medication 100 ML: at 08:02

## 2024-02-15 RX ADMIN — SODIUM CHLORIDE 1000 ML: 9 INJECTION, SOLUTION INTRAVENOUS at 07:02

## 2024-02-15 RX ADMIN — POTASSIUM CHLORIDE 40 MEQ: 1500 TABLET, EXTENDED RELEASE ORAL at 08:02

## 2024-02-15 NOTE — TELEPHONE ENCOUNTER
Spoke with pt's wife. She is requesting that we send pt's Eliquis to [facility] for assistance with Rx cost. Pt currently has one week's worth at home.

## 2024-02-15 NOTE — FIRST PROVIDER EVALUATION
Medical screening examination initiated.  I have conducted a focused provider triage encounter, findings are as follows:    Brief history of present illness:  reports constipation x 1 week    Vitals:    02/15/24 1349   BP: 106/65   BP Location: Right arm   Patient Position: Sitting   Pulse: 84   Resp: 18   Temp: 97.4 °F (36.3 °C)   TempSrc: Oral   SpO2: 98%   Weight: (S)   Comment: needs bed weight       Pertinent physical exam:  nad    Brief workup plan:  labs, imaging     Preliminary workup initiated; this workup will be continued and followed by the physician or advanced practice provider that is assigned to the patient when roomed.

## 2024-02-16 NOTE — ED PROVIDER NOTES
SCRIBE #1 NOTE: I, Nguyen Salinas, am scribing for, and in the presence of, Pb Sanchez MD. I have scribed the HPI, ROS, PEx.     SCRIBE #2 NOTE: I, Troy Bond, am scribing for, and in the presence of,  Vinod Bruno MD. I have scribed the remaining portions of the note not scribed by Scribe #1.      History     Chief Complaint   Patient presents with    Constipation     Constipation x 1 week. Eating very light and drinking water. Denies N/V.      Review of patient's allergies indicates:  No Known Allergies      History of Present Illness     HPI    2/15/2024, 6:54 PM  History obtained from the wife and patient      History of Present Illness: Anthony Biggs Jr. is a 83 y.o. male patient with a PMHx of HTN, HLD, DM, A-fib who presents to the Emergency Department for evaluation of constipation which onset 1 week PTA in the ED. Pt's wife states that the pt has not had a bowel movement in over a week. She states that the pt visited his PCP (Dr. Moran) yesterday where they were told that he was impacted and told to try an enema. Pt's wife states that she attempted to try and enema but failed to do so. Symptoms are constant and moderate in severity. No mitigating or exacerbating factors reported. Associated sxs include abdominal pain, rectal pain. Patient denies any fever, chills, n/v/d, headaches, diaphoresis, cough, congestion, and all other sxs at this time. Pt's wife states that the pt is demented and refuses to take any medications for treatment of the issue as a result. No further complaints or concerns at this time.       Arrival mode: Personal vehicle    PCP: Giuliano Moran MD        Past Medical History:  Past Medical History:   Diagnosis Date    Angiodysplasia of small intestine     Atrial fibrillation     Chronic upper GI bleeding     due to angiodysplasia in proximal small intestine    Coronary atherosclerosis     Dementia     Diabetes mellitus without complication     Diverticulosis of large  intestine without hemorrhage 05/16/2017    Family history of macular degeneration 10/20/2014    History of prostate cancer     Hyperlipidemia associated with type 2 diabetes mellitus     Hypertension associated with diabetes     LAILA (iron deficiency anemia) 09/20/2021    due to angiodysplasia in small intestine    Intention tremor     Major depressive disorder, recurrent, moderate 04/12/2021    SHORTY (obstructive sleep apnea)     Urinary incontinence        Past Surgical History:  Past Surgical History:   Procedure Laterality Date    ABDOMINAL HERNIA REPAIR      APPENDECTOMY      bladder sx      CARDIAC CATHETERIZATION      CATARACT EXTRACTION W/  INTRAOCULAR LENS IMPLANT  Restor OU    COLONOSCOPY N/A 5/16/2017    Procedure: COLONOSCOPY;  Surgeon: Alfredo Naidu MD;  Location: [facility];  Service: Endoscopy;  Laterality: N/A;    ESOPHAGOGASTRODUODENOSCOPY N/A 10/29/2021    Procedure: SBE (Push Enteroscopy) with Dr. Wayne;  Surgeon: Arlette Wayne MD;  Location: [facility];  Service: Endoscopy;  Laterality: N/A;  Plavix held indefinitely as of 10-20-21. Must also hold Coumadin 5 days prior. Ok to continue to use ASA.    ESOPHAGOGASTRODUODENOSCOPY N/A 2/23/2023    Procedure: EGD (ESOPHAGOGASTRODUODENOSCOPY);  Surgeon: Opal Wright MD;  Location: [facility];  Service: Endoscopy;  Laterality: N/A;    inguinal hernia      INTRALUMINAL GASTROINTESTINAL TRACT IMAGING VIA CAPSULE N/A 10/4/2021    Procedure: IMAGING PROCEDURE, GI TRACT, INTRALUMINAL, VIA CAPSULE;  Surgeon: Joaquin Stack RN;  Location: [facility] ENDO;  Service: Endoscopy;  Laterality: N/A;    LEFT HEART CATHETERIZATION Left 7/20/2021    Procedure: CATHETERIZATION, HEART, LEFT;  Surgeon: Darryl Griffith MD;  Location: [facility] CATH LAB;  Service: Cardiology;  Laterality: Left;    lung sx      PERCUTANEOUS TRANSLUMINAL BALLOON ANGIOPLASTY OF CORONARY ARTERY  7/20/2021    Procedure: Angioplasty-coronary;  Surgeon: Darryl Griffith MD;  Location:  [facility] CATH LAB;  Service: Cardiology;;    PROSTATE SURGERY           Family History:  Family History   Problem Relation Age of Onset    Heart disease Mother     Cancer Father         lung    Macular degeneration Sister     Diabetes Sister     Heart disease Sister     Strabismus Neg Hx     Retinal detachment Neg Hx     Glaucoma Neg Hx     Blindness Neg Hx     Amblyopia Neg Hx        Social History:  Social History     Tobacco Use    Smoking status: Former     Current packs/day: 0.00     Average packs/day: 0.5 packs/day for 40.0 years (20.0 ttl pk-yrs)     Types: Cigarettes     Start date: 1962     Quit date:      Years since quittin.1    Smokeless tobacco: Never   Substance and Sexual Activity    Alcohol use: Yes     Alcohol/week: 7.0 standard drinks of alcohol     Types: 7 Shots of liquor per week    Drug use: No    Sexual activity: Not Currently        Review of Systems     Review of Systems   Constitutional:  Negative for chills, diaphoresis and fever.   HENT:  Negative for congestion and sore throat.    Respiratory:  Negative for cough and shortness of breath.    Cardiovascular:  Negative for chest pain.   Gastrointestinal:  Positive for abdominal pain, constipation and rectal pain. Negative for diarrhea, nausea and vomiting.   Genitourinary:  Negative for dysuria.   Musculoskeletal:  Negative for back pain.   Skin:  Negative for rash.   Neurological:  Negative for weakness and headaches.   Hematological:  Does not bruise/bleed easily.   All other systems reviewed and are negative.     Physical Exam     Initial Vitals [02/15/24 1349]   BP Pulse Resp Temp SpO2   106/65 84 18 97.4 °F (36.3 °C) 98 %      MAP       --          Physical Exam  Nursing Notes and Vital Signs Reviewed.  Constitutional: Patient is in no acute distress. Well-developed and well-nourished.  Head: Atraumatic. Normocephalic.  Eyes: PERRL. EOM intact. Conjunctivae are not pale. No scleral icterus.  ENT: Mucous membranes are moist.  Oropharynx is clear and symmetric.    Neck: Supple. Full ROM. No lymphadenopathy.  Cardiovascular: Regular rate. Regular rhythm. No murmurs, rubs, or gallops. Distal pulses are 2+ and symmetric.  Pulmonary/Chest: No respiratory distress. Clear to auscultation bilaterally. No wheezing or rales.  Abdominal: There is a huge fecal impaction upon rectal exam. Patient did not tolerate disimpaction.   Genitourinary: No CVA tenderness  Musculoskeletal: Moves all extremities. No obvious deformities. No edema. No calf tenderness.  Skin: Warm and dry.  Neurological:  Alert, awake, and appropriate.  Normal speech.  No acute focal neurological deficits are appreciated.  Psychiatric: Normal affect. Good eye contact. Appropriate in content.     ED Course   Procedures  ED Vital Signs:  Vitals:    02/15/24 1349 02/15/24 1607 02/15/24 1915 02/15/24 1930   BP: 106/65 (!) 136/91 131/74 129/76   Pulse: 84 68 92 92   Resp: 18 18 20 20   Temp: 97.4 °F (36.3 °C) 97.5 °F (36.4 °C)  97.5 °F (36.4 °C)   TempSrc: Oral Oral  Oral   SpO2: 98% 99% 97% 96%    02/15/24 2030 02/15/24 2210   BP:  128/71   Pulse:  91   Resp: 20 18   Temp:  97.5 °F (36.4 °C)   TempSrc:  Oral   SpO2:  96%       Abnormal Lab Results:  Labs Reviewed   CBC W/ AUTO DIFFERENTIAL - Abnormal; Notable for the following components:       Result Value    Hemoglobin 13.9 (*)     All other components within normal limits   COMPREHENSIVE METABOLIC PANEL - Abnormal; Notable for the following components:    Sodium 134 (*)     Potassium 3.0 (*)     Chloride 92 (*)     Glucose 159 (*)     BUN 29 (*)     Creatinine 1.6 (*)     eGFR 43 (*)     All other components within normal limits   LIPASE - Abnormal; Notable for the following components:    Lipase 83 (*)     All other components within normal limits        All Lab Results:  Results for orders placed or performed during the hospital encounter of 02/15/24   CBC auto differential   Result Value Ref Range    WBC 8.47 3.90 - 12.70 K/uL     RBC 4.88 4.60 - 6.20 M/uL    Hemoglobin 13.9 (L) 14.0 - 18.0 g/dL    Hematocrit 41.7 40.0 - 54.0 %    MCV 86 82 - 98 fL    MCH 28.5 27.0 - 31.0 pg    MCHC 33.3 32.0 - 36.0 g/dL    RDW 14.5 11.5 - 14.5 %    Platelets 383 150 - 450 K/uL    MPV 9.2 9.2 - 12.9 fL    Immature Granulocytes 0.5 0.0 - 0.5 %    Gran # (ANC) 5.3 1.8 - 7.7 K/uL    Immature Grans (Abs) 0.04 0.00 - 0.04 K/uL    Lymph # 2.2 1.0 - 4.8 K/uL    Mono # 0.9 0.3 - 1.0 K/uL    Eos # 0.1 0.0 - 0.5 K/uL    Baso # 0.02 0.00 - 0.20 K/uL    nRBC 0 0 /100 WBC    Gran % 62.4 38.0 - 73.0 %    Lymph % 25.5 18.0 - 48.0 %    Mono % 10.2 4.0 - 15.0 %    Eosinophil % 1.2 0.0 - 8.0 %    Basophil % 0.2 0.0 - 1.9 %    Differential Method Automated    Comprehensive metabolic panel   Result Value Ref Range    Sodium 134 (L) 136 - 145 mmol/L    Potassium 3.0 (L) 3.5 - 5.1 mmol/L    Chloride 92 (L) 95 - 110 mmol/L    CO2 27 23 - 29 mmol/L    Glucose 159 (H) 70 - 110 mg/dL    BUN 29 (H) 8 - 23 mg/dL    Creatinine 1.6 (H) 0.5 - 1.4 mg/dL    Calcium 10.2 8.7 - 10.5 mg/dL    Total Protein 8.2 6.0 - 8.4 g/dL    Albumin 3.9 3.5 - 5.2 g/dL    Total Bilirubin 0.8 0.1 - 1.0 mg/dL    Alkaline Phosphatase 97 55 - 135 U/L    AST 34 10 - 40 U/L    ALT 30 10 - 44 U/L    eGFR 43 (A) >60 mL/min/1.73 m^2    Anion Gap 15 8 - 16 mmol/L   Lipase   Result Value Ref Range    Lipase 83 (H) 4 - 60 U/L     *Note: Due to a large number of results and/or encounters for the requested time period, some results have not been displayed. A complete set of results can be found in Results Review.        Imaging Results:  Imaging Results              X-Ray Abdomen AP 1 View (Final result)  Result time 02/15/24 14:13:41      Final result by Jimmie Schaefer MD (02/15/24 14:13:41)                   Impression:      Moderate constipation      Electronically signed by: Jimmie Schaefer MD  Date:    02/15/2024  Time:    14:13               Narrative:    EXAMINATION:  XR ABDOMEN AP 1 VIEW    CLINICAL  HISTORY:  constipation;    TECHNIQUE:  Two view of the abdomen was performed.    COMPARISON:  None    FINDINGS:  Nonobstructive bowel gas pattern.  Moderate constipation.    No obvious free air.  No portal venous gas.    No acute fracture. Mild DJD. Lung bases are clear.                                         The Emergency Provider reviewed the vital signs and test results, which are outlined above.     ED Discussion     8:00 PM: Dr. Sanchez transfers care of patient to Dr. Bruno pending lab results.     9:54 PM: Reassessed pt at this time. Discussed with pt all pertinent ED information and results. Discussed pt dx and plan of tx. Gave pt all f/u and return to the ED instructions. All questions and concerns were addressed at this time. Pt expresses understanding of information and instructions, and is comfortable with plan to discharge. Pt is stable for discharge.    I discussed with patient and/or family/caretaker that evaluation in the ED does not suggest any emergent or life threatening medical conditions requiring immediate intervention beyond what was provided in the ED, and I believe patient is safe for discharge.  Regardless, an unremarkable evaluation in the ED does not preclude the development or presence of a serious of life threatening condition. As such, patient was instructed to return immediately for any worsening or change in current symptoms.       Medical Decision Making  Amount and/or Complexity of Data Reviewed  Labs: ordered. Decision-making details documented in ED Course.  Radiology: ordered. Decision-making details documented in ED Course.    Risk  OTC drugs.  Prescription drug management.                ED Medication(s):  Medications   glycerin 99.5% topical solution 100 mL (100 mLs Rectal Given 2/15/24 2040)     And   magnesium citrate solution 296 mL (296 mLs Rectal Given 2/15/24 2040)     And   sodium chloride 0.9% bolus 500 mL 500 mL (500 mLs Rectal New Bag 2/15/24 2041)   sodium chloride  0.9% bolus 1,000 mL 1,000 mL (0 mLs Intravenous Stopped 2/15/24 2122)   potassium chloride SA CR tablet 40 mEq (40 mEq Oral Given 2/15/24 2013)   midazolam (VERSED) 5 mg/mL injection 2 mg (2 mg Intravenous Given 2/15/24 2027)       Discharge Medication List as of 2/15/2024  9:52 PM           Follow-up Information       Giuliano Moran MD In 1 day.    Specialty: Internal Medicine  Contact information:  [address]  [address]  [facility] [address]  [phone]               [facility] - [facility]    Specialty: Emergency Medicine  Why: As needed, If symptoms worsen  Contact information:  [address]  [facility] [address] [phone]                               Scribe Attestation:   Scribe #1: I performed the above scribed service and the documentation accurately describes the services I performed. I attest to the accuracy of the note.     Attending:   Physician Attestation Statement for Scribe #1: I, Pb Sanchez MD, personally performed the services described in this documentation, as scribed by Nguyen Salinas, in my presence, and it is both accurate and complete.       Scribe Attestation:   Scribe #2: I performed the above scribed service and the documentation accurately describes the services I performed. I attest to the accuracy of the note.    Attending Attestation:           Physician Attestation for Scribe:    Physician Attestation Statement for Scribe #2: IVinod MD, reviewed documentation, as scribed by Troy Bond in my presence, and it is both accurate and complete. I also acknowledge and confirm the content of the note done by Scribe #1.           Clinical Impression       ICD-10-CM ICD-9-CM   1. Fecal impaction in rectum  K56.41 560.32   2. Constipation, unspecified constipation type  K59.00 564.00       Disposition:   Disposition: Discharged  Condition: Stable        Vinod Bruno MD  02/16/24 [id number]

## 2024-02-20 ENCOUNTER — TELEPHONE (OUTPATIENT)
Dept: INTERNAL MEDICINE | Facility: CLINIC | Age: 84
End: 2024-02-20
Payer: MEDICARE

## 2024-02-20 NOTE — TELEPHONE ENCOUNTER
----- Message from Jeana Greene sent at 2/20/2024  4:26 PM CST -----  Regarding: concerns  Name of who is calling:   wife / Marino      What is the request in detail: Pt is requesting a call back in ref to concerns of Amber cancelled/ need clarification       Can the clinic reply by [facility]:no      What number to call back if not [facility]: [phone]

## 2024-02-21 ENCOUNTER — OFFICE VISIT (OUTPATIENT)
Dept: URGENT CARE | Facility: CLINIC | Age: 84
End: 2024-02-21
Payer: MEDICARE

## 2024-02-21 ENCOUNTER — HOSPITAL ENCOUNTER (OUTPATIENT)
Dept: RADIOLOGY | Facility: CLINIC | Age: 84
Discharge: HOME | End: 2024-02-21
Attending: NURSE PRACTITIONER
Payer: MEDICARE

## 2024-02-21 VITALS
SYSTOLIC BLOOD PRESSURE: 111 MMHG | DIASTOLIC BLOOD PRESSURE: 57 MMHG | BODY MASS INDEX: 32.49 KG/M2 | TEMPERATURE: 98 F | HEIGHT: 65 IN | HEART RATE: 97 BPM | OXYGEN SATURATION: 97 % | RESPIRATION RATE: 18 BRPM | WEIGHT: 195 LBS

## 2024-02-21 DIAGNOSIS — K59.00 CONSTIPATION, UNSPECIFIED CONSTIPATION TYPE: Primary | ICD-10-CM

## 2024-02-21 DIAGNOSIS — K59.00 CONSTIPATION, UNSPECIFIED CONSTIPATION TYPE: ICD-10-CM

## 2024-02-21 DIAGNOSIS — K59.04 FUNCTIONAL CONSTIPATION: ICD-10-CM

## 2024-02-21 DIAGNOSIS — F03.90 DEMENTIA, UNSPECIFIED DEMENTIA SEVERITY, UNSPECIFIED DEMENTIA TYPE, UNSPECIFIED WHETHER BEHAVIORAL, PSYCHOTIC, OR MOOD DISTURBANCE OR ANXIETY: ICD-10-CM

## 2024-02-21 PROCEDURE — 74018 RADEX ABDOMEN 1 VIEW: CPT | Mod: S$GLB,,, | Performed by: RADIOLOGY

## 2024-02-21 PROCEDURE — 99214 OFFICE O/P EST MOD 30 MIN: CPT | Mod: S$GLB,,, | Performed by: NURSE PRACTITIONER

## 2024-02-21 RX ORDER — LACTULOSE 10 G/15ML
20 SOLUTION ORAL DAILY PRN
Qty: 150 ML | Refills: 1 | Status: SHIPPED | OUTPATIENT
Start: 2024-02-21 | End: 2024-02-26

## 2024-02-21 NOTE — PATIENT INSTRUCTIONS
Today's xray and exam suggest constipation vs fecal impaction   General mgmt   Increase fluids   Slowly add fiber to diet with consideration for needing > fluids   Keep active walking    Warm fluids  upon awakening   Glycerin Suppositiory per package directions   Fleet enema  per pkg direction    Trial of Lactulose x 1 day; If no BM, then to ER for further evaluation and hospital care;  Do not give/take if fecal impaction;      Any rectal bleeding or vomiting immediate ER visit

## 2024-02-21 NOTE — PROGRESS NOTES
"Subjective:      Patient ID: Anthony Biggs Jr. is a 83 y.o. male.    Vitals:  height is 5' 5" (1.651 m) and weight is 88.5 kg (195 lb). His tympanic temperature is 98 °F (36.7 °C). His blood pressure is 111/57 (abnormal) and his pulse is 97. His respiration is 18 and oxygen saturation is 97%.     Chief Complaint: Constipation    Patient presents with constipation and pressure in the anus.  He went to the ER with a fecal impaction on 2/15 and was cleaned out his wife states.  She does not know if he has had a bowel movement since.  He does have dementia    Constipation  This is a new problem. The current episode started in the past 7 days. The problem is unchanged. Stool frequency: unknown. The patient is not on a high fiber diet. He Does not exercise regularly. There has Been adequate water intake. Associated symptoms include rectal pain. Pertinent negatives include no abdominal pain, anorexia, back pain, bloating, diarrhea, difficulty urinating, fecal incontinence, fever, flatus, hematochezia, hemorrhoids, melena, nausea, vomiting or weight loss. He has tried manual disimpaction and enemas for the symptoms. The treatment provided mild relief. His past medical history is significant for neurologic disease and psychiatric history. There is no history of abdominal surgery, endocrine disease, inflammatory bowel disease, irritable bowel syndrome, metabolic disease, neuromuscular disease or radiation treatment.       Constitution: Negative for fever.   Gastrointestinal:  Positive for constipation and rectal pain. Negative for abdominal pain, history of abdominal surgery, nausea, vomiting, diarrhea, bright red blood in stool and hemorrhoids.   Musculoskeletal:  Negative for back pain.      Objective:     Vitals:    02/21/24 0813   BP: (Abnormal) 111/57   BP Location: Right arm   Patient Position: Sitting   BP Method: Large (Automatic)   Pulse: 97   Resp: 18   Temp: 98 °F (36.7 °C)   TempSrc: Tympanic   SpO2: 97%   Weight: " "88.5 kg (195 lb)   Height: 5' 5" (1.651 m)       Physical Exam   Constitutional: He is oriented to person, place, and time. He appears well-developed.   HENT:   Head: Normocephalic and atraumatic.   Ears:   Right Ear: External ear normal.   Left Ear: External ear normal.   Nose: Nose normal.   Mouth/Throat: Mucous membranes are normal.   Eyes: Conjunctivae and lids are normal.   Neck: Trachea normal. Neck supple.   Cardiovascular: Normal rate, regular rhythm and normal heart sounds.   Pulmonary/Chest: Effort normal and breath sounds normal. No respiratory distress.   Abdominal: Normal appearance and bowel sounds are normal. He exhibits no distension and no mass. Soft. There is no abdominal tenderness.   Genitourinary:         Comments: Soft stool in rectal vault;        Musculoskeletal: Normal range of motion.         General: Normal range of motion.   Neurological: He is alert and oriented to person, place, and time. He has normal strength.   Skin: Skin is warm, dry, intact, not diaphoretic and not pale.   Psychiatric: His speech is normal and behavior is normal. Judgment and thought content normal.   Nursing note and vitals reviewed.      Assessment:     1. Constipation, unspecified constipation type    2. Functional constipation    3. Dementia, unspecified dementia severity, unspecified dementia type, unspecified whether behavioral, psychotic, or mood disturbance or anxiety        Plan:     Patient Instructions   Today's xray and exam suggest constipation vs fecal impaction   General mgmt   Increase fluids   Slowly add fiber to diet with consideration for needing > fluids   Keep active walking    Warm fluids  upon awakening   Glycerin Suppositiory per package directions   Fleet enema  per pkg direction    Trial of Lactulose x 1 day; If no BM, then to ER for further evaluation and hospital care;  Do not give/take if fecal impaction;      Any rectal bleeding or vomiting immediate ER visit           Constipation, " unspecified constipation type  -     XR KUB; Future; Expected date: 02/21/2024  -     lactulose (CHRONULAC) 20 gram/30 mL Soln; Take 30 mLs (20 g total) by mouth daily as needed (Constipation).  Dispense: 150 mL; Refill: 1    Functional constipation  -     lactulose (CHRONULAC) 20 gram/30 mL Soln; Take 30 mLs (20 g total) by mouth daily as needed (Constipation).  Dispense: 150 mL; Refill: 1    Dementia, unspecified dementia severity, unspecified dementia type, unspecified whether behavioral, psychotic, or mood disturbance or anxiety

## 2024-02-27 ENCOUNTER — TELEPHONE (OUTPATIENT)
Dept: NEUROLOGY | Facility: CLINIC | Age: 84
End: 2024-02-27
Payer: MEDICARE

## 2024-02-27 NOTE — TELEPHONE ENCOUNTER
----- Message from Cleo Cardoza sent at 2/27/2024  8:07 AM CST -----  Contact: wife  [phone]  Patients wife called in this morning to schedule an appointment the first available was for today, and the next available is not until August. She would like to know can he be worked in earlier. Please call back  [phone]. Thanks maninder

## 2024-02-29 DIAGNOSIS — I48.0 PAROXYSMAL ATRIAL FIBRILLATION: Primary | ICD-10-CM

## 2024-03-01 ENCOUNTER — HOSPITAL ENCOUNTER (OUTPATIENT)
Dept: CARDIOLOGY | Facility: HOSPITAL | Age: 84
Discharge: HOME | End: 2024-03-01
Attending: INTERNAL MEDICINE
Payer: MEDICARE

## 2024-03-01 ENCOUNTER — OFFICE VISIT (OUTPATIENT)
Dept: CARDIOLOGY | Facility: CLINIC | Age: 84
End: 2024-03-01
Payer: MEDICARE

## 2024-03-01 VITALS
OXYGEN SATURATION: 99 % | RESPIRATION RATE: 16 BRPM | HEART RATE: 93 BPM | HEIGHT: 65 IN | DIASTOLIC BLOOD PRESSURE: 73 MMHG | BODY MASS INDEX: 32.73 KG/M2 | WEIGHT: 196.44 LBS | SYSTOLIC BLOOD PRESSURE: 127 MMHG

## 2024-03-01 DIAGNOSIS — E11.69 HYPERLIPIDEMIA ASSOCIATED WITH TYPE 2 DIABETES MELLITUS: ICD-10-CM

## 2024-03-01 DIAGNOSIS — E66.9 OBESITY (BMI 30.0-34.9): ICD-10-CM

## 2024-03-01 DIAGNOSIS — I48.0 PAROXYSMAL ATRIAL FIBRILLATION: ICD-10-CM

## 2024-03-01 DIAGNOSIS — E78.5 HYPERLIPIDEMIA ASSOCIATED WITH TYPE 2 DIABETES MELLITUS: ICD-10-CM

## 2024-03-01 DIAGNOSIS — I27.20 PULMONARY HYPERTENSION, MILD: ICD-10-CM

## 2024-03-01 DIAGNOSIS — Z79.01 CHRONIC ANTICOAGULATION: ICD-10-CM

## 2024-03-01 DIAGNOSIS — I70.0 ATHEROSCLEROSIS OF AORTA: Primary | ICD-10-CM

## 2024-03-01 DIAGNOSIS — E11.59 HYPERTENSION ASSOCIATED WITH DIABETES: ICD-10-CM

## 2024-03-01 DIAGNOSIS — G47.33 OSA (OBSTRUCTIVE SLEEP APNEA): ICD-10-CM

## 2024-03-01 DIAGNOSIS — I15.2 HYPERTENSION ASSOCIATED WITH DIABETES: ICD-10-CM

## 2024-03-01 DIAGNOSIS — I25.10 ATHEROSCLEROSIS OF NATIVE CORONARY ARTERY OF NATIVE HEART WITHOUT ANGINA PECTORIS: ICD-10-CM

## 2024-03-01 LAB
OHS QRS DURATION: 72 MS
OHS QTC CALCULATION: 413 MS

## 2024-03-01 PROCEDURE — 93010 ELECTROCARDIOGRAM REPORT: CPT | Mod: HCNC,,, | Performed by: INTERNAL MEDICINE

## 2024-03-01 PROCEDURE — 1159F MED LIST DOCD IN RCRD: CPT | Mod: HCNC,CPTII,S$GLB, | Performed by: INTERNAL MEDICINE

## 2024-03-01 PROCEDURE — 3074F SYST BP LT 130 MM HG: CPT | Mod: HCNC,CPTII,S$GLB, | Performed by: INTERNAL MEDICINE

## 2024-03-01 PROCEDURE — 1101F PT FALLS ASSESS-DOCD LE1/YR: CPT | Mod: HCNC,CPTII,S$GLB, | Performed by: INTERNAL MEDICINE

## 2024-03-01 PROCEDURE — 93005 ELECTROCARDIOGRAM TRACING: CPT | Mod: HCNC

## 2024-03-01 PROCEDURE — 3288F FALL RISK ASSESSMENT DOCD: CPT | Mod: HCNC,CPTII,S$GLB, | Performed by: INTERNAL MEDICINE

## 2024-03-01 PROCEDURE — 99999 PR PBB SHADOW E&M-EST. PATIENT-LVL III: CPT | Mod: PBBFAC,HCNC,, | Performed by: INTERNAL MEDICINE

## 2024-03-01 PROCEDURE — 3072F LOW RISK FOR RETINOPATHY: CPT | Mod: HCNC,CPTII,S$GLB, | Performed by: INTERNAL MEDICINE

## 2024-03-01 PROCEDURE — 99214 OFFICE O/P EST MOD 30 MIN: CPT | Mod: HCNC,S$GLB,, | Performed by: INTERNAL MEDICINE

## 2024-03-01 PROCEDURE — 3078F DIAST BP <80 MM HG: CPT | Mod: HCNC,CPTII,S$GLB, | Performed by: INTERNAL MEDICINE

## 2024-03-01 NOTE — PROGRESS NOTES
Subjective:   Patient ID:  Anthony Biggs Jr. is a 83 y.o. male who presents for follow-up of No chief complaint on file.  Patient denies CP, angina or anginal equivalent.  BP and lipids are at goal.  Edema  This is a recurrent problem. The current episode started more than 1 month ago. The problem occurs constantly. The problem has been gradually worsening. Pertinent negatives include no chest pain.   Hyperlipidemia  This is a chronic problem. The current episode started more than 1 year ago. The problem is controlled. Pertinent negatives include no chest pain or shortness of breath. Current antihyperlipidemic treatment includes statins. The current treatment provides moderate improvement of lipids. There are no compliance problems.    Coronary Artery Disease  Presents for follow-up visit. Pertinent negatives include no chest pain, chest pressure, chest tightness, dizziness, leg swelling, muscle weakness, palpitations, shortness of breath or weight gain. The symptoms have been stable. Compliance with diet is good. Compliance with exercise is good. Compliance with medications is good.       Review of Systems   Constitutional: Negative. Negative for weight gain.   HENT: Negative.     Eyes: Negative.    Cardiovascular: Negative.  Negative for chest pain, leg swelling and palpitations.   Respiratory: Negative.  Negative for chest tightness and shortness of breath.    Endocrine: Negative.    Hematologic/Lymphatic: Negative.    Skin: Negative.    Musculoskeletal:  Negative for muscle weakness.   Gastrointestinal: Negative.    Genitourinary: Negative.    Neurological: Negative.  Negative for dizziness.   Psychiatric/Behavioral: Negative.     Allergic/Immunologic: Negative.    All other systems reviewed and are negative.    Family History   Problem Relation Age of Onset    Heart disease Mother     Cancer Father         lung    Macular degeneration Sister     Diabetes Sister     Heart disease Sister     Strabismus Neg Hx      Retinal detachment Neg Hx     Glaucoma Neg Hx     Blindness Neg Hx     Amblyopia Neg Hx      Past Medical History:   Diagnosis Date    Angiodysplasia of small intestine     Atrial fibrillation     Chronic upper GI bleeding     due to angiodysplasia in proximal small intestine    Coronary atherosclerosis     Dementia     Diabetes mellitus without complication     Diverticulosis of large intestine without hemorrhage 2017    Family history of macular degeneration 10/20/2014    History of prostate cancer     Hyperlipidemia associated with type 2 diabetes mellitus     Hypertension associated with diabetes     LAILA (iron deficiency anemia) 2021    due to angiodysplasia in small intestine    Intention tremor     Major depressive disorder, recurrent, moderate 2021    SHORTY (obstructive sleep apnea)     Urinary incontinence      Social History     Socioeconomic History    Marital status:     Highest education level: Some college, no degree   Tobacco Use    Smoking status: Former     Current packs/day: 0.00     Average packs/day: 0.5 packs/day for 40.0 years (20.0 total pack years)     Types: Cigarettes     Start date: 1962     Quit date:      Years since quittin.1    Smokeless tobacco: Never   Substance and Sexual Activity    Alcohol use: Yes     Alcohol/week: 7.0 standard drinks of alcohol     Types: 7 Shots of liquor per week    Drug use: No    Sexual activity: Not Currently     Social Determinants of Health     Financial Resource Strain: Low Risk  (10/16/2023)    Overall Financial Resource Strain (CARDIA)     Difficulty of Paying Living Expenses: Not hard at all   Food Insecurity: No Food Insecurity (10/16/2023)    Hunger Vital Sign     Worried About Running Out of Food in the Last Year: Never true     Ran Out of Food in the Last Year: Never true   Transportation Needs: No Transportation Needs (10/16/2023)    PRAPARE - Transportation     Lack of Transportation (Medical): No     Lack of  Transportation (Non-Medical): No   Physical Activity: Inactive (10/16/2023)    Exercise Vital Sign     Days of Exercise per Week: 0 days     Minutes of Exercise per Session: 0 min   Stress: No Stress Concern Present (10/16/2023)    [employer] [place] [employer] - Occupational Stress Questionnaire     Feeling of Stress : Not at all   Social Connections: Moderately Integrated (10/16/2023)    Social Connection and Isolation Panel [NHANES]     Frequency of Communication with Friends and Family: More than three times a week     Frequency of Social Gatherings with Friends and Family: Once a week     Attends Druze Services: More than 4 times per year     Active Member of Clubs or Organizations: No     Attends Club or Organization Meetings: Never     Marital Status:    Housing Stability: Low Risk  (10/16/2023)    Housing Stability Vital Sign     Unable to Pay for Housing in the Last Year: No     Number of Places Lived in the Last Year: 1     Unstable Housing in the Last Year: No     Current Outpatient Medications on File Prior to Visit   Medication Sig Dispense Refill    allopurinoL (ZYLOPRIM) 100 MG tablet Take 1 tablet (100 mg total) by mouth once daily. 90 tablet 3    apixaban (ELIQUIS) 2.5 mg Tab Take 1 tablet (2.5 mg total) by mouth 2 (two) times daily. 180 tablet 3    atorvastatin (LIPITOR) 40 MG tablet Take 1 tablet (40 mg total) by mouth once daily. for 360 doses 90 tablet 3    donepeziL (ARICEPT) 10 MG tablet Take 1 tablet (10 mg total) by mouth every evening. 90 tablet 3    empagliflozin (JARDIANCE) 10 mg tablet Take 1 tablet (10 mg total) by mouth once daily. 90 tablet 3    ferrous sulfate 324 mg (65 mg iron) TbEC TAKE ONE TABLET BY MOUTH TWICE DAILY 180 tablet 3    isosorbide mononitrate (IMDUR) 30 MG 24 hr tablet Take 1 tablet (30 mg total) by mouth once daily. 90 tablet 3    melatonin 3 mg Tab Take 1 tablet by mouth nightly.      metFORMIN (GLUCOPHAGE) 500 MG tablet Take 1 tablet (500 mg  total) by mouth 2 (two) times daily with meals. 180 tablet 3    metOLazone (ZAROXOLYN) 5 MG tablet Take 1 tablet (5 mg total) by mouth once daily. 30 tablet 11    metoprolol succinate (TOPROL-XL) 25 MG 24 hr tablet Take 1 tablet (25 mg total) by mouth once daily. 90 tablet 3    nystatin-triamcinolone (MYCOLOG II) cream Apply topically 4 (four) times daily. 30 g 1    pantoprazole (PROTONIX) 40 MG tablet Take 1 tablet (40 mg total) by mouth once daily. 90 tablet 3    potassium chloride SA (K-DUR,KLOR-CON M) 10 MEQ tablet Take 1 tablet (10 mEq total) by mouth once daily. 90 tablet 3    sucralfate (CARAFATE) 100 mg/mL suspension Take 10 mLs (1 g total) by mouth once daily.       No current facility-administered medications on file prior to visit.     Review of patient's allergies indicates:  No Known Allergies    Objective:     Physical Exam  Vitals and nursing note reviewed.   Constitutional:       Appearance: He is well-developed.   HENT:      Head: Normocephalic and atraumatic.   Eyes:      Conjunctiva/sclera: Conjunctivae normal.      Pupils: Pupils are equal, round, and reactive to light.   Cardiovascular:      Rate and Rhythm: Normal rate and regular rhythm.      Pulses: Intact distal pulses.      Heart sounds: Normal heart sounds.   Pulmonary:      Effort: Pulmonary effort is normal.      Breath sounds: Normal breath sounds.   Abdominal:      General: Bowel sounds are normal.      Palpations: Abdomen is soft.   Musculoskeletal:      Cervical back: Normal range of motion and neck supple.   Skin:     General: Skin is warm and dry.   Neurological:      Mental Status: He is alert and oriented to person, place, and time.         Assessment:     1. Atherosclerosis of aorta    2. Pulmonary hypertension, mild    3. Paroxysmal atrial fibrillation    4. Atherosclerosis of native coronary artery of native heart without angina pectoris    5. Hyperlipidemia associated with type 2 diabetes mellitus    6. Hypertension  associated with diabetes    7. Chronic anticoagulation    8. SHORTY (obstructive sleep apnea)    9. Obesity (BMI 30.0-34.9)        Plan:     Atherosclerosis of aorta    Pulmonary hypertension, mild    Paroxysmal atrial fibrillation    Atherosclerosis of native coronary artery of native heart without angina pectoris    Hyperlipidemia associated with type 2 diabetes mellitus    Hypertension associated with diabetes    Chronic anticoagulation    SHORTY (obstructive sleep apnea)    Obesity (BMI 30.0-34.9)      Continue lasix-edema  Continue statin-hlp  Continue metoprolol, eliquis- afib  Continue imdur- CAD

## 2024-04-08 ENCOUNTER — LAB VISIT (OUTPATIENT)
Dept: LAB | Facility: HOSPITAL | Age: 84
End: 2024-04-08
Attending: INTERNAL MEDICINE
Payer: MEDICARE

## 2024-04-08 DIAGNOSIS — D50.0 IRON DEFICIENCY ANEMIA DUE TO CHRONIC BLOOD LOSS: ICD-10-CM

## 2024-04-08 DIAGNOSIS — E11.9 DIABETES MELLITUS WITHOUT COMPLICATION: ICD-10-CM

## 2024-04-08 LAB
ALBUMIN SERPL BCP-MCNC: 3.7 G/DL (ref 3.5–5.2)
ALP SERPL-CCNC: 83 U/L (ref 55–135)
ALT SERPL W/O P-5'-P-CCNC: 24 U/L (ref 10–44)
ANION GAP SERPL CALC-SCNC: 12 MMOL/L (ref 8–16)
AST SERPL-CCNC: 25 U/L (ref 10–40)
BASOPHILS # BLD AUTO: 0.03 K/UL (ref 0–0.2)
BASOPHILS NFR BLD: 0.4 % (ref 0–1.9)
BILIRUB SERPL-MCNC: 0.6 MG/DL (ref 0.1–1)
BUN SERPL-MCNC: 19 MG/DL (ref 8–23)
CALCIUM SERPL-MCNC: 10.1 MG/DL (ref 8.7–10.5)
CHLORIDE SERPL-SCNC: 94 MMOL/L (ref 95–110)
CO2 SERPL-SCNC: 32 MMOL/L (ref 23–29)
CREAT SERPL-MCNC: 1 MG/DL (ref 0.5–1.4)
DIFFERENTIAL METHOD BLD: ABNORMAL
EOSINOPHIL # BLD AUTO: 0.3 K/UL (ref 0–0.5)
EOSINOPHIL NFR BLD: 3.2 % (ref 0–8)
ERYTHROCYTE [DISTWIDTH] IN BLOOD BY AUTOMATED COUNT: 14.2 % (ref 11.5–14.5)
EST. GFR  (NO RACE VARIABLE): >60 ML/MIN/1.73 M^2
ESTIMATED AVG GLUCOSE: 192 MG/DL (ref 68–131)
FERRITIN SERPL-MCNC: 63 NG/ML (ref 20–300)
GLUCOSE SERPL-MCNC: 157 MG/DL (ref 70–110)
HBA1C MFR BLD: 8.3 % (ref 4–5.6)
HCT VFR BLD AUTO: 43.3 % (ref 40–54)
HGB BLD-MCNC: 13.3 G/DL (ref 14–18)
IMM GRANULOCYTES # BLD AUTO: 0.04 K/UL (ref 0–0.04)
IMM GRANULOCYTES NFR BLD AUTO: 0.5 % (ref 0–0.5)
IRON SERPL-MCNC: 87 UG/DL (ref 45–160)
LYMPHOCYTES # BLD AUTO: 2.9 K/UL (ref 1–4.8)
LYMPHOCYTES NFR BLD: 35.2 % (ref 18–48)
MCH RBC QN AUTO: 27.7 PG (ref 27–31)
MCHC RBC AUTO-ENTMCNC: 30.7 G/DL (ref 32–36)
MCV RBC AUTO: 90 FL (ref 82–98)
MONOCYTES # BLD AUTO: 1 K/UL (ref 0.3–1)
MONOCYTES NFR BLD: 11.5 % (ref 4–15)
NEUTROPHILS # BLD AUTO: 4.1 K/UL (ref 1.8–7.7)
NEUTROPHILS NFR BLD: 49.2 % (ref 38–73)
NRBC BLD-RTO: 0 /100 WBC
PLATELET # BLD AUTO: 344 K/UL (ref 150–450)
PMV BLD AUTO: 10.5 FL (ref 9.2–12.9)
POTASSIUM SERPL-SCNC: 3.4 MMOL/L (ref 3.5–5.1)
PROT SERPL-MCNC: 7.1 G/DL (ref 6–8.4)
RBC # BLD AUTO: 4.81 M/UL (ref 4.6–6.2)
SATURATED IRON: 20 % (ref 20–50)
SODIUM SERPL-SCNC: 138 MMOL/L (ref 136–145)
TOTAL IRON BINDING CAPACITY: 426 UG/DL (ref 250–450)
TRANSFERRIN SERPL-MCNC: 288 MG/DL (ref 200–375)
WBC # BLD AUTO: 8.32 K/UL (ref 3.9–12.7)

## 2024-04-08 PROCEDURE — 85025 COMPLETE CBC W/AUTO DIFF WBC: CPT | Mod: HCNC | Performed by: INTERNAL MEDICINE

## 2024-04-08 PROCEDURE — 83036 HEMOGLOBIN GLYCOSYLATED A1C: CPT | Mod: HCNC | Performed by: INTERNAL MEDICINE

## 2024-04-08 PROCEDURE — 83540 ASSAY OF IRON: CPT | Mod: HCNC | Performed by: INTERNAL MEDICINE

## 2024-04-08 PROCEDURE — 36415 COLL VENOUS BLD VENIPUNCTURE: CPT | Mod: HCNC,PO | Performed by: INTERNAL MEDICINE

## 2024-04-08 PROCEDURE — 82728 ASSAY OF FERRITIN: CPT | Mod: HCNC | Performed by: INTERNAL MEDICINE

## 2024-04-08 PROCEDURE — 80053 COMPREHEN METABOLIC PANEL: CPT | Mod: HCNC | Performed by: INTERNAL MEDICINE

## 2024-04-15 ENCOUNTER — OFFICE VISIT (OUTPATIENT)
Dept: INTERNAL MEDICINE | Facility: CLINIC | Age: 84
End: 2024-04-15
Payer: MEDICARE

## 2024-04-15 VITALS
BODY MASS INDEX: 31.35 KG/M2 | WEIGHT: 199.75 LBS | OXYGEN SATURATION: 94 % | HEIGHT: 67 IN | SYSTOLIC BLOOD PRESSURE: 126 MMHG | HEART RATE: 89 BPM | DIASTOLIC BLOOD PRESSURE: 72 MMHG

## 2024-04-15 DIAGNOSIS — I15.2 HYPERTENSION ASSOCIATED WITH DIABETES: ICD-10-CM

## 2024-04-15 DIAGNOSIS — D50.0 IRON DEFICIENCY ANEMIA DUE TO CHRONIC BLOOD LOSS: ICD-10-CM

## 2024-04-15 DIAGNOSIS — K55.20 ANGIODYSPLASIA OF SMALL INTESTINE: ICD-10-CM

## 2024-04-15 DIAGNOSIS — E11.59 HYPERTENSION ASSOCIATED WITH DIABETES: ICD-10-CM

## 2024-04-15 DIAGNOSIS — E11.69 HYPERLIPIDEMIA ASSOCIATED WITH TYPE 2 DIABETES MELLITUS: ICD-10-CM

## 2024-04-15 DIAGNOSIS — F33.1 MAJOR DEPRESSIVE DISORDER, RECURRENT, MODERATE: ICD-10-CM

## 2024-04-15 DIAGNOSIS — Z79.01 CHRONIC ANTICOAGULATION: ICD-10-CM

## 2024-04-15 DIAGNOSIS — E27.8 ADRENAL NODULE: ICD-10-CM

## 2024-04-15 DIAGNOSIS — E11.9 DIABETES MELLITUS WITHOUT COMPLICATION: Primary | ICD-10-CM

## 2024-04-15 DIAGNOSIS — Z85.46 HISTORY OF PROSTATE CANCER: ICD-10-CM

## 2024-04-15 DIAGNOSIS — I48.0 PAROXYSMAL ATRIAL FIBRILLATION: ICD-10-CM

## 2024-04-15 DIAGNOSIS — K92.2 CHRONIC UPPER GI BLEEDING: ICD-10-CM

## 2024-04-15 DIAGNOSIS — I70.0 ATHEROSCLEROSIS OF AORTA: ICD-10-CM

## 2024-04-15 DIAGNOSIS — J43.2 CENTRILOBULAR EMPHYSEMA: ICD-10-CM

## 2024-04-15 DIAGNOSIS — G25.2 INTENTION TREMOR: ICD-10-CM

## 2024-04-15 DIAGNOSIS — Z86.010 HISTORY OF COLON POLYPS: ICD-10-CM

## 2024-04-15 DIAGNOSIS — F03.90 DEMENTIA, UNSPECIFIED DEMENTIA SEVERITY, UNSPECIFIED DEMENTIA TYPE, UNSPECIFIED WHETHER BEHAVIORAL, PSYCHOTIC, OR MOOD DISTURBANCE OR ANXIETY: ICD-10-CM

## 2024-04-15 DIAGNOSIS — I25.10 ATHEROSCLEROSIS OF NATIVE CORONARY ARTERY OF NATIVE HEART WITHOUT ANGINA PECTORIS: ICD-10-CM

## 2024-04-15 DIAGNOSIS — E78.5 HYPERLIPIDEMIA ASSOCIATED WITH TYPE 2 DIABETES MELLITUS: ICD-10-CM

## 2024-04-15 DIAGNOSIS — G47.33 OSA (OBSTRUCTIVE SLEEP APNEA): ICD-10-CM

## 2024-04-15 PROCEDURE — 1101F PT FALLS ASSESS-DOCD LE1/YR: CPT | Mod: HCNC,CPTII,S$GLB, | Performed by: INTERNAL MEDICINE

## 2024-04-15 PROCEDURE — 1126F AMNT PAIN NOTED NONE PRSNT: CPT | Mod: HCNC,CPTII,S$GLB, | Performed by: INTERNAL MEDICINE

## 2024-04-15 PROCEDURE — 3072F LOW RISK FOR RETINOPATHY: CPT | Mod: HCNC,CPTII,S$GLB, | Performed by: INTERNAL MEDICINE

## 2024-04-15 PROCEDURE — G2211 COMPLEX E/M VISIT ADD ON: HCPCS | Mod: HCNC,S$GLB,, | Performed by: INTERNAL MEDICINE

## 2024-04-15 PROCEDURE — 99214 OFFICE O/P EST MOD 30 MIN: CPT | Mod: HCNC,S$GLB,, | Performed by: INTERNAL MEDICINE

## 2024-04-15 PROCEDURE — 1160F RVW MEDS BY RX/DR IN RCRD: CPT | Mod: HCNC,CPTII,S$GLB, | Performed by: INTERNAL MEDICINE

## 2024-04-15 PROCEDURE — 3078F DIAST BP <80 MM HG: CPT | Mod: HCNC,CPTII,S$GLB, | Performed by: INTERNAL MEDICINE

## 2024-04-15 PROCEDURE — 1159F MED LIST DOCD IN RCRD: CPT | Mod: HCNC,CPTII,S$GLB, | Performed by: INTERNAL MEDICINE

## 2024-04-15 PROCEDURE — 3074F SYST BP LT 130 MM HG: CPT | Mod: HCNC,CPTII,S$GLB, | Performed by: INTERNAL MEDICINE

## 2024-04-15 PROCEDURE — 3288F FALL RISK ASSESSMENT DOCD: CPT | Mod: HCNC,CPTII,S$GLB, | Performed by: INTERNAL MEDICINE

## 2024-04-15 PROCEDURE — 99999 PR PBB SHADOW E&M-EST. PATIENT-LVL IV: CPT | Mod: PBBFAC,HCNC,, | Performed by: INTERNAL MEDICINE

## 2024-04-15 NOTE — PROGRESS NOTES
"HPI:  Patient is a 83-year-old gentleman who comes in today for follow-up of his diabetes, hypertension, lipids, and Alzheimer's dementia.  He is accompanied by his wife.  Patient himself has no complaints.  The wife states that he has difficulty controlling his diet.  He pretty much is eats.  Sweets all the time.  He is taking all his medications.  She struggles with him in regards to constipation.  He had to go to the ER once for fecal impaction.  Patient does take iron twice a day due to angiodysplasias of the small intestines causing chronic anemia.  he is on Eliquis due to atrial fibrillation.  His memory has slowly declined.  It has been no significant change.  He has had some personality changes as well.  He rarely has some violent tendencies.  Current meds have been verified and updated per the EMR  Exam:/72 (BP Location: Left arm)   Pulse 89   Ht 5' 7" (1.702 m)   Wt 90.6 kg (199 lb 11.8 oz)   SpO2 (!) 94%   BMI 31.28 kg/m²   Carotids 2+ equal without bruits  Chest clear  Cardiovascular irregular rate and rhythm with a 2/6 systolic murmur    Lab Results   Component Value Date    WBC 8.32 04/08/2024    HGB 13.3 (L) 04/08/2024    HCT 43.3 04/08/2024     04/08/2024    CHOL 107 (L) 01/03/2024    TRIG 91 01/03/2024    HDL 31 (L) 01/03/2024    ALT 24 04/08/2024    AST 25 04/08/2024     04/08/2024    K 3.4 (L) 04/08/2024    CL 94 (L) 04/08/2024    CREATININE 1.0 04/08/2024    BUN 19 04/08/2024    CO2 32 (H) 04/08/2024    TSH 1.135 01/03/2024    PSA <0.010 08/20/2013    INR 1.2 02/23/2023    HGBA1C 8.3 (H) 04/08/2024    PSADIAG <0.01 03/23/2023     (H) 02/23/2023    URICACID 6.3 12/10/2021    SEDRATE 32 (H) 12/10/2021    CRP 10.0 (H) 12/10/2021       Impression:  Atrial fibrillation, stable, unchanged on anticoagulation  Hypertension and lipids, well controlled on current therapy  Diabetes, his A1c has gone up in the last 6 months.  Still given his other comorbid conditions fairly " well controlled  Chronic anemia secondary to angiodysplasias and him being on anticoagulation.  H&H currently stable.  He is on iron supplementation twice a day  Chronic constipation, I am see me worse by the iron pills.  Patient is taking MiraLax daily and stool softeners daily.  He has not been on any fiber agent.  The wife would like to start him on Linzess  Alzheimer's dementia, stable  Patient Active Problem List   Diagnosis    Atrial fibrillation    Coronary atherosclerosis    History of prostate cancer    Refractive error    Posterior capsular opacification    Pseudophakia of both eyes    History of colon polyps    Chronic anticoagulation    Pulmonary hypertension, mild    SHORTY (obstructive sleep apnea)    Diabetes mellitus without complication    Hyperlipidemia associated with type 2 diabetes mellitus    Hypertension associated with diabetes    Intention tremor    Atherosclerosis of aorta    Adrenal nodule    Lung nodules    Obesity (BMI 30.0-34.9)    Major depressive disorder, recurrent, moderate    Angiodysplasia of small intestine    Chronic upper GI bleeding    LAILA (iron deficiency anemia)    Dementia    Urinary incontinence    Centrilobular emphysema       Plan:  Orders Placed This Encounter    Hemoglobin A1C    Basic Metabolic Panel    Lipid Panel    TSH    Microalbumin/Creatinine Ratio, Urine    CBC Auto Differential    linaCLOtide (LINZESS) 145 mcg Cap capsule     Patient was started on Linzess daily.  Encouraged him to also make sure he is drinking plenty of fluids and also to take some for a fiber agent every day.  He will see me back in 3 months with above lab work.  His other medications remain the same    This note is generated with speech recognition software and is subject to transcription error and sound alike phrases that may be missed by proofreading.

## 2024-04-26 ENCOUNTER — HOSPITAL ENCOUNTER (INPATIENT)
Facility: HOSPITAL | Age: 84
LOS: 4 days | Discharge: NURSING, REHAB OR LONG-TERM CARE | DRG: 638 | End: 2024-04-30
Attending: EMERGENCY MEDICINE | Admitting: HOSPITALIST
Payer: MEDICARE

## 2024-04-26 DIAGNOSIS — E11.01 HHNC (HYPERGLYCEMIC HYPEROSMOLAR NONKETOTIC COMA): Primary | ICD-10-CM

## 2024-04-26 DIAGNOSIS — R07.9 CHEST PAIN: ICD-10-CM

## 2024-04-26 DIAGNOSIS — E11.69 HYPERLIPIDEMIA ASSOCIATED WITH TYPE 2 DIABETES MELLITUS: ICD-10-CM

## 2024-04-26 DIAGNOSIS — R41.82 ALTERED MENTAL STATUS: ICD-10-CM

## 2024-04-26 DIAGNOSIS — E11.9 DIABETES MELLITUS WITHOUT COMPLICATION: ICD-10-CM

## 2024-04-26 DIAGNOSIS — R79.89 TROPONIN I ABOVE REFERENCE RANGE: ICD-10-CM

## 2024-04-26 DIAGNOSIS — E11.00 HYPEROSMOLAR HYPERGLYCEMIC STATE (HHS): ICD-10-CM

## 2024-04-26 DIAGNOSIS — E78.5 HYPERLIPIDEMIA ASSOCIATED WITH TYPE 2 DIABETES MELLITUS: ICD-10-CM

## 2024-04-26 PROBLEM — N17.9 AKI (ACUTE KIDNEY INJURY): Status: ACTIVE | Noted: 2024-04-26

## 2024-04-26 LAB
ALBUMIN SERPL BCP-MCNC: 3.9 G/DL (ref 3.5–5.2)
ALLENS TEST: ABNORMAL
ALP SERPL-CCNC: 103 U/L (ref 55–135)
ALT SERPL W/O P-5'-P-CCNC: 25 U/L (ref 10–44)
ANION GAP SERPL CALC-SCNC: 14 MMOL/L (ref 8–16)
ANION GAP SERPL CALC-SCNC: 15 MMOL/L (ref 8–16)
ANION GAP SERPL CALC-SCNC: 18 MMOL/L (ref 8–16)
ANION GAP SERPL CALC-SCNC: 22 MMOL/L (ref 8–16)
AST SERPL-CCNC: 14 U/L (ref 10–40)
B-OH-BUTYR BLD STRIP-SCNC: 1.4 MMOL/L (ref 0–0.5)
BACTERIA #/AREA URNS HPF: NORMAL /HPF
BACTERIA #/AREA URNS HPF: NORMAL /HPF
BASOPHILS # BLD AUTO: 0.02 K/UL (ref 0–0.2)
BASOPHILS NFR BLD: 0.1 % (ref 0–1.9)
BILIRUB SERPL-MCNC: 1.2 MG/DL (ref 0.1–1)
BILIRUB UR QL STRIP: NEGATIVE
BILIRUB UR QL STRIP: NEGATIVE
BUN SERPL-MCNC: 70 MG/DL (ref 8–23)
BUN SERPL-MCNC: 88 MG/DL (ref 8–23)
BUN SERPL-MCNC: 88 MG/DL (ref 8–23)
BUN SERPL-MCNC: 93 MG/DL (ref 8–23)
CALCIUM SERPL-MCNC: 10.4 MG/DL (ref 8.7–10.5)
CALCIUM SERPL-MCNC: 10.7 MG/DL (ref 8.7–10.5)
CALCIUM SERPL-MCNC: 10.9 MG/DL (ref 8.7–10.5)
CALCIUM SERPL-MCNC: 10.9 MG/DL (ref 8.7–10.5)
CHLORIDE SERPL-SCNC: 101 MMOL/L (ref 95–110)
CHLORIDE SERPL-SCNC: 106 MMOL/L (ref 95–110)
CHLORIDE SERPL-SCNC: 112 MMOL/L (ref 95–110)
CHLORIDE SERPL-SCNC: 116 MMOL/L (ref 95–110)
CK SERPL-CCNC: 637 U/L (ref 20–200)
CLARITY UR: CLEAR
CLARITY UR: CLEAR
CO2 SERPL-SCNC: 22 MMOL/L (ref 23–29)
CO2 SERPL-SCNC: 24 MMOL/L (ref 23–29)
CO2 SERPL-SCNC: 24 MMOL/L (ref 23–29)
CO2 SERPL-SCNC: 26 MMOL/L (ref 23–29)
COLOR UR: YELLOW
COLOR UR: YELLOW
CREAT SERPL-MCNC: 1.6 MG/DL (ref 0.5–1.4)
CREAT SERPL-MCNC: 2.1 MG/DL (ref 0.5–1.4)
CREAT SERPL-MCNC: 2.4 MG/DL (ref 0.5–1.4)
CREAT SERPL-MCNC: 2.7 MG/DL (ref 0.5–1.4)
DELSYS: ABNORMAL
DIFFERENTIAL METHOD BLD: ABNORMAL
EOSINOPHIL # BLD AUTO: 0 K/UL (ref 0–0.5)
EOSINOPHIL NFR BLD: 0 % (ref 0–8)
ERYTHROCYTE [DISTWIDTH] IN BLOOD BY AUTOMATED COUNT: 14.1 % (ref 11.5–14.5)
EST. GFR  (NO RACE VARIABLE): 23 ML/MIN/1.73 M^2
EST. GFR  (NO RACE VARIABLE): 26 ML/MIN/1.73 M^2
EST. GFR  (NO RACE VARIABLE): 31 ML/MIN/1.73 M^2
EST. GFR  (NO RACE VARIABLE): 43 ML/MIN/1.73 M^2
GLUCOSE SERPL-MCNC: 198 MG/DL (ref 70–110)
GLUCOSE SERPL-MCNC: 585 MG/DL (ref 70–110)
GLUCOSE SERPL-MCNC: 764 MG/DL (ref 70–110)
GLUCOSE SERPL-MCNC: 770 MG/DL (ref 70–110)
GLUCOSE UR QL STRIP: ABNORMAL
GLUCOSE UR QL STRIP: ABNORMAL
HCO3 UR-SCNC: 28.7 MMOL/L (ref 24–28)
HCT VFR BLD AUTO: 46.3 % (ref 40–54)
HCV AB SERPL QL IA: NEGATIVE
HEP C VIRUS HOLD SPECIMEN: NORMAL
HGB BLD-MCNC: 15 G/DL (ref 14–18)
HGB UR QL STRIP: ABNORMAL
HGB UR QL STRIP: NEGATIVE
HIV 1+2 AB+HIV1 P24 AG SERPL QL IA: NEGATIVE
IMM GRANULOCYTES # BLD AUTO: 0.07 K/UL (ref 0–0.04)
IMM GRANULOCYTES NFR BLD AUTO: 0.4 % (ref 0–0.5)
KETONES UR QL STRIP: NEGATIVE
KETONES UR QL STRIP: NEGATIVE
LACTATE SERPL-SCNC: 2.9 MMOL/L (ref 0.5–2.2)
LACTATE SERPL-SCNC: 3.4 MMOL/L (ref 0.5–2.2)
LACTATE SERPL-SCNC: 3.5 MMOL/L (ref 0.5–2.2)
LEUKOCYTE ESTERASE UR QL STRIP: NEGATIVE
LEUKOCYTE ESTERASE UR QL STRIP: NEGATIVE
LIPASE SERPL-CCNC: 58 U/L (ref 4–60)
LYMPHOCYTES # BLD AUTO: 1.6 K/UL (ref 1–4.8)
LYMPHOCYTES NFR BLD: 9.4 % (ref 18–48)
MAGNESIUM SERPL-MCNC: 2.2 MG/DL (ref 1.6–2.6)
MCH RBC QN AUTO: 28.6 PG (ref 27–31)
MCHC RBC AUTO-ENTMCNC: 32.4 G/DL (ref 32–36)
MCV RBC AUTO: 88 FL (ref 82–98)
MICROSCOPIC COMMENT: NORMAL
MICROSCOPIC COMMENT: NORMAL
MODE: ABNORMAL
MONOCYTES # BLD AUTO: 1.3 K/UL (ref 0.3–1)
MONOCYTES NFR BLD: 7.6 % (ref 4–15)
NEUTROPHILS # BLD AUTO: 13.7 K/UL (ref 1.8–7.7)
NEUTROPHILS NFR BLD: 82.5 % (ref 38–73)
NITRITE UR QL STRIP: NEGATIVE
NITRITE UR QL STRIP: NEGATIVE
NRBC BLD-RTO: 0 /100 WBC
OHS QRS DURATION: 78 MS
OHS QTC CALCULATION: 513 MS
OSMOLALITY SERPL: 383 MOSM/KG (ref 280–300)
PCO2 BLDA: 46.2 MMHG (ref 35–45)
PH SMN: 7.4 [PH] (ref 7.35–7.45)
PH UR STRIP: 5 [PH] (ref 5–8)
PH UR STRIP: 6 [PH] (ref 5–8)
PHOSPHATE SERPL-MCNC: 1.2 MG/DL (ref 2.7–4.5)
PLATELET # BLD AUTO: 330 K/UL (ref 150–450)
PMV BLD AUTO: 10.5 FL (ref 9.2–12.9)
PO2 BLDA: 49 MMHG (ref 40–60)
POC BE: 4 MMOL/L
POC SATURATED O2: 84 % (ref 95–100)
POCT GLUCOSE: 174 MG/DL (ref 70–110)
POCT GLUCOSE: 195 MG/DL (ref 70–110)
POCT GLUCOSE: 200 MG/DL (ref 70–110)
POCT GLUCOSE: 200 MG/DL (ref 70–110)
POCT GLUCOSE: 226 MG/DL (ref 70–110)
POCT GLUCOSE: 296 MG/DL (ref 70–110)
POCT GLUCOSE: 321 MG/DL (ref 70–110)
POCT GLUCOSE: 420 MG/DL (ref 70–110)
POCT GLUCOSE: >500 MG/DL (ref 70–110)
POTASSIUM SERPL-SCNC: 2.9 MMOL/L (ref 3.5–5.1)
POTASSIUM SERPL-SCNC: 3.3 MMOL/L (ref 3.5–5.1)
POTASSIUM SERPL-SCNC: 3.3 MMOL/L (ref 3.5–5.1)
POTASSIUM SERPL-SCNC: 3.4 MMOL/L (ref 3.5–5.1)
PROCALCITONIN SERPL IA-MCNC: 0.08 NG/ML
PROT SERPL-MCNC: 8.2 G/DL (ref 6–8.4)
PROT UR QL STRIP: NEGATIVE
PROT UR QL STRIP: NEGATIVE
RBC # BLD AUTO: 5.25 M/UL (ref 4.6–6.2)
RBC #/AREA URNS HPF: 1 /HPF (ref 0–4)
SAMPLE: ABNORMAL
SITE: ABNORMAL
SODIUM SERPL-SCNC: 145 MMOL/L (ref 136–145)
SODIUM SERPL-SCNC: 148 MMOL/L (ref 136–145)
SODIUM SERPL-SCNC: 150 MMOL/L (ref 136–145)
SODIUM SERPL-SCNC: 157 MMOL/L (ref 136–145)
SP GR UR STRIP: 1.01 (ref 1–1.03)
SP GR UR STRIP: 1.03 (ref 1–1.03)
TROPONIN I SERPL DL<=0.01 NG/ML-MCNC: 0.07 NG/ML (ref 0–0.03)
TROPONIN I SERPL DL<=0.01 NG/ML-MCNC: 0.08 NG/ML (ref 0–0.03)
TROPONIN I SERPL DL<=0.01 NG/ML-MCNC: 0.1 NG/ML (ref 0–0.03)
TSH SERPL DL<=0.005 MIU/L-ACNC: 0.85 UIU/ML (ref 0.4–4)
URN SPEC COLLECT METH UR: ABNORMAL
URN SPEC COLLECT METH UR: ABNORMAL
UROBILINOGEN UR STRIP-ACNC: NEGATIVE EU/DL
UROBILINOGEN UR STRIP-ACNC: NEGATIVE EU/DL
WBC # BLD AUTO: 16.62 K/UL (ref 3.9–12.7)
WBC #/AREA URNS HPF: 3 /HPF (ref 0–5)
YEAST URNS QL MICRO: NORMAL
YEAST URNS QL MICRO: NORMAL

## 2024-04-26 PROCEDURE — 81000 URINALYSIS NONAUTO W/SCOPE: CPT | Mod: 91,HCNC | Performed by: HOSPITALIST

## 2024-04-26 PROCEDURE — 82962 GLUCOSE BLOOD TEST: CPT | Mod: HCNC

## 2024-04-26 PROCEDURE — 84100 ASSAY OF PHOSPHORUS: CPT | Mod: HCNC | Performed by: INTERNAL MEDICINE

## 2024-04-26 PROCEDURE — 96360 HYDRATION IV INFUSION INIT: CPT | Mod: HCNC

## 2024-04-26 PROCEDURE — 63600175 PHARM REV CODE 636 W HCPCS: Mod: HCNC | Performed by: INTERNAL MEDICINE

## 2024-04-26 PROCEDURE — 93005 ELECTROCARDIOGRAM TRACING: CPT | Mod: HCNC

## 2024-04-26 PROCEDURE — 84484 ASSAY OF TROPONIN QUANT: CPT | Mod: 91,HCNC | Performed by: EMERGENCY MEDICINE

## 2024-04-26 PROCEDURE — 84145 PROCALCITONIN (PCT): CPT | Mod: HCNC | Performed by: INTERNAL MEDICINE

## 2024-04-26 PROCEDURE — 82010 KETONE BODYS QUAN: CPT | Mod: HCNC | Performed by: EMERGENCY MEDICINE

## 2024-04-26 PROCEDURE — 20000000 HC ICU ROOM: Mod: HCNC

## 2024-04-26 PROCEDURE — 25000003 PHARM REV CODE 250: Mod: HCNC | Performed by: EMERGENCY MEDICINE

## 2024-04-26 PROCEDURE — 80048 BASIC METABOLIC PNL TOTAL CA: CPT | Mod: HCNC | Performed by: HOSPITALIST

## 2024-04-26 PROCEDURE — 83690 ASSAY OF LIPASE: CPT | Mod: HCNC | Performed by: EMERGENCY MEDICINE

## 2024-04-26 PROCEDURE — 82803 BLOOD GASES ANY COMBINATION: CPT | Mod: HCNC

## 2024-04-26 PROCEDURE — 86803 HEPATITIS C AB TEST: CPT | Mod: HCNC | Performed by: EMERGENCY MEDICINE

## 2024-04-26 PROCEDURE — 63600175 PHARM REV CODE 636 W HCPCS: Mod: HCNC | Performed by: EMERGENCY MEDICINE

## 2024-04-26 PROCEDURE — S5010 5% DEXTROSE AND 0.45% SALINE: HCPCS | Mod: HCNC | Performed by: HOSPITALIST

## 2024-04-26 PROCEDURE — 80053 COMPREHEN METABOLIC PANEL: CPT | Mod: HCNC | Performed by: EMERGENCY MEDICINE

## 2024-04-26 PROCEDURE — 99900035 HC TECH TIME PER 15 MIN (STAT): Mod: HCNC

## 2024-04-26 PROCEDURE — 96372 THER/PROPH/DIAG INJ SC/IM: CPT | Performed by: EMERGENCY MEDICINE

## 2024-04-26 PROCEDURE — 87389 HIV-1 AG W/HIV-1&-2 AB AG IA: CPT | Mod: HCNC | Performed by: EMERGENCY MEDICINE

## 2024-04-26 PROCEDURE — 83735 ASSAY OF MAGNESIUM: CPT | Mod: HCNC | Performed by: HOSPITALIST

## 2024-04-26 PROCEDURE — 82550 ASSAY OF CK (CPK): CPT | Mod: HCNC | Performed by: INTERNAL MEDICINE

## 2024-04-26 PROCEDURE — 93010 ELECTROCARDIOGRAM REPORT: CPT | Mod: HCNC,,, | Performed by: INTERNAL MEDICINE

## 2024-04-26 PROCEDURE — 80048 BASIC METABOLIC PNL TOTAL CA: CPT | Mod: 91,HCNC,XB

## 2024-04-26 PROCEDURE — 36415 COLL VENOUS BLD VENIPUNCTURE: CPT | Mod: HCNC | Performed by: INTERNAL MEDICINE

## 2024-04-26 PROCEDURE — 84484 ASSAY OF TROPONIN QUANT: CPT | Mod: HCNC | Performed by: HOSPITALIST

## 2024-04-26 PROCEDURE — 99285 EMERGENCY DEPT VISIT HI MDM: CPT | Mod: 25,HCNC

## 2024-04-26 PROCEDURE — 85025 COMPLETE CBC W/AUTO DIFF WBC: CPT | Mod: HCNC | Performed by: EMERGENCY MEDICINE

## 2024-04-26 PROCEDURE — 83605 ASSAY OF LACTIC ACID: CPT | Mod: 91,HCNC | Performed by: INTERNAL MEDICINE

## 2024-04-26 PROCEDURE — 83605 ASSAY OF LACTIC ACID: CPT | Mod: HCNC | Performed by: HOSPITALIST

## 2024-04-26 PROCEDURE — 63600175 PHARM REV CODE 636 W HCPCS: Mod: HCNC | Performed by: HOSPITALIST

## 2024-04-26 PROCEDURE — 81000 URINALYSIS NONAUTO W/SCOPE: CPT | Mod: HCNC | Performed by: EMERGENCY MEDICINE

## 2024-04-26 PROCEDURE — 25000003 PHARM REV CODE 250: Mod: HCNC

## 2024-04-26 PROCEDURE — 87040 BLOOD CULTURE FOR BACTERIA: CPT | Mod: HCNC | Performed by: HOSPITALIST

## 2024-04-26 PROCEDURE — 84484 ASSAY OF TROPONIN QUANT: CPT | Mod: 91,HCNC | Performed by: INTERNAL MEDICINE

## 2024-04-26 PROCEDURE — 25000003 PHARM REV CODE 250: Mod: HCNC | Performed by: HOSPITALIST

## 2024-04-26 PROCEDURE — 84443 ASSAY THYROID STIM HORMONE: CPT | Mod: HCNC | Performed by: INTERNAL MEDICINE

## 2024-04-26 PROCEDURE — 83930 ASSAY OF BLOOD OSMOLALITY: CPT | Mod: HCNC | Performed by: HOSPITALIST

## 2024-04-26 RX ORDER — FLUOXETINE 10 MG/1
10 CAPSULE ORAL EVERY MORNING
COMMUNITY
Start: 2024-04-22

## 2024-04-26 RX ORDER — DONEPEZIL HYDROCHLORIDE 5 MG/1
10 TABLET, FILM COATED ORAL NIGHTLY
Status: DISCONTINUED | OUTPATIENT
Start: 2024-04-26 | End: 2024-04-30 | Stop reason: HOSPADM

## 2024-04-26 RX ORDER — GLUCAGON 1 MG
1 KIT INJECTION
Status: DISCONTINUED | OUTPATIENT
Start: 2024-04-26 | End: 2024-04-30 | Stop reason: HOSPADM

## 2024-04-26 RX ORDER — PANTOPRAZOLE SODIUM 40 MG/1
40 TABLET, DELAYED RELEASE ORAL DAILY
Status: DISCONTINUED | OUTPATIENT
Start: 2024-04-26 | End: 2024-04-30 | Stop reason: HOSPADM

## 2024-04-26 RX ORDER — IBUPROFEN 200 MG
16 TABLET ORAL
Status: DISCONTINUED | OUTPATIENT
Start: 2024-04-26 | End: 2024-04-30 | Stop reason: HOSPADM

## 2024-04-26 RX ORDER — ONDANSETRON HYDROCHLORIDE 2 MG/ML
4 INJECTION, SOLUTION INTRAVENOUS EVERY 6 HOURS PRN
Status: DISCONTINUED | OUTPATIENT
Start: 2024-04-26 | End: 2024-04-30 | Stop reason: HOSPADM

## 2024-04-26 RX ORDER — DEXTROSE MONOHYDRATE AND SODIUM CHLORIDE 5; .45 G/100ML; G/100ML
INJECTION, SOLUTION INTRAVENOUS CONTINUOUS PRN
Status: DISCONTINUED | OUTPATIENT
Start: 2024-04-26 | End: 2024-04-27

## 2024-04-26 RX ORDER — METOPROLOL SUCCINATE 25 MG/1
25 TABLET, EXTENDED RELEASE ORAL DAILY
Status: DISCONTINUED | OUTPATIENT
Start: 2024-04-26 | End: 2024-04-30 | Stop reason: HOSPADM

## 2024-04-26 RX ORDER — SIMETHICONE 80 MG
1 TABLET,CHEWABLE ORAL 4 TIMES DAILY PRN
Status: DISCONTINUED | OUTPATIENT
Start: 2024-04-26 | End: 2024-04-30 | Stop reason: HOSPADM

## 2024-04-26 RX ORDER — POTASSIUM CHLORIDE 7.45 MG/ML
30 INJECTION INTRAVENOUS ONCE
Status: DISCONTINUED | OUTPATIENT
Start: 2024-04-26 | End: 2024-04-26

## 2024-04-26 RX ORDER — METOPROLOL TARTRATE 1 MG/ML
2.5 INJECTION, SOLUTION INTRAVENOUS EVERY 6 HOURS PRN
Status: DISCONTINUED | OUTPATIENT
Start: 2024-04-26 | End: 2024-04-30 | Stop reason: HOSPADM

## 2024-04-26 RX ORDER — SODIUM CHLORIDE AND POTASSIUM CHLORIDE 150; 450 MG/100ML; MG/100ML
INJECTION, SOLUTION INTRAVENOUS CONTINUOUS
Status: DISCONTINUED | OUTPATIENT
Start: 2024-04-26 | End: 2024-04-28

## 2024-04-26 RX ORDER — POTASSIUM CHLORIDE 750 MG/1
10 TABLET, EXTENDED RELEASE ORAL DAILY
Status: DISCONTINUED | OUTPATIENT
Start: 2024-04-26 | End: 2024-04-28

## 2024-04-26 RX ORDER — POTASSIUM CHLORIDE 7.45 MG/ML
10 INJECTION INTRAVENOUS
Status: DISCONTINUED | OUTPATIENT
Start: 2024-04-26 | End: 2024-04-26

## 2024-04-26 RX ORDER — IBUPROFEN 200 MG
24 TABLET ORAL
Status: DISCONTINUED | OUTPATIENT
Start: 2024-04-26 | End: 2024-04-30 | Stop reason: HOSPADM

## 2024-04-26 RX ORDER — NALOXONE HCL 0.4 MG/ML
0.02 VIAL (ML) INJECTION
Status: DISCONTINUED | OUTPATIENT
Start: 2024-04-26 | End: 2024-04-30 | Stop reason: HOSPADM

## 2024-04-26 RX ORDER — ACETAMINOPHEN 325 MG/1
650 TABLET ORAL EVERY 4 HOURS PRN
Status: DISCONTINUED | OUTPATIENT
Start: 2024-04-26 | End: 2024-04-30 | Stop reason: HOSPADM

## 2024-04-26 RX ORDER — ALLOPURINOL 100 MG/1
100 TABLET ORAL DAILY
Status: DISCONTINUED | OUTPATIENT
Start: 2024-04-26 | End: 2024-04-30 | Stop reason: HOSPADM

## 2024-04-26 RX ORDER — LABETALOL HYDROCHLORIDE 5 MG/ML
10 INJECTION, SOLUTION INTRAVENOUS EVERY 6 HOURS PRN
Status: DISCONTINUED | OUTPATIENT
Start: 2024-04-26 | End: 2024-04-30 | Stop reason: HOSPADM

## 2024-04-26 RX ORDER — SODIUM CHLORIDE 9 MG/ML
1000 INJECTION, SOLUTION INTRAVENOUS CONTINUOUS
Status: DISCONTINUED | OUTPATIENT
Start: 2024-04-26 | End: 2024-04-26

## 2024-04-26 RX ORDER — POTASSIUM CHLORIDE 7.45 MG/ML
10 INJECTION INTRAVENOUS
Status: DISPENSED | OUTPATIENT
Start: 2024-04-26 | End: 2024-04-26

## 2024-04-26 RX ORDER — ATORVASTATIN CALCIUM 40 MG/1
40 TABLET, FILM COATED ORAL DAILY
Status: DISCONTINUED | OUTPATIENT
Start: 2024-04-26 | End: 2024-04-30 | Stop reason: HOSPADM

## 2024-04-26 RX ORDER — TALC
6 POWDER (GRAM) TOPICAL NIGHTLY PRN
Status: DISCONTINUED | OUTPATIENT
Start: 2024-04-26 | End: 2024-04-30 | Stop reason: HOSPADM

## 2024-04-26 RX ORDER — SODIUM CHLORIDE 0.9 % (FLUSH) 0.9 %
10 SYRINGE (ML) INJECTION
Status: DISCONTINUED | OUTPATIENT
Start: 2024-04-26 | End: 2024-04-30 | Stop reason: HOSPADM

## 2024-04-26 RX ORDER — ISOSORBIDE MONONITRATE 30 MG/1
30 TABLET, EXTENDED RELEASE ORAL DAILY
Status: DISCONTINUED | OUTPATIENT
Start: 2024-04-26 | End: 2024-04-30 | Stop reason: HOSPADM

## 2024-04-26 RX ADMIN — SODIUM CHLORIDE 0.1 UNITS/KG/HR: 9 INJECTION, SOLUTION INTRAVENOUS at 04:04

## 2024-04-26 RX ADMIN — POTASSIUM CHLORIDE 10 MEQ: 7.46 INJECTION, SOLUTION INTRAVENOUS at 02:04

## 2024-04-26 RX ADMIN — SODIUM CHLORIDE 1000 ML: 9 INJECTION, SOLUTION INTRAVENOUS at 02:04

## 2024-04-26 RX ADMIN — DEXTROSE AND SODIUM CHLORIDE: 5; 450 INJECTION, SOLUTION INTRAVENOUS at 08:04

## 2024-04-26 RX ADMIN — SODIUM CHLORIDE, POTASSIUM CHLORIDE, SODIUM LACTATE AND CALCIUM CHLORIDE 1000 ML: 600; 310; 30; 20 INJECTION, SOLUTION INTRAVENOUS at 03:04

## 2024-04-26 RX ADMIN — INSULIN HUMAN 6 UNITS: 100 INJECTION, SOLUTION PARENTERAL at 09:04

## 2024-04-26 RX ADMIN — POTASSIUM PHOSPHATE, MONOBASIC AND POTASSIUM PHOSPHATE, DIBASIC 30 MMOL: 224; 236 INJECTION, SOLUTION, CONCENTRATE INTRAVENOUS at 11:04

## 2024-04-26 RX ADMIN — POTASSIUM CHLORIDE 10 MEQ: 7.46 INJECTION, SOLUTION INTRAVENOUS at 01:04

## 2024-04-26 RX ADMIN — POTASSIUM CHLORIDE AND SODIUM CHLORIDE: 450; 150 INJECTION, SOLUTION INTRAVENOUS at 04:04

## 2024-04-26 RX ADMIN — APIXABAN 2.5 MG: 2.5 TABLET, FILM COATED ORAL at 08:04

## 2024-04-26 RX ADMIN — SODIUM CHLORIDE 1000 ML: 9 INJECTION, SOLUTION INTRAVENOUS at 08:04

## 2024-04-26 RX ADMIN — INSULIN HUMAN 6 UNITS: 100 INJECTION, SOLUTION PARENTERAL at 03:04

## 2024-04-26 RX ADMIN — DONEPEZIL HYDROCHLORIDE 10 MG: 5 TABLET, FILM COATED ORAL at 08:04

## 2024-04-26 RX ADMIN — POTASSIUM CHLORIDE 10 MEQ: 7.46 INJECTION, SOLUTION INTRAVENOUS at 03:04

## 2024-04-26 NOTE — SUBJECTIVE & OBJECTIVE
Past Medical History:   Diagnosis Date    Angiodysplasia of small intestine     Atrial fibrillation     Chronic upper GI bleeding     due to angiodysplasia in proximal small intestine    Coronary atherosclerosis     Dementia     Diabetes mellitus without complication     Diverticulosis of large intestine without hemorrhage 05/16/2017    Family history of macular degeneration 10/20/2014    History of prostate cancer     Hyperlipidemia associated with type 2 diabetes mellitus     Hypertension associated with diabetes     LAILA (iron deficiency anemia) 09/20/2021    due to angiodysplasia in small intestine    Intention tremor     Major depressive disorder, recurrent, moderate 04/12/2021    SHORTY (obstructive sleep apnea)     Urinary incontinence        Past Surgical History:   Procedure Laterality Date    ABDOMINAL HERNIA REPAIR      APPENDECTOMY      bladder sx      CARDIAC CATHETERIZATION      CATARACT EXTRACTION W/  INTRAOCULAR LENS IMPLANT  Restor OU    COLONOSCOPY N/A 5/16/2017    Procedure: COLONOSCOPY;  Surgeon: Alfredo Naidu MD;  Location: [facility];  Service: Endoscopy;  Laterality: N/A;    ESOPHAGOGASTRODUODENOSCOPY N/A 10/29/2021    Procedure: SBE (Push Enteroscopy) with Dr. Wayne;  Surgeon: Arlette Wayne MD;  Location: [facility];  Service: Endoscopy;  Laterality: N/A;  Plavix held indefinitely as of 10-20-21. Must also hold Coumadin 5 days prior. Ok to continue to use ASA.    ESOPHAGOGASTRODUODENOSCOPY N/A 2/23/2023    Procedure: EGD (ESOPHAGOGASTRODUODENOSCOPY);  Surgeon: Opal Wright MD;  Location: [facility];  Service: Endoscopy;  Laterality: N/A;    inguinal hernia      INTRALUMINAL GASTROINTESTINAL TRACT IMAGING VIA CAPSULE N/A 10/4/2021    Procedure: IMAGING PROCEDURE, GI TRACT, INTRALUMINAL, VIA CAPSULE;  Surgeon: Joaquin Stack RN;  Location: [facility];  Service: Endoscopy;  Laterality: N/A;    LEFT HEART CATHETERIZATION Left 7/20/2021    Procedure: CATHETERIZATION, HEART, LEFT;   Surgeon: Darryl Griffith MD;  Location: [facility] CATH LAB;  Service: Cardiology;  Laterality: Left;    lung sx      PERCUTANEOUS TRANSLUMINAL BALLOON ANGIOPLASTY OF CORONARY ARTERY  7/20/2021    Procedure: Angioplasty-coronary;  Surgeon: Darryl Griffith MD;  Location: [facility] CATH LAB;  Service: Cardiology;;    PROSTATE SURGERY         Review of patient's allergies indicates:  No Known Allergies    Current Facility-Administered Medications   Medication Dose Route Frequency Provider Last Rate Last Admin    0.9%  NaCl infusion  1,000 mL Intravenous Continuous Estuardo Eddy MD        acetaminophen tablet 650 mg  650 mg Oral Q4H PRN Estuardo Eddy MD        dextrose 10% bolus 125 mL 125 mL  12.5 g Intravenous PRN Estuardo Eddy MD        dextrose 10% bolus 125 mL 125 mL  12.5 g Intravenous PRN Estuardo Eddy MD        dextrose 10% bolus 250 mL 250 mL  25 g Intravenous PRN Estuardo Eddy MD        dextrose 10% bolus 250 mL 250 mL  25 g Intravenous PRN Estuardo Eddy MD        dextrose 5 % and 0.45 % NaCl infusion   Intravenous Continuous PRN Estuardo Eddy MD        glucagon (human recombinant) injection 1 mg  1 mg Intramuscular PRN Estuardo Eddy MD        glucose chewable tablet 16 g  16 g Oral PRN Estuardo Eddy MD        glucose chewable tablet 24 g  24 g Oral PRN Estuardo Eddy MD        insulin regular 1 Units/mL in sodium chloride 0.9% 100 mL infusion  0-0.2 Units/kg/hr Intravenous Continuous Estuardo Eddy MD        melatonin tablet 6 mg  6 mg Oral Nightly PRN Estuardo Eddy MD        naloxone 0.4 mg/mL injection 0.02 mg  0.02 mg Intravenous PRN Estuardo Eddy MD        ondansetron injection 4 mg  4 mg Intravenous Q6H PRN Estuardo Eddy MD        potassium chloride 10 mEq in 100 mL IVPB  10 mEq Intravenous Q1H Jose Cruz Nielsen Jr., MD        simethicone chewable tablet 80 mg  1 tablet Oral QID PRN Estuardo Eddy MD        sodium chloride 0.9% flush 10 mL  10 mL Intravenous PRN Estuardo Eddy MD         Current  Outpatient Medications   Medication Sig Dispense Refill    allopurinoL (ZYLOPRIM) 100 MG tablet Take 1 tablet (100 mg total) by mouth once daily. 90 tablet 3    apixaban (ELIQUIS) 2.5 mg Tab Take 1 tablet (2.5 mg total) by mouth 2 (two) times daily. 180 tablet 3    atorvastatin (LIPITOR) 40 MG tablet Take 1 tablet (40 mg total) by mouth once daily. for 360 doses 90 tablet 3    donepeziL (ARICEPT) 10 MG tablet Take 1 tablet (10 mg total) by mouth every evening. 90 tablet 3    empagliflozin (JARDIANCE) 10 mg tablet Take 1 tablet (10 mg total) by mouth once daily. 90 tablet 3    ferrous sulfate 324 mg (65 mg iron) TbEC TAKE ONE TABLET BY MOUTH TWICE DAILY 180 tablet 3    FLUoxetine 10 MG capsule Take 10 mg by mouth every morning.      isosorbide mononitrate (IMDUR) 30 MG 24 hr tablet Take 1 tablet (30 mg total) by mouth once daily. 90 tablet 3    linaCLOtide (LINZESS) 145 mcg Cap capsule Take 1 capsule (145 mcg total) by mouth before breakfast. 90 capsule 3    metOLazone (ZAROXOLYN) 5 MG tablet Take 1 tablet (5 mg total) by mouth once daily. 30 tablet 11    metoprolol succinate (TOPROL-XL) 25 MG 24 hr tablet Take 1 tablet (25 mg total) by mouth once daily. 90 tablet 3    pantoprazole (PROTONIX) 40 MG tablet Take 1 tablet (40 mg total) by mouth once daily. 90 tablet 3    potassium chloride SA (K-DUR,KLOR-CON M) 10 MEQ tablet Take 1 tablet (10 mEq total) by mouth once daily. 90 tablet 3    nystatin-triamcinolone (MYCOLOG II) cream Apply topically 4 (four) times daily. 30 g 1     Family History       Problem Relation (Age of Onset)    Cancer Father    Diabetes Sister    Heart disease Mother, Sister    Macular degeneration Sister          Tobacco Use    Smoking status: Former     Current packs/day: 0.00     Average packs/day: 0.5 packs/day for 40.0 years (20.0 ttl pk-yrs)     Types: Cigarettes     Start date: 1962     Quit date:      Years since quittin.3    Smokeless tobacco: Never   Substance and Sexual  Activity    Alcohol use: Yes     Alcohol/week: 7.0 standard drinks of alcohol     Types: 7 Shots of liquor per week    Drug use: No    Sexual activity: Not Currently     Review of Systems   All other systems reviewed and are negative.    Objective:     Vital Signs (Most Recent):  Temp: 98.1 °F (36.7 °C) (04/26/24 0742)  Pulse: (!) 111 (04/26/24 1035)  Resp: (!) 29 (04/26/24 1035)  BP: (!) 152/80 (04/26/24 1035)  SpO2: 99 % (04/26/24 1035) Vital Signs (24h Range):  Temp:  [98.1 °F (36.7 °C)] 98.1 °F (36.7 °C)  Pulse:  [] 111  Resp:  [14-38] 29  SpO2:  [91 %-99 %] 99 %  BP: (135-155)/(79-92) 152/80     Weight: 90.3 kg (199 lb)  Body mass index is 32.12 kg/m².     Physical Exam  Vitals and nursing note reviewed.   Constitutional:       General: He is not in acute distress.     Appearance: Normal appearance. He is normal weight. He is ill-appearing.   HENT:      Head: Normocephalic and atraumatic.      Nose: Nose normal.      Mouth/Throat:      Mouth: Mucous membranes are dry.      Pharynx: Oropharynx is clear.   Eyes:      Extraocular Movements: Extraocular movements intact.      Pupils: Pupils are equal, round, and reactive to light.   Cardiovascular:      Rate and Rhythm: Tachycardia present.      Pulses: Normal pulses.      Heart sounds: Normal heart sounds.   Pulmonary:      Effort: Pulmonary effort is normal. No respiratory distress.      Breath sounds: Normal breath sounds. No wheezing, rhonchi or rales.   Abdominal:      General: Abdomen is flat. Bowel sounds are normal. There is no distension.      Palpations: Abdomen is soft.      Tenderness: There is no abdominal tenderness. There is no guarding.   Neurological:      Mental Status: He is alert. He is disoriented.      Comments: Arouses to stimuli, answers yes/no  Moving all extremities   Psychiatric:         Mood and Affect: Mood normal.         Behavior: Behavior normal.              CRANIAL NERVES     CN III, IV, VI   Pupils are equal, round, and  reactive to light.       Significant Labs: All pertinent labs within the past 24 hours have been reviewed.  Recent Lab Results  (Last 5 results in the past 24 hours)        04/26/24  1120   04/26/24  1000   04/26/24  0927   04/26/24  0823   04/26/24  0818        Albumin         3.9       ALP         103       Allens Test       N/A         ALT         25       Anion Gap   18       22       AST         14       Baso #         0.02       Basophil %         0.1       Beta-Hydroxybutyrate         1.4       BILIRUBIN TOTAL         1.2  Comment: For infants and newborns, interpretation of results should be based  on gestational age, weight and in agreement with clinical  observations.    Premature Infant recommended reference ranges:  Up to 24 hours.............<8.0 mg/dL  Up to 48 hours............<12.0 mg/dL  3-5 days..................<15.0 mg/dL  6-29 days.................<15.0 mg/dL         Site       Other         BUN   88       93       Calcium   10.4       10.9       Chloride   106       101       CO2   24       22       Creatinine   2.4       2.7       DelSys       Room Air         Differential Method         Automated       eGFR   26       23       Eos #         0.0       Eos %         0.0       Glucose   764  Comment: GLU critical result(s) called and verbal readback obtained from   Missy Alvarez RN by [facility] 04/26/2024 10:51         770  Comment: GLU critical result(s) called and verbal readback obtained from Tiffany Syed RN by [facility] 04/26/2024 09:05         Gran # (ANC)         13.7       Gran %         82.5       Hematocrit         46.3       Hemoglobin         15.0       Hepatitis C Ab         Negative       HEP C Virus Hold Specimen         Hold for HCV sendout       HIV 1/2 Ag/Ab         Negative       Immature Grans (Abs)         0.07  Comment: Mild elevation in immature granulocytes is non specific and   can be seen in a variety of conditions including stress response,   acute inflammation, trauma and  pregnancy. Correlation with other   laboratory and clinical findings is essential.         Immature Granulocytes         0.4       Lipase         58       Lymph #         1.6       Lymph %         9.4       Magnesium    2.2             MCH         28.6       MCHC         32.4       MCV         88       Mode       SPONT         Mono #         1.3       Mono %         7.6       MPV         10.5       nRBC         0       Platelet Count         330       POC BE       4         POC HCO3       28.7         POC PCO2       46.2         POC PH       7.401         POC PO2       49         POC SATURATED O2       84         POCT Glucose >500     >500           Potassium   3.3       3.4       PROTEIN TOTAL         8.2       RBC         5.25       RDW         14.1       Sample       VENOUS         Sodium   148       145       Troponin I   0.071  Comment: The reference interval for Troponin I represents the 99th percentile   cutoff   for our facility and is consistent with 3rd generation assay   performance.         0.078  Comment: The reference interval for Troponin I represents the 99th percentile   cutoff   for our facility and is consistent with 3rd generation assay   performance.         WBC         16.62                              Significant Imaging: I have reviewed all pertinent imaging results/findings within the past 24 hours.    CT Head Without Contrast   Final Result      No acute intracranial CT abnormality.      All CT scans at this facility are performed  using dose modulation techniques as appropriate to performed exam including the following:  automated exposure control; adjustment of mA and/or kV according to the patients size (this includes techniques or standardized protocols for targeted exams where dose is matched to indication/reason for exam: i.e. extremities or head);  iterative reconstruction technique.         Electronically signed by: Yusuf Nicholas   Date:    04/26/2024   Time:    09:07      X-Ray  Chest AP Portable   Final Result      No acute abnormality.         Electronically signed by: Yusuf Nicholas   Date:    04/26/2024   Time:    08:15

## 2024-04-26 NOTE — HPI
82 y/o male with PMHx of A-fib, CHF, CAD, DM-2, mood disorder, recently diagnosed with dementia who presented to the ER today accompanied with spouse for generalized weakness. Patient awake but lethargic, moaning, answers some questions. History obtained from wife at bedside. Patient recently started Fluoxetine this week, took for 2 days before wife noticed change in behavior. She reports he's been eating ice cream more lately. He is a diabetic, but not on insulin, currently on Jardiance. No reported chest pain, SOB, fevers/chills, nausea/vomiting, diarrhea, dysuria or any other complaints. In ER, BG level 770, K 3.4, elevated AG 22, BUN/Cr 93/2.7 (baseline Cr 1.0), troponin 0.071, beta hydroxybutyrate 1.4. Recent Hg A1c 8.3%. Patient received 6 units of regular insulin. Repeat BMP with , AG 18. Calculated serum osm 329. [facility] [place] management, admit inpatient for [facility].

## 2024-04-26 NOTE — SUBJECTIVE & OBJECTIVE
Past Medical History:   Diagnosis Date    Angiodysplasia of small intestine     Atrial fibrillation     Chronic upper GI bleeding     due to angiodysplasia in proximal small intestine    Coronary atherosclerosis     Dementia     Diabetes mellitus without complication     Diverticulosis of large intestine without hemorrhage 05/16/2017    Family history of macular degeneration 10/20/2014    History of prostate cancer     Hyperlipidemia associated with type 2 diabetes mellitus     Hypertension associated with diabetes     LAILA (iron deficiency anemia) 09/20/2021    due to angiodysplasia in small intestine    Intention tremor     Major depressive disorder, recurrent, moderate 04/12/2021    SHORTY (obstructive sleep apnea)     Urinary incontinence        Past Surgical History:   Procedure Laterality Date    ABDOMINAL HERNIA REPAIR      APPENDECTOMY      bladder sx      CARDIAC CATHETERIZATION      CATARACT EXTRACTION W/  INTRAOCULAR LENS IMPLANT  Restor OU    COLONOSCOPY N/A 5/16/2017    Procedure: COLONOSCOPY;  Surgeon: Alfredo Naidu MD;  Location: [facility];  Service: Endoscopy;  Laterality: N/A;    ESOPHAGOGASTRODUODENOSCOPY N/A 10/29/2021    Procedure: SBE (Push Enteroscopy) with Dr. Wayne;  Surgeon: Arlette Wayne MD;  Location: [facility];  Service: Endoscopy;  Laterality: N/A;  Plavix held indefinitely as of 10-20-21. Must also hold Coumadin 5 days prior. Ok to continue to use ASA.    ESOPHAGOGASTRODUODENOSCOPY N/A 2/23/2023    Procedure: EGD (ESOPHAGOGASTRODUODENOSCOPY);  Surgeon: Opal Wright MD;  Location: [facility];  Service: Endoscopy;  Laterality: N/A;    inguinal hernia      INTRALUMINAL GASTROINTESTINAL TRACT IMAGING VIA CAPSULE N/A 10/4/2021    Procedure: IMAGING PROCEDURE, GI TRACT, INTRALUMINAL, VIA CAPSULE;  Surgeon: Joaquin Stack RN;  Location: [facility];  Service: Endoscopy;  Laterality: N/A;    LEFT HEART CATHETERIZATION Left 7/20/2021    Procedure: CATHETERIZATION, HEART, LEFT;   Surgeon: Darryl Griffith MD;  Location: [facility] CATH LAB;  Service: Cardiology;  Laterality: Left;    lung sx      PERCUTANEOUS TRANSLUMINAL BALLOON ANGIOPLASTY OF CORONARY ARTERY  2021    Procedure: Angioplasty-coronary;  Surgeon: Darryl Griffith MD;  Location: [facility] CATH LAB;  Service: Cardiology;;    PROSTATE SURGERY         Review of patient's allergies indicates:  No Known Allergies    Family History       Problem Relation (Age of Onset)    Cancer Father    Diabetes Sister    Heart disease Mother, Sister    Macular degeneration Sister          Tobacco Use    Smoking status: Former     Current packs/day: 0.00     Average packs/day: 0.5 packs/day for 40.0 years (20.0 ttl pk-yrs)     Types: Cigarettes     Start date: 1962     Quit date:      Years since quittin.3    Smokeless tobacco: Never   Substance and Sexual Activity    Alcohol use: Yes     Alcohol/week: 7.0 standard drinks of alcohol     Types: 7 Shots of liquor per week    Drug use: No    Sexual activity: Not Currently         Review of Systems   Reason unable to perform ROS: unable to give confused.     Objective:     Vital Signs (Most Recent):  Temp: 98.6 °F (37 °C) (24)  Pulse: 95 (24)  Resp: (!) 22 (24)  BP: (!) 174/96 (24)  SpO2: 95 % (24) Vital Signs (24h Range):  Temp:  [98.1 °F (36.7 °C)-98.6 °F (37 °C)] 98.6 °F (37 °C)  Pulse:  [] 95  Resp:  [14-40] 22  SpO2:  [88 %-99 %] 95 %  BP: (135-174)/(73-96) 174/96     Weight: 90.2 kg (198 lb 13.7 oz)  Body mass index is 32.1 kg/m².      Intake/Output Summary (Last 24 hours) at 2024  Last data filed at 2024 1600  Gross per 24 hour   Intake 1800.94 ml   Output --   Net 1800.94 ml        Physical Exam  Vitals and nursing note reviewed.   HENT:      Head: Normocephalic and atraumatic.   Eyes:      General:         Right eye: No discharge.         Left eye: No discharge.   Cardiovascular:      Rate and Rhythm:  Normal rate.      Heart sounds: No murmur heard.  Pulmonary:      Effort: No respiratory distress.      Breath sounds: No wheezing or rales.   Abdominal:      General: There is no distension.      Palpations: Abdomen is soft.   Musculoskeletal:         General: No swelling or tenderness.      Cervical back: Neck supple.   Neurological:      Comments: Confused move allext           Vents:       Lines/Drains/Airways       Drain  Duration                  Urethral Catheter 04/26/24 1555 <1 day              Peripheral Intravenous Line  Duration                  Peripheral IV - Single Lumen 04/26/24 0750 18 G Posterior;Right Hand <1 day         Peripheral IV - Single Lumen 04/26/24 1001 20 G Anterior;Left Forearm <1 day                    Significant Labs:    CBC/Anemia Profile:  Recent Labs   Lab 04/26/24 0818   WBC 16.62*   HGB 15.0   HCT 46.3      MCV 88   RDW 14.1        Chemistries:  Recent Labs   Lab 04/26/24  0818 04/26/24  1000 04/26/24  1425    148* 150*   K 3.4* 3.3* 3.3*    106 112*   CO2 22* 24 24   BUN 93* 88* 88*   CREATININE 2.7* 2.4* 2.1*   CALCIUM 10.9* 10.4 10.9*   ALBUMIN 3.9  --   --    PROT 8.2  --   --    BILITOT 1.2*  --   --    ALKPHOS 103  --   --    ALT 25  --   --    AST 14  --   --    MG  --  2.2  --        All pertinent labs within the past 24 hours have been reviewed.    Significant Imaging:   I have reviewed all pertinent imaging results/findings within the past 24 hours.

## 2024-04-26 NOTE — ASSESSMENT & PLAN NOTE
Patient's FSGs are uncontrolled due to hyperglycemia on current medication regimen.  Last A1c reviewed-   Lab Results   Component Value Date    HGBA1C 8.3 (H) 04/08/2024     Most recent fingerstick glucose reviewed-   Recent Labs   Lab 04/26/24  0927 04/26/24  1120   POCTGLUCOSE >500* >500*     Current correctional scale   insulin ggt  Increase anti-hyperglycemic dose as follows-   Antihyperglycemics (From admission, onward)      Start     Stop Route Frequency Ordered    04/26/24 1100  insulin regular 1 Units/mL in sodium chloride 0.9% 100 mL infusion        Question Answer Comment   Insulin Rate Adjustment (DO NOT MODIFY ANSWER) \\[email or web address] [email or web address] [email or web address] version [email or web address]    Initial dose (DO NOT CHANGE): 0.1 units/kg/hr        -- IV Continuous 04/26/24 1058          Hold Oral hypoglycemics while patient is in the hospital.   ------------  DKA/HHS insulin drip pathway initiated  BMP q6h, IV NS, NPO  Transition to sub-q regimen when appropriate

## 2024-04-26 NOTE — PHARMACY MED REC
"Admission Medication History     The home medication history was taken by Lars Cardoza.    You may go to "Admission" then "Reconcile Home Medications" tabs to review and/or act upon these items.     The home medication list has been updated by the Pharmacy department.   Please read ALL comments highlighted in yellow.   Please address this information as you see fit.    Feel free to contact us if you have any questions or require assistance.      The medications listed below were removed from the home medication list. Please reorder if appropriate:  Patient reports no longer taking the following medication(s):  MELATONIN 3MG  MYCOLOG II CREAM  METFORMIN 500MG    Medications listed below were obtained from: Patient/family and Analytic software- [employer]: SPOUSE    Lars Cardoza  [id number]    Current Outpatient Medications on File Prior to Encounter   Medication Sig Dispense Refill Last Dose    allopurinoL (ZYLOPRIM) 100 MG tablet Take 1 tablet (100 mg total) by mouth once daily. 90 tablet 3 4/25/2024    apixaban (ELIQUIS) 2.5 mg Tab Take 1 tablet (2.5 mg total) by mouth 2 (two) times daily. 180 tablet 3 4/25/2024    atorvastatin (LIPITOR) 40 MG tablet Take 1 tablet (40 mg total) by mouth once daily. for 360 doses 90 tablet 3 4/25/2024    donepeziL (ARICEPT) 10 MG tablet Take 1 tablet (10 mg total) by mouth every evening. 90 tablet 3 4/25/2024    empagliflozin (JARDIANCE) 10 mg tablet Take 1 tablet (10 mg total) by mouth once daily. 90 tablet 3 4/25/2024    ferrous sulfate 324 mg (65 mg iron) TbEC TAKE ONE TABLET BY MOUTH TWICE DAILY 180 tablet 3 4/25/2024    FLUoxetine 10 MG capsule Take 10 mg by mouth every morning.   4/25/2024    isosorbide mononitrate (IMDUR) 30 MG 24 hr tablet Take 1 tablet (30 mg total) by mouth once daily. 90 tablet 3 4/25/2024    linaCLOtide (LINZESS) 145 mcg Cap capsule Take 1 capsule (145 mcg total) by mouth before breakfast. 90 capsule 3 4/25/2024    metOLazone (ZAROXOLYN) 5 MG " tablet Take 1 tablet (5 mg total) by mouth once daily. 30 tablet 11 4/25/2024    metoprolol succinate (TOPROL-XL) 25 MG 24 hr tablet Take 1 tablet (25 mg total) by mouth once daily. 90 tablet 3 4/25/2024    pantoprazole (PROTONIX) 40 MG tablet Take 1 tablet (40 mg total) by mouth once daily. 90 tablet 3 4/25/2024    potassium chloride SA (K-DUR,KLOR-CON M) 10 MEQ tablet Take 1 tablet (10 mEq total) by mouth once daily. 90 tablet 3 4/25/2024     .

## 2024-04-26 NOTE — CONSULTS
[facility] - Intensive Care ([facility])  Critical Care Medicine  Consult Note    Patient Name: Anthony Biggs Jr.  MRN: [id number]  Admission Date: 4/26/2024  Hospital Length of Stay: 0 days  Code Status: Full Code  Attending Physician: Estuardo Eddy MD   Primary Care Provider: Giuliano Moran MD   Principal Problem: Hyperosmolar hyperglycemic state (HHS)    Consults  Subjective:     HPI:  83-year-old male history of diabetes, dementia, AFib on Eliquis, and coronary artery disease who presented to the ER with lethargic and weak for 2 days.  His Accu-Chek read high.  Patient was started on fluoxetine with some change in behavior.  Patient was found to have a blood glucose in the 700s.  Wean and creatinine were elevated 93 and 2.7.  He was started on IV fluids.  Insulin was ordered as well.  Wife states he has dementia and she lets him eat what he wants to.  Recently had large amount of ice cream.  Also been having falls.  Head CT was negative.  Patient moved to the ICU for insulin drip    Hospital/ICU Course:  No notes on file    Past Medical History:   Diagnosis Date    Angiodysplasia of small intestine     Atrial fibrillation     Chronic upper GI bleeding     due to angiodysplasia in proximal small intestine    Coronary atherosclerosis     Dementia     Diabetes mellitus without complication     Diverticulosis of large intestine without hemorrhage 05/16/2017    Family history of macular degeneration 10/20/2014    History of prostate cancer     Hyperlipidemia associated with type 2 diabetes mellitus     Hypertension associated with diabetes     LAILA (iron deficiency anemia) 09/20/2021    due to angiodysplasia in small intestine    Intention tremor     Major depressive disorder, recurrent, moderate 04/12/2021    SHORTY (obstructive sleep apnea)     Urinary incontinence        Past Surgical History:   Procedure Laterality Date    ABDOMINAL HERNIA REPAIR      APPENDECTOMY      bladder sx      CARDIAC CATHETERIZATION      CATARACT  EXTRACTION W/  INTRAOCULAR LENS IMPLANT  Restor OU    COLONOSCOPY N/A 2017    Procedure: COLONOSCOPY;  Surgeon: Alfredo Naidu MD;  Location: [facility];  Service: Endoscopy;  Laterality: N/A;    ESOPHAGOGASTRODUODENOSCOPY N/A 10/29/2021    Procedure: SBE (Push Enteroscopy) with Dr. Wayne;  Surgeon: Arlette Wayne MD;  Location: [facility];  Service: Endoscopy;  Laterality: N/A;  Plavix held indefinitely as of 10-20-21. Must also hold Coumadin 5 days prior. Ok to continue to use ASA.    ESOPHAGOGASTRODUODENOSCOPY N/A 2023    Procedure: EGD (ESOPHAGOGASTRODUODENOSCOPY);  Surgeon: Opal Wright MD;  Location: [facility];  Service: Endoscopy;  Laterality: N/A;    inguinal hernia      INTRALUMINAL GASTROINTESTINAL TRACT IMAGING VIA CAPSULE N/A 10/4/2021    Procedure: IMAGING PROCEDURE, GI TRACT, INTRALUMINAL, VIA CAPSULE;  Surgeon: Joaquin Stack RN;  Location: [facility];  Service: Endoscopy;  Laterality: N/A;    LEFT HEART CATHETERIZATION Left 2021    Procedure: CATHETERIZATION, HEART, LEFT;  Surgeon: Darryl Griffith MD;  Location: [facility] CATH LAB;  Service: Cardiology;  Laterality: Left;    lung sx      PERCUTANEOUS TRANSLUMINAL BALLOON ANGIOPLASTY OF CORONARY ARTERY  2021    Procedure: Angioplasty-coronary;  Surgeon: Darryl Griffith MD;  Location: [facility] CATH LAB;  Service: Cardiology;;    PROSTATE SURGERY         Review of patient's allergies indicates:  No Known Allergies    Family History       Problem Relation (Age of Onset)    Cancer Father    Diabetes Sister    Heart disease Mother, Sister    Macular degeneration Sister          Tobacco Use    Smoking status: Former     Current packs/day: 0.00     Average packs/day: 0.5 packs/day for 40.0 years (20.0 ttl pk-yrs)     Types: Cigarettes     Start date: 1962     Quit date:      Years since quittin.3    Smokeless tobacco: Never   Substance and Sexual Activity    Alcohol use: Yes     Alcohol/week: 7.0 standard  drinks of alcohol     Types: 7 Shots of liquor per week    Drug use: No    Sexual activity: Not Currently         Review of Systems   Reason unable to perform ROS: unable to give confused.     Objective:     Vital Signs (Most Recent):  Temp: 98.6 °F (37 °C) (04/26/24 1704)  Pulse: 95 (04/26/24 1704)  Resp: (!) 22 (04/26/24 1704)  BP: (!) 174/96 (04/26/24 1704)  SpO2: 95 % (04/26/24 1704) Vital Signs (24h Range):  Temp:  [98.1 °F (36.7 °C)-98.6 °F (37 °C)] 98.6 °F (37 °C)  Pulse:  [] 95  Resp:  [14-40] 22  SpO2:  [88 %-99 %] 95 %  BP: (135-174)/(73-96) 174/96     Weight: 90.2 kg (198 lb 13.7 oz)  Body mass index is 32.1 kg/m².      Intake/Output Summary (Last 24 hours) at 4/26/2024 1707  Last data filed at 4/26/2024 1600  Gross per 24 hour   Intake 1800.94 ml   Output --   Net 1800.94 ml        Physical Exam  Vitals and nursing note reviewed.   HENT:      Head: Normocephalic and atraumatic.   Eyes:      General:         Right eye: No discharge.         Left eye: No discharge.   Cardiovascular:      Rate and Rhythm: Normal rate.      Heart sounds: No murmur heard.  Pulmonary:      Effort: No respiratory distress.      Breath sounds: No wheezing or rales.   Abdominal:      General: There is no distension.      Palpations: Abdomen is soft.   Musculoskeletal:         General: No swelling or tenderness.      Cervical back: Neck supple.   Neurological:      Comments: Confused move allext           Vents:       Lines/Drains/Airways       Drain  Duration                  Urethral Catheter 04/26/24 1555 <1 day              Peripheral Intravenous Line  Duration                  Peripheral IV - Single Lumen 04/26/24 0750 18 G Posterior;Right Hand <1 day         Peripheral IV - Single Lumen 04/26/24 1001 20 G Anterior;Left Forearm <1 day                    Significant Labs:    CBC/Anemia Profile:  Recent Labs   Lab 04/26/24  0818   WBC 16.62*   HGB 15.0   HCT 46.3      MCV 88   RDW 14.1        Chemistries:  Recent  Labs   Lab 04/26/24  0818 04/26/24  1000 04/26/24  1425    148* 150*   K 3.4* 3.3* 3.3*    106 112*   CO2 22* 24 24   BUN 93* 88* 88*   CREATININE 2.7* 2.4* 2.1*   CALCIUM 10.9* 10.4 10.9*   ALBUMIN 3.9  --   --    PROT 8.2  --   --    BILITOT 1.2*  --   --    ALKPHOS 103  --   --    ALT 25  --   --    AST 14  --   --    MG  --  2.2  --        All pertinent labs within the past 24 hours have been reviewed.    Significant Imaging:   I have reviewed all pertinent imaging results/findings within the past 24 hours.    ABG  Recent Labs   Lab 04/26/24  0823   PH 7.401   PO2 49   PCO2 46.2*   HCO3 28.7*   BE 4*     Assessment/Plan:     Cardiac/Vascular  Hypertension associated with diabetes  Iv prn's for now   Will need speech eval     Atrial fibrillation  Monitor , may need prn's if rate increased     Renal/  SHAYLA (acute kidney injury)  Cont fluids     Hematology  Chronic anticoagulation  Hold for now until speech clears    Endocrine  * Hyperosmolar hyperglycemic state (HHS)  Cont fluids /  insulin drip   Will give a bolus  Watch electrolytes and replace           Iraida Scruggs MD  Critical Care Medicine  [facility] - Intensive Care ([facility])

## 2024-04-26 NOTE — HPI
83-year-old male history of diabetes, dementia, AFib on Eliquis, and coronary artery disease who presented to the ER with lethargic and weak for 2 days.  His Accu-Chek read high.  Patient was started on fluoxetine with some change in behavior.  Patient was found to have a blood glucose in the 700s.  Wean and creatinine were elevated 93 and 2.7.  He was started on IV fluids.  Insulin was ordered as well.  Wife states he has dementia and she lets him eat what he wants to.  Recently had large amount of ice cream.  Also been having falls.  Head CT was negative.  Patient moved to the ICU for insulin drip

## 2024-04-26 NOTE — ED PROVIDER NOTES
SCRIBE #1 NOTE: I, Priscilla Michelle, am scribing for, and in the presence of, Jose Cruz Nielsen Jr., MD. I have scribed the entire note.       History     Chief Complaint   Patient presents with    Altered Mental Status     EMS reports patient has been lethargic and weak x 2 days. CBG reading of HI. AMS      Review of patient's allergies indicates:  No Known Allergies      History of Present Illness     HPI    4/26/2024, 7:56 AM  History obtained from the wife      History of Present Illness: Anthony Biggs Jr. is a 83 y.o. male patient with a PMHx of DM and HTN who presents to the Emergency Department for evaluation of lethargy and weakness which onset 2 days PTA. Patients spouse noted that the patient has dementia and that she noticed a change in his energy two days ago but attributed it to the change in his medication. Symptoms are constant and moderate in severity. No mitigating or exacerbating factors reported. Patient denies any fever, chills, HA, n/v/d, CP, and all other sxs at this time. No further complaints or concerns at this time.  Patient was blood sugar read high prior to arrival.  Per the spouse the patient has been eating ice cream      Arrival mode: EMS    PCP: Giuliano Moran MD        Past Medical History:  Past Medical History:   Diagnosis Date    Angiodysplasia of small intestine     Atrial fibrillation     Chronic upper GI bleeding     due to angiodysplasia in proximal small intestine    Coronary atherosclerosis     Dementia     Diabetes mellitus without complication     Diverticulosis of large intestine without hemorrhage 05/16/2017    Family history of macular degeneration 10/20/2014    History of prostate cancer     Hyperlipidemia associated with type 2 diabetes mellitus     Hypertension associated with diabetes     LAILA (iron deficiency anemia) 09/20/2021    due to angiodysplasia in small intestine    Intention tremor     Major depressive disorder, recurrent, moderate 04/12/2021    SHORTY  (obstructive sleep apnea)     Urinary incontinence        Past Surgical History:  Past Surgical History:   Procedure Laterality Date    ABDOMINAL HERNIA REPAIR      APPENDECTOMY      bladder sx      CARDIAC CATHETERIZATION      CATARACT EXTRACTION W/  INTRAOCULAR LENS IMPLANT  Restor OU    COLONOSCOPY N/A 5/16/2017    Procedure: COLONOSCOPY;  Surgeon: Alfredo Naidu MD;  Location: [facility];  Service: Endoscopy;  Laterality: N/A;    ESOPHAGOGASTRODUODENOSCOPY N/A 10/29/2021    Procedure: SBE (Push Enteroscopy) with Dr. Wayne;  Surgeon: Arlette Wayne MD;  Location: [facility];  Service: Endoscopy;  Laterality: N/A;  Plavix held indefinitely as of 10-20-21. Must also hold Coumadin 5 days prior. Ok to continue to use ASA.    ESOPHAGOGASTRODUODENOSCOPY N/A 2/23/2023    Procedure: EGD (ESOPHAGOGASTRODUODENOSCOPY);  Surgeon: Opal Wright MD;  Location: [facility];  Service: Endoscopy;  Laterality: N/A;    inguinal hernia      INTRALUMINAL GASTROINTESTINAL TRACT IMAGING VIA CAPSULE N/A 10/4/2021    Procedure: IMAGING PROCEDURE, GI TRACT, INTRALUMINAL, VIA CAPSULE;  Surgeon: Joaquin Stack RN;  Location: [facility];  Service: Endoscopy;  Laterality: N/A;    LEFT HEART CATHETERIZATION Left 7/20/2021    Procedure: CATHETERIZATION, HEART, LEFT;  Surgeon: Darryl Griffith MD;  Location: [facility] CATH LAB;  Service: Cardiology;  Laterality: Left;    lung sx      PERCUTANEOUS TRANSLUMINAL BALLOON ANGIOPLASTY OF CORONARY ARTERY  7/20/2021    Procedure: Angioplasty-coronary;  Surgeon: Darryl Griffith MD;  Location: [facility] CATH LAB;  Service: Cardiology;;    PROSTATE SURGERY           Family History:  Family History   Problem Relation Name Age of Onset    Heart disease Mother      Cancer Father          lung    Macular degeneration Sister      Diabetes Sister      Heart disease Sister      Strabismus Neg Hx      Retinal detachment Neg Hx      Glaucoma Neg Hx      Blindness Neg Hx      Amblyopia Neg Hx          Social History:  Social History     Tobacco Use    Smoking status: Former     Current packs/day: 0.00     Average packs/day: 0.5 packs/day for 40.0 years (20.0 ttl pk-yrs)     Types: Cigarettes     Start date: 1962     Quit date:      Years since quittin.3    Smokeless tobacco: Never   Substance and Sexual Activity    Alcohol use: Yes     Alcohol/week: 7.0 standard drinks of alcohol     Types: 7 Shots of liquor per week    Drug use: No    Sexual activity: Not Currently        Review of Systems     Review of Systems   Constitutional:  Negative for chills and fever.   Respiratory:  Negative for cough.    Cardiovascular:  Negative for chest pain.   Gastrointestinal:  Negative for diarrhea, nausea and vomiting.   Neurological:  Positive for weakness. Negative for headaches.        Physical Exam     Initial Vitals [24 0742]   BP Pulse Resp Temp SpO2   135/79 94 14 98.1 °F (36.7 °C) 99 %      MAP       --          Physical Exam  Nursing Notes and Vital Signs Reviewed.  Constitutional: Patient is in no acute distress. Well-developed and well-nourished.  Patient was confused.  He opens his eyes to voice and tracks does follow commands.  Head: Atraumatic. Normocephalic.  Eyes:  EOM intact.  No scleral icterus.  ENT: Mucous membranes are moist.  Nares clear   Neck:  Full ROM. No JVD.  Cardiovascular: Regular rate. Regular rhythm No murmurs, rubs, or gallops. Distal pulses are 2+ and symmetric  Pulmonary/Chest: No respiratory distress. Clear to auscultation bilaterally. No wheezing or rales.  Equal chest wall rise bilaterally  Abdominal: Soft and non-distended.  There is no tenderness.  No rebound, guarding, or rigidity. Good bowel sounds.  Genitourinary: No CVA tenderness.  No suprapubic tenderness  Musculoskeletal: Moves all extremities. No obvious deformities.  5 x 5 strength in all extremities   Skin: Warm and dry.  Neurological:  Listless with the appropriate.  He awakens to voice and follows  "commands..   No acute focal neurological deficits are appreciated.  Two through 12 intact bilaterally.  Psychiatric: Normal affect. Good eye contact. Appropriate in content.       ED Course   Critical Care    Date/Time: 4/26/2024 9:18 AM    Performed by: Jose Cruz Nielsen Jr., MD  Authorized by: Jose Cruz Nielsen Jr., MD  Direct patient critical care time: 30 minutes  Additional history critical care time: 15 minutes  Ordering / reviewing critical care time: 5 minutes  Documentation critical care time: 5 minutes  Consulting other physicians critical care time: 5 minutes  Total critical care time (exclusive of procedural time) : 60 minutes  Critical care time was exclusive of separately billable procedures and treating other patients and teaching time.  Critical care was necessary to treat or prevent imminent or life-threatening deterioration of the following conditions: metabolic crisis.  Critical care was time spent personally by me on the following activities: blood draw for specimens, development of treatment plan with patient or surrogate, discussions with consultants, discussions with primary provider, interpretation of cardiac output measurements, evaluation of patient's response to treatment, examination of patient, obtaining history from patient or surrogate, ordering and performing treatments and interventions, ordering and review of laboratory studies, ordering and review of radiographic studies, pulse oximetry, re-evaluation of patient's condition and review of old charts.        ED Vital Signs:  Vitals:    04/26/24 0742 04/26/24 0802 04/26/24 0823 04/26/24 0830   BP: 135/79 (!) 150/92     Pulse: 94 (!) 111 104    Resp: 14 (!) 38 (!) 29 (!) 33   Temp: 98.1 °F (36.7 °C)      TempSrc: Oral      SpO2: 99% (!) 93% (!) 93%    Weight:       Height: 5' 6" (1.676 m)       04/26/24 0832 04/26/24 0933 04/26/24 1030 04/26/24 1035   BP: (!) 155/82   (!) 152/80   Pulse: 102   (!) 111   Resp: (!) 30  (!) 25 (!) 29 "   Temp:       TempSrc:       SpO2: (!) 91%  95% 99%   Weight:  90.3 kg (199 lb)     Height:           Abnormal Lab Results:  Labs Reviewed   CBC W/ AUTO DIFFERENTIAL - Abnormal; Notable for the following components:       Result Value    WBC 16.62 (*)     Gran # (ANC) 13.7 (*)     Immature Grans (Abs) 0.07 (*)     Mono # 1.3 (*)     Gran % 82.5 (*)     Lymph % 9.4 (*)     All other components within normal limits    Narrative:     Release to patient->Immediate   COMPREHENSIVE METABOLIC PANEL - Abnormal; Notable for the following components:    Potassium 3.4 (*)     CO2 22 (*)     Glucose 770 (*)     BUN 93 (*)     Creatinine 2.7 (*)     Calcium 10.9 (*)     Total Bilirubin 1.2 (*)     eGFR 23 (*)     Anion Gap 22 (*)     All other components within normal limits    Narrative:     Release to patient->Immediate  GLU critical result(s) called and verbal readback obtained from Tiffany Syed RN by [facility] 04/26/2024 09:05   URINALYSIS, REFLEX TO URINE CULTURE - Abnormal; Notable for the following components:    Glucose, UA 4+ (*)     All other components within normal limits    Narrative:     Specimen Source->Urine   TROPONIN I - Abnormal; Notable for the following components:    Troponin I 0.078 (*)     All other components within normal limits    Narrative:     Release to patient->Immediate   BETA - HYDROXYBUTYRATE, SERUM - Abnormal; Notable for the following components:    Beta-Hydroxybutyrate 1.4 (*)     All other components within normal limits    Narrative:     Release to patient->Immediate   BASIC METABOLIC PANEL - Abnormal; Notable for the following components:    Sodium 148 (*)     Potassium 3.3 (*)     Glucose 764 (*)     BUN 88 (*)     Creatinine 2.4 (*)     Anion Gap 18 (*)     eGFR 26 (*)     All other components within normal limits    Narrative:     GLU critical result(s) called and verbal readback obtained from   Missy Alvarez RN by [facility] 04/26/2024 10:51   TROPONIN I - Abnormal; Notable for the  following components:    Troponin I 0.071 (*)     All other components within normal limits   ISTAT PROCEDURE - Abnormal; Notable for the following components:    POC PCO2 46.2 (*)     POC HCO3 28.7 (*)     POC BE 4 (*)     All other components within normal limits   POCT GLUCOSE - Abnormal; Notable for the following components:    POCT Glucose >500 (*)     All other components within normal limits   POCT GLUCOSE - Abnormal; Notable for the following components:    POCT Glucose >500 (*)     All other components within normal limits   HIV 1 / 2 ANTIBODY    Narrative:     Release to patient->Immediate   HEPATITIS C ANTIBODY    Narrative:     Release to patient->Immediate   HEP C VIRUS HOLD SPECIMEN    Narrative:     Release to patient->Immediate   LIPASE    Narrative:     Release to patient->Immediate   URINALYSIS MICROSCOPIC    Narrative:     Specimen Source->Urine   MAGNESIUM   OSMOLALITY, SERUM   URINALYSIS, REFLEX TO URINE CULTURE   POCT GLUCOSE MONITORING CONTINUOUS   POCT GLUCOSE MONITORING CONTINUOUS        All Lab Results:  Results for orders placed or performed during the hospital encounter of 04/26/24   HIV 1/2 Ag/Ab (4th Gen)   Result Value Ref Range    HIV 1/2 Ag/Ab Negative Negative   Hepatitis C Antibody   Result Value Ref Range    Hepatitis C Ab Negative Negative   HCV Virus Hold Specimen   Result Value Ref Range    HEP C Virus Hold Specimen Hold for HCV sendout    CBC auto differential   Result Value Ref Range    WBC 16.62 (H) 3.90 - 12.70 K/uL    RBC 5.25 4.60 - 6.20 M/uL    Hemoglobin 15.0 14.0 - 18.0 g/dL    Hematocrit 46.3 40.0 - 54.0 %    MCV 88 82 - 98 fL    MCH 28.6 27.0 - 31.0 pg    MCHC 32.4 32.0 - 36.0 g/dL    RDW 14.1 11.5 - 14.5 %    Platelets 330 150 - 450 K/uL    MPV 10.5 9.2 - 12.9 fL    Immature Granulocytes 0.4 0.0 - 0.5 %    Gran # (ANC) 13.7 (H) 1.8 - 7.7 K/uL    Immature Grans (Abs) 0.07 (H) 0.00 - 0.04 K/uL    Lymph # 1.6 1.0 - 4.8 K/uL    Mono # 1.3 (H) 0.3 - 1.0 K/uL    Eos # 0.0  0.0 - 0.5 K/uL    Baso # 0.02 0.00 - 0.20 K/uL    nRBC 0 0 /100 WBC    Gran % 82.5 (H) 38.0 - 73.0 %    Lymph % 9.4 (L) 18.0 - 48.0 %    Mono % 7.6 4.0 - 15.0 %    Eosinophil % 0.0 0.0 - 8.0 %    Basophil % 0.1 0.0 - 1.9 %    Differential Method Automated    Comprehensive metabolic panel   Result Value Ref Range    Sodium 145 136 - 145 mmol/L    Potassium 3.4 (L) 3.5 - 5.1 mmol/L    Chloride 101 95 - 110 mmol/L    CO2 22 (L) 23 - 29 mmol/L    Glucose 770 (HH) 70 - 110 mg/dL    BUN 93 (H) 8 - 23 mg/dL    Creatinine 2.7 (H) 0.5 - 1.4 mg/dL    Calcium 10.9 (H) 8.7 - 10.5 mg/dL    Total Protein 8.2 6.0 - 8.4 g/dL    Albumin 3.9 3.5 - 5.2 g/dL    Total Bilirubin 1.2 (H) 0.1 - 1.0 mg/dL    Alkaline Phosphatase 103 55 - 135 U/L    AST 14 10 - 40 U/L    ALT 25 10 - 44 U/L    eGFR 23 (A) >60 mL/min/1.73 m^2    Anion Gap 22 (H) 8 - 16 mmol/L   Lipase   Result Value Ref Range    Lipase 58 4 - 60 U/L   Urinalysis, Reflex to Urine Culture Urine, Clean Catch    Specimen: Urine, Clean Catch   Result Value Ref Range    Specimen UA Urine, Clean Catch     Color, UA Yellow Yellow, Straw, [place]    Appearance, UA Clear Clear    pH, UA 5.0 5.0 - 8.0    Specific Gravity, UA 1.030 1.005 - 1.030    Protein, UA Negative Negative    Glucose, UA 4+ (A) Negative    Ketones, UA Negative Negative    Bilirubin (UA) Negative Negative    Occult Blood UA Negative Negative    Nitrite, UA Negative Negative    Urobilinogen, UA Negative <2.0 EU/dL    Leukocytes, UA Negative Negative   Troponin I   Result Value Ref Range    Troponin I 0.078 (H) 0.000 - 0.026 ng/mL   Beta - Hydroxybutyrate, Serum   Result Value Ref Range    Beta-Hydroxybutyrate 1.4 (H) 0.0 - 0.5 mmol/L   Urinalysis Microscopic   Result Value Ref Range    Bacteria None None-Occ /hpf    Yeast, UA None None    Microscopic Comment SEE COMMENT    Basic metabolic panel   Result Value Ref Range    Sodium 148 (H) 136 - 145 mmol/L    Potassium 3.3 (L) 3.5 - 5.1 mmol/L    Chloride 106 95 - 110  mmol/L    CO2 24 23 - 29 mmol/L    Glucose 764 (HH) 70 - 110 mg/dL    BUN 88 (H) 8 - 23 mg/dL    Creatinine 2.4 (H) 0.5 - 1.4 mg/dL    Calcium 10.4 8.7 - 10.5 mg/dL    Anion Gap 18 (H) 8 - 16 mmol/L    eGFR 26 (A) >60 mL/min/1.73 m^2   Magnesium   Result Value Ref Range    Magnesium 2.2 1.6 - 2.6 mg/dL   Troponin I   Result Value Ref Range    Troponin I 0.071 (H) 0.000 - 0.026 ng/mL   EKG 12-lead   Result Value Ref Range    QRS Duration 78 ms    [facility] QTC Calculation 513 ms   ISTAT PROCEDURE   Result Value Ref Range    POC PH 7.401 7.35 - 7.45    POC PCO2 46.2 (H) 35 - 45 mmHg    POC PO2 49 40 - 60 mmHg    POC HCO3 28.7 (H) 24 - 28 mmol/L    POC BE 4 (H) -2 to 2 mmol/L    POC SATURATED O2 84 95 - 100 %    Sample VENOUS     Site Other     Allens Test N/A     DelSys Room Air     Mode SPONT    POCT glucose   Result Value Ref Range    POCT Glucose >500 (HH) 70 - 110 mg/dL   POCT glucose   Result Value Ref Range    POCT Glucose >500 (HH) 70 - 110 mg/dL     *Note: Due to a large number of results and/or encounters for the requested time period, some results have not been displayed. A complete set of results can be found in Results Review.         Imaging Results:  Imaging Results              CT Head Without Contrast (Final result)  Result time 04/26/24 09:07:39      Final result by Yusuf Nicholas MD (04/26/24 09:07:39)                   Impression:      No acute intracranial CT abnormality.    All CT scans at this facility are performed  using dose modulation techniques as appropriate to performed exam including the following:  automated exposure control; adjustment of mA and/or kV according to the patients size (this includes techniques or standardized protocols for targeted exams where dose is matched to indication/reason for exam: i.e. extremities or head);  iterative reconstruction technique.      Electronically signed by: Yusuf Nicholas  Date:    04/26/2024  Time:    09:07               Narrative:     EXAMINATION:  CT HEAD WITHOUT CONTRAST    CLINICAL HISTORY:  Mental status change, unknown cause;    TECHNIQUE:  Low dose axial CT images obtained throughout the head without intravenous contrast. Sagittal and coronal reconstructions were performed.    COMPARISON:  02/24/2023    FINDINGS:  Intracranial compartment:    Ventricles and sulci are normal in size for age without evidence of hydrocephalus. No extra-axial blood or fluid collections.    Mild microvascular ischemic change.  No parenchymal mass, hemorrhage, edema or major vascular distribution infarct.    Skull/extracranial contents (limited evaluation): No fracture. Mastoid air cells and paranasal sinuses are essentially clear.                                       X-Ray Chest AP Portable (Final result)  Result time 04/26/24 08:15:53      Final result by Yusuf Nicholas MD (04/26/24 08:15:53)                   Impression:      No acute abnormality.      Electronically signed by: Yusuf Nicholas  Date:    04/26/2024  Time:    08:15               Narrative:    EXAMINATION:  XR CHEST AP PORTABLE    CLINICAL HISTORY:  altered mental status;    TECHNIQUE:  Single frontal view of the chest was performed.    COMPARISON:  None    FINDINGS:  The lungs are clear, with normal appearance of pulmonary vasculature and no pleural effusion or pneumothorax.    The cardiac silhouette is normal in size. The hilar and mediastinal contours are unremarkable.    Bones are intact.                                       The EKG was ordered, reviewed, and independently interpreted by the ED provider.  Interpretation time: 08:03  Rate: 97 BPM  Rhythm: atrial fibrillation with premature ventricular or aberrantly conducted complexes  Interpretation: ST & T wave abnormality, consider inferior ischemia. Consider anterolateral ischemia. Prolonged QT No STEMI.             The Emergency Provider reviewed the vital signs and test results, which are outlined above.     ED Discussion       9:25  AM: Discussed case with  ([facility]). Dr. Eddy agrees with current care and management of pt and accepts admission.   Admitting Service: Hospital Med  Admitting Physician: Dr. Eddy  Admit to: [facility]       ED Course as of 04/26/24 1140   Fri Apr 26, 2024   0802 Cardiac monitor interpretation  Independent interpretation  Indication:  Altered mental status  Sinus tachycardia.  Rate 101.  No STEMI [RT]      ED Course User Index  [RT] Jose Cruz Nielsen Jr., MD     Medical Decision Making  Differential diagnosis:  Altered mental status, DKA, UTI, pneumonia, stroke, hyperosmolar nonketotic state    Amount and/or Complexity of Data Reviewed  Independent Historian: spouse  External Data Reviewed: labs and notes.  Labs: ordered. Decision-making details documented in ED Course.     Details: Patient was with elevated glucose greater than 500.  BNP shows it at 764.  Does have an elevated gap at 18 however he was not acidotic with a pH of 7.4.  Does have a minimal elevation of beta hydroxybutyrate as well as troponin is 0.071.  Magnesium is normal.  Patient was 16,000 white count with no shift in his urine is clear.  Patient appears to be on the cusp of but not in diabetic ketoacidosis.  Treated with insulin subQ as well as fluids.  With his alteration of his consciousness concerns for H and and K.  Will admit  Radiology: ordered. Decision-making details documented in ED Course.  ECG/medicine tests: ordered and independent interpretation performed. Decision-making details documented in ED Course.  Discussion of management or test interpretation with external provider(s): Discussed the case with Dr. Eddy with [facility] medicine graciously accepts    Risk  OTC drugs.  Prescription drug management.  Decision regarding hospitalization.    Critical Care  Total time providing critical care: 60 minutes                ED Medication(s):  Medications   sodium chloride 0.9% flush 10 mL (has no administration in time  range)   0.9%  NaCl infusion (has no administration in time range)   dextrose 5 % and 0.45 % NaCl infusion (has no administration in time range)   insulin regular 1 Units/mL in sodium chloride 0.9% 100 mL infusion (has no administration in time range)   dextrose 10% bolus 125 mL 125 mL (has no administration in time range)   dextrose 10% bolus 250 mL 250 mL (has no administration in time range)   potassium chloride 10 mEq in 100 mL IVPB (has no administration in time range)   sodium chloride 0.9% bolus 1,000 mL 1,000 mL (0 mLs Intravenous Stopped 4/26/24 0919)   insulin regular injection 6 Units 0.06 mL (6 Units Subcutaneous Given 4/26/24 0928)       New Prescriptions    No medications on file               Scribe Attestation:   Scribe #1: I performed the above scribed service and the documentation accurately describes the services I performed. I attest to the accuracy of the note.     Attending:   Physician Attestation Statement for Scribe #1: I, Jose Cruz Nielsen Jr., MD, personally performed the services described in this documentation, as scribed by Priscilla Michelle, in my presence, and it is both accurate and complete.           Clinical Impression       ICD-10-CM ICD-9-CM   1. HHNC (hyperglycemic hyperosmolar nonketotic coma)  E11.01 250.20     250.30   2. Altered mental status  R41.82 780.97   3. Troponin I above reference range  R79.89 790.6   4. Hyperosmolar hyperglycemic state (HHS)  E11.00 250.22       Disposition:   Disposition: Admitted  Condition: Critical         Jose Cruz Neilsen Jr., MD  04/26/24 1140

## 2024-04-26 NOTE — H&P
[facility] - Emergency Dept[facility]  [facility]  [facility] & Physical    Patient Name: Anthony Biggs Jr.  MRN: [id number]  Patient Class: IP- Inpatient  Admission Date: 4/26/2024  Attending Physician: Estuardo Eddy MD   Primary Care Provider: Giuliano Moran MD         Patient information was obtained from patient, past medical records, and ER records.     Subjective:     Principal Problem:Hyperosmolar hyperglycemic state (HHS)    Chief Complaint:   Chief Complaint   Patient presents with    Altered Mental Status     EMS reports patient has been lethargic and weak x 2 days. CBG reading of HI. AMS         HPI: 84 y/o male with PMHx of A-fib, CHF, CAD, DM-2, mood disorder, recently diagnosed with dementia who presented to the ER today accompanied with spouse for generalized weakness. Patient awake but lethargic, moaning, answers some questions. History obtained from wife at bedside. Patient recently started Fluoxetine this week, took for 2 days before wife noticed change in behavior. She reports he's been eating ice cream more lately. He is a diabetic, but not on insulin, currently on Jardiance. No reported chest pain, SOB, fevers/chills, nausea/vomiting, diarrhea, dysuria or any other complaints. In ER, BG level 770, K 3.4, elevated AG 22, BUN/Cr 93/2.7 (baseline Cr 1.0), troponin 0.071, beta hydroxybutyrate 1.4. Recent Hg A1c 8.3%. Patient received 6 units of regular insulin. Repeat BMP with , AG 18. Calculated serum osm 329. [facility] [place], admit inpatient for HHS.    Past Medical History:   Diagnosis Date    Angiodysplasia of small intestine     Atrial fibrillation     Chronic upper GI bleeding     due to angiodysplasia in proximal small intestine    Coronary atherosclerosis     Dementia     Diabetes mellitus without complication     Diverticulosis of large intestine without hemorrhage 05/16/2017    Family history of macular degeneration 10/20/2014    History of prostate cancer      Hyperlipidemia associated with type 2 diabetes mellitus     Hypertension associated with diabetes     LAILA (iron deficiency anemia) 09/20/2021    due to angiodysplasia in small intestine    Intention tremor     Major depressive disorder, recurrent, moderate 04/12/2021    SHORTY (obstructive sleep apnea)     Urinary incontinence        Past Surgical History:   Procedure Laterality Date    ABDOMINAL HERNIA REPAIR      APPENDECTOMY      bladder sx      CARDIAC CATHETERIZATION      CATARACT EXTRACTION W/  INTRAOCULAR LENS IMPLANT  Restor OU    COLONOSCOPY N/A 5/16/2017    Procedure: COLONOSCOPY;  Surgeon: Alfredo Naidu MD;  Location: [facility];  Service: Endoscopy;  Laterality: N/A;    ESOPHAGOGASTRODUODENOSCOPY N/A 10/29/2021    Procedure: SBE (Push Enteroscopy) with Dr. Wayne;  Surgeon: Arlette Wayne MD;  Location: [facility];  Service: Endoscopy;  Laterality: N/A;  Plavix held indefinitely as of 10-20-21. Must also hold Coumadin 5 days prior. Ok to continue to use ASA.    ESOPHAGOGASTRODUODENOSCOPY N/A 2/23/2023    Procedure: EGD (ESOPHAGOGASTRODUODENOSCOPY);  Surgeon: Opal Wright MD;  Location: [facility];  Service: Endoscopy;  Laterality: N/A;    inguinal hernia      INTRALUMINAL GASTROINTESTINAL TRACT IMAGING VIA CAPSULE N/A 10/4/2021    Procedure: IMAGING PROCEDURE, GI TRACT, INTRALUMINAL, VIA CAPSULE;  Surgeon: Joaquin Stack RN;  Location: [facility];  Service: Endoscopy;  Laterality: N/A;    LEFT HEART CATHETERIZATION Left 7/20/2021    Procedure: CATHETERIZATION, HEART, LEFT;  Surgeon: Darryl Griffith MD;  Location: [facility] CATH LAB;  Service: Cardiology;  Laterality: Left;    lung sx      PERCUTANEOUS TRANSLUMINAL BALLOON ANGIOPLASTY OF CORONARY ARTERY  7/20/2021    Procedure: Angioplasty-coronary;  Surgeon: Darryl Griffith MD;  Location: [facility] CATH LAB;  Service: Cardiology;;    PROSTATE SURGERY         Review of patient's allergies indicates:  No Known Allergies    Current  Facility-Administered Medications   Medication Dose Route Frequency Provider Last Rate Last Admin    0.9%  NaCl infusion  1,000 mL Intravenous Continuous Estuardo Eddy MD        acetaminophen tablet 650 mg  650 mg Oral Q4H PRN Estuardo Eddy MD        dextrose 10% bolus 125 mL 125 mL  12.5 g Intravenous PRN Estuardo Eddy MD        dextrose 10% bolus 125 mL 125 mL  12.5 g Intravenous PRN Estuardo Eddy MD        dextrose 10% bolus 250 mL 250 mL  25 g Intravenous PRN Estuardo Eddy MD        dextrose 10% bolus 250 mL 250 mL  25 g Intravenous PRN Estuardo Eddy MD        dextrose 5 % and 0.45 % NaCl infusion   Intravenous Continuous PRN Estuardo Eddy MD        glucagon (human recombinant) injection 1 mg  1 mg Intramuscular PRN Estuardo Eddy MD        glucose chewable tablet 16 g  16 g Oral PRN Estuardo Eddy MD        glucose chewable tablet 24 g  24 g Oral PRN Estuardo Eddy MD        insulin regular 1 Units/mL in sodium chloride 0.9% 100 mL infusion  0-0.2 Units/kg/hr Intravenous Continuous Estuardo Eddy MD        melatonin tablet 6 mg  6 mg Oral Nightly PRN Estuardo Eddy MD        naloxone 0.4 mg/mL injection 0.02 mg  0.02 mg Intravenous PRN Estuardo Eddy MD        ondansetron injection 4 mg  4 mg Intravenous Q6H PRN Estuardo Eddy MD        potassium chloride 10 mEq in 100 mL IVPB  10 mEq Intravenous Q1H Jose Cruz Nielsen Jr., MD        simethicone chewable tablet 80 mg  1 tablet Oral QID PRN Estuardo Eddy MD        sodium chloride 0.9% flush 10 mL  10 mL Intravenous PRN Estuardo Eddy MD         Current Outpatient Medications   Medication Sig Dispense Refill    allopurinoL (ZYLOPRIM) 100 MG tablet Take 1 tablet (100 mg total) by mouth once daily. 90 tablet 3    apixaban (ELIQUIS) 2.5 mg Tab Take 1 tablet (2.5 mg total) by mouth 2 (two) times daily. 180 tablet 3    atorvastatin (LIPITOR) 40 MG tablet Take 1 tablet (40 mg total) by mouth once daily. for 360 doses 90 tablet 3    donepeziL (ARICEPT) 10 MG  tablet Take 1 tablet (10 mg total) by mouth every evening. 90 tablet 3    empagliflozin (JARDIANCE) 10 mg tablet Take 1 tablet (10 mg total) by mouth once daily. 90 tablet 3    ferrous sulfate 324 mg (65 mg iron) TbEC TAKE ONE TABLET BY MOUTH TWICE DAILY 180 tablet 3    FLUoxetine 10 MG capsule Take 10 mg by mouth every morning.      isosorbide mononitrate (IMDUR) 30 MG 24 hr tablet Take 1 tablet (30 mg total) by mouth once daily. 90 tablet 3    linaCLOtide (LINZESS) 145 mcg Cap capsule Take 1 capsule (145 mcg total) by mouth before breakfast. 90 capsule 3    metOLazone (ZAROXOLYN) 5 MG tablet Take 1 tablet (5 mg total) by mouth once daily. 30 tablet 11    metoprolol succinate (TOPROL-XL) 25 MG 24 hr tablet Take 1 tablet (25 mg total) by mouth once daily. 90 tablet 3    pantoprazole (PROTONIX) 40 MG tablet Take 1 tablet (40 mg total) by mouth once daily. 90 tablet 3    potassium chloride SA (K-DUR,KLOR-CON M) 10 MEQ tablet Take 1 tablet (10 mEq total) by mouth once daily. 90 tablet 3    nystatin-triamcinolone (MYCOLOG II) cream Apply topically 4 (four) times daily. 30 g 1     Family History       Problem Relation (Age of Onset)    Cancer Father    Diabetes Sister    Heart disease Mother, Sister    Macular degeneration Sister          Tobacco Use    Smoking status: Former     Current packs/day: 0.00     Average packs/day: 0.5 packs/day for 40.0 years (20.0 ttl pk-yrs)     Types: Cigarettes     Start date: 1962     Quit date:      Years since quittin.3    Smokeless tobacco: Never   Substance and Sexual Activity    Alcohol use: Yes     Alcohol/week: 7.0 standard drinks of alcohol     Types: 7 Shots of liquor per week    Drug use: No    Sexual activity: Not Currently     Review of Systems   All other systems reviewed and are negative.    Objective:     Vital Signs (Most Recent):  Temp: 98.1 °F (36.7 °C) (24 0742)  Pulse: (!) 111 (24 1035)  Resp: (!) 29 (24 1035)  BP: (!) 152/80 (24  1035)  SpO2: 99 % (04/26/24 1035) Vital Signs (24h Range):  Temp:  [98.1 °F (36.7 °C)] 98.1 °F (36.7 °C)  Pulse:  [] 111  Resp:  [14-38] 29  SpO2:  [91 %-99 %] 99 %  BP: (135-155)/(79-92) 152/80     Weight: 90.3 kg (199 lb)  Body mass index is 32.12 kg/m².     Physical Exam  Vitals and nursing note reviewed.   Constitutional:       General: He is not in acute distress.     Appearance: Normal appearance. He is normal weight. He is ill-appearing.   HENT:      Head: Normocephalic and atraumatic.      Nose: Nose normal.      Mouth/Throat:      Mouth: Mucous membranes are dry.      Pharynx: Oropharynx is clear.   Eyes:      Extraocular Movements: Extraocular movements intact.      Pupils: Pupils are equal, round, and reactive to light.   Cardiovascular:      Rate and Rhythm: Tachycardia present.      Pulses: Normal pulses.      Heart sounds: Normal heart sounds.   Pulmonary:      Effort: Pulmonary effort is normal. No respiratory distress.      Breath sounds: Normal breath sounds. No wheezing, rhonchi or rales.   Abdominal:      General: Abdomen is flat. Bowel sounds are normal. There is no distension.      Palpations: Abdomen is soft.      Tenderness: There is no abdominal tenderness. There is no guarding.   Neurological:      Mental Status: He is alert. He is disoriented.      Comments: Arouses to stimuli, answers yes/no  Moving all extremities   Psychiatric:         Mood and Affect: Mood normal.         Behavior: Behavior normal.              CRANIAL NERVES     CN III, IV, VI   Pupils are equal, round, and reactive to light.       Significant Labs: All pertinent labs within the past 24 hours have been reviewed.  Recent Lab Results  (Last 5 results in the past 24 hours)        04/26/24  1120   04/26/24  1000   04/26/24  0927   04/26/24  0823   04/26/24  0818        Albumin         3.9       ALP         103       Allens Test       N/A         ALT         25       Anion Gap   18       22       AST         14        Baso #         0.02       Basophil %         0.1       Beta-Hydroxybutyrate         1.4       BILIRUBIN TOTAL         1.2  Comment: For infants and newborns, interpretation of results should be based  on gestational age, weight and in agreement with clinical  observations.    Premature Infant recommended reference ranges:  Up to 24 hours.............<8.0 mg/dL  Up to 48 hours............<12.0 mg/dL  3-5 days..................<15.0 mg/dL  6-29 days.................<15.0 mg/dL         Site       Other         BUN   88       93       Calcium   10.4       10.9       Chloride   106       101       CO2   24       22       Creatinine   2.4       2.7       DelSys       Room Air         Differential Method         Automated       eGFR   26       23       Eos #         0.0       Eos %         0.0       Glucose   764  Comment: GLU critical result(s) called and verbal readback obtained from   Missy Alvarez RN by [facility] 04/26/2024 10:51         770  Comment: GLU critical result(s) called and verbal readback obtained from Tiffany Syed RN by [facility] 04/26/2024 09:05         Gran # (ANC)         13.7       Gran %         82.5       Hematocrit         46.3       Hemoglobin         15.0       Hepatitis C Ab         Negative       HEP C Virus Hold Specimen         Hold for HCV sendout       HIV 1/2 Ag/Ab         Negative       Immature Grans (Abs)         0.07  Comment: Mild elevation in immature granulocytes is non specific and   can be seen in a variety of conditions including stress response,   acute inflammation, trauma and pregnancy. Correlation with other   laboratory and clinical findings is essential.         Immature Granulocytes         0.4       Lipase         58       Lymph #         1.6       Lymph %         9.4       Magnesium    2.2             MCH         28.6       MCHC         32.4       MCV         88       Mode       SPONT         Mono #         1.3       Mono %         7.6       MPV         10.5       nRBC          0       Platelet Count         330       POC BE       4         POC HCO3       28.7         POC PCO2       46.2         POC PH       7.401         POC PO2       49         POC SATURATED O2       84         POCT Glucose >500     >500           Potassium   3.3       3.4       PROTEIN TOTAL         8.2       RBC         5.25       RDW         14.1       Sample       VENOUS         Sodium   148       145       Troponin I   0.071  Comment: The reference interval for Troponin I represents the 99th percentile   cutoff   for our facility and is consistent with 3rd generation assay   performance.         0.078  Comment: The reference interval for Troponin I represents the 99th percentile   cutoff   for our facility and is consistent with 3rd generation assay   performance.         WBC         16.62                              Significant Imaging: I have reviewed all pertinent imaging results/findings within the past 24 hours.    CT Head Without Contrast   Final Result      No acute intracranial CT abnormality.      All CT scans at this facility are performed  using dose modulation techniques as appropriate to performed exam including the following:  automated exposure control; adjustment of mA and/or kV according to the patients size (this includes techniques or standardized protocols for targeted exams where dose is matched to indication/reason for exam: i.e. extremities or head);  iterative reconstruction technique.         Electronically signed by: Yusuf Nicholas   Date:    04/26/2024   Time:    09:07      X-Ray Chest AP Portable   Final Result      No acute abnormality.         Electronically signed by: Yusuf Nicholas   Date:    04/26/2024   Time:    08:15          Assessment/Plan:     * Hyperosmolar hyperglycemic state (HHS)  Patient's FSGs are uncontrolled due to hyperglycemia on current medication regimen.  Last A1c reviewed-   Lab Results   Component Value Date    HGBA1C 8.3 (H) 04/08/2024     Most recent  fingerstick glucose reviewed-   Recent Labs   Lab 04/26/24  0927 04/26/24  1120   POCTGLUCOSE >500* >500*     Current correctional scale   insulin ggt  Increase anti-hyperglycemic dose as follows-   Antihyperglycemics (From admission, onward)      Start     Stop Route Frequency Ordered    04/26/24 1100  insulin regular 1 Units/mL in sodium chloride 0.9% 100 mL infusion        Question Answer Comment   Insulin Rate Adjustment (DO NOT MODIFY ANSWER) \\[email or web address] ADJUSTMENT [facility] version [email or web address]    Initial dose (DO NOT CHANGE): 0.1 units/kg/hr        -- IV Continuous 04/26/24 1058          Hold Oral hypoglycemics while patient is in the hospital.   ------------  DKA/HHS insulin drip pathway initiated  BMP q6h, IV NS, NPO  Transition to sub-q regimen when appropriate    SHAYLA (acute kidney injury)  Patient with acute kidney injury/acute renal failure likely due to pre-renal azotemia due to dehydration SHAYLA is currently worsening. Baseline creatinine  1.0  - Labs reviewed- Renal function/electrolytes with Estimated Creatinine Clearance: 25.4 mL/min (A) (based on SCr of 2.4 mg/dL (H)). according to latest data. Monitor urine output and serial BMP and adjust therapy as needed. Avoid nephrotoxins and renally dose meds for GFR listed above.    IV NS  Avoid nephrotoxins  AM labs  Strict I/O's    Atrial fibrillation  Patient with atrial fibrillation which is controlled currently with Beta Blocker. Patient is currently in atrial fibrillation.[id number] Score: 4. Anticoagulation indicated. Anticoagulation done with Eliquis .    Hypertension associated with diabetes  Chronic, controlled. Latest blood pressure and vitals reviewed-     Temp:  [98.1 °F (36.7 °C)]   Pulse:  []   Resp:  [14-38]   BP: (135-155)/(79-92)   SpO2:  [91 %-99 %] .   Home meds for hypertension were reviewed and noted below.   Hypertension Medications               isosorbide mononitrate (IMDUR) 30 MG 24 hr  tablet Take 1 tablet (30 mg total) by mouth once daily.    metOLazone (ZAROXOLYN) 5 MG tablet Take 1 tablet (5 mg total) by mouth once daily.    metoprolol succinate (TOPROL-XL) 25 MG 24 hr tablet Take 1 tablet (25 mg total) by mouth once daily.            While in the hospital, will manage blood pressure as follows; Continue home antihypertensive regimen    Will utilize p.r.n. blood pressure medication only if patient's blood pressure greater than 180/110 and he develops symptoms such as worsening chest pain or shortness of breath.    SHORTY (obstructive sleep apnea)  CPAP qHS      VTE Risk Mitigation (From admission, onward)           Ordered     IP VTE HIGH RISK PATIENT  Once         04/26/24 1058     Place sequential compression device  Until discontinued         04/26/24 1058                               AdmissionCare    Guideline: Diabetes - INPT, Inpatient    Based on the indications selected for the patient, the bed status of Inpatient was determined to be MET    The following indications were selected as present at the time of evaluation of the patient:      - Hyperglycemic hyperosmolar state, as indicated by ALL of the following:    - Plasma glucose greater than 600 mg/dL (33.30 mmol/L)    - Serum osmolality greater than 320 mOsm/kg (mmol/kg)    - Neurologic dysfunction (eg, stupor, coma, seizure)    AdmissionCare documentation entered by: Estuardo Eddy    [facility] [employer], 27th edition, Copyright © 2023 [facility] [employer] All Rights Reserved.  [id number]:[id number]:00    Estuardo Eddy MD  Department of Hospital Medicine  [facility] - Emergency Dept.        Critical care time spent on the evaluation and treatment of severe organ dysfunction, review of pertinent labs and imaging studies, discussions with consulting providers and discussions with patient/family: 42 minutes.

## 2024-04-26 NOTE — ASSESSMENT & PLAN NOTE
Patient with acute kidney injury/acute renal failure likely due to pre-renal azotemia due to dehydration SHAYLA is currently worsening. Baseline creatinine  1.0  - Labs reviewed- Renal function/electrolytes with Estimated Creatinine Clearance: 25.4 mL/min (A) (based on SCr of 2.4 mg/dL (H)). according to latest data. Monitor urine output and serial BMP and adjust therapy as needed. Avoid nephrotoxins and renally dose meds for GFR listed above.    IV NS  Avoid nephrotoxins  AM labs  Strict I/O's

## 2024-04-26 NOTE — ASSESSMENT & PLAN NOTE
Patient with atrial fibrillation which is controlled currently with Beta Blocker. Patient is currently in atrial fibrillation.[id number] Score: 4. Anticoagulation indicated. Anticoagulation done with Eliquis .

## 2024-04-26 NOTE — ASSESSMENT & PLAN NOTE
Chronic, controlled. Latest blood pressure and vitals reviewed-     Temp:  [98.1 °F (36.7 °C)]   Pulse:  []   Resp:  [14-38]   BP: (135-155)/(79-92)   SpO2:  [91 %-99 %] .   Home meds for hypertension were reviewed and noted below.   Hypertension Medications               isosorbide mononitrate (IMDUR) 30 MG 24 hr tablet Take 1 tablet (30 mg total) by mouth once daily.    metOLazone (ZAROXOLYN) 5 MG tablet Take 1 tablet (5 mg total) by mouth once daily.    metoprolol succinate (TOPROL-XL) 25 MG 24 hr tablet Take 1 tablet (25 mg total) by mouth once daily.            While in the hospital, will manage blood pressure as follows; Continue home antihypertensive regimen    Will utilize p.r.n. blood pressure medication only if patient's blood pressure greater than 180/110 and he develops symptoms such as worsening chest pain or shortness of breath.

## 2024-04-26 NOTE — ADMISSIONCARE
AdmissionCare    Guideline: Diabetes - INPT, Inpatient    Based on the indications selected for the patient, the bed status of Inpatient was determined to be MET    The following indications were selected as present at the time of evaluation of the patient:      - Hyperglycemic hyperosmolar state, as indicated by ALL of the following:    - Plasma glucose greater than 600 mg/dL (33.30 mmol/L)    - Serum osmolality greater than 320 mOsm/kg (mmol/kg)    - Neurologic dysfunction (eg, stupor, coma, seizure)    AdmissionCare documentation entered by: Estuardo Eddy    [facility], 27th edition, Copyright © 2023 [facility], [facility] All Rights Reserved.  [id number]:27:51-05:00

## 2024-04-27 LAB
ALBUMIN SERPL BCP-MCNC: 3.1 G/DL (ref 3.5–5.2)
ALP SERPL-CCNC: 75 U/L (ref 55–135)
ALT SERPL W/O P-5'-P-CCNC: 17 U/L (ref 10–44)
ANION GAP SERPL CALC-SCNC: 11 MMOL/L (ref 8–16)
ANION GAP SERPL CALC-SCNC: 12 MMOL/L (ref 8–16)
ANION GAP SERPL CALC-SCNC: 12 MMOL/L (ref 8–16)
ANION GAP SERPL CALC-SCNC: 15 MMOL/L (ref 8–16)
ANION GAP SERPL CALC-SCNC: 7 MMOL/L (ref 8–16)
AST SERPL-CCNC: 27 U/L (ref 10–40)
BASOPHILS # BLD AUTO: 0.03 K/UL (ref 0–0.2)
BASOPHILS NFR BLD: 0.2 % (ref 0–1.9)
BILIRUB SERPL-MCNC: 1.1 MG/DL (ref 0.1–1)
BUN SERPL-MCNC: 46 MG/DL (ref 8–23)
BUN SERPL-MCNC: 54 MG/DL (ref 8–23)
BUN SERPL-MCNC: 59 MG/DL (ref 8–23)
BUN SERPL-MCNC: 59 MG/DL (ref 8–23)
BUN SERPL-MCNC: 67 MG/DL (ref 8–23)
CALCIUM SERPL-MCNC: 10.1 MG/DL (ref 8.7–10.5)
CALCIUM SERPL-MCNC: 10.1 MG/DL (ref 8.7–10.5)
CALCIUM SERPL-MCNC: 10.2 MG/DL (ref 8.7–10.5)
CALCIUM SERPL-MCNC: 9.4 MG/DL (ref 8.7–10.5)
CALCIUM SERPL-MCNC: 9.8 MG/DL (ref 8.7–10.5)
CHLORIDE SERPL-SCNC: 113 MMOL/L (ref 95–110)
CHLORIDE SERPL-SCNC: 113 MMOL/L (ref 95–110)
CHLORIDE SERPL-SCNC: 114 MMOL/L (ref 95–110)
CHLORIDE SERPL-SCNC: 115 MMOL/L (ref 95–110)
CHLORIDE SERPL-SCNC: 117 MMOL/L (ref 95–110)
CK SERPL-CCNC: 898 U/L (ref 20–200)
CO2 SERPL-SCNC: 22 MMOL/L (ref 23–29)
CO2 SERPL-SCNC: 26 MMOL/L (ref 23–29)
CO2 SERPL-SCNC: 28 MMOL/L (ref 23–29)
CREAT SERPL-MCNC: 1.3 MG/DL (ref 0.5–1.4)
CREAT SERPL-MCNC: 1.3 MG/DL (ref 0.5–1.4)
CREAT SERPL-MCNC: 1.5 MG/DL (ref 0.5–1.4)
CREAT SERPL-MCNC: 1.5 MG/DL (ref 0.5–1.4)
CREAT SERPL-MCNC: 1.6 MG/DL (ref 0.5–1.4)
DIFFERENTIAL METHOD BLD: ABNORMAL
EOSINOPHIL # BLD AUTO: 0 K/UL (ref 0–0.5)
EOSINOPHIL NFR BLD: 0.2 % (ref 0–8)
ERYTHROCYTE [DISTWIDTH] IN BLOOD BY AUTOMATED COUNT: 14 % (ref 11.5–14.5)
EST. GFR  (NO RACE VARIABLE): 43 ML/MIN/1.73 M^2
EST. GFR  (NO RACE VARIABLE): 46 ML/MIN/1.73 M^2
EST. GFR  (NO RACE VARIABLE): 46 ML/MIN/1.73 M^2
EST. GFR  (NO RACE VARIABLE): 55 ML/MIN/1.73 M^2
EST. GFR  (NO RACE VARIABLE): 55 ML/MIN/1.73 M^2
GLUCOSE SERPL-MCNC: 205 MG/DL (ref 70–110)
GLUCOSE SERPL-MCNC: 227 MG/DL (ref 70–110)
GLUCOSE SERPL-MCNC: 235 MG/DL (ref 70–110)
GLUCOSE SERPL-MCNC: 235 MG/DL (ref 70–110)
GLUCOSE SERPL-MCNC: 271 MG/DL (ref 70–110)
HCT VFR BLD AUTO: 45.2 % (ref 40–54)
HGB BLD-MCNC: 14.3 G/DL (ref 14–18)
IMM GRANULOCYTES # BLD AUTO: 0.08 K/UL (ref 0–0.04)
IMM GRANULOCYTES NFR BLD AUTO: 0.5 % (ref 0–0.5)
LACTATE SERPL-SCNC: 2.3 MMOL/L (ref 0.5–2.2)
LYMPHOCYTES # BLD AUTO: 2.2 K/UL (ref 1–4.8)
LYMPHOCYTES NFR BLD: 12.9 % (ref 18–48)
MAGNESIUM SERPL-MCNC: 1.8 MG/DL (ref 1.6–2.6)
MAGNESIUM SERPL-MCNC: 1.9 MG/DL (ref 1.6–2.6)
MCH RBC QN AUTO: 28.1 PG (ref 27–31)
MCHC RBC AUTO-ENTMCNC: 31.6 G/DL (ref 32–36)
MCV RBC AUTO: 89 FL (ref 82–98)
MONOCYTES # BLD AUTO: 1 K/UL (ref 0.3–1)
MONOCYTES NFR BLD: 6 % (ref 4–15)
NEUTROPHILS # BLD AUTO: 13.6 K/UL (ref 1.8–7.7)
NEUTROPHILS NFR BLD: 80.2 % (ref 38–73)
NRBC BLD-RTO: 0 /100 WBC
PHOSPHATE SERPL-MCNC: 2.5 MG/DL (ref 2.7–4.5)
PHOSPHATE SERPL-MCNC: 3.9 MG/DL (ref 2.7–4.5)
PLATELET # BLD AUTO: 283 K/UL (ref 150–450)
PMV BLD AUTO: 10.2 FL (ref 9.2–12.9)
POCT GLUCOSE: 135 MG/DL (ref 70–110)
POCT GLUCOSE: 146 MG/DL (ref 70–110)
POCT GLUCOSE: 215 MG/DL (ref 70–110)
POCT GLUCOSE: 219 MG/DL (ref 70–110)
POCT GLUCOSE: 227 MG/DL (ref 70–110)
POCT GLUCOSE: 241 MG/DL (ref 70–110)
POCT GLUCOSE: 244 MG/DL (ref 70–110)
POCT GLUCOSE: 244 MG/DL (ref 70–110)
POTASSIUM SERPL-SCNC: 3 MMOL/L (ref 3.5–5.1)
POTASSIUM SERPL-SCNC: 3.4 MMOL/L (ref 3.5–5.1)
POTASSIUM SERPL-SCNC: 3.7 MMOL/L (ref 3.5–5.1)
PROT SERPL-MCNC: 6.7 G/DL (ref 6–8.4)
RBC # BLD AUTO: 5.09 M/UL (ref 4.6–6.2)
SODIUM SERPL-SCNC: 149 MMOL/L (ref 136–145)
SODIUM SERPL-SCNC: 152 MMOL/L (ref 136–145)
SODIUM SERPL-SCNC: 153 MMOL/L (ref 136–145)
SODIUM SERPL-SCNC: 153 MMOL/L (ref 136–145)
SODIUM SERPL-SCNC: 154 MMOL/L (ref 136–145)
TROPONIN I SERPL DL<=0.01 NG/ML-MCNC: 0.07 NG/ML (ref 0–0.03)
TROPONIN I SERPL DL<=0.01 NG/ML-MCNC: 0.12 NG/ML (ref 0–0.03)
WBC # BLD AUTO: 16.92 K/UL (ref 3.9–12.7)

## 2024-04-27 PROCEDURE — 5A09457 ASSISTANCE WITH RESPIRATORY VENTILATION, 24-96 CONSECUTIVE HOURS, CONTINUOUS POSITIVE AIRWAY PRESSURE: ICD-10-PCS | Performed by: HOSPITALIST

## 2024-04-27 PROCEDURE — 83605 ASSAY OF LACTIC ACID: CPT | Mod: HCNC

## 2024-04-27 PROCEDURE — 27100171 HC OXYGEN HIGH FLOW UP TO 24 HOURS: Mod: HCNC

## 2024-04-27 PROCEDURE — 80048 BASIC METABOLIC PNL TOTAL CA: CPT | Mod: 91,HCNC,XB | Performed by: INTERNAL MEDICINE

## 2024-04-27 PROCEDURE — 25000003 PHARM REV CODE 250: Mod: HCNC | Performed by: HOSPITALIST

## 2024-04-27 PROCEDURE — 97163 PT EVAL HIGH COMPLEX 45 MIN: CPT | Mod: HCNC

## 2024-04-27 PROCEDURE — 97110 THERAPEUTIC EXERCISES: CPT | Mod: HCNC

## 2024-04-27 PROCEDURE — 94640 AIRWAY INHALATION TREATMENT: CPT | Mod: HCNC

## 2024-04-27 PROCEDURE — 82550 ASSAY OF CK (CPK): CPT | Mod: HCNC

## 2024-04-27 PROCEDURE — 63600175 PHARM REV CODE 636 W HCPCS: Mod: HCNC | Performed by: INTERNAL MEDICINE

## 2024-04-27 PROCEDURE — 80053 COMPREHEN METABOLIC PANEL: CPT | Mod: HCNC | Performed by: INTERNAL MEDICINE

## 2024-04-27 PROCEDURE — 80048 BASIC METABOLIC PNL TOTAL CA: CPT | Mod: HCNC,XB

## 2024-04-27 PROCEDURE — 94761 N-INVAS EAR/PLS OXIMETRY MLT: CPT | Mod: HCNC

## 2024-04-27 PROCEDURE — 99900035 HC TECH TIME PER 15 MIN (STAT): Mod: HCNC

## 2024-04-27 PROCEDURE — 97530 THERAPEUTIC ACTIVITIES: CPT | Mod: HCNC

## 2024-04-27 PROCEDURE — 97167 OT EVAL HIGH COMPLEX 60 MIN: CPT | Mod: HCNC

## 2024-04-27 PROCEDURE — 36415 COLL VENOUS BLD VENIPUNCTURE: CPT | Mod: HCNC | Performed by: INTERNAL MEDICINE

## 2024-04-27 PROCEDURE — 36415 COLL VENOUS BLD VENIPUNCTURE: CPT | Mod: HCNC,XB

## 2024-04-27 PROCEDURE — 84100 ASSAY OF PHOSPHORUS: CPT | Mod: HCNC | Performed by: INTERNAL MEDICINE

## 2024-04-27 PROCEDURE — 25000242 PHARM REV CODE 250 ALT 637 W/ HCPCS: Mod: HCNC | Performed by: INTERNAL MEDICINE

## 2024-04-27 PROCEDURE — 94660 CPAP INITIATION&MGMT: CPT | Mod: HCNC

## 2024-04-27 PROCEDURE — S5010 5% DEXTROSE AND 0.45% SALINE: HCPCS | Mod: HCNC

## 2024-04-27 PROCEDURE — 25000003 PHARM REV CODE 250: Mod: HCNC

## 2024-04-27 PROCEDURE — 84484 ASSAY OF TROPONIN QUANT: CPT | Mod: HCNC

## 2024-04-27 PROCEDURE — 92610 EVALUATE SWALLOWING FUNCTION: CPT | Mod: HCNC

## 2024-04-27 PROCEDURE — 80048 BASIC METABOLIC PNL TOTAL CA: CPT | Mod: 91,HCNC,XB

## 2024-04-27 PROCEDURE — 85025 COMPLETE CBC W/AUTO DIFF WBC: CPT | Mod: HCNC | Performed by: INTERNAL MEDICINE

## 2024-04-27 PROCEDURE — 20000000 HC ICU ROOM: Mod: HCNC

## 2024-04-27 PROCEDURE — 84484 ASSAY OF TROPONIN QUANT: CPT | Mod: 91,HCNC | Performed by: INTERNAL MEDICINE

## 2024-04-27 PROCEDURE — 27000190 HC CPAP FULL FACE MASK W/VALVE: Mod: HCNC

## 2024-04-27 PROCEDURE — 83735 ASSAY OF MAGNESIUM: CPT | Mod: 91,HCNC | Performed by: INTERNAL MEDICINE

## 2024-04-27 PROCEDURE — 63600175 PHARM REV CODE 636 W HCPCS: Mod: HCNC

## 2024-04-27 RX ORDER — INSULIN ASPART 100 [IU]/ML
0-10 INJECTION, SOLUTION INTRAVENOUS; SUBCUTANEOUS EVERY 6 HOURS PRN
Status: DISCONTINUED | OUTPATIENT
Start: 2024-04-27 | End: 2024-04-28

## 2024-04-27 RX ORDER — POTASSIUM CHLORIDE 7.45 MG/ML
40 INJECTION INTRAVENOUS
Status: DISCONTINUED | OUTPATIENT
Start: 2024-04-27 | End: 2024-04-30 | Stop reason: HOSPADM

## 2024-04-27 RX ORDER — IPRATROPIUM BROMIDE AND ALBUTEROL SULFATE 2.5; .5 MG/3ML; MG/3ML
3 SOLUTION RESPIRATORY (INHALATION) EVERY 6 HOURS PRN
Status: DISCONTINUED | OUTPATIENT
Start: 2024-04-27 | End: 2024-04-30 | Stop reason: HOSPADM

## 2024-04-27 RX ORDER — POTASSIUM CHLORIDE 7.45 MG/ML
60 INJECTION INTRAVENOUS
Status: DISCONTINUED | OUTPATIENT
Start: 2024-04-27 | End: 2024-04-30 | Stop reason: HOSPADM

## 2024-04-27 RX ORDER — POTASSIUM CHLORIDE 7.45 MG/ML
80 INJECTION INTRAVENOUS
Status: DISCONTINUED | OUTPATIENT
Start: 2024-04-27 | End: 2024-04-30 | Stop reason: HOSPADM

## 2024-04-27 RX ORDER — MUPIROCIN 20 MG/G
OINTMENT TOPICAL 2 TIMES DAILY
Status: DISCONTINUED | OUTPATIENT
Start: 2024-04-27 | End: 2024-04-30 | Stop reason: HOSPADM

## 2024-04-27 RX ORDER — GLUCAGON 1 MG
1 KIT INJECTION
Status: DISCONTINUED | OUTPATIENT
Start: 2024-04-27 | End: 2024-04-27

## 2024-04-27 RX ADMIN — DEXTROSE AND SODIUM CHLORIDE: 5; 450 INJECTION, SOLUTION INTRAVENOUS at 02:04

## 2024-04-27 RX ADMIN — APIXABAN 2.5 MG: 2.5 TABLET, FILM COATED ORAL at 08:04

## 2024-04-27 RX ADMIN — ALLOPURINOL 100 MG: 100 TABLET ORAL at 08:04

## 2024-04-27 RX ADMIN — DONEPEZIL HYDROCHLORIDE 10 MG: 5 TABLET, FILM COATED ORAL at 09:04

## 2024-04-27 RX ADMIN — MUPIROCIN: 20 OINTMENT TOPICAL at 09:04

## 2024-04-27 RX ADMIN — METOPROLOL SUCCINATE 25 MG: 25 TABLET, EXTENDED RELEASE ORAL at 08:04

## 2024-04-27 RX ADMIN — IPRATROPIUM BROMIDE AND ALBUTEROL SULFATE 3 ML: 2.5; .5 SOLUTION RESPIRATORY (INHALATION) at 09:04

## 2024-04-27 RX ADMIN — POTASSIUM CHLORIDE AND SODIUM CHLORIDE: 450; 150 INJECTION, SOLUTION INTRAVENOUS at 06:04

## 2024-04-27 RX ADMIN — INSULIN DETEMIR 10 UNITS: 100 INJECTION, SOLUTION SUBCUTANEOUS at 09:04

## 2024-04-27 RX ADMIN — ISOSORBIDE MONONITRATE 30 MG: 30 TABLET, EXTENDED RELEASE ORAL at 08:04

## 2024-04-27 RX ADMIN — POTASSIUM CHLORIDE 60 MEQ: 7.46 INJECTION, SOLUTION INTRAVENOUS at 05:04

## 2024-04-27 RX ADMIN — INSULIN DETEMIR 10 UNITS: 100 INJECTION, SOLUTION SUBCUTANEOUS at 08:04

## 2024-04-27 RX ADMIN — APIXABAN 2.5 MG: 2.5 TABLET, FILM COATED ORAL at 09:04

## 2024-04-27 RX ADMIN — INSULIN ASPART 4 UNITS: 100 INJECTION, SOLUTION INTRAVENOUS; SUBCUTANEOUS at 04:04

## 2024-04-27 RX ADMIN — POTASSIUM CHLORIDE 40 MEQ: 7.46 INJECTION, SOLUTION INTRAVENOUS at 04:04

## 2024-04-27 RX ADMIN — POTASSIUM CHLORIDE AND SODIUM CHLORIDE: 450; 150 INJECTION, SOLUTION INTRAVENOUS at 02:04

## 2024-04-27 RX ADMIN — ATORVASTATIN CALCIUM 40 MG: 40 TABLET, FILM COATED ORAL at 08:04

## 2024-04-27 RX ADMIN — POTASSIUM CHLORIDE 10 MEQ: 750 TABLET, EXTENDED RELEASE ORAL at 08:04

## 2024-04-27 RX ADMIN — PANTOPRAZOLE SODIUM 40 MG: 40 TABLET, DELAYED RELEASE ORAL at 08:04

## 2024-04-27 RX ADMIN — INSULIN DETEMIR 10 UNITS: 100 INJECTION, SOLUTION SUBCUTANEOUS at 02:04

## 2024-04-27 RX ADMIN — INSULIN ASPART 4 UNITS: 100 INJECTION, SOLUTION INTRAVENOUS; SUBCUTANEOUS at 11:04

## 2024-04-27 RX ADMIN — INSULIN ASPART 4 UNITS: 100 INJECTION, SOLUTION INTRAVENOUS; SUBCUTANEOUS at 06:04

## 2024-04-27 NOTE — PROGRESS NOTES
[facility] - Intensive Care ([facility])  Critical Care Medicine  Progress Note    Patient Name: Anthony Biggs Jr.  MRN: [id number]  Admission Date: 4/26/2024  Hospital Length of Stay: 1 days  Code Status: Full Code  Attending Provider: Estuardo Eddy MD  Primary Care Provider: Giuliano Moran MD   Principal Problem: Hyperosmolar hyperglycemic state (HHS)    Subjective:     HPI:  83-year-old male history of diabetes, dementia, AFib on Eliquis, and coronary artery disease who presented to the ER with lethargic and weak for 2 days.  His Accu-Chek read high.  Patient was started on fluoxetine with some change in behavior.  Patient was found to have a blood glucose in the 700s.  Wean and creatinine were elevated 93 and 2.7.  He was started on IV fluids.  Insulin was ordered as well.  Wife states he has dementia and she lets him eat what he wants to.  Recently had large amount of ice cream.  Also been having falls.  Head CT was negative.  Patient moved to the ICU for insulin drip    Hospital/ICU Course:  Off drip some confusion     Interval History: no acute events  Some confusion       Objective:     Vital Signs (Most Recent):  Temp: 98 °F (36.7 °C) (04/27/24 1115)  Pulse: 99 (04/27/24 1400)  Resp: (!) 24 (04/27/24 1400)  BP: 111/73 (04/27/24 1400)  SpO2: 99 % (04/27/24 1400) Vital Signs (24h Range):  Temp:  [97.4 °F (36.3 °C)-98.6 °F (37 °C)] 98 °F (36.7 °C)  Pulse:  [] 99  Resp:  [20-50] 24  SpO2:  [87 %-100 %] 99 %  BP: (111-174)/() 111/73     Weight: 90.2 kg (198 lb 13.7 oz)  Body mass index is 32.1 kg/m².      Intake/Output Summary (Last 24 hours) at 4/27/2024 1445  Last data filed at 4/27/2024 0938  Gross per 24 hour   Intake 5139.95 ml   Output 1215 ml   Net 3924.95 ml        Physical Exam  Vitals and nursing note reviewed.   Constitutional:       General: He is not in acute distress.  HENT:      Head: Normocephalic and atraumatic.   Eyes:      General:         Right eye: No discharge.         Left eye: No  discharge.   Cardiovascular:      Rate and Rhythm: Normal rate and regular rhythm.      Heart sounds: No murmur heard.  Pulmonary:      Effort: No respiratory distress.      Breath sounds: Wheezing present.   Abdominal:      General: There is no distension.      Palpations: Abdomen is soft.   Musculoskeletal:         General: No swelling or tenderness.      Cervical back: Neck supple.   Neurological:      Mental Status: He is alert.      Comments: Alert / confused            Review of Systems   Reason unable to perform ROS: limited unable to give.       Vents:  Oxygen Concentration (%): 24 (04/27/24 0004)    Lines/Drains/Airways       Peripheral Intravenous Line  Duration                  Peripheral IV - Single Lumen 04/26/24 1730 20 G Anterior;Distal;Left Forearm <1 day         Peripheral IV - Single Lumen 04/26/24 1735 20 G Left Antecubital <1 day         Peripheral IV - Single Lumen 04/26/24 2300 20 G Anterior;Left Hand <1 day                    Significant Labs:    CBC/Anemia Profile:  Recent Labs   Lab 04/26/24  0818 04/27/24  0451   WBC 16.62* 16.92*   HGB 15.0 14.3   HCT 46.3 45.2    283   MCV 88 89   RDW 14.1 14.0        Chemistries:  Recent Labs   Lab 04/26/24  0818 04/26/24  1000 04/26/24  1425 04/26/24  2035 04/27/24  0039 04/27/24  0451 04/27/24  0745    148*   < > 157* 154* 153*  153* 152*   K 3.4* 3.3*   < > 2.9* 3.0* 3.0*  3.0* 3.4*    106   < > 116* 117* 113*  113* 115*   CO2 22* 24   < > 26 26 28  28 22*   BUN 93* 88*   < > 70* 67* 59*  59* 54*   CREATININE 2.7* 2.4*   < > 1.6* 1.6* 1.5*  1.5* 1.3   CALCIUM 10.9* 10.4   < > 10.7* 10.2 10.1  10.1 9.8   ALBUMIN 3.9  --   --   --   --  3.1*  --    PROT 8.2  --   --   --   --  6.7  --    BILITOT 1.2*  --   --   --   --  1.1*  --    ALKPHOS 103  --   --   --   --  75  --    ALT 25  --   --   --   --  17  --    AST 14  --   --   --   --  27  --    MG  --  2.2  --   --   --  1.9  --    PHOS  --   --   --  1.2*  --  3.9  --     < >  = values in this interval not displayed.       All pertinent labs within the past 24 hours have been reviewed.    Significant Imaging:  I have reviewed all pertinent imaging results/findings within the past 24 hours.    ABG  Recent Labs   Lab 04/26/24  0823   PH 7.401   PO2 49   PCO2 46.2*   HCO3 28.7*   BE 4*     Assessment/Plan:     Cardiac/Vascular  Hypertension associated with diabetes  Iv prn's for now   Will need speech eval     Atrial fibrillation  Monitor , may need prn's if rate increased     4/27 home meds as able     Renal/  SHAYLA (acute kidney injury)  Cont fluids   Monitor     Hematology  Chronic anticoagulation  Hold for now until speech clears    Endocrine  * Hyperosmolar hyperglycemic state (HHS)  Cont fluids /  insulin drip   Will give a bolus  Watch electrolytes and replace    4/27 -off insulin drip / on sq / on fluids  Replace electrolytes   Watch sodium        Pt/ot / st Iraida Scruggs MD  Critical Care Medicine  [facility] - Int[facility] Care ([facility])

## 2024-04-27 NOTE — PLAN OF CARE
"Pt pleasantly confused throughout the day.  Pulls out Arshad first thing this morning, has some blood in urine for a couple hours then urine clears- now clear yellow.  IVs covered with Kerlix and restraints dc'd.  Pt does not pull at lines after this.   Wife expresses concerns about not wanting pt to move out of ICU for fear that he will fall.  Pt has not attempted to get out of bed this shift.  Pt is noted to have intermittent (but frequent) hiccoughs- wife states he takes "Chlorpromaz" for this at home- MD does not want to give this at this time. Pt is incontinent of bowel and bladder, wife says this is his normal.  Appetite is very poor, only eats bites of each meal.  Wife daughter and son at bedside throughout the day, updates given at bedside.   "

## 2024-04-27 NOTE — PLAN OF CARE
OT EVAL COMPLETE.  PATIENT WOULD BENEFIT FROM CONT SKILLED OT INTERVENTION.  WIFE SEEKING RESOURCES TO BE ABLE TO TAKE CARE OF HER  AT HOME.  OT RECOMMENDS HH VS SNF AT D/C.

## 2024-04-27 NOTE — ASSESSMENT & PLAN NOTE
Patient's FSGs are uncontrolled due to hyperglycemia on current medication regimen.  Last A1c reviewed-   Lab Results   Component Value Date    HGBA1C 8.3 (H) 04/08/2024     Most recent fingerstick glucose reviewed-   Recent Labs   Lab 04/27/24  0101 04/27/24  0210 04/27/24  0457 04/27/24  0605   POCTGLUCOSE 244* 241* 215* 219*       Current correctional scale   insulin ggt  Increase anti-hyperglycemic dose as follows-   Antihyperglycemics (From admission, onward)      Start     Stop Route Frequency Ordered    04/27/24 0316  insulin aspart U-100 pen 0-10 Units         -- SubQ Every 6 hours PRN 04/27/24 0219    04/27/24 0230  insulin detemir U-100 (Levemir) pen 10 Units         -- SubQ 2 times daily 04/27/24 0219          Hold Oral hypoglycemics while patient is in the hospital.   ------------  DKA/HHS insulin drip pathway initiated  BMP q6h, IV NS, NPO  Transition to sub-q regimen when appropriate    4/27  BG levels improving  On levemir 10 units BID and mod SSI

## 2024-04-27 NOTE — PT/OT/SLP EVAL
Occupational Therapy   Evaluation    Name: Anthony Biggs Jr.  MRN: [id number]  Admitting Diagnosis: Hyperosmolar hyperglycemic state (HHS)  Recent Surgery: * No surgery found *      Recommendations:     Discharge Recommendations: Moderate Intensity Therapy  Discharge Equipment Recommendations:  walker, rolling  Barriers to discharge:  Decreased caregiver support, Other (Comment) (WIFE STATED SHE IS THE ONLY CAREGIVER DUE TO HER KIDS AT WORK.)    Assessment:     Anthony Biggs Jr. is a 83 y.o. male with a medical diagnosis of Hyperosmolar hyperglycemic state (HHS).  He presents with SELF CARE DEBILITY . Performance deficits affecting function: weakness, impaired endurance, impaired self care skills, impaired functional mobility, gait instability, impaired balance, impaired cognition, decreased upper extremity function, decreased lower extremity function, decreased safety awareness.      Rehab Prognosis: Fair; patient would benefit from acute skilled OT services to address these deficits and reach maximum level of function.       Plan:     Patient to be seen 2 x/week to address the above listed problems via therapeutic exercises, therapeutic activities, self-care/home management  Plan of Care Expires: 05/11/24  Plan of Care Reviewed with: spouse, daughter    Subjective     Chief Complaint: WIFE STATED PATIENT HAS BEGUN TO CONSTANTLY REMOVE HIS BRIEF OR UNDERWEAR WITH CONTINENCE PAD, TO REFUSE SHOWERING AND HAS BEEN HOLDING ON TO FURNITURE FOR BALANCE WITH AMBULATION  Patient/Family Comments/goals: WIFE SEEKING RESOURCES TO BE ABLE TO TAKE CARE OF HER  AT HOME.      Occupational Profile:  Living Environment:PATIENT LIVES WITH WIFE IN A 2 STORY HOUSE WITH STAIRS WITH 1 STEP AT ENTRANCE.  Previous level of function: WIFE STATED PATIENT HAS BEGUN TO CONSTANTLY REMOVE HIS BRIEF OR UNDERWEAR WITH CONTINENCE PAD, TO REFUSE SHOWERING AND HAS BEEN HOLDING ON TO FURNITURE FOR BALANCE WITH AMBULATION  Roles and Routines: WIFE  HAS TO SPONGE BATHE PATIENT AND (A) PATIENT WITH TOILETING.  Equipment Used at Home:  (WALK-IN SHOWER WHICH WIFE STATED PATIENT HAS BEGUN REFUSING TO USE.  WIFE STATED PATIENT HAS BEEN HOLDING ON TO FURNITURE FOR BALANCE WHEN AMBULATING.)  Assistance upon Discharge: WIFE    Pain/Comfort:  Pain Rating 1: 0/10 (PER NON-VERBAL PAIN SCALE... WIFE C/O PATIENT UNABLE TO VERBALIZE PAIN EVEN IF THERE IS PAIN.)    Patients cultural, spiritual, Adventist conflicts given the current situation:      Objective:     Communicated with: NURSE WHITNEY prior to session.  Patient found supine with peripheral IV, blood pressure cuff, pulse ox (continuous) upon OT entry to room.    General Precautions: Standard, fall  Orthopedic Precautions: N/A  Braces: N/A  Respiratory Status: Room air    Occupational Performance:    Bed Mobility:    MAX (A) SUPINE <> SIT    Functional Mobility/Transfers:  NT DUE TO SAFETY CONCERN.    Activities of Daily Living:  PATIENT CURRENTLY (D) WITH ADL'S DUE TO DECREASED BALANCE/ENDURANCE AS WELL AS POOR COGNITION.    Cognitive/Visual Perceptual:  POOR COGNITION.  VISION APPEARS INTACT.    Physical Exam:  Balance:    -       FAIR STATIC SITTING EOB  Upper Extremity Range of Motion:   BUE AROM WFL WITHIN LIMITS OF ICU LINE.    AMPAC 6 Click ADL:  AMPAC Total Score: 6    Treatment & Education:  OT EVAL PERFORMED.  PATIENT AND DTR RECEIVED EDUCATION RE: HOW TO INTERACT WITH PATIENT DUE TO POOR COGNITION.  WIFE AND DTR WERE ADVISED TO RESEARCH OPTIONS TO FIND (A) FOR PATIENT'S WIFE FOR (A) WITH PATIENT'S CARE TO AVOID CAREGIVER BURNOUT FOR WIFE.   WAS INFORMED OF WIFE'S REQUEST FOR RESOURCES.   STATED SHE WILL GIVE PATIENT'S WIFE SOME INFO.  PATIENT ATTEMPTED TO PARTICIPATE WITH [facility] MOBILITY AND SELF CARE TASKS.     Patient left supine with all lines intact and WIFE AND DTR present    GOALS:   Multidisciplinary Problems       Occupational Therapy Goals          Problem: Occupational  Therapy    Goal Priority Disciplines Outcome Interventions   Occupational Therapy Goal     OT, PT/OT     Description: LTG'S TO BE MET IN 14 DAYS (5/11/24)    1)  PATIENT WILL PERFORM UB DRESSING WITH MIN (A) TO DECREASE (D) ON CAREGIVER.    2)  PATIENT WILL PERFORM LB DRESSING WITH MOD (A) TO DECREASE (D) ON CAREGIVER..    3)  PATIENT WILL PERFORM TOILET T/F WITH MOD (A) TO DECREASE (D) ON CAREGIVER..    4)  AFTER EDUCATION, WIFE WILL BE (I) TO GIVE PATIENT VC'S AND GESTURES THAT WOULD ALLOW PATIENT TO PERFORM HIS SELF CARE TASKS WITH DECREASED (D) ON WIFE.                         History:     Past Medical History:   Diagnosis Date    Angiodysplasia of small intestine     Atrial fibrillation     Chronic upper GI bleeding     due to angiodysplasia in proximal small intestine    Coronary atherosclerosis     Dementia     Diabetes mellitus without complication     Diverticulosis of large intestine without hemorrhage 05/16/2017    Family history of macular degeneration 10/20/2014    History of prostate cancer     Hyperlipidemia associated with type 2 diabetes mellitus     Hypertension associated with diabetes     LAILA (iron deficiency anemia) 09/20/2021    due to angiodysplasia in small intestine    Intention tremor     Major depressive disorder, recurrent, moderate 04/12/2021    SHORTY (obstructive sleep apnea)     Urinary incontinence          Past Surgical History:   Procedure Laterality Date    ABDOMINAL HERNIA REPAIR      APPENDECTOMY      bladder sx      CARDIAC CATHETERIZATION      CATARACT EXTRACTION W/  INTRAOCULAR LENS IMPLANT  Restor OU    COLONOSCOPY N/A 5/16/2017    Procedure: COLONOSCOPY;  Surgeon: Alfredo Naidu MD;  Location: [facility];  Service: Endoscopy;  Laterality: N/A;    ESOPHAGOGASTRODUODENOSCOPY N/A 10/29/2021    Procedure: SBE (Push Enteroscopy) with Dr. Wayne;  Surgeon: Arlette Wayne MD;  Location: [facility];  Service: Endoscopy;  Laterality: N/A;  Plavix held indefinitely as of  10-20-21. Must also hold Coumadin 5 days prior. Ok to continue to use ASA.    ESOPHAGOGASTRODUODENOSCOPY N/A 2/23/2023    Procedure: EGD (ESOPHAGOGASTRODUODENOSCOPY);  Surgeon: Opal Wright MD;  Location: [facility] ENDO;  Service: Endoscopy;  Laterality: N/A;    inguinal hernia      INTRALUMINAL GASTROINTESTINAL TRACT IMAGING VIA CAPSULE N/A 10/4/2021    Procedure: IMAGING PROCEDURE, GI TRACT, INTRALUMINAL, VIA CAPSULE;  Surgeon: Joaquin Stack RN;  Location: [facility] ENDO;  Service: Endoscopy;  Laterality: N/A;    LEFT HEART CATHETERIZATION Left 7/20/2021    Procedure: CATHETERIZATION, HEART, LEFT;  Surgeon: Darryl Griffith MD;  Location: [facility] CATH LAB;  Service: Cardiology;  Laterality: Left;    lung sx      PERCUTANEOUS TRANSLUMINAL BALLOON ANGIOPLASTY OF CORONARY ARTERY  7/20/2021    Procedure: Angioplasty-coronary;  Surgeon: Darryl Griffith MD;  Location: [facility] CATH LAB;  Service: Cardiology;;    PROSTATE SURGERY         Time Tracking:     OT Date of Treatment: 04/27/24  OT Start Time: 1005  OT Stop Time: 1030  OT Total Time (min): 25 min    Billable Minutes:Evaluation 10  Therapeutic Activity 15    4/27/2024

## 2024-04-27 NOTE — SUBJECTIVE & OBJECTIVE
Interval History: no acute events  Some confusion       Objective:     Vital Signs (Most Recent):  Temp: 98 °F (36.7 °C) (04/27/24 1115)  Pulse: 99 (04/27/24 1400)  Resp: (!) 24 (04/27/24 1400)  BP: 111/73 (04/27/24 1400)  SpO2: 99 % (04/27/24 1400) Vital Signs (24h Range):  Temp:  [97.4 °F (36.3 °C)-98.6 °F (37 °C)] 98 °F (36.7 °C)  Pulse:  [] 99  Resp:  [20-50] 24  SpO2:  [87 %-100 %] 99 %  BP: (111-174)/() 111/73     Weight: 90.2 kg (198 lb 13.7 oz)  Body mass index is 32.1 kg/m².      Intake/Output Summary (Last 24 hours) at 4/27/2024 1445  Last data filed at 4/27/2024 0938  Gross per 24 hour   Intake 5139.95 ml   Output 1215 ml   Net 3924.95 ml        Physical Exam  Vitals and nursing note reviewed.   Constitutional:       General: He is not in acute distress.  HENT:      Head: Normocephalic and atraumatic.   Eyes:      General:         Right eye: No discharge.         Left eye: No discharge.   Cardiovascular:      Rate and Rhythm: Normal rate and regular rhythm.      Heart sounds: No murmur heard.  Pulmonary:      Effort: No respiratory distress.      Breath sounds: Wheezing present.   Abdominal:      General: There is no distension.      Palpations: Abdomen is soft.   Musculoskeletal:         General: No swelling or tenderness.      Cervical back: Neck supple.   Neurological:      Mental Status: He is alert.      Comments: Alert / confused            Review of Systems   Reason unable to perform ROS: limited unable to give.       Vents:  Oxygen Concentration (%): 24 (04/27/24 0004)    Lines/Drains/Airways       Peripheral Intravenous Line  Duration                  Peripheral IV - Single Lumen 04/26/24 1730 20 G Anterior;Distal;Left Forearm <1 day         Peripheral IV - Single Lumen 04/26/24 1735 20 G Left Antecubital <1 day         Peripheral IV - Single Lumen 04/26/24 2300 20 G Anterior;Left Hand <1 day                    Significant Labs:    CBC/Anemia Profile:  Recent Labs   Lab 04/26/24  0818  04/27/24  0451   WBC 16.62* 16.92*   HGB 15.0 14.3   HCT 46.3 45.2    283   MCV 88 89   RDW 14.1 14.0        Chemistries:  Recent Labs   Lab 04/26/24  0818 04/26/24  1000 04/26/24  1425 04/26/24 2035 04/27/24  0039 04/27/24  0451 04/27/24  0745    148*   < > 157* 154* 153*  153* 152*   K 3.4* 3.3*   < > 2.9* 3.0* 3.0*  3.0* 3.4*    106   < > 116* 117* 113*  113* 115*   CO2 22* 24   < > 26 26 28  28 22*   BUN 93* 88*   < > 70* 67* 59*  59* 54*   CREATININE 2.7* 2.4*   < > 1.6* 1.6* 1.5*  1.5* 1.3   CALCIUM 10.9* 10.4   < > 10.7* 10.2 10.1  10.1 9.8   ALBUMIN 3.9  --   --   --   --  3.1*  --    PROT 8.2  --   --   --   --  6.7  --    BILITOT 1.2*  --   --   --   --  1.1*  --    ALKPHOS 103  --   --   --   --  75  --    ALT 25  --   --   --   --  17  --    AST 14  --   --   --   --  27  --    MG  --  2.2  --   --   --  1.9  --    PHOS  --   --   --  1.2*  --  3.9  --     < > = values in this interval not displayed.       All pertinent labs within the past 24 hours have been reviewed.    Significant Imaging:  I have reviewed all pertinent imaging results/findings within the past 24 hours.

## 2024-04-27 NOTE — PLAN OF CARE
[facility] - Intensive Care (Hospital)  Initial Discharge Assessment       Primary Care Provider: Giuliano Moran MD    Admission Diagnosis: Altered mental status [R41.82]  HHNC (hyperglycemic hyperosmolar nonketotic coma) [E11.01]  Troponin I above reference range [R79.89]  Chest pain [R07.9]  Hyperosmolar hyperglycemic state (HHS) [E11.00]    Admission Date: 4/26/2024  Expected Discharge Date:     Transition of Care Barriers: None    Payor: HU[employer] MANAGED MEDICARE / Plan: HU[employer] MEDICARE HMO / Product Type: Capitation /     Extended Emergency Contact Information  Primary Emergency Contact: Marino Biggs  Address: [facility] [place] [address] [address] [facility]  Home Phone: [phone]  Mobile Phone: [phone]  Relation: Spouse  Secondary Emergency Contact: Jeana Mandujano  Mobile Phone: [phone]  Relation: Daughter   needed? No    Discharge Plan A: Home with family         [id number] [place]  [address] [address]  [address]  [facility] [address]  Phone: [phone] Fax: [phone]    [facility] [address] [facility] - [address]  [address] [facility] [address]  Phone: [phone] Fax: [phone]      Initial Assessment (most recent)       Adult Discharge Assessment - 04/27/24 1112          Discharge Assessment    Assessment Type Discharge Planning Assessment     Confirmed/corrected address, phone number and insurance Yes     Confirmed Demographics Correct on Facesheet     Source of Information patient     Does patient/caregiver understand observation status Yes     Communicated RACHNA with patient/caregiver Date not available/Unable to determine     People in Home spouse     Do you expect to return to your current living situation? Yes     Do you have help at home or someone to help you manage your care at home? Yes     Who are your caregiver(s) and their phone number(s)? Marino Kalyan, spouse, [phone]     Prior to  hospitilization cognitive status: Alert/Oriented     Current cognitive status: Alert/Oriented     Equipment Currently Used at Home none     Readmission within 30 days? No     Patient currently being followed by outpatient case management? No     Do you currently have service(s) that help you manage your care at home? No     Do you take prescription medications? Yes     Do you have prescription coverage? Yes     Do you have any problems affording any of your prescribed medications? No     Is the patient taking medications as prescribed? yes     Who is going to help you get home at discharge? family     How do you get to doctors appointments? family or friend will provide     Are you on dialysis? No     Do you take coumadin? No     Discharge Plan A Home with family     DME Needed Upon Discharge  none     Discharge Plan discussed with: Spouse/sig other;Adult children     Name(s) and Number(s) Marino Biggs, [phone] and Jeana Mandujano [phone]     Transition of Care Barriers None

## 2024-04-27 NOTE — ASSESSMENT & PLAN NOTE
Patient with acute kidney injury/acute renal failure likely due to pre-renal azotemia due to dehydration SHAYLA is currently worsening. Baseline creatinine  1.0  - Labs reviewed- Renal function/electrolytes with Estimated Creatinine Clearance: 46.9 mL/min (based on SCr of 1.3 mg/dL). according to latest data. Monitor urine output and serial BMP and adjust therapy as needed. Avoid nephrotoxins and renally dose meds for GFR listed above.    IV fluids  Avoid nephrotoxins  AM labs  Strict I/O's

## 2024-04-27 NOTE — HOSPITAL COURSE
The patient presented with lethargy and weakness. His home glucometer was reading high. Patient does not allow family to check his blood sugar regularly and he eats sweets. He was placed on IVFs and IV insulin. He was admitted to the ICU. His blood sugar improved and he was transitioned to subcutaneous insulin. His blood sugars normalized. He was not requiring insulin coverage prior to discharge. Oral medication resumed upon discharge. Outpatient diabetic education after discharge from skilled facility. He was evaluated by therapy. Skilled placement recommended. He was accepted to [facility] for continuation of care. Patient seen and examined, stable for discharge.

## 2024-04-27 NOTE — SUBJECTIVE & OBJECTIVE
Review of Systems   All other systems reviewed and are negative.    Objective:     Vital Signs (Most Recent):  Temp: 97.4 °F (36.3 °C) (04/27/24 0700)  Pulse: 104 (04/27/24 0835)  Resp: (!) 26 (04/27/24 0700)  BP: 138/76 (04/27/24 0835)  SpO2: 98 % (04/27/24 0700) Vital Signs (24h Range):  Temp:  [97.4 °F (36.3 °C)-98.6 °F (37 °C)] 97.4 °F (36.3 °C)  Pulse:  [] 104  Resp:  [20-50] 26  SpO2:  [88 %-100 %] 98 %  BP: (138-174)/() 138/76     Weight: 90.2 kg (198 lb 13.7 oz)  Body mass index is 32.1 kg/m².    Intake/Output Summary (Last 24 hours) at 4/27/2024 0938  Last data filed at 4/27/2024 0700  Gross per 24 hour   Intake 4706.16 ml   Output 1215 ml   Net 3491.16 ml         Physical Exam  Constitutional:       General: He is not in acute distress.     Appearance: Normal appearance.   Cardiovascular:      Rate and Rhythm: Normal rate and regular rhythm.      Heart sounds: No murmur heard.  Pulmonary:      Effort: Pulmonary effort is normal. No respiratory distress.      Breath sounds: Normal breath sounds. No wheezing.   Abdominal:      General: There is no distension.      Palpations: Abdomen is soft.      Tenderness: There is no abdominal tenderness.   Neurological:      Mental Status: He is alert.      Comments: Awake, minimally interactive, following simple commands             Significant Labs: All pertinent labs within the past 24 hours have been reviewed.  Recent Lab Results  (Last 5 results in the past 24 hours)        04/27/24  0745   04/27/24  0605   04/27/24  0457   04/27/24  0451   04/27/24  0210        Procalcitonin               Albumin       3.1         ALP       75         ALT       17         Anion Gap 15       12                12         AST       27         Baso #       0.03         Basophil %       0.2         BILIRUBIN TOTAL       1.1  Comment: For infants and newborns, interpretation of results should be based  on gestational age, weight and in agreement with  clinical  observations.    Premature Infant recommended reference ranges:  Up to 24 hours.............<8.0 mg/dL  Up to 48 hours............<12.0 mg/dL  3-5 days..................<15.0 mg/dL  6-29 days.................<15.0 mg/dL           BUN 54       59                59         Calcium 9.8       10.1                10.1         Chloride 115       113                113         CO2 22       28                28         CPK       898         Creatinine 1.3       1.5                1.5         Differential Method       Automated         eGFR 55       46                46         Eos #       0.0         Eos %       0.2         Glucose 227       235                235         Gran # (ANC)       13.6         Gran %       80.2         Hematocrit       45.2         Hemoglobin       14.3         Immature Grans (Abs)       0.08  Comment: Mild elevation in immature granulocytes is non specific and   can be seen in a variety of conditions including stress response,   acute inflammation, trauma and pregnancy. Correlation with other   laboratory and clinical findings is essential.           Immature Granulocytes       0.5         Lactic Acid Level       2.3  Comment: Falsely low lactic acid results can be found in samples   containing >=13.0 mg/dL total bilirubin and/or >=3.5 mg/dL   direct bilirubin.           Lymph #       2.2         Lymph %       12.9         Magnesium        1.9         MCH       28.1         MCHC       31.6         MCV       89         Mono #       1.0         Mono %       6.0         MPV       10.2         nRBC       0         Phosphorus Level       3.9         Platelet Count       283         POCT Glucose   219   215     241       Potassium 3.4       3.0                3.0         PROTEIN TOTAL       6.7         RBC       5.09         RDW       14.0         Sodium 152       153                153         Troponin I       0.125  Comment: The reference interval for Troponin I represents the 99th  percentile   cutoff   for our facility and is consistent with 3rd generation assay   performance.           TSH               WBC       16.92                                Significant Imaging: I have reviewed all pertinent imaging results/findings within the past 24 hours.    CT Head Without Contrast   Final Result      No acute intracranial CT abnormality.      All CT scans at this facility are performed  using dose modulation techniques as appropriate to performed exam including the following:  automated exposure control; adjustment of mA and/or kV according to the patients size (this includes techniques or standardized protocols for targeted exams where dose is matched to indication/reason for exam: i.e. extremities or head);  iterative reconstruction technique.         Electronically signed by: Yusuf Nicholas   Date:    04/26/2024   Time:    09:07      X-Ray Chest AP Portable   Final Result      No acute abnormality.         Electronically signed by: Yusuf Nicholas   Date:    04/26/2024   Time:    08:15      X-Ray Chest 1 View    (Results Pending)

## 2024-04-27 NOTE — PT/OT/SLP EVAL
Physical Therapy Evaluation    Patient Name:  Anthony Biggs Jr.   MRN:  [id number]    Recommendations:     Discharge Recommendations: Moderate Intensity Therapy   Discharge Equipment Recommendations: walker, rolling, bath bench   Barriers to discharge: Decreased caregiver support    Assessment:     Anthony Biggs Jr. is a 83 y.o. male admitted with a medical diagnosis of Hyperosmolar hyperglycemic state (HHS).  He presents with the following impairments/functional limitations: weakness, impaired self care skills, impaired functional mobility, gait instability, impaired balance, impaired cognition, decreased lower extremity function, decreased safety awareness .Will benefit from acute PT to address limitations and family education.    Rehab Prognosis: Fair; patient would benefit from acute skilled PT services to address these deficits and reach maximum level of function.    Recent Surgery: * No surgery found *      Plan:     During this hospitalization, patient to be seen 3 x/week (as priscila) to address the identified rehab impairments via gait training, therapeutic activities, therapeutic exercises, neuromuscular re-education and progress toward the following goals:    Plan of Care Expires:  05/11/24    Subjective     Chief Complaint: weakness  Patient/Family Comments/goals: improve strength. Return to home  Pain/Comfort:  Pain Rating 1: 0/10 (unable to answer)    Patients cultural, spiritual, Confucianism conflicts given the current situation:      Living Environment:  Pt lives with wife. Wife assists with bathing and dressing. She reports has become incontinent. Ind with ambulation but wife reports balance has declined  Prior to admission, patients level of function was Needs assist.  Equipment used at home: none.  DME owned (not currently used): none.  Upon discharge, patient will have assistance from wife.    Objective:     Communicated with Nurse White prior to session.  Patient found supine with telemetry, pulse ox  (continuous), peripheral IV, blood pressure cuff  upon PT entry to room.    General Precautions: Standard,    Orthopedic Precautions:    Braces:    Respiratory Status: Room air    Exams:  RLE ROM: Deficits: severe  RLE Strength: Deficits: severe  LLE ROM: Deficits: severe  LLE Strength: Deficits: severe    Functional Mobility:  Max A with bed mobility. Fair sitting balance with poor sitting posture  Transfer and gait not tested due to safety      AM-PAC 6 CLICK MOBILITY  Total Score:8       Treatment & Education:  Pt and family educated in A/PROM to BLE in bed.     Patient left supine with all lines intact, call button in reach, and wife and daughter present.    GOALS:   Multidisciplinary Problems       Physical Therapy Goals          Problem: Physical Therapy    Goal Priority Disciplines Outcome Goal Variances Interventions   Physical Therapy Goal     PT, PT/OT Not Progressing     Description: PT eval complete. The following goals will be met in 14 days  1. Pt SBA with bed mobility  2. Pt transfer to chair with RW and min A  3. Pt amb 75 ft with RW and min A                       History:     Past Medical History:   Diagnosis Date    Angiodysplasia of small intestine     Atrial fibrillation     Chronic upper GI bleeding     due to angiodysplasia in proximal small intestine    Coronary atherosclerosis     Dementia     Diabetes mellitus without complication     Diverticulosis of large intestine without hemorrhage 05/16/2017    Family history of macular degeneration 10/20/2014    History of prostate cancer     Hyperlipidemia associated with type 2 diabetes mellitus     Hypertension associated with diabetes     LAILA (iron deficiency anemia) 09/20/2021    due to angiodysplasia in small intestine    Intention tremor     Major depressive disorder, recurrent, moderate 04/12/2021    SHORTY (obstructive sleep apnea)     Urinary incontinence        Past Surgical History:   Procedure Laterality Date    ABDOMINAL HERNIA REPAIR       APPENDECTOMY      bladder sx      CARDIAC CATHETERIZATION      CATARACT EXTRACTION W/  INTRAOCULAR LENS IMPLANT  Restor OU    COLONOSCOPY N/A 5/16/2017    Procedure: COLONOSCOPY;  Surgeon: Alfredo Naidu MD;  Location: [facility];  Service: Endoscopy;  Laterality: N/A;    ESOPHAGOGASTRODUODENOSCOPY N/A 10/29/2021    Procedure: SBE (Push Enteroscopy) with Dr. Wayne;  Surgeon: Arlette Wayne MD;  Location: [facility];  Service: Endoscopy;  Laterality: N/A;  Plavix held indefinitely as of 10-20-21. Must also hold Coumadin 5 days prior. Ok to continue to use ASA.    ESOPHAGOGASTRODUODENOSCOPY N/A 2/23/2023    Procedure: EGD (ESOPHAGOGASTRODUODENOSCOPY);  Surgeon: Opal Wright MD;  Location: [facility];  Service: Endoscopy;  Laterality: N/A;    inguinal hernia      INTRALUMINAL GASTROINTESTINAL TRACT IMAGING VIA CAPSULE N/A 10/4/2021    Procedure: IMAGING PROCEDURE, GI TRACT, INTRALUMINAL, VIA CAPSULE;  Surgeon: Joaquin Stack RN;  Location: [facility];  Service: Endoscopy;  Laterality: N/A;    LEFT HEART CATHETERIZATION Left 7/20/2021    Procedure: CATHETERIZATION, HEART, LEFT;  Surgeon: Darryl Griffith MD;  Location: [facility] CATH LAB;  Service: Cardiology;  Laterality: Left;    lung sx      PERCUTANEOUS TRANSLUMINAL BALLOON ANGIOPLASTY OF CORONARY ARTERY  7/20/2021    Procedure: Angioplasty-coronary;  Surgeon: Darryl Griffith MD;  Location: [facility] CATH LAB;  Service: Cardiology;;    PROSTATE SURGERY         Time Tracking:     PT Received On: 04/27/24  PT Start Time: 1015     PT Stop Time: 1040  PT Total Time (min): 25 min     Billable Minutes: Evaluation 15 and Therapeutic Exercise 10      04/27/2024

## 2024-04-27 NOTE — ASSESSMENT & PLAN NOTE
Cont fluids /  insulin drip   Will give a bolus  Watch electrolytes and replace    4/27 -off insulin drip / on sq / on fluids  Replace electrolytes   Watch sodium

## 2024-04-28 PROBLEM — E87.0 HYPERNATREMIA: Status: ACTIVE | Noted: 2024-04-28

## 2024-04-28 LAB
ANION GAP SERPL CALC-SCNC: 8 MMOL/L (ref 8–16)
BASOPHILS # BLD AUTO: 0.03 K/UL (ref 0–0.2)
BASOPHILS NFR BLD: 0.3 % (ref 0–1.9)
BUN SERPL-MCNC: 35 MG/DL (ref 8–23)
CALCIUM SERPL-MCNC: 9.4 MG/DL (ref 8.7–10.5)
CHLORIDE SERPL-SCNC: 113 MMOL/L (ref 95–110)
CO2 SERPL-SCNC: 27 MMOL/L (ref 23–29)
CREAT SERPL-MCNC: 1.2 MG/DL (ref 0.5–1.4)
DIFFERENTIAL METHOD BLD: ABNORMAL
EOSINOPHIL # BLD AUTO: 0.4 K/UL (ref 0–0.5)
EOSINOPHIL NFR BLD: 4.1 % (ref 0–8)
ERYTHROCYTE [DISTWIDTH] IN BLOOD BY AUTOMATED COUNT: 14.1 % (ref 11.5–14.5)
EST. GFR  (NO RACE VARIABLE): >60 ML/MIN/1.73 M^2
GLUCOSE SERPL-MCNC: 132 MG/DL (ref 70–110)
HCT VFR BLD AUTO: 40.6 % (ref 40–54)
HGB BLD-MCNC: 13 G/DL (ref 14–18)
IMM GRANULOCYTES # BLD AUTO: 0.06 K/UL (ref 0–0.04)
IMM GRANULOCYTES NFR BLD AUTO: 0.6 % (ref 0–0.5)
LYMPHOCYTES # BLD AUTO: 2.7 K/UL (ref 1–4.8)
LYMPHOCYTES NFR BLD: 25.3 % (ref 18–48)
MAGNESIUM SERPL-MCNC: 1.7 MG/DL (ref 1.6–2.6)
MCH RBC QN AUTO: 28.6 PG (ref 27–31)
MCHC RBC AUTO-ENTMCNC: 32 G/DL (ref 32–36)
MCV RBC AUTO: 89 FL (ref 82–98)
MONOCYTES # BLD AUTO: 0.6 K/UL (ref 0.3–1)
MONOCYTES NFR BLD: 5.5 % (ref 4–15)
NEUTROPHILS # BLD AUTO: 6.8 K/UL (ref 1.8–7.7)
NEUTROPHILS NFR BLD: 64.2 % (ref 38–73)
NRBC BLD-RTO: 0 /100 WBC
PHOSPHATE SERPL-MCNC: 2.5 MG/DL (ref 2.7–4.5)
PLATELET # BLD AUTO: 233 K/UL (ref 150–450)
PMV BLD AUTO: 9.9 FL (ref 9.2–12.9)
POCT GLUCOSE: 137 MG/DL (ref 70–110)
POCT GLUCOSE: 137 MG/DL (ref 70–110)
POCT GLUCOSE: 171 MG/DL (ref 70–110)
POCT GLUCOSE: 174 MG/DL (ref 70–110)
POTASSIUM SERPL-SCNC: 4 MMOL/L (ref 3.5–5.1)
RBC # BLD AUTO: 4.55 M/UL (ref 4.6–6.2)
SODIUM SERPL-SCNC: 148 MMOL/L (ref 136–145)
WBC # BLD AUTO: 10.66 K/UL (ref 3.9–12.7)

## 2024-04-28 PROCEDURE — 36415 COLL VENOUS BLD VENIPUNCTURE: CPT | Mod: HCNC | Performed by: HOSPITALIST

## 2024-04-28 PROCEDURE — 25000003 PHARM REV CODE 250: Mod: HCNC | Performed by: HOSPITALIST

## 2024-04-28 PROCEDURE — 80048 BASIC METABOLIC PNL TOTAL CA: CPT | Mod: HCNC | Performed by: HOSPITALIST

## 2024-04-28 PROCEDURE — 63600175 PHARM REV CODE 636 W HCPCS: Mod: HCNC | Performed by: INTERNAL MEDICINE

## 2024-04-28 PROCEDURE — 85025 COMPLETE CBC W/AUTO DIFF WBC: CPT | Mod: HCNC | Performed by: HOSPITALIST

## 2024-04-28 PROCEDURE — 99900035 HC TECH TIME PER 15 MIN (STAT): Mod: HCNC

## 2024-04-28 PROCEDURE — 94761 N-INVAS EAR/PLS OXIMETRY MLT: CPT | Mod: HCNC

## 2024-04-28 PROCEDURE — 27100171 HC OXYGEN HIGH FLOW UP TO 24 HOURS: Mod: HCNC

## 2024-04-28 PROCEDURE — 83735 ASSAY OF MAGNESIUM: CPT | Mod: HCNC | Performed by: HOSPITALIST

## 2024-04-28 PROCEDURE — 63600175 PHARM REV CODE 636 W HCPCS: Mod: HCNC

## 2024-04-28 PROCEDURE — 94660 CPAP INITIATION&MGMT: CPT | Mod: HCNC

## 2024-04-28 PROCEDURE — 25000003 PHARM REV CODE 250: Mod: HCNC | Performed by: INTERNAL MEDICINE

## 2024-04-28 PROCEDURE — 84100 ASSAY OF PHOSPHORUS: CPT | Mod: HCNC | Performed by: INTERNAL MEDICINE

## 2024-04-28 PROCEDURE — 21400001 HC TELEMETRY ROOM: Mod: HCNC

## 2024-04-28 RX ORDER — MAGNESIUM SULFATE HEPTAHYDRATE 40 MG/ML
2 INJECTION, SOLUTION INTRAVENOUS
Status: DISCONTINUED | OUTPATIENT
Start: 2024-04-28 | End: 2024-04-30 | Stop reason: HOSPADM

## 2024-04-28 RX ORDER — MAGNESIUM SULFATE HEPTAHYDRATE 40 MG/ML
4 INJECTION, SOLUTION INTRAVENOUS
Status: DISCONTINUED | OUTPATIENT
Start: 2024-04-28 | End: 2024-04-30 | Stop reason: HOSPADM

## 2024-04-28 RX ORDER — IBUPROFEN 200 MG
16 TABLET ORAL
Status: DISCONTINUED | OUTPATIENT
Start: 2024-04-28 | End: 2024-04-30 | Stop reason: HOSPADM

## 2024-04-28 RX ORDER — SODIUM CHLORIDE 450 MG/100ML
INJECTION, SOLUTION INTRAVENOUS CONTINUOUS
Status: DISCONTINUED | OUTPATIENT
Start: 2024-04-28 | End: 2024-04-30 | Stop reason: HOSPADM

## 2024-04-28 RX ORDER — IBUPROFEN 200 MG
24 TABLET ORAL
Status: DISCONTINUED | OUTPATIENT
Start: 2024-04-28 | End: 2024-04-30 | Stop reason: HOSPADM

## 2024-04-28 RX ORDER — GLUCAGON 1 MG
1 KIT INJECTION
Status: DISCONTINUED | OUTPATIENT
Start: 2024-04-28 | End: 2024-04-30 | Stop reason: HOSPADM

## 2024-04-28 RX ORDER — INSULIN ASPART 100 [IU]/ML
0-10 INJECTION, SOLUTION INTRAVENOUS; SUBCUTANEOUS
Status: DISCONTINUED | OUTPATIENT
Start: 2024-04-28 | End: 2024-04-30 | Stop reason: HOSPADM

## 2024-04-28 RX ADMIN — MUPIROCIN: 20 OINTMENT TOPICAL at 08:04

## 2024-04-28 RX ADMIN — INSULIN ASPART 2 UNITS: 100 INJECTION, SOLUTION INTRAVENOUS; SUBCUTANEOUS at 02:04

## 2024-04-28 RX ADMIN — MAGNESIUM SULFATE HEPTAHYDRATE 2 G: 40 INJECTION, SOLUTION INTRAVENOUS at 06:04

## 2024-04-28 RX ADMIN — POTASSIUM CHLORIDE 10 MEQ: 750 TABLET, EXTENDED RELEASE ORAL at 09:04

## 2024-04-28 RX ADMIN — POTASSIUM CHLORIDE AND SODIUM CHLORIDE: 450; 150 INJECTION, SOLUTION INTRAVENOUS at 04:04

## 2024-04-28 RX ADMIN — METOPROLOL SUCCINATE 25 MG: 25 TABLET, EXTENDED RELEASE ORAL at 09:04

## 2024-04-28 RX ADMIN — DONEPEZIL HYDROCHLORIDE 10 MG: 5 TABLET, FILM COATED ORAL at 08:04

## 2024-04-28 RX ADMIN — ATORVASTATIN CALCIUM 40 MG: 40 TABLET, FILM COATED ORAL at 09:04

## 2024-04-28 RX ADMIN — ISOSORBIDE MONONITRATE 30 MG: 30 TABLET, EXTENDED RELEASE ORAL at 09:04

## 2024-04-28 RX ADMIN — SODIUM CHLORIDE: 4.5 INJECTION, SOLUTION INTRAVENOUS at 09:04

## 2024-04-28 RX ADMIN — MUPIROCIN: 20 OINTMENT TOPICAL at 09:04

## 2024-04-28 RX ADMIN — PANTOPRAZOLE SODIUM 40 MG: 40 TABLET, DELAYED RELEASE ORAL at 09:04

## 2024-04-28 RX ADMIN — ALLOPURINOL 100 MG: 100 TABLET ORAL at 09:04

## 2024-04-28 RX ADMIN — APIXABAN 2.5 MG: 2.5 TABLET, FILM COATED ORAL at 08:04

## 2024-04-28 RX ADMIN — APIXABAN 2.5 MG: 2.5 TABLET, FILM COATED ORAL at 09:04

## 2024-04-28 NOTE — ASSESSMENT & PLAN NOTE
Cont fluids /  insulin drip   Will give a bolus  Watch electrolytes and replace    4/27 -off insulin drip / on sq / on fluids  Replace electrolytes   Watch sodium    4/28 on fluids  Off insulin drip  Sliding scale  Long acting held until better po

## 2024-04-28 NOTE — PROGRESS NOTES
[facility] - Intensive Care ([facility])  Critical Care Medicine  Progress Note    Patient Name: Anthony Biggs Jr.  MRN: [id number]  Admission Date: 4/26/2024  Hospital Length of Stay: 2 days  Code Status: Full Code  Attending Provider: Estuardo Eddy MD  Primary Care Provider: Giuliano Moran MD   Principal Problem: Hyperosmolar hyperglycemic state (HHS)    Subjective:     HPI:  83-year-old male history of diabetes, dementia, AFib on Eliquis, and coronary artery disease who presented to the ER with lethargic and weak for 2 days.  His Accu-Chek read high.  Patient was started on fluoxetine with some change in behavior.  Patient was found to have a blood glucose in the 700s.  Wean and creatinine were elevated 93 and 2.7.  He was started on IV fluids.  Insulin was ordered as well.  Wife states he has dementia and she lets him eat what he wants to.  Recently had large amount of ice cream.  Also been having falls.  Head CT was negative.  Patient moved to the ICU for insulin drip    Hospital/ICU Course:  Off drip some confusion     4/28 - more alert      Interval History: remains off drip   Long acting held this am      Objective:     Vital Signs (Most Recent):  Temp: 97.7 °F (36.5 °C) (04/28/24 0302)  Pulse: 104 (04/28/24 0921)  Resp: (!) 25 (04/28/24 0602)  BP: 129/78 (04/28/24 0921)  SpO2: 96 % (04/28/24 0602) Vital Signs (24h Range):  Temp:  [97.7 °F (36.5 °C)-98.2 °F (36.8 °C)] 97.7 °F (36.5 °C)  Pulse:  [] 104  Resp:  [20-39] 25  SpO2:  [91 %-99 %] 96 %  BP: (111-161)/() 129/78     Weight: 90.2 kg (198 lb 13.7 oz)  Body mass index is 32.1 kg/m².      Intake/Output Summary (Last 24 hours) at 4/28/2024 1142  Last data filed at 4/28/2024 0300  Gross per 24 hour   Intake 1385.61 ml   Output --   Net 1385.61 ml        Physical Exam  Vitals and nursing note reviewed.   Constitutional:       General: He is in acute distress.   HENT:      Head: Normocephalic and atraumatic.   Eyes:      General:         Right eye: No  discharge.         Left eye: No discharge.   Cardiovascular:      Rate and Rhythm: Normal rate and regular rhythm.      Heart sounds: No murmur heard.  Pulmonary:      Effort: No respiratory distress.      Breath sounds: No wheezing.   Abdominal:      General: There is no distension.      Palpations: Abdomen is soft.   Musculoskeletal:         General: No swelling or tenderness.      Cervical back: Neck supple.   Neurological:      Comments: Oriented person / place/           Review of Systems   Reason unable to perform ROS: limited but denies.   Constitutional:  Positive for appetite change.   HENT: Negative.     Respiratory: Negative.     Cardiovascular: Negative.    Gastrointestinal: Negative.        Vents:  Oxygen Concentration (%): 24 (04/28/24 0007)    Lines/Drains/Airways       Peripheral Intravenous Line  Duration                  Peripheral IV - Single Lumen 04/27/24 2012 Other (Comments) Anterior;Left Upper Arm <1 day                    Significant Labs:    CBC/Anemia Profile:  Recent Labs   Lab 04/27/24  0451 04/28/24  0340   WBC 16.92* 10.66   HGB 14.3 13.0*   HCT 45.2 40.6    233   MCV 89 89   RDW 14.0 14.1        Chemistries:  Recent Labs   Lab 04/27/24  0451 04/27/24  0745 04/27/24  1521 04/28/24  0340   *  153* 152* 149* 148*   K 3.0*  3.0* 3.4* 3.7 4.0   *  113* 115* 114* 113*   CO2 28  28 22* 28 27   BUN 59*  59* 54* 46* 35*   CREATININE 1.5*  1.5* 1.3 1.3 1.2   CALCIUM 10.1  10.1 9.8 9.4 9.4   ALBUMIN 3.1*  --   --   --    PROT 6.7  --   --   --    BILITOT 1.1*  --   --   --    ALKPHOS 75  --   --   --    ALT 17  --   --   --    AST 27  --   --   --    MG 1.9  --  1.8 1.7   PHOS 3.9  --  2.5* 2.5*       All pertinent labs within the past 24 hours have been reviewed.    Significant Imaging:  I have reviewed all pertinent imaging results/findings within the past 24 hours.    ABG  Recent Labs   Lab 04/26/24  0823   PH 7.401   PO2 49   PCO2 46.2*   HCO3 28.7*   BE 4*      Assessment/Plan:     Cardiac/Vascular  Hypertension associated with diabetes  Iv prn's for now   Will need speech eval     Atrial fibrillation  Monitor , may need prn's if rate increased     4/27 home meds as able     4/28 home meds    Renal/  Hypernatremia  Fluids       SHAYLA (acute kidney injury)  Cont fluids   Monitor     Hematology  Chronic anticoagulation  Hold for now until speech clears    Endocrine  * Hyperosmolar hyperglycemic state (HHS)  Cont fluids /  insulin drip   Will give a bolus  Watch electrolytes and replace    4/27 -off insulin drip / on sq / on fluids  Replace electrolytes   Watch sodium    4/28 on fluids  Off insulin drip  Sliding scale  Long acting held until better po        Pt/ot/st  Transfer to floor with eloise Scruggs MD  Critical Care Medicine  [facility] - Intensive Care (Hospital)

## 2024-04-28 NOTE — SUBJECTIVE & OBJECTIVE
Interval History: remains off drip   Long acting held this am      Objective:     Vital Signs (Most Recent):  Temp: 97.7 °F (36.5 °C) (04/28/24 0302)  Pulse: 104 (04/28/24 0921)  Resp: (!) 25 (04/28/24 0602)  BP: 129/78 (04/28/24 0921)  SpO2: 96 % (04/28/24 0602) Vital Signs (24h Range):  Temp:  [97.7 °F (36.5 °C)-98.2 °F (36.8 °C)] 97.7 °F (36.5 °C)  Pulse:  [] 104  Resp:  [20-39] 25  SpO2:  [91 %-99 %] 96 %  BP: (111-161)/() 129/78     Weight: 90.2 kg (198 lb 13.7 oz)  Body mass index is 32.1 kg/m².      Intake/Output Summary (Last 24 hours) at 4/28/2024 1142  Last data filed at 4/28/2024 0300  Gross per 24 hour   Intake 1385.61 ml   Output --   Net 1385.61 ml        Physical Exam  Vitals and nursing note reviewed.   Constitutional:       General: He is in acute distress.   HENT:      Head: Normocephalic and atraumatic.   Eyes:      General:         Right eye: No discharge.         Left eye: No discharge.   Cardiovascular:      Rate and Rhythm: Normal rate and regular rhythm.      Heart sounds: No murmur heard.  Pulmonary:      Effort: No respiratory distress.      Breath sounds: No wheezing.   Abdominal:      General: There is no distension.      Palpations: Abdomen is soft.   Musculoskeletal:         General: No swelling or tenderness.      Cervical back: Neck supple.   Neurological:      Comments: Oriented person / place/           Review of Systems   Reason unable to perform ROS: limited but denies.   Constitutional:  Positive for appetite change.   HENT: Negative.     Respiratory: Negative.     Cardiovascular: Negative.    Gastrointestinal: Negative.        Vents:  Oxygen Concentration (%): 24 (04/28/24 0007)    Lines/Drains/Airways       Peripheral Intravenous Line  Duration                  Peripheral IV - Single Lumen 04/27/24 2012 Other (Comments) Anterior;Left Upper Arm <1 day                    Significant Labs:    CBC/Anemia Profile:  Recent Labs   Lab 04/27/24  0451 04/28/24  0340   WBC  16.92* 10.66   HGB 14.3 13.0*   HCT 45.2 40.6    233   MCV 89 89   RDW 14.0 14.1        Chemistries:  Recent Labs   Lab 04/27/24  0451 04/27/24  0745 04/27/24  1521 04/28/24  0340   *  153* 152* 149* 148*   K 3.0*  3.0* 3.4* 3.7 4.0   *  113* 115* 114* 113*   CO2 28  28 22* 28 27   BUN 59*  59* 54* 46* 35*   CREATININE 1.5*  1.5* 1.3 1.3 1.2   CALCIUM 10.1  10.1 9.8 9.4 9.4   ALBUMIN 3.1*  --   --   --    PROT 6.7  --   --   --    BILITOT 1.1*  --   --   --    ALKPHOS 75  --   --   --    ALT 17  --   --   --    AST 27  --   --   --    MG 1.9  --  1.8 1.7   PHOS 3.9  --  2.5* 2.5*       All pertinent labs within the past 24 hours have been reviewed.    Significant Imaging:  I have reviewed all pertinent imaging results/findings within the past 24 hours.

## 2024-04-28 NOTE — PROGRESS NOTES
[facility] Care ([facility] [facility] Medicine  Progress Note    Patient Name: Anthony Biggs Jr.  MRN: [id number]  Patient Class: IP- Inpatient   Admission Date: 4/26/2024  Length of Stay: 2 days  Attending Physician: Estuardo Eddy MD  Primary Care Provider: Giuliano Moran MD        Subjective:     Principal Problem:Hyperosmolar hyperglycemic state (HHS)        HPI:  84 y/o male with PMHx of A-fib, CHF, CAD, DM-2, mood disorder, recently diagnosed with dementia who presented to the ER today accompanied with spouse for generalized weakness. Patient awake but lethargic, moaning, answers some questions. History obtained from wife at bedside. Patient recently started Fluoxetine this week, took for 2 days before wife noticed change in behavior. She reports he's been eating ice cream more lately. He is a diabetic, but not on insulin, currently on Jardiance. No reported chest pain, SOB, fevers/chills, nausea/vomiting, diarrhea, dysuria or any other complaints. In ER, BG level 770, K 3.4, elevated AG 22, BUN/Cr 93/2.7 (baseline Cr 1.0), troponin 0.071, beta hydroxybutyrate 1.4. Recent Hg A1c 8.3%. Patient received 6 units of regular insulin. Repeat BMP with , AG 18. Calculated serum osm 329. [facility] consulted for further management, admit inpatient for HHS.    Overview/Hospital Course:  4/27  BG levels better controlled  Currently on levemir 10 units BID and moderate SSI  PT/OT/ST evaluations pending  Patient with hiccups, has had to take meds in past   Pt pulled maki out overnight, urinating this morning, noted small clot  Discussed with RN at bedside and ICU team    4/28  Patient confused overnight, pulling lines; currently sleeping  Delirium precautions, remove restraints if able  BG levels trending down to mid 100's, levemir held this AM      Review of Systems   All other systems reviewed and are negative.    Objective:     Vital Signs (Most Recent):  Temp: 97.7 °F (36.5 °C) (04/28/24 0302)  Pulse: 96  (04/28/24 0602)  Resp: (!) 25 (04/28/24 0602)  BP: 121/78 (04/28/24 0602)  SpO2: 96 % (04/28/24 0602) Vital Signs (24h Range):  Temp:  [97.7 °F (36.5 °C)-98.2 °F (36.8 °C)] 97.7 °F (36.5 °C)  Pulse:  [] 96  Resp:  [20-39] 25  SpO2:  [87 %-100 %] 96 %  BP: (111-161)/() 121/78     Weight: 90.2 kg (198 lb 13.7 oz)  Body mass index is 32.1 kg/m².    Intake/Output Summary (Last 24 hours) at 4/28/2024 0809  Last data filed at 4/28/2024 0300  Gross per 24 hour   Intake 1919.4 ml   Output --   Net 1919.4 ml         Physical Exam  Constitutional:       General: He is not in acute distress.     Appearance: Normal appearance. He is obese.   Cardiovascular:      Rate and Rhythm: Regular rhythm. Tachycardia present.      Heart sounds: No murmur heard.  Pulmonary:      Effort: Pulmonary effort is normal. No respiratory distress.      Breath sounds: Normal breath sounds. No wheezing.   Abdominal:      General: There is no distension.      Palpations: Abdomen is soft.      Tenderness: There is no abdominal tenderness.             Significant Labs: All pertinent labs within the past 24 hours have been reviewed.  Recent Lab Results  (Last 5 results in the past 24 hours)        04/28/24  0613   04/28/24  0340   04/27/24  2336   04/27/24  2120   04/27/24  1521        Anion Gap   8       7       Baso #   0.03             Basophil %   0.3             BUN   35       46       Calcium   9.4       9.4       Chloride   113       114       CO2   27       28       Creatinine   1.2       1.3       Differential Method   Automated             eGFR   >60       55       Eos #   0.4             Eos %   4.1             Glucose   132       205       Gran # (ANC)   6.8             Gran %   64.2             Hematocrit   40.6             Hemoglobin   13.0             Immature Grans (Abs)   0.06  Comment: Mild elevation in immature granulocytes is non specific and   can be seen in a variety of conditions including stress response,   acute  inflammation, trauma and pregnancy. Correlation with other   laboratory and clinical findings is essential.               Immature Granulocytes   0.6             Lymph #   2.7             Lymph %   25.3             Magnesium    1.7       1.8       MCH   28.6             MCHC   32.0             MCV   89             Mono #   0.6             Mono %   5.5             MPV   9.9             nRBC   0             Phosphorus Level   2.5       2.5       Platelet Count   233             POCT Glucose 137     135   146         Potassium   4.0       3.7       RBC   4.55             RDW   14.1             Sodium   148       149       Troponin I         0.075  Comment: The reference interval for Troponin I represents the 99th percentile   cutoff   for our facility and is consistent with 3rd generation assay   performance.         WBC   10.66                                    Significant Imaging: I have reviewed all pertinent imaging results/findings within the past 24 hours.    X-Ray Chest 1 View   Final Result      No acute abnormality.         Electronically signed by: Yusuf Nicholas   Date:    04/27/2024   Time:    10:45      CT Head Without Contrast   Final Result      No acute intracranial CT abnormality.      All CT scans at this facility are performed  using dose modulation techniques as appropriate to performed exam including the following:  automated exposure control; adjustment of mA and/or kV according to the patients size (this includes techniques or standardized protocols for targeted exams where dose is matched to indication/reason for exam: i.e. extremities or head);  iterative reconstruction technique.         Electronically signed by: Yusuf Nicholas   Date:    04/26/2024   Time:    09:07      X-Ray Chest AP Portable   Final Result      No acute abnormality.         Electronically signed by: Yusuf Nicholas   Date:    04/26/2024   Time:    08:15            Assessment/Plan:      * Hyperosmolar hyperglycemic state  ([facility])  Patient's FSGs are uncontrolled due to hyperglycemia on current medication regimen.  Last A1c reviewed-   Lab Results   Component Value Date    HGBA1C 8.3 (H) 04/08/2024     Most recent fingerstick glucose reviewed-   Recent Labs   Lab 04/27/24  1123 04/27/24  2120 04/27/24  2336 04/28/24  0613   POCTGLUCOSE 227* 146* 135* 137*       Current correctional scale   insulin ggt  Increase anti-hyperglycemic dose as follows-   Antihyperglycemics (From admission, onward)      Start     Stop Route Frequency Ordered    04/27/24 0316  insulin aspart U-100 pen 0-10 Units         -- SubQ Every 6 hours PRN 04/27/24 0219    04/27/24 0230  insulin detemir U-100 (Levemir) pen 10 Units         -- SubQ 2 times daily 04/27/24 0219          Hold Oral hypoglycemics while patient is in the hospital.   ------------  DKA/HHS insulin drip pathway initiated  BMP q6h, IV NS, NPO  Transition to sub-q regimen when appropriate    4/27  BG levels improving  On levemir 10 units BID and mod SSI    4/28  BG levels improving, down to 130's this AM  Levemir AM dose held  Continue mod SSI PRN    Hypernatremia  Patient has hypernatremia which is controlled. The hypernatremia is due to Dehydration. We will aim to correct the sodium by 8-10mEq in 24 hours. We will correct their hypernatremia with Select IV fluids: 1/2 NS  at a rate of 70 ml/hr. The patient's sodium results have been reviewed and are listed below.  Recent Labs   Lab 04/28/24  0340   *       SHAYLA (acute kidney injury)  Patient with acute kidney injury/acute renal failure likely due to pre-renal azotemia due to dehydration SHAYLA is currently worsening. Baseline creatinine  1.0  - Labs reviewed- Renal function/electrolytes with Estimated Creatinine Clearance: 50.8 mL/min (based on SCr of 1.2 mg/dL). according to latest data. Monitor urine output and serial BMP and adjust therapy as needed. Avoid nephrotoxins and renally dose meds for GFR listed above.    IV fluids  Avoid  nephrotoxins  AM labs  Strict I/O's    4/28  Renal function improved    Atrial fibrillation  Patient with atrial fibrillation which is controlled currently with Beta Blocker. Patient is currently in atrial fibrillation.[id number] Score: 4. Anticoagulation indicated. Anticoagulation done with Eliquis .    Hypertension associated with diabetes  Chronic, controlled. Latest blood pressure and vitals reviewed-     Temp:  [97.7 °F (36.5 °C)-98.2 °F (36.8 °C)]   Pulse:  []   Resp:  [20-39]   BP: (111-161)/()   SpO2:  [87 %-100 %] .   Home meds for hypertension were reviewed and noted below.   Hypertension Medications               isosorbide mononitrate (IMDUR) 30 MG 24 hr tablet Take 1 tablet (30 mg total) by mouth once daily.    metOLazone (ZAROXOLYN) 5 MG tablet Take 1 tablet (5 mg total) by mouth once daily.    metoprolol succinate (TOPROL-XL) 25 MG 24 hr tablet Take 1 tablet (25 mg total) by mouth once daily.            While in the hospital, will manage blood pressure as follows; Continue home antihypertensive regimen    Will utilize p.r.n. blood pressure medication only if patient's blood pressure greater than 180/110 and he develops symptoms such as worsening chest pain or shortness of breath.    SHORTY (obstructive sleep apnea)  CPAP qHS      VTE Risk Mitigation (From admission, onward)           Ordered     apixaban tablet 2.5 mg  2 times daily         04/26/24 1220     Place sequential compression device  Until discontinued         04/26/24 1724     IP VTE LOW RISK PATIENT  Once         04/26/24 1141                    Discharge Planning   RACHNA:      Code Status: Full Code   Is the patient medically ready for discharge?:     Reason for patient still in hospital (select all that apply): Patient trending condition, Laboratory test, Treatment, and Consult recommendations  Discharge Plan A: Home with family          Estuardo Eddy MD  Department of Hospital Medicine   [facility] - Intensive Care ([facility])

## 2024-04-28 NOTE — PLAN OF CARE
A223/A223 A.  Anthony Biggs Jr. is a 83 y.o.male admitted on 4/26/2024 for Hyperosmolar hyperglycemic state (HHS)   Code Status: Full Code MRN: [id number]   Review of patient's allergies indicates:  No Known Allergies  Past Medical History:   Diagnosis Date    Angiodysplasia of small intestine     Atrial fibrillation     Chronic upper GI bleeding     due to angiodysplasia in proximal small intestine    Coronary atherosclerosis     Dementia     Diabetes mellitus without complication     Diverticulosis of large intestine without hemorrhage 05/16/2017    Family history of macular degeneration 10/20/2014    History of prostate cancer     Hyperlipidemia associated with type 2 diabetes mellitus     Hypertension associated with diabetes     LAILA (iron deficiency anemia) 09/20/2021    due to angiodysplasia in small intestine    Intention tremor     Major depressive disorder, recurrent, moderate 04/12/2021    SHORTY (obstructive sleep apnea)     Urinary incontinence       PRN meds    acetaminophen, 650 mg, Q4H PRN  albuterol-ipratropium, 3 mL, Q6H PRN  dextrose 10%, 12.5 g, PRN  dextrose 10%, 12.5 g, PRN  dextrose 10%, 25 g, PRN  dextrose 10%, 25 g, PRN  glucagon (human recombinant), 1 mg, PRN  glucagon (human recombinant), 1 mg, PRN  glucose, 16 g, PRN  glucose, 16 g, PRN  glucose, 24 g, PRN  glucose, 24 g, PRN  insulin aspart U-100, 0-10 Units, QID (AC + HS) PRN  labetalol, 10 mg, Q6H PRN  magnesium sulfate IVPB, 2 g, PRN  magnesium sulfate IVPB, 4 g, PRN  melatonin, 6 mg, Nightly PRN  metoprolol, 2.5 mg, Q6H PRN  naloxone, 0.02 mg, PRN  ondansetron, 4 mg, Q6H PRN  potassium chloride, 40 mEq, PRN   And  potassium chloride, 60 mEq, PRN   And  potassium chloride, 80 mEq, PRN  simethicone, 1 tablet, QID PRN  sodium chloride 0.9%, 10 mL, PRN      Chart check completed. Will continue plan of care.      Orientation: disoriented to, time  Chel Coma Scale Score: 14     Lead Monitored: Lead II Rhythm: atrial rhythm    Cardiac/Telemetry  Box Number: [id number]  VTE Required Core Measure: Pharmacological prophylaxis initiated/maintained Last Bowel Movement: 04/28/24  Diet Minced & Moist (IDDSI Level 5) Consistent Carbohydrate; Standard Tray  Voiding Characteristics: incontinence  Julito Score: 15  Fall Risk Score: 23  Accucheck []   Freq?      Lines/Drains/Airways       Peripheral Intravenous Line  Duration                  Peripheral IV - Single Lumen 04/27/24 2012 Other (Comments) Anterior;Left Upper Arm <1 day

## 2024-04-28 NOTE — ASSESSMENT & PLAN NOTE
Patient has hypernatremia which is controlled. The hypernatremia is due to Dehydration. We will aim to correct the sodium by 8-10mEq in 24 hours. We will correct their hypernatremia with Select IV fluids: 1/2 NS  at a rate of 70 ml/hr. The patient's sodium results have been reviewed and are listed below.  Recent Labs   Lab 04/28/24  0340   *

## 2024-04-28 NOTE — PLAN OF CARE
Patient more confused overnight (hx of dementia).  Restraints added r/t pt pulling out Ivs. Did well overnight. Safety measures in place

## 2024-04-28 NOTE — ASSESSMENT & PLAN NOTE
Chronic, controlled. Latest blood pressure and vitals reviewed-     Temp:  [97.7 °F (36.5 °C)-98.2 °F (36.8 °C)]   Pulse:  []   Resp:  [20-39]   BP: (111-161)/()   SpO2:  [87 %-100 %] .   Home meds for hypertension were reviewed and noted below.   Hypertension Medications               isosorbide mononitrate (IMDUR) 30 MG 24 hr tablet Take 1 tablet (30 mg total) by mouth once daily.    metOLazone (ZAROXOLYN) 5 MG tablet Take 1 tablet (5 mg total) by mouth once daily.    metoprolol succinate (TOPROL-XL) 25 MG 24 hr tablet Take 1 tablet (25 mg total) by mouth once daily.            While in the hospital, will manage blood pressure as follows; Continue home antihypertensive regimen    Will utilize p.r.n. blood pressure medication only if patient's blood pressure greater than 180/110 and he develops symptoms such as worsening chest pain or shortness of breath.

## 2024-04-28 NOTE — PT/OT/SLP EVAL
Speech Language Pathology Evaluation  Bedside Swallow    Patient Name:  Anthony Biggs Jr.   MRN:  [id number]  Admitting Diagnosis: Hyperosmolar hyperglycemic state (HHS)    Recommendations:                 General Recommendations:  Dysphagia therapy  Diet recommendations:  Soft & Bite Sized Diet - IDDSI Level 6, Thin liquids - IDDSI Level 0   Aspiration Precautions: Eliminate distractions, HOB to 90 degrees, Meds whole buried in puree, Remain upright 30 minutes post meal, and Standard aspiration precautions   General Precautions: Standard, aspiration  Communication strategies:  provide increased time to answer    Assessment:     Anthony Biggs Jr. is a 83 y.o. male with a medical diagnosis of hyperosmolar hyperglycemic state (HHS). He presents with an SLP diagnosis of Dysphagia characterized by extended oral prep and impaired a-p transit. Observed minimal intake despite max encouragement. He is at increase risk of aspiration due to cognitive deficits and continuous singultus. He would benefit from continued skilled therapy to monitor for diet tolerance.    History:     Past Medical History:   Diagnosis Date    Angiodysplasia of small intestine     Atrial fibrillation     Chronic upper GI bleeding     due to angiodysplasia in proximal small intestine    Coronary atherosclerosis     Dementia     Diabetes mellitus without complication     Diverticulosis of large intestine without hemorrhage 05/16/2017    Family history of macular degeneration 10/20/2014    History of prostate cancer     Hyperlipidemia associated with type 2 diabetes mellitus     Hypertension associated with diabetes     LAILA (iron deficiency anemia) 09/20/2021    due to angiodysplasia in small intestine    Intention tremor     Major depressive disorder, recurrent, moderate 04/12/2021    SHORTY (obstructive sleep apnea)     Urinary incontinence        Past Surgical History:   Procedure Laterality Date    ABDOMINAL HERNIA REPAIR      APPENDECTOMY      bladder sx       CARDIAC CATHETERIZATION      CATARACT EXTRACTION W/  INTRAOCULAR LENS IMPLANT  Restor OU    COLONOSCOPY N/A 5/16/2017    Procedure: COLONOSCOPY;  Surgeon: Alfredo Naidu MD;  Location: [facility];  Service: Endoscopy;  Laterality: N/A;    ESOPHAGOGASTRODUODENOSCOPY N/A 10/29/2021    Procedure: SBE (Push Enteroscopy) with Dr. Wayne;  Surgeon: Arlette Wayne MD;  Location: [facility];  Service: Endoscopy;  Laterality: N/A;  Plavix held indefinitely as of 10-20-21. Must also hold Coumadin 5 days prior. Ok to continue to use ASA.    ESOPHAGOGASTRODUODENOSCOPY N/A 2/23/2023    Procedure: EGD (ESOPHAGOGASTRODUODENOSCOPY);  Surgeon: Opal Wright MD;  Location: [facility];  Service: Endoscopy;  Laterality: N/A;    inguinal hernia      INTRALUMINAL GASTROINTESTINAL TRACT IMAGING VIA CAPSULE N/A 10/4/2021    Procedure: IMAGING PROCEDURE, GI TRACT, INTRALUMINAL, VIA CAPSULE;  Surgeon: Joaquin Stack RN;  Location: [facility];  Service: Endoscopy;  Laterality: N/A;    LEFT HEART CATHETERIZATION Left 7/20/2021    Procedure: CATHETERIZATION, HEART, LEFT;  Surgeon: Darryl Griffith MD;  Location: [facility] CATH LAB;  Service: Cardiology;  Laterality: Left;    lung sx      PERCUTANEOUS TRANSLUMINAL BALLOON ANGIOPLASTY OF CORONARY ARTERY  7/20/2021    Procedure: Angioplasty-coronary;  Surgeon: Darryl Griffith MD;  Location: [facility] CATH LAB;  Service: Cardiology;;    PROSTATE SURGERY         Social History: Patient lives with spouse at home.    Chest X-Rays: EXAMINATION:  XR CHEST 1 VIEW     CLINICAL HISTORY:  respiratory failure;     TECHNIQUE:  Single frontal view of the chest was performed.     COMPARISON:  None     FINDINGS:  The lungs are clear, with normal appearance of pulmonary vasculature and no pleural effusion or pneumothorax.     The cardiac silhouette is normal in size. The hilar and mediastinal contours are unremarkable.     Bones are intact.     Impression:     No acute abnormality.         Electronically signed by:Yusuf Nicholas  Date:                                            04/27/2024  Time:                                           10:45    Prior diet: Regular solids and thin liquids via straw        Subjective     Patient alert and seen at bedside with family. Wife denied any difficulty swallowing at home.  Patient goals: I'm uncomfortable     Pain/Comfort:  Pain Rating 1: 0/10  Pain Rating Post-Intervention 1: 0/10    Respiratory Status: Room air    Objective:     Oral Musculature Evaluation  Oral Musculature: general weakness  Dentition: scattered dentition      Clinical Swallow Evaluation:   Predisposing dysphagia risk factors: dementia diagnosis    Smiley Swallow Protocol: unable to follow directive     Clinical Swallow Examination:   Of note, patient was fed by SLP throughout evaluation. Patient presented with:     CONSISTENCY  NOTES   THIN (IDDSI 0) Via cup and straw   No overt s/s of aspiration   PUREE (IDDSI 4/Extremely Thick)   TSP bites of pudding x 2 No overt s/s of aspiration   SOLID (IDDSI 7/Regular) Bite of betty cracker  X1   Extended prep, slow a-p transit     Thickened liquids were not used in this assessment. Jessefe (2018) reported that thickened liquids have no sound evidence at reducing the risk of pneumonia in patients with dysphagia and can cause harm by increasing their risk of dehydration. It also presents an increased risk of UTI, electrolyte imbalance, constipation, fecal impaction, cognitive impairment, functional decline and even death (Langmore, 2002; [place], 2016).  Thickened liquids are associated with risks including dehydration, increased pharyngeal residue, potential interference with medication absorption, and decreased quality of life (Karina, 2013). Thickened liquids are also more likely to be silently aspirated than thin liquids (Madan et al., 2018). This supports the assertion that we should confirm a patient requires thickened liquids with an  instrumental swallow study prior to recommending them.    References:   Karina HERR (2013). Thickening agents used for dysphagia management: Effect on bioavailability of water, medication and feelings of satiety. Nutrition Journal, 12, 54. https://doi.org/10.1186/6747-4822-05-54    CARMENZA Haegr, DONOVAN Willis, YOLANDA Gómez, & CARMENZA Landeros (2018). Cough response to aspiration in thin and thick fluids during FEES in hospitalized inpatients. International journal of language & communication disorders, 53(5), 909-918. https://doi.org/10.1111/0693-6343.44131    INTERPRETATION AND RISK ASSESSMENT:  Clinical swallow evaluation (CSE) revealed oral phase characterized by lingual, labial, and buccal strength and range of motion adequate for lip closure, bolus preparation and propulsion. The patient had no anterior loss of the bolus with closure of the lips around the utensil. Minimal residue remained in the oral cavity following the swallow. Patient without overt clinical signs/symptoms of aspiration on any PO trials given. Contributing risk factors for dysphagia include cognitive deficits.     Clinical signs of oropharyngeal dysphagia, likely Acute on chronic related to dementia diagnosis. Swallowing prognosis is Fair. Instrumental swallow study is not indicated to assess the swallowing physiology and rule out aspiration of various consistencies.           Goals:   Multidisciplinary Problems       SLP Goals          Problem: SLP    Goal Priority Disciplines Outcome   SLP Goal     SLP    Description: TPW tolerate the least restricted diet without any overt s/s of aspiration                       Plan:     Patient to be seen:  2 x/week   Plan of Care expires:     Plan of Care reviewed with:  patient, spouse, daughter   SLP Follow-Up:  Yes       Discharge recommendations:  Moderate Intensity Therapy   Barriers to Discharge:  Level of Skilled Assistance Needed mod    Time Tracking:     SLP Treatment Date:   04/27/24  Speech Start  Time:  1123  Speech Stop Time:  1139     Speech Total Time (min):  16 min    Billable Minutes: Eval Swallow and Oral Function 16 04/27/2024

## 2024-04-28 NOTE — ASSESSMENT & PLAN NOTE
Patient with acute kidney injury/acute renal failure likely due to pre-renal azotemia due to dehydration SHAYLA is currently worsening. Baseline creatinine  1.0  - Labs reviewed- Renal function/electrolytes with Estimated Creatinine Clearance: 50.8 mL/min (based on SCr of 1.2 mg/dL). according to latest data. Monitor urine output and serial BMP and adjust therapy as needed. Avoid nephrotoxins and renally dose meds for GFR listed above.    IV fluids  Avoid nephrotoxins  AM labs  Strict I/O's    4/28  Renal function improved

## 2024-04-28 NOTE — PLAN OF CARE
Poc reviewed c pt and spouse this morning.  Restraints d/c'd, pt didn't pull any lines.  Minimal appetite.  Void and bm x 2, incontinent.  Resting between care.  Bed assigned on tele unit, report called.  Pt transferred to tele bed and transported on tele monitoring, family in room at time of transfer.

## 2024-04-28 NOTE — ASSESSMENT & PLAN NOTE
Patient with atrial fibrillation which is controlled currently with Beta Blocker. Patient is currently in atrial fibrillation.[id number] Score: 4. Anticoagulation indicated. Anticoagulation done with Eliquis .

## 2024-04-28 NOTE — ASSESSMENT & PLAN NOTE
Patient's FSGs are uncontrolled due to hyperglycemia on current medication regimen.  Last A1c reviewed-   Lab Results   Component Value Date    HGBA1C 8.3 (H) 04/08/2024     Most recent fingerstick glucose reviewed-   Recent Labs   Lab 04/27/24  1123 04/27/24  2120 04/27/24  2336 04/28/24  0613   POCTGLUCOSE 227* 146* 135* 137*       Current correctional scale   insulin ggt  Increase anti-hyperglycemic dose as follows-   Antihyperglycemics (From admission, onward)      Start     Stop Route Frequency Ordered    04/27/24 0316  insulin aspart U-100 pen 0-10 Units         -- SubQ Every 6 hours PRN 04/27/24 0219    04/27/24 0230  insulin detemir U-100 (Levemir) pen 10 Units         -- SubQ 2 times daily 04/27/24 0219          Hold Oral hypoglycemics while patient is in the hospital.   ------------  DKA/HHS insulin drip pathway initiated  BMP q6h, IV NS, NPO  Transition to sub-q regimen when appropriate    4/27  BG levels improving  On levemir 10 units BID and mod SSI    4/28  BG levels improving, down to 130's this AM  Levemir AM dose held  Continue mod SSI PRN

## 2024-04-29 LAB
CK SERPL-CCNC: 85 U/L (ref 20–200)
POCT GLUCOSE: 114 MG/DL (ref 70–110)
POCT GLUCOSE: 149 MG/DL (ref 70–110)
POCT GLUCOSE: 165 MG/DL (ref 70–110)

## 2024-04-29 PROCEDURE — 25000003 PHARM REV CODE 250: Mod: HCNC | Performed by: INTERNAL MEDICINE

## 2024-04-29 PROCEDURE — 21400001 HC TELEMETRY ROOM: Mod: HCNC

## 2024-04-29 PROCEDURE — 99900035 HC TECH TIME PER 15 MIN (STAT): Mod: HCNC

## 2024-04-29 PROCEDURE — 97530 THERAPEUTIC ACTIVITIES: CPT | Mod: HCNC

## 2024-04-29 PROCEDURE — 82550 ASSAY OF CK (CPK): CPT | Mod: HCNC | Performed by: INTERNAL MEDICINE

## 2024-04-29 PROCEDURE — 36415 COLL VENOUS BLD VENIPUNCTURE: CPT | Mod: HCNC | Performed by: INTERNAL MEDICINE

## 2024-04-29 PROCEDURE — 25000003 PHARM REV CODE 250: Mod: HCNC | Performed by: HOSPITALIST

## 2024-04-29 RX ADMIN — ALLOPURINOL 100 MG: 100 TABLET ORAL at 08:04

## 2024-04-29 RX ADMIN — MUPIROCIN: 20 OINTMENT TOPICAL at 10:04

## 2024-04-29 RX ADMIN — METOPROLOL SUCCINATE 25 MG: 25 TABLET, EXTENDED RELEASE ORAL at 08:04

## 2024-04-29 RX ADMIN — SODIUM CHLORIDE: 4.5 INJECTION, SOLUTION INTRAVENOUS at 12:04

## 2024-04-29 RX ADMIN — DONEPEZIL HYDROCHLORIDE 10 MG: 5 TABLET, FILM COATED ORAL at 10:04

## 2024-04-29 RX ADMIN — APIXABAN 2.5 MG: 2.5 TABLET, FILM COATED ORAL at 10:04

## 2024-04-29 RX ADMIN — PANTOPRAZOLE SODIUM 40 MG: 40 TABLET, DELAYED RELEASE ORAL at 08:04

## 2024-04-29 RX ADMIN — ATORVASTATIN CALCIUM 40 MG: 40 TABLET, FILM COATED ORAL at 08:04

## 2024-04-29 RX ADMIN — ISOSORBIDE MONONITRATE 30 MG: 30 TABLET, EXTENDED RELEASE ORAL at 08:04

## 2024-04-29 RX ADMIN — APIXABAN 2.5 MG: 2.5 TABLET, FILM COATED ORAL at 08:04

## 2024-04-29 RX ADMIN — MUPIROCIN: 20 OINTMENT TOPICAL at 08:04

## 2024-04-29 NOTE — PLAN OF CARE
A223/A223 A.  Anthony Biggs Jr. is a 83 y.o.male admitted on 4/26/2024 for Hyperosmolar hyperglycemic state (HHS)   Code Status: Full Code MRN: [id number]   Review of patient's allergies indicates:  No Known Allergies  Past Medical History:   Diagnosis Date    Angiodysplasia of small intestine     Atrial fibrillation     Chronic upper GI bleeding     due to angiodysplasia in proximal small intestine    Coronary atherosclerosis     Dementia     Diabetes mellitus without complication     Diverticulosis of large intestine without hemorrhage 05/16/2017    Family history of macular degeneration 10/20/2014    History of prostate cancer     Hyperlipidemia associated with type 2 diabetes mellitus     Hypertension associated with diabetes     LAILA (iron deficiency anemia) 09/20/2021    due to angiodysplasia in small intestine    Intention tremor     Major depressive disorder, recurrent, moderate 04/12/2021    SHORTY (obstructive sleep apnea)     Urinary incontinence       PRN meds    acetaminophen, 650 mg, Q4H PRN  albuterol-ipratropium, 3 mL, Q6H PRN  dextrose 10%, 12.5 g, PRN  dextrose 10%, 12.5 g, PRN  dextrose 10%, 25 g, PRN  dextrose 10%, 25 g, PRN  glucagon (human recombinant), 1 mg, PRN  glucagon (human recombinant), 1 mg, PRN  glucose, 16 g, PRN  glucose, 16 g, PRN  glucose, 24 g, PRN  glucose, 24 g, PRN  insulin aspart U-100, 0-10 Units, QID (AC + HS) PRN  labetalol, 10 mg, Q6H PRN  magnesium sulfate IVPB, 2 g, PRN  magnesium sulfate IVPB, 4 g, PRN  melatonin, 6 mg, Nightly PRN  metoprolol, 2.5 mg, Q6H PRN  naloxone, 0.02 mg, PRN  ondansetron, 4 mg, Q6H PRN  potassium chloride, 40 mEq, PRN   And  potassium chloride, 60 mEq, PRN   And  potassium chloride, 80 mEq, PRN  simethicone, 1 tablet, QID PRN  sodium chloride 0.9%, 10 mL, PRN      Chart check completed. Will continue plan of care.      Orientation: disoriented to, time  Chel Coma Scale Score: 15     Lead Monitored: Lead II Rhythm: atrial rhythm    Cardiac/Telemetry  Box Number: [id number]  VTE Required Core Measure: Pharmacological prophylaxis initiated/maintained Last Bowel Movement: 04/28/24  Diet Minced & Moist (IDDSI Level 5) Consistent Carbohydrate; Standard Tray  Voiding Characteristics: incontinence  Julito Score: 16  Fall Risk Score: 26  Accucheck []   Freq?      Lines/Drains/Airways       Peripheral Intravenous Line  Duration                  Peripheral IV - Single Lumen 04/27/24 2012 Other (Comments) Anterior;Left Upper Arm 1 day

## 2024-04-29 NOTE — PLAN OF CARE
Problem: Adult Inpatient Plan of Care  Goal: Plan of Care Review  Outcome: Progressing  Goal: Patient-Specific Goal (Individualized)  Outcome: Progressing  Goal: Absence of Hospital-Acquired Illness or Injury  Outcome: Progressing  Goal: Optimal Comfort and Wellbeing  Outcome: Progressing  Goal: Readiness for Transition of Care  Outcome: Progressing     Problem: Skin Injury Risk Increased  Goal: Skin Health and Integrity  Outcome: Progressing     Problem: Fall Injury Risk  Goal: Absence of Fall and Fall-Related Injury  Outcome: Progressing

## 2024-04-29 NOTE — PT/OT/SLP PROGRESS
"Occupational Therapy   Treatment    Name: Anthony Biggs Jr.  MRN: [id number]  Admitting Diagnosis:  Hyperosmolar hyperglycemic state (HHS)       Recommendations:     Discharge Recommendations: Moderate Intensity Therapy  Discharge Equipment Recommendations:  to be determined by next level of care  Barriers to discharge:  Decreased caregiver support    Assessment:     Anthony Biggs Jr. is a 83 y.o. male with a medical diagnosis of Hyperosmolar hyperglycemic state (HHS).  He presents with the following performance deficits affecting function are weakness, impaired endurance, impaired self care skills, impaired functional mobility, impaired balance, impaired cardiopulmonary response to activity, decreased safety awareness, impaired cognition.     Rehab Prognosis:  Fair; patient would benefit from acute skilled OT services to address these deficits and reach maximum level of function.       Plan:     Patient to be seen 2 x/week to address the above listed problems via self-care/home management, therapeutic activities, therapeutic exercises  Plan of Care Expires: 05/11/24  Plan of Care Reviewed with: patient, spouse, family    Subjective     Chief Complaint: Reported "I am doing ok today."  Patient/Family Comments/goals: increase independence  Pain/Comfort:  Pain Rating 1: 0/10    Objective:     Communicated with: NurseCarol, prior to session.  Patient found supine with peripheral IV, telemetry, PureWick, bed alarm (rajinder sys) upon OT entry to room.    General Precautions: Standard, fall    Orthopedic Precautions:N/A  Braces: N/A  Respiratory Status: Room air     Occupational Performance:     Bed Mobility:    Patient completed Supine to Sit with minimum assistance   Increased time and v/c for technique    Functional Mobility/Transfers:  Patient completed Sit <> Stand Transfer with minimum assistance  with  rolling walker  (completed x2 trials from EOB)  Patient completed Bed <> Chair Transfer using Step Transfer technique " with minimum assistance with rolling walker  Functional Mobility: Patient able to complete side steps at EOB and ~3-4 steps to bedside chair with RW and min A to promote upright activity.  Provided with v/c for technique with transfers to increase safety and independence with completion.    [facility] 6 Click ADL: 16    Treatment & Education:  Patient tolerated session well overall. Reviewed B UE and LE therex to be completed while upright to increase functional strength and activity tolerance. Return demonstration x5 reps x3 planes of motion. Family present for education to reinforce, as patient remains with cognitive deficits. Patient with limited physical assistance at home with gross functional decline, recommending moderate intensity intervention at d/c to maximize independence prior to returning home with spouse. Educated on benefits of OOB activity and importance of calling for A to transfer back to bed. All parties stated understanding and in agreement with POC.    Patient left up in chair with all lines intact, call button in reach, and family present    GOALS:   Multidisciplinary Problems       Occupational Therapy Goals          Problem: Occupational Therapy    Goal Priority Disciplines Outcome Interventions   Occupational Therapy Goal     OT, PT/OT Progressing    Description: LTG'S TO BE MET IN 14 DAYS (5/11/24)    1)  PATIENT WILL PERFORM UB DRESSING WITH MIN (A) TO DECREASE (D) ON CAREGIVER.    2)  PATIENT WILL PERFORM LB DRESSING WITH MOD (A) TO DECREASE (D) ON CAREGIVER..    3)  PATIENT WILL PERFORM TOILET T/F WITH MOD (A) TO DECREASE (D) ON CAREGIVER..    4)  AFTER EDUCATION, WIFE WILL BE (I) TO GIVE PATIENT VC'S AND GESTURES THAT WOULD ALLOW PATIENT TO PERFORM HIS SELF CARE TASKS WITH DECREASED (D) ON WIFE.                         Time Tracking:     OT Date of Treatment: 04/29/24  OT Start Time: 1025  OT Stop Time: 1050  OT Total Time (min): 25 min    Billable Minutes:Therapeutic Activity  25    Shira Harrell, OT  4/29/2024

## 2024-04-29 NOTE — PLAN OF CARE
[facility] accepted and has submitted for authorization.       04/29/24 8085   Post-Acute Status   Post-Acute Authorization Placement   Post-Acute Placement Status Pending payor review/awaiting authorization (if required)   Discharge Plan   Discharge Plan A Skilled Nursing Facility

## 2024-04-29 NOTE — PROGRESS NOTES
ST attempted dysphagia intervention/diet follow up; however, pt getting cleaned by CNA upon arrival.  ST will continue efforts and established POC at next available opportunity.

## 2024-04-29 NOTE — PLAN OF CARE
Recommendation:   1. Continue current Minced and Moist Consistent CHO as tolerated. Texture/consistency modifications per SLP/MD for all meals and supplements.   2. Addition of Andrey BID to promote wound healing.   3. Addition of Boost Glucose Control TID to supplement meal intake.   4. Monitor weight/labs.   5. RD to follow and monitor intake    Goals:   Pt intake >/= 50% EEN/EPN by RD follow up    Nutrition Goal Status: new  Communication of RD Recs: other (comment) (POC)

## 2024-04-29 NOTE — CONSULTS
[facility] - Telemetry ([facility])  Wound Care    Patient Name:  Anthony Biggs Jr.   MRN:  [id number]  Date: 2024  Diagnosis: Hyperosmolar hyperglycemic state (HHS)    History:     Past Medical History:   Diagnosis Date    Angiodysplasia of small intestine     Atrial fibrillation     Chronic upper GI bleeding     due to angiodysplasia in proximal small intestine    Coronary atherosclerosis     Dementia     Diabetes mellitus without complication     Diverticulosis of large intestine without hemorrhage 2017    Family history of macular degeneration 10/20/2014    History of prostate cancer     Hyperlipidemia associated with type 2 diabetes mellitus     Hypertension associated with diabetes     LAILA (iron deficiency anemia) 2021    due to angiodysplasia in small intestine    Intention tremor     Major depressive disorder, recurrent, moderate 2021    SHORTY (obstructive sleep apnea)     Urinary incontinence        Social History     Socioeconomic History    Marital status:     Highest education level: Some college, no degree   Tobacco Use    Smoking status: Former     Current packs/day: 0.00     Average packs/day: 0.5 packs/day for 40.0 years (20.0 ttl pk-yrs)     Types: Cigarettes     Start date: 1962     Quit date:      Years since quittin.3    Smokeless tobacco: Never   Substance and Sexual Activity    Alcohol use: Yes     Alcohol/week: 7.0 standard drinks of alcohol     Types: 7 Shots of liquor per week    Drug use: No    Sexual activity: Not Currently     Social Determinants of Health     Financial Resource Strain: Patient Unable To Answer (2024)    Overall Financial Resource Strain (CARDIA)     Difficulty of Paying Living Expenses: Patient unable to answer   Food Insecurity: Patient Unable To Answer (2024)    Hunger Vital Sign     Worried About Running Out of Food in the Last Year: Patient unable to answer     Ran Out of Food in the Last Year: Patient unable to answer    Transportation Needs: Patient Unable To Answer (4/27/2024)    PRAPARE - Transportation     Lack of Transportation (Medical): Patient unable to answer     Lack of Transportation (Non-Medical): Patient unable to answer   Physical Activity: Inactive (10/16/2023)    Exercise Vital Sign     Days of Exercise per Week: 0 days     Minutes of Exercise per Session: 0 min   Stress: Patient Unable To Answer (4/27/2024)    [employer] [place] [employer] - Occupational Stress Questionnaire     Feeling of Stress : Patient unable to answer   Social Connections: Moderately Integrated (10/16/2023)    Social Connection and Isolation Panel [NHANES]     Frequency of Communication with Friends and Family: More than three times a week     Frequency of Social Gatherings with Friends and Family: Once a week     Attends Alevism Services: More than 4 times per year     Active Member of Clubs or Organizations: No     Attends Club or Organization Meetings: Never     Marital Status:    Housing Stability: Unknown (4/27/2024)    Housing Stability Vital Sign     Unable to Pay for Housing in the Last Year: No     Homeless in the Last Year: Patient unable to answer       Precautions:     Allergies as of 04/26/2024    (No Known Allergies)       WOC Assessment Details/Treatment     Consulted on this 82 y/o M patient due to present on admission wounds.   Skin tears to left forearm, cleansed with saline and foam dressing replaced - rec. Change weekly and prn.  MASD to groin folds and lower abdominal fold/left hip region. Cleansed with bath wipes and moisture barrier paste applied in thin layer, recommend apply every shift and prn. QiVi external urinary management system is in use.   Healing/fading maroon DTPI noted to coccygeal region, foam dressing maintained.  Recommend turn q2 hours, on waffle mattress overlay.      04/29/24 0830   WOCN Assessment   WOCN Total Time (mins) 45   Visit Date 04/29/24   Visit Time 0830   Consult Type  New   WOCN Speciality Wound   Wound skin tear;moisture        Wound 04/26/24 1900 Abrasion(s) Left posterior Elbow   Date First Assessed/Time First Assessed: 04/26/24 1900   Present on Original Admission: Yes  Primary Wound Type: Abrasion(s)  Side: Left  Orientation: posterior  Location: Elbow  Is this injury device related?: No   Dressing Appearance Intact;Moist drainage   Drainage Amount Small   Drainage Characteristics/Odor Serosanguineous   Appearance Red;Pink;Moist   Tissue loss description Full thickness   Wound Edges Jagged   Care Cleansed with:;Sterile normal saline;Applied:;Skin Barrier   Dressing Foam;Changed   Dressing Change Due 05/06/24        Wound 04/26/24 1900 Moisture associated dermatitis Right Groin   Date First Assessed/Time First Assessed: 04/26/24 1900   Present on Original Admission: Yes  Primary Wound Type: Moisture associated dermatitis  Side: Right  Location: Groin   Drainage Amount Scant   Drainage Characteristics/Odor Serous   Appearance Red   Tissue loss description Partial thickness   Care Cleansed with:;Soap and water;Applied:;Skin Barrier        Wound 04/26/24 1900 Moisture associated dermatitis Left Hip   Date First Assessed/Time First Assessed: 04/26/24 1900   Present on Original Admission: Yes  Primary Wound Type: Moisture associated dermatitis  Side: Left  Location: Hip   Drainage Amount None   Appearance Red   Tissue loss description Not applicable   Care Cleansed with:;Soap and water;Applied:;Skin Barrier        Wound 04/26/24 1900 Pressure Injury Coccyx   Date First Assessed/Time First Assessed: 04/26/24 1900   Present on Original Admission: Yes  Primary Wound Type: Pressure Injury  Location: Coccyx   Pressure Injury Stage DTPI   Dressing Appearance Intact   Drainage Amount None   Appearance Red;Maroon   Tissue loss description Not applicable   Wound Length (cm) 3 cm   Wound Width (cm) 6 cm   Wound Surface Area (cm^2) 18 cm^2   Care Cleansed with:;Soap and water;Applied:;Skin  Barrier   Dressing Foam   Dressing Change Due 05/02/24 04/29/2024

## 2024-04-29 NOTE — CONSULTS
[facility] - Telemetry ([facility])  Adult Nutrition  Consult Note    SUMMARY     Recommendations    Recommendation:   1. Continue current Minced and Moist Consistent CHO as tolerated. Texture/consistency modifications per SLP/MD for all meals and supplements.   2. Addition of Andrey BID to promote wound healing.   3. Addition of Boost Glucose Control TID to supplement meal intake.   4. Monitor weight/labs.   5. RD to follow and monitor intake    Goals:   Pt intake >/= 50% EEN/EPN by RD follow up    Nutrition Goal Status: new  Communication of RD Recs: other (comment) (POC)    Assessment and Plan    Nutrition Problem  Increased Nutrient Needs, Energy/Protein    Related to (etiology):   Physiological state    Signs and Symptoms (as evidenced by):   Pt with dementia, limited PO intake and multiple wounds/areas of altered skin integrity.      Interventions:  Collaboration with other providers  Com[employer]- Boost Glucose Control TID  Modified Beverage- Andrey BID    Nutrition Diagnosis Status:   New    Reason for Assessment    Reason For Assessment: consult (DTI, wife would also like to speak with dietician)  Diagnosis: other (see comments) (Hyperosmolar hyperglycemic state)  Relevant Medical History: Afib, T2DM, HLD, HTN, LAILA, depression, dementia, diverticulosis, prostate surgery, admonial hernia with repair  Interdisciplinary Rounds: did not attend  General Information Comments: Pt receiving minced and moist consistent CHO diet with 25% intake recorded. Per chart review pt with delerium and dementia, intermittently pulling at lines. Receiving IV fluids. Onsite RD to discuss questions from wife upon follow up. PIV. Julito Score: 15 abrasions left elbow, moisture associated dermatitis groin, skin tear left hip, pressure injury coccyx. NFPE not compeleted 2/2 remote weekend coverage.  Nutrition Discharge Planning: d/c TBD    Nutrition Risk Screen    Nutrition Risk Screen: difficulty  "chewing/swallowing    Nutrition/Diet History    Food Preferences: CAPRI  Food Allergies: NKFA  Factors Affecting Nutritional Intake: decreased appetite, impaired cognitive status/motor control, difficulty/impaired swallowing    Anthropometrics    Temp: 98.2 °F (36.8 °C)  Height Method: Estimated  Height: 5' 6" (167.6 cm)  Height (inches): 66 in  Weight Method: Bed Scale  Weight: 90.2 kg (198 lb 13.7 oz)  Weight (lb): 198.86 lb  Ideal Body Weight (IBW), Male: 142 lb  % Ideal Body Weight, Male (lb): 140.04 %  BMI (Calculated): 32.1  BMI Grade: 30 - 34.9- obesity - grade I       Lab/Procedures/Meds    Pertinent Labs Reviewed: reviewed  Pertinent Labs Comments: Na 148, Cl 113, BUN 35, Glu 132, Phos 2.5, A1C 8.3  Pertinent Medications Reviewed: reviewed  Pertinent Medications Comments: NaCl at 70 mL/hr, allopruinol. Apixaban, statin, donepezil, isosorbide mononitrate, TOPROL-XL, pantoprazole        Estimated/Assessed Needs    Weight Used For Calorie Calculations: 90.2 kg (198 lb 13.7 oz)  Energy Calorie Requirements (kcal): 2014  Energy Need Method: [place] (x1.3)  Protein Requirements: 90 (1.0 gm/kg r/t SHAYLA and wounds)  Weight Used For Protein Calculations: 90.2 kg (198 lb 13.7 oz)     Estimated Fluid Requirement Method: RDA Method (or PER MD)  RDA Method (mL): 2014  CHO Requirement: 225 gm/day      Nutrition Prescription Ordered    Current Diet Order: Minced and Moist Consistent CHO    Evaluation of Received Nutrient/Fluid Intake    IV Fluid (mL): 1800 (NaCl at 70 mL/hr)  I/O: 1919.4/65  Energy Calories Required: not meeting needs  Protein Required: not meeting needs  Fluid Required: meeting needs  Comments: LBM 4/28  % Intake of Estimated Energy Needs: 0 - 25 %  % Meal Intake: 0 - 25 %    Nutrition Risk    Level of Risk/Frequency of Follow-up:  (2x/week)       Monitor and Evaluation    Food and Nutrient Intake: energy intake, food and beverage intake  Food and Nutrient Adminstration: diet " order  Knowledge/Beliefs/Attitudes: food and nutrition knowledge/skill  Anthropometric Measurements: weight, weight change, body mass index  Biochemical Data, Medical Tests and Procedures: electrolyte and renal panel, gastrointestinal profile, glucose/endocrine profile, inflammatory profile, lipid profile       Nutrition Follow-Up    RD Follow-up?: Yes

## 2024-04-29 NOTE — PLAN OF CARE
Preference obtained for [facility].  Referral sent via careport.  Notified liaison Jessie.       04/29/24 1120   Post-Acute Status   Post-Acute Authorization Placement   Post-Acute Placement Status Referrals Sent   Discharge Plan   Discharge Plan A Skilled Nursing Facility

## 2024-04-30 VITALS
WEIGHT: 198.88 LBS | OXYGEN SATURATION: 98 % | RESPIRATION RATE: 18 BRPM | DIASTOLIC BLOOD PRESSURE: 81 MMHG | BODY MASS INDEX: 31.96 KG/M2 | HEART RATE: 97 BPM | HEIGHT: 66 IN | SYSTOLIC BLOOD PRESSURE: 141 MMHG | TEMPERATURE: 99 F

## 2024-04-30 LAB
POCT GLUCOSE: 118 MG/DL (ref 70–110)
POCT GLUCOSE: 121 MG/DL (ref 70–110)
POCT GLUCOSE: 171 MG/DL (ref 70–110)

## 2024-04-30 PROCEDURE — 92526 ORAL FUNCTION THERAPY: CPT | Mod: HCNC

## 2024-04-30 PROCEDURE — 25000003 PHARM REV CODE 250: Mod: HCNC | Performed by: INTERNAL MEDICINE

## 2024-04-30 PROCEDURE — 97530 THERAPEUTIC ACTIVITIES: CPT | Mod: HCNC,CQ

## 2024-04-30 PROCEDURE — 25000003 PHARM REV CODE 250: Mod: HCNC | Performed by: HOSPITALIST

## 2024-04-30 PROCEDURE — 97116 GAIT TRAINING THERAPY: CPT | Mod: HCNC,CQ

## 2024-04-30 RX ORDER — MOXIFLOXACIN 5 MG/ML
1 SOLUTION/ DROPS OPHTHALMIC 3 TIMES DAILY
Start: 2024-04-30 | End: 2024-05-07

## 2024-04-30 RX ORDER — MOXIFLOXACIN 5 MG/ML
1 SOLUTION/ DROPS OPHTHALMIC 3 TIMES DAILY
Status: DISCONTINUED | OUTPATIENT
Start: 2024-04-30 | End: 2024-04-30 | Stop reason: HOSPADM

## 2024-04-30 RX ADMIN — MUPIROCIN: 20 OINTMENT TOPICAL at 09:04

## 2024-04-30 RX ADMIN — SODIUM CHLORIDE: 4.5 INJECTION, SOLUTION INTRAVENOUS at 07:04

## 2024-04-30 RX ADMIN — PANTOPRAZOLE SODIUM 40 MG: 40 TABLET, DELAYED RELEASE ORAL at 09:04

## 2024-04-30 RX ADMIN — ISOSORBIDE MONONITRATE 30 MG: 30 TABLET, EXTENDED RELEASE ORAL at 09:04

## 2024-04-30 RX ADMIN — ALLOPURINOL 100 MG: 100 TABLET ORAL at 09:04

## 2024-04-30 RX ADMIN — METOPROLOL SUCCINATE 25 MG: 25 TABLET, EXTENDED RELEASE ORAL at 09:04

## 2024-04-30 RX ADMIN — ATORVASTATIN CALCIUM 40 MG: 40 TABLET, FILM COATED ORAL at 09:04

## 2024-04-30 RX ADMIN — INSULIN ASPART 2 UNITS: 100 INJECTION, SOLUTION INTRAVENOUS; SUBCUTANEOUS at 11:04

## 2024-04-30 RX ADMIN — APIXABAN 2.5 MG: 2.5 TABLET, FILM COATED ORAL at 09:04

## 2024-04-30 RX ADMIN — MOXIFLOXACIN 1 DROP: 5 SOLUTION/ DROPS OPHTHALMIC at 11:04

## 2024-04-30 NOTE — PLAN OF CARE
[facility] - Telemetry (Hospital)  Discharge Final Note    Primary Care Provider: Giuliano Moran MD    Expected Discharge Date: 4/30/2024    Final Discharge Note (most recent)       Final Note - 04/30/24 1111          Final Note    Assessment Type Final Discharge Note     Anticipated Discharge Disposition Skilled Nursing Facility        Post-Acute Status    Post-Acute Authorization Placement     Post-Acute Placement Status Set-up Complete/Auth obtained     Discharge Delays None known at this time                     Important Message from Medicare             Contact Info       Giuliano Moran MD   Specialty: Internal Medicine   Relationship: PCP - General    [address]   [address]  [facility] [id number]   Phone: [phone]       Next Steps: Follow up          DC Disposition: [facility], SNF  Family Notified: Patient and family at bedside  Transportation: [facility], SNF    Patient needed SNF placement upon DC. Patient and family decided on [facility] for placement. BRIE sent referral for review and insurance approval. Jessie with [facility] received approval and gave SW number for nurse report. BRIE messages nurse with number for report. SW pending  time.

## 2024-04-30 NOTE — ASSESSMENT & PLAN NOTE
Chronic, controlled. Latest blood pressure and vitals reviewed-     Temp:  [97.7 °F (36.5 °C)-98.5 °F (36.9 °C)]   Pulse:  []   Resp:  [16-20]   BP: (104-146)/(69-89)   SpO2:  [95 %-98 %] .   Home meds for hypertension were reviewed and noted below.   Hypertension Medications               isosorbide mononitrate (IMDUR) 30 MG 24 hr tablet Take 1 tablet (30 mg total) by mouth once daily.    metOLazone (ZAROXOLYN) 5 MG tablet Take 1 tablet (5 mg total) by mouth once daily.    metoprolol succinate (TOPROL-XL) 25 MG 24 hr tablet Take 1 tablet (25 mg total) by mouth once daily.            While in the hospital, will manage blood pressure as follows; Continue home antihypertensive regimen    Will utilize p.r.n. blood pressure medication only if patient's blood pressure greater than 180/110 and he develops symptoms such as worsening chest pain or shortness of breath.

## 2024-04-30 NOTE — PLAN OF CARE
04/30/24 0921   Post-Acute Status   Post-Acute Authorization Placement  (SNF - [facility])   Post-Acute Placement Status Set-up Complete/Auth obtained   Discharge Delays None known at this time   Discharge Plan   Discharge Plan A Skilled Nursing Facility       Per Jessie, at [facility], patient has insurance authorization to admit to [facility]. SW spoke to Patient's daughter, Jeana over the phone. SW will speak with spouse and Patient at bedside letting them know of DC today.     9 52 SW meet with patient ,spouse, and family at bedside to discuss SNF placement. Patient undecided on SNF placement and wished to go home. Patient's family voiced concerns about Patient DC home and wish for him to go to [facility] for therapy. SW voiced to Patient that SNF was temporary and Patient can DC home upon completion of therapy. Patient's daughter-in-law was listing benefits of SNF for Patient and patient was agreeable to SNF placement at the end of the discussion. Patient is medically stable and will DC to [facility] today.     10 30 SW uploaded AVS and DC orders to Careport for [facility]. SW messaged Jessie, for number for nurse report and  time.     SW will continue to follow and assist as needed.

## 2024-04-30 NOTE — PLAN OF CARE
A223/A223 A.  Anthony Biggs Jr. is a 83 y.o.male admitted on 4/26/2024 for Hyperosmolar hyperglycemic state (HHS)   Code Status: Full Code MRN: [id number]   Review of patient's allergies indicates:  No Known Allergies  Past Medical History:   Diagnosis Date    Angiodysplasia of small intestine     Atrial fibrillation     Chronic upper GI bleeding     due to angiodysplasia in proximal small intestine    Coronary atherosclerosis     Dementia     Diabetes mellitus without complication     Diverticulosis of large intestine without hemorrhage 05/16/2017    Family history of macular degeneration 10/20/2014    History of prostate cancer     Hyperlipidemia associated with type 2 diabetes mellitus     Hypertension associated with diabetes     LAILA (iron deficiency anemia) 09/20/2021    due to angiodysplasia in small intestine    Intention tremor     Major depressive disorder, recurrent, moderate 04/12/2021    SHORTY (obstructive sleep apnea)     Urinary incontinence       PRN meds    acetaminophen, 650 mg, Q4H PRN  albuterol-ipratropium, 3 mL, Q6H PRN  dextrose 10%, 12.5 g, PRN  dextrose 10%, 12.5 g, PRN  dextrose 10%, 25 g, PRN  dextrose 10%, 25 g, PRN  glucagon (human recombinant), 1 mg, PRN  glucagon (human recombinant), 1 mg, PRN  glucose, 16 g, PRN  glucose, 16 g, PRN  glucose, 24 g, PRN  glucose, 24 g, PRN  insulin aspart U-100, 0-10 Units, QID (AC + HS) PRN  labetalol, 10 mg, Q6H PRN  magnesium sulfate IVPB, 2 g, PRN  magnesium sulfate IVPB, 4 g, PRN  melatonin, 6 mg, Nightly PRN  metoprolol, 2.5 mg, Q6H PRN  naloxone, 0.02 mg, PRN  ondansetron, 4 mg, Q6H PRN  potassium chloride, 40 mEq, PRN   And  potassium chloride, 60 mEq, PRN   And  potassium chloride, 80 mEq, PRN  simethicone, 1 tablet, QID PRN  sodium chloride 0.9%, 10 mL, PRN      AVS Discharge instructions received and reviewed with pt and family at bedside.  Pt voiced understanding and all questions answered to satisfaction.  Stressed importance to making and keeping  all follow up appointments.  Tele monitor removed and brought to monitor tech.  IV d/c'd with tip intact, pressure dressing applied.  Pt will call when ready to be transported to front of hospital via w/c to be discharged home.      Orientation: disoriented to, time  [place] Coma Scale Score: 15     Lead Monitored: Lead II Rhythm: atrial rhythm Frequency/Ectopy: PVCs  Cardiac/Telemetry Box Number: [id number]  VTE Required Core Measure: Pharmacological prophylaxis initiated/maintained Last Bowel Movement: 04/29/24  Diet diabetic  Diet Soft & Bite Sized (IDDSI Level 6) Consistent Carbohydrate; Standard Tray  Voiding Characteristics: incontinence  Julito Score: 16  Fall Risk Score: 22  Accucheck []   Freq?      Lines/Drains/Airways       Peripheral Intravenous Line  Duration                  Peripheral IV - Single Lumen 04/27/24 2012 Other (Comments) Anterior;Left Upper Arm 2 days

## 2024-04-30 NOTE — ASSESSMENT & PLAN NOTE
Patient with acute kidney injury/acute renal failure likely due to pre-renal azotemia due to dehydration SHAYLA is currently worsening. Baseline creatinine  1.0  - Labs reviewed- Renal function/electrolytes with Estimated Creatinine Clearance: 50.8 mL/min (based on SCr of 1.2 mg/dL). according to latest data. Monitor urine output and serial BMP and adjust therapy as needed. Avoid nephrotoxins and renally dose meds for GFR listed above.  -resolved

## 2024-04-30 NOTE — PT/OT/SLP PROGRESS
Physical Therapy  Treatment    [place] DEJA Biggs Jr.   MRN: [id number]   Admitting Diagnosis: Hyperosmolar hyperglycemic state (HHS)    PT Received On: 04/30/24  PT Start Time: 0830     PT Stop Time: 0855    PT Total Time (min): 25 min       Billable Minutes:  Gait Training 10 and Therapeutic Activity 15    Treatment Type: Treatment  PT/PTA: PTA     Number of PTA visits since last PT visit: 1       General Precautions: Standard, fall  Orthopedic Precautions: N/A  Braces: N/A  Respiratory Status: Room air         Subjective:  Communicated with patient's nurse, Leonor, and completed Epic chart review prior to session.  Patient agreed to PT session.     Pain/Comfort  Pain Rating 1: 0/10  Pain Rating Post-Intervention 1: 0/10    Objective:   Patient found with: peripheral IV, telemetry, bed alarm, Other (comments) (AVASYS)    Supine > sit EOB: Min A     Forward scoot towards EOB: SBA    STS from EOB > RW: Min A (VC for hand placement)    20ft w/ RW Mod A (VC for safety w/ RW mgmt and to remain within MARIO of AD)    Stand pivot T/F to chair w/ RW: Mod  A    Completed x10 reps AROM TE to BLE: LAQ, Hip Flex, AP   Intermittent cues given as needed to maintain correct form during repetitions    Educated patient on importance of increased tolerance to upright position and direct impact on CV endurance and strength. Patient encouraged to sit up in chair/ EOB, for a minimum of 2 consecutive hours, 3x per day. Encouraged patient to perform AROM TE to BLE throughout the day within all available planes of motion. Re enforced importance of utilizing call light to meet needs in room and not attempt to get up without staff assistance. Patient verbalized understanding and agreed to comply.       AM-PAC 6 CLICK MOBILITY  How much help from another person does this patient currently need?   1 = Unable, Total/Dependent Assistance  2 = A lot, Maximum/Moderate Assistance  3 = A little, Minimum/Contact Guard/Supervision  4 = None, Modified  [place]/Independent    Turning over in bed (including adjusting bedclothes, sheets and blankets)?: 3  Sitting down on and standing up from a chair with arms (e.g., wheelchair, bedside commode, etc.): 3  Moving from lying on back to sitting on the side of the bed?: 3  Moving to and from a bed to a chair (including a wheelchair)?: 2  Need to walk in hospital room?: 2  Climbing 3-5 steps with a railing?: 1 (NT)  Basic Mobility Total Score: 14    AM-PAC Raw Score CMS G-Code Modifier Level of Impairment Assistance   6 % Total / Unable   7 - 9 CM 80 - 100% Maximal Assist   10 - 14 CL 60 - 80% Moderate Assist   15 - 19 CK 40 - 60% Moderate Assist   20 - 22 CJ 20 - 40% Minimal Assist   23 CI 1-20% SBA / CGA   24 CH 0% Independent/ Mod I     Patient left up in chair with call button in reach, chair alarm on, and AVASYS & wife present.    Assessment:  Montal[place] DEJA Kalyan Rodgers is a 83 y.o. male with a medical diagnosis of Hyperosmolar hyperglycemic state (HHS) and presents with overall decline in functional mobility. Patient would continue to benefit from skilled PT to address functional limitations listed below in order to return to PLOF/decrease caregiver burden.     Rehab identified problem list/impairments: weakness, impaired endurance, impaired self care skills, impaired functional mobility, gait instability, impaired balance, impaired cognition, decreased coordination, decreased upper extremity function, decreased lower extremity function, decreased safety awareness, decreased ROM, impaired cardiopulmonary response to activity    Rehab potential is fair.    Activity tolerance: Fair    Discharge recommendations: Moderate Intensity Therapy      Barriers to discharge:      Equipment recommendations: to be determined by next level of care     GOALS:   Multidisciplinary Problems       Physical Therapy Goals          Problem: Physical Therapy    Goal Priority Disciplines Outcome Goal Variances Interventions   Physical  Therapy Goal     PT, PT/OT Not Progressing     Description: PT eval complete. The following goals will be met in 14 days  1. Pt SBA with bed mobility  2. Pt transfer to chair with RW and min A  3. Pt amb 75 ft with RW and min A                       PLAN:    Patient to be seen 3 x/week to address the above listed problems via gait training, therapeutic activities, therapeutic exercises  Plan of Care expires: 05/11/24  Plan of Care reviewed with: patient, spouse         04/30/2024

## 2024-04-30 NOTE — ASSESSMENT & PLAN NOTE
Patient with atrial fibrillation which is controlled currently with Beta Blocker. Patient is currently in atrial fibrillation.[id number] Score: 4. Anticoagulation indicated. Anticoagulation done with Eliquis .

## 2024-04-30 NOTE — ASSESSMENT & PLAN NOTE
Patient's FSGs are uncontrolled due to hyperglycemia on current medication regimen.  Last A1c reviewed-   Lab Results   Component Value Date    HGBA1C 8.3 (H) 04/08/2024     Most recent fingerstick glucose reviewed-   Recent Labs   Lab 04/28/24  2046 04/29/24  0628 04/29/24  1056 04/29/24  1734   POCTGLUCOSE 137* 114* 165* 149*     Current correctional scale  Medium  Maintain anti-hyperglycemic dose as follows-   Antihyperglycemics (From admission, onward)      Start     Stop Route Frequency Ordered    04/28/24 1438  insulin aspart U-100 pen 0-10 Units         -- SubQ Before meals & nightly PRN 04/28/24 1338          Hold Oral hypoglycemics while patient is in the hospital.

## 2024-04-30 NOTE — ASSESSMENT & PLAN NOTE
-resolved with treatment of significant hyperglycemia  -plan referral to outpatient diabetic management to prevent another episode

## 2024-04-30 NOTE — PT/OT/SLP PROGRESS
Speech Language Pathology Treatment    Patient Name:  Anthony Biggs Jr.   MRN:  [id number]  Admitting Diagnosis: Hyperosmolar hyperglycemic state (HHS)    Recommendations:                 General Recommendations:  Follow-up not indicated  Diet recommendations:  Soft & Bite Sized Diet - IDDSI Level 6, Liquid Diet Level: Thin liquids - IDDSI Level 0   Aspiration Precautions: 1 bite/sip at a time, Assistance with meals, Avoid talking while eating, Eliminate distractions, Frequent oral care, HOB to 90 degrees, Small bites/sips, and Standard aspiration precautions   General Precautions: Standard, aspiration  Communication strategies:  provide increased time to answer    Assessment:     Anthony Biggs Jr. is a 83 y.o. male with a medical diagnosis of hyperosmolar hyperglycemic state (HHS). Pt presents with improved levels of alertness/awareness today with family reporting that he is at his communicative and cognitive baseline as he has a hx of dementia. Pt presented with no overt s/s of aspiration throughout assessment, but with prolonged mastication of solid (IDDSI 7) consistency. In order for pt to maintain independence during meals and decrease work/fatigue that could result from prolonged mastication, pt is recommended for a continued diet of soft and bite sized solids (IDDSI 6) and thin liquids (IDDSI 0) with small bites/sips, one bite/sip at a time, and elimination of distractions during meals. No further acute ST needs at this time. MD to re-consult if medically indicated.     Subjective     Pt was seen bedside for tx with family present throughout.   Patient goals: to go home      Pain/Comfort:  Pain Rating 1: 0/10  Pain Rating Post-Intervention 1: 0/10  Pain Rating Post-Intervention 2: 0/10    Objective:     Has the patient been evaluated by SLP for swallowing?   Yes  Keep patient NPO? No   Current Respiratory Status:          Assessment:  Clinical Swallow Examination:   Of note, patient self-fed throughout  evaluation. Patient presented with:     CONSISTENCY  NOTES   THIN (IDDSI 0) Cup/straw sips   No overt s/s of aspiration appreciated. Belching after intake.    PUREE (IDDSI 4/Extremely Thick)   TSP/TBSP bites of applesauce  No overt s/s of aspiration appreciated.    SOLID (IDDSI 7/Regular) Bite of Urvashi Doone cookie    No overt s/s of aspiration appreciated. Pt with prolonged mastication.      Thickened liquids were not used in this assessment. Alvina (2018) reported that thickened liquids have no sound evidence at reducing the risk of pneumonia in patients with dysphagia and can cause harm by increasing their risk of dehydration. It also presents an increased risk of UTI, electrolyte imbalance, constipation, fecal impaction, cognitive impairment, functional decline and even death (Lindy, 2002; Seda, 2016).  Thickened liquids are associated with risks including dehydration, increased pharyngeal residue, potential interference with medication absorption, and decreased quality of life (Karina, 2013). Thickened liquids are also more likely to be silently aspirated than thin liquids (Madan marshall al., 2018). This supports the assertion that we should confirm a patient requires thickened liquids with an instrumental swallow study prior to recommending them.    References:   Karina GREER. (2013). Thickening agents used for dysphagia management: Effect on bioavailability of water, medication and feelings of satiety. Nutrition Journal, 12, 54. https://doi.org/10.1186/2534-2894-45-54    CARMENZA Hager, DONOVAN Willis, YOLANDA Gómez, & CARMENZA Landeros (2018). Cough response to aspiration in thin and thick fluids during FEES in hospitalized inpatients. International journal of language & communication disorders, 53(5), 909-918. https://doi.org/10.1111/4798-1840.68645    INTERPRETATION AND RISK ASSESSMENT:  Clinical swallow evaluation (CSE) revealed oral phase characterized by lingual, labial, and buccal strength and range of motion  adequate for lip closure, bolus preparation and propulsion. The patient had no anterior loss of the bolus with complete closure of the lips around the utensils. No residue remained in the oral cavity following the swallow. Patient without overt clinical signs/symptoms of aspiration on any PO trials given. Contributing risk factors for dysphagia include cognitive deficits. Swallowing prognosis is Good. Instrumental swallow study is not indicated to assess the swallowing physiology and rule out aspiration of various consistencies.       Goals:   Multidisciplinary Problems       SLP Goals       Not on file              Multidisciplinary Problems (Resolved)          Problem: SLP    Goal Priority Disciplines Outcome   SLP Goal   (Resolved)     SLP Met   Description: TPW tolerate the least restricted diet without any overt s/s of aspiration                       Plan:     Patient to be seen:  2 x/week   Plan of Care expires:     Plan of Care reviewed with:  patient, family   SLP Follow-Up:  No       Discharge recommendations:  No Therapy Indicated   Barriers to Discharge:  None    Time Tracking:     SLP Treatment Date:   04/30/24  Speech Start Time:  1100  Speech Stop Time:  1118     Speech Total Time (min):  18 min    Billable Minutes: Treatment Swallowing Dysfunction 18    Krissy Parra MA, Provisional SLP, CF-SLP  Speech Language Pathologist  04/30/2024

## 2024-04-30 NOTE — PROGRESS NOTES
[facility] Medicine  Progress Note    Patient Name: Anthony Biggs Jr.  MRN: [id number]  Patient Class: IP- Inpatient   Admission Date: 4/26/2024  Length of Stay: 3 days  Attending Physician: Hiram Ghotra MD  Primary Care Provider: Giuliano Moran MD        Subjective:     Principal Problem:Hyperosmolar hyperglycemic state (HHS)        HPI:  82 y/o male with PMHx of A-fib, CHF, CAD, DM-2, mood disorder, recently diagnosed with dementia who presented to the ER today accompanied with spouse for generalized weakness. Patient awake but lethargic, moaning, answers some questions. History obtained from wife at bedside. Patient recently started Fluoxetine this week, took for 2 days before wife noticed change in behavior. She reports he's been eating ice cream more lately. He is a diabetic, but not on insulin, currently on Jardiance. No reported chest pain, SOB, fevers/chills, nausea/vomiting, diarrhea, dysuria or any other complaints. In ER, BG level 770, K 3.4, elevated AG 22, BUN/Cr 93/2.7 (baseline Cr 1.0), troponin 0.071, beta hydroxybutyrate 1.4. Recent Hg A1c 8.3%. Patient received 6 units of regular insulin. Repeat BMP with , AG 18. Calculated serum osm 329. [facility] [place] [place] consulted for further management, admit inpatient for HHS.    Overview/Hospital Course:  4/27  BG levels better controlled  Currently on levemir 10 units BID and moderate SSI  PT/OT/ST evaluations pending  Patient with hiccups, has had to take meds in past   Pt pulled maki out overnight, urinating this morning, noted small clot  Discussed with RN at bedside and ICU team    4/28  Patient confused overnight, pulling lines; currently sleeping  Delirium precautions, remove restraints if able  BG levels trending down to mid 100's, levemir held this AM    Interval History: f/u dementia and diabetes, no acute issues overnight.     Review of Systems  Objective:     Vital Signs (Most Recent):  Temp: 97.8 °F (36.6 °C) (04/29/24  1614)  Pulse: 92 (04/29/24 1719)  Resp: 18 (04/29/24 1614)  BP: 110/71 (04/29/24 1614)  SpO2: 96 % (04/29/24 1614) Vital Signs (24h Range):  Temp:  [97.7 °F (36.5 °C)-98.5 °F (36.9 °C)] 97.8 °F (36.6 °C)  Pulse:  [] 92  Resp:  [16-20] 18  SpO2:  [95 %-98 %] 96 %  BP: (104-146)/(69-89) 110/71     Weight: 90.2 kg (198 lb 13.7 oz)  Body mass index is 32.1 kg/m².    Intake/Output Summary (Last 24 hours) at 4/29/2024 1918  Last data filed at 4/29/2024 0800  Gross per 24 hour   Intake 480 ml   Output 300 ml   Net 180 ml         Physical Exam  HENT:      Head: Normocephalic and atraumatic.   Cardiovascular:      Rate and Rhythm: Normal rate and regular rhythm.      Heart sounds: No murmur heard.  Pulmonary:      Effort: Pulmonary effort is normal. No respiratory distress.      Breath sounds: Normal breath sounds. No wheezing.   Abdominal:      General: Bowel sounds are normal. There is no distension.      Palpations: Abdomen is soft.      Tenderness: There is no abdominal tenderness.   Musculoskeletal:         General: No swelling.   Skin:     General: Skin is warm and dry.   Neurological:      Mental Status: He is alert.      Motor: Weakness present.             Significant Labs: All pertinent labs within the past 24 hours have been reviewed.  CBC:   Recent Labs   Lab 04/28/24  0340   WBC 10.66   HGB 13.0*   HCT 40.6        CMP:   Recent Labs   Lab 04/28/24  0340   *   K 4.0   *   CO2 27   *   BUN 35*   CREATININE 1.2   CALCIUM 9.4   ANIONGAP 8       Significant Imaging: I have reviewed all pertinent imaging results/findings within the past 24 hours.    Assessment/Plan:      * Hyperosmolar hyperglycemic state (HHS)  -resolved with treatment of significant hyperglycemia  -plan referral to outpatient diabetic management to prevent another episode    Hypernatremia  Patient has hypernatremia which is controlled. The hypernatremia is due to Dehydration. We will aim to correct the sodium by  8-10mEq in 24 hours. We will correct their hypernatremia with Select IV fluids: 1/2 NS  at a rate of 70 ml/hr. The patient's sodium results have been reviewed and are listed below.  Recent Labs   Lab 04/28/24  0340   *       SHAYLA (acute kidney injury)  Patient with acute kidney injury/acute renal failure likely due to pre-renal azotemia due to dehydration SHAYLA is currently worsening. Baseline creatinine  1.0  - Labs reviewed- Renal function/electrolytes with Estimated Creatinine Clearance: 50.8 mL/min (based on SCr of 1.2 mg/dL). according to latest data. Monitor urine output and serial BMP and adjust therapy as needed. Avoid nephrotoxins and renally dose meds for GFR listed above.  -resolved    Hypertension associated with diabetes  Chronic, controlled. Latest blood pressure and vitals reviewed-     Temp:  [97.7 °F (36.5 °C)-98.5 °F (36.9 °C)]   Pulse:  []   Resp:  [16-20]   BP: (104-146)/(69-89)   SpO2:  [95 %-98 %] .   Home meds for hypertension were reviewed and noted below.   Hypertension Medications               isosorbide mononitrate (IMDUR) 30 MG 24 hr tablet Take 1 tablet (30 mg total) by mouth once daily.    metOLazone (ZAROXOLYN) 5 MG tablet Take 1 tablet (5 mg total) by mouth once daily.    metoprolol succinate (TOPROL-XL) 25 MG 24 hr tablet Take 1 tablet (25 mg total) by mouth once daily.            While in the hospital, will manage blood pressure as follows; Continue home antihypertensive regimen    Will utilize p.r.n. blood pressure medication only if patient's blood pressure greater than 180/110 and he develops symptoms such as worsening chest pain or shortness of breath.    Diabetes mellitus without complication  Patient's FSGs are uncontrolled due to hyperglycemia on current medication regimen.  Last A1c reviewed-   Lab Results   Component Value Date    HGBA1C 8.3 (H) 04/08/2024     Most recent fingerstick glucose reviewed-   Recent Labs   Lab 04/28/24 2046 04/29/24  1508  04/29/24  1056 04/29/24  1734   POCTGLUCOSE 137* 114* 165* 149*     Current correctional scale  Medium  Maintain anti-hyperglycemic dose as follows-   Antihyperglycemics (From admission, onward)      Start     Stop Route Frequency Ordered    04/28/24 1438  insulin aspart U-100 pen 0-10 Units         -- SubQ Before meals & nightly PRN 04/28/24 1338          Hold Oral hypoglycemics while patient is in the hospital.    SHORTY (obstructive sleep apnea)  CPAP qHS    Atrial fibrillation  Patient with atrial fibrillation which is controlled currently with Beta Blocker. Patient is currently in atrial fibrillation.[id number] Score: 4. Anticoagulation indicated. Anticoagulation done with Eliquis .      VTE Risk Mitigation (From admission, onward)           Ordered     apixaban tablet 2.5 mg  2 times daily         04/26/24 1220     Place sequential compression device  Until discontinued         04/26/24 1724     IP VTE LOW RISK PATIENT  Once         04/26/24 1141                    Discharge Planning   RACHNA:      Code Status: Full Code   Is the patient medically ready for discharge?:     Reason for patient still in hospital (select all that apply): Patient trending condition, PT / OT recommendations, and Pending disposition  Discharge Plan A: Skilled Nursing Facility                  Hiram Ghotra MD  Department of Hospital Medicine   [facility] - Telemetry ([facility])

## 2024-04-30 NOTE — PLAN OF CARE
04/30/24 1550   Medicare Message   Important Message from Medicare regarding Discharge Appeal Rights Explained to patient/caregiver   Date IMM was signed 04/30/24   Time IMM was signed 0930       SW spoke to Patient's daughterJeana over the phone to go over Important Message from Medicare (IMM). Patient's daughter verbalized understandings of rights and had no reservations. SW placed IMM in Patient's chart.

## 2024-04-30 NOTE — PT/OT/SLP PROGRESS
Physical Therapy      Patient Name:  Anthony Biggs Jr.   MRN:  [id number]    07:35 a.m.  Patient not seen today secondary to Bowel/bladder accident. Assisted patient with notifying nursing staff.   Will follow up at next available opportunity.

## 2024-04-30 NOTE — SUBJECTIVE & OBJECTIVE
Interval History: f/u dementia and diabetes, no acute issues overnight.     Review of Systems  Objective:     Vital Signs (Most Recent):  Temp: 97.8 °F (36.6 °C) (04/29/24 1614)  Pulse: 92 (04/29/24 1719)  Resp: 18 (04/29/24 1614)  BP: 110/71 (04/29/24 1614)  SpO2: 96 % (04/29/24 1614) Vital Signs (24h Range):  Temp:  [97.7 °F (36.5 °C)-98.5 °F (36.9 °C)] 97.8 °F (36.6 °C)  Pulse:  [] 92  Resp:  [16-20] 18  SpO2:  [95 %-98 %] 96 %  BP: (104-146)/(69-89) 110/71     Weight: 90.2 kg (198 lb 13.7 oz)  Body mass index is 32.1 kg/m².    Intake/Output Summary (Last 24 hours) at 4/29/2024 1918  Last data filed at 4/29/2024 0800  Gross per 24 hour   Intake 480 ml   Output 300 ml   Net 180 ml         Physical Exam  HENT:      Head: Normocephalic and atraumatic.   Cardiovascular:      Rate and Rhythm: Normal rate and regular rhythm.      Heart sounds: No murmur heard.  Pulmonary:      Effort: Pulmonary effort is normal. No respiratory distress.      Breath sounds: Normal breath sounds. No wheezing.   Abdominal:      General: Bowel sounds are normal. There is no distension.      Palpations: Abdomen is soft.      Tenderness: There is no abdominal tenderness.   Musculoskeletal:         General: No swelling.   Skin:     General: Skin is warm and dry.   Neurological:      Mental Status: He is alert.      Motor: Weakness present.             Significant Labs: All pertinent labs within the past 24 hours have been reviewed.  CBC:   Recent Labs   Lab 04/28/24  0340   WBC 10.66   HGB 13.0*   HCT 40.6        CMP:   Recent Labs   Lab 04/28/24  0340   *   K 4.0   *   CO2 27   *   BUN 35*   CREATININE 1.2   CALCIUM 9.4   ANIONGAP 8       Significant Imaging: I have reviewed all pertinent imaging results/findings within the past 24 hours.

## 2024-04-30 NOTE — DISCHARGE SUMMARY
[facility] - Telemetry ([facility] Medicine  Discharge Summary      Patient Name: Anthony Biggs Jr.  MRN: [id number]  SHAE: [id number]  Patient Class: IP- Inpatient  Admission Date: 4/26/2024  Hospital Length of Stay: 4 days  Discharge Date and Time:  04/30/2024 10:30 AM  Attending Physician: Hiram Ghotra MD   Discharging Provider: Hiram Ghotra MD  Primary Care Provider: Giuliano Moran MD    Primary Care Team: Networked reference to record PCT     HPI:   84 y/o male with PMHx of A-fib, CHF, CAD, DM-2, mood disorder, recently diagnosed with dementia who presented to the ER today accompanied with spouse for generalized weakness. Patient awake but lethargic, moaning, answers some questions. History obtained from wife at bedside. Patient recently started Fluoxetine this week, took for 2 days before wife noticed change in behavior. She reports he's been eating ice cream more lately. He is a diabetic, but not on insulin, currently on Jardiance. No reported chest pain, SOB, fevers/chills, nausea/vomiting, diarrhea, dysuria or any other complaints. In ER, BG level 770, K 3.4, elevated AG 22, BUN/Cr 93/2.7 (baseline Cr 1.0), troponin 0.071, beta hydroxybutyrate 1.4. Recent Hg A1c 8.3%. Patient received 6 units of regular insulin. Repeat BMP with , AG 18. Calculated serum osm 329. [facility] consulted for further management, admit inpatient for [facility].    * No surgery found *      Hospital Course:   The patient presented with lethargy and weakness. His home glucometer was reading high. Patient does not allow family to check his blood sugar regularly and he eats sweets. He was placed on IVFs and IV insulin. He was admitted to the ICU. His blood sugar improved and he was transitioned to subcutaneous insulin. His blood sugars normalized. He was not requiring insulin coverage prior to discharge. Oral medication resumed upon discharge. Outpatient diabetic education after discharge from skilled facility. He was  evaluated by therapy. Skilled placement recommended. He was accepted to [facility] for continuation of care. Patient seen and examined, stable for discharge.      Goals of Care Treatment Preferences:  Code Status: Full Code      Consults:   Consults (From admission, onward)          Status Ordering Provider     Inpatient consult to Social Work  Once        Provider:  (Not yet assigned)    Completed [place] QUINN     Inpatient consult to Registered Dietitian/Nutritionist  Once        Provider:  (Not yet assigned)    Completed [place] QUINN              Final Active Diagnoses:    Diagnosis Date Noted POA    PRINCIPAL PROBLEM:  Hyperosmolar hyperglycemic state (HHS) [E11.00] 04/26/2024 Yes    Hypernatremia [E87.0] 04/28/2024 Yes    SHAYLA (acute kidney injury) [N17.9] 04/26/2024 Yes    Hypertension associated with diabetes [E11.59, I15.2]  Yes    Diabetes mellitus without complication [E11.9]  Yes    SHORTY (obstructive sleep apnea) [G47.33]  Yes    Chronic anticoagulation [Z79.01] 08/07/2017 Not Applicable    Atrial fibrillation [I48.91]  Yes      Problems Resolved During this Admission:       Discharged Condition: stable    Disposition: Skilled Nursing Facility    Follow Up:   Follow-up Information       Giuliano Moran MD Follow up.    Specialty: Internal Medicine  Contact information:  [address] [place] [address]  [address]  [facility] [id number] [phone]                           Patient Instructions:      Ambulatory referral/consult to Outpatient Case Management   Referral Priority: Routine Referral Type: Consultation   Referral Reason: Specialty Services Required   Number of Visits Requested: 1     Ambulatory referral/consult to Diabetes Education   Standing Status: Future   Referral Priority: Routine Referral Type: Consultation   Referral Reason: Specialty Services Required   Requested Specialty: Diabetes   Number of Visits Requested: 1 Expiration Date: 04/30/25     Diet diabetic     Activity as tolerated        Significant Diagnostic Studies: Radiology:   Imaging Results              CT Head Without Contrast (Final result)  Result time 04/26/24 09:07:39      Final result by Yusuf Nicholas MD (04/26/24 09:07:39)                   Impression:      No acute intracranial CT abnormality.    All CT scans at this facility are performed  using dose modulation techniques as appropriate to performed exam including the following:  automated exposure control; adjustment of mA and/or kV according to the patients size (this includes techniques or standardized protocols for targeted exams where dose is matched to indication/reason for exam: i.e. extremities or head);  iterative reconstruction technique.      Electronically signed by: Yusuf Nicholas  Date:    04/26/2024  Time:    09:07               Narrative:    EXAMINATION:  CT HEAD WITHOUT CONTRAST    CLINICAL HISTORY:  Mental status change, unknown cause;    TECHNIQUE:  Low dose axial CT images obtained throughout the head without intravenous contrast. Sagittal and coronal reconstructions were performed.    COMPARISON:  02/24/2023    FINDINGS:  Intracranial compartment:    Ventricles and sulci are normal in size for age without evidence of hydrocephalus. No extra-axial blood or fluid collections.    Mild microvascular ischemic change.  No parenchymal mass, hemorrhage, edema or major vascular distribution infarct.    Skull/extracranial contents (limited evaluation): No fracture. Mastoid air cells and paranasal sinuses are essentially clear.                                       X-Ray Chest AP Portable (Final result)  Result time 04/26/24 08:15:53      Final result by Yusuf Nicholas MD (04/26/24 08:15:53)                   Impression:      No acute abnormality.      Electronically signed by: Yusuf Nicholas  Date:    04/26/2024  Time:    08:15               Narrative:    EXAMINATION:  XR CHEST AP PORTABLE    CLINICAL HISTORY:  altered mental status;    TECHNIQUE:  Single  frontal view of the chest was performed.    COMPARISON:  None    FINDINGS:  The lungs are clear, with normal appearance of pulmonary vasculature and no pleural effusion or pneumothorax.    The cardiac silhouette is normal in size. The hilar and mediastinal contours are unremarkable.    Bones are intact.                                        Pending Diagnostic Studies:       None           Medications:  Reconciled Home Medications:      Medication List        START taking these medications      moxifloxacin 0.5 % ophthalmic solution  Commonly known as: VIGAMOX  Place 1 drop into both eyes 3 (three) times daily. for 7 days            CONTINUE taking these medications      allopurinoL 100 MG tablet  Commonly known as: ZYLOPRIM  Take 1 tablet (100 mg total) by mouth once daily.     apixaban 2.5 mg Tab  Commonly known as: ELIQUIS  Take 1 tablet (2.5 mg total) by mouth 2 (two) times daily.     atorvastatin 40 MG tablet  Commonly known as: LIPITOR  Take 1 tablet (40 mg total) by mouth once daily. for 360 doses     donepeziL 10 MG tablet  Commonly known as: ARICEPT  Take 1 tablet (10 mg total) by mouth every evening.     empagliflozin 10 mg tablet  Commonly known as: JARDIANCE  Take 1 tablet (10 mg total) by mouth once daily.     ferrous sulfate 324 mg (65 mg iron) Tbec  TAKE ONE TABLET BY MOUTH TWICE DAILY     FLUoxetine 10 MG capsule  Take 10 mg by mouth every morning.     isosorbide mononitrate 30 MG 24 hr tablet  Commonly known as: IMDUR  Take 1 tablet (30 mg total) by mouth once daily.     linaCLOtide 145 mcg Cap capsule  Commonly known as: LINZESS  Take 1 capsule (145 mcg total) by mouth before breakfast.     metOLazone 5 MG tablet  Commonly known as: ZAROXOLYN  Take 1 tablet (5 mg total) by mouth once daily.     metoprolol succinate 25 MG 24 hr tablet  Commonly known as: TOPROL-XL  Take 1 tablet (25 mg total) by mouth once daily.     nystatin-triamcinolone cream  Commonly known as: MYCOLOG II  Apply topically 4  (four) times daily.     pantoprazole 40 MG tablet  Commonly known as: PROTONIX  Take 1 tablet (40 mg total) by mouth once daily.     potassium chloride SA 10 MEQ tablet  Commonly known as: K-DUR,KLOR-CON M  Take 1 tablet (10 mEq total) by mouth once daily.              Indwelling Lines/Drains at time of discharge:   Lines/Drains/Airways       None                   Time spent on the discharge of patient: 31 minutes         Hiram Ghotra MD  Department of Hospital Medicine  [facility] - Telemetry ([facility])

## 2024-05-01 ENCOUNTER — OUTPATIENT CASE MANAGEMENT (OUTPATIENT)
Dept: ADMINISTRATIVE | Facility: OTHER | Age: 84
End: 2024-05-01
Payer: MEDICARE

## 2024-05-01 LAB
BACTERIA BLD CULT: NORMAL
BACTERIA BLD CULT: NORMAL

## 2024-05-01 NOTE — PHYSICIAN QUERY
Question: Please clarify the integumentary diagnosis related to the documentation outlined in the query message.    Provider Query Response:  Deep Tissue Pressure Injury of coccyx

## 2024-07-09 NOTE — TELEPHONE ENCOUNTER
Addended by: BRIAN BARRETT on: 7/9/2024 08:43 AM     Modules accepted: Orders     Spoke with patient wife she reporting patient is very weak needs to see dr guidry and is not all the way coherent, spoke with dr guidry he suggest patient go to ed. Spoke with patient wife educated patient needs to go to ed per dr guidry  , mrs cleveland stated she will bring him to ed

## 2024-07-29 PROBLEM — N17.9 AKI (ACUTE KIDNEY INJURY): Status: RESOLVED | Noted: 2024-04-26 | Resolved: 2024-07-29

## 2024-08-07 ENCOUNTER — LAB VISIT (OUTPATIENT)
Dept: LAB | Facility: HOSPITAL | Age: 84
End: 2024-08-07
Attending: INTERNAL MEDICINE
Payer: MEDICARE

## 2024-08-07 DIAGNOSIS — E11.9 DIABETES MELLITUS WITHOUT COMPLICATION: ICD-10-CM

## 2024-08-07 LAB
ANION GAP SERPL CALC-SCNC: 11 MMOL/L (ref 8–16)
BASOPHILS # BLD AUTO: 0.01 K/UL (ref 0–0.2)
BASOPHILS NFR BLD: 0.2 % (ref 0–1.9)
BUN SERPL-MCNC: 15 MG/DL (ref 8–23)
CALCIUM SERPL-MCNC: 9.4 MG/DL (ref 8.7–10.5)
CHLORIDE SERPL-SCNC: 93 MMOL/L (ref 95–110)
CHOLEST SERPL-MCNC: 86 MG/DL (ref 120–199)
CHOLEST/HDLC SERPL: 2.6 {RATIO} (ref 2–5)
CO2 SERPL-SCNC: 32 MMOL/L (ref 23–29)
CREAT SERPL-MCNC: 1 MG/DL (ref 0.5–1.4)
DIFFERENTIAL METHOD BLD: ABNORMAL
EOSINOPHIL # BLD AUTO: 0.1 K/UL (ref 0–0.5)
EOSINOPHIL NFR BLD: 2.1 % (ref 0–8)
ERYTHROCYTE [DISTWIDTH] IN BLOOD BY AUTOMATED COUNT: 15.1 % (ref 11.5–14.5)
EST. GFR  (NO RACE VARIABLE): >60 ML/MIN/1.73 M^2
ESTIMATED AVG GLUCOSE: 146 MG/DL (ref 68–131)
GLUCOSE SERPL-MCNC: 103 MG/DL (ref 70–110)
HBA1C MFR BLD: 6.7 % (ref 4–5.6)
HCT VFR BLD AUTO: 33.2 % (ref 40–54)
HDLC SERPL-MCNC: 33 MG/DL (ref 40–75)
HDLC SERPL: 38.4 % (ref 20–50)
HGB BLD-MCNC: 10.1 G/DL (ref 14–18)
IMM GRANULOCYTES # BLD AUTO: 0.02 K/UL (ref 0–0.04)
IMM GRANULOCYTES NFR BLD AUTO: 0.3 % (ref 0–0.5)
LDLC SERPL CALC-MCNC: 44 MG/DL (ref 63–159)
LYMPHOCYTES # BLD AUTO: 1.3 K/UL (ref 1–4.8)
LYMPHOCYTES NFR BLD: 20.2 % (ref 18–48)
MCH RBC QN AUTO: 28.5 PG (ref 27–31)
MCHC RBC AUTO-ENTMCNC: 30.4 G/DL (ref 32–36)
MCV RBC AUTO: 94 FL (ref 82–98)
MONOCYTES # BLD AUTO: 0.8 K/UL (ref 0.3–1)
MONOCYTES NFR BLD: 11.3 % (ref 4–15)
NEUTROPHILS # BLD AUTO: 4.4 K/UL (ref 1.8–7.7)
NEUTROPHILS NFR BLD: 65.9 % (ref 38–73)
NONHDLC SERPL-MCNC: 53 MG/DL
NRBC BLD-RTO: 0 /100 WBC
PLATELET # BLD AUTO: 230 K/UL (ref 150–450)
PMV BLD AUTO: 11.1 FL (ref 9.2–12.9)
POTASSIUM SERPL-SCNC: 3.6 MMOL/L (ref 3.5–5.1)
RBC # BLD AUTO: 3.55 M/UL (ref 4.6–6.2)
SODIUM SERPL-SCNC: 136 MMOL/L (ref 136–145)
TRIGL SERPL-MCNC: 45 MG/DL (ref 30–150)
TSH SERPL DL<=0.005 MIU/L-ACNC: 1.52 UIU/ML (ref 0.4–4)
WBC # BLD AUTO: 6.64 K/UL (ref 3.9–12.7)

## 2024-08-07 PROCEDURE — 80048 BASIC METABOLIC PNL TOTAL CA: CPT | Mod: HCNC | Performed by: INTERNAL MEDICINE

## 2024-08-07 PROCEDURE — 83036 HEMOGLOBIN GLYCOSYLATED A1C: CPT | Mod: HCNC | Performed by: INTERNAL MEDICINE

## 2024-08-07 PROCEDURE — 85025 COMPLETE CBC W/AUTO DIFF WBC: CPT | Mod: HCNC | Performed by: INTERNAL MEDICINE

## 2024-08-07 PROCEDURE — 80061 LIPID PANEL: CPT | Mod: HCNC | Performed by: INTERNAL MEDICINE

## 2024-08-07 PROCEDURE — 36415 COLL VENOUS BLD VENIPUNCTURE: CPT | Mod: HCNC,PO | Performed by: INTERNAL MEDICINE

## 2024-08-07 PROCEDURE — 84443 ASSAY THYROID STIM HORMONE: CPT | Mod: HCNC | Performed by: INTERNAL MEDICINE

## 2024-08-08 ENCOUNTER — OFFICE VISIT (OUTPATIENT)
Dept: INTERNAL MEDICINE | Facility: CLINIC | Age: 84
End: 2024-08-08
Payer: MEDICARE

## 2024-08-08 VITALS
BODY MASS INDEX: 33.16 KG/M2 | OXYGEN SATURATION: 95 % | DIASTOLIC BLOOD PRESSURE: 86 MMHG | HEART RATE: 68 BPM | TEMPERATURE: 97 F | WEIGHT: 205.5 LBS | SYSTOLIC BLOOD PRESSURE: 134 MMHG

## 2024-08-08 DIAGNOSIS — Z00.00 ROUTINE GENERAL MEDICAL EXAMINATION AT A HEALTH CARE FACILITY: Primary | ICD-10-CM

## 2024-08-08 DIAGNOSIS — E11.9 DIABETES MELLITUS WITHOUT COMPLICATION: ICD-10-CM

## 2024-08-08 DIAGNOSIS — Z85.46 HISTORY OF PROSTATE CANCER: ICD-10-CM

## 2024-08-08 DIAGNOSIS — I70.0 ATHEROSCLEROSIS OF AORTA: ICD-10-CM

## 2024-08-08 DIAGNOSIS — K55.20 ANGIODYSPLASIA OF SMALL INTESTINE: ICD-10-CM

## 2024-08-08 DIAGNOSIS — K92.2 CHRONIC UPPER GI BLEEDING: ICD-10-CM

## 2024-08-08 DIAGNOSIS — E78.00 PURE HYPERCHOLESTEROLEMIA: ICD-10-CM

## 2024-08-08 DIAGNOSIS — I48.0 PAROXYSMAL ATRIAL FIBRILLATION: ICD-10-CM

## 2024-08-08 DIAGNOSIS — E27.8 ADRENAL NODULE: ICD-10-CM

## 2024-08-08 DIAGNOSIS — E78.5 HYPERLIPIDEMIA ASSOCIATED WITH TYPE 2 DIABETES MELLITUS: ICD-10-CM

## 2024-08-08 DIAGNOSIS — Z79.01 CHRONIC ANTICOAGULATION: ICD-10-CM

## 2024-08-08 DIAGNOSIS — E11.69 HYPERLIPIDEMIA ASSOCIATED WITH TYPE 2 DIABETES MELLITUS: ICD-10-CM

## 2024-08-08 DIAGNOSIS — F03.90 DEMENTIA, UNSPECIFIED DEMENTIA SEVERITY, UNSPECIFIED DEMENTIA TYPE, UNSPECIFIED WHETHER BEHAVIORAL, PSYCHOTIC, OR MOOD DISTURBANCE OR ANXIETY: ICD-10-CM

## 2024-08-08 DIAGNOSIS — G47.33 OSA (OBSTRUCTIVE SLEEP APNEA): ICD-10-CM

## 2024-08-08 DIAGNOSIS — E11.59 HYPERTENSION ASSOCIATED WITH DIABETES: ICD-10-CM

## 2024-08-08 DIAGNOSIS — F33.1 MAJOR DEPRESSIVE DISORDER, RECURRENT, MODERATE: ICD-10-CM

## 2024-08-08 DIAGNOSIS — E66.9 OBESITY (BMI 30.0-34.9): ICD-10-CM

## 2024-08-08 DIAGNOSIS — D50.0 IRON DEFICIENCY ANEMIA DUE TO CHRONIC BLOOD LOSS: ICD-10-CM

## 2024-08-08 DIAGNOSIS — I15.2 HYPERTENSION ASSOCIATED WITH DIABETES: ICD-10-CM

## 2024-08-08 DIAGNOSIS — I25.10 ATHEROSCLEROSIS OF NATIVE CORONARY ARTERY OF NATIVE HEART WITHOUT ANGINA PECTORIS: ICD-10-CM

## 2024-08-08 PROCEDURE — 99999 PR PBB SHADOW E&M-EST. PATIENT-LVL III: CPT | Mod: PBBFAC,HCNC,, | Performed by: INTERNAL MEDICINE

## 2024-08-08 PROCEDURE — 1126F AMNT PAIN NOTED NONE PRSNT: CPT | Mod: HCNC,CPTII,S$GLB, | Performed by: INTERNAL MEDICINE

## 2024-08-08 PROCEDURE — 3288F FALL RISK ASSESSMENT DOCD: CPT | Mod: HCNC,CPTII,S$GLB, | Performed by: INTERNAL MEDICINE

## 2024-08-08 PROCEDURE — 3075F SYST BP GE 130 - 139MM HG: CPT | Mod: HCNC,CPTII,S$GLB, | Performed by: INTERNAL MEDICINE

## 2024-08-08 PROCEDURE — 1100F PTFALLS ASSESS-DOCD GE2>/YR: CPT | Mod: HCNC,CPTII,S$GLB, | Performed by: INTERNAL MEDICINE

## 2024-08-08 PROCEDURE — 3072F LOW RISK FOR RETINOPATHY: CPT | Mod: HCNC,CPTII,S$GLB, | Performed by: INTERNAL MEDICINE

## 2024-08-08 PROCEDURE — 1159F MED LIST DOCD IN RCRD: CPT | Mod: HCNC,CPTII,S$GLB, | Performed by: INTERNAL MEDICINE

## 2024-08-08 PROCEDURE — 3079F DIAST BP 80-89 MM HG: CPT | Mod: HCNC,CPTII,S$GLB, | Performed by: INTERNAL MEDICINE

## 2024-08-08 PROCEDURE — 99397 PER PM REEVAL EST PAT 65+ YR: CPT | Mod: HCNC,S$GLB,, | Performed by: INTERNAL MEDICINE

## 2024-08-08 RX ORDER — MORPHINE SULFATE 20 MG/ML
SOLUTION ORAL
COMMUNITY
Start: 2024-05-17 | End: 2024-08-08

## 2024-08-08 RX ORDER — LORAZEPAM 1 MG/1
1 TABLET ORAL 4 TIMES DAILY PRN
COMMUNITY
Start: 2024-05-17 | End: 2024-08-08

## 2024-08-08 RX ORDER — PANTOPRAZOLE SODIUM 40 MG/1
40 TABLET, DELAYED RELEASE ORAL DAILY
Qty: 90 TABLET | Refills: 3 | Status: SHIPPED | OUTPATIENT
Start: 2024-08-08 | End: 2025-08-08

## 2024-08-08 RX ORDER — ALLOPURINOL 100 MG/1
100 TABLET ORAL DAILY
Qty: 90 TABLET | Refills: 3 | Status: SHIPPED | OUTPATIENT
Start: 2024-08-08

## 2024-08-08 RX ORDER — METOPROLOL SUCCINATE 25 MG/1
25 TABLET, EXTENDED RELEASE ORAL DAILY
Qty: 90 TABLET | Refills: 3 | Status: SHIPPED | OUTPATIENT
Start: 2024-08-08 | End: 2025-08-08

## 2024-08-08 RX ORDER — DONEPEZIL HYDROCHLORIDE 10 MG/1
10 TABLET, FILM COATED ORAL NIGHTLY
Qty: 90 TABLET | Refills: 3 | Status: SHIPPED | OUTPATIENT
Start: 2024-08-08 | End: 2025-08-08

## 2024-08-08 RX ORDER — FLUOXETINE 10 MG/1
10 CAPSULE ORAL EVERY MORNING
Qty: 90 CAPSULE | Refills: 3 | Status: SHIPPED | OUTPATIENT
Start: 2024-08-08

## 2024-08-08 RX ORDER — METOLAZONE 5 MG/1
5 TABLET ORAL DAILY
Qty: 90 TABLET | Refills: 3 | Status: SHIPPED | OUTPATIENT
Start: 2024-08-08 | End: 2025-08-08

## 2024-08-08 RX ORDER — LORAZEPAM 0.5 MG/1
0.5 TABLET ORAL EVERY 12 HOURS PRN
Qty: 30 TABLET | Refills: 5 | Status: SHIPPED | OUTPATIENT
Start: 2024-08-08

## 2024-08-08 RX ORDER — ISOSORBIDE MONONITRATE 30 MG/1
30 TABLET, EXTENDED RELEASE ORAL DAILY
Qty: 90 TABLET | Refills: 3 | Status: SHIPPED | OUTPATIENT
Start: 2024-08-08 | End: 2025-08-08

## 2024-08-08 RX ORDER — ATORVASTATIN CALCIUM 40 MG/1
40 TABLET, FILM COATED ORAL DAILY
Qty: 90 TABLET | Refills: 3 | Status: SHIPPED | OUTPATIENT
Start: 2024-08-08 | End: 2025-08-03

## 2024-08-12 ENCOUNTER — TELEPHONE (OUTPATIENT)
Dept: INTERNAL MEDICINE | Facility: CLINIC | Age: 84
End: 2024-08-12
Payer: MEDICARE

## 2024-08-12 DIAGNOSIS — E11.9 DIABETES MELLITUS WITHOUT COMPLICATION: Primary | ICD-10-CM

## 2024-08-12 DIAGNOSIS — I15.2 HYPERTENSION ASSOCIATED WITH DIABETES: ICD-10-CM

## 2024-08-12 DIAGNOSIS — E11.59 HYPERTENSION ASSOCIATED WITH DIABETES: ICD-10-CM

## 2024-08-12 DIAGNOSIS — F03.90 DEMENTIA, UNSPECIFIED DEMENTIA SEVERITY, UNSPECIFIED DEMENTIA TYPE, UNSPECIFIED WHETHER BEHAVIORAL, PSYCHOTIC, OR MOOD DISTURBANCE OR ANXIETY: ICD-10-CM

## 2024-08-12 NOTE — TELEPHONE ENCOUNTER
Pts wife came in for a request of HH for pt/ she said they forgot to mention it at his appt last week/please advise

## 2024-08-15 ENCOUNTER — TELEPHONE (OUTPATIENT)
Dept: INTERNAL MEDICINE | Facility: CLINIC | Age: 84
End: 2024-08-15
Payer: MEDICARE

## 2024-08-15 NOTE — TELEPHONE ENCOUNTER
Instruct the family to keep the area extremely dry. Needs to sit under ceiling fan naked twice per day for 20 minutes. They need to get otc Lotrimin cream and apply twice per day.

## 2024-08-15 NOTE — TELEPHONE ENCOUNTER
[facility] went to see pt today and seen a rash in pt's groin area/ His wife came to the clinic and wants to know  what can she use that wont irritate that area any more/ pt uses [facility]/ please advise

## 2024-08-27 ENCOUNTER — TELEPHONE (OUTPATIENT)
Dept: INTERNAL MEDICINE | Facility: CLINIC | Age: 84
End: 2024-08-27
Payer: MEDICARE

## 2024-08-27 RX ORDER — TORSEMIDE 10 MG/1
10 TABLET ORAL DAILY PRN
Qty: 30 TABLET | Refills: 11 | Status: SHIPPED | OUTPATIENT
Start: 2024-08-27 | End: 2025-08-27

## 2024-08-27 NOTE — TELEPHONE ENCOUNTER
Spoke with [facility]  nurse Neida.  She stated Mr Anthony gained 4lbs since last week.  Has been retaining fluid in the lower extremities, wheezing when he walks. Need medical advice.  Please advise.

## 2024-08-27 NOTE — TELEPHONE ENCOUNTER
----- Message from Opalallanjames Salmon sent at 8/27/2024  2:05 PM CDT -----  Contact: @home health nurse  Would like to receive medical advice.  Pharm:    [facility] [address]  [place] - [address] [facility]  [address] [facility] [address]  Phone: [phone] Fax: [phone]     Would they like a call back or a response via [facility]:  call back    Additional information:  Calling to speak with the office about the pt retaining fluid in his lower extremities she also states that he is wheezing when he walks, and also his weight saws increased by 4 pounds.

## 2024-08-27 NOTE — TELEPHONE ENCOUNTER
I sent in a fluid pill that I want him to take every morning till he gets the 4 lbs off. Then he can take as needed daily for fluid retention.

## 2024-08-27 NOTE — TELEPHONE ENCOUNTER
Alejandra with [facility] seen pt today. She stated she noticed his legs were swollen, his weight is up 5-6lbs, and slightly having SOB with activities during PT. HH nurse is scheduled to see him today or tomorrow per Alejandra.     She stated she discovered while talking to wife, that she forgot to give medications this am. Nurse wants to know if there is anything that needs to be done or completed for this.

## 2024-08-27 NOTE — TELEPHONE ENCOUNTER
----- Message from Lacy Varela sent at 8/27/2024  1:01 PM CDT -----  Contact: ange[facility]  Ange is requesting a call back from the nurse in regards to [place] having increased swelling in his legs, and increased weight when she visited him this morning. Please give her a call back at [phone]

## 2024-09-09 ENCOUNTER — EXTERNAL HOME HEALTH (OUTPATIENT)
Dept: HOME HEALTH SERVICES | Facility: HOSPITAL | Age: 84
End: 2024-09-09
Payer: MEDICARE

## 2024-09-20 ENCOUNTER — DOCUMENT SCAN (OUTPATIENT)
Dept: HOME HEALTH SERVICES | Facility: HOSPITAL | Age: 84
End: 2024-09-20
Payer: MEDICARE

## 2024-10-18 NOTE — ASSESSMENT & PLAN NOTE
Family reports progressive decline for the past few months, patient with decreasing mobility and physical activity  Per family patient has been having symptoms of suspected dementia (irritability, intermittent confusion, repeating things, memory loss) for approximately 2 years  Plan ambulatory referral to Neurology on discharge for further evaluation  Delirium precautions  PT/OT recommends SNF placement.  Social work has been consulted  SLP recommends Minced & Moist Diet - IDDSI Level 5, Thin liquids - IDDSI Level 0 (no straws), aspiration precaution     Adult

## 2024-11-06 ENCOUNTER — OFFICE VISIT (OUTPATIENT)
Dept: URGENT CARE | Facility: CLINIC | Age: 84
End: 2024-11-06
Payer: MEDICARE

## 2024-11-06 VITALS
DIASTOLIC BLOOD PRESSURE: 62 MMHG | BODY MASS INDEX: 31.3 KG/M2 | HEART RATE: 69 BPM | RESPIRATION RATE: 15 BRPM | HEIGHT: 66 IN | WEIGHT: 194.75 LBS | TEMPERATURE: 97 F | OXYGEN SATURATION: 96 % | SYSTOLIC BLOOD PRESSURE: 118 MMHG

## 2024-11-06 DIAGNOSIS — S01.01XA LACERATION OF SCALP, INITIAL ENCOUNTER: ICD-10-CM
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
## 2024-11-06 DIAGNOSIS — Z86.59 HISTORY OF DEMENTIA: ICD-10-CM

## 2024-11-06 PROCEDURE — 99214 OFFICE O/P EST MOD 30 MIN: CPT | Mod: S$GLB,,, | Performed by: NURSE PRACTITIONER

## 2024-11-06 RX ORDER — MUPIROCIN 20 MG/G
OINTMENT TOPICAL 3 TIMES DAILY
Qty: 22 G | Refills: 0 | Status: SHIPPED | OUTPATIENT
Start: 2024-11-06

## 2024-11-06 RX ORDER — MUPIROCIN 20 MG/G
OINTMENT TOPICAL
Status: COMPLETED | OUTPATIENT
Start: 2024-11-06 | End: 2024-11-06

## 2024-11-06 RX ADMIN — MUPIROCIN: 20 OINTMENT TOPICAL at 08:11

## 2024-11-06 NOTE — PROGRESS NOTES
"Subjective:      Patient ID: Anthony Biggs Jr. is a 84 y.o. male.    Vitals:  height is 5' 6" (1.676 m) and weight is 88.3 kg (194 lb 12.4 oz). His temperature is 96.5 °F (35.8 °C). His blood pressure is 118/62 and his pulse is 69. His respiration is 15 and oxygen saturation is 96%.     Chief Complaint: Fall    Patient was an 84-year-old male with a history of dementia receiving hospice care who is accompanied by his wife and presents for evaluation for a laceration to the back of his head after a fall.  Mom states Monday night during the night she found him on the floor in the kitchen.  She states he had a gas to the back of the head.  She reports hospice come out and evaluated him applied bandage.  She states wound is not bleeding but she was concerned that is still open.  No reported vomiting, increased confusion, complaint of extremity pain or other wounds.  Patient did not contribute significantly to history due to his current medical conditions.  No other concerns voiced.    Fall  The accident occurred 3 to 5 days ago. The fall occurred in unknown circumstances. He fell from an unknown height. He landed on Hard floor. The volume of blood lost was minimal. The point of impact was the head. The pain is present in the head. The patient is experiencing no pain. Pertinent negatives include no fever, hearing loss, loss of consciousness, nausea, tingling, visual change or vomiting. The treatment provided no relief.       Unable to perform ROS: Dementia   Constitution: Negative for fever.   Gastrointestinal:  Negative for nausea and vomiting.   Skin:  Positive for laceration.   Neurological:  Negative for loss of consciousness.      Objective:     Physical Exam   Constitutional: He appears well-developed. He is cooperative.   HENT:   Head: Normocephalic and atraumatic.   Ears:   Right Ear: Hearing and external ear normal.   Left Ear: Hearing and external ear normal.   Nose: Nose normal. No mucosal edema or nasal " deformity. No epistaxis. Right sinus exhibits no maxillary sinus tenderness and no frontal sinus tenderness. Left sinus exhibits no maxillary sinus tenderness and no frontal sinus tenderness.   Mouth/Throat: Uvula is midline, oropharynx is clear and moist and mucous membranes are normal. No trismus in the jaw. Normal dentition. No uvula swelling.   Eyes: Conjunctivae and lids are normal.   Neck: Trachea normal and phonation normal. Neck supple.   Cardiovascular: Normal rate, regular rhythm, normal heart sounds and normal pulses.   Pulmonary/Chest: Effort normal and breath sounds normal. No respiratory distress.   Abdominal: Normal appearance and bowel sounds are normal. Soft.   Musculoskeletal: Normal range of motion.         General: Normal range of motion.   Neurological: He is alert and at baseline. He exhibits normal muscle tone.   Skin: Skin is warm, dry and intact.         Comments: Posterior scalp has a 5 cm superficial abrasion with a 1 cm superficial laceration in the center of the abrasion.  There is no surrounding swelling, erythema or drainage.  Wound appears to be healing well.   Psychiatric: His speech is normal and behavior is normal. Judgment and thought content normal.   Nursing note and vitals reviewed.      Assessment:     1. Fall, initial encounter    2. Laceration of scalp, initial encounter    3. History of dementia        Plan:       Fall, initial encounter    Laceration of scalp, initial encounter    History of dementia    Other orders  -     mupirocin 2 % ointment  -     mupirocin (BACTROBAN) 2 % ointment; Apply topically 3 (three) times daily.  Dispense: 22 g; Refill: 0          Medical Decision Making:   History:   I obtained history from: someone other than patient.       <> Summary of History: wife  Initial Assessment:   Nontoxic appearing 85 yo male c/o laceration to scalp from fall.  After complete evaluation, including thorough history and physical exam, the patient's symptoms are  consistent with a simple laceration. There are no concerning features on physical exam to suggest sytemic bacterial infection, abscess, retained foreign body. Vital signs do not suggest sepsis. There is no erythema, drainage or limited ROM to suggest deeper pathology such as tendon or vascular involvement. Tetanus UTD . The patient will be provided with wound care instructions. Will provide RX for mupirocin upon D/C. Return precautions and follow up instructions were provided.         Patient Instructions   Clean the wound twice a day with antibacterial soap and water, then apply dressing using  antibiotic ointment. Use the prescribed ointment if one was provided.    Tylenol or ibuprofen for pain or fever as needed    Your wound is not infected at this time. However if it gets painful, turns red or drainage occurs,  you need to get reevaluated.    If your condition worsens or fails to improve we recommend that you receive another evaluation at the ER immediately or contact your PCP to discuss your concerns or return here.     You must understand that you've received an urgent care treatment only and that you may be released before all your medical problems are known or treated. You the patient will arrange for followup care as instructed.

## 2024-11-24 ENCOUNTER — HOSPITAL ENCOUNTER (OUTPATIENT)
Facility: HOSPITAL | Age: 84
Discharge: HOSPICE | End: 2024-11-26
Attending: EMERGENCY MEDICINE | Admitting: FAMILY MEDICINE
Payer: MEDICARE
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
## 2024-11-24 DIAGNOSIS — S73.101A HIP SPRAIN, RIGHT, INITIAL ENCOUNTER: ICD-10-CM

## 2024-11-24 DIAGNOSIS — R07.9 CHEST PAIN: ICD-10-CM
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
## 2024-11-24 PROBLEM — R29.6 FREQUENT FALLS: Status: ACTIVE | Noted: 2024-11-24

## 2024-11-24 PROBLEM — M25.551 RIGHT HIP PAIN: Status: ACTIVE | Noted: 2024-11-24

## 2024-11-24 PROBLEM — D50.9 IRON DEFICIENCY ANEMIA: Status: ACTIVE | Noted: 2024-11-24

## 2024-11-24 LAB
ABO + RH BLD: NORMAL
ALBUMIN SERPL BCP-MCNC: 3 G/DL (ref 3.5–5.2)
ALP SERPL-CCNC: 91 U/L (ref 40–150)
ALT SERPL W/O P-5'-P-CCNC: 7 U/L (ref 10–44)
ANION GAP SERPL CALC-SCNC: 13 MMOL/L (ref 8–16)
APTT PPP: 31.7 SEC (ref 21–32)
AST SERPL-CCNC: 19 U/L (ref 10–40)
BASOPHILS # BLD AUTO: 0.01 K/UL (ref 0–0.2)
BASOPHILS # BLD AUTO: 0.01 K/UL (ref 0–0.2)
BASOPHILS NFR BLD: 0.1 % (ref 0–1.9)
BASOPHILS NFR BLD: 0.1 % (ref 0–1.9)
BILIRUB SERPL-MCNC: 1 MG/DL (ref 0.1–1)
BILIRUB UR QL STRIP: NEGATIVE
BLD GP AB SCN CELLS X3 SERPL QL: NORMAL
BLD PROD TYP BPU: NORMAL
BLOOD UNIT EXPIRATION DATE: NORMAL
BLOOD UNIT TYPE CODE: 5100
BLOOD UNIT TYPE: NORMAL
BUN SERPL-MCNC: 28 MG/DL (ref 8–23)
CALCIUM SERPL-MCNC: 9.6 MG/DL (ref 8.7–10.5)
CHLORIDE SERPL-SCNC: 87 MMOL/L (ref 95–110)
CLARITY UR: CLEAR
CO2 SERPL-SCNC: 40 MMOL/L (ref 23–29)
CODING SYSTEM: NORMAL
COLOR UR: YELLOW
CREAT SERPL-MCNC: 1.1 MG/DL (ref 0.5–1.4)
CROSSMATCH INTERPRETATION: NORMAL
DIFFERENTIAL METHOD BLD: ABNORMAL
DIFFERENTIAL METHOD BLD: ABNORMAL
DISPENSE STATUS: NORMAL
EOSINOPHIL # BLD AUTO: 0.1 K/UL (ref 0–0.5)
EOSINOPHIL # BLD AUTO: 0.1 K/UL (ref 0–0.5)
EOSINOPHIL NFR BLD: 1.1 % (ref 0–8)
EOSINOPHIL NFR BLD: 1.6 % (ref 0–8)
ERYTHROCYTE [DISTWIDTH] IN BLOOD BY AUTOMATED COUNT: 17.8 % (ref 11.5–14.5)
ERYTHROCYTE [DISTWIDTH] IN BLOOD BY AUTOMATED COUNT: 17.9 % (ref 11.5–14.5)
EST. GFR  (NO RACE VARIABLE): >60 ML/MIN/1.73 M^2
FERRITIN SERPL-MCNC: 40 NG/ML (ref 20–300)
GLUCOSE SERPL-MCNC: 129 MG/DL (ref 70–110)
GLUCOSE UR QL STRIP: NEGATIVE
HCT VFR BLD AUTO: 22.4 % (ref 40–54)
HCT VFR BLD AUTO: 22.9 % (ref 40–54)
HGB BLD-MCNC: 6.5 G/DL (ref 14–18)
HGB BLD-MCNC: 6.5 G/DL (ref 14–18)
HGB UR QL STRIP: NEGATIVE
IMM GRANULOCYTES # BLD AUTO: 0.03 K/UL (ref 0–0.04)
IMM GRANULOCYTES # BLD AUTO: 0.03 K/UL (ref 0–0.04)
IMM GRANULOCYTES NFR BLD AUTO: 0.4 % (ref 0–0.5)
IMM GRANULOCYTES NFR BLD AUTO: 0.4 % (ref 0–0.5)
INR PPP: 1.1 (ref 0.8–1.2)
KETONES UR QL STRIP: NEGATIVE
LEUKOCYTE ESTERASE UR QL STRIP: NEGATIVE
LYMPHOCYTES # BLD AUTO: 1.2 K/UL (ref 1–4.8)
LYMPHOCYTES # BLD AUTO: 1.2 K/UL (ref 1–4.8)
LYMPHOCYTES NFR BLD: 15.3 % (ref 18–48)
LYMPHOCYTES NFR BLD: 17.6 % (ref 18–48)
MCH RBC QN AUTO: 22.5 PG (ref 27–31)
MCH RBC QN AUTO: 22.7 PG (ref 27–31)
MCHC RBC AUTO-ENTMCNC: 28.4 G/DL (ref 32–36)
MCHC RBC AUTO-ENTMCNC: 29 G/DL (ref 32–36)
MCV RBC AUTO: 78 FL (ref 82–98)
MCV RBC AUTO: 79 FL (ref 82–98)
MONOCYTES # BLD AUTO: 0.7 K/UL (ref 0.3–1)
MONOCYTES # BLD AUTO: 0.8 K/UL (ref 0.3–1)
MONOCYTES NFR BLD: 10.4 % (ref 4–15)
MONOCYTES NFR BLD: 10.7 % (ref 4–15)
NEUTROPHILS # BLD AUTO: 4.9 K/UL (ref 1.8–7.7)
NEUTROPHILS # BLD AUTO: 5.5 K/UL (ref 1.8–7.7)
NEUTROPHILS NFR BLD: 69.9 % (ref 38–73)
NEUTROPHILS NFR BLD: 72.4 % (ref 38–73)
NITRITE UR QL STRIP: NEGATIVE
NRBC BLD-RTO: 0 /100 WBC
NRBC BLD-RTO: 0 /100 WBC
NUM UNITS TRANS PACKED RBC: NORMAL
PH UR STRIP: 8 [PH] (ref 5–8)
PLATELET # BLD AUTO: 309 K/UL (ref 150–450)
PLATELET # BLD AUTO: 328 K/UL (ref 150–450)
PMV BLD AUTO: 9.5 FL (ref 9.2–12.9)
PMV BLD AUTO: 9.8 FL (ref 9.2–12.9)
POTASSIUM SERPL-SCNC: 2.6 MMOL/L (ref 3.5–5.1)
PROT SERPL-MCNC: 7.3 G/DL (ref 6–8.4)
PROT UR QL STRIP: NEGATIVE
PROTHROMBIN TIME: 13 SEC (ref 9–12.5)
RBC # BLD AUTO: 2.86 M/UL (ref 4.6–6.2)
RBC # BLD AUTO: 2.89 M/UL (ref 4.6–6.2)
SODIUM SERPL-SCNC: 140 MMOL/L (ref 136–145)
SP GR UR STRIP: 1.01 (ref 1–1.03)
SPECIMEN OUTDATE: NORMAL
URN SPEC COLLECT METH UR: NORMAL
UROBILINOGEN UR STRIP-ACNC: NEGATIVE EU/DL
WBC # BLD AUTO: 7.03 K/UL (ref 3.9–12.7)
WBC # BLD AUTO: 7.58 K/UL (ref 3.9–12.7)

## 2024-11-24 PROCEDURE — 85730 THROMBOPLASTIN TIME PARTIAL: CPT | Mod: HCNC | Performed by: FAMILY MEDICINE

## 2024-11-24 PROCEDURE — 63600175 PHARM REV CODE 636 W HCPCS: Mod: HCNC | Performed by: EMERGENCY MEDICINE

## 2024-11-24 PROCEDURE — 99285 EMERGENCY DEPT VISIT HI MDM: CPT | Mod: 25,HCNC

## 2024-11-24 PROCEDURE — 93010 ELECTROCARDIOGRAM REPORT: CPT | Mod: HCNC,,, | Performed by: INTERNAL MEDICINE

## 2024-11-24 PROCEDURE — 80053 COMPREHEN METABOLIC PANEL: CPT | Mod: HCNC | Performed by: FAMILY MEDICINE

## 2024-11-24 PROCEDURE — G0378 HOSPITAL OBSERVATION PER HR: HCPCS | Mod: HCNC

## 2024-11-24 PROCEDURE — P9016 RBC LEUKOCYTES REDUCED: HCPCS | Mod: HCNC | Performed by: NURSE PRACTITIONER

## 2024-11-24 PROCEDURE — 82728 ASSAY OF FERRITIN: CPT | Mod: HCNC | Performed by: NURSE PRACTITIONER

## 2024-11-24 PROCEDURE — 36415 COLL VENOUS BLD VENIPUNCTURE: CPT | Mod: HCNC | Performed by: NURSE PRACTITIONER

## 2024-11-24 PROCEDURE — 85025 COMPLETE CBC W/AUTO DIFF WBC: CPT | Mod: 91,HCNC | Performed by: FAMILY MEDICINE

## 2024-11-24 PROCEDURE — 36430 TRANSFUSION BLD/BLD COMPNT: CPT | Mod: HCNC

## 2024-11-24 PROCEDURE — 25000003 PHARM REV CODE 250: Mod: HCNC | Performed by: NURSE PRACTITIONER

## 2024-11-24 PROCEDURE — 85610 PROTHROMBIN TIME: CPT | Mod: HCNC | Performed by: FAMILY MEDICINE

## 2024-11-24 PROCEDURE — 85025 COMPLETE CBC W/AUTO DIFF WBC: CPT | Mod: HCNC | Performed by: NURSE PRACTITIONER

## 2024-11-24 PROCEDURE — 86850 RBC ANTIBODY SCREEN: CPT | Mod: HCNC | Performed by: NURSE PRACTITIONER

## 2024-11-24 PROCEDURE — 36415 COLL VENOUS BLD VENIPUNCTURE: CPT | Mod: HCNC | Performed by: FAMILY MEDICINE

## 2024-11-24 PROCEDURE — 93005 ELECTROCARDIOGRAM TRACING: CPT | Mod: HCNC

## 2024-11-24 PROCEDURE — 81003 URINALYSIS AUTO W/O SCOPE: CPT | Mod: HCNC | Performed by: EMERGENCY MEDICINE

## 2024-11-24 PROCEDURE — 84466 ASSAY OF TRANSFERRIN: CPT | Mod: HCNC | Performed by: NURSE PRACTITIONER

## 2024-11-24 PROCEDURE — 25000003 PHARM REV CODE 250: Mod: HCNC | Performed by: INTERNAL MEDICINE

## 2024-11-24 PROCEDURE — 96375 TX/PRO/DX INJ NEW DRUG ADDON: CPT | Mod: HCNC

## 2024-11-24 PROCEDURE — 86920 COMPATIBILITY TEST SPIN: CPT | Mod: HCNC | Performed by: NURSE PRACTITIONER

## 2024-11-24 RX ORDER — AMOXICILLIN 250 MG
1 CAPSULE ORAL 2 TIMES DAILY
Status: DISCONTINUED | OUTPATIENT
Start: 2024-11-24 | End: 2024-11-24

## 2024-11-24 RX ORDER — IBUPROFEN 200 MG
16 TABLET ORAL
Status: DISCONTINUED | OUTPATIENT
Start: 2024-11-24 | End: 2024-11-26 | Stop reason: HOSPADM

## 2024-11-24 RX ORDER — POLYETHYLENE GLYCOL 3350 17 G/17G
17 POWDER, FOR SOLUTION ORAL 2 TIMES DAILY PRN
Status: DISCONTINUED | OUTPATIENT
Start: 2024-11-24 | End: 2024-11-26 | Stop reason: HOSPADM

## 2024-11-24 RX ORDER — TORSEMIDE 10 MG/1
10 TABLET ORAL DAILY
Status: DISCONTINUED | OUTPATIENT
Start: 2024-11-25 | End: 2024-11-24

## 2024-11-24 RX ORDER — FLUOXETINE 10 MG/1
10 CAPSULE ORAL EVERY MORNING
Status: DISCONTINUED | OUTPATIENT
Start: 2024-11-25 | End: 2024-11-26 | Stop reason: HOSPADM

## 2024-11-24 RX ORDER — AMOXICILLIN 250 MG
1 CAPSULE ORAL 2 TIMES DAILY
Status: DISCONTINUED | OUTPATIENT
Start: 2024-11-24 | End: 2024-11-26 | Stop reason: HOSPADM

## 2024-11-24 RX ORDER — ALPRAZOLAM 0.25 MG/1
0.25 TABLET ORAL ONCE
Status: COMPLETED | OUTPATIENT
Start: 2024-11-24 | End: 2024-11-24

## 2024-11-24 RX ORDER — LORAZEPAM 2 MG/ML
1 INJECTION INTRAMUSCULAR
Status: COMPLETED | OUTPATIENT
Start: 2024-11-24 | End: 2024-11-24

## 2024-11-24 RX ORDER — SIMETHICONE 80 MG
1 TABLET,CHEWABLE ORAL 4 TIMES DAILY PRN
Status: DISCONTINUED | OUTPATIENT
Start: 2024-11-24 | End: 2024-11-26 | Stop reason: HOSPADM

## 2024-11-24 RX ORDER — ONDANSETRON HYDROCHLORIDE 2 MG/ML
4 INJECTION, SOLUTION INTRAVENOUS EVERY 6 HOURS PRN
Status: DISCONTINUED | OUTPATIENT
Start: 2024-11-24 | End: 2024-11-26 | Stop reason: HOSPADM

## 2024-11-24 RX ORDER — AMOXICILLIN 250 MG
1 CAPSULE ORAL 2 TIMES DAILY
COMMUNITY
Start: 2024-11-14

## 2024-11-24 RX ORDER — ALLOPURINOL 100 MG/1
100 TABLET ORAL DAILY
Status: DISCONTINUED | OUTPATIENT
Start: 2024-11-25 | End: 2024-11-26 | Stop reason: HOSPADM

## 2024-11-24 RX ORDER — IPRATROPIUM BROMIDE AND ALBUTEROL SULFATE 2.5; .5 MG/3ML; MG/3ML
3 SOLUTION RESPIRATORY (INHALATION) EVERY 6 HOURS PRN
Status: DISCONTINUED | OUTPATIENT
Start: 2024-11-24 | End: 2024-11-26 | Stop reason: HOSPADM

## 2024-11-24 RX ORDER — ISOSORBIDE MONONITRATE 30 MG/1
30 TABLET, EXTENDED RELEASE ORAL DAILY
Status: DISCONTINUED | OUTPATIENT
Start: 2024-11-25 | End: 2024-11-26 | Stop reason: HOSPADM

## 2024-11-24 RX ORDER — DONEPEZIL HYDROCHLORIDE 5 MG/1
10 TABLET, FILM COATED ORAL NIGHTLY
Status: DISCONTINUED | OUTPATIENT
Start: 2024-11-24 | End: 2024-11-26 | Stop reason: HOSPADM

## 2024-11-24 RX ORDER — PROCHLORPERAZINE EDISYLATE 5 MG/ML
5 INJECTION INTRAMUSCULAR; INTRAVENOUS EVERY 6 HOURS PRN
Status: DISCONTINUED | OUTPATIENT
Start: 2024-11-24 | End: 2024-11-26 | Stop reason: HOSPADM

## 2024-11-24 RX ORDER — GABAPENTIN 100 MG/1
100 CAPSULE ORAL 3 TIMES DAILY PRN
COMMUNITY
Start: 2024-11-14

## 2024-11-24 RX ORDER — LANOLIN ALCOHOL/MO/W.PET/CERES
800 CREAM (GRAM) TOPICAL
Status: DISCONTINUED | OUTPATIENT
Start: 2024-11-24 | End: 2024-11-26 | Stop reason: HOSPADM

## 2024-11-24 RX ORDER — GLUCAGON 1 MG
1 KIT INJECTION
Status: DISCONTINUED | OUTPATIENT
Start: 2024-11-24 | End: 2024-11-26 | Stop reason: HOSPADM

## 2024-11-24 RX ORDER — IBUPROFEN 200 MG
24 TABLET ORAL
Status: DISCONTINUED | OUTPATIENT
Start: 2024-11-24 | End: 2024-11-26 | Stop reason: HOSPADM

## 2024-11-24 RX ORDER — TALC
6 POWDER (GRAM) TOPICAL NIGHTLY PRN
Status: DISCONTINUED | OUTPATIENT
Start: 2024-11-24 | End: 2024-11-26 | Stop reason: HOSPADM

## 2024-11-24 RX ORDER — ACETAMINOPHEN 325 MG/1
650 TABLET ORAL EVERY 4 HOURS PRN
Status: DISCONTINUED | OUTPATIENT
Start: 2024-11-24 | End: 2024-11-26 | Stop reason: HOSPADM

## 2024-11-24 RX ORDER — SODIUM,POTASSIUM PHOSPHATES 280-250MG
2 POWDER IN PACKET (EA) ORAL
Status: DISCONTINUED | OUTPATIENT
Start: 2024-11-24 | End: 2024-11-26 | Stop reason: HOSPADM

## 2024-11-24 RX ORDER — PANTOPRAZOLE SODIUM 40 MG/1
40 TABLET, DELAYED RELEASE ORAL DAILY
Status: DISCONTINUED | OUTPATIENT
Start: 2024-11-25 | End: 2024-11-26 | Stop reason: HOSPADM

## 2024-11-24 RX ORDER — HYDROCODONE BITARTRATE AND ACETAMINOPHEN 500; 5 MG/1; MG/1
TABLET ORAL
Status: DISCONTINUED | OUTPATIENT
Start: 2024-11-24 | End: 2024-11-26 | Stop reason: HOSPADM

## 2024-11-24 RX ORDER — METOPROLOL SUCCINATE 25 MG/1
25 TABLET, EXTENDED RELEASE ORAL DAILY
Status: DISCONTINUED | OUTPATIENT
Start: 2024-11-25 | End: 2024-11-26 | Stop reason: HOSPADM

## 2024-11-24 RX ORDER — DOCUSATE SODIUM 100 MG/1
100 CAPSULE, LIQUID FILLED ORAL DAILY PRN
COMMUNITY
Start: 2024-09-24

## 2024-11-24 RX ORDER — OLANZAPINE 5 MG/1
10 TABLET, ORALLY DISINTEGRATING ORAL EVERY 8 HOURS PRN
Status: DISCONTINUED | OUTPATIENT
Start: 2024-11-24 | End: 2024-11-26 | Stop reason: HOSPADM

## 2024-11-24 RX ORDER — HYDROCODONE BITARTRATE AND ACETAMINOPHEN 5; 325 MG/1; MG/1
1 TABLET ORAL EVERY 6 HOURS PRN
Status: DISCONTINUED | OUTPATIENT
Start: 2024-11-24 | End: 2024-11-26 | Stop reason: HOSPADM

## 2024-11-24 RX ORDER — HALOPERIDOL 2 MG/ML
1 SOLUTION ORAL EVERY 6 HOURS PRN
COMMUNITY
Start: 2024-09-14

## 2024-11-24 RX ORDER — SODIUM CHLORIDE 0.9 % (FLUSH) 0.9 %
10 SYRINGE (ML) INJECTION EVERY 8 HOURS PRN
Status: DISCONTINUED | OUTPATIENT
Start: 2024-11-24 | End: 2024-11-26 | Stop reason: HOSPADM

## 2024-11-24 RX ORDER — NALOXONE HCL 0.4 MG/ML
0.02 VIAL (ML) INJECTION
Status: DISCONTINUED | OUTPATIENT
Start: 2024-11-24 | End: 2024-11-26 | Stop reason: HOSPADM

## 2024-11-24 RX ORDER — ATORVASTATIN CALCIUM 40 MG/1
40 TABLET, FILM COATED ORAL DAILY
Status: DISCONTINUED | OUTPATIENT
Start: 2024-11-25 | End: 2024-11-26 | Stop reason: HOSPADM

## 2024-11-24 RX ORDER — ALUMINUM HYDROXIDE, MAGNESIUM HYDROXIDE, AND SIMETHICONE 1200; 120; 1200 MG/30ML; MG/30ML; MG/30ML
30 SUSPENSION ORAL 4 TIMES DAILY PRN
Status: DISCONTINUED | OUTPATIENT
Start: 2024-11-24 | End: 2024-11-26 | Stop reason: HOSPADM

## 2024-11-24 RX ADMIN — Medication 6 MG: at 09:11

## 2024-11-24 RX ADMIN — ALPRAZOLAM 0.25 MG: 0.25 TABLET ORAL at 09:11

## 2024-11-24 RX ADMIN — DONEPEZIL HYDROCHLORIDE 10 MG: 5 TABLET, FILM COATED ORAL at 09:11

## 2024-11-24 RX ADMIN — LORAZEPAM 1 MG: 2 INJECTION INTRAMUSCULAR; INTRAVENOUS at 02:11

## 2024-11-24 RX ADMIN — SENNOSIDES AND DOCUSATE SODIUM 1 TABLET: 50; 8.6 TABLET ORAL at 09:11

## 2024-11-24 RX ADMIN — POTASSIUM BICARBONATE 50 MEQ: 978 TABLET, EFFERVESCENT ORAL at 06:11

## 2024-11-24 RX ADMIN — POTASSIUM BICARBONATE 50 MEQ: 978 TABLET, EFFERVESCENT ORAL at 09:11

## 2024-11-24 NOTE — ED NOTES
This RN did OC rounding with pt. And spouse, who verbalized a concern that pt's [facility] card was not returned to them by [facility] staff. [facility]. [facility] to investigate and try to locate card and will call back with the result of their inquiry.

## 2024-11-24 NOTE — SUBJECTIVE & OBJECTIVE
Past Medical History:   Diagnosis Date    Angiodysplasia of small intestine     Atrial fibrillation     Chronic upper GI bleeding     due to angiodysplasia in proximal small intestine    Coronary atherosclerosis     Dementia     Diabetes mellitus without complication     Diverticulosis of large intestine without hemorrhage 05/16/2017    Family history of macular degeneration 10/20/2014    History of prostate cancer     Hyperlipidemia associated with type 2 diabetes mellitus     Hypertension associated with diabetes     LAILA (iron deficiency anemia) 09/20/2021    due to angiodysplasia in small intestine    Intention tremor     Major depressive disorder, recurrent, moderate 04/12/2021    SHORTY (obstructive sleep apnea)     Urinary incontinence        Past Surgical History:   Procedure Laterality Date    ABDOMINAL HERNIA REPAIR      APPENDECTOMY      bladder sx      CARDIAC CATHETERIZATION      CATARACT EXTRACTION W/  INTRAOCULAR LENS IMPLANT  Restor OU    COLONOSCOPY N/A 5/16/2017    Procedure: COLONOSCOPY;  Surgeon: Alfredo Naidu MD;  Location: [facility];  Service: Endoscopy;  Laterality: N/A;    ESOPHAGOGASTRODUODENOSCOPY N/A 10/29/2021    Procedure: SBE (Push Enteroscopy) with Dr. Wayne;  Surgeon: Arlette Wayne MD;  Location: [facility];  Service: Endoscopy;  Laterality: N/A;  Plavix held indefinitely as of 10-20-21. Must also hold Coumadin 5 days prior. Ok to continue to use ASA.    ESOPHAGOGASTRODUODENOSCOPY N/A 2/23/2023    Procedure: EGD (ESOPHAGOGASTRODUODENOSCOPY);  Surgeon: Opal Wright MD;  Location: [facility];  Service: Endoscopy;  Laterality: N/A;    inguinal hernia      INTRALUMINAL GASTROINTESTINAL TRACT IMAGING VIA CAPSULE N/A 10/4/2021    Procedure: IMAGING PROCEDURE, GI TRACT, INTRALUMINAL, VIA CAPSULE;  Surgeon: Joaquin Stack RN;  Location: [facility];  Service: Endoscopy;  Laterality: N/A;    LEFT HEART CATHETERIZATION Left 7/20/2021    Procedure: CATHETERIZATION, HEART, LEFT;   Surgeon: Darryl Griffith MD;  Location: [facility] CATH LAB;  Service: Cardiology;  Laterality: Left;    lung sx      PERCUTANEOUS TRANSLUMINAL BALLOON ANGIOPLASTY OF CORONARY ARTERY  7/20/2021    Procedure: Angioplasty-coronary;  Surgeon: Darryl Griffith MD;  Location: [facility] CATH LAB;  Service: Cardiology;;    PROSTATE SURGERY         Review of patient's allergies indicates:  No Known Allergies    No current facility-administered medications on file prior to encounter.     Current Outpatient Medications on File Prior to Encounter   Medication Sig    docusate sodium (COLACE) 100 MG capsule Take 100 mg by mouth daily as needed.    gabapentin (NEURONTIN) 100 MG capsule Take 100 mg by mouth 3 (three) times daily as needed.    haloperidol (HALDOL) 2 mg/mL solution Take 1 mg by mouth every 6 (six) hours as needed (agitation).    senna-docusate 8.6-50 mg (PERICOLACE) 8.6-50 mg per tablet Take 1 tablet by mouth 2 (two) times daily.    allopurinoL (ZYLOPRIM) 100 MG tablet Take 1 tablet (100 mg total) by mouth once daily.    apixaban (ELIQUIS) 2.5 mg Tab Take 1 tablet (2.5 mg total) by mouth 2 (two) times daily.    atorvastatin (LIPITOR) 40 MG tablet Take 1 tablet (40 mg total) by mouth once daily. for 360 doses    donepeziL (ARICEPT) 10 MG tablet Take 1 tablet (10 mg total) by mouth every evening.    FLUoxetine 10 MG capsule Take 1 capsule (10 mg total) by mouth every morning.    isosorbide mononitrate (IMDUR) 30 MG 24 hr tablet Take 1 tablet (30 mg total) by mouth once daily.    LORazepam (ATIVAN) 0.5 MG tablet Take 1 tablet (0.5 mg total) by mouth every 12 (twelve) hours as needed for Anxiety. (Patient taking differently: Take 0.5 mg by mouth every 12 (twelve) hours as needed for Anxiety (restless leg).)    metOLazone (ZAROXOLYN) 5 MG tablet Take 1 tablet (5 mg total) by mouth once daily.    metoprolol succinate (TOPROL-XL) 25 MG 24 hr tablet Take 1 tablet (25 mg total) by mouth once daily.    mupirocin (BACTROBAN) 2 %  ointment Apply topically 3 (three) times daily.    nystatin-triamcinolone (MYCOLOG II) cream Apply topically 4 (four) times daily.    pantoprazole (PROTONIX) 40 MG tablet Take 1 tablet (40 mg total) by mouth once daily. (Patient not taking: Reported on 2024)    torsemide (DEMADEX) 10 MG Tab Take 1 tablet (10 mg total) by mouth daily as needed.     Family History       Problem Relation (Age of Onset)    Cancer Father    Diabetes Sister    Heart disease Mother, Sister    Macular degeneration Sister          Tobacco Use    Smoking status: Former     Current packs/day: 0.00     Average packs/day: 0.5 packs/day for 40.0 years (20.0 ttl pk-yrs)     Types: Cigarettes     Start date: 1962     Quit date:      Years since quittin.9    Smokeless tobacco: Never   Substance and Sexual Activity    Alcohol use: Yes     Alcohol/week: 7.0 standard drinks of alcohol     Types: 7 Shots of liquor per week    Drug use: No    Sexual activity: Not Currently     Review of Systems   Unable to perform ROS: Dementia     Objective:     Vital Signs (Most Recent):  Temp: 98 °F (36.7 °C) (24)  Pulse: 79 (24 162)  Resp: 16 (24)  BP: (!) 141/80 (24)  SpO2: 95 % (24) Vital Signs (24h Range):  Temp:  [97.7 °F (36.5 °C)-98 °F (36.7 °C)] 98 °F (36.7 °C)  Pulse:  [61-91] 79  Resp:  [16-20] 16  SpO2:  [94 %-97 %] 95 %  BP: (100-141)/(57-80) 141/80     Weight: 86.2 kg (190 lb 0.6 oz)  Body mass index is 30.67 kg/m².     Physical Exam  Vitals and nursing note reviewed.   Constitutional:       General: He is not in acute distress.     Appearance: He is well-developed. He is not diaphoretic.   HENT:      Head: Normocephalic and atraumatic.      Nose: Nose normal.   Eyes:      General: No scleral icterus.     Conjunctiva/sclera: Conjunctivae normal.   Cardiovascular:      Rate and Rhythm: Normal rate and regular rhythm.      Heart sounds: Normal heart sounds. No murmur heard.     No  friction rub. No gallop.   Pulmonary:      Effort: Pulmonary effort is normal. No respiratory distress.      Breath sounds: Normal breath sounds. No stridor. No wheezing or rales.   Chest:      Chest wall: No tenderness.   Abdominal:      General: Bowel sounds are normal. There is no distension.      Palpations: Abdomen is soft.      Tenderness: There is no abdominal tenderness. There is no guarding or rebound.   Musculoskeletal:         General: Tenderness (right posterior iliac crest and buttock) present. No deformity. Normal range of motion.      Cervical back: Normal range of motion and neck supple.      Right lower leg: Edema present.      Comments: No bruising or injury noted  Full ROM noted to right hip without pain including external and internal rotation   TTP to right posterior iliac crest, buttock, and posterior right thigh    Skin:     General: Skin is warm and dry.      Coloration: Skin is pale.      Findings: No erythema or rash.   Neurological:      Mental Status: He is alert.      Cranial Nerves: No cranial nerve deficit.      Motor: No abnormal muscle tone.      Coordination: Coordination normal.      Comments: Oriented to person and place      Psychiatric:         Behavior: Behavior normal.         Thought Content: Thought content normal.         Cognition and Memory: Cognition is impaired. Memory is impaired.                Significant Labs: All pertinent labs within the past 24 hours have been reviewed.    Significant Imaging: I have reviewed all pertinent imaging results/findings within the past 24 hours.

## 2024-11-24 NOTE — HPI
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 Spouse reports at approx midnight, she found the pt on the floor with his walker on top of him. He as laying on his right side and was unable to get up. He could not bear weight on his right leg and was holding his right hip. She got him off the floor. When the hospice nurse came by, they decided to rescind hospice and send to ED for evaluation. According to pt's spouse, the patient has been doing very well and has been more mobile and more oriented lately. He does have frequent falls. The spouse is requesting SNF rehab at [facility] after cleared from any fractures.     In the ED, Afebrile, VSS, labs revealed WBC normal, Hgb 6.5, INR 1.1, UA- unremarkable, CXR-clear, Right hip with pelvis was negative for fracture or dislocation, Right shoulder was negative for fracture/dislocation, Right knee was negative for fracture/dislocation, CT head showed nothing acute, Xray right ankle was negative for acute fracture/dislocation, CT Right hip showed no definite fracture, Some mild fat stranding along the ileo psoas musculature, Xray lumbar spine showed no fracture, DDD.   CMP pending   The patient was agitated in the ED and required IV Ativan   The patient is a DNR, His wife is his SDM

## 2024-11-24 NOTE — PHARMACY MED REC
"Admission Medication History     The home medication history was taken by Lars Cardoza.    You may go to "Admission" then "Reconcile Home Medications" tabs to review and/or act upon these items.     The home medication list has been updated by the Pharmacy department.   Please read ALL comments highlighted in yellow.   Please address this information as you see fit.    Feel free to contact us if you have any questions or require assistance.      Medications listed below were obtained from: SNF/Rehab/[facility] : HOSPICE  (Not in a hospital admission)      Lars Cardoza  [id number]    Current Outpatient Medications on File Prior to Encounter   Medication Sig Dispense Refill Last Dose/Taking    docusate sodium (COLACE) 100 MG capsule Take 100 mg by mouth daily as needed.   Taking As Needed    gabapentin (NEURONTIN) 100 MG capsule Take 100 mg by mouth 3 (three) times daily as needed.   Taking As Needed    haloperidol (HALDOL) 2 mg/mL solution Take 1 mg by mouth every 6 (six) hours as needed (agitation).   Taking As Needed    senna-docusate 8.6-50 mg (PERICOLACE) 8.6-50 mg per tablet Take 1 tablet by mouth 2 (two) times daily.   Taking    allopurinoL (ZYLOPRIM) 100 MG tablet Take 1 tablet (100 mg total) by mouth once daily. 90 tablet 3     apixaban (ELIQUIS) 2.5 mg Tab Take 1 tablet (2.5 mg total) by mouth 2 (two) times daily. 180 tablet 3     atorvastatin (LIPITOR) 40 MG tablet Take 1 tablet (40 mg total) by mouth once daily. for 360 doses 90 tablet 3     donepeziL (ARICEPT) 10 MG tablet Take 1 tablet (10 mg total) by mouth every evening. 90 tablet 3     FLUoxetine 10 MG capsule Take 1 capsule (10 mg total) by mouth every morning. 90 capsule 3     isosorbide mononitrate (IMDUR) 30 MG 24 hr tablet Take 1 tablet (30 mg total) by mouth once daily. 90 tablet 3     LORazepam (ATIVAN) 0.5 MG tablet Take 1 tablet (0.5 mg total) by mouth every 12 (twelve) hours as needed for Anxiety. (Patient taking differently: Take 0.5 " mg by mouth every 12 (twelve) hours as needed for Anxiety (restless leg).) 30 tablet 5     metOLazone (ZAROXOLYN) 5 MG tablet Take 1 tablet (5 mg total) by mouth once daily. 90 tablet 3     metoprolol succinate (TOPROL-XL) 25 MG 24 hr tablet Take 1 tablet (25 mg total) by mouth once daily. 90 tablet 3     mupirocin (BACTROBAN) 2 % ointment Apply topically 3 (three) times daily. 22 g 0     nystatin-triamcinolone (MYCOLOG II) cream Apply topically 4 (four) times daily. 30 g 1     pantoprazole (PROTONIX) 40 MG tablet Take 1 tablet (40 mg total) by mouth once daily. (Patient not taking: Reported on 11/24/2024) 90 tablet 3 Not Taking    torsemide (DEMADEX) 10 MG Tab Take 1 tablet (10 mg total) by mouth daily as needed. 30 tablet 11                            .

## 2024-11-24 NOTE — ED PROVIDER NOTES
SCRIBE #1 NOTE: I, Francisca Hendrickson, am scribing for, and in the presence of, Pb Sanchez MD. I have scribed the entire note.       History     Chief Complaint   Patient presents with    Fall     Per ems pt fell around midnight last night. Pt was able to get back in bed with the help of his wife. This morning pt was unable to get out of bed and ems was called. Ems noted shorting to the right leg     Review of patient's allergies indicates:  No Known Allergies      History of Present Illness     HPI    11/24/2024, 10:17 AM  History obtained from the wife  Limited HPI and ROS due to hx of dementia      History of Present Illness: Anthony Biggs Jr. is a 84 y.o. male patient with a PMHx of A-fib, DM type 2, HLD, HTN, angiodysplasia of small intestine, major depressive disorder, dementia, prostate cancer, and coronary atherosclerosis who presents to the Emergency Department for evaluation of R hip and R shoulder pain which onset gradually last night after falling while turning with his walker. Wife states that she was able to help him back into his recliner, but noticed this morning that the patient was unable to move his R leg. Symptoms are constant and moderate in severity. Pt is unable to confirm or deny any other sxs at this time. No prior tx. Wife mentions that her  is on blood thinners, she denies any increased confusion. No further complaints or concerns at this time.       Arrival mode: EMS    PCP: Giuliano Moran MD        Past Medical History:  Past Medical History:   Diagnosis Date    Angiodysplasia of small intestine     Atrial fibrillation     Chronic upper GI bleeding     due to angiodysplasia in proximal small intestine    Coronary atherosclerosis     Dementia     Diabetes mellitus without complication     Diverticulosis of large intestine without hemorrhage 05/16/2017    Family history of macular degeneration 10/20/2014    History of prostate cancer     Hyperlipidemia associated with type 2  diabetes mellitus     Hypertension associated with diabetes     LAILA (iron deficiency anemia) 09/20/2021    due to angiodysplasia in small intestine    Intention tremor     Major depressive disorder, recurrent, moderate 04/12/2021    SHORTY (obstructive sleep apnea)     Urinary incontinence        Past Surgical History:  Past Surgical History:   Procedure Laterality Date    ABDOMINAL HERNIA REPAIR      APPENDECTOMY      bladder sx      CARDIAC CATHETERIZATION      CATARACT EXTRACTION W/  INTRAOCULAR LENS IMPLANT  Restor OU    COLONOSCOPY N/A 5/16/2017    Procedure: COLONOSCOPY;  Surgeon: Alfredo Naidu MD;  Location: [facility];  Service: Endoscopy;  Laterality: N/A;    ESOPHAGOGASTRODUODENOSCOPY N/A 10/29/2021    Procedure: SBE (Push Enteroscopy) with Dr. Wayne;  Surgeon: Arlette Wayne MD;  Location: [facility];  Service: Endoscopy;  Laterality: N/A;  Plavix held indefinitely as of 10-20-21. Must also hold Coumadin 5 days prior. Ok to continue to use ASA.    ESOPHAGOGASTRODUODENOSCOPY N/A 2/23/2023    Procedure: EGD (ESOPHAGOGASTRODUODENOSCOPY);  Surgeon: Opal Wright MD;  Location: [facility];  Service: Endoscopy;  Laterality: N/A;    inguinal hernia      INTRALUMINAL GASTROINTESTINAL TRACT IMAGING VIA CAPSULE N/A 10/4/2021    Procedure: IMAGING PROCEDURE, GI TRACT, INTRALUMINAL, VIA CAPSULE;  Surgeon: Joaquin Stack RN;  Location: [facility];  Service: Endoscopy;  Laterality: N/A;    LEFT HEART CATHETERIZATION Left 7/20/2021    Procedure: CATHETERIZATION, HEART, LEFT;  Surgeon: Darryl Griffith MD;  Location: [facility] CATH LAB;  Service: Cardiology;  Laterality: Left;    lung sx      PERCUTANEOUS TRANSLUMINAL BALLOON ANGIOPLASTY OF CORONARY ARTERY  7/20/2021    Procedure: Angioplasty-coronary;  Surgeon: Darryl Griffith MD;  Location: [facility] CATH LAB;  Service: Cardiology;;    PROSTATE SURGERY           Family History:  Family History   Problem Relation Name Age of Onset    Heart disease Mother       Cancer Father          lung    Macular degeneration Sister      Diabetes Sister      Heart disease Sister      Strabismus Neg Hx      Retinal detachment Neg Hx      Glaucoma Neg Hx      Blindness Neg Hx      Amblyopia Neg Hx         Social History:  Social History     Tobacco Use    Smoking status: Former     Current packs/day: 0.00     Average packs/day: 0.5 packs/day for 40.0 years (20.0 ttl pk-yrs)     Types: Cigarettes     Start date: 1962     Quit date:      Years since quittin.9    Smokeless tobacco: Never   Substance and Sexual Activity    Alcohol use: Yes     Alcohol/week: 7.0 standard drinks of alcohol     Types: 7 Shots of liquor per week    Drug use: No    Sexual activity: Not Currently        Review of Systems     Review of Systems   Unable to perform ROS: Dementia   Musculoskeletal:  Positive for arthralgias (R hip and R shoulder).   Psychiatric/Behavioral:  Negative for confusion (no increased confusion per wife).       Physical Exam     Initial Vitals [24 0948]   BP Pulse Resp Temp SpO2   100/60 72 18 97.7 °F (36.5 °C) 97 %      MAP       --          Physical Exam  Nursing Notes and Vital Signs Reviewed.  Constitutional: Patient is in no acute distress. Appears stated age.   Head: Atraumatic. Normocephalic.  Eyes: PERRL. EOM intact. Conjunctivae are not pale. No scleral icterus.  ENT: Mucous membranes are moist. Oropharynx is clear and symmetric.    Neck: Supple. Full ROM. No lymphadenopathy.  Cardiovascular: Regular rate. Regular rhythm. No murmurs, rubs, or gallops. Distal pulses are 2+ and symmetric.  Pulmonary/Chest: No respiratory distress. Clear to auscultation bilaterally. No wheezing or rales.  Abdominal: Soft and non-distended.  There is no tenderness.  No rebound, guarding, or rigidity. Good bowel sounds.  Musculoskeletal: Able to move bilateral arms above head, able to move L leg, unable to move R leg. R leg exernally rotated. No obvious tenderness to R shoulder, R knee,  "and R hip. No obvious deformities. No edema.  Skin: Warm and dry.  Neurological: Eyes closed, but opens on command. No acute focal neurological deficits are appreciated.  Psychiatric: Demented, did not really converse.      ED Course   Procedures  ED Vital Signs:  Vitals:    11/24/24 0948 11/24/24 1040 11/24/24 1043 11/24/24 1146   BP: 100/60      Pulse: 72 61 61    Resp: 18      Temp: 97.7 °F (36.5 °C)      TempSrc: Oral      SpO2: 97%      Weight:    86.2 kg (190 lb 0.6 oz)   Height: 5' 6" (1.676 m)       11/24/24 1300 11/24/24 1330 11/24/24 1532 11/24/24 1545   BP: 110/65 126/60  (!) 127/57   Pulse: 61 61 91 67   Resp: 20      Temp:       TempSrc:       SpO2: (!) 94% 97% 96% 96%   Weight:       Height:           Abnormal Lab Results:  Labs Reviewed   URINALYSIS, REFLEX TO URINE CULTURE       Result Value    Specimen UA Urine, Clean Catch      Color, UA Yellow      Appearance, UA Clear      pH, UA 8.0      Specific Gravity, UA 1.015      Protein, UA Negative      Glucose, UA Negative      Ketones, UA Negative      Bilirubin (UA) Negative      Occult Blood UA Negative      Nitrite, UA Negative      Urobilinogen, UA Negative      Leukocytes, UA Negative      Narrative:     Specimen Source->Urine   CBC W/ AUTO DIFFERENTIAL   COMPREHENSIVE METABOLIC PANEL   APTT   PROTIME-INR        All Lab Results:  Results for orders placed or performed during the hospital encounter of 11/24/24   Urinalysis, Reflex to Urine Culture Urine, Clean Catch    Collection Time: 11/24/24 12:16 PM    Specimen: Urine, Clean Catch   Result Value Ref Range    Specimen UA Urine, Clean Catch     Color, UA Yellow Yellow, Straw, [place]    Appearance, UA Clear Clear    pH, UA 8.0 5.0 - 8.0    Specific Gravity, UA 1.015 1.005 - 1.030    Protein, UA Negative Negative    Glucose, UA Negative Negative    Ketones, UA Negative Negative    Bilirubin (UA) Negative Negative    Occult Blood UA Negative Negative    Nitrite, UA Negative Negative    Urobilinogen, " UA Negative <2.0 EU/dL    Leukocytes, UA Negative Negative     *Note: Due to a large number of results and/or encounters for the requested time period, some results have not been displayed. A complete set of results can be found in Results Review.         Imaging Results:  Imaging Results              X-Ray Lumbar Spine Ap And Lateral (Final result)  Result time 11/24/24 14:59:28      Final result by Raya VicenteCasey), MD (11/24/24 14:59:28)                   Impression:      No fractures involving the lumbar spine.  Degenerative changes are present.      Electronically signed by: Raya Vicente MD  Date:    11/24/2024  Time:    14:59               Narrative:    EXAMINATION:  XR LUMBAR SPINE AP AND LATERAL    CLINICAL HISTORY:  ,Fall;    TECHNIQUE:  Standard lumbar spine x-ray.  Three views.    COMPARISON:  CT abdomen pelvis 07/17/2022    FINDINGS:  Multilevel degenerative disc changes.  Findings most prominent at the thoracolumbar junction with mild kyphosis.  No fractures.                                       CT Hip Without Contrast Right (Final result)  Result time 11/24/24 13:27:11      Final result by Yusuf Nicholas MD (11/24/24 13:27:11)                   Impression:      No definite fracture or traumatic malalignment.    Question ileo psoas muscular injury as above.    Degenerative changes not unexpected for age    If high concern remains consider additional evaluation.    All CT scans at this facility are performed  using dose modulation techniques as appropriate to performed exam including the following:  automated exposure control; adjustment of mA and/or kV according to the patients size (this includes techniques or standardized protocols for targeted exams where dose is matched to indication/reason for exam: i.e. extremities or head);  iterative reconstruction technique.      Electronically signed by: Yusuf Nicholas  Date:    11/24/2024  Time:    13:27               Narrative:     EXAMINATION:  CT HIP WITHOUT CONTRAST RIGHT    CLINICAL HISTORY:  Foreign body suspected, hip, neg xray;    TECHNIQUE:  Low-dose axial CT images of the hip without contrast.    COMPARISON:  None    FINDINGS:  No acute displaced fractures identified.  No traumatic malalignment.  No osseous destructive process.  Degenerative change of the lumbar spine not unexpected for age.  Degenerative change of the right hip and symphysis pubis.    Some mild fat stranding along the ileo psoas musculature possibly related to muscular injury.  Correlation is advised.                                       CT Head Without Contrast (Final result)  Result time 11/24/24 11:01:42      Final result by Percy Kay MD (11/24/24 11:01:42)                   Impression:      1.  Negative for acute intracranial process, considering there is motion artifact on both series obtained and subtle abnormalities could easily be overlooked.  Negative for hemorrhage, or skull fracture.    2.  Cerebral atrophy noted.  Intracranial atherosclerotic disease noted.  Small vessel ischemic changes noted.    3.  Debris within both ear canals.  Clinical correlation is advised.    All CT scans at this facility are performed  using dose modulation techniques as appropriate to performed exam including the following:  automated exposure control; adjustment of mA and/or kV according to the patients size (this includes techniques or standardized protocols for targeted exams where dose is matched to indication/reason for exam: i.e. extremities or head);  iterative reconstruction technique.      Electronically signed by: Percy Kay MD  Date:    11/24/2024  Time:    11:01               Narrative:    EXAMINATION:  CT HEAD WITHOUT CONTRAST    CLINICAL HISTORY:  Head trauma, minor (Age >= 65y);    TECHNIQUE:  Axial images through the brain and posterior fossa were obtained without the use of IV contrast.  Sagittal and coronal reconstructions are provided for review.   Two series were obtained.  There is significant motion artifact on both series.  Subtle abnormalities could easily be overlooked.    COMPARISON:  April 26, 2024    FINDINGS:  The ventricles are midline and the CSF spaces are prominent.  The gray-white matter junction is well preserved. Negative for intracranial vascular abnormalities. Negative for mass, mass effect, cerebral edema, hemorrhage or abnormal fluid collections.  Intracranial atherosclerotic disease noted.  Small vessel ischemic changes noted.  Falx calcifications along with the anterior falx lipoma again seen.  Mild prominence of the cisterna magna again seen.    The skull and scalp are  intact.  Marked debris within both ear canals.  The   paranasal sinuses, mastoid air cells, and middle ears are clear. The globes are intact. Postoperative changes to the lens regions.                                       X-Ray Ankle Complete Right (Final result)  Result time 11/24/24 11:51:54      Final result by Percy Kay MD (11/24/24 11:51:54)                   Impression:      1.  Negative for acute process involving the visualized osseous structures.    2.  Soft tissue swelling of the ankle and dorsum of the foot soft tissues without air in the soft tissues or radiopaque foreign bodies.    3.  Age-appropriate degenerative changes of the hindfoot as detailed above.      Electronically signed by: Percy Kay MD  Date:    11/24/2024  Time:    11:51               Narrative:    EXAMINATION:  XR ANKLE COMPLETE 3 VIEW RIGHT    CLINICAL HISTORY:  Unspecified fall, initial encounter    TECHNIQUE:  AP, lateral, and oblique images of the right ankle were performed.    COMPARISON:  None    FINDINGS:  The mortise is intact.  The talar dome is smooth.  Large plantar calcaneal spur.  Anterior and posterior tibial plafond spurring.  Medial and lateral malleolar spurring.  Os trigonum.    Negative for fracture or dislocation.    Soft tissue swelling surrounds the ankle and  the dorsum of the foot.                                       X-Ray Knee 1 or 2 View Right (Final result)  Result time 11/24/24 10:59:29      Final result by Percy Kay MD (11/24/24 10:59:29)                   Impression:      1.  Negative for acute process involving the visualized osseous structures.    2.  Soft tissue calf edema noted.      Electronically signed by: Percy Kay MD  Date:    11/24/2024  Time:    10:59               Narrative:    EXAMINATION:  XR KNEE 1 OR 2 VIEW RIGHT    CLINICAL HISTORY:  fall;    TECHNIQUE:  AP and lateral views of the right knee were performed.    COMPARISON:  None    FINDINGS:  Negative for knee joint effusion.  Negative for fracture or dislocation.  Joint spaces are well maintained.  Minor patellar spurring.    There is edema within the calf soft tissues.                                       X-Ray Shoulder Trauma Right (Final result)  Result time 11/24/24 10:58:45      Final result by Percy Kay MD (11/24/24 10:58:45)                   Impression:      1.  Negative for acute process.      Electronically signed by: Percy Kay MD  Date:    11/24/2024  Time:    10:58               Narrative:    EXAMINATION:  XR SHOULDER TRAUMA 3 VIEW RIGHT    CLINICAL HISTORY:  Unspecified fall, initial encounter    TECHNIQUE:  Three or four views of the right shoulder were performed.    COMPARISON:  Chest x-ray from April 27, 2024    FINDINGS:  Age-appropriate degenerative changes of the acromioclavicular joint with sclerosis of the greater tuberosity.  The shoulder girdle is intact without fracture or dislocation.  Please see the chest x-ray performed the same date for details of the visualized portions of the chest.                                       X-Ray Hip 2 or 3 views Right with Pelvis when performed (Final result)  Result time 11/24/24 10:57:29      Final result by Percy Kay MD (11/24/24 10:57:29)                   Impression:      1.  Negative for acute  process.      Electronically signed by: Percy Kay MD  Date:    11/24/2024  Time:    10:57               Narrative:    EXAMINATION:  XR HIP WITH PELVIS WHEN PERFORMED 2 OR 3 VIEWS RIGHT    CLINICAL HISTORY:  Unspecified fall, initial encounter    TECHNIQUE:  AP view of the pelvis and frog leg lateral view of the right hip were performed.    COMPARISON:  None    FINDINGS:  A the hip joints are well maintained.  Mild acetabular spurring.  Mild sclerosis along the left greater than right sacroiliac joints.  The osseous structures are otherwise intact.  Negative for fracture or dislocation.    Postoperative changes overlie the pelvis and peroneal region.  Normal bowel gas pattern.                                       X-Ray Chest AP Portable (Final result)  Result time 11/24/24 10:53:21      Final result by Percy Kay MD (11/24/24 10:53:21)                   Impression:      1.  Negative for acute process involving the chest.    2.  Stable findings as noted above.      Electronically signed by: Percy Kay MD  Date:    11/24/2024  Time:    10:53               Narrative:    EXAMINATION:  XR CHEST AP PORTABLE    CLINICAL HISTORY:  Chest Pain;    COMPARISON:  Studies dating back to February 10, 2022    FINDINGS:  EKG leads overlie the chest, which is lordotic in position.  The lungs are clear. The cardiac silhouette size is normal. The trachea is midline and the mediastinal width is normal. Negative for focal infiltrate, effusion or pneumothorax. Pulmonary vasculature is normal. Negative for osseous abnormalities. Convex left curvature of the midthoracic spine.  Tortuous aorta with calcifications of the aortic knob.  There are degenerative changes of the spine and both shoulder girdles.                                       The EKG was ordered, reviewed, and independently interpreted by the ED provider.  Interpretation time: 11:21  Rate: 62 BPM  Rhythm: atrial fibrillation  Interpretation: Incomplete right  bundle branch block. ST & T wave abnormality, consider anterior ischemia. No STEMI.           The Emergency Provider reviewed the vital signs and test results, which are outlined above.     ED Discussion            Medical Decision Making  Patient lives at home with his wife. Uses a walker at baseline does not walk that much but also has pretty moderate to severe dementia. Fell last night. He really has not been able to move his right leg since just minimally. X-ray and CT of the right hip does not show a fracture. He can bend it just a little bit but he can not get up to walk. Wife cannot take care of him. Lifts left leg with no complication.    Amount and/or Complexity of Data Reviewed  Labs: ordered. Decision-making details documented in ED Course.  Radiology: ordered. Decision-making details documented in ED Course.  ECG/medicine tests: ordered and independent interpretation performed. Decision-making details documented in ED Course.  Discussion of management or test interpretation with external provider(s):   2:41 PM: Discussed case with Dr. Ren ([facility]). Dr. Ren agrees with current care and management of pt and accepts admission. He recommends a consultation to ortho, pt may need MRI.  Admitting Service:   Admitting Physician: Dr. Ren  Admit to: OBS    2:41 PM: Re-evaluated pt. I have discussed test results, shared treatment plan, and the need for admission with patient and family at bedside. Pt and family express understanding at this time and agree with all information. All questions answered. Pt and family have no further questions or concerns at this time. Pt is ready for admit.      Risk  Prescription drug management.  Decision regarding hospitalization.  Risk Details: Differential diagnosis: Electrolyte abnormality, strains, sprain, fracture, syncope, sepsis, infection, arrhythmia, dehydration, dementia                  ED Medication(s):  Medications   sodium chloride 0.9% bolus 1,000 mL 1,000  mL (1,000 mLs Intravenous Not Given 11/24/24 1415)   OLANZapine zydis disintegrating tablet 10 mg (has no administration in time range)   LORazepam injection 1 mg (1 mg Intravenous Given 11/24/24 1400)       New Prescriptions    No medications on file               Scribe Attestation:   Scribe #1: I performed the above scribed service and the documentation accurately describes the services I performed. I attest to the accuracy of the note.     Attending:   Physician Attestation Statement for Scribe #1: I, Pb Sanchez MD, personally performed the services described in this documentation, as scribed by Francisca Hendrickson, in my presence, and it is both accurate and complete.           Clinical Impression       ICD-10-CM ICD-9-CM   1. Fall, initial encounter  W19.XXXA E888.9   2. Chest pain  R07.9 786.50   3. Fall  W19.XXXA E888.9   4. Hip sprain, right, initial encounter  S73.101A 843.9       Disposition:   Disposition: Placed in Observation  Condition: Fair       Pb Sanchez MD  11/24/24 4076

## 2024-11-25 LAB
ALBUMIN SERPL BCP-MCNC: 2.8 G/DL (ref 3.5–5.2)
ALP SERPL-CCNC: 81 U/L (ref 40–150)
ALT SERPL W/O P-5'-P-CCNC: 7 U/L (ref 10–44)
ANION GAP SERPL CALC-SCNC: 10 MMOL/L (ref 8–16)
AST SERPL-CCNC: 18 U/L (ref 10–40)
BASOPHILS # BLD AUTO: 0.01 K/UL (ref 0–0.2)
BASOPHILS NFR BLD: 0.2 % (ref 0–1.9)
BILIRUB SERPL-MCNC: 1.4 MG/DL (ref 0.1–1)
BUN SERPL-MCNC: 28 MG/DL (ref 8–23)
CALCIUM SERPL-MCNC: 9.4 MG/DL (ref 8.7–10.5)
CHLORIDE SERPL-SCNC: 89 MMOL/L (ref 95–110)
CO2 SERPL-SCNC: 42 MMOL/L (ref 23–29)
CREAT SERPL-MCNC: 1.1 MG/DL (ref 0.5–1.4)
DIFFERENTIAL METHOD BLD: ABNORMAL
EOSINOPHIL # BLD AUTO: 0.1 K/UL (ref 0–0.5)
EOSINOPHIL NFR BLD: 1.7 % (ref 0–8)
ERYTHROCYTE [DISTWIDTH] IN BLOOD BY AUTOMATED COUNT: 18.6 % (ref 11.5–14.5)
EST. GFR  (NO RACE VARIABLE): >60 ML/MIN/1.73 M^2
GLUCOSE SERPL-MCNC: 116 MG/DL (ref 70–110)
HCT VFR BLD AUTO: 24.6 % (ref 40–54)
HGB BLD-MCNC: 7.2 G/DL (ref 14–18)
IMM GRANULOCYTES # BLD AUTO: 0.03 K/UL (ref 0–0.04)
IMM GRANULOCYTES NFR BLD AUTO: 0.5 % (ref 0–0.5)
IRON SERPL-MCNC: 23 UG/DL (ref 45–160)
IRON SERPL-MCNC: 23 UG/DL (ref 45–160)
LYMPHOCYTES # BLD AUTO: 1.4 K/UL (ref 1–4.8)
LYMPHOCYTES NFR BLD: 20.8 % (ref 18–48)
MAGNESIUM SERPL-MCNC: 1.5 MG/DL (ref 1.6–2.6)
MCH RBC QN AUTO: 23.7 PG (ref 27–31)
MCHC RBC AUTO-ENTMCNC: 29.3 G/DL (ref 32–36)
MCV RBC AUTO: 81 FL (ref 82–98)
MONOCYTES # BLD AUTO: 0.8 K/UL (ref 0.3–1)
MONOCYTES NFR BLD: 11.8 % (ref 4–15)
NEUTROPHILS # BLD AUTO: 4.2 K/UL (ref 1.8–7.7)
NEUTROPHILS NFR BLD: 65 % (ref 38–73)
NRBC BLD-RTO: 0 /100 WBC
OHS QRS DURATION: 98 MS
OHS QTC CALCULATION: 458 MS
PHOSPHATE SERPL-MCNC: 2.4 MG/DL (ref 2.7–4.5)
PLATELET # BLD AUTO: 272 K/UL (ref 150–450)
PMV BLD AUTO: 9.7 FL (ref 9.2–12.9)
POTASSIUM SERPL-SCNC: 2.4 MMOL/L (ref 3.5–5.1)
PROT SERPL-MCNC: 6.6 G/DL (ref 6–8.4)
RBC # BLD AUTO: 3.04 M/UL (ref 4.6–6.2)
SATURATED IRON: 5 % (ref 20–50)
SODIUM SERPL-SCNC: 141 MMOL/L (ref 136–145)
TOTAL IRON BINDING CAPACITY: 497 UG/DL (ref 250–450)
TRANSFERRIN SERPL-MCNC: 336 MG/DL (ref 200–375)
WBC # BLD AUTO: 6.5 K/UL (ref 3.9–12.7)

## 2024-11-25 PROCEDURE — 80053 COMPREHEN METABOLIC PANEL: CPT | Mod: HCNC | Performed by: NURSE PRACTITIONER

## 2024-11-25 PROCEDURE — 97163 PT EVAL HIGH COMPLEX 45 MIN: CPT | Mod: HCNC

## 2024-11-25 PROCEDURE — 63700000 PHARM REV CODE 250 ALT 637 W/O HCPCS: Mod: HCNC | Performed by: FAMILY MEDICINE

## 2024-11-25 PROCEDURE — 27100171 HC OXYGEN HIGH FLOW UP TO 24 HOURS: Mod: HCNC

## 2024-11-25 PROCEDURE — 96365 THER/PROPH/DIAG IV INF INIT: CPT

## 2024-11-25 PROCEDURE — 97110 THERAPEUTIC EXERCISES: CPT | Mod: HCNC

## 2024-11-25 PROCEDURE — G0378 HOSPITAL OBSERVATION PER HR: HCPCS | Mod: HCNC

## 2024-11-25 PROCEDURE — 36415 COLL VENOUS BLD VENIPUNCTURE: CPT | Mod: HCNC | Performed by: NURSE PRACTITIONER

## 2024-11-25 PROCEDURE — 94761 N-INVAS EAR/PLS OXIMETRY MLT: CPT | Mod: HCNC

## 2024-11-25 PROCEDURE — 96366 THER/PROPH/DIAG IV INF ADDON: CPT

## 2024-11-25 PROCEDURE — 25000003 PHARM REV CODE 250: Mod: HCNC | Performed by: NURSE PRACTITIONER

## 2024-11-25 PROCEDURE — 63600175 PHARM REV CODE 636 W HCPCS: Mod: HCNC | Performed by: FAMILY MEDICINE

## 2024-11-25 PROCEDURE — 84100 ASSAY OF PHOSPHORUS: CPT | Mod: HCNC | Performed by: NURSE PRACTITIONER

## 2024-11-25 PROCEDURE — 96376 TX/PRO/DX INJ SAME DRUG ADON: CPT

## 2024-11-25 PROCEDURE — 83735 ASSAY OF MAGNESIUM: CPT | Mod: HCNC | Performed by: NURSE PRACTITIONER

## 2024-11-25 PROCEDURE — 97166 OT EVAL MOD COMPLEX 45 MIN: CPT | Mod: HCNC

## 2024-11-25 PROCEDURE — 96375 TX/PRO/DX INJ NEW DRUG ADDON: CPT

## 2024-11-25 PROCEDURE — 36430 TRANSFUSION BLD/BLD COMPNT: CPT | Mod: HCNC

## 2024-11-25 PROCEDURE — 85025 COMPLETE CBC W/AUTO DIFF WBC: CPT | Mod: HCNC | Performed by: NURSE PRACTITIONER

## 2024-11-25 RX ORDER — MAGNESIUM SULFATE HEPTAHYDRATE 40 MG/ML
2 INJECTION, SOLUTION INTRAVENOUS ONCE
Status: COMPLETED | OUTPATIENT
Start: 2024-11-25 | End: 2024-11-25

## 2024-11-25 RX ORDER — POTASSIUM CHLORIDE 20 MEQ/15ML
40 SOLUTION ORAL DAILY
Qty: 90 ML | Refills: 0 | Status: SHIPPED | OUTPATIENT
Start: 2024-11-25 | End: 2024-11-28

## 2024-11-25 RX ORDER — POTASSIUM CHLORIDE 20 MEQ/15ML
40 SOLUTION ORAL
Status: DISPENSED | OUTPATIENT
Start: 2024-11-25 | End: 2024-11-25

## 2024-11-25 RX ORDER — POTASSIUM CHLORIDE 20 MEQ/15ML
40 SOLUTION ORAL
Status: COMPLETED | OUTPATIENT
Start: 2024-11-25 | End: 2024-11-26

## 2024-11-25 RX ORDER — POTASSIUM CHLORIDE 20 MEQ/1
40 TABLET, EXTENDED RELEASE ORAL EVERY 4 HOURS
Status: DISCONTINUED | OUTPATIENT
Start: 2024-11-25 | End: 2024-11-25

## 2024-11-25 RX ORDER — LANOLIN ALCOHOL/MO/W.PET/CERES
1 CREAM (GRAM) TOPICAL DAILY
Status: DISCONTINUED | OUTPATIENT
Start: 2024-11-25 | End: 2024-11-26 | Stop reason: HOSPADM

## 2024-11-25 RX ORDER — POTASSIUM CHLORIDE 7.45 MG/ML
10 INJECTION INTRAVENOUS
Status: COMPLETED | OUTPATIENT
Start: 2024-11-25 | End: 2024-11-25

## 2024-11-25 RX ORDER — HYDROCODONE BITARTRATE AND ACETAMINOPHEN 5; 325 MG/1; MG/1
1 TABLET ORAL EVERY 6 HOURS PRN
Qty: 15 TABLET | Refills: 0 | Status: SHIPPED | OUTPATIENT
Start: 2024-11-25

## 2024-11-25 RX ORDER — FERROUS SULFATE 324(65)MG
324 TABLET, DELAYED RELEASE (ENTERIC COATED) ORAL DAILY
Qty: 30 TABLET | Refills: 0 | Status: SHIPPED | OUTPATIENT
Start: 2024-11-25 | End: 2024-12-25

## 2024-11-25 RX ADMIN — FLUOXETINE HYDROCHLORIDE 10 MG: 10 CAPSULE ORAL at 08:11

## 2024-11-25 RX ADMIN — POTASSIUM CHLORIDE 40 MEQ: 1.5 SOLUTION ORAL at 03:11

## 2024-11-25 RX ADMIN — MAGNESIUM SULFATE HEPTAHYDRATE 2 G: 40 INJECTION, SOLUTION INTRAVENOUS at 12:11

## 2024-11-25 RX ADMIN — SENNOSIDES AND DOCUSATE SODIUM 1 TABLET: 50; 8.6 TABLET ORAL at 09:11

## 2024-11-25 RX ADMIN — POTASSIUM CHLORIDE 10 MEQ: 7.46 INJECTION, SOLUTION INTRAVENOUS at 12:11

## 2024-11-25 RX ADMIN — POTASSIUM CHLORIDE 10 MEQ: 7.46 INJECTION, SOLUTION INTRAVENOUS at 04:11

## 2024-11-25 RX ADMIN — IRON SUCROSE 200 MG: 20 INJECTION, SOLUTION INTRAVENOUS at 10:11

## 2024-11-25 RX ADMIN — POTASSIUM CHLORIDE 40 MEQ: 1.5 SOLUTION ORAL at 09:11

## 2024-11-25 RX ADMIN — SENNOSIDES AND DOCUSATE SODIUM 1 TABLET: 50; 8.6 TABLET ORAL at 08:11

## 2024-11-25 RX ADMIN — DONEPEZIL HYDROCHLORIDE 10 MG: 5 TABLET, FILM COATED ORAL at 09:11

## 2024-11-25 RX ADMIN — ATORVASTATIN CALCIUM 40 MG: 40 TABLET, FILM COATED ORAL at 08:11

## 2024-11-25 RX ADMIN — METOPROLOL SUCCINATE 25 MG: 25 TABLET, EXTENDED RELEASE ORAL at 08:11

## 2024-11-25 RX ADMIN — ALLOPURINOL 100 MG: 100 TABLET ORAL at 08:11

## 2024-11-25 RX ADMIN — POTASSIUM CHLORIDE 10 MEQ: 7.46 INJECTION, SOLUTION INTRAVENOUS at 03:11

## 2024-11-25 RX ADMIN — PANTOPRAZOLE SODIUM 40 MG: 40 TABLET, DELAYED RELEASE ORAL at 08:11

## 2024-11-25 RX ADMIN — POTASSIUM CHLORIDE 10 MEQ: 7.46 INJECTION, SOLUTION INTRAVENOUS at 01:11

## 2024-11-25 RX ADMIN — ISOSORBIDE MONONITRATE 30 MG: 30 TABLET, EXTENDED RELEASE ORAL at 08:11

## 2024-11-25 NOTE — PT/OT/SLP EVAL
Occupational Therapy Evaluation and Treatment    Name: Anthony Biggs Jr.  MRN: [id number]  Admitting Diagnosis: Acute right hip pain  Recent Surgery: * No surgery found *      Recommendations:     Discharge Recommendations: Low Intensity Therapy  Level of Assistance Recommended: 24 hours significant assistance  Discharge Equipment Recommendations: none  Barriers to discharge:      Assessment:     Anthony Biggs Jr. is a 84 y.o. male with a medical diagnosis of Acute right hip pain. He presents with performance deficits affecting function including weakness, impaired functional mobility, decreased safety awareness, impaired endurance, gait instability, impaired balance, impaired self care skills.     Rehab Prognosis: Fair; patient would benefit from acute OT services to address these deficits and reach maximum level of function.    Plan:     Patient to be seen 2 x/week to address the above listed problems via self-care/home management, therapeutic activities, therapeutic exercises  Plan of Care Expires: 12/09/24  Plan of Care Reviewed with: patient, friend    Subjective     Chief Complaint: DEBILITY AND GENERALIZED WEAKNESS  Patient Comments/Goals: RETURN HOME WITH 24 HOUR CARE AND HOME HEALTH   Pain/Comfort:  Pain Rating 1: 0/10    Patients cultural, spiritual, Samaritan conflicts given the current situation:      Social History:  Living Environment: Patient lives with their spouse in a single story home with number of outside stair(s): 0  Prior Level of Function: Prior to admission, patient  REQUIRES ASSISTANCE WITH ADL'S  Roles and Routines: Patient was not driving and not working prior to admission.  Equipment Used at Home: walker, rolling, oxygen, wheelchair  DME owned (not currently used): none  Assistance Upon Discharge: family    Objective:     Communicated with NURSE AND EPIC CHART REVIEW prior to session. Patient found HOB elevated with bed alarm, oxygen upon OT entry to room.    General Precautions: Standard,  fall   Orthopedic Precautions: N/A   Braces:      Respiratory Status: Nasal cannula, flow 2 L/min    Occupational Performance    Gait belt applied - Yes    Bed Mobility:   Rolling/Turning to Right with minimum assistance  Scooting anteriorly to EOB to have both feet planted on floor: minimum assistance  Supine to sit from right side of bed with minimum assistance    Functional Mobility/Transfers:  Sit <> Stand Transfer with minimum assistance with rolling walker  Bed <> Chair Transfer using Step Transfer technique with moderate assistance with rolling walker  Functional Mobility: PT  AMBULATED 10 FEET WITH MOD A ND ROLLING WALKER    Activities of Daily Living:  Upper Body Dressing: MOD A   Lower Body Dressing: maximal assistance    Cognitive/Visual Perceptual:  Cognitive/Psychosocial Skills:    -     Oriented to: Person and Place  -     Follows Commands/attention: Follows two-step commands  -     Communication: clear/fluent  -     Memory: Impaired STM, Impaired LTM, and Poor immediate recall  -     Safety awareness/insight to disability: impaired    Physical Exam:  Upper Extremity Range of Motion:     -       Right Upper Extremity: WFL  -       Left Upper Extremity: WFL  Upper Extremity Strength:    -       Right Upper Extremity: MMT: 3/5 GROSSLY  -       Left Upper Extremity: MMT: 3/5 grossly   Strength:    -       Right Upper Extremity: mmt: 3/5 grossly  -       Left Upper Extremity: mmt: 3/5 grossly    AMPAC 6 Click ADL:  AMPAC Total Score: 14    Treatment & Education:  Educated patient on importance of increased tolerance to upright position and direct impact on CV endurance and strength. Patient encouraged to sit up in chair/ EOB, for a minimum of 2 consecutive hours including for all meals..Pt educated on HEP. Pt performed 1 set x 10 reps b ue rom exercise ( shoulder flexion,  elbow flex/ext hand/digits flex/ext).  Encouraged patient to perform AROM TE to BUE throughout the day within all available planes  of motion. Re enforced importance of utilizing call light to meet needs in room and not attempt to get up without staff assistance. Patient verbalized understanding and agreed to comply.           Patient clear to stand pivot transfer with RN/PCT, assist xmod a with step<pivot transfer .    Patient left up in chair with all lines intact, call button in reach, RN notified, chair alarm on, and spouse present.    GOALS:   Multidisciplinary Problems       Occupational Therapy Goals          Problem: Occupational Therapy    Goal Priority Disciplines Outcome Interventions   Occupational Therapy Goal     OT, PT/OT     Description: O.T. GOALS TO BE MET BY 12-9-24  PT WILL TOLERATE 1 SET X 12 REPS B UE ROM EXERCISE  SBA WITH UE DRESSING  MOD A WITH LE DRESSING  CGA WITH TOILET TRANSFER                         History:     Past Medical History:   Diagnosis Date    Angiodysplasia of small intestine     Atrial fibrillation     Chronic upper GI bleeding     due to angiodysplasia in proximal small intestine    Coronary atherosclerosis     Dementia     Diabetes mellitus without complication     Diverticulosis of large intestine without hemorrhage 05/16/2017    Family history of macular degeneration 10/20/2014    History of prostate cancer     Hyperlipidemia associated with type 2 diabetes mellitus     Hypertension associated with diabetes     LAILA (iron deficiency anemia) 09/20/2021    due to angiodysplasia in small intestine    Intention tremor     Major depressive disorder, recurrent, moderate 04/12/2021    SHORTY (obstructive sleep apnea)     Urinary incontinence          Past Surgical History:   Procedure Laterality Date    ABDOMINAL HERNIA REPAIR      APPENDECTOMY      bladder sx      CARDIAC CATHETERIZATION      CATARACT EXTRACTION W/  INTRAOCULAR LENS IMPLANT  Restor OU    COLONOSCOPY N/A 5/16/2017    Procedure: COLONOSCOPY;  Surgeon: Alfredo Naidu MD;  [facility];  Service: Endoscopy;  Laterality: N/A;     ESOPHAGOGASTRODUODENOSCOPY N/A 10/29/2021    Procedure: SBE (Push Enteroscopy) with Dr. Wayne;  Surgeon: Arlette Wayne MD;  Location: [facility];  Service: Endoscopy;  Laterality: N/A;  Plavix held indefinitely as of 10-20-21. Must also hold Coumadin 5 days prior. Ok to continue to use ASA.    ESOPHAGOGASTRODUODENOSCOPY N/A 2/23/2023    Procedure: EGD (ESOPHAGOGASTRODUODENOSCOPY);  Surgeon: Opal Wright MD;  Location: [facility];  Service: Endoscopy;  Laterality: N/A;    inguinal hernia      INTRALUMINAL GASTROINTESTINAL TRACT IMAGING VIA CAPSULE N/A 10/4/2021    Procedure: IMAGING PROCEDURE, GI TRACT, INTRALUMINAL, VIA CAPSULE;  Surgeon: Joaquin Stack RN;  Location: [facility];  Service: Endoscopy;  Laterality: N/A;    LEFT HEART CATHETERIZATION Left 7/20/2021    Procedure: CATHETERIZATION, HEART, LEFT;  Surgeon: Darryl Griffith MD;  Location: [facility] CATH LAB;  Service: Cardiology;  Laterality: Left;    lung sx      PERCUTANEOUS TRANSLUMINAL BALLOON ANGIOPLASTY OF CORONARY ARTERY  7/20/2021    Procedure: Angioplasty-coronary;  Surgeon: Darryl Griffith MD;  Location: [facility] CATH LAB;  Service: Cardiology;;    PROSTATE SURGERY         Time Tracking:     OT Date of Treatment: 11/25/24  OT Start Time: 0953  OT Stop Time: 1016  OT Total Time (min): 23 min    Billable Minutes: Evaluation 10 minutes and Therapeutic Exercise 13 minutes    11/25/2024

## 2024-11-25 NOTE — ASSESSMENT & PLAN NOTE
Patient with dementia with likely etiology of vascular dementia. Dementia is moderate. The patient does have signs of behavioral disturbance. Home dementia medications are Held or Continued: continued.. Continue non-pharmacologic interventions to prevent delirium (No VS between 11PM-5AM, activity during day, opening blinds, providing glasses/hearing aids, and up in chair during daytime). Will avoid narcotics and benzos unless absolutely necessary. PRN anti-psychotics are prescribed to avoid self harm behaviors.

## 2024-11-25 NOTE — HOSPITAL COURSE
11/25/2024  Pain controlled.  Hypokalemia noted.  Will replace electrolytes.   consulted.  Wife would like to return back home on hospice.  Would like to stay overnight for monitoring.  Initially planned for MRI of pelvis however patient unlikely to be surgical candidate therefore MRI was deferred.  Anticipate DC tomorrow.    Pain controlled. Electrolytes repleted. Diamox given. Patient discharged home with hospice.

## 2024-11-25 NOTE — ASSESSMENT & PLAN NOTE
Patient has paroxysmal (<7 days) atrial fibrillation. Patient is currently in sinus rhythm. HIXHR1OEEo Score: 4. The patients heart rate in the last 24 hours is as follows:  Pulse  Min: 61  Max: 91     Antiarrhythmics  metoprolol succinate (TOPROL-XL) 24 hr tablet 25 mg, Daily, Oral    Anticoagulants   Hold Eliquis for now     Plan  - Replete lytes with a goal of K>4, Mg >2  - Patient is anticoagulated, see medications listed above.  - Patient's afib is currently controlled  - Hold Eliquis due to anemia

## 2024-11-25 NOTE — PLAN OF CARE
[facility] - Med Surg  Initial Discharge Assessment       Primary Care Provider: Giuliano Moran MD    Admission Diagnosis: Fall [W19.XXXA]  Chest pain [R07.9]  Fall, initial encounter [W19.XXXA]  Hip sprain, right, initial encounter [S73.101A]    Admission Date: 11/24/2024  Expected Discharge Date: Per Attending     Transition of Care Barriers: None    Payor: [employer] MANAGED MEDICARE / Plan: [employer] MEDICARE HMO / Product Type: Capitation /     Extended Emergency Contact Information  Primary Emergency Contact: Marino Biggs  Address[address] [place] [address] [facility]  Home Phone: [phone]  Mobile Phone: [phone]  Relation: Spouse  Secondary Emergency Contact: Jeana Mandujano  Mobile Phone: [phone]  Relation: Daughter   needed? No    Discharge Plan A: Hospice/home  Discharge Plan B: Skilled Nursing Facility      [id number] - [place], [place] - [address]  [address] [facility] [address]  Phone: [phone] Fax: [phone]    [facility] [address] [place] [address], [place] - [address]  [address]  [facility] [address]  Phone: [phone] Fax: [phone]      Initial Assessment (most recent)       Adult Discharge Assessment - 11/25/24 0927          Discharge Assessment    Assessment Type Discharge Planning Assessment     Confirmed/corrected address, phone number and insurance Yes     Confirmed Demographics Correct on Facesheet     Source of Information family     Communicated RACHNA with patient/caregiver Date not available/Unable to determine     Reason For Admission acute right hip pain     People in Home spouse     Do you expect to return to your current living situation? Yes     Do you have help at home or someone to help you manage your care at home? Yes     Who are your caregiver(s) and their phone number(s)? family     Walking or Climbing Stairs Difficulty yes     Walking or Climbing Stairs ambulation difficulty,  requires equipment     Dressing/Bathing Difficulty yes     Dressing/Bathing bathing difficulty, assistance 1 person     Home Accessibility wheelchair accessible     Home Layout Able to live on 1st floor     Equipment Currently Used at Home walker, rolling;wheelchair;oxygen     Readmission within 30 days? No     Patient currently being followed by outpatient case management? No     Do you currently have service(s) that help you manage your care at home? Yes     Name and Contact number of agency [facility]osp[facility]     Is the pt/caregiver preference to resume services with current agency Yes     Do you take prescription medications? Yes     Do you have prescription coverage? Yes     Coverage [employer]     Do you have any problems affording any of your prescribed medications? No     Is the patient taking medications as prescribed? yes     Who is going to help you get home at discharge? family     How do you get to doctors appointments? family or friend will provide     Are you on dialysis? No     Do you take coumadin? No     Discharge Plan A Hospice/home     Discharge Plan B Skilled Nursing Facility     DME Needed Upon Discharge  none     Discharge Plan discussed with: Spouse/sig other     Transition of Care Barriers None                   Ramesh met with pt's spouse, Marino, to complete assessment and to discuss d/c plans. Pt's spouse stated pt is current with Hospice; unsure of name of company. Pt has been to SNF previously but family unsure of d/c plan at this time. Ramesh explained she will f/u with PT/OT recs and meet with family at bedside to discuss all d/c options; spouse is agreeable. Ramesh provided spouse with a list of sitter services and a resource booklet for spouse to look into additional sitter services upon d/c.

## 2024-11-25 NOTE — PT/OT/SLP EVAL
Physical Therapy Evaluation    Patient Name:  Anthony Biggs Jr.   MRN:  [id number]    Recommendations:     Discharge Recommendations: Low Intensity Therapy (24 HR. CAREGIVER)   Discharge Equipment Recommendations: walker, rolling   Barriers to discharge: None    Assessment:     Anthony Biggs Jr. is a 84 y.o. male admitted with a medical diagnosis of Acute right hip pain.  He presents with the following impairments/functional limitations: weakness, impaired endurance, impaired functional mobility, gait instability, impaired balance, decreased ROM, impaired self care skills, decreased lower extremity function, decreased upper extremity function, decreased safety awareness, decreased coordination, impaired cognition .    Rehab Prognosis: Fair; patient would benefit from acute skilled PT services to address these deficits and reach maximum level of function.    Recent Surgery: * No surgery found *      Plan:     During this hospitalization, patient to be seen 3 x/week to address the identified rehab impairments via gait training, therapeutic activities, therapeutic exercises and progress toward the following goals:    Plan of Care Expires:  12/09/24    Subjective     Chief Complaint: WEAKNESS   Patient/Family Comments/goals: RETURN HOME @ PLOF   Pain/Comfort:  Pain Rating 1: 0/10  Pain Rating Post-Intervention 1: 0/10    Patients cultural, spiritual, Orthodoxy conflicts given the current situation:      Living Environment:  PT LIVES AT HOME WITH WIFE IN A 2 STORY HOME WITH 1 STEP TO ENTER. PT BED AND BATHROOM DOWN STAIRS  Prior to admission, patients level of function was MOD I WITH ROLLATOR .  Equipment used at home: wheelchair, rollator, lift device, shower chair.  DME owned (not currently used): shower chair and O2, WC .  Upon discharge, patient will have assistance from WIFE AND sitters wife plans to higher.    Objective:     Communicated with nurse and epic chart review  prior to session.  Patient found up in chair  "with peripheral IV, oxygen  upon PT entry to room.    General Precautions: Standard, fall  Orthopedic Precautions:N/A   Braces: N/A  Respiratory Status: Nasal cannula, flow 2 L/min    Exams:  Cognitive Exam:  Patient is oriented to Person  RLE ROM: LIMITED  RLE Strength: LIMITED  LLE ROM: LIMITED  LLE Strength: LIMITED    Functional Mobility:  Bed Mobility:     Rolling Right: minimum assistance  Scooting: minimum assistance  Supine to Sit: moderate assistance  Transfers:     Sit to Stand:  contact guard assistance with rolling walker  Bed to Chair: contact guard assistance with  rolling walker  using  Stand Pivot  Gait: PT GT TRAINED X 18' WITH RW AND MIN >CGA.       AM-PAC 6 CLICK MOBILITY  Total Score:15       Treatment & Education:  GT. BELT AND  SOCKS DONNED PRIOR TO OOB MOBILITY.   PT WITH STEP TO GT AND REPORTS OF INC FATIGUE. PT RETURNED TO BEDSIDE CHAIR WITH MIN A. PT COMPLETED B LE TE X 10 REPS OF AP, TKE AND MIP. P.T. DICUSSED REC FOR D/C WITH WIFE OF RW AND HH P.T. P.T. RELAYED INFO TO  AFTER COMPLETION OF EVAL AND TX. PT EDUCATED ON "CALL DON'T FALL", ENCOURAGED TO CALL FOR ASSISTANCE WITH ALL NEEDS FOR OOB MOBILITY.      Patient left up in chair with call button in reach and chair alarm on.    GOALS:   Multidisciplinary Problems       Physical Therapy Goals          Problem: Physical Therapy    Goal Priority Disciplines Outcome Interventions   Physical Therapy Goal     PT, PT/OT     Description: LT24  1. PT WILL COMPLETE BED MOBILITY WITH MIN A  2. PT WILL STAND PIVOT T/F TO CHAIR WITH RW AND SBA  3. PT WILL GT TRAIN X 100' WITH RW AND SBA TO PROGRESS GT.   4. PT WILL INC [facility] SCORE BY 2 POINTS TO PROGRESS GROSS FUNC MOBILITY.                          History:     Past Medical History:   Diagnosis Date    Angiodysplasia of small intestine     Atrial fibrillation     Chronic upper GI bleeding     due to angiodysplasia in proximal small intestine    Coronary atherosclerosis     Dementia     " Diabetes mellitus without complication     Diverticulosis of large intestine without hemorrhage 05/16/2017    Family history of macular degeneration 10/20/2014    History of prostate cancer     Hyperlipidemia associated with type 2 diabetes mellitus     Hypertension associated with diabetes     LAILA (iron deficiency anemia) 09/20/2021    due to angiodysplasia in small intestine    Intention tremor     Major depressive disorder, recurrent, moderate 04/12/2021    SHORTY (obstructive sleep apnea)     Urinary incontinence        Past Surgical History:   Procedure Laterality Date    ABDOMINAL HERNIA REPAIR      APPENDECTOMY      bladder sx      CARDIAC CATHETERIZATION      CATARACT EXTRACTION W/  INTRAOCULAR LENS IMPLANT  Restor OU    COLONOSCOPY N/A 5/16/2017    Procedure: COLONOSCOPY;  Surgeon: Alfredo Naidu MD;  Location: [facility];  Service: Endoscopy;  Laterality: N/A;    ESOPHAGOGASTRODUODENOSCOPY N/A 10/29/2021    Procedure: SBE (Push Enteroscopy) with Dr. Wayne;  Surgeon: Arlette Wayne MD;  Location: [facility];  Service: Endoscopy;  Laterality: N/A;  Plavix held indefinitely as of 10-20-21. Must also hold Coumadin 5 days prior. Ok to continue to use ASA.    ESOPHAGOGASTRODUODENOSCOPY N/A 2/23/2023    Procedure: EGD (ESOPHAGOGASTRODUODENOSCOPY);  Surgeon: Opal Wright MD;  Location: [facility];  Service: Endoscopy;  Laterality: N/A;    inguinal hernia      INTRALUMINAL GASTROINTESTINAL TRACT IMAGING VIA CAPSULE N/A 10/4/2021    Procedure: IMAGING PROCEDURE, GI TRACT, INTRALUMINAL, VIA CAPSULE;  Surgeon: Joaquin Stack RN;  Location: [facility] ENDO;  Service: Endoscopy;  Laterality: N/A;    LEFT HEART CATHETERIZATION Left 7/20/2021    Procedure: CATHETERIZATION, HEART, LEFT;  Surgeon: Darryl Griffith MD;  Location: [facility] CATH LAB;  Service: Cardiology;  Laterality: Left;    lung sx      PERCUTANEOUS TRANSLUMINAL BALLOON ANGIOPLASTY OF CORONARY ARTERY  7/20/2021    Procedure: Angioplasty-coronary;   Surgeon: Darryl Griffith MD;  Location: [facility] CATH LAB;  Service: Cardiology;;    PROSTATE SURGERY         Time Tracking:     PT Received On: 11/25/24  PT Start Time: 1040     PT Stop Time: 1105  PT Total Time (min): 25 min     Billable Minutes: Evaluation 15 and Therapeutic Exercise 10      11/25/2024

## 2024-11-25 NOTE — CONSULTS
Ramesh met with pt's spouse, Marino, at bedside to discuss d/c plans. Pt's wife is willing to take pt home with [facility] instead of SNF.     Ramesh spoke with PT, Klaudia, who requested Sw reach out to hospice to see if they will allow 5-10 PT visits with hospice care. Klaudia also requested a RW for pt upon d/c instead of a rollator that pt currently has.     Ramesh spoke with Amanda with [facility]. Ramesh inquired about PT visits; Amanda stated hospice can provide 2 PT visits weekly for 1 month. Ramesh also notified Amanda that pt will need a RW delivered to pt's home.     Ramesh faxed hospice referral to [phone]. Pending hospice arrangements.

## 2024-11-25 NOTE — ASSESSMENT & PLAN NOTE
XRAY and CT of right hip showed no acute fracture or dislocation   PT/OT  Consult Ortho   Pain control prn   Spouse would like SNF for frequent falls

## 2024-11-25 NOTE — ASSESSMENT & PLAN NOTE
"Patient's FSGs are controlled on current medication regimen.  Last A1c reviewed-   Lab Results   Component Value Date    HGBA1C 6.7 (H) 08/07/2024     Most recent fingerstick glucose reviewed- No results for input(s): "POCTGLUCOSE" in the last 24 hours.  Current correctional scale  Low  Maintain anti-hyperglycemic dose as follows-   Antihyperglycemics (From admission, onward)      None          Hold Oral hypoglycemics while patient is in the hospital.  "

## 2024-11-25 NOTE — ASSESSMENT & PLAN NOTE
Pt has been on hospice and has not followed up with GI   Will transfuse one unit of PRBC for anemia   Spouse denies melena, hematemesis or hematochezia   Will monitor for now

## 2024-11-25 NOTE — PROGRESS NOTES
[facility] Medicine  Progress Note    Patient Name: Anthony Biggs Jr.  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 Spouse reports at approx midnight, she found the pt on the floor with his walker on top of him. He as laying on his right side and was unable to get up. He could not bear weight on his right leg and was holding his right hip. She got him off the floor. When the hospice nurse came by, they decided to rescind hospice and send to ED for evaluation. According to pt's spouse, the patient has been doing very well and has been more mobile and more oriented lately. He does have frequent falls. The spouse is requesting SNF rehab at [facility] after cleared from any fractures.     In the ED, Afebrile, VSS, labs revealed WBC normal, Hgb 6.5, INR 1.1, UA- unremarkable, CXR-clear, Right hip with pelvis was negative for fracture or dislocation, Right shoulder was negative for fracture/dislocation, Right knee was negative for fracture/dislocation, CT head showed nothing acute, Xray right ankle was negative for acute fracture/dislocation, CT Right hip showed no definite fracture, Some mild fat stranding along the ileo psoas musculature, Xray lumbar spine showed no fracture, DDD.   CMP pending   The patient was agitated in the ED and required IV Ativan   The patient is a DNR, His wife is his SDM    Overview/Hospital Course:  11/25/2024  Pain controlled.  Hypokalemia noted.  Will replace electrolytes.   consulted.  Wife would like to return back home on hospice.  Would like to  stay overnight for monitoring.  Initially planned for MRI of pelvis however patient unlikely to be surgical candidate therefore MRI was deferred.  Anticipate DC tomorrow.    Interval History:  See above    Review of Systems   Unable to perform ROS: Dementia     Objective:     Vital Signs (Most Recent):  Temp: 98.1 °F (36.7 °C) (11/25/24 1109)  Pulse: (!) 56 (11/25/24 1109)  Resp: 16 (11/25/24 1109)  BP: (!) 109/59 (11/25/24 1109)  SpO2: 98 % (11/25/24 1109) Vital Signs (24h Range):  Temp:  [97.3 °F (36.3 °C)-98.6 °F (37 °C)] 98.1 °F (36.7 °C)  Pulse:  [56-91] 56  Resp:  [16-20] 16  SpO2:  [90 %-99 %] 98 %  BP: (105-141)/(55-97) 109/59     Weight: 86.2 kg (190 lb 0.6 oz)  Body mass index is 30.67 kg/m².    Intake/Output Summary (Last 24 hours) at 11/25/2024 1505  Last data filed at 11/25/2024 0407  Gross per 24 hour   Intake 805 ml   Output 1 ml   Net 804 ml         Physical Exam  Constitutional:       General: He is not in acute distress.     Appearance: He is well-developed. He is not diaphoretic.   HENT:      Head: Normocephalic and atraumatic.      Nose: Nose normal.   Eyes:      General: No scleral icterus.     Conjunctiva/sclera: Conjunctivae normal.      Pupils: Pupils are equal, round, and reactive to light.   Cardiovascular:      Rate and Rhythm: Normal rate and regular rhythm.      Heart sounds: Normal heart sounds. No murmur heard.     No friction rub. No gallop.   Pulmonary:      Effort: Pulmonary effort is normal. No respiratory distress.      Breath sounds: Normal breath sounds. No stridor. No wheezing or rales.   Chest:      Chest wall: No tenderness.   Abdominal:      General: Bowel sounds are normal. There is no distension.      Palpations: Abdomen is soft. There is no mass.      Tenderness: There is no abdominal tenderness. There is no guarding or rebound.   Musculoskeletal:         General: Tenderness (right posterior iliac crest and buttock) present. No deformity. Normal range of motion.       Cervical back: Normal range of motion and neck supple.      Right lower leg: Edema present.      Comments: No bruising or injury noted  Full ROM noted to right hip without pain including external and internal rotation   TTP to right posterior iliac crest, buttock, and posterior right thigh    Skin:     General: Skin is warm and dry.      Coloration: Skin is pale.      Findings: No erythema or rash.   Neurological:      Mental Status: He is alert and oriented to person, place, and time.      Cranial Nerves: No cranial nerve deficit.      Motor: No abnormal muscle tone.      Coordination: Coordination normal.      Comments: Oriented to person and place      Psychiatric:         Behavior: Behavior normal.         Thought Content: Thought content normal.         Cognition and Memory: Cognition is impaired. Memory is impaired.             Significant Labs: All pertinent labs within the past 24 hours have been reviewed.    Significant Imaging: I have reviewed all pertinent imaging results/findings within the past 24 hours.    Assessment/Plan:      * Acute right hip pain  XRAY and CT of right hip showed no acute fracture or dislocation   PT/OT  Consult Ortho   Pain control prn   Spouse would like SNF for frequent falls     11/25/2024  Pain controlled   Wife would like to return back home with hospice   Requested patient be observed overnight   Will plan for DC tomorrow  MRI canceled as patient likely not a surgical candidate   And patient is returning home with hospice      Iron deficiency anemia  Anemia is likely due to chronic blood loss. Most recent hemoglobin and hematocrit are listed below.  Recent Labs     11/24/24  1706 11/24/24  1912 11/25/24  0542   HGB 6.5* 6.5* 7.2*   HCT 22.9* 22.4* 24.6*       Plan  - Monitor serial CBC: Daily  - Transfuse PRBC if patient becomes hemodynamically unstable, symptomatic or H/H drops below 7/21.  - Patient's anemia is currently worsening. Will transfuse one unit PRBC   - Spouse  "denies any recent melena, hematochezia, or hematemesis     Will start Venofer and ferrous sulfate    Frequent falls  PT/OT  Consult CM for possible SNF      Dementia  Patient with dementia with likely etiology of vascular dementia. Dementia is moderate. The patient does have signs of behavioral disturbance. Home dementia medications are Held or Continued: continued.. Continue non-pharmacologic interventions to prevent delirium (No VS between 11PM-5AM, activity during day, opening blinds, providing glasses/hearing aids, and up in chair during daytime). Will avoid narcotics and benzos unless absolutely necessary. PRN anti-psychotics are prescribed to avoid self harm behaviors.    Angiodysplasia of small intestine  Pt has been on hospice and has not followed up with GI   Will transfuse one unit of PRBC for anemia   Spouse denies melena, hematemesis or hematochezia   Will monitor for now       Hypertension associated with diabetes  Patients blood pressure range in the last 24 hours was: BP  Min: 100/60  Max: 141/80.The patient's inpatient anti-hypertensive regimen is listed below:  Current Antihypertensives  isosorbide mononitrate 24 hr tablet 30 mg, Daily, Oral  metoprolol succinate (TOPROL-XL) 24 hr tablet 25 mg, Daily, Oral    Plan  - BP is controlled, no changes needed to their regimen  Hold Metolazone and Torsemide for now       Diabetes mellitus without complication  Patient's FSGs are controlled on current medication regimen.  Last A1c reviewed-   Lab Results   Component Value Date    HGBA1C 6.7 (H) 08/07/2024     Most recent fingerstick glucose reviewed- No results for input(s): "POCTGLUCOSE" in the last 24 hours.  Current correctional scale  Low  Maintain anti-hyperglycemic dose as follows-   Antihyperglycemics (From admission, onward)      None          Hold Oral hypoglycemics while patient is in the hospital.    Chronic anticoagulation  Hold Eliquis for now due to severe anemia       Atrial " fibrillation  Patient has paroxysmal (<7 days) atrial fibrillation. Patient is currently in sinus rhythm. [id number] Score: 4. The patients heart rate in the last 24 hours is as follows:  Pulse  Min: 61  Max: 91     Antiarrhythmics  metoprolol succinate (TOPROL-XL) 24 hr tablet 25 mg, Daily, Oral    Anticoagulants   Hold Eliquis for now     Plan  - Replete lytes with a goal of K>4, Mg >2  - Patient is anticoagulated, see medications listed above.  - Patient's afib is currently controlled  - Hold Eliquis due to anemia           VTE Risk Mitigation (From admission, onward)           Ordered     IP VTE HIGH RISK PATIENT  Once         11/24/24 1802     Place sequential compression device  Until discontinued         11/24/24 1802     Reason for No Pharmacological VTE Prophylaxis  Once        Question:  Reasons:  Answer:  Risk of Bleeding    11/24/24 1802                    Discharge Planning   RACHNA: 11/25/2024     Code Status: DNR   Is the patient medically ready for discharge?:     Reason for patient still in hospital (select all that apply): Patient trending condition  Discharge Plan A: Hospice/home   Discharge Delays: None known at this time              Huseyin Ren MD  Department of Hospital Medicine   [facility]

## 2024-11-25 NOTE — ASSESSMENT & PLAN NOTE
XRAY and CT of right hip showed no acute fracture or dislocation   PT/OT  Consult Ortho   Pain control prn   Spouse would like SNF for frequent falls     11/25/2024  Pain controlled   Wife would like to return back home with hospice   Requested patient be observed overnight   Will plan for DC tomorrow  MRI canceled as patient likely not a surgical candidate   And patient is returning home with hospice

## 2024-11-25 NOTE — ASSESSMENT & PLAN NOTE
Anemia is likely due to chronic blood loss. Most recent hemoglobin and hematocrit are listed below.  Recent Labs     11/24/24  1706   HGB 6.5*   HCT 22.9*     Plan  - Monitor serial CBC: Daily  - Transfuse PRBC if patient becomes hemodynamically unstable, symptomatic or H/H drops below 7/21.  - Patient's anemia is currently worsening. Will transfuse one unit PRBC   - Spouse denies any recent melena, hematochezia, or hematemesis

## 2024-11-25 NOTE — PLAN OF CARE
Discussed poc with pt, pt verbalized understanding    Purposeful rounding every 2hours    VS wnl  Cardiac monitoring in use, pt is NSR, tele monitor #[id number]  Fall precautions in place, remains injury free  Pain and nausea under control with PRN meds  Bed locked at lowest position  Call light within reach    Chart check complete  Will cont with POC

## 2024-11-25 NOTE — H&P
[facility] Medicine  History & Physical    Patient Name: Anthony Biggs Jr.  MRN: [id number]  Patient Class: OP- Observation  Admission Date: 11/24/2024  Attending Physician: Dr. Ren  Primary Care Provider: Giuliano Moran MD         Patient information was obtained from spouse/SO and ER records.     Subjective:     Principal Problem:Acute right hip pain    Chief Complaint:   Chief Complaint   Patient presents with    Fall     Per ems pt fell around midnight last night. Pt was able to get back in bed with the help of his wife. This morning pt was unable to get out of bed and ems was called. 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 Spouse reports at approx midnight, she found the pt on the floor with his walker on top of him. He as laying on his right side and was unable to get up. He could not bear weight on his right leg and was holding his right hip. She got him off the floor to the bed. When the hospice nurse came by, they decided to rescind hospice and send to ED for evaluation. According to pt's spouse, the patient has been doing very well and has been more mobile and more oriented lately. He does have frequent falls. The spouse is requesting SNF rehab at [facility] after cleared from any fractures to improve mobility.     In the ED, Afebrile, VSS, labs revealed WBC normal, Hgb 6.5, INR 1.1, UA- unremarkable, CXR-clear, Right hip with pelvis was negative for fracture or dislocation, Right shoulder was negative for fracture/dislocation, Right knee was negative for fracture/dislocation, CT head showed nothing acute, Xray right ankle was negative for acute fracture/dislocation, CT Right hip showed no definite fracture, Some mild fat stranding along the ileo psoas musculature, Xray  lumbar spine showed no fracture, DDD.   CMP pending   The patient was agitated in the ED and required IV Ativan   The patient is a DNR, His wife is his SDM    Past Medical History:   Diagnosis Date    Angiodysplasia of small intestine     Atrial fibrillation     Chronic upper GI bleeding     due to angiodysplasia in proximal small intestine    Coronary atherosclerosis     Dementia     Diabetes mellitus without complication     Diverticulosis of large intestine without hemorrhage 05/16/2017    Family history of macular degeneration 10/20/2014    History of prostate cancer     Hyperlipidemia associated with type 2 diabetes mellitus     Hypertension associated with diabetes     LAILA (iron deficiency anemia) 09/20/2021    due to angiodysplasia in small intestine    Intention tremor     Major depressive disorder, recurrent, moderate 04/12/2021    SHORTY (obstructive sleep apnea)     Urinary incontinence        Past Surgical History:   Procedure Laterality Date    ABDOMINAL HERNIA REPAIR      APPENDECTOMY      bladder sx      CARDIAC CATHETERIZATION      CATARACT EXTRACTION W/  INTRAOCULAR LENS IMPLANT  Restor OU    COLONOSCOPY N/A 5/16/2017    Procedure: COLONOSCOPY;  Surgeon: Alfredo Naidu MD;  Location: [facility];  Service: Endoscopy;  Laterality: N/A;    ESOPHAGOGASTRODUODENOSCOPY N/A 10/29/2021    Procedure: SBE (Push Enteroscopy) with Dr. Wayne;  Surgeon: Arlette Wayne MD;  Location: [facility];  Service: Endoscopy;  Laterality: N/A;  Plavix held indefinitely as of 10-20-21. Must also hold Coumadin 5 days prior. Ok to continue to use ASA.    ESOPHAGOGASTRODUODENOSCOPY N/A 2/23/2023    Procedure: EGD (ESOPHAGOGASTRODUODENOSCOPY);  Surgeon: Opal Wright MD;  Location: [facility];  Service: Endoscopy;  Laterality: N/A;    inguinal hernia      INTRALUMINAL GASTROINTESTINAL TRACT IMAGING VIA CAPSULE N/A 10/4/2021    Procedure: IMAGING PROCEDURE, GI TRACT, INTRALUMINAL, VIA CAPSULE;  Surgeon: Joaquin Stack  RN;  Location: [facility] ENDO;  Service: Endoscopy;  Laterality: N/A;    LEFT HEART CATHETERIZATION Left 7/20/2021    Procedure: CATHETERIZATION, HEART, LEFT;  Surgeon: Darryl Griffith MD;  Location: [facility] CATH LAB;  Service: Cardiology;  Laterality: Left;    lung sx      PERCUTANEOUS TRANSLUMINAL BALLOON ANGIOPLASTY OF CORONARY ARTERY  7/20/2021    Procedure: Angioplasty-coronary;  Surgeon: Darryl Griffith MD;  Location: [facility] CATH LAB;  Service: Cardiology;;    PROSTATE SURGERY         Review of patient's allergies indicates:  No Known Allergies    No current facility-administered medications on file prior to encounter.     Current Outpatient Medications on File Prior to Encounter   Medication Sig    docusate sodium (COLACE) 100 MG capsule Take 100 mg by mouth daily as needed.    gabapentin (NEURONTIN) 100 MG capsule Take 100 mg by mouth 3 (three) times daily as needed.    haloperidol (HALDOL) 2 mg/mL solution Take 1 mg by mouth every 6 (six) hours as needed (agitation).    senna-docusate 8.6-50 mg (PERICOLACE) 8.6-50 mg per tablet Take 1 tablet by mouth 2 (two) times daily.    allopurinoL (ZYLOPRIM) 100 MG tablet Take 1 tablet (100 mg total) by mouth once daily.    apixaban (ELIQUIS) 2.5 mg Tab Take 1 tablet (2.5 mg total) by mouth 2 (two) times daily.    atorvastatin (LIPITOR) 40 MG tablet Take 1 tablet (40 mg total) by mouth once daily. for 360 doses    donepeziL (ARICEPT) 10 MG tablet Take 1 tablet (10 mg total) by mouth every evening.    FLUoxetine 10 MG capsule Take 1 capsule (10 mg total) by mouth every morning.    isosorbide mononitrate (IMDUR) 30 MG 24 hr tablet Take 1 tablet (30 mg total) by mouth once daily.    LORazepam (ATIVAN) 0.5 MG tablet Take 1 tablet (0.5 mg total) by mouth every 12 (twelve) hours as needed for Anxiety. (Patient taking differently: Take 0.5 mg by mouth every 12 (twelve) hours as needed for Anxiety (restless leg).)    metOLazone (ZAROXOLYN) 5 MG tablet Take 1 tablet (5 mg total)  by mouth once daily.    metoprolol succinate (TOPROL-XL) 25 MG 24 hr tablet Take 1 tablet (25 mg total) by mouth once daily.    mupirocin (BACTROBAN) 2 % ointment Apply topically 3 (three) times daily.    nystatin-triamcinolone (MYCOLOG II) cream Apply topically 4 (four) times daily.    pantoprazole (PROTONIX) 40 MG tablet Take 1 tablet (40 mg total) by mouth once daily. (Patient not taking: Reported on 2024)    torsemide (DEMADEX) 10 MG Tab Take 1 tablet (10 mg total) by mouth daily as needed.     Family History       Problem Relation (Age of Onset)    Cancer Father    Diabetes Sister    Heart disease Mother, Sister    Macular degeneration Sister          Tobacco Use    Smoking status: Former     Current packs/day: 0.00     Average packs/day: 0.5 packs/day for 40.0 years (20.0 ttl pk-yrs)     Types: Cigarettes     Start date: 1962     Quit date:      Years since quittin.9    Smokeless tobacco: Never   Substance and Sexual Activity    Alcohol use: Yes     Alcohol/week: 7.0 standard drinks of alcohol     Types: 7 Shots of liquor per week    Drug use: No    Sexual activity: Not Currently     Review of Systems   Unable to perform ROS: Dementia     Objective:     Vital Signs (Most Recent):  Temp: 98 °F (36.7 °C) (24 1620)  Pulse: 79 (24 1620)  Resp: 16 (24 162)  BP: (!) 141/80 (24 162)  SpO2: 95 % (24) Vital Signs (24h Range):  Temp:  [97.7 °F (36.5 °C)-98 °F (36.7 °C)] 98 °F (36.7 °C)  Pulse:  [61-91] 79  Resp:  [16-20] 16  SpO2:  [94 %-97 %] 95 %  BP: (100-141)/(57-80) 141/80     Weight: 86.2 kg (190 lb 0.6 oz)  Body mass index is 30.67 kg/m².     Physical Exam  Vitals and nursing note reviewed.   Constitutional:       General: He is not in acute distress.     Appearance: He is well-developed. He is not diaphoretic.   HENT:      Head: Normocephalic and atraumatic.      Nose: Nose normal.   Eyes:      General: No scleral icterus.     Conjunctiva/sclera:  Conjunctivae normal.   Cardiovascular:      Rate and Rhythm: Normal rate and regular rhythm.      Heart sounds: Normal heart sounds. No murmur heard.     No friction rub. No gallop.   Pulmonary:      Effort: Pulmonary effort is normal. No respiratory distress.      Breath sounds: Normal breath sounds. No stridor. No wheezing or rales.   Chest:      Chest wall: No tenderness.   Abdominal:      General: Bowel sounds are normal. There is no distension.      Palpations: Abdomen is soft.      Tenderness: There is no abdominal tenderness. There is no guarding or rebound.   Musculoskeletal:         General: Tenderness (right posterior iliac crest and buttock) present. No deformity. Normal range of motion.      Cervical back: Normal range of motion and neck supple.      Right lower leg: Edema present.      Comments: No bruising or injury noted  Full ROM noted to right hip without pain including external and internal rotation   TTP to right posterior iliac crest, buttock, and posterior right thigh    Skin:     General: Skin is warm and dry.      Coloration: Skin is pale.      Findings: No erythema or rash.   Neurological:      Mental Status: He is alert.      Cranial Nerves: No cranial nerve deficit.      Motor: No abnormal muscle tone.      Coordination: Coordination normal.      Comments: Oriented to person and place      Psychiatric:         Behavior: Behavior normal.         Thought Content: Thought content normal.         Cognition and Memory: Cognition is impaired. Memory is impaired.                Significant Labs: All pertinent labs within the past 24 hours have been reviewed.    Significant Imaging: I have reviewed all pertinent imaging results/findings within the past 24 hours.  Assessment/Plan:     * Acute right hip pain  XRAY and CT of right hip showed no acute fracture or dislocation   PT/OT  Consult Ortho   Pain control prn   Spouse would like SNF for frequent falls       Frequent falls  PT/OT  Consult CM for  "possible SNF      Iron deficiency anemia  Anemia is likely due to chronic blood loss. Most recent hemoglobin and hematocrit are listed below.  Recent Labs     11/24/24  1706   HGB 6.5*   HCT 22.9*     Plan  - Monitor serial CBC: Daily  - Transfuse PRBC if patient becomes hemodynamically unstable, symptomatic or H/H drops below 7/21.  - Patient's anemia is currently worsening. Will transfuse one unit PRBC   - Spouse denies any recent melena, hematochezia, or hematemesis     Dementia  Patient with dementia with likely etiology of vascular dementia. Dementia is moderate. The patient does have signs of behavioral disturbance. Home dementia medications are Held or Continued: continued.. Continue non-pharmacologic interventions to prevent delirium (No VS between 11PM-5AM, activity during day, opening blinds, providing glasses/hearing aids, and up in chair during daytime). Will avoid narcotics and benzos unless absolutely necessary. PRN anti-psychotics are prescribed to avoid self harm behaviors.    Angiodysplasia of small intestine  Pt has been on hospice and has not followed up with GI   Will transfuse one unit of PRBC for anemia   Spouse denies melena, hematemesis or hematochezia   Will monitor for now       Hypertension associated with diabetes  Patients blood pressure range in the last 24 hours was: BP  Min: 100/60  Max: 141/80.The patient's inpatient anti-hypertensive regimen is listed below:  Current Antihypertensives  isosorbide mononitrate 24 hr tablet 30 mg, Daily, Oral  metoprolol succinate (TOPROL-XL) 24 hr tablet 25 mg, Daily, Oral    Plan  - BP is controlled, no changes needed to their regimen  Hold Metolazone and Torsemide for now       Diabetes mellitus without complication  Patient's FSGs are controlled on current medication regimen.  Last A1c reviewed-   Lab Results   Component Value Date    HGBA1C 6.7 (H) 08/07/2024     Most recent fingerstick glucose reviewed- No results for input(s): "POCTGLUCOSE" in " the last 24 hours.  Current correctional scale  Low  Maintain anti-hyperglycemic dose as follows-   Antihyperglycemics (From admission, onward)      None          Hold Oral hypoglycemics while patient is in the hospital.    Chronic anticoagulation  Hold Eliquis for now due to severe anemia       Atrial fibrillation  Patient has paroxysmal (<7 days) atrial fibrillation. Patient is currently in sinus rhythm. [id number] Score: 4. The patients heart rate in the last 24 hours is as follows:  Pulse  Min: 61  Max: 91     Antiarrhythmics  metoprolol succinate (TOPROL-XL) 24 hr tablet 25 mg, Daily, Oral    Anticoagulants   Hold Eliquis for now     Plan  - Replete lytes with a goal of K>4, Mg >2  - Patient is anticoagulated, see medications listed above.  - Patient's afib is currently controlled  - Hold Eliquis due to anemia           VTE Risk Mitigation (From admission, onward)           Ordered     IP VTE HIGH RISK PATIENT  Once         11/24/24 1802     Place sequential compression device  Until discontinued         11/24/24 1802     Reason for No Pharmacological VTE Prophylaxis  Once        Question:  Reasons:  Answer:  Risk of Bleeding    11/24/24 1802                         On 11/24/2024, patient should be placed in hospital observation services under my care in collaboration with Dr. Ren.           Lita Lopez, NP  Department of Hospital Medicine  [facility]

## 2024-11-25 NOTE — PLAN OF CARE
[facility] - Med Surg  Discharge Final Note    Primary Care Provider: Giuliano Moran MD    Expected Discharge Date: 11/25/2024    Final Discharge Note (most recent)       Final Note - 11/25/24 1242          Final Note    Assessment Type Final Discharge Note     Anticipated Discharge Disposition Hospice/Home        Post-Acute Status    Post-Acute Authorization Hospice     Hospice Status Set-up Complete/Auth obtained     Discharge Delays None known at this time                   Sw notified Ellen with [facility] of plans for pt to d/c today.     Patient has no d/c needs at this time. Sw to follow up, as needed, for d/c planning purposes.

## 2024-11-25 NOTE — ASSESSMENT & PLAN NOTE
Patients blood pressure range in the last 24 hours was: BP  Min: 100/60  Max: 141/80.The patient's inpatient anti-hypertensive regimen is listed below:  Current Antihypertensives  isosorbide mononitrate 24 hr tablet 30 mg, Daily, Oral  metoprolol succinate (TOPROL-XL) 24 hr tablet 25 mg, Daily, Oral    Plan  - BP is controlled, no changes needed to their regimen  Hold Metolazone and Torsemide for now

## 2024-11-25 NOTE — SUBJECTIVE & OBJECTIVE
Interval History:  See above    Review of Systems   Unable to perform ROS: Dementia     Objective:     Vital Signs (Most Recent):  Temp: 98.1 °F (36.7 °C) (11/25/24 1109)  Pulse: (!) 56 (11/25/24 1109)  Resp: 16 (11/25/24 1109)  BP: (!) 109/59 (11/25/24 1109)  SpO2: 98 % (11/25/24 1109) Vital Signs (24h Range):  Temp:  [97.3 °F (36.3 °C)-98.6 °F (37 °C)] 98.1 °F (36.7 °C)  Pulse:  [56-91] 56  Resp:  [16-20] 16  SpO2:  [90 %-99 %] 98 %  BP: (105-141)/(55-97) 109/59     Weight: 86.2 kg (190 lb 0.6 oz)  Body mass index is 30.67 kg/m².    Intake/Output Summary (Last 24 hours) at 11/25/2024 1505  Last data filed at 11/25/2024 0407  Gross per 24 hour   Intake 805 ml   Output 1 ml   Net 804 ml         Physical Exam  Constitutional:       General: He is not in acute distress.     Appearance: He is well-developed. He is not diaphoretic.   HENT:      Head: Normocephalic and atraumatic.      Nose: Nose normal.   Eyes:      General: No scleral icterus.     Conjunctiva/sclera: Conjunctivae normal.      Pupils: Pupils are equal, round, and reactive to light.   Cardiovascular:      Rate and Rhythm: Normal rate and regular rhythm.      Heart sounds: Normal heart sounds. No murmur heard.     No friction rub. No gallop.   Pulmonary:      Effort: Pulmonary effort is normal. No respiratory distress.      Breath sounds: Normal breath sounds. No stridor. No wheezing or rales.   Chest:      Chest wall: No tenderness.   Abdominal:      General: Bowel sounds are normal. There is no distension.      Palpations: Abdomen is soft. There is no mass.      Tenderness: There is no abdominal tenderness. There is no guarding or rebound.   Musculoskeletal:         General: Tenderness (right posterior iliac crest and buttock) present. No deformity. Normal range of motion.      Cervical back: Normal range of motion and neck supple.      Right lower leg: Edema present.      Comments: No bruising or injury noted  Full ROM noted to right hip without pain  including external and internal rotation   TTP to right posterior iliac crest, buttock, and posterior right thigh    Skin:     General: Skin is warm and dry.      Coloration: Skin is pale.      Findings: No erythema or rash.   Neurological:      Mental Status: He is alert and oriented to person, place, and time.      Cranial Nerves: No cranial nerve deficit.      Motor: No abnormal muscle tone.      Coordination: Coordination normal.      Comments: Oriented to person and place      Psychiatric:         Behavior: Behavior normal.         Thought Content: Thought content normal.         Cognition and Memory: Cognition is impaired. Memory is impaired.             Significant Labs: All pertinent labs within the past 24 hours have been reviewed.    Significant Imaging: I have reviewed all pertinent imaging results/findings within the past 24 hours.

## 2024-11-25 NOTE — PLAN OF CARE
Discussed plan of care with patient and this patient was able to, verbalized understanding.  Patient remains free from injury.  Safety and comfort precautions maintained this shift.   Call light and personal belongings within reach, bed in low position with bed wheels locked.  No s/s of acute distress.   Purposeful rounding continued this shift.  Pain levels are controlled per MD order. IVF infusing.  Cardiac monitoring in place.  Diet orders continued.  Vital signs continued per order.  Q2 repositioning   Patient mobility status with assist X 1  Chart and orders review completed.   Patient education about care completed.     Problem: Skin Injury Risk Increased  Goal: Skin Health and Integrity  11/25/2024 0314 by Namrata Jolley LPN  Outcome: Progressing  11/25/2024 0314 by Namrata Jolley LPN  Outcome: Not Progressing     Problem: Adult Inpatient Plan of Care  Goal: Plan of Care Review  11/25/2024 0314 by Namrata Jolley LPN  Outcome: Progressing  11/25/2024 0314 by Namrata Jolley LPN  Outcome: Not Progressing  Goal: Patient-Specific Goal (Individualized)  11/25/2024 0314 by Namrata Jolley LPN  Outcome: Progressing  11/25/2024 0314 by Namrata Jolley LPN  Outcome: Not Progressing  Goal: Absence of Hospital-Acquired Illness or Injury  11/25/2024 0314 by Namrata Jolley LPN  Outcome: Progressing  11/25/2024 0314 by Namrata Jolley LPN  Outcome: Not Progressing  Goal: Optimal Comfort and Wellbeing  11/25/2024 0314 by Namrata Jolley LPN  Outcome: Progressing  11/25/2024 0314 by Namrata Jolley LPN  Outcome: Not Progressing  Goal: Readiness for Transition of Care  11/25/2024 0314 by Namrata Jolley LPN  Outcome: Progressing  11/25/2024 0314 by Namrata Jolley LPN  Outcome: Not Progressing     Problem: Diabetes Comorbidity  Goal: Blood Glucose Level Within Targeted Range  11/25/2024 0314 by Namrata Jolley LPN  Outcome: Progressing  11/25/2024 0314 by Namrata Jolley LPN  Outcome: Not  Progressing     Problem: Wound  Goal: Optimal Coping  11/25/2024 0314 by Namrata Jolley LPN  Outcome: Progressing  11/25/2024 0314 by Namrata Jolley LPN  Outcome: Not Progressing  Goal: Optimal Functional Ability  11/25/2024 0314 by Namrata Jolley LPN  Outcome: Progressing  11/25/2024 0314 by Namrata Jolley LPN  Outcome: Not Progressing  Goal: Absence of Infection Signs and Symptoms  11/25/2024 0314 by Namrata Jolley LPN  Outcome: Progressing  11/25/2024 0314 by Namrata Jolley LPN  Outcome: Not Progressing  Goal: Improved Oral Intake  11/25/2024 0314 by Namrata Jolley LPN  Outcome: Progressing  11/25/2024 0314 by Namrata Jolley LPN  Outcome: Not Progressing  Goal: Optimal Pain Control and Function  11/25/2024 0314 by Namrata Jolley LPN  Outcome: Progressing  11/25/2024 0314 by Namrata Jolley LPN  Outcome: Not Progressing  Goal: Skin Health and Integrity  11/25/2024 0314 by Namrata Jolley LPN  Outcome: Progressing  11/25/2024 0314 by Namrata Jolley LPN  Outcome: Not Progressing  Goal: Optimal Wound Healing  11/25/2024 0314 by Namrata Jolley LPN  Outcome: Progressing  11/25/2024 0314 by Namrata Jolley LPN  Outcome: Not Progressing     Problem: Infection  Goal: Absence of Infection Signs and Symptoms  11/25/2024 0314 by Namrata Jolley LPN  Outcome: Progressing  11/25/2024 0314 by Namrata Jolley LPN  Outcome: Not Progressing

## 2024-11-25 NOTE — ASSESSMENT & PLAN NOTE
Anemia is likely due to chronic blood loss. Most recent hemoglobin and hematocrit are listed below.  Recent Labs     11/24/24  1706 11/24/24  1912 11/25/24  0542   HGB 6.5* 6.5* 7.2*   HCT 22.9* 22.4* 24.6*       Plan  - Monitor serial CBC: Daily  - Transfuse PRBC if patient becomes hemodynamically unstable, symptomatic or H/H drops below 7/21.  - Patient's anemia is currently worsening. Will transfuse one unit PRBC   - Spouse denies any recent melena, hematochezia, or hematemesis     Will start Venofer and ferrous sulfate

## 2024-11-26 VITALS
HEIGHT: 66 IN | HEART RATE: 83 BPM | RESPIRATION RATE: 20 BRPM | DIASTOLIC BLOOD PRESSURE: 57 MMHG | WEIGHT: 184.75 LBS | OXYGEN SATURATION: 93 % | TEMPERATURE: 98 F | SYSTOLIC BLOOD PRESSURE: 110 MMHG | BODY MASS INDEX: 29.69 KG/M2

## 2024-11-26 LAB
ALBUMIN SERPL BCP-MCNC: 2.7 G/DL (ref 3.5–5.2)
ALP SERPL-CCNC: 78 U/L (ref 40–150)
ALT SERPL W/O P-5'-P-CCNC: 8 U/L (ref 10–44)
ANION GAP SERPL CALC-SCNC: 6 MMOL/L (ref 8–16)
AST SERPL-CCNC: 18 U/L (ref 10–40)
BASOPHILS # BLD AUTO: 0.01 K/UL (ref 0–0.2)
BASOPHILS NFR BLD: 0.1 % (ref 0–1.9)
BILIRUB SERPL-MCNC: 1.1 MG/DL (ref 0.1–1)
BUN SERPL-MCNC: 23 MG/DL (ref 8–23)
CALCIUM SERPL-MCNC: 9.2 MG/DL (ref 8.7–10.5)
CHLORIDE SERPL-SCNC: 92 MMOL/L (ref 95–110)
CO2 SERPL-SCNC: 42 MMOL/L (ref 23–29)
CREAT SERPL-MCNC: 1 MG/DL (ref 0.5–1.4)
DIFFERENTIAL METHOD BLD: ABNORMAL
EOSINOPHIL # BLD AUTO: 0.3 K/UL (ref 0–0.5)
EOSINOPHIL NFR BLD: 4.6 % (ref 0–8)
ERYTHROCYTE [DISTWIDTH] IN BLOOD BY AUTOMATED COUNT: 18.7 % (ref 11.5–14.5)
EST. GFR  (NO RACE VARIABLE): >60 ML/MIN/1.73 M^2
GLUCOSE SERPL-MCNC: 101 MG/DL (ref 70–110)
HCT VFR BLD AUTO: 25.2 % (ref 40–54)
HGB BLD-MCNC: 7.2 G/DL (ref 14–18)
IMM GRANULOCYTES # BLD AUTO: 0.04 K/UL (ref 0–0.04)
IMM GRANULOCYTES NFR BLD AUTO: 0.6 % (ref 0–0.5)
LYMPHOCYTES # BLD AUTO: 1.3 K/UL (ref 1–4.8)
LYMPHOCYTES NFR BLD: 18.5 % (ref 18–48)
MAGNESIUM SERPL-MCNC: 2.2 MG/DL (ref 1.6–2.6)
MCH RBC QN AUTO: 23.5 PG (ref 27–31)
MCHC RBC AUTO-ENTMCNC: 28.6 G/DL (ref 32–36)
MCV RBC AUTO: 82 FL (ref 82–98)
MONOCYTES # BLD AUTO: 0.8 K/UL (ref 0.3–1)
MONOCYTES NFR BLD: 10.8 % (ref 4–15)
NEUTROPHILS # BLD AUTO: 4.6 K/UL (ref 1.8–7.7)
NEUTROPHILS NFR BLD: 65.4 % (ref 38–73)
NRBC BLD-RTO: 0 /100 WBC
PHOSPHATE SERPL-MCNC: 1.8 MG/DL (ref 2.7–4.5)
PLATELET # BLD AUTO: 292 K/UL (ref 150–450)
PMV BLD AUTO: 10.2 FL (ref 9.2–12.9)
POTASSIUM SERPL-SCNC: 2.8 MMOL/L (ref 3.5–5.1)
PROT SERPL-MCNC: 6.6 G/DL (ref 6–8.4)
RBC # BLD AUTO: 3.07 M/UL (ref 4.6–6.2)
SODIUM SERPL-SCNC: 140 MMOL/L (ref 136–145)
WBC # BLD AUTO: 7.01 K/UL (ref 3.9–12.7)

## 2024-11-26 PROCEDURE — 63600175 PHARM REV CODE 636 W HCPCS: Mod: HCNC | Performed by: FAMILY MEDICINE

## 2024-11-26 PROCEDURE — 84100 ASSAY OF PHOSPHORUS: CPT | Mod: HCNC | Performed by: NURSE PRACTITIONER

## 2024-11-26 PROCEDURE — 25000003 PHARM REV CODE 250: Mod: HCNC | Performed by: NURSE PRACTITIONER

## 2024-11-26 PROCEDURE — 83735 ASSAY OF MAGNESIUM: CPT | Mod: HCNC | Performed by: NURSE PRACTITIONER

## 2024-11-26 PROCEDURE — 36415 COLL VENOUS BLD VENIPUNCTURE: CPT | Mod: HCNC | Performed by: NURSE PRACTITIONER

## 2024-11-26 PROCEDURE — 99900035 HC TECH TIME PER 15 MIN (STAT): Mod: HCNC

## 2024-11-26 PROCEDURE — 94761 N-INVAS EAR/PLS OXIMETRY MLT: CPT | Mod: HCNC

## 2024-11-26 PROCEDURE — 97116 GAIT TRAINING THERAPY: CPT | Mod: HCNC

## 2024-11-26 PROCEDURE — G0378 HOSPITAL OBSERVATION PER HR: HCPCS | Mod: HCNC

## 2024-11-26 PROCEDURE — 85025 COMPLETE CBC W/AUTO DIFF WBC: CPT | Mod: HCNC | Performed by: NURSE PRACTITIONER

## 2024-11-26 PROCEDURE — 96375 TX/PRO/DX INJ NEW DRUG ADDON: CPT

## 2024-11-26 PROCEDURE — 97530 THERAPEUTIC ACTIVITIES: CPT | Mod: HCNC

## 2024-11-26 PROCEDURE — 63700000 PHARM REV CODE 250 ALT 637 W/O HCPCS: Mod: HCNC | Performed by: FAMILY MEDICINE

## 2024-11-26 PROCEDURE — 25000003 PHARM REV CODE 250: Mod: HCNC | Performed by: FAMILY MEDICINE

## 2024-11-26 PROCEDURE — 80053 COMPREHEN METABOLIC PANEL: CPT | Mod: HCNC | Performed by: NURSE PRACTITIONER

## 2024-11-26 PROCEDURE — 27000221 HC OXYGEN, UP TO 24 HOURS: Mod: HCNC

## 2024-11-26 RX ORDER — POTASSIUM CHLORIDE 20 MEQ/15ML
40 SOLUTION ORAL ONCE
Status: COMPLETED | OUTPATIENT
Start: 2024-11-26 | End: 2024-11-26

## 2024-11-26 RX ADMIN — SENNOSIDES AND DOCUSATE SODIUM 1 TABLET: 50; 8.6 TABLET ORAL at 09:11

## 2024-11-26 RX ADMIN — ISOSORBIDE MONONITRATE 30 MG: 30 TABLET, EXTENDED RELEASE ORAL at 09:11

## 2024-11-26 RX ADMIN — DEXTROSE MONOHYDRATE 500 MG: 50 INJECTION, SOLUTION INTRAVENOUS at 11:11

## 2024-11-26 RX ADMIN — PANTOPRAZOLE SODIUM 40 MG: 40 TABLET, DELAYED RELEASE ORAL at 09:11

## 2024-11-26 RX ADMIN — ALLOPURINOL 100 MG: 100 TABLET ORAL at 09:11

## 2024-11-26 RX ADMIN — FERROUS SULFATE TAB 325 MG (65 MG ELEMENTAL FE) 1 EACH: 325 (65 FE) TAB at 09:11

## 2024-11-26 RX ADMIN — FLUOXETINE HYDROCHLORIDE 10 MG: 10 CAPSULE ORAL at 06:11

## 2024-11-26 RX ADMIN — ATORVASTATIN CALCIUM 40 MG: 40 TABLET, FILM COATED ORAL at 09:11

## 2024-11-26 RX ADMIN — METOPROLOL SUCCINATE 25 MG: 25 TABLET, EXTENDED RELEASE ORAL at 09:11

## 2024-11-26 RX ADMIN — POTASSIUM CHLORIDE 40 MEQ: 1.5 SOLUTION ORAL at 02:11

## 2024-11-26 RX ADMIN — POTASSIUM CHLORIDE 40 MEQ: 1.5 SOLUTION ORAL at 11:11

## 2024-11-26 NOTE — DISCHARGE SUMMARY
[facility] Medicine  Discharge Summary      Patient Name: Anthony Biggs Jr.  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 Spouse reports at approx midnight, she found the pt on the floor with his walker on top of him. He as laying on his right side and was unable to get up. He could not bear weight on his right leg and was holding his right hip. She got him off the floor. When the hospice nurse came by, they decided to rescind hospice and send to ED for evaluation. According to pt's spouse, the patient has been doing very well and has been more mobile and more oriented lately. He does have frequent falls. The spouse is requesting SNF rehab at [facility] after cleared from any fractures.     In the ED, Afebrile, VSS, labs revealed WBC normal, Hgb 6.5, INR 1.1, UA- unremarkable, CXR-clear, Right hip with pelvis was negative for fracture or dislocation, Right shoulder was negative for fracture/dislocation, Right knee was negative for fracture/dislocation, CT head showed nothing acute, Xray right ankle was negative for acute fracture/dislocation, CT Right hip showed no definite fracture, Some mild fat stranding along the ileo psoas musculature, Xray lumbar spine showed no fracture, DDD.   CMP pending   The patient was agitated in the ED and required IV Ativan   The patient is a DNR, His wife is his SDM    * No surgery found *      Hospital Course:   11/25/2024  Pain controlled.  Hypokalemia  noted.  Will replace electrolytes.   consulted.  Wife would like to return back home on hospice.  Would like to stay overnight for monitoring.  Initially planned for MRI of pelvis however patient unlikely to be surgical candidate therefore MRI was deferred.  Anticipate DC tomorrow.    Pain controlled. Electrolytes repleted. Diamox given. Patient discharged home with hospice.      Goals of Care Treatment Preferences:  Code Status: DNR      SDOH Screening:  The patient was screened for utility difficulties, food insecurity, transport difficulties, housing insecurity, and interpersonal safety and there were no concerns identified this admission.     Consults:   Consults (From admission, onward)          Status Ordering Provider     Inpatient consult to Social Work  Once        Provider:  (Not yet assigned)    Completed NIRAV HUNT     Inpatient consult to Social Work  Once        Provider:  (Not yet assigned)    Completed RICHARD CORDOVA            No new Assessment & Plan notes have been filed under this hospital service since the last note was generated.  Service: Hospital Medicine    Final Active Diagnoses:    Diagnosis Date Noted POA    PRINCIPAL PROBLEM:  Acute right hip pain [M25.551] 11/24/2024 Yes    Frequent falls [R29.6] 11/24/2024 Not Applicable    Iron deficiency anemia [D50.9] 11/24/2024 Yes    Dementia [F03.90] 02/25/2023 Yes    Angiodysplasia of small intestine [K55.20]  Yes    Diabetes mellitus without complication [E11.9]  Yes    Hypertension associated with diabetes [E11.59, I15.2]  Yes    Chronic anticoagulation [Z79.01] 08/07/2017 Not Applicable    Atrial fibrillation [I48.91]  Yes      Problems Resolved During this Admission:       Discharged Condition: stable    Disposition:     Follow Up:   Follow-up Information       Giuliano Moran MD Follow up in 1 week(s).    Specialty: Internal Medicine  Contact information:  [address]   [address]  [facility] [address] [phone]                            Patient Instructions:      Ambulatory referral/consult to Outpatient Case Management   Referral Priority: Routine Referral Type: Consultation   Referral Reason: Specialty Services Required   Number of Visits Requested: 1       Significant Diagnostic Studies: N/A    Pending Diagnostic Studies:       None           Medications:  Reconciled Home Medications:      Medication List        START taking these medications      ferrous sulfate 324 mg (65 mg iron) Tbec  Take 1 tablet (324 mg total) by mouth once daily.     HYDROcodone-acetaminophen 5-325 mg per tablet  Commonly known as: NORCO  Take 1 tablet by mouth every 6 (six) hours as needed for Pain.     potassium chloride 10% 20 mEq/15 mL oral solution  Commonly known as: KAYCIEL  Take 30 mLs (40 mEq total) by mouth once daily. for 3 days            CONTINUE taking these medications      allopurinoL 100 MG tablet  Commonly known as: ZYLOPRIM  Take 1 tablet (100 mg total) by mouth once daily.     apixaban 2.5 mg Tab  Commonly known as: ELIQUIS  Take 1 tablet (2.5 mg total) by mouth 2 (two) times daily.     atorvastatin 40 MG tablet  Commonly known as: LIPITOR  Take 1 tablet (40 mg total) by mouth once daily. for 360 doses     docusate sodium 100 MG capsule  Commonly known as: COLACE  Take 100 mg by mouth daily as needed.     donepeziL 10 MG tablet  Commonly known as: ARICEPT  Take 1 tablet (10 mg total) by mouth every evening.     FLUoxetine 10 MG capsule  Take 1 capsule (10 mg total) by mouth every morning.     gabapentin 100 MG capsule  Commonly known as: NEURONTIN  Take 100 mg by mouth 3 (three) times daily as needed.     haloperidol 2 mg/mL solution  Commonly known as: HALDOL  Take 1 mg by mouth every 6 (six) hours as needed (agitation).     isosorbide mononitrate 30 MG 24 hr tablet  Commonly known as: IMDUR  Take 1 tablet (30 mg total) by mouth once daily.     LORazepam 0.5 MG tablet  Commonly known as: ATIVAN  Take 1 tablet (0.5 mg total) by mouth  every 12 (twelve) hours as needed for Anxiety.     metOLazone 5 MG tablet  Commonly known as: ZAROXOLYN  Take 1 tablet (5 mg total) by mouth once daily.     metoprolol succinate 25 MG 24 hr tablet  Commonly known as: TOPROL-XL  Take 1 tablet (25 mg total) by mouth once daily.     mupirocin 2 % ointment  Commonly known as: BACTROBAN  Apply topically 3 (three) times daily.     nystatin-triamcinolone cream  Commonly known as: MYCOLOG II  Apply topically 4 (four) times daily.     senna-docusate 8.6-50 mg 8.6-50 mg per tablet  Commonly known as: PERICOLACE  Take 1 tablet by mouth 2 (two) times daily.     torsemide 10 MG Tab  Commonly known as: DEMADEX  Take 1 tablet (10 mg total) by mouth daily as needed.            ASK your doctor about these medications      pantoprazole 40 MG tablet  Commonly known as: PROTONIX  Take 1 tablet (40 mg total) by mouth once daily.              Indwelling Lines/Drains at time of discharge:   Lines/Drains/Airways       None                   Time spent on the discharge of patient: 36 minutes         Huseyin Ren MD  Department of Hospital Medicine  [facility] Surg

## 2024-11-26 NOTE — PLAN OF CARE
Problem: Skin Injury Risk Increased  Goal: Skin Health and Integrity  Outcome: Met     Problem: Adult Inpatient Plan of Care  Goal: Plan of Care Review  Outcome: Met  Goal: Patient-Specific Goal (Individualized)  Outcome: Met  Goal: Absence of Hospital-Acquired Illness or Injury  Outcome: Met  Goal: Optimal Comfort and Wellbeing  Outcome: Met  Goal: Readiness for Transition of Care  Outcome: Met     Problem: Diabetes Comorbidity  Goal: Blood Glucose Level Within Targeted Range  Outcome: Met     Problem: Wound  Goal: Optimal Coping  Outcome: Met  Goal: Optimal Functional Ability  Outcome: Met  Goal: Absence of Infection Signs and Symptoms  Outcome: Met  Goal: Improved Oral Intake  Outcome: Met  Goal: Optimal Pain Control and Function  Outcome: Met  Goal: Skin Health and Integrity  Outcome: Met  Goal: Optimal Wound Healing  Outcome: Met     Problem: Infection  Goal: Absence of Infection Signs and Symptoms  Outcome: Met   Discussed poc with pt, pt verbalized understanding    Purposeful rounding every 2hours    VS wnl  Cardiac monitoring in use, pt is Afib, tele monitor #  Fall precautions in place, remains injury free  Pt denies c/o pain   All meds given as prescribed  Bed locked at lowest position  Call light within reach    Chart check complete  Will cont with POC

## 2024-11-26 NOTE — PT/OT/SLP PROGRESS
"Physical Therapy  Treatment    [place] DE[facility] Biggs Jr.   MRN: [id number]   Admitting Diagnosis: Acute right hip pain    PT Received On: 11/26/24  PT Start Time: 1100     PT Stop Time: 1123    PT Total Time (min): 23 min       Billable Minutes:  Gait Training 13 and Therapeutic Activity 10    Treatment Type: Treatment  PT/PTA: PT     Number of PTA visits since last PT visit: 0       General Precautions: Standard, fall  Orthopedic Precautions: N/A  Braces: N/A  Respiratory Status: Room air         Subjective:  Communicated with NURSE AND EPIC CHART REVIEW  prior to session.   PT AGREED TO TX    Pain/Comfort  Pain Rating 1: 3/10  Location - Side 1: Right  Location 1: leg  Pain Addressed 1: Reposition  Pain Rating Post-Intervention 1: 3/10    Objective:   Patient found with: peripheral IV, oxygen    Functional Mobility:  PT MET IN RM SUP>SIT EOB WITH CGA. PT SCOOTED TO EOB AND COMPLETED AP AND TKE X 10 REPS. GT. BELT AND  SOCKS DONNED PRIOR TO OOB MOBILITY.  PT STOOD WITH RW AND CGA FOR GT TRAINING. PT GT TRAINED X 60' WITH STEP TO GT AND CGA WITH CUES FOR SAFETY. PT RETURNED TO RM AND PT DECLINED OOB TO CHAIR. PT T/F TO EOB AND SCOOTED IN BED WITH CGA. PT SUP IN BED WITH SBA. PT LEFT WITH HOB ELEVATED AND ALL NEEDS MET.     Treatment and Education:  PT EDUCATED ON "CALL DON'T FALL", ENCOURAGED TO CALL FOR ASSISTANCE WITH ALL NEEDS FOR OOB MOBILITY.       AM-PAC 6 CLICK MOBILITY  How much help from another person does this patient currently need?   1 = Unable, Total/Dependent Assistance  2 = A lot, Maximum/Moderate Assistance  3 = A little, Minimum/Contact Guard/Supervision  4 = None, Modified [place]/Independent    Turning over in bed (including adjusting bedclothes, sheets and blankets)?: 3  Sitting down on and standing up from a chair with arms (e.g., wheelchair, bedside commode, etc.): 3  Moving from lying on back to sitting on the side of the bed?: 3  Moving to and from a bed to a chair (including a wheelchair)?: " 3  Need to walk in hospital room?: 3  Climbing 3-5 steps with a railing?: 1  Basic Mobility Total Score: 16    AM-PAC Raw Score CMS G-Code Modifier Level of Impairment Assistance   6 % Total / Unable   7 - 9 CM 80 - 100% Maximal Assist   10 - 14 CL 60 - 80% Moderate Assist   15 - 19 CK 40 - 60% Moderate Assist   20 - 22 CJ 20 - 40% Minimal Assist   23 CI 1-20% SBA / CGA   24 CH 0% Independent/ Mod I     Patient left HOB elevated with all lines intact, call button in reach, and WIFE present.    Assessment:  PT PROGRESSING WITH GT.     Rehab identified problem list/impairments: weakness, impaired endurance, gait instability, impaired functional mobility, impaired balance, pain, decreased safety awareness, decreased lower extremity function, impaired cognition, decreased ROM, impaired self care skills    Rehab potential is fair.    Activity tolerance: Fair    Discharge recommendations: Low Intensity Therapy      Barriers to discharge:      Equipment recommendations: walker, rolling     GOALS:   Multidisciplinary Problems       Physical Therapy Goals          Problem: Physical Therapy    Goal Priority Disciplines Outcome Interventions   Physical Therapy Goal     PT, PT/OT     Description: LT24  1. PT WILL COMPLETE BED MOBILITY WITH MIN A  2. PT WILL STAND PIVOT T/F TO CHAIR WITH RW AND SBA  3. PT WILL GT TRAIN X 100' WITH RW AND SBA TO PROGRESS GT.   4. PT WILL INC AMPAC SCORE BY 2 POINTS TO PROGRESS GROSS FUNC MOBILITY.                          PLAN:    Patient to be seen 3 x/week to address the above listed problems via gait training, therapeutic activities  Plan of Care expires: 24  Plan of Care reviewed with: patient, spouse         2024

## 2024-11-26 NOTE — PLAN OF CARE
Discharge education given. Pt and spouse verbalized an understanding. IV removed and catheter intact. Pt being discharged with personal belongings. Pt will be wheeled down. Heart monitor given to unit secretary.

## 2024-12-10 ENCOUNTER — OUTPATIENT CASE MANAGEMENT (OUTPATIENT)
Dept: ADMINISTRATIVE | Facility: OTHER | Age: 84
End: 2024-12-10
Payer: MEDICARE

## 2025-01-10 ENCOUNTER — TELEPHONE (OUTPATIENT)
Dept: INTERNAL MEDICINE | Facility: CLINIC | Age: 85
End: 2025-01-10
Payer: MEDICARE

## 2025-01-10 DIAGNOSIS — H61.23 BILATERAL IMPACTED CERUMEN: ICD-10-CM

## 2025-01-10 DIAGNOSIS — E11.9 DIABETES MELLITUS WITHOUT COMPLICATION: Primary | ICD-10-CM

## 2025-01-10 NOTE — TELEPHONE ENCOUNTER
Mrs. Biggs came in to the office today to ask if there was any way that Mr. Cruz can come in every 6 months or so to get blood levels checked. Mr. Cruz has been on hospice for about 6 months but he was previously in the hospital and needed blood due to low levels. Mrs. Biggs stated that hospice told her they do not do lab draws so she wanted to know if that would be something we can order(whether it be Dr. Moran or another provider).   She also stated that once or twice a year she would have to take Mr. Cruz to ENT to have his ears cleaned out due to wax build up and she is requesting a referral to have it done again.

## 2025-01-10 NOTE — TELEPHONE ENCOUNTER
Patient needs ENT appt scheduled please.       I attempted to schedule, it shows that pt was previously seen by an ENT provider that is not available anymore and to send a message for scheduling appt. Please contact patient at [phone] for appt.

## 2025-01-10 NOTE — TELEPHONE ENCOUNTER
----- Message from Summer sent at 1/10/2025  3:41 PM CST -----  Contact: Laurie/wife  Type:  Patient Returning Call    Who Called: Laurie/wife  Who Left Message for Patient:Presley Ahuja MA  Does the patient know what this is regarding?: yes   Would the patient rather a call back or a response via [facility]? Call back   Best Call Back Number:[phone]  Additional Information:

## 2025-01-13 DIAGNOSIS — Z00.00 ENCOUNTER FOR MEDICARE ANNUAL WELLNESS EXAM: ICD-10-CM

## 2025-01-14 ENCOUNTER — LAB VISIT (OUTPATIENT)
Dept: LAB | Facility: HOSPITAL | Age: 85
End: 2025-01-14
Attending: INTERNAL MEDICINE
Payer: MEDICARE

## 2025-01-14 DIAGNOSIS — E11.9 DIABETES MELLITUS WITHOUT COMPLICATION: ICD-10-CM

## 2025-01-14 LAB
ALBUMIN SERPL BCP-MCNC: 3.2 G/DL (ref 3.5–5.2)
ALP SERPL-CCNC: 77 U/L (ref 40–150)
ALT SERPL W/O P-5'-P-CCNC: 10 U/L (ref 10–44)
ANION GAP SERPL CALC-SCNC: 10 MMOL/L (ref 8–16)
AST SERPL-CCNC: 17 U/L (ref 10–40)
BASOPHILS # BLD AUTO: 0.02 K/UL (ref 0–0.2)
BASOPHILS NFR BLD: 0.4 % (ref 0–1.9)
BILIRUB SERPL-MCNC: 0.4 MG/DL (ref 0.1–1)
BUN SERPL-MCNC: 23 MG/DL (ref 8–23)
CALCIUM SERPL-MCNC: 9.5 MG/DL (ref 8.7–10.5)
CHLORIDE SERPL-SCNC: 94 MMOL/L (ref 95–110)
CO2 SERPL-SCNC: 31 MMOL/L (ref 23–29)
CREAT SERPL-MCNC: 0.9 MG/DL (ref 0.5–1.4)
DIFFERENTIAL METHOD BLD: ABNORMAL
EOSINOPHIL # BLD AUTO: 0.3 K/UL (ref 0–0.5)
EOSINOPHIL NFR BLD: 4.7 % (ref 0–8)
ERYTHROCYTE [DISTWIDTH] IN BLOOD BY AUTOMATED COUNT: 20.1 % (ref 11.5–14.5)
EST. GFR  (NO RACE VARIABLE): >60 ML/MIN/1.73 M^2
ESTIMATED AVG GLUCOSE: 128 MG/DL (ref 68–131)
GLUCOSE SERPL-MCNC: 126 MG/DL (ref 70–110)
HBA1C MFR BLD: 6.1 % (ref 4–5.6)
HCT VFR BLD AUTO: 31.9 % (ref 40–54)
HGB BLD-MCNC: 9.3 G/DL (ref 14–18)
IMM GRANULOCYTES # BLD AUTO: 0.01 K/UL (ref 0–0.04)
IMM GRANULOCYTES NFR BLD AUTO: 0.2 % (ref 0–0.5)
LYMPHOCYTES # BLD AUTO: 1.5 K/UL (ref 1–4.8)
LYMPHOCYTES NFR BLD: 27.7 % (ref 18–48)
MCH RBC QN AUTO: 26.2 PG (ref 27–31)
MCHC RBC AUTO-ENTMCNC: 29.2 G/DL (ref 32–36)
MCV RBC AUTO: 90 FL (ref 82–98)
MONOCYTES # BLD AUTO: 0.7 K/UL (ref 0.3–1)
MONOCYTES NFR BLD: 13 % (ref 4–15)
NEUTROPHILS # BLD AUTO: 3 K/UL (ref 1.8–7.7)
NEUTROPHILS NFR BLD: 54 % (ref 38–73)
NRBC BLD-RTO: 0 /100 WBC
PLATELET # BLD AUTO: 422 K/UL (ref 150–450)
PMV BLD AUTO: 9.8 FL (ref 9.2–12.9)
POTASSIUM SERPL-SCNC: 3.2 MMOL/L (ref 3.5–5.1)
PROT SERPL-MCNC: 7.5 G/DL (ref 6–8.4)
RBC # BLD AUTO: 3.55 M/UL (ref 4.6–6.2)
SODIUM SERPL-SCNC: 135 MMOL/L (ref 136–145)
WBC # BLD AUTO: 5.48 K/UL (ref 3.9–12.7)

## 2025-01-14 PROCEDURE — 80053 COMPREHEN METABOLIC PANEL: CPT | Mod: HCNC | Performed by: INTERNAL MEDICINE

## 2025-01-14 PROCEDURE — 83036 HEMOGLOBIN GLYCOSYLATED A1C: CPT | Mod: HCNC | Performed by: INTERNAL MEDICINE

## 2025-01-14 PROCEDURE — 85025 COMPLETE CBC W/AUTO DIFF WBC: CPT | Mod: HCNC | Performed by: INTERNAL MEDICINE

## 2025-01-16 ENCOUNTER — OFFICE VISIT (OUTPATIENT)
Dept: OTOLARYNGOLOGY | Facility: CLINIC | Age: 85
End: 2025-01-16
Payer: MEDICARE

## 2025-01-16 ENCOUNTER — TELEPHONE (OUTPATIENT)
Dept: INTERNAL MEDICINE | Facility: CLINIC | Age: 85
End: 2025-01-16
Payer: MEDICARE

## 2025-01-16 DIAGNOSIS — H61.23 BILATERAL IMPACTED CERUMEN: ICD-10-CM

## 2025-01-16 PROCEDURE — 99499 UNLISTED E&M SERVICE: CPT | Mod: 25,HCNC,S$GLB, | Performed by: PHYSICIAN ASSISTANT

## 2025-01-16 PROCEDURE — 99999 PR PBB SHADOW E&M-EST. PATIENT-LVL III: CPT | Mod: PBBFAC,HCNC,, | Performed by: PHYSICIAN ASSISTANT

## 2025-01-16 PROCEDURE — 69210 REMOVE IMPACTED EAR WAX UNI: CPT | Mod: HCNC,S$GLB,, | Performed by: PHYSICIAN ASSISTANT

## 2025-01-16 NOTE — TELEPHONE ENCOUNTER
Attempted to contact patient. Call forwarded to .    ----- Message from Mirna sent at 1/16/2025  9:48 AM CST -----  Contact: Marino / Wife  Type:  Test Results    Who Called: Marino  Name of Test (Lab/Mammo/Etc): Fasting labs  Date of Test: 01/14/2025  Ordering Provider: JACK Meyers  Where the test was performed[facility]  Would the patient rather a call back or a response via [facility]? Call back  Best Call Back Number: [phone]  Additional Information:

## 2025-01-16 NOTE — PROGRESS NOTES
Subjective:   Cerumen impactions     Patient ID: Anthony Biggs Jr. is a 84 y.o. male.    Chief Complaint:  Excessive ear wax     Anthony Biggs Jr. is a 84 y.o. male here to see me today for evaluation of a possible wax impaction in bilateral ears. Patient has history of dementia, wife helps act as historian.  He has complaints of hearing loss in the affected ears, but denies pain or drainage.  This has been an issue in the past.  The patient has not been using any sort of ear drop to soften the wax.    HPI  Review of Systems   HENT: Positive for hearing loss. Negative for ear discharge, ear pain and tinnitus.        Objective:     Physical Exam   HENT:   Right Ear: External ear and ear canal normal. Decreased hearing is noted.   Left Ear: External ear and ear canal normal. Decreased hearing is noted.   Bilateral complete cerumen impactions, removal described below       Procedure Note    CHIEF COMPLAINT:  Cerumen Impaction    Description:  The patient was seated in an exam chair.  An ear speculum was placed in the right EAC and was examined under the microscope.  Suction and/or loop curettes were used to remove a large cerumen impaction.  The tympanic membrane was visualized and was normal in appearance.  The procedure was repeated on the left side in a similar fashion.  The TM was intact and normal on this side as well.  The patient tolerated the procedure well.           Assessment:     1. Bilateral impacted cerumen        Plan:     1.  Cerumen impaction:  Removed today without difficulty.  I would recommend the use of a wax softening drop, either over the counter Debrox or mineral oil, on a weekly basis.  I also instructed the patient to avoid Qtips.  RTC as needed for ear cleaning.

## 2025-02-06 NOTE — TELEPHONE ENCOUNTER
Please call to change your appt on 10/02/20 with Dr Griffith to a virtual visit, or if needed to another day. lucy   Thank you for visiting us today.    It was great to see you today, here is a summary of my recommendations based on what we discussed:  The exam and x-rays consistent with a mallet injury, possible associated fracture, we will also have the radiologist review this, it is important that we keep the finger extended at all times for the tendon to heal.  I recommend a follow-up in 3 to 4 weeks.  There is a little damage to the nailbed but no signs of secondary infection.  If you have to replace the splint try to do it relatively fast to avoid the finger to go back into flexion.        Please let me know if you have any questions, you have access to [facility] you can send direct messages.    Dr Sosa (Rayray Govea MD)

## 2025-06-26 ENCOUNTER — PATIENT OUTREACH (OUTPATIENT)
Dept: ADMINISTRATIVE | Facility: HOSPITAL | Age: 85
End: 2025-06-26
Payer: MEDICARE

## 2025-06-26 NOTE — PROGRESS NOTES
Dr Carvajal's pts needing to establish report - chart searched, attempted to contact pt, no answer, LVM

## (undated) DEVICE — GUIDEWIRE CHOICE PT FLPY 182CM

## (undated) DEVICE — CATH NC QUANTUM APEX MR 3X20

## (undated) DEVICE — CATH IMPULSE PIGTAIL 6F 110CM

## (undated) DEVICE — SHEATH INTRODUCER 6FR 11CM

## (undated) DEVICE — CATH DIAG IMPULSE 6FR FR4

## (undated) DEVICE — CATH IMPULSE 6FR MULTI-PAK

## (undated) DEVICE — CATH BLLN FG APEX MR 2.50X20MM

## (undated) DEVICE — DEVICE PERCLOSE SUT CLSR 6FR

## (undated) DEVICE — OMNIPAQUE 300MG 150ML VIAL

## (undated) DEVICE — Device

## (undated) DEVICE — PACK CATH LAB CUSTOM BR

## (undated) DEVICE — CATH DIAG IMPULSE 6FR FL4

## (undated) DEVICE — WIRE GUIDE TEFLON 3CM .035 145